# Patient Record
Sex: FEMALE | Race: WHITE | NOT HISPANIC OR LATINO
[De-identification: names, ages, dates, MRNs, and addresses within clinical notes are randomized per-mention and may not be internally consistent; named-entity substitution may affect disease eponyms.]

---

## 2017-12-12 PROBLEM — Z00.00 ENCOUNTER FOR PREVENTIVE HEALTH EXAMINATION: Status: ACTIVE | Noted: 2017-12-12

## 2017-12-18 ENCOUNTER — APPOINTMENT (OUTPATIENT)
Dept: OBGYN | Facility: CLINIC | Age: 62
End: 2017-12-18
Payer: MEDICARE

## 2017-12-18 VITALS
SYSTOLIC BLOOD PRESSURE: 165 MMHG | WEIGHT: 155 LBS | HEIGHT: 61 IN | BODY MASS INDEX: 29.27 KG/M2 | DIASTOLIC BLOOD PRESSURE: 87 MMHG

## 2017-12-18 DIAGNOSIS — Z87.09 PERSONAL HISTORY OF OTHER DISEASES OF THE RESPIRATORY SYSTEM: ICD-10-CM

## 2017-12-18 DIAGNOSIS — N39.46 MIXED INCONTINENCE: ICD-10-CM

## 2017-12-18 DIAGNOSIS — Z01.419 ENCOUNTER FOR GYNECOLOGICAL EXAMINATION (GENERAL) (ROUTINE) W/OUT ABNORMAL FINDINGS: ICD-10-CM

## 2017-12-18 DIAGNOSIS — Z82.3 FAMILY HISTORY OF STROKE: ICD-10-CM

## 2017-12-18 DIAGNOSIS — Z82.49 FAMILY HISTORY OF ISCHEMIC HEART DISEASE AND OTHER DISEASES OF THE CIRCULATORY SYSTEM: ICD-10-CM

## 2017-12-18 DIAGNOSIS — Z86.19 PERSONAL HISTORY OF OTHER INFECTIOUS AND PARASITIC DISEASES: ICD-10-CM

## 2017-12-18 DIAGNOSIS — N89.8 OTHER SPECIFIED NONINFLAMMATORY DISORDERS OF VAGINA: ICD-10-CM

## 2017-12-18 PROCEDURE — 99386 PREV VISIT NEW AGE 40-64: CPT

## 2017-12-18 PROCEDURE — G0101: CPT

## 2017-12-22 LAB
CANDIDA VAG CYTO: DETECTED
CYTOLOGY CVX/VAG DOC THIN PREP: NORMAL
G VAGINALIS+PREV SP MTYP VAG QL MICRO: NOT DETECTED
T VAGINALIS VAG QL WET PREP: NOT DETECTED

## 2018-01-05 PROBLEM — N39.46 URGE AND STRESS INCONTINENCE: Status: ACTIVE | Noted: 2018-01-05

## 2020-02-27 ENCOUNTER — NON-APPOINTMENT (OUTPATIENT)
Age: 65
End: 2020-02-27

## 2020-02-27 ENCOUNTER — APPOINTMENT (OUTPATIENT)
Dept: FAMILY MEDICINE | Facility: CLINIC | Age: 65
End: 2020-02-27
Payer: MEDICARE

## 2020-02-27 VITALS
HEART RATE: 75 BPM | SYSTOLIC BLOOD PRESSURE: 144 MMHG | WEIGHT: 160 LBS | TEMPERATURE: 98.2 F | DIASTOLIC BLOOD PRESSURE: 80 MMHG | OXYGEN SATURATION: 94 % | HEIGHT: 61 IN | BODY MASS INDEX: 30.21 KG/M2 | RESPIRATION RATE: 16 BRPM

## 2020-02-27 DIAGNOSIS — Z86.39 PERSONAL HISTORY OF OTHER ENDOCRINE, NUTRITIONAL AND METABOLIC DISEASE: ICD-10-CM

## 2020-02-27 DIAGNOSIS — J06.9 ACUTE UPPER RESPIRATORY INFECTION, UNSPECIFIED: ICD-10-CM

## 2020-02-27 PROCEDURE — 99203 OFFICE O/P NEW LOW 30 MIN: CPT

## 2020-02-27 RX ORDER — METRONIDAZOLE 7.5 MG/G
0.75 GEL VAGINAL
Qty: 1 | Refills: 1 | Status: DISCONTINUED | COMMUNITY
Start: 2017-12-18 | End: 2020-02-27

## 2020-02-27 RX ORDER — TERCONAZOLE 4 MG/G
0.4 CREAM VAGINAL
Qty: 1 | Refills: 2 | Status: DISCONTINUED | COMMUNITY
Start: 2017-12-22 | End: 2020-02-27

## 2020-02-27 RX ORDER — OXYBUTYNIN 3.9 MG/D
3.9 PATCH TRANSDERMAL WEEKLY
Qty: 8 | Refills: 0 | Status: DISCONTINUED | COMMUNITY
Start: 2018-01-05 | End: 2020-02-27

## 2020-02-28 NOTE — HISTORY OF PRESENT ILLNESS
[FreeTextEntry8] : Pt is new, C/O cough, congestion, sore throat for 1 month \par Pt C/O Itchiness below abdomen line \par [facility] \par \par Reveiw of symptoms\par as above \par stable alert NAD\par no acute changes\par

## 2020-02-28 NOTE — HEALTH RISK ASSESSMENT
[] : Yes [No] : No [No falls in past year] : Patient reported no falls in the past year [0] : 2) Feeling down, depressed, or hopeless: Not at all (0) [Audit-CScore] : 0 [[id number]] : 0

## 2020-02-28 NOTE — PLAN
[FreeTextEntry1] : medications as directed.\par supportive care\par fluids, hydration, rest\par 1 week fu

## 2020-03-12 ENCOUNTER — APPOINTMENT (OUTPATIENT)
Dept: FAMILY MEDICINE | Facility: CLINIC | Age: 65
End: 2020-03-12
Payer: MEDICARE

## 2020-03-12 ENCOUNTER — RESULT CHARGE (OUTPATIENT)
Age: 65
End: 2020-03-12

## 2020-03-12 VITALS
SYSTOLIC BLOOD PRESSURE: 144 MMHG | RESPIRATION RATE: 16 BRPM | TEMPERATURE: 98.2 F | HEART RATE: 70 BPM | BODY MASS INDEX: 30.21 KG/M2 | OXYGEN SATURATION: 99 % | DIASTOLIC BLOOD PRESSURE: 80 MMHG | HEIGHT: 61 IN | WEIGHT: 160 LBS

## 2020-03-12 DIAGNOSIS — B37.2 CANDIDIASIS OF SKIN AND NAIL: ICD-10-CM

## 2020-03-12 PROCEDURE — 82044 UR ALBUMIN SEMIQUANTITATIVE: CPT | Mod: QW

## 2020-03-12 PROCEDURE — 99497 ADVNCD CARE PLAN 30 MIN: CPT

## 2020-03-12 PROCEDURE — 81003 URINALYSIS AUTO W/O SCOPE: CPT | Mod: QW

## 2020-03-12 PROCEDURE — 36415 COLL VENOUS BLD VENIPUNCTURE: CPT

## 2020-03-12 PROCEDURE — 99215 OFFICE O/P EST HI 40 MIN: CPT | Mod: 25

## 2020-03-12 RX ORDER — AZITHROMYCIN 250 MG/1
250 TABLET, FILM COATED ORAL
Qty: 1 | Refills: 0 | Status: DISCONTINUED | COMMUNITY
Start: 2020-02-27 | End: 2020-03-12

## 2020-03-12 RX ORDER — NAPROXEN 500 MG/1
500 TABLET ORAL
Refills: 0 | Status: DISCONTINUED | COMMUNITY
End: 2020-03-12

## 2020-03-12 NOTE — HISTORY OF PRESENT ILLNESS
[FreeTextEntry1] : Pt here for follow up to discuss medications.\par Pt requesting refills on Loratadine and [employer]estyle [employer].\par [facility] for meds\par [facility] for [employer]  [de-identified] : Caridad does not have a clear understanding of how to use her Novolog. She uses 14-16 units with dinner. takes Lantus at night and does not eat breakfast. She states she knows when she has to eat lunch because she will get shaky. she then drinks OJ, eats lunch and feels better

## 2020-03-12 NOTE — PHYSICAL EXAM
[Normal] : affect was normal and insight and judgment were intact [de-identified] : poor dentition [de-identified] : red rash in inguinal region

## 2020-03-12 NOTE — PLAN
[FreeTextEntry1] : Pt to contact office with updated list of preferred formulary diabetes medicines and name of mail order pharmacy\par \par Pt will follow up with psychiatrist next month. I have educated her regarding polypharmacy and the need to decrease/taper her Benzodiazepine dose. She will try to take 1/2 tab in the am and 1 hs

## 2020-03-12 NOTE — PHYSICAL EXAM
[Normal] : affect was normal and insight and judgment were intact [de-identified] : poor dentition [de-identified] : red rash in inguinal region

## 2020-03-12 NOTE — HISTORY OF PRESENT ILLNESS
[FreeTextEntry1] : Pt here for follow up to discuss medications.\par Pt requesting refills on Loratadine and [employer].\par [facility] for meds\par [facility] for [employer]  [de-identified] : Caridad does not have a clear understanding of how to use her Novolog. She uses 14-16 units with dinner. takes Lantus at night and does not eat breakfast. She states she knows when she has to eat lunch because she will get shaky. she then drinks OJ, eats lunch and feels better

## 2020-03-12 NOTE — ASSESSMENT
[FreeTextEntry1] : Pt has fragile IDDM. I strongly urged her to eat breakfast and see endocrine to adjust her Novolog scale

## 2020-03-13 LAB
ALBUMIN SERPL ELPH-MCNC: 3.8 G/DL
ALP BLD-CCNC: 134 U/L
ALT SERPL-CCNC: 6 U/L
ANION GAP SERPL CALC-SCNC: 12 MMOL/L
AST SERPL-CCNC: 35 U/L
BASOPHILS # BLD AUTO: 0.03 K/UL
BASOPHILS NFR BLD AUTO: 0.9 %
BILIRUB SERPL-MCNC: 1.7 MG/DL
BUN SERPL-MCNC: 12 MG/DL
CALCIUM SERPL-MCNC: 8.7 MG/DL
CHLORIDE SERPL-SCNC: 104 MMOL/L
CHOLEST SERPL-MCNC: 108 MG/DL
CHOLEST/HDLC SERPL: 2.9 RATIO
CO2 SERPL-SCNC: 26 MMOL/L
CREAT SERPL-MCNC: 0.62 MG/DL
EOSINOPHIL # BLD AUTO: 0.09 K/UL
EOSINOPHIL NFR BLD AUTO: 2.7 %
GLUCOSE SERPL-MCNC: 145 MG/DL
HCT VFR BLD CALC: 44.4 %
HDLC SERPL-MCNC: 37 MG/DL
HGB BLD-MCNC: 14.3 G/DL
IMM GRANULOCYTES NFR BLD AUTO: 0.3 %
LDLC SERPL CALC-MCNC: 41 MG/DL
LYMPHOCYTES # BLD AUTO: 0.62 K/UL
LYMPHOCYTES NFR BLD AUTO: 18.4 %
MAN DIFF?: NORMAL
MCHC RBC-ENTMCNC: 32.2 GM/DL
MCHC RBC-ENTMCNC: 33.2 PG
MCV RBC AUTO: 103 FL
MONOCYTES # BLD AUTO: 0.42 K/UL
MONOCYTES NFR BLD AUTO: 12.5 %
NEUTROPHILS # BLD AUTO: 2.2 K/UL
NEUTROPHILS NFR BLD AUTO: 65.2 %
PLATELET # BLD AUTO: 54 K/UL
POTASSIUM SERPL-SCNC: 4.6 MMOL/L
PROT SERPL-MCNC: 6.5 G/DL
RBC # BLD: 4.31 M/UL
RBC # FLD: 14.6 %
SODIUM SERPL-SCNC: 142 MMOL/L
T4 FREE SERPL-MCNC: 0.9 NG/DL
TRIGL SERPL-MCNC: 152 MG/DL
TSH SERPL-ACNC: 2.97 UIU/ML
WBC # FLD AUTO: 3.37 K/UL

## 2020-03-14 LAB — BACTERIA UR CULT: NORMAL

## 2020-05-11 ENCOUNTER — RX RENEWAL (OUTPATIENT)
Age: 65
End: 2020-05-11

## 2020-05-20 ENCOUNTER — APPOINTMENT (OUTPATIENT)
Dept: FAMILY MEDICINE | Facility: CLINIC | Age: 65
End: 2020-05-20
Payer: MEDICARE

## 2020-05-20 VITALS
RESPIRATION RATE: 16 BRPM | HEART RATE: 73 BPM | WEIGHT: 150 LBS | HEIGHT: 61 IN | DIASTOLIC BLOOD PRESSURE: 66 MMHG | TEMPERATURE: 98.2 F | BODY MASS INDEX: 28.32 KG/M2 | SYSTOLIC BLOOD PRESSURE: 130 MMHG | OXYGEN SATURATION: 97 %

## 2020-05-20 DIAGNOSIS — Z23 ENCOUNTER FOR IMMUNIZATION: ICD-10-CM

## 2020-05-20 DIAGNOSIS — Z03.818 ENCOUNTER FOR OBSERVATION FOR SUSPECTED EXPOSURE TO OTHER BIOLOGICAL AGENTS RULED OUT: ICD-10-CM

## 2020-05-20 PROCEDURE — 90670 PCV13 VACCINE IM: CPT

## 2020-05-20 PROCEDURE — 99214 OFFICE O/P EST MOD 30 MIN: CPT | Mod: 25

## 2020-05-20 PROCEDURE — G0009: CPT

## 2020-05-20 PROCEDURE — 36415 COLL VENOUS BLD VENIPUNCTURE: CPT

## 2020-05-20 RX ORDER — SITAGLIPTIN 100 MG/1
100 TABLET, FILM COATED ORAL
Refills: 0 | Status: DISCONTINUED | COMMUNITY
End: 2020-05-20

## 2020-05-20 NOTE — PHYSICAL EXAM
[No Acute Distress] : no acute distress [Well Nourished] : well nourished [Well Developed] : well developed [Well-Appearing] : well-appearing [Normal Sclera/Conjunctiva] : normal sclera/conjunctiva [PERRL] : pupils equal round and reactive to light [EOMI] : extraocular movements intact [Normal Outer Ear/Nose] : the outer ears and nose were normal in appearance [No JVD] : no jugular venous distention [No Lymphadenopathy] : no lymphadenopathy [Supple] : supple [Thyroid Normal, No Nodules] : the thyroid was normal and there were no nodules present [No Respiratory Distress] : no respiratory distress  [No Accessory Muscle Use] : no accessory muscle use [Normal Rate] : normal rate  [Clear to Auscultation] : lungs were clear to auscultation bilaterally [Regular Rhythm] : with a regular rhythm [Normal S1, S2] : normal S1 and S2 [No Murmur] : no murmur heard [No Extremity Clubbing/Cyanosis] : no extremity clubbing/cyanosis [No Edema] : there was no peripheral edema [No Joint Swelling] : no joint swelling [Grossly Normal Strength/Tone] : grossly normal strength/tone [Coordination Grossly Intact] : coordination grossly intact [No Focal Deficits] : no focal deficits [Normal Gait] : normal gait [Normal Affect] : the affect was normal [Alert and Oriented x3] : oriented to person, place, and time [Normal Insight/Judgement] : insight and judgment were intact

## 2020-05-20 NOTE — HISTORY OF PRESENT ILLNESS
[FreeTextEntry1] : refill metformin\par [facility] \par [facility] [de-identified] : Freida has had a poor appetite due to not seeing her grandchildren during the pandemic and has lost 10#. She presents for FBW and refill of metformin. She is now eating a little breakfast

## 2020-05-21 LAB
ALBUMIN SERPL ELPH-MCNC: 4.1 G/DL
ALP BLD-CCNC: 118 U/L
ALT SERPL-CCNC: 12 U/L
ANION GAP SERPL CALC-SCNC: 13 MMOL/L
AST SERPL-CCNC: 43 U/L
BILIRUB SERPL-MCNC: 1.1 MG/DL
BUN SERPL-MCNC: 27 MG/DL
CALCIUM SERPL-MCNC: 9.5 MG/DL
CHLORIDE SERPL-SCNC: 98 MMOL/L
CHOLEST SERPL-MCNC: 105 MG/DL
CHOLEST/HDLC SERPL: 2.7 RATIO
CO2 SERPL-SCNC: 31 MMOL/L
CREAT SERPL-MCNC: 1.46 MG/DL
ESTIMATED AVERAGE GLUCOSE: 137 MG/DL
GLUCOSE SERPL-MCNC: 134 MG/DL
HBA1C MFR BLD HPLC: 6.4 %
HDLC SERPL-MCNC: 38 MG/DL
LDLC SERPL CALC-MCNC: 40 MG/DL
POTASSIUM SERPL-SCNC: 4.8 MMOL/L
PROT SERPL-MCNC: 6.9 G/DL
SARS-COV-2 IGG SERPL IA-ACNC: 0.1 INDEX
SARS-COV-2 IGG SERPL QL IA: NEGATIVE
SODIUM SERPL-SCNC: 142 MMOL/L
TRIGL SERPL-MCNC: 133 MG/DL

## 2020-05-29 ENCOUNTER — APPOINTMENT (OUTPATIENT)
Dept: FAMILY MEDICINE | Facility: CLINIC | Age: 65
End: 2020-05-29
Payer: MEDICARE

## 2020-05-29 VITALS
RESPIRATION RATE: 16 BRPM | TEMPERATURE: 97.4 F | DIASTOLIC BLOOD PRESSURE: 60 MMHG | OXYGEN SATURATION: 97 % | BODY MASS INDEX: 28.7 KG/M2 | HEART RATE: 69 BPM | SYSTOLIC BLOOD PRESSURE: 126 MMHG | WEIGHT: 152 LBS | HEIGHT: 61 IN

## 2020-05-29 PROCEDURE — 99214 OFFICE O/P EST MOD 30 MIN: CPT | Mod: 25

## 2020-05-29 PROCEDURE — 36415 COLL VENOUS BLD VENIPUNCTURE: CPT

## 2020-05-29 NOTE — PHYSICAL EXAM
[Normal] : normal sclera/conjunctiva, pupils are equal, round and reactive to light and extraocular movements are intact [de-identified] : slightly wide based gait

## 2020-05-29 NOTE — PLAN
[FreeTextEntry1] : increase water intake to 3 bottles daily\par Use cane when walking for exercise daily with spouse

## 2020-05-31 LAB
ANION GAP SERPL CALC-SCNC: 11 MMOL/L
BUN SERPL-MCNC: 26 MG/DL
CALCIUM SERPL-MCNC: 9.3 MG/DL
CHLORIDE SERPL-SCNC: 104 MMOL/L
CO2 SERPL-SCNC: 27 MMOL/L
CREAT SERPL-MCNC: 0.89 MG/DL
GLUCOSE SERPL-MCNC: 231 MG/DL
POTASSIUM SERPL-SCNC: 4.6 MMOL/L
SODIUM SERPL-SCNC: 142 MMOL/L

## 2020-06-05 DIAGNOSIS — R26.81 UNSTEADINESS ON FEET: ICD-10-CM

## 2020-07-17 ENCOUNTER — APPOINTMENT (OUTPATIENT)
Dept: FAMILY MEDICINE | Facility: CLINIC | Age: 65
End: 2020-07-17
Payer: MEDICARE

## 2020-07-17 VITALS
HEART RATE: 72 BPM | SYSTOLIC BLOOD PRESSURE: 118 MMHG | BODY MASS INDEX: 28.32 KG/M2 | WEIGHT: 150 LBS | RESPIRATION RATE: 15 BRPM | OXYGEN SATURATION: 98 % | HEIGHT: 61 IN | DIASTOLIC BLOOD PRESSURE: 62 MMHG | TEMPERATURE: 98.1 F

## 2020-07-17 DIAGNOSIS — M62.838 OTHER MUSCLE SPASM: ICD-10-CM

## 2020-07-17 PROCEDURE — 36415 COLL VENOUS BLD VENIPUNCTURE: CPT

## 2020-07-17 PROCEDURE — 99214 OFFICE O/P EST MOD 30 MIN: CPT | Mod: 25

## 2020-07-17 NOTE — HEALTH RISK ASSESSMENT
[de-identified] : Psychiatrist (telephone visit) and Opthalmology (diabetic eye exam, routine exam)

## 2020-07-17 NOTE — PHYSICAL EXAM
[Normal] : no joint swelling and grossly normal strength and tone [No Edema] : there was no peripheral edema [Soft] : abdomen soft [de-identified] : protuberant/truncal obesity

## 2020-07-17 NOTE — HISTORY OF PRESENT ILLNESS
[FreeTextEntry1] : Patient is here for blood work, pt is not fasting \par seeing GI 7/21/20 Татьяна Pearson [de-identified] : Freida has a history of liver disease and presents today for followup. She states she had improved water intake but is recently slacking off and has been experiencing muscle spasms in lower extremities HS

## 2020-07-20 LAB
ALBUMIN SERPL ELPH-MCNC: 3.9 G/DL
ALP BLD-CCNC: 127 U/L
ALT SERPL-CCNC: 11 U/L
ANION GAP SERPL CALC-SCNC: 18 MMOL/L
AST SERPL-CCNC: 49 U/L
BILIRUB SERPL-MCNC: 1.4 MG/DL
BUN SERPL-MCNC: 14 MG/DL
CALCIUM SERPL-MCNC: 8.7 MG/DL
CHLORIDE SERPL-SCNC: 103 MMOL/L
CO2 SERPL-SCNC: 23 MMOL/L
CREAT SERPL-MCNC: 0.92 MG/DL
GLUCOSE SERPL-MCNC: 163 MG/DL
HCV RNA SERPL NAA DL=5-ACNC: NOT DETECTED IU/ML
HCV RNA SERPL NAA+PROBE-LOG IU: NOT DETECTED LOG10IU/ML
POTASSIUM SERPL-SCNC: 4.2 MMOL/L
PROT SERPL-MCNC: 6.4 G/DL
SODIUM SERPL-SCNC: 144 MMOL/L

## 2020-08-03 ENCOUNTER — RESULT REVIEW (OUTPATIENT)
Age: 65
End: 2020-08-03

## 2020-08-03 ENCOUNTER — APPOINTMENT (OUTPATIENT)
Dept: ULTRASOUND IMAGING | Facility: CLINIC | Age: 65
End: 2020-08-03
Payer: MEDICARE

## 2020-08-03 PROCEDURE — 76705 ECHO EXAM OF ABDOMEN: CPT

## 2020-08-19 ENCOUNTER — APPOINTMENT (OUTPATIENT)
Dept: FAMILY MEDICINE | Facility: CLINIC | Age: 65
End: 2020-08-19

## 2020-08-31 ENCOUNTER — RX RENEWAL (OUTPATIENT)
Age: 65
End: 2020-08-31

## 2020-09-03 ENCOUNTER — RX RENEWAL (OUTPATIENT)
Age: 65
End: 2020-09-03

## 2020-09-04 ENCOUNTER — APPOINTMENT (OUTPATIENT)
Dept: FAMILY MEDICINE | Facility: CLINIC | Age: 65
End: 2020-09-04
Payer: MEDICARE

## 2020-09-04 VITALS
HEART RATE: 69 BPM | WEIGHT: 159 LBS | SYSTOLIC BLOOD PRESSURE: 120 MMHG | RESPIRATION RATE: 16 BRPM | OXYGEN SATURATION: 96 % | DIASTOLIC BLOOD PRESSURE: 80 MMHG | HEIGHT: 61 IN | BODY MASS INDEX: 30.02 KG/M2 | TEMPERATURE: 98.7 F

## 2020-09-04 PROCEDURE — 90662 IIV NO PRSV INCREASED AG IM: CPT

## 2020-09-04 PROCEDURE — G0008: CPT

## 2020-09-04 PROCEDURE — 99214 OFFICE O/P EST MOD 30 MIN: CPT | Mod: 25

## 2020-09-04 PROCEDURE — 36415 COLL VENOUS BLD VENIPUNCTURE: CPT

## 2020-09-04 NOTE — HISTORY OF PRESENT ILLNESS
[FreeTextEntry1] : flu vaccine \par would like to discuss bloodwork \par [facility]  [de-identified] : Caridad presents for FBW and flu vaccine

## 2020-09-04 NOTE — PHYSICAL EXAM
[Non Tender] : non-tender [Soft] : abdomen soft [Normal] : affect was normal and insight and judgment were intact [de-identified] : protuberant, distended

## 2020-09-06 LAB
ALBUMIN SERPL ELPH-MCNC: 3.8 G/DL
ALP BLD-CCNC: 112 U/L
ALT SERPL-CCNC: 10 U/L
ANION GAP SERPL CALC-SCNC: 10 MMOL/L
AST SERPL-CCNC: 37 U/L
BILIRUB SERPL-MCNC: 1 MG/DL
BUN SERPL-MCNC: 15 MG/DL
CALCIUM SERPL-MCNC: 9.2 MG/DL
CHLORIDE SERPL-SCNC: 106 MMOL/L
CHOLEST SERPL-MCNC: 109 MG/DL
CHOLEST/HDLC SERPL: 2.1 RATIO
CO2 SERPL-SCNC: 25 MMOL/L
CREAT SERPL-MCNC: 0.86 MG/DL
ESTIMATED AVERAGE GLUCOSE: 134 MG/DL
GLUCOSE SERPL-MCNC: 123 MG/DL
HBA1C MFR BLD HPLC: 6.3 %
HDLC SERPL-MCNC: 53 MG/DL
LDLC SERPL CALC-MCNC: 39 MG/DL
POTASSIUM SERPL-SCNC: 4.6 MMOL/L
PROT SERPL-MCNC: 6.2 G/DL
SODIUM SERPL-SCNC: 142 MMOL/L
TRIGL SERPL-MCNC: 87 MG/DL

## 2020-10-20 ENCOUNTER — OUTPATIENT (OUTPATIENT)
Dept: OUTPATIENT SERVICES | Facility: HOSPITAL | Age: 65
LOS: 1 days | End: 2020-10-20

## 2020-10-29 ENCOUNTER — RX RENEWAL (OUTPATIENT)
Age: 65
End: 2020-10-29

## 2020-12-04 ENCOUNTER — LABORATORY RESULT (OUTPATIENT)
Age: 65
End: 2020-12-04

## 2020-12-04 ENCOUNTER — APPOINTMENT (OUTPATIENT)
Dept: FAMILY MEDICINE | Facility: CLINIC | Age: 65
End: 2020-12-04
Payer: MEDICARE

## 2020-12-04 VITALS
HEIGHT: 61 IN | HEART RATE: 78 BPM | BODY MASS INDEX: 30.58 KG/M2 | WEIGHT: 162 LBS | RESPIRATION RATE: 16 BRPM | TEMPERATURE: 97 F | SYSTOLIC BLOOD PRESSURE: 134 MMHG | DIASTOLIC BLOOD PRESSURE: 68 MMHG

## 2020-12-04 DIAGNOSIS — N28.9 DISORDER OF KIDNEY AND URETER, UNSPECIFIED: ICD-10-CM

## 2020-12-04 DIAGNOSIS — J30.89 OTHER ALLERGIC RHINITIS: ICD-10-CM

## 2020-12-04 PROCEDURE — 99214 OFFICE O/P EST MOD 30 MIN: CPT | Mod: 25

## 2020-12-04 PROCEDURE — 36415 COLL VENOUS BLD VENIPUNCTURE: CPT

## 2020-12-04 NOTE — HISTORY OF PRESENT ILLNESS
[FreeTextEntry1] : 3M follow up, fbw \par needs refills on lisinopril, ursodiol and loratadine \par [facility]  [de-identified] : Natalie presents for refill of medication and FBW for cholesterol, diabetes

## 2020-12-04 NOTE — PHYSICAL EXAM
[No Acute Distress] : no acute distress [Well Nourished] : well nourished [Well Developed] : well developed [Well-Appearing] : well-appearing [Normal Sclera/Conjunctiva] : normal sclera/conjunctiva [PERRL] : pupils equal round and reactive to light [EOMI] : extraocular movements intact [Normal Outer Ear/Nose] : the outer ears and nose were normal in appearance [Normal Oropharynx] : the oropharynx was normal [No JVD] : no jugular venous distention [No Lymphadenopathy] : no lymphadenopathy [Supple] : supple [Thyroid Normal, No Nodules] : the thyroid was normal and there were no nodules present [No Respiratory Distress] : no respiratory distress  [No Accessory Muscle Use] : no accessory muscle use [Clear to Auscultation] : lungs were clear to auscultation bilaterally [Normal Rate] : normal rate  [Regular Rhythm] : with a regular rhythm [Normal S1, S2] : normal S1 and S2 [No Edema] : there was no peripheral edema [No Extremity Clubbing/Cyanosis] : no extremity clubbing/cyanosis [No Masses] : no abdominal mass palpated [Normal Posterior Cervical Nodes] : no posterior cervical lymphadenopathy [Normal Anterior Cervical Nodes] : no anterior cervical lymphadenopathy [No CVA Tenderness] : no CVA  tenderness [No Spinal Tenderness] : no spinal tenderness [No Joint Swelling] : no joint swelling [Grossly Normal Strength/Tone] : grossly normal strength/tone [No Rash] : no rash [Coordination Grossly Intact] : coordination grossly intact [No Focal Deficits] : no focal deficits [Normal Gait] : normal gait [Normal Affect] : the affect was normal [Alert and Oriented x3] : oriented to person, place, and time [Normal Insight/Judgement] : insight and judgment were intact

## 2020-12-06 LAB
ALBUMIN SERPL ELPH-MCNC: 3.7 G/DL
ALP BLD-CCNC: 133 U/L
ALT SERPL-CCNC: 10 U/L
ANION GAP SERPL CALC-SCNC: 10 MMOL/L
AST SERPL-CCNC: 42 U/L
BASOPHILS # BLD AUTO: 0.03 K/UL
BASOPHILS NFR BLD AUTO: 1.1 %
BILIRUB SERPL-MCNC: 0.8 MG/DL
BUN SERPL-MCNC: 19 MG/DL
CALCIUM SERPL-MCNC: 9.4 MG/DL
CHLORIDE SERPL-SCNC: 106 MMOL/L
CHOLEST SERPL-MCNC: 101 MG/DL
CO2 SERPL-SCNC: 26 MMOL/L
CREAT SERPL-MCNC: 0.92 MG/DL
EOSINOPHIL # BLD AUTO: 0.06 K/UL
EOSINOPHIL NFR BLD AUTO: 2.1 %
ESTIMATED AVERAGE GLUCOSE: 146 MG/DL
GLUCOSE SERPL-MCNC: 162 MG/DL
HBA1C MFR BLD HPLC: 6.7 %
HCT VFR BLD CALC: 31.9 %
HDLC SERPL-MCNC: 38 MG/DL
HGB BLD-MCNC: 9.6 G/DL
IMM GRANULOCYTES NFR BLD AUTO: 0.4 %
LDLC SERPL CALC-MCNC: 37 MG/DL
LYMPHOCYTES # BLD AUTO: 0.48 K/UL
LYMPHOCYTES NFR BLD AUTO: 17.1 %
MAN DIFF?: NORMAL
MCHC RBC-ENTMCNC: 26.2 PG
MCHC RBC-ENTMCNC: 30.1 GM/DL
MCV RBC AUTO: 87.2 FL
MONOCYTES # BLD AUTO: 0.3 K/UL
MONOCYTES NFR BLD AUTO: 10.7 %
NEUTROPHILS # BLD AUTO: 1.92 K/UL
NEUTROPHILS NFR BLD AUTO: 68.6 %
NONHDLC SERPL-MCNC: 64 MG/DL
PLATELET # BLD AUTO: 60 K/UL
POTASSIUM SERPL-SCNC: 4.7 MMOL/L
PROT SERPL-MCNC: 6.4 G/DL
RBC # BLD: 3.66 M/UL
RBC # FLD: 16 %
SODIUM SERPL-SCNC: 142 MMOL/L
TRIGL SERPL-MCNC: 131 MG/DL
WBC # FLD AUTO: 2.8 K/UL

## 2020-12-13 ENCOUNTER — APPOINTMENT (OUTPATIENT)
Dept: DISASTER EMERGENCY | Facility: CLINIC | Age: 65
End: 2020-12-13

## 2020-12-15 PROBLEM — Z86.19 HISTORY OF CANDIDIASIS OF VAGINA: Status: RESOLVED | Noted: 2017-12-22 | Resolved: 2020-12-15

## 2020-12-23 PROBLEM — J06.9 ACUTE URI: Status: RESOLVED | Noted: 2020-02-27 | Resolved: 2020-12-23

## 2021-01-26 ENCOUNTER — APPOINTMENT (OUTPATIENT)
Dept: FAMILY MEDICINE | Facility: CLINIC | Age: 66
End: 2021-01-26
Payer: MEDICARE

## 2021-01-26 PROCEDURE — 99442: CPT | Mod: 95

## 2021-01-26 NOTE — PLAN
[FreeTextEntry1] : Urged pt to notify me immediately if PCR is +Time start 11:14\par Time end 11:20am\par \par Total time 11min 11:26am

## 2021-01-26 NOTE — HISTORY OF PRESENT ILLNESS
[Home] : at home, [unfilled] , at the time of the visit. [Medical Office: ([facility])___] : at the medical office located in  [Verbal consent obtained from patient] : the patient, [unfilled] [FreeTextEntry8] : Natalie states she has loose BMs, headache, sore throat, left ear pressure and green mucous x 1 week. She reports a neg rapid covid test at urgent care 1/25, PCR pending\par Kaitlin cooper is diabetic, she denies cough,fever or SOB

## 2021-03-04 ENCOUNTER — RX RENEWAL (OUTPATIENT)
Age: 66
End: 2021-03-04

## 2021-03-05 ENCOUNTER — NON-APPOINTMENT (OUTPATIENT)
Age: 66
End: 2021-03-05

## 2021-03-05 ENCOUNTER — APPOINTMENT (OUTPATIENT)
Dept: FAMILY MEDICINE | Facility: CLINIC | Age: 66
End: 2021-03-05
Payer: MEDICARE

## 2021-03-05 ENCOUNTER — LABORATORY RESULT (OUTPATIENT)
Age: 66
End: 2021-03-05

## 2021-03-05 VITALS
TEMPERATURE: 97.4 F | OXYGEN SATURATION: 97 % | HEART RATE: 74 BPM | HEIGHT: 61 IN | DIASTOLIC BLOOD PRESSURE: 82 MMHG | SYSTOLIC BLOOD PRESSURE: 132 MMHG | RESPIRATION RATE: 16 BRPM

## 2021-03-05 PROCEDURE — 93000 ELECTROCARDIOGRAM COMPLETE: CPT

## 2021-03-05 PROCEDURE — 99214 OFFICE O/P EST MOD 30 MIN: CPT | Mod: 25

## 2021-03-05 PROCEDURE — 36415 COLL VENOUS BLD VENIPUNCTURE: CPT

## 2021-03-05 PROCEDURE — 81003 URINALYSIS AUTO W/O SCOPE: CPT | Mod: QW

## 2021-03-05 PROCEDURE — 82044 UR ALBUMIN SEMIQUANTITATIVE: CPT | Mod: QW

## 2021-03-05 NOTE — HEALTH RISK ASSESSMENT
[] : Yes [16-20] : 16-20 [No] : In the past 12 months have you used drugs other than those required for medical reasons? No [Two or more falls in past year] : Patient reported two or more falls in the past year [2] : 1) Little interest or pleasure doing things for more than half of the days (2) [3] : 2) Feeling down, depressed, or hopeless for nearly every day (3) [Audit-CScore] : 0 [QZD1Frarc] : 5

## 2021-03-05 NOTE — PHYSICAL EXAM
Spontaneous, unlabored and symmetrical [No Extremity Clubbing/Cyanosis] : no extremity clubbing/cyanosis [Coordination Grossly Intact] : coordination grossly intact [No Focal Deficits] : no focal deficits [Normal Gait] : normal gait [Alert and Oriented x3] : oriented to person, place, and time [Normal] : affect was normal and insight and judgment were intact [de-identified] : trace edema of ankles

## 2021-03-05 NOTE — HISTORY OF PRESENT ILLNESS
[FreeTextEntry1] : FBW \par pt would like alprazolam, metformin refilled \par [facility]

## 2021-03-06 LAB
ALBUMIN SERPL ELPH-MCNC: 3.5 G/DL
ALP BLD-CCNC: 164 U/L
ALT SERPL-CCNC: 12 U/L
ANION GAP SERPL CALC-SCNC: 13 MMOL/L
AST SERPL-CCNC: 41 U/L
BASOPHILS # BLD AUTO: 0.03 K/UL
BASOPHILS NFR BLD AUTO: 0.8 %
BILIRUB DIRECT SERPL-MCNC: 0.6 MG/DL
BILIRUB INDIRECT SERPL-MCNC: 0.4 MG/DL
BILIRUB SERPL-MCNC: 1 MG/DL
BUN SERPL-MCNC: 14 MG/DL
CALCIUM SERPL-MCNC: 9 MG/DL
CHLORIDE SERPL-SCNC: 104 MMOL/L
CHOLEST SERPL-MCNC: 110 MG/DL
CO2 SERPL-SCNC: 25 MMOL/L
CREAT SERPL-MCNC: 0.78 MG/DL
EOSINOPHIL # BLD AUTO: 0.12 K/UL
EOSINOPHIL NFR BLD AUTO: 3.1 %
ESTIMATED AVERAGE GLUCOSE: 131 MG/DL
GLUCOSE SERPL-MCNC: 102 MG/DL
HBA1C MFR BLD HPLC: 6.2 %
HCT VFR BLD CALC: 41.9 %
HDLC SERPL-MCNC: 47 MG/DL
HGB BLD-MCNC: 13 G/DL
IMM GRANULOCYTES NFR BLD AUTO: 0.3 %
LDLC SERPL CALC-MCNC: 46 MG/DL
LYMPHOCYTES # BLD AUTO: 0.49 K/UL
LYMPHOCYTES NFR BLD AUTO: 12.7 %
MAN DIFF?: NORMAL
MCHC RBC-ENTMCNC: 30.7 PG
MCHC RBC-ENTMCNC: 31 GM/DL
MCV RBC AUTO: 98.8 FL
MONOCYTES # BLD AUTO: 0.46 K/UL
MONOCYTES NFR BLD AUTO: 11.9 %
NEUTROPHILS # BLD AUTO: 2.75 K/UL
NEUTROPHILS NFR BLD AUTO: 71.2 %
NONHDLC SERPL-MCNC: 62 MG/DL
PLATELET # BLD AUTO: 60 K/UL
POTASSIUM SERPL-SCNC: 4.2 MMOL/L
PROT SERPL-MCNC: 6 G/DL
RBC # BLD: 4.24 M/UL
RBC # FLD: 16.2 %
SODIUM SERPL-SCNC: 142 MMOL/L
TRIGL SERPL-MCNC: 84 MG/DL
WBC # FLD AUTO: 3.86 K/UL

## 2021-04-26 ENCOUNTER — RX RENEWAL (OUTPATIENT)
Age: 66
End: 2021-04-26

## 2021-06-30 ENCOUNTER — APPOINTMENT (OUTPATIENT)
Dept: FAMILY MEDICINE | Facility: CLINIC | Age: 66
End: 2021-06-30
Payer: MEDICARE

## 2021-06-30 DIAGNOSIS — T88.7XXA UNSPECIFIED ADVERSE EFFECT OF DRUG OR MEDICAMENT, INITIAL ENCOUNTER: ICD-10-CM

## 2021-06-30 PROCEDURE — 99214 OFFICE O/P EST MOD 30 MIN: CPT | Mod: 95

## 2021-07-01 NOTE — PLAN
[FreeTextEntry1] : RTO for fbw\par Instructed pt to restart Lisinopril 20mg daily. Hold for am systolic BP <100.\par Only take xanax for severe anxiety\par F/U 3 months\par Time start 9:15 am\par Time end 9:30 am\par Total time 30 min\par \par

## 2021-07-02 ENCOUNTER — LABORATORY RESULT (OUTPATIENT)
Age: 66
End: 2021-07-02

## 2021-07-02 ENCOUNTER — APPOINTMENT (OUTPATIENT)
Dept: FAMILY MEDICINE | Facility: CLINIC | Age: 66
End: 2021-07-02
Payer: MEDICARE

## 2021-07-02 PROCEDURE — 36415 COLL VENOUS BLD VENIPUNCTURE: CPT

## 2021-07-07 LAB
ALBUMIN SERPL ELPH-MCNC: 4 G/DL
ALP BLD-CCNC: 167 U/L
ALT SERPL-CCNC: 10 U/L
ANION GAP SERPL CALC-SCNC: 13 MMOL/L
AST SERPL-CCNC: 41 U/L
BASOPHILS # BLD AUTO: 0.03 K/UL
BASOPHILS NFR BLD AUTO: 0.9 %
BILIRUB SERPL-MCNC: 1.3 MG/DL
BUN SERPL-MCNC: 13 MG/DL
CALCIUM SERPL-MCNC: 9.5 MG/DL
CHLORIDE SERPL-SCNC: 106 MMOL/L
CHOLEST SERPL-MCNC: 130 MG/DL
CO2 SERPL-SCNC: 24 MMOL/L
CREAT SERPL-MCNC: 0.64 MG/DL
EOSINOPHIL # BLD AUTO: 0.02 K/UL
EOSINOPHIL NFR BLD AUTO: 0.6 %
ESTIMATED AVERAGE GLUCOSE: 108 MG/DL
GLUCOSE SERPL-MCNC: 92 MG/DL
HBA1C MFR BLD HPLC: 5.4 %
HCT VFR BLD CALC: 44.6 %
HDLC SERPL-MCNC: 74 MG/DL
HGB BLD-MCNC: 13.8 G/DL
IMM GRANULOCYTES NFR BLD AUTO: 0 %
LDLC SERPL CALC-MCNC: 41 MG/DL
LYMPHOCYTES # BLD AUTO: 0.35 K/UL
LYMPHOCYTES NFR BLD AUTO: 10.7 %
MAN DIFF?: NORMAL
MCHC RBC-ENTMCNC: 30.9 GM/DL
MCHC RBC-ENTMCNC: 31.8 PG
MCV RBC AUTO: 102.8 FL
MONOCYTES # BLD AUTO: 0.37 K/UL
MONOCYTES NFR BLD AUTO: 11.3 %
NEUTROPHILS # BLD AUTO: 2.5 K/UL
NEUTROPHILS NFR BLD AUTO: 76.5 %
NONHDLC SERPL-MCNC: 55 MG/DL
PLATELET # BLD AUTO: 45 K/UL
POTASSIUM SERPL-SCNC: 4.9 MMOL/L
PROT SERPL-MCNC: 6.7 G/DL
RBC # BLD: 4.34 M/UL
RBC # FLD: 13.7 %
SODIUM SERPL-SCNC: 143 MMOL/L
TRIGL SERPL-MCNC: 72 MG/DL
WBC # FLD AUTO: 3.27 K/UL

## 2021-08-02 ENCOUNTER — RX RENEWAL (OUTPATIENT)
Age: 66
End: 2021-08-02

## 2021-08-09 ENCOUNTER — RX RENEWAL (OUTPATIENT)
Age: 66
End: 2021-08-09

## 2021-08-12 ENCOUNTER — RX RENEWAL (OUTPATIENT)
Age: 66
End: 2021-08-12

## 2021-09-21 ENCOUNTER — APPOINTMENT (OUTPATIENT)
Dept: DISASTER EMERGENCY | Facility: CLINIC | Age: 66
End: 2021-09-21

## 2021-09-22 LAB — SARS-COV-2 N GENE NPH QL NAA+PROBE: NOT DETECTED

## 2021-09-24 ENCOUNTER — OUTPATIENT (OUTPATIENT)
Dept: OUTPATIENT SERVICES | Facility: HOSPITAL | Age: 66
LOS: 1 days | End: 2021-09-24

## 2021-10-06 ENCOUNTER — LABORATORY RESULT (OUTPATIENT)
Age: 66
End: 2021-10-06

## 2021-10-06 ENCOUNTER — APPOINTMENT (OUTPATIENT)
Dept: FAMILY MEDICINE | Facility: CLINIC | Age: 66
End: 2021-10-06
Payer: MEDICARE

## 2021-10-06 ENCOUNTER — NON-APPOINTMENT (OUTPATIENT)
Age: 66
End: 2021-10-06

## 2021-10-06 ENCOUNTER — MED ADMIN CHARGE (OUTPATIENT)
Age: 66
End: 2021-10-06

## 2021-10-06 VITALS
DIASTOLIC BLOOD PRESSURE: 80 MMHG | HEART RATE: 70 BPM | BODY MASS INDEX: 28.32 KG/M2 | WEIGHT: 150 LBS | SYSTOLIC BLOOD PRESSURE: 120 MMHG | OXYGEN SATURATION: 97 % | HEIGHT: 61 IN | TEMPERATURE: 97 F | RESPIRATION RATE: 16 BRPM

## 2021-10-06 PROCEDURE — 90662 IIV NO PRSV INCREASED AG IM: CPT

## 2021-10-06 PROCEDURE — 82044 UR ALBUMIN SEMIQUANTITATIVE: CPT | Mod: QW

## 2021-10-06 PROCEDURE — 99214 OFFICE O/P EST MOD 30 MIN: CPT | Mod: 25

## 2021-10-06 PROCEDURE — 81003 URINALYSIS AUTO W/O SCOPE: CPT | Mod: QW

## 2021-10-06 PROCEDURE — 36415 COLL VENOUS BLD VENIPUNCTURE: CPT

## 2021-10-06 PROCEDURE — G0008: CPT

## 2021-10-06 RX ORDER — CLARITHROMYCIN 250 MG/1
250 TABLET, FILM COATED ORAL
Qty: 20 | Refills: 0 | Status: COMPLETED | COMMUNITY
Start: 2021-02-04 | End: 2021-10-06

## 2021-10-06 RX ORDER — METFORMIN ER 500 MG 500 MG/1
500 TABLET ORAL
Qty: 90 | Refills: 0 | Status: DISCONTINUED | COMMUNITY
Start: 2020-03-31 | End: 2021-10-06

## 2021-10-06 NOTE — HEALTH RISK ASSESSMENT
[] : Yes [5-9] : 5-9 [Yes] : Yes [2 - 4 times a month (2 pts)] : 2-4 times a month (2 points) [1 or 2 (0 pts)] : 1 or 2 (0 points) [Never (0 pts)] : Never (0 points) [No] : In the past 12 months have you used drugs other than those required for medical reasons? No [No falls in past year] : Patient reported no falls in the past year [0] : 1) Little interest or pleasure doing things: Not at all (0) [2] : 2) Feeling down, depressed, or hopeless for more than half of the days (2) [PHQ-2 Negative - No further assessment needed] : PHQ-2 Negative - No further assessment needed [Audit-CScore] : 2 [ILK1Rsecj] : 2

## 2021-10-06 NOTE — HISTORY OF PRESENT ILLNESS
[FreeTextEntry1] : FBW\par flu vaccine \par pt states she will call if she needs refills \par [facility]  [de-identified] : Natalie feels well. Needs flu vaccine , fasting blood work and refill of Htn meds\par Endo and colonoscopy recently done by Dr Carranza. Will call for report

## 2021-10-06 NOTE — PHYSICAL EXAM
[Normal] : normal rate, regular rhythm, normal S1 and S2 and no murmur heard [No Edema] : there was no peripheral edema [No Extremity Clubbing/Cyanosis] : no extremity clubbing/cyanosis [Soft] : abdomen soft [de-identified] : truncal adiposity [de-identified] : mild abd distension

## 2021-10-07 ENCOUNTER — MED ADMIN CHARGE (OUTPATIENT)
Age: 66
End: 2021-10-07

## 2021-10-08 LAB
ALBUMIN SERPL ELPH-MCNC: 3.7 G/DL
ALP BLD-CCNC: 134 U/L
ALT SERPL-CCNC: 9 U/L
ANION GAP SERPL CALC-SCNC: 9 MMOL/L
AST SERPL-CCNC: 32 U/L
BACTERIA UR CULT: NORMAL
BASOPHILS # BLD AUTO: 0.02 K/UL
BASOPHILS NFR BLD AUTO: 0.8 %
BILIRUB SERPL-MCNC: 1.4 MG/DL
BUN SERPL-MCNC: 11 MG/DL
CALCIUM SERPL-MCNC: 9.1 MG/DL
CHLORIDE SERPL-SCNC: 105 MMOL/L
CHOLEST SERPL-MCNC: 95 MG/DL
CO2 SERPL-SCNC: 28 MMOL/L
CREAT SERPL-MCNC: 0.79 MG/DL
EOSINOPHIL # BLD AUTO: 0.06 K/UL
EOSINOPHIL NFR BLD AUTO: 2.5 %
ESTIMATED AVERAGE GLUCOSE: 131 MG/DL
GLUCOSE SERPL-MCNC: 127 MG/DL
HBA1C MFR BLD HPLC: 6.2 %
HCT VFR BLD CALC: 39.2 %
HDLC SERPL-MCNC: 39 MG/DL
HGB BLD-MCNC: 12.1 G/DL
IMM GRANULOCYTES NFR BLD AUTO: 0.4 %
LDLC SERPL CALC-MCNC: 39 MG/DL
LYMPHOCYTES # BLD AUTO: 0.36 K/UL
LYMPHOCYTES NFR BLD AUTO: 14.9 %
MAN DIFF?: NORMAL
MCHC RBC-ENTMCNC: 30.5 PG
MCHC RBC-ENTMCNC: 30.9 GM/DL
MCV RBC AUTO: 98.7 FL
MONOCYTES # BLD AUTO: 0.27 K/UL
MONOCYTES NFR BLD AUTO: 11.2 %
NEUTROPHILS # BLD AUTO: 1.69 K/UL
NEUTROPHILS NFR BLD AUTO: 70.2 %
NONHDLC SERPL-MCNC: 56 MG/DL
PLATELET # BLD AUTO: 51 K/UL
POTASSIUM SERPL-SCNC: 4.5 MMOL/L
PROT SERPL-MCNC: 6 G/DL
RBC # BLD: 3.97 M/UL
RBC # FLD: 15.2 %
SODIUM SERPL-SCNC: 143 MMOL/L
TRIGL SERPL-MCNC: 87 MG/DL
WBC # FLD AUTO: 2.41 K/UL

## 2021-10-11 ENCOUNTER — APPOINTMENT (OUTPATIENT)
Dept: FAMILY MEDICINE | Facility: CLINIC | Age: 66
End: 2021-10-11
Payer: MEDICARE

## 2021-10-11 VITALS
SYSTOLIC BLOOD PRESSURE: 138 MMHG | RESPIRATION RATE: 16 BRPM | DIASTOLIC BLOOD PRESSURE: 72 MMHG | HEART RATE: 71 BPM | BODY MASS INDEX: 28.32 KG/M2 | WEIGHT: 150 LBS | OXYGEN SATURATION: 98 % | HEIGHT: 61 IN | TEMPERATURE: 97.2 F

## 2021-10-11 DIAGNOSIS — R79.89 OTHER SPECIFIED ABNORMAL FINDINGS OF BLOOD CHEMISTRY: ICD-10-CM

## 2021-10-11 DIAGNOSIS — G56.01 CARPAL TUNNEL SYNDROME, RIGHT UPPER LIMB: ICD-10-CM

## 2021-10-11 PROCEDURE — 99214 OFFICE O/P EST MOD 30 MIN: CPT

## 2021-10-11 NOTE — PHYSICAL EXAM
[Normal] : normal rate, regular rhythm, normal S1 and S2 and no murmur heard [No Carotid Bruits] : no carotid bruits [No Edema] : there was no peripheral edema [No Extremity Clubbing/Cyanosis] : no extremity clubbing/cyanosis [Soft] : abdomen soft [de-identified] : Radial pulse 2+ bilaterally [de-identified] : distended/ protuberant

## 2021-10-11 NOTE — PLAN
[FreeTextEntry1] : Take Lisinopril and pantoprazole in the am of procedure\par  Lantus insulin decreased t 10 units the evening before surgery\par \par There are no absolute contraindications to surgery\par Kaitlin Nobles is medically cleared

## 2021-10-11 NOTE — ASSESSMENT
[Patient Optimized for Surgery] : Patient optimized for surgery [FreeTextEntry4] : Preop  CBC  WBC 2.46, H/H 12.1/39.2, Plt 51,000\par 7/2/21 CBC  WBC 3.27, H/H 13.8/44.6  Plt 45,000\par \par CBC Stable ; No clinically significant change

## 2021-10-11 NOTE — HISTORY OF PRESENT ILLNESS
[FreeTextEntry1] : Right CTS  [FreeTextEntry2] : 10/13/2021  [FreeTextEntry3] :  [FreeTextEntry4] : [facility]\par Right wrist pain

## 2021-10-14 ENCOUNTER — RX RENEWAL (OUTPATIENT)
Age: 66
End: 2021-10-14

## 2021-10-29 ENCOUNTER — RX RENEWAL (OUTPATIENT)
Age: 66
End: 2021-10-29

## 2021-12-20 ENCOUNTER — RX RENEWAL (OUTPATIENT)
Age: 66
End: 2021-12-20

## 2021-12-23 ENCOUNTER — EMERGENCY (EMERGENCY)
Facility: HOSPITAL | Age: 66
LOS: 1 days | End: 2021-12-23
Admitting: EMERGENCY MEDICINE
Payer: MEDICARE

## 2021-12-23 PROCEDURE — 73562 X-RAY EXAM OF KNEE 3: CPT | Mod: 26,RT

## 2021-12-23 PROCEDURE — 73030 X-RAY EXAM OF SHOULDER: CPT | Mod: 26,RT

## 2021-12-23 PROCEDURE — 99284 EMERGENCY DEPT VISIT MOD MDM: CPT

## 2021-12-23 PROCEDURE — 73060 X-RAY EXAM OF HUMERUS: CPT | Mod: 26,RT

## 2021-12-31 NOTE — HISTORY OF PRESENT ILLNESS
[Home] : at home, [unfilled] , at the time of the visit. · Dietary, lifestyle modifications encouraged [Other Location: e.g. Home (Enter Location, City,State)___] : at [unfilled] [Spouse] : spouse [Verbal consent obtained from patient] : the patient, [unfilled] [FreeTextEntry8] : Kaitlin c/o being "loopy" recently and stopped her Vitamins, fish oil, xanax and lisinopril 3 weeks ago . She states she feels much better. \par She has h/o NIDDM, cirrhosis Htn, hyperlipidemia and renal insufficiency. She requests refill of flonase

## 2022-01-12 ENCOUNTER — APPOINTMENT (OUTPATIENT)
Dept: FAMILY MEDICINE | Facility: CLINIC | Age: 67
End: 2022-01-12
Payer: MEDICARE

## 2022-01-12 VITALS
DIASTOLIC BLOOD PRESSURE: 60 MMHG | HEART RATE: 92 BPM | RESPIRATION RATE: 16 BRPM | WEIGHT: 144 LBS | TEMPERATURE: 98 F | HEIGHT: 61 IN | SYSTOLIC BLOOD PRESSURE: 138 MMHG | OXYGEN SATURATION: 99 % | BODY MASS INDEX: 27.19 KG/M2

## 2022-01-12 DIAGNOSIS — S42.213A UNSPECIFIED DISPLACED FRACTURE OF SURGICAL NECK OF UNSPECIFIED HUMERUS, INITIAL ENCOUNTER FOR CLOSED FRACTURE: ICD-10-CM

## 2022-01-12 PROCEDURE — 99214 OFFICE O/P EST MOD 30 MIN: CPT | Mod: 25

## 2022-01-12 PROCEDURE — 36415 COLL VENOUS BLD VENIPUNCTURE: CPT

## 2022-01-12 NOTE — PHYSICAL EXAM
[Soft] : abdomen soft [Normal] : no rash [de-identified] : distended abd/ascites [de-identified] : right arm in sling

## 2022-01-12 NOTE — HISTORY OF PRESENT ILLNESS
[FreeTextEntry1] : refill furosemide, pantoprazole\par [facility] \par [facility] [de-identified] : Caridad fell in the street [place] of the car wash on 12[place]23/21. She sustained a comminuted Fx of left humerus at surgical neck\par Follow up on diabetes, cirrhosis

## 2022-01-13 LAB
ALBUMIN SERPL ELPH-MCNC: 3.3 G/DL
ALP BLD-CCNC: 148 U/L
ALT SERPL-CCNC: 10 U/L
ANION GAP SERPL CALC-SCNC: 13 MMOL/L
AST SERPL-CCNC: 47 U/L
BASOPHILS # BLD AUTO: 0.01 K/UL
BASOPHILS NFR BLD AUTO: 0.4 %
BILIRUB DIRECT SERPL-MCNC: 1.1 MG/DL
BILIRUB INDIRECT SERPL-MCNC: 0.8 MG/DL
BILIRUB SERPL-MCNC: 2 MG/DL
BUN SERPL-MCNC: 13 MG/DL
CALCIUM SERPL-MCNC: 8.6 MG/DL
CHLORIDE SERPL-SCNC: 106 MMOL/L
CHOLEST SERPL-MCNC: 84 MG/DL
CO2 SERPL-SCNC: 24 MMOL/L
CREAT SERPL-MCNC: 0.74 MG/DL
EOSINOPHIL # BLD AUTO: 0.02 K/UL
EOSINOPHIL NFR BLD AUTO: 0.8 %
ESTIMATED AVERAGE GLUCOSE: 105 MG/DL
GLUCOSE SERPL-MCNC: 100 MG/DL
HBA1C MFR BLD HPLC: 5.3 %
HCT VFR BLD CALC: 38.4 %
HDLC SERPL-MCNC: 26 MG/DL
HGB BLD-MCNC: 12.2 G/DL
IMM GRANULOCYTES NFR BLD AUTO: 0.4 %
LDLC SERPL CALC-MCNC: 22 MG/DL
LYMPHOCYTES # BLD AUTO: 0.36 K/UL
LYMPHOCYTES NFR BLD AUTO: 13.9 %
MAN DIFF?: NORMAL
MCHC RBC-ENTMCNC: 31.3 PG
MCHC RBC-ENTMCNC: 31.8 GM/DL
MCV RBC AUTO: 98.5 FL
MONOCYTES # BLD AUTO: 0.24 K/UL
MONOCYTES NFR BLD AUTO: 9.3 %
NEUTROPHILS # BLD AUTO: 1.95 K/UL
NEUTROPHILS NFR BLD AUTO: 75.2 %
NONHDLC SERPL-MCNC: 58 MG/DL
PLATELET # BLD AUTO: 44 K/UL
POTASSIUM SERPL-SCNC: 4.6 MMOL/L
PROT SERPL-MCNC: 5.7 G/DL
RBC # BLD: 3.9 M/UL
RBC # FLD: 16 %
SODIUM SERPL-SCNC: 143 MMOL/L
TRIGL SERPL-MCNC: 179 MG/DL
WBC # FLD AUTO: 2.59 K/UL

## 2022-01-18 ENCOUNTER — RX RENEWAL (OUTPATIENT)
Age: 67
End: 2022-01-18

## 2022-01-24 ENCOUNTER — RX RENEWAL (OUTPATIENT)
Age: 67
End: 2022-01-24

## 2022-02-12 ENCOUNTER — RX RENEWAL (OUTPATIENT)
Age: 67
End: 2022-02-12

## 2022-03-17 ENCOUNTER — RX RENEWAL (OUTPATIENT)
Age: 67
End: 2022-03-17

## 2022-03-30 ENCOUNTER — RX RENEWAL (OUTPATIENT)
Age: 67
End: 2022-03-30

## 2022-04-06 ENCOUNTER — APPOINTMENT (OUTPATIENT)
Dept: FAMILY MEDICINE | Facility: CLINIC | Age: 67
End: 2022-04-06
Payer: MEDICARE

## 2022-04-06 ENCOUNTER — NON-APPOINTMENT (OUTPATIENT)
Age: 67
End: 2022-04-06

## 2022-04-06 VITALS
HEIGHT: 61 IN | SYSTOLIC BLOOD PRESSURE: 128 MMHG | BODY MASS INDEX: 26.21 KG/M2 | HEART RATE: 60 BPM | TEMPERATURE: 98.6 F | RESPIRATION RATE: 16 BRPM | OXYGEN SATURATION: 97 % | DIASTOLIC BLOOD PRESSURE: 76 MMHG | WEIGHT: 138.8 LBS

## 2022-04-06 LAB
ALBUMIN: 10
BILIRUB UR QL STRIP: NEGATIVE
CLARITY UR: CLEAR
COLLECTION METHOD: NORMAL
CREATININE: 10
GLUCOSE UR-MCNC: NEGATIVE
HCG UR QL: 0.2 EU/DL
HGB UR QL STRIP.AUTO: NORMAL
KETONES UR-MCNC: NEGATIVE
LEUKOCYTE ESTERASE UR QL STRIP: NORMAL
MICROALBUMIN/CREAT UR TEST STR-RTO: < 30
NITRITE UR QL STRIP: NEGATIVE
PH UR STRIP: 5
PROT UR STRIP-MCNC: NEGATIVE
SP GR UR STRIP: 1.01

## 2022-04-06 PROCEDURE — 99214 OFFICE O/P EST MOD 30 MIN: CPT | Mod: 25

## 2022-04-06 PROCEDURE — 82044 UR ALBUMIN SEMIQUANTITATIVE: CPT | Mod: QW

## 2022-04-06 PROCEDURE — 81003 URINALYSIS AUTO W/O SCOPE: CPT | Mod: QW

## 2022-04-06 PROCEDURE — 93000 ELECTROCARDIOGRAM COMPLETE: CPT

## 2022-04-06 PROCEDURE — 36415 COLL VENOUS BLD VENIPUNCTURE: CPT

## 2022-04-06 NOTE — PLAN
[FreeTextEntry1] : low fat/ low carb diet recommended. exercise ( walking, jogging ) recommended 30-40 minutes daily a minimum of 4 times per week.\par

## 2022-04-06 NOTE — HISTORY OF PRESENT ILLNESS
[FreeTextEntry1] : Follow up and fasting lab work. Script for mammo.\par NKDA\par [facility] [de-identified] : Caridad has not seen GI for her liver cirrhosis because she had to cancel her last appoint when she fractured her shoulder\par She presents for fasting blood work

## 2022-04-06 NOTE — PHYSICAL EXAM
[Normal] : normal rate, regular rhythm, normal S1 and S2 and no murmur heard [Soft] : abdomen soft [Non Tender] : non-tender [Grossly Normal Strength/Tone] : grossly normal strength/tone [Memory Grossly Normal] : memory grossly normal [Alert and Oriented x3] : oriented to person, place, and time [de-identified] : distended. + ascites [de-identified] : right shoulder abduction to approx 70-80 degrees

## 2022-04-07 LAB
ALBUMIN SERPL ELPH-MCNC: 3.4 G/DL
ALP BLD-CCNC: 139 U/L
ALT SERPL-CCNC: 9 U/L
ANION GAP SERPL CALC-SCNC: 8 MMOL/L
AST SERPL-CCNC: 31 U/L
BASOPHILS # BLD AUTO: 0.02 K/UL
BASOPHILS NFR BLD AUTO: 0.7 %
BILIRUB DIRECT SERPL-MCNC: 0.8 MG/DL
BILIRUB INDIRECT SERPL-MCNC: 0.7 MG/DL
BILIRUB SERPL-MCNC: 1.5 MG/DL
BUN SERPL-MCNC: 12 MG/DL
CALCIUM SERPL-MCNC: 8.7 MG/DL
CHLORIDE SERPL-SCNC: 108 MMOL/L
CHOLEST SERPL-MCNC: 111 MG/DL
CO2 SERPL-SCNC: 27 MMOL/L
CREAT SERPL-MCNC: 0.74 MG/DL
EGFR: 89 ML/MIN/1.73M2
EOSINOPHIL # BLD AUTO: 0.09 K/UL
EOSINOPHIL NFR BLD AUTO: 3.3 %
GLUCOSE SERPL-MCNC: 107 MG/DL
HCT VFR BLD CALC: 37.7 %
HDLC SERPL-MCNC: 48 MG/DL
HGB BLD-MCNC: 12.2 G/DL
IMM GRANULOCYTES NFR BLD AUTO: 0.4 %
LDLC SERPL CALC-MCNC: 46 MG/DL
LYMPHOCYTES # BLD AUTO: 0.42 K/UL
LYMPHOCYTES NFR BLD AUTO: 15.3 %
MAN DIFF?: NORMAL
MCHC RBC-ENTMCNC: 32.3 PG
MCHC RBC-ENTMCNC: 32.4 GM/DL
MCV RBC AUTO: 99.7 FL
MONOCYTES # BLD AUTO: 0.41 K/UL
MONOCYTES NFR BLD AUTO: 14.9 %
NEUTROPHILS # BLD AUTO: 1.8 K/UL
NEUTROPHILS NFR BLD AUTO: 65.4 %
NONHDLC SERPL-MCNC: 64 MG/DL
PLATELET # BLD AUTO: 57 K/UL
POTASSIUM SERPL-SCNC: 5.2 MMOL/L
PROT SERPL-MCNC: 5.9 G/DL
RBC # BLD: 3.78 M/UL
RBC # FLD: 15.3 %
SODIUM SERPL-SCNC: 143 MMOL/L
TRIGL SERPL-MCNC: 87 MG/DL
WBC # FLD AUTO: 2.75 K/UL

## 2022-04-08 LAB
ESTIMATED AVERAGE GLUCOSE: 111 MG/DL
HBA1C MFR BLD HPLC: 5.5 %

## 2022-04-11 ENCOUNTER — RX RENEWAL (OUTPATIENT)
Age: 67
End: 2022-04-11

## 2022-04-11 LAB — BACTERIA UR CULT: NORMAL

## 2022-04-13 ENCOUNTER — RX RENEWAL (OUTPATIENT)
Age: 67
End: 2022-04-13

## 2022-04-15 ENCOUNTER — TRANSCRIPTION ENCOUNTER (OUTPATIENT)
Age: 67
End: 2022-04-15

## 2022-04-16 ENCOUNTER — NON-APPOINTMENT (OUTPATIENT)
Age: 67
End: 2022-04-16

## 2022-04-16 ENCOUNTER — TRANSCRIPTION ENCOUNTER (OUTPATIENT)
Age: 67
End: 2022-04-16

## 2022-04-21 ENCOUNTER — RX RENEWAL (OUTPATIENT)
Age: 67
End: 2022-04-21

## 2022-05-25 ENCOUNTER — NON-APPOINTMENT (OUTPATIENT)
Age: 67
End: 2022-05-25

## 2022-06-29 ENCOUNTER — RX RENEWAL (OUTPATIENT)
Age: 67
End: 2022-06-29

## 2022-07-04 ENCOUNTER — RX RENEWAL (OUTPATIENT)
Age: 67
End: 2022-07-04

## 2022-07-06 ENCOUNTER — APPOINTMENT (OUTPATIENT)
Dept: FAMILY MEDICINE | Facility: CLINIC | Age: 67
End: 2022-07-06

## 2022-07-06 VITALS
DIASTOLIC BLOOD PRESSURE: 72 MMHG | HEART RATE: 71 BPM | TEMPERATURE: 98.3 F | SYSTOLIC BLOOD PRESSURE: 124 MMHG | WEIGHT: 132 LBS | BODY MASS INDEX: 24.92 KG/M2 | HEIGHT: 61 IN | RESPIRATION RATE: 16 BRPM | OXYGEN SATURATION: 98 %

## 2022-07-06 PROCEDURE — 36415 COLL VENOUS BLD VENIPUNCTURE: CPT

## 2022-07-06 PROCEDURE — 99214 OFFICE O/P EST MOD 30 MIN: CPT | Mod: 25

## 2022-07-06 NOTE — PHYSICAL EXAM
[Normal] : normal rate, regular rhythm, normal S1 and S2 and no murmur heard [No Edema] : there was no peripheral edema [No Extremity Clubbing/Cyanosis] : no extremity clubbing/cyanosis [Non Tender] : non-tender [Normal Bowel Sounds] : normal bowel sounds [Coordination Grossly Intact] : coordination grossly intact [Alert and Oriented x3] : oriented to person, place, and time [No Focal Deficits] : no focal deficits [de-identified] : distended abdomen + ascites [de-identified] : muscle wasting all extremities

## 2022-07-06 NOTE — HISTORY OF PRESENT ILLNESS
[FreeTextEntry1] : pt c/o shakes, off balance, nervousness since shoulder surgery dec of 2021\par needs refill on sertraline, lisinopril, trazodone, loratadine  \[facility]  [de-identified] : Caridad has not followed up with GI Dr Carranza in >6 months as she was recovering from a fractured humerus\par She now c/o worsening abdominal girth, weight loss,diarrhea, mild confusion and mood swings.\par

## 2022-07-06 NOTE — REVIEW OF SYSTEMS
[Diarrhea] : diarrhea [Confusion] : confusion [Negative] : Heme/Lymph [FreeTextEntry7] : increased abd girth

## 2022-07-06 NOTE — PLAN
[FreeTextEntry1] : Hold Simvastatin\par \par Blood drawn in office\par \par F/U with Dr Carranza for Diarrhea\par Referred to Dr Martinez Hepatology/ transplant program

## 2022-07-07 LAB
ALBUMIN SERPL ELPH-MCNC: 3.2 G/DL
ALP BLD-CCNC: 134 U/L
ALT SERPL-CCNC: 11 U/L
ANION GAP SERPL CALC-SCNC: 11 MMOL/L
AST SERPL-CCNC: 37 U/L
BILIRUB DIRECT SERPL-MCNC: 0.8 MG/DL
BILIRUB INDIRECT SERPL-MCNC: 0.7 MG/DL
BILIRUB SERPL-MCNC: 1.5 MG/DL
BUN SERPL-MCNC: 16 MG/DL
CALCIUM SERPL-MCNC: 8.3 MG/DL
CHLORIDE SERPL-SCNC: 110 MMOL/L
CHOLEST SERPL-MCNC: 108 MG/DL
CO2 SERPL-SCNC: 24 MMOL/L
CREAT SERPL-MCNC: 0.68 MG/DL
EGFR: 95 ML/MIN/1.73M2
GLUCOSE SERPL-MCNC: 172 MG/DL
HDLC SERPL-MCNC: 49 MG/DL
LDLC SERPL CALC-MCNC: 26 MG/DL
NONHDLC SERPL-MCNC: 59 MG/DL
POTASSIUM SERPL-SCNC: 4.6 MMOL/L
PROT SERPL-MCNC: 5.8 G/DL
SODIUM SERPL-SCNC: 145 MMOL/L
TRIGL SERPL-MCNC: 167 MG/DL

## 2022-07-12 LAB — AMMONIA PLAS-MCNC: 39.1 UMOL/L

## 2022-07-14 ENCOUNTER — APPOINTMENT (OUTPATIENT)
Dept: GASTROENTEROLOGY | Facility: CLINIC | Age: 67
End: 2022-07-14

## 2022-07-14 ENCOUNTER — RESULT REVIEW (OUTPATIENT)
Age: 67
End: 2022-07-14

## 2022-07-14 ENCOUNTER — NON-APPOINTMENT (OUTPATIENT)
Age: 67
End: 2022-07-14

## 2022-07-14 VITALS
RESPIRATION RATE: 16 BRPM | HEIGHT: 61 IN | DIASTOLIC BLOOD PRESSURE: 60 MMHG | HEART RATE: 75 BPM | BODY MASS INDEX: 24.17 KG/M2 | TEMPERATURE: 98.7 F | OXYGEN SATURATION: 95 % | WEIGHT: 128 LBS | SYSTOLIC BLOOD PRESSURE: 136 MMHG

## 2022-07-14 PROCEDURE — 99205 OFFICE O/P NEW HI 60 MIN: CPT

## 2022-07-14 RX ORDER — OXYCODONE 5 MG/1
5 TABLET ORAL
Qty: 14 | Refills: 0 | Status: DISCONTINUED | COMMUNITY
Start: 2022-01-12 | End: 2022-07-14

## 2022-07-14 RX ORDER — BENZONATATE 100 MG/1
100 CAPSULE ORAL
Qty: 30 | Refills: 1 | Status: COMPLETED | COMMUNITY
Start: 2022-04-16 | End: 2022-07-14

## 2022-07-14 RX ORDER — LORATADINE 10 MG/1
10 CAPSULE, LIQUID FILLED ORAL
Qty: 90 | Refills: 0 | Status: DISCONTINUED | COMMUNITY
Start: 2020-09-10 | End: 2022-07-14

## 2022-07-14 RX ORDER — CEFDINIR 300 MG/1
300 CAPSULE ORAL TWICE DAILY
Qty: 20 | Refills: 0 | Status: COMPLETED | COMMUNITY
Start: 2022-04-16 | End: 2022-07-14

## 2022-07-14 RX ORDER — TRAZODONE HYDROCHLORIDE 50 MG/1
50 TABLET ORAL
Refills: 0 | Status: DISCONTINUED | COMMUNITY
End: 2022-07-14

## 2022-07-14 RX ORDER — ALPRAZOLAM 1 MG/1
1 TABLET ORAL
Qty: 90 | Refills: 0 | Status: DISCONTINUED | COMMUNITY
End: 2022-07-14

## 2022-07-14 RX ORDER — GUAIFENESIN AND CODEINE PHOSPHATE 10; 100 MG/5ML; MG/5ML
100-10 LIQUID ORAL
Qty: 120 | Refills: 0 | Status: COMPLETED | COMMUNITY
Start: 2022-04-20 | End: 2022-07-14

## 2022-07-14 NOTE — PHYSICAL EXAM
[Scleral Icterus] : No Scleral Icterus [Hepatojugular Reflux] : patient did not have a sustained hepatojugular reflux [Spider Angioma] : Spider angioma(s) was(were) observed [Abdominal  Ascites] : no ascites [Splenomegaly] : splenomegaly [Non-Tender] : non-tender [Irregular] : irregular [Asterixis] : Asterixis observed [Jaundice] : No jaundice [Palmar Erythema] : no Palmar Erythema [Depression] : no depression [Hallucinations] : ~T no ~M hallucinations [Delusions] : no ~T delusions [Suicidal Ideation] : no suicidal ideation [Homicidal Ideation] : no homicidal ideations [Preoccupiation With Violent Thoughts] : no preoccupation with violent thoughts [General Appearance - Alert] : alert [General Appearance - In No Acute Distress] : in no acute distress [Sclera] : the sclera and conjunctiva were normal [Neck Appearance] : the appearance of the neck was normal [Heart Rate And Rhythm] : heart rate was normal and rhythm regular [Edema] : there was no peripheral edema [Bowel Sounds] : normal bowel sounds [Abdomen Soft] : soft [Abdomen Tenderness] : non-tender [Abdomen Mass (___ Cm)] : no abdominal mass palpated [Involuntary Movements] : no involuntary movements were seen [Motor Tone] : muscle strength and tone were normal [Ataxic, Wide-Based] : wide-based and ataxic [Skin Color & Pigmentation] : normal skin color and pigmentation [Skin Turgor] : normal skin turgor [] : no rash [Oriented To Time, Place, And Person] : oriented to person, place, and time [Impaired Insight] : insight and judgment were intact [Affect] : the affect was normal

## 2022-07-14 NOTE — ADDENDUM
[FreeTextEntry1] : I, Florencia Vaughan NP, acted as scribe for DION Delgadillo for this patient encounter.

## 2022-07-14 NOTE — HISTORY OF PRESENT ILLNESS
[de-identified] : FELICITY RUBIN is a 67 year old female with a PMH significant for Cirrhosis with ascites and encephalopathy, HTN, DM, HLD, Asthma, and Anemia. She presents today for establishment of care.\par \par Pt reports that she has known fatty liver for many years and was diagnosed with cirrhosis in the last few years. Previous management included lasix 40 mg daily and ursodiol 500 mg daily. Pt is unsure why she was prescribed ursodiol. Current daily smoker. Denies alcohol consumption, past or present. Denies risk factors for viral hepatitis. Pt states, and  Kai  agrees, that pt has become more confused and foggy in the past year. She also reports bladder and bowel incontinence and unsteady gait. She has a history of falls, most recent being last year in which she fractured her shoulder. Pt feels these symptoms started after the most recent fall.  \par \par Denies autoimmune disease, fatigue, malaise, arthralgias, myalgias, pruritus, recent infection, abdominal pain, jaundice, hematemesis, hematochezia, dark urine, unintentional weight loss or gain. Denies family history of liver disease, sudden death or late trimester abortions.\par \par On chart review: Pt has had elevated LFTs dating as far back as 2016 in the form of alk phos elevation ranging from 120-200 with normal transaminases and bilirubin. In 2020, bilirubin is seen to elevate to 1.7 and fluctuate from 0.8 to 2.0 since then. ALT remained normal. AST mostly in the 30s-40s. Last INR done in 2020 was 1.4. Negative for HBV and HCV in 2020. Plt count ranging from 44-77 since 2017. \par Last US abdomen from 2020 revealed lobulated liver with a diffuse heterogeneous/nodular echotexture is compatible with underlying hepatocellular disease/cirrhosis, no focal hepatic abnormality, extatic CBD (11 mm) likely related to prior cholecystectomy/cholecystitis, splenomegaly and mild ascites\par \par Last EGD 9/2021 no varices\par No previous paracentesis

## 2022-07-14 NOTE — ASSESSMENT
[FreeTextEntry1] : FELICITY RUBIN is a 67 year old female with a PMH significant for Cirrhosis with ascites and encephalopathy, HTN, DM, HLD, Asthma, and Anemia. \par \par Disease progression of cirrhosis discussed, including but not limited to HCC, varices, encephalopathy, ascites, and death. \par \par HCC Screening\par -US liver ordered\par -AFP ordered\par \par Hepatitis\par -viral hepatitis labs ordered\par \par Variceal Screening\par -Last EGD 9/2021- recommend repeat\par -if pt experiences hematemesis, advised to go to ER immediately\par \par Encephalopathy\par -notify provider if new onset confusion\par -at least 1-2 bms/day\par -start xifaxan 550mg bid\par \par Ascites\par -Continue Furosemide 40 mg daily\par \par Diet\par -High Protein Low Fat Low Sugar Low Salt (up to 2G Na/day) Diet\par -No prolonged fasting\par -Exercise and weight loss (lose 10% of current weight)\par -Mediterranean Diet info provided\par -No alcohol consumption\par \par Pain\par -NO NSAIDS!!\par -Can take up to 2G Tylenol per day\par \par Transplant\par -will calculate meld next visit\par \par Follow Up\par Labs ordered to rule out competing etiologies of liver disease.\par -Follow up in 3 weeks with labs and imaging or sooner if needed\par -Follow up with Neurology - increased confusion, unsteady gait, hx of falls, bladder/bowel incontinence not totally explained by liver disease\par

## 2022-07-14 NOTE — REASON FOR VISIT
[Consultation] : a consultation visit [Spouse] : spouse [FreeTextEntry1] : cirrhosis with ascites and hepatic encephalopathy

## 2022-07-18 ENCOUNTER — LABORATORY RESULT (OUTPATIENT)
Age: 67
End: 2022-07-18

## 2022-07-18 ENCOUNTER — APPOINTMENT (OUTPATIENT)
Dept: ULTRASOUND IMAGING | Facility: CLINIC | Age: 67
End: 2022-07-18

## 2022-07-18 PROCEDURE — 76705 ECHO EXAM OF ABDOMEN: CPT

## 2022-07-19 ENCOUNTER — APPOINTMENT (OUTPATIENT)
Dept: NEUROLOGY | Facility: CLINIC | Age: 67
End: 2022-07-19

## 2022-07-19 VITALS
HEIGHT: 61 IN | SYSTOLIC BLOOD PRESSURE: 126 MMHG | WEIGHT: 130 LBS | DIASTOLIC BLOOD PRESSURE: 70 MMHG | BODY MASS INDEX: 24.55 KG/M2

## 2022-07-19 PROCEDURE — 99204 OFFICE O/P NEW MOD 45 MIN: CPT

## 2022-07-19 NOTE — ASSESSMENT
[FreeTextEntry1] : This is a 67-year-old woman with a history of liver disease.\par She has been having dizziness for about a year since her head injury.\par As she has chronic hepatic disease, she is at increased risk for hemorrhage.\par \par I will obtain an MRI of the brain to evaluate for structural pathology.\par \par I have given her an instruction sheet for some simple balance exercises she can do at home.\par \par I will see her back after the imaging.

## 2022-07-19 NOTE — HISTORY OF PRESENT ILLNESS
[FreeTextEntry1] : I saw this patient in the office today.\par \par She describes a long history of liver disease.\par She has been getting occasional episodes of dizziness and balance difficulty.\par This began in mid 2021 after head injury.\par She reports that she uses a cane occasionally for the balance.\par She describes the dizziness as both lightheaded sensation as well as spinning sensation.

## 2022-07-19 NOTE — PHYSICAL EXAM

## 2022-07-19 NOTE — CONSULT LETTER
[Dear  ___] : Dear ~PETE, [Courtesy Letter:] : I had the pleasure of seeing your patient, [unfilled], in my office today. [Please see my note below.] : Please see my note below. [Consult Closing:] : Thank you very much for allowing me to participate in the care of this patient.  If you have any questions, please do not hesitate to contact me. [Sincerely,] : Sincerely, [FreeTextEntry3] : Hussein Tobar MD.

## 2022-07-25 ENCOUNTER — RX RENEWAL (OUTPATIENT)
Age: 67
End: 2022-07-25

## 2022-07-25 LAB
A1AT PHENOTYP SERPL-IMP: NORMAL
A1AT SERPL-MCNC: 178 MG/DL
A1AT SERPL-MCNC: 184 MG/DL
AFP-TM SERPL-MCNC: 2 NG/ML
ALBUMIN SERPL ELPH-MCNC: 3.1 G/DL
ALP BLD-CCNC: 146 U/L
ALT SERPL-CCNC: 9 U/L
ANA PAT FLD IF-IMP: ABNORMAL
ANA SER IF-ACNC: ABNORMAL
ANION GAP SERPL CALC-SCNC: 11 MMOL/L
AST SERPL-CCNC: 38 U/L
BASOPHILS # BLD AUTO: 0.03 K/UL
BASOPHILS NFR BLD AUTO: 1.2 %
BILIRUB INDIRECT SERPL-MCNC: 0.9 MG/DL
BILIRUB SERPL-MCNC: 1.6 MG/DL
BUN SERPL-MCNC: 16 MG/DL
CALCIUM SERPL-MCNC: 8.5 MG/DL
CERULOPLASMIN SERPL-MCNC: 21 MG/DL
CHLORIDE SERPL-SCNC: 105 MMOL/L
CO2 SERPL-SCNC: 27 MMOL/L
CREAT SERPL-MCNC: 0.83 MG/DL
DEPRECATED KAPPA LC FREE/LAMBDA SER: 1.77 RATIO
EGFR: 77 ML/MIN/1.73M2
EOSINOPHIL # BLD AUTO: 0.05 K/UL
EOSINOPHIL NFR BLD AUTO: 2 %
FERRITIN SERPL-MCNC: 60 NG/ML
GLUCOSE SERPL-MCNC: 119 MG/DL
HBV CORE IGG+IGM SER QL: NONREACTIVE
HBV CORE IGM SER QL: NONREACTIVE
HBV SURFACE AB SER QL: NONREACTIVE
HBV SURFACE AG SER QL: NONREACTIVE
HCT VFR BLD CALC: 39.1 %
HCV AB SER QL: NONREACTIVE
HCV S/CO RATIO: 0.13 S/CO
HEPATITIS A IGG ANTIBODY: NONREACTIVE
HGB BLD-MCNC: 12.6 G/DL
IGA SER QL IEP: 390 MG/DL
IGG SER QL IEP: 1127 MG/DL
IGM SER QL IEP: 148 MG/DL
IMM GRANULOCYTES NFR BLD AUTO: 0.4 %
INR PPP: 1.33 RATIO
IRON SATN MFR SERPL: 30 %
IRON SERPL-MCNC: 83 UG/DL
KAPPA LC CSF-MCNC: 4.31 MG/DL
KAPPA LC SERPL-MCNC: 7.64 MG/DL
LKM AB SER QL IF: <20.1 UNITS
LYMPHOCYTES # BLD AUTO: 0.36 K/UL
LYMPHOCYTES NFR BLD AUTO: 14.5 %
MAN DIFF?: NORMAL
MCHC RBC-ENTMCNC: 32.2 GM/DL
MCHC RBC-ENTMCNC: 33.9 PG
MCV RBC AUTO: 105.1 FL
MITOCHONDRIA AB SER IF-ACNC: NORMAL
MONOCYTES # BLD AUTO: 0.31 K/UL
MONOCYTES NFR BLD AUTO: 12.5 %
NEUTROPHILS # BLD AUTO: 1.72 K/UL
NEUTROPHILS NFR BLD AUTO: 69.4 %
NT-PROBNP SERPL-MCNC: 71 PG/ML
PLATELET # BLD AUTO: 54 K/UL
POTASSIUM SERPL-SCNC: 4.1 MMOL/L
PROT SERPL-MCNC: 5.7 G/DL
PT BLD: 15.5 SEC
RBC # BLD: 3.72 M/UL
RBC # FLD: 15.7 %
SMOOTH MUSCLE AB SER QL IF: ABNORMAL
SODIUM SERPL-SCNC: 142 MMOL/L
SOLUBLE LIVER IGG SER IA-ACNC: 0.7
TIBC SERPL-MCNC: 277 UG/DL
TSH SERPL-ACNC: 3.6 UIU/ML
UIBC SERPL-MCNC: 194 UG/DL
VIT B12 SERPL-MCNC: >2000 PG/ML
WBC # FLD AUTO: 2.48 K/UL

## 2022-07-28 ENCOUNTER — OUTPATIENT (OUTPATIENT)
Dept: OUTPATIENT SERVICES | Facility: HOSPITAL | Age: 67
LOS: 1 days | End: 2022-07-28

## 2022-07-28 DIAGNOSIS — K74.60 UNSPECIFIED CIRRHOSIS OF LIVER: ICD-10-CM

## 2022-07-28 DIAGNOSIS — R19.7 DIARRHEA, UNSPECIFIED: ICD-10-CM

## 2022-08-11 ENCOUNTER — APPOINTMENT (OUTPATIENT)
Dept: GASTROENTEROLOGY | Facility: CLINIC | Age: 67
End: 2022-08-11

## 2022-08-11 VITALS
SYSTOLIC BLOOD PRESSURE: 126 MMHG | HEIGHT: 61 IN | WEIGHT: 133 LBS | OXYGEN SATURATION: 94 % | DIASTOLIC BLOOD PRESSURE: 64 MMHG | BODY MASS INDEX: 25.11 KG/M2 | RESPIRATION RATE: 16 BRPM | HEART RATE: 77 BPM | TEMPERATURE: 98.7 F

## 2022-08-11 PROCEDURE — 99214 OFFICE O/P EST MOD 30 MIN: CPT

## 2022-08-11 NOTE — REASON FOR VISIT
[Follow-Up: _____] : a [unfilled] follow-up visit [FreeTextEntry1] : cirrhosis complicated by ascites and hepatic encephalopathy

## 2022-08-11 NOTE — HISTORY OF PRESENT ILLNESS
[MELD Score: ___] : MELD Score is [unfilled] [Fatigue] : denies fatigue [Malaise] : denies malaise [Diffuse Joint Pain (Arthralgias)] : denies arthralgias [Muscle Aches, Generalized (Myalgias)] : denies myalgias [Abdominal Pain] : denies abdominal pain [Urine Tests Nonspecific Abnormal Findings Biliuria] : denies dark urine [Itching (Pruritus)] : denies pruritus [de-identified] : FELICITY RUBIN is a 67 year old female with a PMH significant for Cirrhosis with ascites and encephalopathy, HTN, DM, HLD, Asthma, and Anemia.\par \par 8/11/22: Since last visit Labs done 7/18/22: bilirubin 1.6, AST 38, ALT 9, AlkPhos 146, INR 1.33, AFP 2, ABDIEL + 1:160, ASMA + 1:120, viral hepatitis panel negative, iron studies negative\par US abdomen 7/18/22: cirrhotic liver without focal abnormality, mild abdominal ascites.\par Pt reports she has quit smoking. She started Xifaxan 550mg BID which her  states has helped with her confusion. Lasix was decreased to 20 mg daily due to pt complaints of frequent urination. Denies hematemesis.\par \par 7/14/22: Pt reports that she has known fatty liver for many years and was diagnosed with cirrhosis in the last few years. Previous management included lasix 40 mg daily and ursodiol 500 mg daily. Pt is unsure why she was prescribed ursodiol. Current daily smoker. Denies alcohol consumption, past or present. Denies risk factors for viral hepatitis. Pt states, and  Kai agrees, that pt has become more confused and foggy in the past year. She also reports bladder and bowel incontinence and unsteady gait. She has a history of falls, most recent being last year in which she fractured her shoulder. Pt feels these symptoms started after the most recent fall. \par \par On chart review: Pt has had elevated LFTs dating as far back as 2016 in the form of alk phos elevation ranging from 120-200 with normal transaminases and bilirubin. In 2020, bilirubin is seen to elevate to 1.7 and fluctuate from 0.8 to 2.0 since then. ALT remained normal. AST mostly in the 30s-40s. Last INR done in 2020 was 1.4. Negative for HBV and HCV in 2020. Plt count ranging from 44-77 since 2017. \par Last US abdomen from 2020 revealed lobulated liver with a diffuse heterogeneous/nodular echotexture is compatible with underlying hepatocellular disease/cirrhosis, no focal hepatic abnormality, extatic CBD (11 mm) likely related to prior cholecystectomy/cholecystitis, splenomegaly and mild ascites\par \par Last EGD 9/2021 no varices, portal hypertensive gastropathy\par No previous paracentesis

## 2022-08-11 NOTE — PHYSICAL EXAM
[Scleral Icterus] : Scleral Icterus noted [Abdominal  Ascites] : ascites [Jaundice] : Jaundice [General Appearance - Alert] : alert [General Appearance - In No Acute Distress] : in no acute distress [Outer Ear] : the ears and nose were normal in appearance [Oropharynx] : the oropharynx was normal [Neck Appearance] : the appearance of the neck was normal [Neck Cervical Mass (___cm)] : no neck mass was observed [Auscultation Breath Sounds / Voice Sounds] : lungs were clear to auscultation bilaterally [Heart Rate And Rhythm] : heart rate was normal and rhythm regular [Edema] : there was no peripheral edema [Bowel Sounds] : normal bowel sounds [Abdomen Soft] : soft [Abdomen Tenderness] : non-tender [Abdomen Mass (___ Cm)] : no abdominal mass palpated [Abnormal Walk] : normal gait [Nail Clubbing] : no clubbing  or cyanosis of the fingernails [Musculoskeletal - Swelling] : no joint swelling seen [Motor Tone] : muscle strength and tone were normal [Skin Color & Pigmentation] : normal skin color and pigmentation [Skin Turgor] : normal skin turgor [] : no rash [No Focal Deficits] : no focal deficits [Oriented To Time, Place, And Person] : oriented to person, place, and time [Impaired Insight] : insight and judgment were intact [Affect] : the affect was normal [Hepatojugular Reflux] : patient did not have a sustained hepatojugular reflux [Spider Angioma] : No spider angioma(s) were observed [Splenomegaly] : no splenomegaly [Asterixis] : no asterixis observed [Palmar Erythema] : no Palmar Erythema [Depression] : no depression [Hallucinations] : ~T no ~M hallucinations [Delusions] : no ~T delusions [Suicidal Ideation] : no suicidal ideation [Homicidal Ideation] : no homicidal ideations [Preoccupiation With Violent Thoughts] : no preoccupation with violent thoughts

## 2022-08-11 NOTE — ADDENDUM
[FreeTextEntry1] : I, Florencia Vaughan NP, have acted as scribe for Dr. Martinez.\par Seen evaluated and examined with NP.  Agree with assessment and plan.

## 2022-08-11 NOTE — ASSESSMENT
[FreeTextEntry1] : FELICITY RUBIN is a 67 year old female with a PMH significant for Cirrhosis with ascites and encephalopathy, HTN, DM, HLD, Asthma, and Anemia. 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  Disease quiescent in the setting of cirrhosis and thus no indication for treatment of autoimmune hepatitis.\par \par \par HCC Screening\par -US liver 7/18/22 no focal lesions\par -next imaging due January 2023\par -AFP 2\par \par Hepatitis\par -viral hepatitis panel negativ\par \par Variceal Screening\par -Last EGD 9/2021- recommend repeat\par -if pt experiences hematemesis, advised to go to ER immediately\par \par Encephalopathy\par -notify provider if new onset confusion\par -at least 1-2 bms/day\par -start xifaxan 550mg bid\par \par Ascites\par -Continue Furosemide 20 mg daily\par \par Diet\par -High Protein Low Fat Low Sugar Low Salt (up to 2G Na/day) Diet\par -No prolonged fasting\par -Exercise and weight loss (lose 10% of current weight)\par -Mediterranean Diet info provided\par -No alcohol consumption\par \par Pain\par -NO NSAIDS!!\par -Can take up to 2G Tylenol per day\par \par Transplant\par -MELDNa 11\par -Pt has quit smoking\par \par Follow Up\par -Follow up in 10 weeks with labs and imaging or sooner if needed\par -Follow up with Neurology - increased confusion, unsteady gait, hx of falls, bladder/bowel incontinence not totally explained by liver disease

## 2022-08-28 ENCOUNTER — RX RENEWAL (OUTPATIENT)
Age: 67
End: 2022-08-28

## 2022-09-05 ENCOUNTER — EMERGENCY (EMERGENCY)
Facility: HOSPITAL | Age: 67
LOS: 1 days | Discharge: HOME | End: 2022-09-05
Admitting: EMERGENCY MEDICINE

## 2022-09-05 DIAGNOSIS — R41.0 DISORIENTATION, UNSPECIFIED: ICD-10-CM

## 2022-09-05 DIAGNOSIS — F17.200 NICOTINE DEPENDENCE, UNSPECIFIED, UNCOMPLICATED: ICD-10-CM

## 2022-09-05 DIAGNOSIS — K74.60 UNSPECIFIED CIRRHOSIS OF LIVER: ICD-10-CM

## 2022-09-05 DIAGNOSIS — I10 ESSENTIAL (PRIMARY) HYPERTENSION: ICD-10-CM

## 2022-09-05 DIAGNOSIS — R79.89 OTHER SPECIFIED ABNORMAL FINDINGS OF BLOOD CHEMISTRY: ICD-10-CM

## 2022-09-05 DIAGNOSIS — R31.9 HEMATURIA, UNSPECIFIED: ICD-10-CM

## 2022-09-05 PROCEDURE — 93010 ELECTROCARDIOGRAM REPORT: CPT

## 2022-09-05 PROCEDURE — 99284 EMERGENCY DEPT VISIT MOD MDM: CPT | Mod: FS

## 2022-09-07 ENCOUNTER — OUTPATIENT (OUTPATIENT)
Dept: OUTPATIENT SERVICES | Facility: HOSPITAL | Age: 67
LOS: 1 days | End: 2022-09-07

## 2022-09-07 DIAGNOSIS — K74.60 UNSPECIFIED CIRRHOSIS OF LIVER: ICD-10-CM

## 2022-09-09 ENCOUNTER — OUTPATIENT (OUTPATIENT)
Dept: OUTPATIENT SERVICES | Facility: HOSPITAL | Age: 67
LOS: 1 days | End: 2022-09-09

## 2022-09-09 DIAGNOSIS — K74.60 UNSPECIFIED CIRRHOSIS OF LIVER: ICD-10-CM

## 2022-09-20 ENCOUNTER — LABORATORY RESULT (OUTPATIENT)
Age: 67
End: 2022-09-20

## 2022-09-20 ENCOUNTER — APPOINTMENT (OUTPATIENT)
Dept: FAMILY MEDICINE | Facility: CLINIC | Age: 67
End: 2022-09-20

## 2022-09-20 VITALS
RESPIRATION RATE: 16 BRPM | OXYGEN SATURATION: 96 % | BODY MASS INDEX: 24.92 KG/M2 | HEIGHT: 61 IN | SYSTOLIC BLOOD PRESSURE: 130 MMHG | WEIGHT: 132 LBS | DIASTOLIC BLOOD PRESSURE: 58 MMHG | HEART RATE: 68 BPM

## 2022-09-20 DIAGNOSIS — Z23 ENCOUNTER FOR IMMUNIZATION: ICD-10-CM

## 2022-09-20 PROCEDURE — 90662 IIV NO PRSV INCREASED AG IM: CPT

## 2022-09-20 PROCEDURE — 99214 OFFICE O/P EST MOD 30 MIN: CPT | Mod: 25

## 2022-09-20 PROCEDURE — G0008: CPT

## 2022-09-20 PROCEDURE — 36415 COLL VENOUS BLD VENIPUNCTURE: CPT

## 2022-09-20 NOTE — PHYSICAL EXAM
[No Acute Distress] : no acute distress [Well Developed] : well developed [Cachectic] : cachexia was observed [Normal Sclera/Conjunctiva] : normal sclera/conjunctiva [PERRL] : pupils equal round and reactive to light [EOMI] : extraocular movements intact [Normal Outer Ear/Nose] : the outer ears and nose were normal in appearance [Normal Oropharynx] : the oropharynx was normal [No Lymphadenopathy] : no lymphadenopathy [Supple] : supple [Thyroid Normal, No Nodules] : the thyroid was normal and there were no nodules present [No Respiratory Distress] : no respiratory distress  [No Accessory Muscle Use] : no accessory muscle use [Clear to Auscultation] : lungs were clear to auscultation bilaterally [Normal Rate] : normal rate  [Regular Rhythm] : with a regular rhythm [Normal S1, S2] : normal S1 and S2 [No Murmur] : no murmur heard [No Edema] : there was no peripheral edema [No Extremity Clubbing/Cyanosis] : no extremity clubbing/cyanosis [Soft] : abdomen soft [Normal Posterior Cervical Nodes] : no posterior cervical lymphadenopathy [Normal Anterior Cervical Nodes] : no anterior cervical lymphadenopathy [No CVA Tenderness] : no CVA  tenderness [No Spinal Tenderness] : no spinal tenderness [No Joint Swelling] : no joint swelling [Grossly Normal Strength/Tone] : grossly normal strength/tone [No Rash] : no rash [Coordination Grossly Intact] : coordination grossly intact [No Focal Deficits] : no focal deficits [Normal Gait] : normal gait [Deep Tendon Reflexes (DTR)] : deep tendon reflexes were 2+ and symmetric [Normal Affect] : the affect was normal [Alert and Oriented x3] : oriented to person, place, and time [Normal Insight/Judgement] : insight and judgment were intact [de-identified] : distended + ascites [de-identified] : muscle wasting of extremities

## 2022-09-20 NOTE — PLAN
[FreeTextEntry1] : Blood drawn in office\par pt seen by STANISLAV Awan NP today who is reportedly arranging uper endoscopy asap and will then consult with Dr Martinez (transplant team attending physician)

## 2022-09-20 NOTE — HISTORY OF PRESENT ILLNESS
[FreeTextEntry1] : Kaitlin was in [facility] labor day 9/5 for inc ammonia levels her  drove \par Saw STANISLAV Awan this morning, she reduced her lactulose to 15mL 3x a day \par \par \par  [de-identified] : Caridad was evaluated at [facility] ER 9/5/22 for hepatic encephalopathy and discharged 9/6/22 in stable condition on Lactulose. Her spouse reports she had been very confused. Ammonia level was 77. Caridad  has lost approximately 18# in the past year with wasting of muscles and gradually worsening cirrhosis. Today, she is upset and gently crying but oriented x 3\par Her next appointment with Dr Martinez is in 4 weeks.

## 2022-09-21 LAB
ALBUMIN SERPL ELPH-MCNC: 3.4 G/DL
ALP BLD-CCNC: 146 U/L
ALT SERPL-CCNC: 10 U/L
AST SERPL-CCNC: 39 U/L
BILIRUB DIRECT SERPL-MCNC: 0.8 MG/DL
BILIRUB INDIRECT SERPL-MCNC: 0.8 MG/DL
BILIRUB SERPL-MCNC: 1.7 MG/DL
CHOLEST SERPL-MCNC: 140 MG/DL
HDLC SERPL-MCNC: 45 MG/DL
INR PPP: 1.33 RATIO
LDLC SERPL CALC-MCNC: 62 MG/DL
NONHDLC SERPL-MCNC: 95 MG/DL
PROT SERPL-MCNC: 5.9 G/DL
PT BLD: 15.7 SEC
TRIGL SERPL-MCNC: 164 MG/DL

## 2022-09-22 LAB
BASOPHILS # BLD AUTO: 0.02 K/UL
BASOPHILS NFR BLD AUTO: 0.7 %
EOSINOPHIL # BLD AUTO: 0.07 K/UL
EOSINOPHIL NFR BLD AUTO: 2.5 %
ESTIMATED AVERAGE GLUCOSE: 137 MG/DL
HBA1C MFR BLD HPLC: 6.4 %
HCT VFR BLD CALC: 36.6 %
HGB BLD-MCNC: 11.9 G/DL
IMM GRANULOCYTES NFR BLD AUTO: 0.4 %
LYMPHOCYTES # BLD AUTO: 0.35 K/UL
LYMPHOCYTES NFR BLD AUTO: 12.7 %
MAN DIFF?: NORMAL
MCHC RBC-ENTMCNC: 32.5 GM/DL
MCHC RBC-ENTMCNC: 34.4 PG
MCV RBC AUTO: 105.8 FL
MONOCYTES # BLD AUTO: 0.34 K/UL
MONOCYTES NFR BLD AUTO: 12.3 %
NEUTROPHILS # BLD AUTO: 1.97 K/UL
NEUTROPHILS NFR BLD AUTO: 71.4 %
PLATELET # BLD AUTO: 41 K/UL
RBC # BLD: 3.46 M/UL
RBC # FLD: 15.3 %
WBC # FLD AUTO: 2.76 K/UL

## 2022-10-04 ENCOUNTER — RX RENEWAL (OUTPATIENT)
Age: 67
End: 2022-10-04

## 2022-10-05 ENCOUNTER — RX RENEWAL (OUTPATIENT)
Age: 67
End: 2022-10-05

## 2022-10-17 ENCOUNTER — APPOINTMENT (OUTPATIENT)
Dept: NEUROLOGY | Facility: CLINIC | Age: 67
End: 2022-10-17

## 2022-10-17 VITALS
BODY MASS INDEX: 24.55 KG/M2 | DIASTOLIC BLOOD PRESSURE: 68 MMHG | WEIGHT: 130 LBS | SYSTOLIC BLOOD PRESSURE: 120 MMHG | HEIGHT: 61 IN

## 2022-10-17 DIAGNOSIS — R42 DIZZINESS AND GIDDINESS: ICD-10-CM

## 2022-10-17 PROCEDURE — 99213 OFFICE O/P EST LOW 20 MIN: CPT

## 2022-10-17 NOTE — HISTORY OF PRESENT ILLNESS
[FreeTextEntry1] : I saw this patient in the office today.\par \par As you recall, she has been getting occasional episodes of dizziness and balance difficulty.\par This began in mid 2021 after head injury.\par She reports that she uses a cane occasionally for the balance.\par She describes the dizziness as both lightheaded sensation as well as spinning sensation.\par She describes a long history of liver disease.\par \par She has not gone for the MRI that I had recommended at her last visit.

## 2022-10-17 NOTE — PHYSICAL EXAM
[General Appearance - Alert] : alert [General Appearance - In No Acute Distress] : in no acute distress [Oriented To Time, Place, And Person] : oriented to person, place, and time [Affect] : the affect was normal [Memory Recent] : recent memory was not impaired [Memory Remote] : remote memory was not impaired [Cranial Nerves Optic (II)] : visual acuity intact bilaterally,  visual fields full to confrontation, pupils equal round and reactive to light [Cranial Nerves Oculomotor (III)] : extraocular motion intact [Cranial Nerves Trigeminal (V)] : facial sensation intact symmetrically [Cranial Nerves Facial (VII)] : face symmetrical [Cranial Nerves Vestibulocochlear (VIII)] : hearing was intact bilaterally [Cranial Nerves Glossopharyngeal (IX)] : tongue and palate midline [Cranial Nerves Accessory (XI - Cranial And Spinal)] : head turning and shoulder shrug symmetric [Cranial Nerves Hypoglossal (XII)] : there was no tongue deviation with protrusion [Motor Tone] : muscle tone was normal in all four extremities [Motor Strength] : muscle strength was normal in all four extremities [Sensation Tactile Decrease] : light touch was intact [Sensation Pain / Temperature Decrease] : pain and temperature was intact [Sensation Vibration Decrease] : vibration was intact [Limited Balance] : the patient's balance was impaired [2+] : Patella left 2+ [Optic Disc Abnormality] : the optic disc were normal in size and color [FreeTextEntry1] : There is ascites noted. [Dysarthria] : no dysarthria [Aphasia] : no dysphasia/aphasia [Romberg's Sign] : Romberg's sign was negtive [Tremor] : no tremor present [Coordination - Dysmetria Impaired Finger-to-Nose Bilateral] : not present [Plantar Reflex Right Only] : normal on the right [Plantar Reflex Left Only] : normal on the left [FreeTextEntry8] : There is no cogwheeling.  There is no masking of facial expression.  The patient presented today without an assistive device for ambulation, but reports that she sometimes uses a cane.

## 2022-10-20 ENCOUNTER — APPOINTMENT (OUTPATIENT)
Dept: GASTROENTEROLOGY | Facility: CLINIC | Age: 67
End: 2022-10-20

## 2022-10-20 VITALS
OXYGEN SATURATION: 98 % | WEIGHT: 130 LBS | SYSTOLIC BLOOD PRESSURE: 120 MMHG | HEIGHT: 61 IN | HEART RATE: 65 BPM | BODY MASS INDEX: 24.55 KG/M2 | RESPIRATION RATE: 16 BRPM | DIASTOLIC BLOOD PRESSURE: 65 MMHG

## 2022-10-20 PROCEDURE — 99214 OFFICE O/P EST MOD 30 MIN: CPT

## 2022-10-20 NOTE — HISTORY OF PRESENT ILLNESS
[MELD Score: ___] : MELD Score is [unfilled] [de-identified] : FELICITY RUBIN is a 67 year old female with a PMH significant for Cirrhosis with ascites and encephalopathy, HTN, DM, HLD, Asthma, and Anemia.\par \par 7/14/22: Pt reports that she has known fatty liver for many years and was diagnosed with cirrhosis in the last few years. Previous management included Lasix 40 mg daily and ursodiol 500 mg daily. Pt is unsure why she was prescribed ursodiol. Current daily smoker. Denies alcohol consumption, past or present. Denies risk factors for viral hepatitis. Pt states, and  Kai agrees, that pt has become more confused and foggy in the past year. She also reports bladder and bowel incontinence and unsteady gait. She has a history of falls, most recent being last year in which she fractured her shoulder. Pt feels these symptoms started after the most recent fall. \par \par On chart review: Pt has had elevated LFTs dating as far back as 2016 in the form of alk phos elevation ranging from 120-200 with normal transaminases and bilirubin. In 2020, bilirubin is seen to elevate to 1.7 and fluctuate from 0.8 to 2.0 since then. ALT remained normal. AST mostly in the 30s-40s. Last INR done in 2020 was 1.4. Negative for HBV and HCV in 2020. Plt count ranging from 44-77 since 2017. \par Last US abdomen from 2020 revealed lobulated liver with a diffuse heterogeneous/nodular echotexture is compatible with underlying hepatocellular disease/cirrhosis, no focal hepatic abnormality, extatic CBD (11 mm) likely related to prior cholecystectomy/cholecystitis, splenomegaly and mild ascites\par \par Last EGD 9/2021 no varices, portal hypertensive gastropathy\par No previous paracentesis \par \par 8/11/22: Since last visit Labs done 7/18/22: bilirubin 1.6, AST 38, ALT 9, AlkPhos 146, INR 1.33, AFP 2, ABDIEL + 1:160, ASMA + 1:120, viral hepatitis panel negative, iron studies negative.\par US abdomen 7/18/22: cirrhotic liver without focal abnormality, mild abdominal ascites.\par Pt reports she has quit smoking. She started Xifaxan 550mg BID which her  states has helped with her confusion. Lasix was decreased to 20 mg daily due to pt complaints of frequent urination. Denies hematemesis.\par \par 10/20/22: Pt reports she went to [facility] in September for increased confusion. She was given a prescription for lactulose and discharged home. Pt is taking lactulose and Xifaxan and reports her confusion has improved. Denies fatigue, malaise, arthralgias, myalgias, recent infection, abdominal pain or distension, jaundice, hematemesis, hematochezia, dark urine, confusion, unintentional weight loss or gain. She is currently taking Spironolactone 50 mg daily. Pt is also taking Ursodiol for pruritus and reports positive relief.\par \par Labs 9/20/22 - bilirubin 1.7, AST 39, ALT 10, , INR 1.33, PLT 41\par EGD 10/19/22 - no varices, portal hypertensive gastropathy

## 2022-10-20 NOTE — PHYSICAL EXAM
[Spider Angioma] : Spider angioma(s) was(were) observed [Splenomegaly] : splenomegaly [Non-Tender] : non-tender [Irregular] : irregular [Asterixis] : Asterixis observed [General Appearance - In No Acute Distress] : in no acute distress [General Appearance - Alert] : alert [Sclera] : the sclera and conjunctiva were normal [Neck Appearance] : the appearance of the neck was normal [Heart Rate And Rhythm] : heart rate was normal and rhythm regular [Edema] : there was no peripheral edema [Bowel Sounds] : normal bowel sounds [Abdomen Tenderness] : non-tender [Abdomen Soft] : soft [Abdomen Mass (___ Cm)] : no abdominal mass palpated [Involuntary Movements] : no involuntary movements were seen [Motor Tone] : muscle strength and tone were normal [Ataxic, Wide-Based] : wide-based and ataxic [Skin Color & Pigmentation] : normal skin color and pigmentation [] : no rash [Skin Turgor] : normal skin turgor [Oriented To Time, Place, And Person] : oriented to person, place, and time [Impaired Insight] : insight and judgment were intact [Affect] : the affect was normal [Scleral Icterus] : No Scleral Icterus [Hepatojugular Reflux] : patient did not have a sustained hepatojugular reflux [Abdominal  Ascites] : no ascites [Jaundice] : No jaundice [Palmar Erythema] : no Palmar Erythema [Depression] : no depression [Hallucinations] : ~T no ~M hallucinations [Delusions] : no ~T delusions [Suicidal Ideation] : no suicidal ideation [Homicidal Ideation] : no homicidal ideations [Preoccupiation With Violent Thoughts] : no preoccupation with violent thoughts

## 2022-10-20 NOTE — REASON FOR VISIT
[Follow-Up: _____] : a [unfilled] follow-up visit [FreeTextEntry1] : cirrhosis c/b ascites and hepatic encephalopathy

## 2022-10-20 NOTE — ASSESSMENT
[FreeTextEntry1] : FELICITY RUBIN is a 67 year old female with a PMH significant for Cirrhosis with ascites and encephalopathy, HTN, DM, HLD, Asthma, and Anemia.\par \par Cirrhosis\par -Positive ABDIEL and ASMA indicates, pt had combination of autoimmune hepatitis and NAFLD that lead to cirrhosis\par -Disease progression of cirrhosis discussed, including but not limited to hepatocellular carcinoma, varices with or without bleeding, hepatic encephalopathy, ascites, liver failure and death. \par \par HCC Screening\par -I have explained the need for imaging every 6 months to assess for HCC.\par -US liver 7/18/22 no focal lesions\par -AFP 7/18/22 - 2.0\par -next labs and imaging ordered for January 2023\par \par Hepatitis\par -viral hepatitis panel negative\par -Pt is note immune to HBV, vaccine recommended\par \par Variceal Screening\par -EGD 10/19/22 - no varices, portal hypertensive gastropathy\par -if pt experiences hematemesis, advised to go to ER immediately\par \par Encephalopathy\par -notify provider if new onset confusion\par -at least 1-2 bms/day\par -titrate lactulose to 1-2 bms/day\par -Continue Xifaxan 550mg bid\par \par Ascites\par -pt stopped Lasix 20 mg for unclear reasons, will reevaluate necessity after repeat labs done\par -Continue Spironolactone 50 mg daily\par -monitor weight daily\par -Contact provider for increased abdominal distension\par \par Diet\par -High Protein Low Fat Low Sugar Low Salt (up to 2G Na/day) Diet\par -No prolonged fasting\par -Mediterranean Diet info provided\par -No alcohol consumption\par \par Pain\par -NO NSAIDS!!\par -Can take up to 2G Tylenol per day\par \par Transplant\par -I have explained that disease can progress to the point of requiring an evaluation for liver transplantation. \par -MELDNa - 12\par \par Follow Up\par -Follow up in 3 months with labs and imaging or sooner if needed

## 2022-10-21 ENCOUNTER — APPOINTMENT (OUTPATIENT)
Dept: ENDOCRINOLOGY | Facility: CLINIC | Age: 67
End: 2022-10-21

## 2022-10-21 VITALS
HEART RATE: 56 BPM | SYSTOLIC BLOOD PRESSURE: 130 MMHG | OXYGEN SATURATION: 98 % | HEIGHT: 61 IN | WEIGHT: 132 LBS | BODY MASS INDEX: 24.92 KG/M2 | DIASTOLIC BLOOD PRESSURE: 80 MMHG | TEMPERATURE: 97.9 F | RESPIRATION RATE: 16 BRPM

## 2022-10-21 DIAGNOSIS — F17.200 NICOTINE DEPENDENCE, UNSPECIFIED, UNCOMPLICATED: ICD-10-CM

## 2022-10-21 LAB — GLUCOSE BLDC GLUCOMTR-MCNC: 129

## 2022-10-21 PROCEDURE — 99204 OFFICE O/P NEW MOD 45 MIN: CPT | Mod: 25

## 2022-10-21 PROCEDURE — 82962 GLUCOSE BLOOD TEST: CPT

## 2022-10-21 RX ORDER — SIMVASTATIN 40 MG/1
40 TABLET, FILM COATED ORAL DAILY
Qty: 90 | Refills: 0 | Status: DISCONTINUED | COMMUNITY
Start: 2020-09-03 | End: 2022-10-21

## 2022-10-21 NOTE — ASSESSMENT
[FreeTextEntry1] : 67 year old female with PMH of cirrhosis due to NAFLD, Type 2 DM, HTN, HLD and osteoporosis here to establish endocrine care.  \par \par 1.  Type 2 DM-  based on low A1c, insulin therapy is no longer indicated.  Can likely manage with lifestyle modification alone.  If an agent is needed, would consider GLP-1 agonist for lower hypoglycemic risk.  Patient is interested in Christal, but explained that this would not be covered by insurance.  Recommend SMBG at least once a day.  Will refer to CDE for further education. \par 2.  OP-  continue Alendronate.  Check DXA now and check 25(OH)D with next labs.  \par 3.  HTN-  continue lisinopril\par 4.  HLD-  avoid statin due to liver disease.

## 2022-10-21 NOTE — REVIEW OF SYSTEMS
[Recent Weight Gain (___ Lbs)] : recent weight gain: [unfilled] lbs [Muscle Cramps] : muscle cramps [Chest Pain] : no chest pain [Shortness Of Breath] : no shortness of breath [Nausea] : no nausea [Polyuria] : no polyuria [Ulcer] : no ulcer [Pain/Numbness of Digits] : no pain/numbness of digits [Polydipsia] : no polydipsia [FreeTextEntry3] : no known DR

## 2022-10-21 NOTE — PHYSICAL EXAM
[Healthy Appearance] : healthy appearance [No Acute Distress] : no acute distress [Normal Sclera/Conjunctiva] : normal sclera/conjunctiva [No Neck Mass] : no neck mass was observed [No LAD] : no lymphadenopathy [Supple] : the neck was supple [Thyroid Not Enlarged] : the thyroid was not enlarged [No Thyroid Nodules] : no palpable thyroid nodules [No Respiratory Distress] : no respiratory distress [Clear to Auscultation] : lungs were clear to auscultation bilaterally [Normal S1, S2] : normal S1 and S2 [Normal Rate] : heart rate was normal [Regular Rhythm] : with a regular rhythm [No Edema] : no peripheral edema [Normal Gait] : normal gait [No Clubbing, Cyanosis] : no clubbing  or cyanosis of the fingernails [Acanthosis Nigricans] : no acanthosis nigricans [Right foot was examined, including] : right foot ~C was examined, including visual inspection with sensory and pulse exams [Left foot was examined, including] : left foot ~C was examined, including visual inspection with sensory and pulse exams [2+] : 2+ in the dorsalis pedis [Diminished Throughout Both Feet] : normal tactile sensation with monofilament testing throughout both feet [No Tremors] : no tremors [Normal Sensation on Monofilament Testing] : normal sensation on monofilament testing of lower extremities [Normal Affect] : the affect was normal [Normal Insight/Judgement] : insight and judgment were intact [Normal Mood] : the mood was normal [de-identified] : 2/6 systolic murmur [FreeTextEntry1] : callus  [FreeTextEntry2] : Hammer toe [FreeTextEntry5] : Callus [FreeTextEntry6] : Hammer toe

## 2022-10-21 NOTE — HISTORY OF PRESENT ILLNESS
[FreeTextEntry1] : Here to establish care for Type 2 DM, osteoporosis\par Her previous endocrinologist stopped practicing and she needs to establish new care. \par She has a PMH significant for cirrhosis with ascites and encephalopathy (related to fatty liver)\par History of OP on alendronate for several years.   \par \par Quality:  Type 2 DM\par Severity:  moderate\par Duration of diabetes:  over 20 years\par Associated Complications/ Symptoms:  no known microvascular complications.  \par Modifying Factors:  Better with medication\par \par SMBG:  Was given Rx for Christal in the past, but did not start using as she was not educated on this.   \par Has not been monitoring blood glucose.  \par \par Current Diabetic Medication Regimen:\par Was on basal- bolus insulin, but ran out of both insulin prescriptions about 2 months ago. \par \par Denies any associated polyuria or polydipsia.  \par

## 2022-10-31 ENCOUNTER — APPOINTMENT (OUTPATIENT)
Dept: FAMILY MEDICINE | Facility: CLINIC | Age: 67
End: 2022-10-31

## 2022-10-31 ENCOUNTER — LABORATORY RESULT (OUTPATIENT)
Age: 67
End: 2022-10-31

## 2022-10-31 VITALS
HEART RATE: 75 BPM | SYSTOLIC BLOOD PRESSURE: 108 MMHG | OXYGEN SATURATION: 99 % | BODY MASS INDEX: 23.6 KG/M2 | DIASTOLIC BLOOD PRESSURE: 48 MMHG | RESPIRATION RATE: 16 BRPM | HEIGHT: 61 IN | WEIGHT: 125 LBS

## 2022-10-31 DIAGNOSIS — Z09 ENCOUNTER FOR FOLLOW-UP EXAMINATION AFTER COMPLETED TREATMENT FOR CONDITIONS OTHER THAN MALIGNANT NEOPLASM: ICD-10-CM

## 2022-10-31 PROCEDURE — 99214 OFFICE O/P EST MOD 30 MIN: CPT

## 2022-10-31 RX ORDER — ALPRAZOLAM 0.5 MG/1
0.5 TABLET ORAL
Qty: 30 | Refills: 0 | Status: COMPLETED | COMMUNITY
Start: 2022-05-10

## 2022-10-31 RX ORDER — SERTRALINE HYDROCHLORIDE 50 MG/1
50 TABLET, FILM COATED ORAL
Qty: 90 | Refills: 0 | Status: COMPLETED | COMMUNITY
Start: 2022-03-02

## 2022-10-31 NOTE — PLAN
[FreeTextEntry1] : f/u 2-3 mo prn\par Pt to go for blood work at [facility] today\par Continue F/U with GI and hepatology as dierected

## 2022-10-31 NOTE — HISTORY OF PRESENT ILLNESS
[FreeTextEntry1] : Kaitlin was in the hospital for an episode of confusion last week\par Lactulose inc 2 tbsp 3x a day per dr smith [de-identified] : Caridad has h/o nonalcoholic cirrhosis and was previously taking Lactulose  but despite this she reports confusion had reoccurred. She was seen at the ER and followed up with Dr Carranza(GI) who increased her lactulose to 1 tbsp TID. She reports she has improved

## 2022-10-31 NOTE — PHYSICAL EXAM
[Normal] : normal rate, regular rhythm, normal S1 and S2 and no murmur heard [No Carotid Bruits] : no carotid bruits [No Edema] : there was no peripheral edema [No Extremity Clubbing/Cyanosis] : no extremity clubbing/cyanosis [No Joint Swelling] : no joint swelling [Coordination Grossly Intact] : coordination grossly intact [Normal Gait] : normal gait [Alert and Oriented x3] : oriented to person, place, and time [de-identified] : abd appears less tense/less distended

## 2022-11-01 LAB
ALBUMIN SERPL ELPH-MCNC: 3.8 G/DL
ALP BLD-CCNC: 142 U/L
ALT SERPL-CCNC: 11 U/L
ANION GAP SERPL CALC-SCNC: 15 MMOL/L
AST SERPL-CCNC: 41 U/L
BASOPHILS # BLD AUTO: 0.04 K/UL
BASOPHILS NFR BLD AUTO: 1.1 %
BILIRUB INDIRECT SERPL-MCNC: 1.1 MG/DL
BILIRUB SERPL-MCNC: 1.8 MG/DL
BUN SERPL-MCNC: 19 MG/DL
CALCIUM SERPL-MCNC: 9.7 MG/DL
CHLORIDE SERPL-SCNC: 104 MMOL/L
CO2 SERPL-SCNC: 24 MMOL/L
CREAT SERPL-MCNC: 0.92 MG/DL
EGFR: 68 ML/MIN/1.73M2
EOSINOPHIL # BLD AUTO: 0.08 K/UL
EOSINOPHIL NFR BLD AUTO: 2.2 %
GLUCOSE SERPL-MCNC: 164 MG/DL
HCT VFR BLD CALC: 42.6 %
HGB BLD-MCNC: 13.4 G/DL
IMM GRANULOCYTES NFR BLD AUTO: 0.3 %
INR PPP: 1.2 RATIO
LYMPHOCYTES # BLD AUTO: 0.55 K/UL
LYMPHOCYTES NFR BLD AUTO: 14.9 %
MAN DIFF?: NORMAL
MCHC RBC-ENTMCNC: 31.5 GM/DL
MCHC RBC-ENTMCNC: 33.3 PG
MCV RBC AUTO: 106 FL
MONOCYTES # BLD AUTO: 0.41 K/UL
MONOCYTES NFR BLD AUTO: 11.1 %
NEUTROPHILS # BLD AUTO: 2.59 K/UL
NEUTROPHILS NFR BLD AUTO: 70.4 %
PLATELET # BLD AUTO: 47 K/UL
POTASSIUM SERPL-SCNC: 5.3 MMOL/L
PROT SERPL-MCNC: 6.8 G/DL
PT BLD: 14.1 SEC
RBC # BLD: 4.02 M/UL
RBC # FLD: 15.2 %
SODIUM SERPL-SCNC: 143 MMOL/L
WBC # FLD AUTO: 3.68 K/UL

## 2022-11-08 ENCOUNTER — RESULT REVIEW (OUTPATIENT)
Age: 67
End: 2022-11-08

## 2022-11-08 ENCOUNTER — APPOINTMENT (OUTPATIENT)
Dept: MRI IMAGING | Facility: CLINIC | Age: 67
End: 2022-11-08

## 2022-11-08 ENCOUNTER — APPOINTMENT (OUTPATIENT)
Dept: RADIOLOGY | Facility: CLINIC | Age: 67
End: 2022-11-08

## 2022-11-08 PROCEDURE — 74018 RADEX ABDOMEN 1 VIEW: CPT

## 2022-11-08 PROCEDURE — 71046 X-RAY EXAM CHEST 2 VIEWS: CPT

## 2022-11-08 PROCEDURE — 70551 MRI BRAIN STEM W/O DYE: CPT | Mod: MH

## 2022-11-15 ENCOUNTER — APPOINTMENT (OUTPATIENT)
Dept: ENDOCRINOLOGY | Facility: CLINIC | Age: 67
End: 2022-11-15

## 2022-11-15 PROCEDURE — G0108 DIAB MANAGE TRN  PER INDIV: CPT

## 2022-11-30 ENCOUNTER — NON-APPOINTMENT (OUTPATIENT)
Age: 67
End: 2022-11-30

## 2022-12-12 ENCOUNTER — NON-APPOINTMENT (OUTPATIENT)
Age: 67
End: 2022-12-12

## 2022-12-16 ENCOUNTER — RX RENEWAL (OUTPATIENT)
Age: 67
End: 2022-12-16

## 2022-12-20 ENCOUNTER — LABORATORY RESULT (OUTPATIENT)
Age: 67
End: 2022-12-20

## 2022-12-23 ENCOUNTER — NON-APPOINTMENT (OUTPATIENT)
Age: 67
End: 2022-12-23

## 2022-12-24 ENCOUNTER — INPATIENT (INPATIENT)
Facility: HOSPITAL | Age: 67
LOS: 5 days | Discharge: HOME | DRG: 432 | End: 2022-12-30
Attending: HOSPITALIST | Admitting: STUDENT IN AN ORGANIZED HEALTH CARE EDUCATION/TRAINING PROGRAM
Payer: MEDICARE

## 2022-12-24 VITALS — HEART RATE: 82 BPM | OXYGEN SATURATION: 100 %

## 2022-12-24 DIAGNOSIS — K92.2 GASTROINTESTINAL HEMORRHAGE, UNSPECIFIED: ICD-10-CM

## 2022-12-24 LAB
ALBUMIN SERPL ELPH-MCNC: 3.1 G/DL — LOW (ref 3.3–5)
ALP SERPL-CCNC: 62 U/L — SIGNIFICANT CHANGE UP (ref 40–120)
ALT FLD-CCNC: 12 U/L — SIGNIFICANT CHANGE UP (ref 10–45)
ANION GAP SERPL CALC-SCNC: 11 MMOL/L — SIGNIFICANT CHANGE UP (ref 5–17)
APTT BLD: 30.2 SEC — SIGNIFICANT CHANGE UP (ref 27.5–35.5)
AST SERPL-CCNC: 35 U/L — SIGNIFICANT CHANGE UP (ref 10–40)
BASE EXCESS BLDV CALC-SCNC: 2.8 MMOL/L — SIGNIFICANT CHANGE UP (ref -2–3)
BASOPHILS # BLD AUTO: 0.01 K/UL — SIGNIFICANT CHANGE UP (ref 0–0.2)
BASOPHILS NFR BLD AUTO: 0.1 % — SIGNIFICANT CHANGE UP (ref 0–2)
BILIRUB SERPL-MCNC: 2.6 MG/DL — HIGH (ref 0.2–1.2)
BUN SERPL-MCNC: 81 MG/DL — HIGH (ref 7–23)
CA-I SERPL-SCNC: 1.13 MMOL/L — LOW (ref 1.15–1.33)
CALCIUM SERPL-MCNC: 8.1 MG/DL — LOW (ref 8.4–10.5)
CHLORIDE BLDV-SCNC: 104 MMOL/L — SIGNIFICANT CHANGE UP (ref 96–108)
CHLORIDE SERPL-SCNC: 106 MMOL/L — SIGNIFICANT CHANGE UP (ref 96–108)
CO2 BLDV-SCNC: 31 MMOL/L — HIGH (ref 22–26)
CO2 SERPL-SCNC: 25 MMOL/L — SIGNIFICANT CHANGE UP (ref 22–31)
CREAT SERPL-MCNC: 1.17 MG/DL — SIGNIFICANT CHANGE UP (ref 0.5–1.3)
EGFR: 51 ML/MIN/1.73M2 — LOW
EOSINOPHIL # BLD AUTO: 0 K/UL — SIGNIFICANT CHANGE UP (ref 0–0.5)
EOSINOPHIL NFR BLD AUTO: 0 % — SIGNIFICANT CHANGE UP (ref 0–6)
GAS PNL BLDA: SIGNIFICANT CHANGE UP
GAS PNL BLDV: 140 MMOL/L — SIGNIFICANT CHANGE UP (ref 136–145)
GAS PNL BLDV: SIGNIFICANT CHANGE UP
GLUCOSE BLDC GLUCOMTR-MCNC: 252 MG/DL — HIGH (ref 70–99)
GLUCOSE BLDV-MCNC: 286 MG/DL — HIGH (ref 70–99)
GLUCOSE SERPL-MCNC: 292 MG/DL — HIGH (ref 70–99)
HCO3 BLDV-SCNC: 29 MMOL/L — SIGNIFICANT CHANGE UP (ref 22–29)
HCT VFR BLD CALC: 30.3 % — LOW (ref 34.5–45)
HCT VFR BLDA CALC: 32 % — LOW (ref 34.5–46.5)
HGB BLD CALC-MCNC: 10.7 G/DL — LOW (ref 11.7–16.1)
HGB BLD-MCNC: 10.2 G/DL — LOW (ref 11.5–15.5)
HOROWITZ INDEX BLDV+IHG-RTO: 30 — SIGNIFICANT CHANGE UP
IMM GRANULOCYTES NFR BLD AUTO: 0.3 % — SIGNIFICANT CHANGE UP (ref 0–0.9)
INR BLD: 1.64 RATIO — HIGH (ref 0.88–1.16)
LACTATE BLDV-MCNC: 2.8 MMOL/L — HIGH (ref 0.5–2)
LDH SERPL L TO P-CCNC: 270 U/L — HIGH (ref 50–242)
LYMPHOCYTES # BLD AUTO: 0.51 K/UL — LOW (ref 1–3.3)
LYMPHOCYTES # BLD AUTO: 7.3 % — LOW (ref 13–44)
MAGNESIUM SERPL-MCNC: 1.3 MG/DL — LOW (ref 1.6–2.6)
MCHC RBC-ENTMCNC: 30.8 PG — SIGNIFICANT CHANGE UP (ref 27–34)
MCHC RBC-ENTMCNC: 33.7 GM/DL — SIGNIFICANT CHANGE UP (ref 32–36)
MCV RBC AUTO: 91.5 FL — SIGNIFICANT CHANGE UP (ref 80–100)
MONOCYTES # BLD AUTO: 0.83 K/UL — SIGNIFICANT CHANGE UP (ref 0–0.9)
MONOCYTES NFR BLD AUTO: 11.9 % — SIGNIFICANT CHANGE UP (ref 2–14)
NEUTROPHILS # BLD AUTO: 5.62 K/UL — SIGNIFICANT CHANGE UP (ref 1.8–7.4)
NEUTROPHILS NFR BLD AUTO: 80.4 % — HIGH (ref 43–77)
NRBC # BLD: 0 /100 WBCS — SIGNIFICANT CHANGE UP (ref 0–0)
PCO2 BLDV: 53 MMHG — HIGH (ref 39–42)
PH BLDV: 7.35 — SIGNIFICANT CHANGE UP (ref 7.32–7.43)
PHOSPHATE SERPL-MCNC: 3.4 MG/DL — SIGNIFICANT CHANGE UP (ref 2.5–4.5)
PLATELET # BLD AUTO: 50 K/UL — LOW (ref 150–400)
PO2 BLDV: 37 MMHG — SIGNIFICANT CHANGE UP (ref 25–45)
POTASSIUM BLDV-SCNC: 5.2 MMOL/L — HIGH (ref 3.5–5.1)
POTASSIUM SERPL-MCNC: 4.9 MMOL/L — SIGNIFICANT CHANGE UP (ref 3.5–5.3)
POTASSIUM SERPL-SCNC: 4.9 MMOL/L — SIGNIFICANT CHANGE UP (ref 3.5–5.3)
PROT SERPL-MCNC: 5.4 G/DL — LOW (ref 6–8.3)
PROTHROM AB SERPL-ACNC: 18.9 SEC — HIGH (ref 10.5–13.4)
RBC # BLD: 3.31 M/UL — LOW (ref 3.8–5.2)
RBC # FLD: 15.8 % — HIGH (ref 10.3–14.5)
SAO2 % BLDV: 67 % — SIGNIFICANT CHANGE UP (ref 67–88)
SODIUM SERPL-SCNC: 142 MMOL/L — SIGNIFICANT CHANGE UP (ref 135–145)
WBC # BLD: 6.99 K/UL — SIGNIFICANT CHANGE UP (ref 3.8–10.5)
WBC # FLD AUTO: 6.99 K/UL — SIGNIFICANT CHANGE UP (ref 3.8–10.5)

## 2022-12-24 PROCEDURE — 99232 SBSQ HOSP IP/OBS MODERATE 35: CPT | Mod: GC

## 2022-12-24 PROCEDURE — 74177 CT ABD & PELVIS W/CONTRAST: CPT | Mod: 26

## 2022-12-24 RX ORDER — SERTRALINE 25 MG/1
50 TABLET, FILM COATED ORAL DAILY
Refills: 0 | Status: DISCONTINUED | OUTPATIENT
Start: 2022-12-24 | End: 2022-12-30

## 2022-12-24 RX ORDER — SODIUM CHLORIDE 9 MG/ML
1000 INJECTION, SOLUTION INTRAVENOUS
Refills: 0 | Status: DISCONTINUED | OUTPATIENT
Start: 2022-12-24 | End: 2022-12-30

## 2022-12-24 RX ORDER — FENTANYL CITRATE 50 UG/ML
50 INJECTION INTRAVENOUS EVERY 4 HOURS
Refills: 0 | Status: DISCONTINUED | OUTPATIENT
Start: 2022-12-24 | End: 2022-12-28

## 2022-12-24 RX ORDER — DEXTROSE 50 % IN WATER 50 %
25 SYRINGE (ML) INTRAVENOUS ONCE
Refills: 0 | Status: DISCONTINUED | OUTPATIENT
Start: 2022-12-24 | End: 2022-12-30

## 2022-12-24 RX ORDER — METOCLOPRAMIDE HCL 10 MG
10 TABLET ORAL ONCE
Refills: 0 | Status: COMPLETED | OUTPATIENT
Start: 2022-12-24 | End: 2022-12-25

## 2022-12-24 RX ORDER — INSULIN LISPRO 100/ML
VIAL (ML) SUBCUTANEOUS EVERY 6 HOURS
Refills: 0 | Status: DISCONTINUED | OUTPATIENT
Start: 2022-12-24 | End: 2022-12-28

## 2022-12-24 RX ORDER — CHLORHEXIDINE GLUCONATE 213 G/1000ML
1 SOLUTION TOPICAL
Refills: 0 | Status: DISCONTINUED | OUTPATIENT
Start: 2022-12-24 | End: 2022-12-28

## 2022-12-24 RX ORDER — LORATADINE 10 MG/1
10 TABLET ORAL DAILY
Refills: 0 | Status: DISCONTINUED | OUTPATIENT
Start: 2022-12-24 | End: 2022-12-26

## 2022-12-24 RX ORDER — CHLORHEXIDINE GLUCONATE 213 G/1000ML
15 SOLUTION TOPICAL EVERY 12 HOURS
Refills: 0 | Status: DISCONTINUED | OUTPATIENT
Start: 2022-12-24 | End: 2022-12-27

## 2022-12-24 RX ORDER — MAGNESIUM SULFATE 500 MG/ML
2 VIAL (ML) INJECTION
Refills: 0 | Status: COMPLETED | OUTPATIENT
Start: 2022-12-24 | End: 2022-12-25

## 2022-12-24 RX ORDER — DEXTROSE 50 % IN WATER 50 %
15 SYRINGE (ML) INTRAVENOUS ONCE
Refills: 0 | Status: DISCONTINUED | OUTPATIENT
Start: 2022-12-24 | End: 2022-12-30

## 2022-12-24 RX ORDER — CHLORHEXIDINE GLUCONATE 213 G/1000ML
1 SOLUTION TOPICAL
Refills: 0 | Status: DISCONTINUED | OUTPATIENT
Start: 2022-12-24 | End: 2022-12-24

## 2022-12-24 RX ORDER — PANTOPRAZOLE SODIUM 20 MG/1
8 TABLET, DELAYED RELEASE ORAL
Qty: 80 | Refills: 0 | Status: DISCONTINUED | OUTPATIENT
Start: 2022-12-24 | End: 2022-12-26

## 2022-12-24 RX ORDER — PROPOFOL 10 MG/ML
10 INJECTION, EMULSION INTRAVENOUS
Qty: 1000 | Refills: 0 | Status: DISCONTINUED | OUTPATIENT
Start: 2022-12-24 | End: 2022-12-27

## 2022-12-24 RX ORDER — RIFAXIMIN 200 MG/1
1 TABLET ORAL
Qty: 0 | Refills: 0 | DISCHARGE

## 2022-12-24 RX ORDER — FENTANYL CITRATE 50 UG/ML
50 INJECTION INTRAVENOUS ONCE
Refills: 0 | Status: DISCONTINUED | OUTPATIENT
Start: 2022-12-24 | End: 2022-12-24

## 2022-12-24 RX ORDER — DEXTROSE 50 % IN WATER 50 %
12.5 SYRINGE (ML) INTRAVENOUS ONCE
Refills: 0 | Status: DISCONTINUED | OUTPATIENT
Start: 2022-12-24 | End: 2022-12-30

## 2022-12-24 RX ORDER — OCTREOTIDE ACETATE 200 UG/ML
50 INJECTION, SOLUTION INTRAVENOUS; SUBCUTANEOUS
Qty: 500 | Refills: 0 | Status: DISCONTINUED | OUTPATIENT
Start: 2022-12-24 | End: 2022-12-28

## 2022-12-24 RX ORDER — SPIRONOLACTONE 25 MG/1
1 TABLET, FILM COATED ORAL
Qty: 0 | Refills: 0 | DISCHARGE

## 2022-12-24 RX ORDER — CEFTRIAXONE 500 MG/1
INJECTION, POWDER, FOR SOLUTION INTRAMUSCULAR; INTRAVENOUS
Refills: 0 | Status: DISCONTINUED | OUTPATIENT
Start: 2022-12-24 | End: 2022-12-28

## 2022-12-24 RX ORDER — GLUCAGON INJECTION, SOLUTION 0.5 MG/.1ML
1 INJECTION, SOLUTION SUBCUTANEOUS ONCE
Refills: 0 | Status: DISCONTINUED | OUTPATIENT
Start: 2022-12-24 | End: 2022-12-30

## 2022-12-24 RX ORDER — CEFTRIAXONE 500 MG/1
2000 INJECTION, POWDER, FOR SOLUTION INTRAMUSCULAR; INTRAVENOUS EVERY 24 HOURS
Refills: 0 | Status: DISCONTINUED | OUTPATIENT
Start: 2022-12-25 | End: 2022-12-28

## 2022-12-24 RX ORDER — CEFTRIAXONE 500 MG/1
2000 INJECTION, POWDER, FOR SOLUTION INTRAMUSCULAR; INTRAVENOUS ONCE
Refills: 0 | Status: COMPLETED | OUTPATIENT
Start: 2022-12-24 | End: 2022-12-24

## 2022-12-24 RX ADMIN — FENTANYL CITRATE 50 MICROGRAM(S): 50 INJECTION INTRAVENOUS at 21:30

## 2022-12-24 RX ADMIN — FENTANYL CITRATE 50 MICROGRAM(S): 50 INJECTION INTRAVENOUS at 21:00

## 2022-12-24 RX ADMIN — Medication 3: at 22:59

## 2022-12-24 RX ADMIN — FENTANYL CITRATE 50 MICROGRAM(S): 50 INJECTION INTRAVENOUS at 23:35

## 2022-12-24 RX ADMIN — OCTREOTIDE ACETATE 10 MICROGRAM(S)/HR: 200 INJECTION, SOLUTION INTRAVENOUS; SUBCUTANEOUS at 22:51

## 2022-12-24 RX ADMIN — PANTOPRAZOLE SODIUM 10 MG/HR: 20 TABLET, DELAYED RELEASE ORAL at 22:51

## 2022-12-24 RX ADMIN — CHLORHEXIDINE GLUCONATE 15 MILLILITER(S): 213 SOLUTION TOPICAL at 22:51

## 2022-12-24 RX ADMIN — CHLORHEXIDINE GLUCONATE 1 APPLICATION(S): 213 SOLUTION TOPICAL at 22:51

## 2022-12-24 RX ADMIN — PROPOFOL 3.53 MICROGRAM(S)/KG/MIN: 10 INJECTION, EMULSION INTRAVENOUS at 22:51

## 2022-12-24 RX ADMIN — FENTANYL CITRATE 50 MICROGRAM(S): 50 INJECTION INTRAVENOUS at 23:05

## 2022-12-24 NOTE — H&P ADULT - CRITICAL CARE ATTENDING COMMENT
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     Stable on arrival rass- 1  Finishing blood with good SBP, sat goo d on minimal vent settings. Pocus with normal LVF and small RV with IVC 1.0cm with complete collapse on respiration.   SCDs  Full code.   CT pending, IR to follow and GI input appreciated.

## 2022-12-24 NOTE — PATIENT PROFILE ADULT - OVER THE PAST TWO WEEKS HAVE YOU FELT DOWN, DEPRESSED OR HOPELESS?
Patient to ER with complaint of lumbar pain radiating to bilateral legs, with difficulty ambulating. Denies any bowel or bladder incontinence. Denies any injury or trauma, history of chronic back pain.
no

## 2022-12-24 NOTE — H&P ADULT - NSHPLABSRESULTS_GEN_ALL_CORE
10.2   6.99  )-----------( 50       ( 24 Dec 2022 20:48 )             30.3   12-24    142  |  106  |  81<H>  ----------------------------<  292<H>  4.9   |  25  |  1.17    Ca    8.1<L>      24 Dec 2022 20:48  Phos  3.4     12-24  Mg     1.3     12-24    TPro  5.4<L>  /  Alb  3.1<L>  /  TBili  2.6<H>  /  DBili  x   /  AST  35  /  ALT  12  /  AlkPhos  62  12-24    CXR

## 2022-12-24 NOTE — CONSULT NOTE ADULT - ASSESSMENT
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 The patient was intubated for airway protection and underwent evaluation with EGD, with large amount of blood at the gastric fundus.     #IVEY cirrhosis   #Variceal bleed   Likely related to gastric varices.     Recommendations:   - CT triple phase   - rest of care per primary team    All recommendations are tentative until note is attested by attending.     Divina Salazar, PGY-4   Gastroenterology/Hepatology Fellow  Available on [employer] Teams  [phone] ([facility] Short Range Pager)  [phone] ([facility] Long Range Pager)    After 5pm, please contact the on-call GI fellow. 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 The patient was intubated for airway protection and underwent evaluation with EGD, with large amount of blood at the gastric fundus.     #IVEY cirrhosis   #Variceal bleed   Likely related to gastric varices.     Recommendations:   - CT triple phase   - continue with octreotide gtt   - continue with ceftriaxone for 7 days total   - resuscitation per MICU team   - rest of care per primary team    All recommendations are tentative until note is attested by attending.     Divina Salazar, PGY-4   Gastroenterology/Hepatology Fellow  Available on Microsoft Teams  [phone] ([facility] Short Range Pager)  [phone] ([facility] Long Range Pager)    After 5pm, please contact the on-call GI fellow. [phone]

## 2022-12-24 NOTE — H&P ADULT - NSHPPHYSICALEXAM_GEN_ALL_CORE
T(C): 37.9 (12-24-22 @ 20:03), Max: 37.9 (12-24-22 @ 20:03)  HR: 82 (12-24-22 @ 20:03) (82 - 82)  BP: 140/63 (12-24-22 @ 20:03) (140/63 - 140/63)  RR: 20 (12-24-22 @ 20:03) (20 - 20)  SpO2: 100% (12-24-22 @ 20:03) (100% - 100%)    CONSTITUTIONAL: Well groomed, no apparent distress, intubated and sedated   EYES: PERRLA and symmetric, EOMI, No conjunctival or scleral injection, non-icteric  ENMT: Oral mucosa with moist membranes. Normal dentition; no pharyngeal injection or exudates             NECK: Supple, symmetric and without tracheal deviation   RESP: No respiratory distress, no use of accessory muscles; CTA b/l, no WRR  CV: RRR, +S1S2, no MRG; no JVD; no peripheral edema  GI: Soft, NT, ND, no rebound, no guarding; no palpable masses; no hepatosplenomegaly; no hernia palpated  LYMPH: No cervical LAD or tenderness; no axillary LAD or tenderness; no inguinal LAD or tenderness  MSK: Normal gait; No digital clubbing or cyanosis; examination of the (head/neck/spine/ribs/pelvis, RUE, LUE, RLE, LLE) without misalignment,            Normal ROM without pain, no spinal tenderness, normal muscle strength/tone  SKIN: No rashes or ulcers noted; no subcutaneous nodules or induration palpable  NEURO: intubated and sedated

## 2022-12-24 NOTE — AIRWAY PLACEMENT NOTE ADULT - AIRWAY COMMENTS:
Pt arrived from OSH already intubated with no complications noted. RT placed pt on ventilator per provider orders. Will continue to monitor throughout the shift.

## 2022-12-24 NOTE — H&P ADULT - ASSESSMENT
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, T2DM, HTN, HLD, CKD, chronic anemia and thrombocytopenia who initially presented to an outside hospital for hematemesis and melena, underwent EGD with limited visualization 2/2 to large amounts of blood, now presents to [facility] for TIPS evaluation     ===Neurology===  - AAOx3 at baseline  - Continue with propofol for sedation  - Patient with history of recent hepatic encephalopathy per chart review, improved with lactulose  - Follow up serum ammonia level  - ?lactulose ?rifaximin    ===Cardiac===   - Maintain MAP >65mmHg with early initiation of vasoactive medications   - HTN medications on hold     ===Respiratory===  - Intubated for airway protection iso active hematemesis  - Vent settings:  - ABG:  - Titrate vent to ABGs    ===Gastrointestinal===   #Upper GI Bleed  - EGD at OSH 12/24: large amounts of blood and clots in the gastric fundus, suspicion for gastric varices, visualized small distal esophageal varices  - Continue with IV PPI BID  - Continue with Octreotide gtt  - Continue with aggressive fluid resuscitation with crystalloids and PRBCs prn  - IR consult for TIPS procedure given active hematemesis  - Follow up hepatology recs   - Maintain active T&S, transfuse Hgb > 7  #Cirrhosis  - Reportedly non-EtOH cirrhosis, 2/2 to IVEY  - Continue with lactulose and rifaximin  - Continue with Ceftriaxone 1g IV qd for SBP PPx for a total of 7 days (12/24 - )    ===Renal===  - No active issues    ===Infectious Disease===  #SBP PPX  - Continue with ceftriaxone 1g IV for SBP PPx     ===Endocrine===  #T2DM  - Start on insulin sliding scale   - If suboptimal glycemic control, can transition to NPH    ===Hematology===  #Anemia  #Thrombcytopenia  - Chronic anemia and thrombocytopenia iso cirrhosis  - DVT PPx with SCDs  - Transfuse to maintain Hgb > 7, PLT > 50k    ===Ethics===  Full code   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, T2DM, HTN, HLD, CKD, chronic anemia and thrombocytopenia who initially presented to an outside hospital for hematemesis and melena, underwent EGD with limited visualization 2/2 to large amounts of blood, now presents to [facility] for TIPS evaluation     ===Neurology===  - AAOx3 at baseline  - Continue with propofol for sedation  - Patient with history of recent hepatic encephalopathy per chart review, improved with lactulose  - Follow up serum ammonia level  - ?lactulose ?rifaximin    ===Cardiac===   - Maintain MAP >65mmHg with early initiation of vasoactive medications   - HTN medications on hold     ===Respiratory===  - Intubated for airway protection iso active hematemesis  - Vent settings:  - ABG:  - Titrate vent to ABGs    ===Gastrointestinal===   #Upper GI Bleed  - EGD at OSH 12/24: large amounts of blood and clots in the gastric fundus, suspicion for gastric varices, visualized small distal esophageal varices  - Continue with IV PPI BID  - Continue with Octreotide gtt  - Continue with aggressive fluid resuscitation with crystalloids and PRBCs prn  - IR consult for TIPS procedure given active hematemesis  - Follow up hepatology recs   - Maintain active T&S, transfuse Hgb > 7  #Cirrhosis  - Reportedly non-EtOH cirrhosis, 2/2 to IVEY  - Continue with lactulose and rifaximin  - Continue with Ceftriaxone 1g IV qd for SBP PPx for a total of 7 days (12/24 - )    ===Renal===  - No active issues    ===Infectious Disease===  #SBP PPX  - Continue with ceftriaxone 1g IV for SBP PPx     ===Endocrine===  #T2DM  - Start on insulin sliding scale   - If suboptimal glycemic control, can transition to NPH    ===Hematology===  #Anemia  #Thrombcytopenia  - Chronic anemia and thrombocytopenia iso cirrhosis  - DVT PPx with SCDs  - Transfuse to maintain Hgb > 7, PLT > 50k    ===Skin===  #Occipital laceration  - s/p staples  - remove staples in 7 days    ===Ethics===  Full code   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     ===Neurology===  - AAOx3 at baseline  - Continue with fent and propofol for sedation  - Patient with history of recent hepatic encephalopathy per chart review, improved with lactulose  - Follow up serum ammonia level  - hold home lactulose and rifaximin while NPO     ===Cardiac===   - Maintain MAP >65mmHg with early initiation of vasoactive medications   - HTN medications on hold while NPO     ===Respiratory===  - Intubated for airway protection iso active hematemesis  - Vent settings: 400/16/30/5  - AB.43/40/162/26  - Titrate vent to ABGs    ===Gastrointestinal===   #Upper GI Bleed  - EGD at OSH : large amounts of blood and clots in the gastric fundus, suspicion for gastric varices, visualized small distal esophageal varices  - Continue with IV PPI BID  - Continue with Octreotide gtt  - Continue with aggressive fluid resuscitation with crystalloids and PRBCs prn  - continue with ceftriaxone 1 g q 24 hours for 7 days   - IR consult for TIPS procedure given active hematemesis  - f/u CT triple phase   - Follow up hepatology recs   - Maintain active T&S, transfuse Hgb > 7  #Cirrhosis  - Reportedly non-EtOH cirrhosis, 2/2 to IVEY  - hold home lactulose and rifaximin while NPO  - Continue with Ceftriaxone 1g IV qd for SBP PPx for a total of 7 days ( - )    ===Renal===  SCr 1.17  - No active issues    ===Infectious Disease===  #SBP PPX  - Continue with ceftriaxone 1g IV for SBP PPx  for 7 days (-)    ===Endocrine===  #T2DM  - Start on insulin sliding scale   - If suboptimal glycemic control, can transition to NPH    ===Hematology===  #Anemia  #Thrombcytopenia  - Chronic anemia and thrombocytopenia iso cirrhosis  - DVT PPx with SCDs  - Transfuse to maintain Hgb > 7, PLT > 50k    ===Skin===  #Occipital laceration  - s/p staples  - remove staples in 7 days    ===Ethics===  Full code   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     ===Neurology===  - AAOx3 at baseline  - Continue with fent and propofol for sedation  - Patient with history of recent hepatic encephalopathy per chart review, improved with lactulose  - Follow up serum ammonia level  - hold home lactulose and rifaximin while NPO     ===Cardiac===   - Maintain MAP >65mmHg with early initiation of vasoactive medications   - HTN medications on hold while NPO     ===Respiratory===  - Intubated for airway protection iso active hematemesis  - Vent settings: 400/16/30/5  - AB.43/40/162/26  - Titrate vent to ABGs    ===Gastrointestinal===   #Upper GI Bleed  - EGD at OSH : large amounts of blood and clots in the gastric fundus, suspicion for gastric varices, visualized small distal esophageal varices  - Continue with IV PPI BID  - Continue with Octreotide gtt  - Continue with aggressive fluid resuscitation with crystalloids and PRBCs prn  - continue with ceftriaxone 1 g q 24 hours for 7 days   - IR consult for TIPS procedure given active hematemesis  - f/u CT triple phase   - Follow up hepatology recs   - Maintain active T&S, transfuse Hgb > 7  #Cirrhosis  - Reportedly non-EtOH cirrhosis, 2/2 to IVEY  - MELD score 17- 6% estimated 3 month mortality   - hold home lactulose and rifaximin while NPO  - Continue with Ceftriaxone 1g IV qd for SBP PPx for a total of 7 days ( - )    ===Renal===  SCr 1.17  - No active issues    ===Infectious Disease===  #SBP PPX  - Continue with ceftriaxone 1g IV for SBP PPx  for 7 days (-)    ===Endocrine===  #T2DM  - Start on insulin sliding scale   - If suboptimal glycemic control, can transition to NPH    ===Hematology===  #Anemia  #Thrombcytopenia  - Chronic anemia and thrombocytopenia iso cirrhosis  - DVT PPx with SCDs  - Transfuse to maintain Hgb > 7, PLT > 50k    ===Skin===  #Occipital laceration  - s/p staples  - remove staples in 7 days    ===Ethics===  Full code   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     ===Neurology===  - AAOx3 at baseline  - Continue with fent and propofol for sedation  - Patient with history of recent hepatic encephalopathy per chart review, improved with lactulose  - Follow up serum ammonia level  - hold home lactulose and rifaximin while NPO     ===Cardiac===   - Maintain MAP >65mmHg with early initiation of vasoactive medications   - HTN medications on hold while NPO     ===Respiratory===  - Intubated for airway protection iso active hematemesis  - Vent settings: 400/16/30/5  - AB.43/40/162/26  - Titrate vent to ABGs    ===Gastrointestinal===   #Upper GI Bleed  - EGD at OSH : large amounts of blood and clots in the gastric fundus, suspicion for gastric varices, visualized small distal esophageal varices  - s/p I U FFP  - Continue with IV PPI BID  - Continue with Octreotide gtt  - Continue with aggressive fluid resuscitation with crystalloids and PRBCs prn  - continue with ceftriaxone 1 g q 24 hours for 7 days   - IR consult for TIPS procedure given active hematemesis- give 1 U FFP before TIPS   - f/u AM CBC and coags before tips   - f/u CT triple phase   - Follow up hepatology recs   - Maintain active T&S, transfuse Hgb > 7  #Cirrhosis  - Reportedly non-EtOH cirrhosis, 2/2 to IVEY  - MELD score 17- 6% estimated 3 month mortality   - hold home lactulose and rifaximin while NPO  - Continue with Ceftriaxone 1g IV qd for SBP PPx for a total of 7 days ( - )    ===Renal===  SCr 1.17  - No active issues    ===Infectious Disease===  #SBP PPX  - Continue with ceftriaxone 1g IV for SBP PPx  for 7 days (-)    ===Endocrine===  #T2DM  - Start on insulin sliding scale   - If suboptimal glycemic control, can transition to NPH    ===Hematology===  #Anemia  #Thrombcytopenia  - Chronic anemia and thrombocytopenia iso cirrhosis  - DVT PPx with SCDs  - Transfuse to maintain Hgb > 7, PLT > 50k    ===Skin===  #Occipital laceration  - s/p staples  - remove staples in 7 days    ===Ethics===  Full code   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     ===Neurology===  - AAOx3 at baseline  - Continue with fent and propofol for sedation  - Patient with history of recent hepatic encephalopathy per chart review, improved with lactulose  - Follow up serum ammonia level  - hold home lactulose and rifaximin while NPO     ===Cardiac===   - Maintain MAP >65mmHg with early initiation of vasoactive medications   - HTN medications on hold while NPO     ===Respiratory===  - Intubated for airway protection iso active hematemesis  - Vent settings: 400/16/30/5  - AB.43/40/162/26  - Titrate vent to ABGs    ===Gastrointestinal===   #Upper GI Bleed  - EGD at OSH : large amounts of blood and clots in the gastric fundus, suspicion for gastric varices, visualized small distal esophageal varices  - s/p I U FFP  - Continue with IV PPI BID  - Continue with Octreotide gtt  - Continue with aggressive fluid resuscitation with crystalloids and PRBCs prn  - continue with ceftriaxone 1 g q 24 hours for 7 days   - IR consult for TIPS procedure given active hematemesis- give 1 U FFP before TIPS   - f/u AM CBC and coags before tips   - f/u CT triple phase   - Follow up hepatology recs   - Maintain active T&S, transfuse Hgb > 7  #Cirrhosis  - Reportedly non-EtOH cirrhosis, 2/2 to IVEY  - MELD score 17- 6% estimated 3 month mortality   - no evidence of hepatic encephalopathy on this admission  - hold home lactulose and rifaximin while NPO  - Continue with Ceftriaxone 1g IV qd for SBP PPx for a total of 7 days ( - )    ===Renal===  SCr 1.17  - No active issues    ===Infectious Disease===  #SBP PPX  - Continue with ceftriaxone 1g IV for SBP PPx  for 7 days (-)    ===Endocrine===  #T2DM  - Start on insulin sliding scale   - If suboptimal glycemic control, can transition to NPH    ===Hematology===  #Anemia  #Thrombcytopenia  - Chronic anemia and thrombocytopenia iso cirrhosis  - DVT PPx with SCDs  - Transfuse to maintain Hgb > 7, PLT > 50k    ===Skin===  #Occipital laceration  - s/p staples  - remove staples in 7 days    ===Ethics===  Full code   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     ===Neurology===  - AAOx3 at baseline  - Patient with history of recent hepatic encephalopathy per chart review, improved with lactulose  [] Continue with fent and propofol for sedation  [] Follow up serum ammonia level  [] hold home lactulose and rifaximin while NPO     ===Cardiac===   #HTN  [] Maintain MAP >65mmHg with early initiation of vasoactive medications   [] HTN medications on hold while NPO     ===Respiratory===  # Intubated for airway protection iso active hematemesis  - Vent settings: 400/16/30/5  - AB.43/40/162/26  [] Titrate vent to ABGs    ===Gastrointestinal===   #Upper GI Bleed  - EGD at OSH : large amounts of blood and clots in the gastric fundus, suspicion for gastric varices, visualized small distal esophageal varices  - s/p I U FFP  [] Continue with IV PPI BID  [] Continue with Octreotide gtt  [] Continue with aggressive fluid resuscitation with crystalloids and PRBCs prn  [] continue with ceftriaxone 1 g q 24 hours for 7 days   [] IR consult for TIPS procedure given active hematemesis- give 1 U FFP before TIPS   - f/u AM CBC and coags before tips   - f/u CT triple phase   [] Follow up hepatology recs   [] Maintain active T&S, transfuse Hgb > 7  #Cirrhosis  - Reportedly non-EtOH cirrhosis, 2/2 to IVEY  - MELD score 17- 6% estimated 3 month mortality   - no evidence of hepatic encephalopathy on this admission  [] hold home lactulose and rifaximin while NPO  [] Continue with Ceftriaxone 1g IV qd for SBP PPx for a total of 7 days ( - )    ===Renal===  SCr 1.17  - No active issues    ===Infectious Disease===  #SBP PPX  [] Continue with ceftriaxone 1g IV for SBP PPx  for 7 days (-)    ===Endocrine===  #T2DM  [] Start on insulin sliding scale   [] If suboptimal glycemic control, can transition to NPH    ===Hematology===  #Anemia  #Thrombcytopenia  - Chronic anemia and thrombocytopenia iso cirrhosis  [] DVT PPx with SCDs  [] Transfuse to maintain Hgb > 7, PLT > 50k    ===Skin===  #Occipital laceration  - s/p staples  [] remove staples in 7 days    ===Ethics===  Full code   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     ===Neurology===  - AAOx3 at baseline  - Patient with history of recent hepatic encephalopathy per chart review, improved with lactulose  [] Continue with fent and propofol for sedation  [] Follow up serum ammonia level  [] hold home lactulose and rifaximin while NPO     ===Cardiac===   #HTN  [] Maintain MAP >65mmHg with early initiation of vasoactive medications - on levo   [] HTN medications on hold while NPO     ===Respiratory===  # Intubated for airway protection iso active hematemesis  - Vent settings: 400/16/30/5  - AB.43/40/162/26  [] Titrate vent to ABGs    ===Gastrointestinal===   #Upper GI Bleed  - EGD at OSH : large amounts of blood and clots in the gastric fundus, suspicion for gastric varices, visualized small distal esophageal varices  - s/p I U FFP  [] Continue with IV PPI BID  [] Continue with Octreotide gtt  [] Continue with aggressive fluid resuscitation with crystalloids and PRBCs prn  [] continue with ceftriaxone 1 g q 24 hours for 7 days   [] IR consult for TIPS procedure given active hematemesis- give 1 U FFP before TIPS   - f/u AM CBC and coags before tips   - f/u CT triple phase   [] Follow up hepatology recs   [] Maintain active T&S, transfuse Hgb > 7  #Cirrhosis  - Reportedly non-EtOH cirrhosis, 2/2 to IVEY  - MELD score 17- 6% estimated 3 month mortality   - no evidence of hepatic encephalopathy on this admission  [] hold home lactulose and rifaximin while NPO  [] Continue with Ceftriaxone 1g IV qd for SBP PPx for a total of 7 days ( - )    ===Renal===  SCr 1.17  - No active issues    ===Infectious Disease===  #SBP PPX  [] Continue with ceftriaxone 1g IV for SBP PPx  for 7 days (-)    ===Endocrine===  #T2DM  [] Start on insulin sliding scale   [] If suboptimal glycemic control, can transition to NPH    ===Hematology===  #Anemia  #Thrombcytopenia  - Chronic anemia and thrombocytopenia iso cirrhosis  [] DVT PPx with SCDs  [] Transfuse to maintain Hgb > 7, PLT > 50k    ===Skin===  #Occipital laceration  - s/p staples  [] remove staples in 7 days    ===Ethics===  Full code

## 2022-12-24 NOTE — H&P ADULT - HISTORY OF PRESENT ILLNESS
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 The patient was found to have an acute drop in hgb from 11 to 5.6 and was hypotensive to an SBP of 80s, but initially responded to resuscitation with IV fluids and blood products. The patient was intubated for airway protection and underwent evaluation with EGD, which revealed large amount of blood at the gastric fundus. The patient was transferred to the [facility] MICU for further management and consideration of TIPS procedure. 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 The patient was found to have an acute drop in hgb from 11 to 5.6 and was hypotensive to an SBP of 80s, but initially responded to resuscitation with  blood products- 5 U pRBCs, 3 FFP, 1 PLT. Did not require any pressure support with pressors. Patient was intubated for airway protection and underwent evaluation with EGD, which revealed large amount of blood at the gastric fundus, portal hypertensive gastropathy, normal D1 and D2 in the duodenum and small distal esophageal varices with no sigmata of recent bleeding. The patient was transferred to the [facility] MICU for further management and consideration of TIPS procedure.    Of note, patient also got 2 staples for an occipital laceration s/p syncopal episode. CTH/C spine showed no evidence of acute fracture or bleed.  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 Per patient's , patient experiencing hematemesis and dark stools for 24 hours. Patient then fell when getting out of bed, prompting them to come to the ED. In the ED at [facility], the patient was found to have an acute drop in hgb from 11 to 5.6 and was hypotensive to an SBP of 80s, but initially responded to resuscitation with  blood products- 5 U pRBCs, 3 FFP, 1 PLT. Did not require any pressure support with pressors. Patient was intubated for airway protection in the setting of an acute UGIB and underwent evaluation with EGD, which revealed large amount of blood at the gastric fundus, portal hypertensive gastropathy, normal D1 and D2 in the duodenum and small distal esophageal varices with no sigmata of recent bleeding. The patient was transferred to the [facility] MICU for further management and consideration of TIPS procedure.    Of note, patient also got 2 staples for an occipital laceration s/p syncopal episode. CTH/C spine showed no evidence of acute fracture or bleed.     At [facility] MICU, patient VSS WNL, intubated and sedated on fentanyl and propofol. Continued on octreotide and PPI gtt.  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 Per patient's , patient experiencing hematemesis and dark stools for 24 hours. Patient then fell when getting out of bed, prompting them to come to the ED. Denies asterixes, confusion/, jaundice or any skin changes, weight loss/ gain, changes in urination or bowel movements, abdominal pain orrecent illness. In the ED at [facility], the patient was found to have an acute drop in hgb from 11 to 5.6 and was hypotensive to an SBP of 80s, but initially responded to resuscitation with  blood products- 5 U pRBCs, 3 FFP, 1 PLT. Did not require any pressure support with pressors. Patient was intubated for airway protection in the setting of an acute UGIB and underwent evaluation with EGD, which revealed large amount of blood at the gastric fundus, portal hypertensive gastropathy, normal D1 and D2 in the duodenum and small distal esophageal varices with no sigmata of recent bleeding. The patient was transferred to the [facility] MICU for further management and consideration of TIPS procedure.    Of note, patient also got 2 staples for an occipital laceration s/p syncopal episode. CTH/C spine showed no evidence of acute fracture or bleed.     [facility], intubated and sedated on fentanyl and propofol. Continued on octreotide and PPI gtt.

## 2022-12-25 LAB
A1C WITH ESTIMATED AVERAGE GLUCOSE RESULT: 5.9 % — HIGH (ref 4–5.6)
AMMONIA BLD-MCNC: 58 UMOL/L — HIGH (ref 11–55)
APPEARANCE UR: CLEAR — SIGNIFICANT CHANGE UP
APTT BLD: 30.5 SEC — SIGNIFICANT CHANGE UP (ref 27.5–35.5)
BACTERIA # UR AUTO: NEGATIVE — SIGNIFICANT CHANGE UP
BASE EXCESS BLDV CALC-SCNC: 3.7 MMOL/L — HIGH (ref -2–3)
BILIRUB UR-MCNC: NEGATIVE — SIGNIFICANT CHANGE UP
BLOOD GAS VENOUS - CREATININE: SIGNIFICANT CHANGE UP MG/DL (ref 0.5–1.3)
CA-I SERPL-SCNC: 1.14 MMOL/L — LOW (ref 1.15–1.33)
CHLORIDE BLDV-SCNC: 104 MMOL/L — SIGNIFICANT CHANGE UP (ref 96–108)
CO2 BLDV-SCNC: 30 MMOL/L — HIGH (ref 22–26)
COLOR SPEC: YELLOW — SIGNIFICANT CHANGE UP
DIFF PNL FLD: ABNORMAL
EPI CELLS # UR: 8 /HPF — HIGH
ESTIMATED AVERAGE GLUCOSE: 123 MG/DL — HIGH (ref 68–114)
GAS PNL BLDV: 139 MMOL/L — SIGNIFICANT CHANGE UP (ref 136–145)
GAS PNL BLDV: SIGNIFICANT CHANGE UP
GAS PNL BLDV: SIGNIFICANT CHANGE UP
GLUCOSE BLDC GLUCOMTR-MCNC: 204 MG/DL — HIGH (ref 70–99)
GLUCOSE BLDC GLUCOMTR-MCNC: 223 MG/DL — HIGH (ref 70–99)
GLUCOSE BLDC GLUCOMTR-MCNC: 266 MG/DL — HIGH (ref 70–99)
GLUCOSE BLDV-MCNC: 237 MG/DL — HIGH (ref 70–99)
GLUCOSE UR QL: NEGATIVE — SIGNIFICANT CHANGE UP
HAPTOGLOB SERPL-MCNC: <20 MG/DL — LOW (ref 34–200)
HCO3 BLDV-SCNC: 29 MMOL/L — SIGNIFICANT CHANGE UP (ref 22–29)
HCT VFR BLD CALC: 28.1 % — LOW (ref 34.5–45)
HCT VFR BLD CALC: 29.3 % — LOW (ref 34.5–45)
HCT VFR BLD CALC: 29.7 % — LOW (ref 34.5–45)
HCT VFR BLDA CALC: 29 % — LOW (ref 34.5–46.5)
HCV AB S/CO SERPL IA: 0.1 S/CO — SIGNIFICANT CHANGE UP (ref 0–0.99)
HCV AB SERPL-IMP: SIGNIFICANT CHANGE UP
HGB BLD CALC-MCNC: 9.8 G/DL — LOW (ref 11.7–16.1)
HGB BLD-MCNC: 10 G/DL — LOW (ref 11.5–15.5)
HGB BLD-MCNC: 10.1 G/DL — LOW (ref 11.5–15.5)
HGB BLD-MCNC: 9.5 G/DL — LOW (ref 11.5–15.5)
HOROWITZ INDEX BLDV+IHG-RTO: 30 — SIGNIFICANT CHANGE UP
HYALINE CASTS # UR AUTO: 1 /LPF — SIGNIFICANT CHANGE UP (ref 0–7)
INR BLD: 1.46 RATIO — HIGH (ref 0.88–1.16)
KETONES UR-MCNC: NEGATIVE — SIGNIFICANT CHANGE UP
LACTATE BLDV-MCNC: 1.8 MMOL/L — SIGNIFICANT CHANGE UP (ref 0.5–2)
LEUKOCYTE ESTERASE UR-ACNC: NEGATIVE — SIGNIFICANT CHANGE UP
LIDOCAIN IGE QN: 71 U/L — HIGH (ref 7–60)
MCHC RBC-ENTMCNC: 30.4 PG — SIGNIFICANT CHANGE UP (ref 27–34)
MCHC RBC-ENTMCNC: 30.6 PG — SIGNIFICANT CHANGE UP (ref 27–34)
MCHC RBC-ENTMCNC: 30.8 PG — SIGNIFICANT CHANGE UP (ref 27–34)
MCHC RBC-ENTMCNC: 33.8 GM/DL — SIGNIFICANT CHANGE UP (ref 32–36)
MCHC RBC-ENTMCNC: 34 GM/DL — SIGNIFICANT CHANGE UP (ref 32–36)
MCHC RBC-ENTMCNC: 34.1 GM/DL — SIGNIFICANT CHANGE UP (ref 32–36)
MCV RBC AUTO: 89.1 FL — SIGNIFICANT CHANGE UP (ref 80–100)
MCV RBC AUTO: 90.5 FL — SIGNIFICANT CHANGE UP (ref 80–100)
MCV RBC AUTO: 90.6 FL — SIGNIFICANT CHANGE UP (ref 80–100)
NITRITE UR-MCNC: NEGATIVE — SIGNIFICANT CHANGE UP
NRBC # BLD: 0 /100 WBCS — SIGNIFICANT CHANGE UP (ref 0–0)
PCO2 BLDV: 47 MMHG — HIGH (ref 39–42)
PH BLDV: 7.4 — SIGNIFICANT CHANGE UP (ref 7.32–7.43)
PH UR: 6 — SIGNIFICANT CHANGE UP (ref 5–8)
PLATELET # BLD AUTO: 56 K/UL — LOW (ref 150–400)
PLATELET # BLD AUTO: 62 K/UL — LOW (ref 150–400)
PLATELET # BLD AUTO: 64 K/UL — LOW (ref 150–400)
PO2 BLDV: 52 MMHG — HIGH (ref 25–45)
POTASSIUM BLDV-SCNC: 4.3 MMOL/L — SIGNIFICANT CHANGE UP (ref 3.5–5.1)
PROT UR-MCNC: ABNORMAL
PROTHROM AB SERPL-ACNC: 16.8 SEC — HIGH (ref 10.5–13.4)
RBC # BLD: 3.1 M/UL — LOW (ref 3.8–5.2)
RBC # BLD: 3.28 M/UL — LOW (ref 3.8–5.2)
RBC # BLD: 3.29 M/UL — LOW (ref 3.8–5.2)
RBC # FLD: 17.2 % — HIGH (ref 10.3–14.5)
RBC # FLD: 17.3 % — HIGH (ref 10.3–14.5)
RBC # FLD: 17.3 % — HIGH (ref 10.3–14.5)
RBC CASTS # UR COMP ASSIST: 132 /HPF — HIGH (ref 0–4)
SAO2 % BLDV: 85.2 % — SIGNIFICANT CHANGE UP (ref 67–88)
SP GR SPEC: 1.04 — HIGH (ref 1.01–1.02)
UROBILINOGEN FLD QL: NEGATIVE — SIGNIFICANT CHANGE UP
WBC # BLD: 6.86 K/UL — SIGNIFICANT CHANGE UP (ref 3.8–10.5)
WBC # BLD: 9.19 K/UL — SIGNIFICANT CHANGE UP (ref 3.8–10.5)
WBC # BLD: 9.29 K/UL — SIGNIFICANT CHANGE UP (ref 3.8–10.5)
WBC # FLD AUTO: 6.86 K/UL — SIGNIFICANT CHANGE UP (ref 3.8–10.5)
WBC # FLD AUTO: 9.19 K/UL — SIGNIFICANT CHANGE UP (ref 3.8–10.5)
WBC # FLD AUTO: 9.29 K/UL — SIGNIFICANT CHANGE UP (ref 3.8–10.5)
WBC UR QL: 12 /HPF — HIGH (ref 0–5)

## 2022-12-25 PROCEDURE — 93306 TTE W/DOPPLER COMPLETE: CPT | Mod: 26

## 2022-12-25 PROCEDURE — 93356 MYOCRD STRAIN IMG SPCKL TRCK: CPT

## 2022-12-25 PROCEDURE — 93010 ELECTROCARDIOGRAM REPORT: CPT

## 2022-12-25 PROCEDURE — 43244 EGD VARICES LIGATION: CPT | Mod: GC

## 2022-12-25 PROCEDURE — 99291 CRITICAL CARE FIRST HOUR: CPT

## 2022-12-25 PROCEDURE — 71045 X-RAY EXAM CHEST 1 VIEW: CPT | Mod: 26

## 2022-12-25 DEVICE — SPEEDBAND: Type: IMPLANTABLE DEVICE | Status: FUNCTIONAL

## 2022-12-25 RX ORDER — ERYTHROMYCIN ETHYLSUCCINATE 400 MG
250 TABLET ORAL ONCE
Refills: 0 | Status: DISCONTINUED | OUTPATIENT
Start: 2022-12-25 | End: 2022-12-25

## 2022-12-25 RX ORDER — ACETAMINOPHEN 500 MG
650 TABLET ORAL ONCE
Refills: 0 | Status: COMPLETED | OUTPATIENT
Start: 2022-12-25 | End: 2022-12-25

## 2022-12-25 RX ORDER — NOREPINEPHRINE BITARTRATE/D5W 8 MG/250ML
0.05 PLASTIC BAG, INJECTION (ML) INTRAVENOUS
Qty: 8 | Refills: 0 | Status: DISCONTINUED | OUTPATIENT
Start: 2022-12-25 | End: 2022-12-27

## 2022-12-25 RX ORDER — METOCLOPRAMIDE HCL 10 MG
10 TABLET ORAL ONCE
Refills: 0 | Status: DISCONTINUED | OUTPATIENT
Start: 2022-12-25 | End: 2022-12-25

## 2022-12-25 RX ADMIN — FENTANYL CITRATE 50 MICROGRAM(S): 50 INJECTION INTRAVENOUS at 08:30

## 2022-12-25 RX ADMIN — Medication 2: at 17:47

## 2022-12-25 RX ADMIN — FENTANYL CITRATE 50 MICROGRAM(S): 50 INJECTION INTRAVENOUS at 08:17

## 2022-12-25 RX ADMIN — OCTREOTIDE ACETATE 10 MICROGRAM(S)/HR: 200 INJECTION, SOLUTION INTRAVENOUS; SUBCUTANEOUS at 20:38

## 2022-12-25 RX ADMIN — PANTOPRAZOLE SODIUM 10 MG/HR: 20 TABLET, DELAYED RELEASE ORAL at 05:27

## 2022-12-25 RX ADMIN — Medication 260 MILLIGRAM(S): at 05:27

## 2022-12-25 RX ADMIN — PROPOFOL 3.53 MICROGRAM(S)/KG/MIN: 10 INJECTION, EMULSION INTRAVENOUS at 05:27

## 2022-12-25 RX ADMIN — OCTREOTIDE ACETATE 10 MICROGRAM(S)/HR: 200 INJECTION, SOLUTION INTRAVENOUS; SUBCUTANEOUS at 07:06

## 2022-12-25 RX ADMIN — PROPOFOL 3.53 MICROGRAM(S)/KG/MIN: 10 INJECTION, EMULSION INTRAVENOUS at 20:38

## 2022-12-25 RX ADMIN — Medication 650 MILLIGRAM(S): at 05:54

## 2022-12-25 RX ADMIN — OCTREOTIDE ACETATE 10 MICROGRAM(S)/HR: 200 INJECTION, SOLUTION INTRAVENOUS; SUBCUTANEOUS at 05:27

## 2022-12-25 RX ADMIN — Medication 10 MILLIGRAM(S): at 00:08

## 2022-12-25 RX ADMIN — Medication 5.98 MICROGRAM(S)/KG/MIN: at 20:38

## 2022-12-25 RX ADMIN — Medication 25 GRAM(S): at 00:09

## 2022-12-25 RX ADMIN — PANTOPRAZOLE SODIUM 10 MG/HR: 20 TABLET, DELAYED RELEASE ORAL at 07:06

## 2022-12-25 RX ADMIN — FENTANYL CITRATE 50 MICROGRAM(S): 50 INJECTION INTRAVENOUS at 13:05

## 2022-12-25 RX ADMIN — FENTANYL CITRATE 50 MICROGRAM(S): 50 INJECTION INTRAVENOUS at 12:49

## 2022-12-25 RX ADMIN — PROPOFOL 3.53 MICROGRAM(S)/KG/MIN: 10 INJECTION, EMULSION INTRAVENOUS at 07:06

## 2022-12-25 RX ADMIN — CHLORHEXIDINE GLUCONATE 1 APPLICATION(S): 213 SOLUTION TOPICAL at 05:28

## 2022-12-25 RX ADMIN — Medication 25 GRAM(S): at 02:00

## 2022-12-25 RX ADMIN — CHLORHEXIDINE GLUCONATE 15 MILLILITER(S): 213 SOLUTION TOPICAL at 17:50

## 2022-12-25 RX ADMIN — Medication 5.98 MICROGRAM(S)/KG/MIN: at 07:06

## 2022-12-25 RX ADMIN — Medication 2: at 05:28

## 2022-12-25 RX ADMIN — PANTOPRAZOLE SODIUM 10 MG/HR: 20 TABLET, DELAYED RELEASE ORAL at 20:38

## 2022-12-25 RX ADMIN — CEFTRIAXONE 100 MILLIGRAM(S): 500 INJECTION, POWDER, FOR SOLUTION INTRAMUSCULAR; INTRAVENOUS at 00:04

## 2022-12-25 RX ADMIN — Medication 3: at 11:55

## 2022-12-25 RX ADMIN — CEFTRIAXONE 100 MILLIGRAM(S): 500 INJECTION, POWDER, FOR SOLUTION INTRAMUSCULAR; INTRAVENOUS at 20:48

## 2022-12-25 RX ADMIN — CHLORHEXIDINE GLUCONATE 15 MILLILITER(S): 213 SOLUTION TOPICAL at 05:28

## 2022-12-25 NOTE — PROGRESS NOTE ADULT - ATTENDING COMMENTS
66 yo F with HTN, HLD, DM2, CKD, and decompensated IVEY cirrhosis, transferred from [facility] to [facility] on 12/24 due to massive UGIB thought to be secondary to actively bleeding fundal gastric varices based initial EGD (12/24) done at [facility] with limited visualization due to active bleeding, s/p 5 units PRBCs transfused (12/24) along with platelets and FFP. She was initially hemodynamically stable on arrival to [facility] last night and Hb/HCT has remained stable since transfer without further need for transfusions, but with interval development of shock likely due to sepsis given fevers to Tmax 100.4, currently requiring norepinephrine infusion. Currently on ceftriaxone (12/24- ) with cultures sent and pending. Multiphase CT abdomen/pelvis (12/24) reviewed, with portosystemic collaterals seen and no active extravasation on arterial phase. Given that she would be high risk for TIPS given concern for active infection and multiple prior hospitalizations for hepatic encephalopathy, will do bedside EGD today to see if endoscopic therapy (such as cyanoacrylate injection of gastric varix) is feasible. Otherwise, would consider BRTO or BATO by IR if feasible. Continue octreotide infusion, PPI, and hemodynamic resuscitation as per MICU team. ABO B with current MELD-Na 17 and anticipate pursuing liver transplant evaluation once she clinically stabilizes, discussed with her family today.    Please don't hesitate to call with any questions or concerns.    Mamie Prado M.D., Ph.D.  Transplant Hepatology

## 2022-12-25 NOTE — CONSULT NOTE ADULT - ASSESSMENT
Assessment: 67y Female with IVEY cirrhosis transferred for acute GI bleeding suspected to be of gastric variceal origin. Patient has responded appropriately to blood product administration.    Plan: GI/hepatology to perform EGD with possible variceal embolization  - IR remains available for TIPS placement if endoscopy cannot adequately treat varices or if patient has another acute bleeding episode  - IR consent obtained from patient's  with understanding of risks including but not limited to death and worsened HE  - IR to continue to follow  - discussed with primary team    Yon Monae MD  PGY-V, Interventional Radiology    -Available on [employer] TEAMS for all non-urgent questions  -Emergent issues: [facility]-p.[phone]; [facility]-p.[phone] ([phone])  -Non-emergent consults: Please place a [place] order "Consult-Interventional Radiology" with an appropriate callback number  -Scheduling questions: [facility]: [phone]; [facility]: [phone]  -Clinic/Outpatient booking: [facility]: [phone]; [facility]: [phone] Assessment: 67y Female with IVEY cirrhosis transferred for acute GI bleeding suspected to be of gastric variceal origin. Patient has responded appropriately to blood product administration.    Plan: GI/hepatology to perform EGD with possible variceal embolization  - cardiac echo  - IR remains available for TIPS placement if endoscopy cannot adequately treat varices or if patient has another acute bleeding episode  - IR consent obtained from patient's  with understanding of risks including but not limited to death and worsened HE  - IR to continue to follow  - discussed with primary team    Yon Monae MD  PGY-V, Interventional Radiology    -Available on [employer] TEAMS for all non-urgent questions  -Emergent issues: [facility]-[phone]; [facility]-p.[phone] ([phone])  -Non-emergent consults: Please place a [place] order "Consult-Interventional Radiology" with an appropriate callback number  -Scheduling questions: [facility]: [phone]; [facility]: [phone]  -Clinic/Outpatient booking: [facility]: [phone]; [facility]: [phone] Assessment: 67y Female with IVEY cirrhosis transferred for acute GI bleeding suspected to be of gastric variceal origin. Patient has responded appropriately to blood product administration.    Plan: GI/hepatology to perform EGD with possible variceal embolization  - cardiac echo  - IR remains available for TIPS placement if endoscopy cannot adequately treat varices or if patient has another acute bleeding episode  - IR consent obtained from patient's  with understanding of risks including worsened HE  - IR to continue to follow  - discussed with primary team    Yon Monae MD  PGY-V, Interventional Radiology    -Available on [employer] TEAMS for all non-urgent questions  -Emergent issues: [facility]-p.[phone]; [facility]-p.[phone] ([phone])  -Non-emergent consults: Please place a [place] order "Consult-Interventional Radiology" with an appropriate callback number  -Scheduling questions: [facility]: [phone]; [facility]: [phone]  -Clinic/Outpatient booking: [facility]: [phone]; [facility]: [phone]

## 2022-12-25 NOTE — PROGRESS NOTE ADULT - ASSESSMENT
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     ===Neurology===  - AAOx3 at baseline  - Patient with history of recent hepatic encephalopathy per chart review, improved with lactulose  [] Continue with fent and propofol for sedation  [] Follow up serum ammonia level  [] hold home lactulose and rifaximin while NPO. Will restart once bleeding is better controlled.    ===Cardiac===   #HTN  [] Maintain MAP >65mmHg with early initiation of vasoactive medications - on levo   [] HTN medications on hold while NPO     ===Respiratory===  # Intubated for airway protection iso active hematemesis  - Vent settings: 400/16/30/5  - AB.43/40/162/26  - No pulmonary issues currently or signs of hepatopulmonary syndrome.    ===Gastrointestinal===   #Upper GI Bleed  - EGD at OSH : large amounts of blood and clots in the gastric fundus, suspicion for gastric varices, visualized small distal esophageal varices  - s/p I U FFP  [] Continue with IV PPI BID  [] Continue with Octreotide gtt  [] Continue with aggressive fluid resuscitation with crystalloids and PRBCs prn  [] continue with ceftriaxone 1 g q 24 hours for 7 days   [] IR consult for TIPS procedure given active hematemesis- give 1 U FFP before TIPS   - f/u AM CBC and coags before tips   - f/u CT triple phase   [] Follow up hepatology recs   [] Maintain active T&S, transfuse Hgb > 7  #Cirrhosis  - Reportedly non-EtOH cirrhosis, 2/2 to IVEY  - MELD score 17- 6% estimated 3 month mortality   - no evidence of hepatic encephalopathy on this admission  [] hold home lactulose and rifaximin while NPO  [] Continue with Ceftriaxone 1g IV qd for SBP PPx for a total of 7 days ( - )    ===Renal===  SCr 1.17  - No active issues    ===Infectious Disease===  #SBP PPX  [] Continue with ceftriaxone 1g IV for SBP PPx  for 7 days (12/24-)    ===Endocrine===  #T2DM  [] Start on insulin sliding scale   [] If suboptimal glycemic control, can transition to NPH    ===Hematology===  #Anemia  #Thrombcytopenia  - Chronic anemia and thrombocytopenia iso cirrhosis  [] DVT PPx with SCDs  [] Transfuse to maintain Hgb > 7, PLT > 50k    ===Skin===  #Occipital laceration  - s/p staples  [] remove staples in 7 days    ===Ethics===  Full code

## 2022-12-25 NOTE — PROGRESS NOTE ADULT - ATTENDING COMMENTS
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    - cont sedation to goal rass  - hold sedation vacation for today given poss procedures  - cont vent support, maintain sat>90%  - GI plan for EGD today and will hold off on TIPS for now  - serial cbc and coags  - cont ppi   - cont octreotide  - cont ctx for UGIB  - cont vasopressors maintain map>65  - qtc prolonged >500, cont to monitor  - holding HE tx given UGIB  - dvt ppx SCD    prognosis guarded

## 2022-12-25 NOTE — PROGRESS NOTE ADULT - SUBJECTIVE AND OBJECTIVE BOX
Gastroenterology/Hepatology Progress Note    Interval Events:   - no acute ON events  - no further bleeding  - on levo 0.03   - plan for possible TIPS today     Allergies:  No Known Drug Allergies  shellfish (Unknown)      Hospital Medications:  cefTRIAXone   IVPB      cefTRIAXone   IVPB 2000 milliGRAM(s) IV Intermittent every 24 hours  chlorhexidine 0.12% Liquid 15 milliLiter(s) Oral Mucosa every 12 hours  chlorhexidine 4% Liquid 1 Application(s) Topical <User Schedule>  dextrose 5%. 1000 milliLiter(s) IV Continuous <Continuous>  dextrose 5%. 1000 milliLiter(s) IV Continuous <Continuous>  dextrose 50% Injectable 25 Gram(s) IV Push once  dextrose 50% Injectable 12.5 Gram(s) IV Push once  dextrose 50% Injectable 25 Gram(s) IV Push once  dextrose Oral Gel 15 Gram(s) Oral once PRN  fentaNYL    Injectable 50 MICROGram(s) IV Push every 4 hours PRN  glucagon  Injectable 1 milliGRAM(s) IntraMuscular once  insulin lispro (ADMELOG) corrective regimen sliding scale   SubCutaneous every 6 hours  loratadine 10 milliGRAM(s) Oral daily  norepinephrine Infusion 0.05 MICROgram(s)/kG/Min IV Continuous <Continuous>  octreotide  Infusion 50 MICROgram(s)/Hr IV Continuous <Continuous>  pantoprazole Infusion 8 mG/Hr IV Continuous <Continuous>  propofol Infusion 10 MICROgram(s)/kG/Min IV Continuous <Continuous>  rifAXIMin 550 milliGRAM(s) Oral two times a day  sertraline 50 milliGRAM(s) Oral daily      ROS: 14 point ROS negative unless otherwise state in subjective    PHYSICAL EXAM:   Vital Signs:  Vital Signs Last 24 Hrs  T(C): 38.1 (25 Dec 2022 08:00), Max: 38.1 (25 Dec 2022 08:00)  T(F): 100.6 (25 Dec 2022 08:00), Max: 100.6 (25 Dec 2022 08:00)  HR: 74 (25 Dec 2022 08:00) (72 - 92)  BP: 98/52 (25 Dec 2022 08:00) (79/37 - 145/66)  BP(mean): 71 (25 Dec 2022 08:00) (54 - 95)  RR: 18 (25 Dec 2022 08:00) (16 - 29)  SpO2: 98% (25 Dec 2022 08:00) (97% - 100%)    Parameters below as of 25 Dec 2022 08:00  Patient On (Oxygen Delivery Method): ventilator    O2 Concentration (%): 30  Daily Height in cm: 165.1 (24 Dec 2022 20:03)    Daily     GENERAL: Patient is intubated, appears critically ill   HEENT:  NCAT, no scleral icterus   CHEST: patient on ventilator   HEART:  Regular rate and rhythm  ABDOMEN:  Soft, non-tender, distended, no masses  EXTREMITIES: No edema  SKIN:  No rash/erythema/ecchymoses/petechiae/wounds/abscess/warm/dry  NEURO:  Alert and oriented x 0, sedated     LABS:                        9.5    6.86  )-----------( 56       ( 25 Dec 2022 06:29 )             28.1     Mean Cell Volume: 90.6 fl (- @ 06:29)    12-    142  |  106  |  81<H>  ----------------------------<  292<H>  4.9   |  25  |  1.17    Ca    8.1<L>      24 Dec 2022 20:48  Phos  3.4     12-24  Mg     1.3     12-24    TPro  5.4<L>  /  Alb  3.1<L>  /  TBili  2.6<H>  /  DBili  x   /  AST  35  /  ALT  12  /  AlkPhos  62  12-24    LIVER FUNCTIONS - ( 24 Dec 2022 20:48 )  Alb: 3.1 g/dL / Pro: 5.4 g/dL / ALK PHOS: 62 U/L / ALT: 12 U/L / AST: 35 U/L / GGT: x           PT/INR - ( 25 Dec 2022 06:29 )   PT: 16.8 sec;   INR: 1.46 ratio         PTT - ( 25 Dec 2022 06:29 )  PTT:30.5 sec  Urinalysis Basic - ( 25 Dec 2022 06:32 )    Color: Yellow / Appearance: Clear / S.045 / pH: x  Gluc: x / Ketone: Negative  / Bili: Negative / Urobili: Negative   Blood: x / Protein: Trace / Nitrite: Negative   Leuk Esterase: Negative / RBC: 132 /hpf / WBC 12 /HPF   Sq Epi: x / Non Sq Epi: 8 /hpf / Bacteria: Negative            Imaging:

## 2022-12-25 NOTE — PROGRESS NOTE ADULT - SUBJECTIVE AND OBJECTIVE BOX
INTERVAL HPI/OVERNIGHT EVENTS:    O/N: See H&P. No obvious continued bleeding since intubation. Plan for possible TIPS today.    SUBJECTIVE: Patient seen and examined at bedside.     OBJECTIVE:    VITAL SIGNS:  ICU Vital Signs Last 24 Hrs  T(C): 38 (25 Dec 2022 05:00), Max: 38 (25 Dec 2022 05:00)  T(F): 100.4 (25 Dec 2022 05:00), Max: 100.4 (25 Dec 2022 05:00)  HR: 77 (25 Dec 2022 07:00) (72 - 92)  BP: 103/51 (25 Dec 2022 07:00) (79/37 - 145/66)  BP(mean): 73 (25 Dec 2022 07:00) (54 - 95)  ABP: --  ABP(mean): --  RR: 29 (25 Dec 2022 07:00) (16 - 29)  SpO2: 98% (25 Dec 2022 07:00) (97% - 100%)    O2 Parameters below as of 24 Dec 2022 20:03  Patient On (Oxygen Delivery Method): ventilator    O2 Concentration (%): 30      Mode: AC/ CMV (Assist Control/ Continuous Mandatory Ventilation), RR (machine): 16, TV (machine): 400, FiO2: 30, PEEP: 5, ITime: 1, MAP: 8, PIP: 19    12-24 @ 07:01  -  12- @ 07:00  --------------------------------------------------------  IN: 928.7 mL / OUT: 640 mL / NET: 288.7 mL      CAPILLARY BLOOD GLUCOSE      POCT Blood Glucose.: 223 mg/dL (25 Dec 2022 05:26)      PHYSICAL EXAM:    General: NAD; sedated and intubated.  HEENT: NC/AT; PERRL, clear conjunctiva  Neck: supple  Respiratory: CTA b/l  Cardiovascular: +S1/S2; RRR  Abdomen: soft, NT/ND; +BS x4  Extremities: WWP, 2+ peripheral pulses b/l; no LE edema  Skin: normal color and turgor; no rash  Neurological: Responsive to pain and light to eyes.    MEDICATIONS:  MEDICATIONS  (STANDING):  cefTRIAXone   IVPB      cefTRIAXone   IVPB 2000 milliGRAM(s) IV Intermittent every 24 hours  chlorhexidine 0.12% Liquid 15 milliLiter(s) Oral Mucosa every 12 hours  chlorhexidine 4% Liquid 1 Application(s) Topical <User Schedule>  dextrose 5%. 1000 milliLiter(s) (50 mL/Hr) IV Continuous <Continuous>  dextrose 5%. 1000 milliLiter(s) (100 mL/Hr) IV Continuous <Continuous>  dextrose 50% Injectable 25 Gram(s) IV Push once  dextrose 50% Injectable 12.5 Gram(s) IV Push once  dextrose 50% Injectable 25 Gram(s) IV Push once  glucagon  Injectable 1 milliGRAM(s) IntraMuscular once  insulin lispro (ADMELOG) corrective regimen sliding scale   SubCutaneous every 6 hours  loratadine 10 milliGRAM(s) Oral daily  norepinephrine Infusion 0.05 MICROgram(s)/kG/Min (5.98 mL/Hr) IV Continuous <Continuous>  octreotide  Infusion 50 MICROgram(s)/Hr (10 mL/Hr) IV Continuous <Continuous>  pantoprazole Infusion 8 mG/Hr (10 mL/Hr) IV Continuous <Continuous>  propofol Infusion 10 MICROgram(s)/kG/Min (3.53 mL/Hr) IV Continuous <Continuous>  rifAXIMin 550 milliGRAM(s) Oral two times a day  sertraline 50 milliGRAM(s) Oral daily    MEDICATIONS  (PRN):  dextrose Oral Gel 15 Gram(s) Oral once PRN Blood Glucose LESS THAN 70 milliGRAM(s)/deciliter  fentaNYL    Injectable 50 MICROGram(s) IV Push every 4 hours PRN Moderate Pain (4 - 6)      ALLERGIES:  Allergies    No Known Drug Allergies  shellfish (Unknown)    Intolerances        LABS:                        9.5    6.86  )-----------( 56       ( 25 Dec 2022 06:29 )             28.1     12-24    142  |  106  |  81<H>  ----------------------------<  292<H>  4.9   |  25  |  1.17    Ca    8.1<L>      24 Dec 2022 20:48  Phos  3.4     12-24  Mg     1.3     12-24    TPro  5.4<L>  /  Alb  3.1<L>  /  TBili  2.6<H>  /  DBili  x   /  AST  35  /  ALT  12  /  AlkPhos  62  12-24    PT/INR - ( 25 Dec 2022 06:29 )   PT: 16.8 sec;   INR: 1.46 ratio         PTT - ( 25 Dec 2022 06:29 )  PTT:30.5 sec  Urinalysis Basic - ( 25 Dec 2022 06:32 )    Color: Yellow / Appearance: Clear / S.045 / pH: x  Gluc: x / Ketone: Negative  / Bili: Negative / Urobili: Negative   Blood: x / Protein: Trace / Nitrite: Negative   Leuk Esterase: Negative / RBC: 132 /hpf / WBC 12 /HPF   Sq Epi: x / Non Sq Epi: 8 /hpf / Bacteria: Negative        RADIOLOGY & ADDITIONAL TESTS: Reviewed.

## 2022-12-25 NOTE — PROGRESS NOTE ADULT - ASSESSMENT
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 The patient was intubated for airway protection and underwent evaluation with EGD, with large amount of blood at the gastric fundus.     #IVEY cirrhosis   #Variceal bleed   Likely related to gastric varices. Patient s/p EGD at outside hospital.     Recommendations:   - CT triple phase completed, official read pending   - continue with octreotide gtt   - continue with ceftriaxone for 7 days total   - resuscitation per MICU team   - rest of care per primary team    All recommendations are tentative until note is attested by attending.     Divina Salazar, PGY-4   Gastroenterology/Hepatology Fellow  Available on [employer] Teams  [phone] ([facility] Short Range Pager)  [phone] ([facility] Long Range Pager)    After 5pm, please contact the on-call GI fellow. [phone]

## 2022-12-25 NOTE — PRE PROCEDURE NOTE - PRE PROCEDURE EVALUATION
PRE-INTERVENTIONAL RADIOLOGY PROCEDURE NOTE  ============================  Pio Martines, PGY-1  Internal Medicine Resident  ============================  Patient Name: HECTOR RUBIN    Patient Age: 67y    Patient Gender: Female    Procedure: TIPS    Diagnosis/Indication: IVEY cirrhosis c/b portal hypertension and UGIB    Interventional Radiology Attending Physician: Dr. Halaibeh    Ordering Attending Physician: Dr. Chadwick    Pertinent Medical History:     Pertinent labs:                      10.2   6.99  )-----------( 50       ( 24 Dec 2022 20:48 )             30.3       12-24    142  |  106  |  81<H>  ----------------------------<  292<H>  4.9   |  25  |  1.17    Ca    8.1<L>      24 Dec 2022 20:48  Phos  3.4     12-24  Mg     1.3     12-24    TPro  5.4<L>  /  Alb  3.1<L>  /  TBili  2.6<H>  /  DBili  x   /  AST  35  /  ALT  12  /  AlkPhos  62  12-24      PT/INR - ( 24 Dec 2022 20:49 )   PT: 18.9 sec;   INR: 1.64 ratio         PTT - ( 24 Dec 2022 20:49 )  PTT:30.2 sec        Patient and Family Aware ? Yes  
Attending Physician: Dr. Prado                         Procedure: EGD     Indication for Procedure: variceal bleed   ________________________________________________________  PAST MEDICAL & SURGICAL HISTORY:    ALLERGIES:  No Known Drug Allergies  shellfish (Unknown)    HOME MEDICATIONS:  Aldactone 25 mg oral tablet: 1 tab(s) orally once a day  alendronate weekly: 70 milligram(s) orally once a week  ALPRAZolam 1 mg oral tablet: 1 tab(s) orally once a day  furosemide 20 mg oral tablet: 1 tab(s) orally once a day  lactulose 10 g/15 mL oral solution: 20 milligram(s) orally 3 times a day  lisinopril 20 mg oral tablet: 1 tab(s) orally once a day  loratadine 10 mg oral tablet: 1 tab(s) orally once a day  rifAXIMin 550 mg oral tablet: 1 tab(s) orally 2 times a day  rifAXIMin 550 mg oral tablet: 1 tab(s) orally 2 times a day  sertraline 50 mg oral tablet: 1 tab(s) orally once a day  ursodiol 500 mg oral tablet: 1 tab(s) orally once a day    AICD/PPM: [ ] yes   [ ] no    PERTINENT LAB DATA:                        10.0   9.29  )-----------( 64       ( 25 Dec 2022 12:27 )             29.3     12-24    142  |  106  |  81<H>  ----------------------------<  292<H>  4.9   |  25  |  1.17    Ca    8.1<L>      24 Dec 2022 20:48  Phos  3.4     12-24  Mg     1.3     12-24    TPro  5.4<L>  /  Alb  3.1<L>  /  TBili  2.6<H>  /  DBili  x   /  AST  35  /  ALT  12  /  AlkPhos  62  12-24    PT/INR - ( 25 Dec 2022 06:29 )   PT: 16.8 sec;   INR: 1.46 ratio         PTT - ( 25 Dec 2022 06:29 )  PTT:30.5 sec            PHYSICAL EXAMINATION:    Height (cm): 165.1  Weight (kg): 63.8  BMI (kg/m2): 23.4  BSA (m2): 1.7T(C): 38.1  HR: 68  BP: 99/49  RR: 16  SpO2: 100%    See chart         COMMENTS:    The patient is a suitable candidate for the planned procedure unless box checked [ ]  No, explain:

## 2022-12-26 ENCOUNTER — RX RENEWAL (OUTPATIENT)
Age: 67
End: 2022-12-26

## 2022-12-26 LAB
ALBUMIN SERPL ELPH-MCNC: 3.1 G/DL — LOW (ref 3.3–5)
ALP SERPL-CCNC: 55 U/L — SIGNIFICANT CHANGE UP (ref 40–120)
ALT FLD-CCNC: 13 U/L — SIGNIFICANT CHANGE UP (ref 10–45)
ANION GAP SERPL CALC-SCNC: 10 MMOL/L — SIGNIFICANT CHANGE UP (ref 5–17)
APTT BLD: 30.6 SEC — SIGNIFICANT CHANGE UP (ref 27.5–35.5)
AST SERPL-CCNC: 37 U/L — SIGNIFICANT CHANGE UP (ref 10–40)
BILIRUB SERPL-MCNC: 1 MG/DL — SIGNIFICANT CHANGE UP (ref 0.2–1.2)
BUN SERPL-MCNC: 57 MG/DL — HIGH (ref 7–23)
CALCIUM SERPL-MCNC: 8 MG/DL — LOW (ref 8.4–10.5)
CHLORIDE SERPL-SCNC: 108 MMOL/L — SIGNIFICANT CHANGE UP (ref 96–108)
CO2 SERPL-SCNC: 25 MMOL/L — SIGNIFICANT CHANGE UP (ref 22–31)
CREAT SERPL-MCNC: 1.17 MG/DL — SIGNIFICANT CHANGE UP (ref 0.5–1.3)
EGFR: 51 ML/MIN/1.73M2 — LOW
GAS PNL BLDA: SIGNIFICANT CHANGE UP
GLUCOSE BLDC GLUCOMTR-MCNC: 182 MG/DL — HIGH (ref 70–99)
GLUCOSE BLDC GLUCOMTR-MCNC: 189 MG/DL — HIGH (ref 70–99)
GLUCOSE BLDC GLUCOMTR-MCNC: 191 MG/DL — HIGH (ref 70–99)
GLUCOSE SERPL-MCNC: 200 MG/DL — HIGH (ref 70–99)
HCT VFR BLD CALC: 27.4 % — LOW (ref 34.5–45)
HCT VFR BLD CALC: 28.5 % — LOW (ref 34.5–45)
HCT VFR BLD CALC: 28.7 % — LOW (ref 34.5–45)
HCT VFR BLD CALC: 29.2 % — LOW (ref 34.5–45)
HGB BLD-MCNC: 9.1 G/DL — LOW (ref 11.5–15.5)
HGB BLD-MCNC: 9.5 G/DL — LOW (ref 11.5–15.5)
HGB BLD-MCNC: 9.5 G/DL — LOW (ref 11.5–15.5)
HGB BLD-MCNC: 9.7 G/DL — LOW (ref 11.5–15.5)
INR BLD: 1.42 RATIO — HIGH (ref 0.88–1.16)
MAGNESIUM SERPL-MCNC: 2 MG/DL — SIGNIFICANT CHANGE UP (ref 1.6–2.6)
MCHC RBC-ENTMCNC: 30.2 PG — SIGNIFICANT CHANGE UP (ref 27–34)
MCHC RBC-ENTMCNC: 30.7 PG — SIGNIFICANT CHANGE UP (ref 27–34)
MCHC RBC-ENTMCNC: 31.1 PG — SIGNIFICANT CHANGE UP (ref 27–34)
MCHC RBC-ENTMCNC: 31.2 PG — SIGNIFICANT CHANGE UP (ref 27–34)
MCHC RBC-ENTMCNC: 32.5 GM/DL — SIGNIFICANT CHANGE UP (ref 32–36)
MCHC RBC-ENTMCNC: 33.2 GM/DL — SIGNIFICANT CHANGE UP (ref 32–36)
MCHC RBC-ENTMCNC: 33.3 GM/DL — SIGNIFICANT CHANGE UP (ref 32–36)
MCHC RBC-ENTMCNC: 33.8 GM/DL — SIGNIFICANT CHANGE UP (ref 32–36)
MCV RBC AUTO: 90.8 FL — SIGNIFICANT CHANGE UP (ref 80–100)
MCV RBC AUTO: 92.7 FL — SIGNIFICANT CHANGE UP (ref 80–100)
MCV RBC AUTO: 93.4 FL — SIGNIFICANT CHANGE UP (ref 80–100)
MCV RBC AUTO: 93.8 FL — SIGNIFICANT CHANGE UP (ref 80–100)
NRBC # BLD: 0 /100 WBCS — SIGNIFICANT CHANGE UP (ref 0–0)
PHOSPHATE SERPL-MCNC: 3.2 MG/DL — SIGNIFICANT CHANGE UP (ref 2.5–4.5)
PLATELET # BLD AUTO: 41 K/UL — LOW (ref 150–400)
PLATELET # BLD AUTO: 42 K/UL — LOW (ref 150–400)
POTASSIUM SERPL-MCNC: 4.1 MMOL/L — SIGNIFICANT CHANGE UP (ref 3.5–5.3)
POTASSIUM SERPL-SCNC: 4.1 MMOL/L — SIGNIFICANT CHANGE UP (ref 3.5–5.3)
PROT SERPL-MCNC: 5.2 G/DL — LOW (ref 6–8.3)
PROTHROM AB SERPL-ACNC: 16.5 SEC — HIGH (ref 10.5–13.4)
RBC # BLD: 2.92 M/UL — LOW (ref 3.8–5.2)
RBC # BLD: 3.05 M/UL — LOW (ref 3.8–5.2)
RBC # BLD: 3.15 M/UL — LOW (ref 3.8–5.2)
RBC # BLD: 3.16 M/UL — LOW (ref 3.8–5.2)
RBC # FLD: 17.2 % — HIGH (ref 10.3–14.5)
RBC # FLD: 17.3 % — HIGH (ref 10.3–14.5)
SODIUM SERPL-SCNC: 143 MMOL/L — SIGNIFICANT CHANGE UP (ref 135–145)
WBC # BLD: 4.75 K/UL — SIGNIFICANT CHANGE UP (ref 3.8–10.5)
WBC # BLD: 5.24 K/UL — SIGNIFICANT CHANGE UP (ref 3.8–10.5)
WBC # BLD: 5.59 K/UL — SIGNIFICANT CHANGE UP (ref 3.8–10.5)
WBC # BLD: 5.93 K/UL — SIGNIFICANT CHANGE UP (ref 3.8–10.5)
WBC # FLD AUTO: 4.75 K/UL — SIGNIFICANT CHANGE UP (ref 3.8–10.5)
WBC # FLD AUTO: 5.24 K/UL — SIGNIFICANT CHANGE UP (ref 3.8–10.5)
WBC # FLD AUTO: 5.59 K/UL — SIGNIFICANT CHANGE UP (ref 3.8–10.5)
WBC # FLD AUTO: 5.93 K/UL — SIGNIFICANT CHANGE UP (ref 3.8–10.5)

## 2022-12-26 PROCEDURE — 99291 CRITICAL CARE FIRST HOUR: CPT

## 2022-12-26 PROCEDURE — 99232 SBSQ HOSP IP/OBS MODERATE 35: CPT | Mod: GC

## 2022-12-26 PROCEDURE — 93010 ELECTROCARDIOGRAM REPORT: CPT

## 2022-12-26 RX ORDER — LACTULOSE 10 G/15ML
200 SOLUTION ORAL EVERY 8 HOURS
Refills: 0 | Status: DISCONTINUED | OUTPATIENT
Start: 2022-12-26 | End: 2022-12-27

## 2022-12-26 RX ORDER — DEXMEDETOMIDINE HYDROCHLORIDE IN 0.9% SODIUM CHLORIDE 4 UG/ML
0.2 INJECTION INTRAVENOUS
Qty: 200 | Refills: 0 | Status: DISCONTINUED | OUTPATIENT
Start: 2022-12-26 | End: 2022-12-26

## 2022-12-26 RX ORDER — PANTOPRAZOLE SODIUM 20 MG/1
40 TABLET, DELAYED RELEASE ORAL DAILY
Refills: 0 | Status: DISCONTINUED | OUTPATIENT
Start: 2022-12-26 | End: 2022-12-28

## 2022-12-26 RX ADMIN — FENTANYL CITRATE 50 MICROGRAM(S): 50 INJECTION INTRAVENOUS at 22:37

## 2022-12-26 RX ADMIN — OCTREOTIDE ACETATE 10 MICROGRAM(S)/HR: 200 INJECTION, SOLUTION INTRAVENOUS; SUBCUTANEOUS at 08:50

## 2022-12-26 RX ADMIN — FENTANYL CITRATE 50 MICROGRAM(S): 50 INJECTION INTRAVENOUS at 05:52

## 2022-12-26 RX ADMIN — FENTANYL CITRATE 50 MICROGRAM(S): 50 INJECTION INTRAVENOUS at 21:34

## 2022-12-26 RX ADMIN — CEFTRIAXONE 100 MILLIGRAM(S): 500 INJECTION, POWDER, FOR SOLUTION INTRAMUSCULAR; INTRAVENOUS at 21:00

## 2022-12-26 RX ADMIN — DEXMEDETOMIDINE HYDROCHLORIDE IN 0.9% SODIUM CHLORIDE 3.19 MICROGRAM(S)/KG/HR: 4 INJECTION INTRAVENOUS at 09:21

## 2022-12-26 RX ADMIN — CHLORHEXIDINE GLUCONATE 15 MILLILITER(S): 213 SOLUTION TOPICAL at 05:07

## 2022-12-26 RX ADMIN — FENTANYL CITRATE 50 MICROGRAM(S): 50 INJECTION INTRAVENOUS at 05:22

## 2022-12-26 RX ADMIN — OCTREOTIDE ACETATE 10 MICROGRAM(S)/HR: 200 INJECTION, SOLUTION INTRAVENOUS; SUBCUTANEOUS at 21:01

## 2022-12-26 RX ADMIN — PANTOPRAZOLE SODIUM 10 MG/HR: 20 TABLET, DELAYED RELEASE ORAL at 08:50

## 2022-12-26 RX ADMIN — LACTULOSE 200 GRAM(S): 10 SOLUTION ORAL at 15:03

## 2022-12-26 RX ADMIN — Medication 1: at 11:44

## 2022-12-26 RX ADMIN — PROPOFOL 3.53 MICROGRAM(S)/KG/MIN: 10 INJECTION, EMULSION INTRAVENOUS at 21:00

## 2022-12-26 RX ADMIN — Medication 2: at 00:24

## 2022-12-26 RX ADMIN — PANTOPRAZOLE SODIUM 40 MILLIGRAM(S): 20 TABLET, DELAYED RELEASE ORAL at 14:10

## 2022-12-26 RX ADMIN — CHLORHEXIDINE GLUCONATE 15 MILLILITER(S): 213 SOLUTION TOPICAL at 17:44

## 2022-12-26 RX ADMIN — Medication 5.98 MICROGRAM(S)/KG/MIN: at 08:50

## 2022-12-26 RX ADMIN — Medication 1: at 17:45

## 2022-12-26 RX ADMIN — Medication 5.98 MICROGRAM(S)/KG/MIN: at 21:00

## 2022-12-26 RX ADMIN — Medication 1: at 05:07

## 2022-12-26 RX ADMIN — CHLORHEXIDINE GLUCONATE 1 APPLICATION(S): 213 SOLUTION TOPICAL at 06:18

## 2022-12-26 RX ADMIN — PROPOFOL 3.53 MICROGRAM(S)/KG/MIN: 10 INJECTION, EMULSION INTRAVENOUS at 08:49

## 2022-12-26 NOTE — PROGRESS NOTE ADULT - SUBJECTIVE AND OBJECTIVE BOX
Gastroenterology/Hepatology Progress Note    Interval Events:   - no acute ON events   - patient s/p EGD with one band placed   - Hgb 9.1 (stable)     Allergies:  No Known Drug Allergies  shellfish (Unknown)      Hospital Medications:  cefTRIAXone   IVPB      cefTRIAXone   IVPB 2000 milliGRAM(s) IV Intermittent every 24 hours  chlorhexidine 0.12% Liquid 15 milliLiter(s) Oral Mucosa every 12 hours  chlorhexidine 4% Liquid 1 Application(s) Topical <User Schedule>  dextrose 5%. 1000 milliLiter(s) IV Continuous <Continuous>  dextrose 5%. 1000 milliLiter(s) IV Continuous <Continuous>  dextrose 50% Injectable 25 Gram(s) IV Push once  dextrose 50% Injectable 12.5 Gram(s) IV Push once  dextrose 50% Injectable 25 Gram(s) IV Push once  dextrose Oral Gel 15 Gram(s) Oral once PRN  fentaNYL    Injectable 50 MICROGram(s) IV Push every 4 hours PRN  glucagon  Injectable 1 milliGRAM(s) IntraMuscular once  insulin lispro (ADMELOG) corrective regimen sliding scale   SubCutaneous every 6 hours  loratadine 10 milliGRAM(s) Oral daily  norepinephrine Infusion 0.05 MICROgram(s)/kG/Min IV Continuous <Continuous>  octreotide  Infusion 50 MICROgram(s)/Hr IV Continuous <Continuous>  pantoprazole Infusion 8 mG/Hr IV Continuous <Continuous>  propofol Infusion 10 MICROgram(s)/kG/Min IV Continuous <Continuous>  rifAXIMin 550 milliGRAM(s) Oral two times a day  sertraline 50 milliGRAM(s) Oral daily      ROS: 14 point ROS negative unless otherwise state in subjective    PHYSICAL EXAM:   Vital Signs:  Vital Signs Last 24 Hrs  T(C): 37 (26 Dec 2022 08:00), Max: 37.8 (25 Dec 2022 19:15)  T(F): 98.6 (26 Dec 2022 08:00), Max: 100 (25 Dec 2022 19:15)  HR: 66 (26 Dec 2022 08:30) (55 - 77)  BP: 107/55 (26 Dec 2022 08:15) (82/46 - 160/65)  BP(mean): 79 (26 Dec 2022 08:15) (62 - 106)  RR: 16 (26 Dec 2022 08:30) (15 - 28)  SpO2: 100% (26 Dec 2022 08:30) (94% - 100%)    Parameters below as of 26 Dec 2022 08:00  Patient On (Oxygen Delivery Method): ventilator    O2 Concentration (%): 30  Daily     Daily     GENERAL: Patient is intubated, appears critically ill   HEENT:  NCAT, no scleral icterus   CHEST: patient on ventilator   HEART:  Regular rate and rhythm  ABDOMEN:  Soft, non-tender, distended, no masses  EXTREMITIES: No edema  SKIN:  No rash/erythema/ecchymoses/petechiae/wounds/abscess/warm/dry  NEURO:  Alert and oriented x 0, sedated     LABS:                        9.1    4.75  )-----------( 41       ( 26 Dec 2022 06:10 )             27.4     Mean Cell Volume: 93.8 fl (- @ 06:10)        143  |  108  |  57<H>  ----------------------------<  200<H>  4.1   |  25  |  1.17    Ca    8.0<L>      26 Dec 2022 00:25  Phos  3.2       Mg     2.0         TPro  5.2<L>  /  Alb  3.1<L>  /  TBili  1.0  /  DBili  x   /  AST  37  /  ALT  13  /  AlkPhos  55      LIVER FUNCTIONS - ( 26 Dec 2022 00:25 )  Alb: 3.1 g/dL / Pro: 5.2 g/dL / ALK PHOS: 55 U/L / ALT: 13 U/L / AST: 37 U/L / GGT: x           PT/INR - ( 26 Dec 2022 00:25 )   PT: 16.5 sec;   INR: 1.42 ratio         PTT - ( 26 Dec 2022 00:25 )  PTT:30.6 sec  Urinalysis Basic - ( 25 Dec 2022 06:32 )    Color: Yellow / Appearance: Clear / S.045 / pH: x  Gluc: x / Ketone: Negative  / Bili: Negative / Urobili: Negative   Blood: x / Protein: Trace / Nitrite: Negative   Leuk Esterase: Negative / RBC: 132 /hpf / WBC 12 /HPF   Sq Epi: x / Non Sq Epi: 8 /hpf / Bacteria: Negative      Amylase Serum--      Lipase serum71       [id number]        Imaging:           Gastroenterology/Hepatology Progress Note    Interval Events:   - no acute ON events   - patient s/p EGD with one band placed   - Hgb 9.1 (stable)     Allergies:  No Known Drug Allergies  shellfish (Unknown)      Hospital Medications:  cefTRIAXone   IVPB      cefTRIAXone   IVPB 2000 milliGRAM(s) IV Intermittent every 24 hours  chlorhexidine 0.12% Liquid 15 milliLiter(s) Oral Mucosa every 12 hours  chlorhexidine 4% Liquid 1 Application(s) Topical <User Schedule>  dextrose 5%. 1000 milliLiter(s) IV Continuous <Continuous>  dextrose 5%. 1000 milliLiter(s) IV Continuous <Continuous>  dextrose 50% Injectable 25 Gram(s) IV Push once  dextrose 50% Injectable 12.5 Gram(s) IV Push once  dextrose 50% Injectable 25 Gram(s) IV Push once  dextrose Oral Gel 15 Gram(s) Oral once PRN  fentaNYL    Injectable 50 MICROGram(s) IV Push every 4 hours PRN  glucagon  Injectable 1 milliGRAM(s) IntraMuscular once  insulin lispro (ADMELOG) corrective regimen sliding scale   SubCutaneous every 6 hours  loratadine 10 milliGRAM(s) Oral daily  norepinephrine Infusion 0.05 MICROgram(s)/kG/Min IV Continuous <Continuous>  octreotide  Infusion 50 MICROgram(s)/Hr IV Continuous <Continuous>  pantoprazole Infusion 8 mG/Hr IV Continuous <Continuous>  propofol Infusion 10 MICROgram(s)/kG/Min IV Continuous <Continuous>  rifAXIMin 550 milliGRAM(s) Oral two times a day  sertraline 50 milliGRAM(s) Oral daily      ROS: 14 point ROS unable to be obtained     PHYSICAL EXAM:   Vital Signs:  Vital Signs Last 24 Hrs  T(C): 37 (26 Dec 2022 08:00), Max: 37.8 (25 Dec 2022 19:15)  T(F): 98.6 (26 Dec 2022 08:00), Max: 100 (25 Dec 2022 19:15)  HR: 66 (26 Dec 2022 08:30) (55 - 77)  BP: 107/55 (26 Dec 2022 08:15) (82/46 - 160/65)  BP(mean): 79 (26 Dec 2022 08:15) (62 - 106)  RR: 16 (26 Dec 2022 08:30) (15 - 28)  SpO2: 100% (26 Dec 2022 08:30) (94% - 100%)    Parameters below as of 26 Dec 2022 08:00  Patient On (Oxygen Delivery Method): ventilator    O2 Concentration (%): 30  Daily     Daily     GENERAL: Patient is intubated, appears critically ill   HEENT:  NCAT, no scleral icterus   CHEST: patient on ventilator   HEART:  Regular rate and rhythm  ABDOMEN:  Soft, non-tender, distended, no masses  EXTREMITIES: No edema  SKIN:  No rash/erythema/ecchymoses/petechiae/wounds/abscess/warm/dry  NEURO:  Alert and oriented x 0, sedated     LABS:                        9.1    4.75  )-----------( 41       ( 26 Dec 2022 06:10 )             27.4     Mean Cell Volume: 93.8 fl (- @ 06:10)        143  |  108  |  57<H>  ----------------------------<  200<H>  4.1   |  25  |  1.17    Ca    8.0<L>      26 Dec 2022 00:25  Phos  3.2       Mg     2.0         TPro  5.2<L>  /  Alb  3.1<L>  /  TBili  1.0  /  DBili  x   /  AST  37  /  ALT  13  /  AlkPhos  55      LIVER FUNCTIONS - ( 26 Dec 2022 00:25 )  Alb: 3.1 g/dL / Pro: 5.2 g/dL / ALK PHOS: 55 U/L / ALT: 13 U/L / AST: 37 U/L / GGT: x           PT/INR - ( 26 Dec 2022 00:25 )   PT: 16.5 sec;   INR: 1.42 ratio         PTT - ( 26 Dec 2022 00:25 )  PTT:30.6 sec  Urinalysis Basic - ( 25 Dec 2022 06:32 )    Color: Yellow / Appearance: Clear / S.045 / pH: x  Gluc: x / Ketone: Negative  / Bili: Negative / Urobili: Negative   Blood: x / Protein: Trace / Nitrite: Negative   Leuk Esterase: Negative / RBC: 132 /hpf / WBC 12 /HPF   Sq Epi: x / Non Sq Epi: 8 /hpf / Bacteria: Negative      Amylase Serum--      Lipase serum71       [id number]        Imaging:           Gastroenterology/Hepatology Progress Note    Interval Events:   - no acute ON events   - patient s/p EGD with one band placed   - Hgb 9.1 (stable)     Allergies:  No Known Drug Allergies  shellfish (Unknown)      Hospital Medications:  cefTRIAXone   IVPB      cefTRIAXone   IVPB 2000 milliGRAM(s) IV Intermittent every 24 hours  chlorhexidine 0.12% Liquid 15 milliLiter(s) Oral Mucosa every 12 hours  chlorhexidine 4% Liquid 1 Application(s) Topical <User Schedule>  dextrose 5%. 1000 milliLiter(s) IV Continuous <Continuous>  dextrose 5%. 1000 milliLiter(s) IV Continuous <Continuous>  dextrose 50% Injectable 25 Gram(s) IV Push once  dextrose 50% Injectable 12.5 Gram(s) IV Push once  dextrose 50% Injectable 25 Gram(s) IV Push once  dextrose Oral Gel 15 Gram(s) Oral once PRN  fentaNYL    Injectable 50 MICROGram(s) IV Push every 4 hours PRN  glucagon  Injectable 1 milliGRAM(s) IntraMuscular once  insulin lispro (ADMELOG) corrective regimen sliding scale   SubCutaneous every 6 hours  loratadine 10 milliGRAM(s) Oral daily  norepinephrine Infusion 0.05 MICROgram(s)/kG/Min IV Continuous <Continuous>  octreotide  Infusion 50 MICROgram(s)/Hr IV Continuous <Continuous>  pantoprazole Infusion 8 mG/Hr IV Continuous <Continuous>  propofol Infusion 10 MICROgram(s)/kG/Min IV Continuous <Continuous>  rifAXIMin 550 milliGRAM(s) Oral two times a day  sertraline 50 milliGRAM(s) Oral daily      ROS: 14 point ROS unable to be obtained     PHYSICAL EXAM:   Vital Signs:  Vital Signs Last 24 Hrs  T(C): 37 (26 Dec 2022 08:00), Max: 37.8 (25 Dec 2022 19:15)  T(F): 98.6 (26 Dec 2022 08:00), Max: 100 (25 Dec 2022 19:15)  HR: 66 (26 Dec 2022 08:30) (55 - 77)  BP: 107/55 (26 Dec 2022 08:15) (82/46 - 160/65)  BP(mean): 79 (26 Dec 2022 08:15) (62 - 106)  RR: 16 (26 Dec 2022 08:30) (15 - 28)  SpO2: 100% (26 Dec 2022 08:30) (94% - 100%)    Parameters below as of 26 Dec 2022 08:00  Patient On (Oxygen Delivery Method): ventilator    O2 Concentration (%): 30  Daily     Daily     GENERAL: Patient is intubated, appears critically ill   HEENT:  NCAT, no scleral icterus   CHEST: patient on ventilator   HEART:  Regular rate and rhythm  ABDOMEN:  Soft, non-tender, distended, no masses  EXTREMITIES: No edema  SKIN:  No rash/erythema/ecchymoses/petechiae/wounds/abscess/warm/dry  NEURO:  Sedated, not following commands     LABS:                        9.1    4.75  )-----------( 41       ( 26 Dec 2022 06:10 )             27.4     Mean Cell Volume: 93.8 fl (- @ 06:10)        143  |  108  |  57<H>  ----------------------------<  200<H>  4.1   |  25  |  1.17    Ca    8.0<L>      26 Dec 2022 00:25  Phos  3.2       Mg     2.0         TPro  5.2<L>  /  Alb  3.1<L>  /  TBili  1.0  /  DBili  x   /  AST  37  /  ALT  13  /  AlkPhos  55      LIVER FUNCTIONS - ( 26 Dec 2022 00:25 )  Alb: 3.1 g/dL / Pro: 5.2 g/dL / ALK PHOS: 55 U/L / ALT: 13 U/L / AST: 37 U/L / GGT: x           PT/INR - ( 26 Dec 2022 00:25 )   PT: 16.5 sec;   INR: 1.42 ratio         PTT - ( 26 Dec 2022 00:25 )  PTT:30.6 sec  Urinalysis Basic - ( 25 Dec 2022 06:32 )    Color: Yellow / Appearance: Clear / S.045 / pH: x  Gluc: x / Ketone: Negative  / Bili: Negative / Urobili: Negative   Blood: x / Protein: Trace / Nitrite: Negative   Leuk Esterase: Negative / RBC: 132 /hpf / WBC 12 /HPF   Sq Epi: x / Non Sq Epi: 8 /hpf / Bacteria: Negative      Amylase Serum--      Lipase serum71       [id number]        Imaging:

## 2022-12-26 NOTE — PROGRESS NOTE ADULT - ATTENDING COMMENTS
66 yo F with HTN, HLD, DM2, CKD, and decompensated IVEY cirrhosis, [place] [facility] [place] [facility] on [place]24 due to massive UGIB initially thought to be secondary to actively bleeding fundal gastric varices based initial EGD (12/24) [place] [facility] with limited visualization due to active bleeding, s/p 5 units PRBCs transfused (12/24) along with platelets and FFP. Repeat EGD here (12/25) with large junctional varix with stigmata of recent bleeding including likely rupture site and red riddhi, s/p EVL x1, also with moderate PHG. No other definite gastric varices seen, though did have some prominent rugal folds in the cardia and fundus. Hb/HCT has remained stable for >24h without need for any further PRBC transfusions, but she has been febrile to Tmax 100.6 in the past 24h and is on norepinephrine infusion with concern for septic shock. Currently on ceftriaxone (12/24- ) but may need broader antibiotic coverage pending infectious studies or if not clinically improving. Recommend to hold NGT or OGT placement for an additional 24h after band ligation of the junctional varix and can give lactulose enemas q8-12h today meanwhile to aid with weaning. Okay to discontinue pantoprazole infusion but should remain on octreotide infusion @50 mcg/hr x72h post-EGD. If re-bleeding occurs, she may need TIPS (though higher risk candidate due to history of severe HE episodes) versus BRTO by IR. ABO B with current MELD-Na 12, but will need eventual liver transplant evaluation given her decompensated cirrhosis.    Her plan of care was discussed in person with the MICU team this morning. Please don't hesitate to call with any questions or concerns.    Mamie Prado M.D., Ph.D.  Transplant Hepatology

## 2022-12-26 NOTE — PROGRESS NOTE ADULT - ASSESSMENT
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, T2DM, HTN, HLD, CKD, chronic anemia and thrombocytopenia who initially presented to an outside hospital for hematemesis and melena, underwent EGD with limited visualization 2/2 to large amounts of blood, now presents to [facility] for TIPS evaluation.     ===Neurology===  - AAOx3 at baseline  - Patient with history of recent hepatic encephalopathy per chart review, improved with lactulose  [] Continue with fent and propofol for sedation  [] Follow up serum ammonia level  [] hold home lactulose and rifaximin while NPO. Will restart once bleeding is better controlled.    ===Cardiac===   #HTN  [] Maintain MAP >65mmHg with early initiation of vasoactive medications - on levo   [] HTN medications on hold while NPO     ===Respiratory===  # Intubated for airway protection iso active hematemesis  - Vent settings: 400/16/30/5  - AB.45/40/124/28  - No pulmonary issues currently or signs of hepatopulmonary syndrome.    ===Gastrointestinal===   #Upper GI Bleed  - EGD at OSH : large amounts of blood and clots in the gastric fundus, suspicion for gastric varices, visualized small distal esophageal varices  - s/p I U FFP  [] Continue with IV PPI BID  [] Continue with Octreotide gtt  [] Continue with aggressive fluid resuscitation with crystalloids and PRBCs prn  [] continue with ceftriaxone 1 g q 24 hours for 7 days   [] IR consult for TIPS procedure given active hematemesis- give 1 U FFP before TIPS   - f/u AM CBC and coags before tips   - f/u CT triple phase   [] Follow up hepatology recs   [] Maintain active T&S, transfuse Hgb > 7  #Cirrhosis  - Reportedly non-EtOH cirrhosis, 2/2 to IVEY  - MELD score 17- 6% estimated 3 month mortality   - no evidence of hepatic encephalopathy on this admission  [] hold home lactulose and rifaximin while NPO  [] Continue with Ceftriaxone 1g IV qd for SBP PPx for a total of 7 days ( - )    ===Renal===  SCr 1.17  - No active issues    ===Infectious Disease===  #SBP PPX  [] Continue with ceftriaxone 1g IV for SBP PPx  for 7 days (12/24-)    ===Endocrine===  #T2DM  [] Start on insulin sliding scale   [] If suboptimal glycemic control, can transition to NPH    ===Hematology===  #Anemia  #Thrombcytopenia  - Chronic anemia and thrombocytopenia iso cirrhosis  [] DVT PPx with SCDs  [] Transfuse to maintain Hgb > 7, PLT > 50k    ===Skin===  #Occipital laceration  - s/p staples  [] remove staples in 7 days    ===Ethics===  Full code   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     ===Neurology===  - AAOx3 at baseline  - Patient with history of recent hepatic encephalopathy per chart review, improved with lactulose  [] Continue with fent and propofol for sedation  - Serum ammonia level was 58.  [] hold home lactulose and rifaximin while NPO. Will restart once bleeding is better controlled.  -Lactulose enemas per hepatology recs    ===Cardiac===   #HTN  [] Maintain MAP >65mmHg with early initiation of vasoactive medications - on levo   [] HTN medications on hold while NPO     ===Respiratory===  # Intubated for airway protection iso active hematemesis  - Vent settings: 400/16/30/5  - AB.45/40/124/28  - No pulmonary issues currently or signs of hepatopulmonary syndrome.    ===Gastrointestinal===   #Upper GI Bleed  - EGD at OSH : large amounts of blood and clots in the gastric fundus, suspicion for gastric varices, visualized small distal esophageal varices  - s/p I U FFP  -s/p EGD : Large junctional varix banded  [] hold NGT or OGT placement for 24h, can place   [] will need repeat EGD in 2 weeks to assess for eradication of varices   [] Discontinue IV PPI gtt and started IV PPI 40mg qd  [] lactulose enemas   [] Continue with Octreotide gtt x 72 hours  [] Continue with aggressive fluid resuscitation with crystalloids and PRBCs prn  [] continue with ceftriaxone 1 g q 24 hours for 7 days   []Hepatology will likely initiate liver transplant workup when patient becomes more clinically stable  [] IR consult for TIPS procedure given active hematemesis- give 1 U FFP before TIPS   - f/u AM CBC and coags before tips   - f/u CT triple phase   -IR discussed case in person with hepatology attending Dr. Prado, who feels the patient is high risk for TIPS placement given existing HE.   -IR remains available for TIPS placement if endoscopy cannot adequately treat varices or if patient has another acute bleeding episode   [] Maintain active T&S, transfuse Hgb > 7  #Cirrhosis  - Reportedly non-EtOH cirrhosis, 2/2 to IVEY  - MELD score 17- 6% estimated 3 month mortality   - no evidence of hepatic encephalopathy on this admission  [] hold home lactulose and rifaximin while NPO  [] Continue with Ceftriaxone 1g IV qd for SBP PPx for a total of 7 days ( - )    ===Renal===  SCr 1.17  - No active issues    ===Infectious Disease===  #SBP PPX  [] Continue with ceftriaxone 1g IV for SBP PPx  for 7 days (-)    ===Endocrine===  #T2DM  [] Start on insulin sliding scale   [] If suboptimal glycemic control, can transition to NPH    ===Hematology===  #Anemia  #Thrombcytopenia  - Chronic anemia and thrombocytopenia iso cirrhosis  [] DVT PPx with SCDs  [] Transfuse to maintain Hgb > 7, PLT > 50k    ===Skin===  #Occipital laceration  - s/p staples  [] remove staples in 7 days    ===Ethics===  Full code

## 2022-12-26 NOTE — PROGRESS NOTE ADULT - SUBJECTIVE AND OBJECTIVE BOX
INTERVAL HPI/OVERNIGHT EVENTS: No overnight events reported per team. Pt had endoscopy yesterday with a large junctional varix banded.    SUBJECTIVE: Patient seen and examined at bedside.       VITAL SIGNS:  ICU Vital Signs Last 24 Hrs  T(C): 37.5 (26 Dec 2022 04:00), Max: 38.1 (25 Dec 2022 08:00)  T(F): 99.5 (26 Dec 2022 04:00), Max: 100.6 (25 Dec 2022 08:00)  HR: 58 (26 Dec 2022 07:30) (55 - 79)  BP: 109/55 (26 Dec 2022 07:30) (82/46 - 160/65)  BP(mean): 79 (26 Dec 2022 07:30) (62 - 106)  ABP: --  ABP(mean): --  RR: 42 (26 Dec 2022 07:30) (15 - 42)  SpO2: 100% (26 Dec 2022 07:30) (94% - 100%)    O2 Parameters below as of 25 Dec 2022 19:15  Patient On (Oxygen Delivery Method): ventilator    O2 Concentration (%): 30      Mode: AC/ CMV (Assist Control/ Continuous Mandatory Ventilation), RR (machine): 16, TV (machine): 400, FiO2: 30, PEEP: 5, ITime: 0.67, MAP: 9, PIP: 22  Plateau pressure:   P/F ratio:     12-25 @ 07:01  -  12- @ 07:00  --------------------------------------------------------  IN: 1136.1 mL / OUT: 1500 mL / NET: -363.9 mL      CAPILLARY BLOOD GLUCOSE      POCT Blood Glucose.: 189 mg/dL (26 Dec 2022 05:06)    ECG:    PHYSICAL EXAM:    General: NAD; sedated and intubated.  HEENT: NC/AT; PERRL, clear conjunctiva  Neck: supple  Respiratory: CTA b/l  Cardiovascular: +S1/S2; RRR  Abdomen: soft, NT/ND; +BS x4  Extremities: WWP, 2+ peripheral pulses b/l; no LE edema  Skin: normal color and turgor; no rash  Neurological: Responsive to pain and light to eyes.    MEDICATIONS:  MEDICATIONS  (STANDING):  cefTRIAXone   IVPB      cefTRIAXone   IVPB 2000 milliGRAM(s) IV Intermittent every 24 hours  chlorhexidine 0.12% Liquid 15 milliLiter(s) Oral Mucosa every 12 hours  chlorhexidine 4% Liquid 1 Application(s) Topical <User Schedule>  dextrose 5%. 1000 milliLiter(s) (50 mL/Hr) IV Continuous <Continuous>  dextrose 5%. 1000 milliLiter(s) (100 mL/Hr) IV Continuous <Continuous>  dextrose 50% Injectable 25 Gram(s) IV Push once  dextrose 50% Injectable 12.5 Gram(s) IV Push once  dextrose 50% Injectable 25 Gram(s) IV Push once  glucagon  Injectable 1 milliGRAM(s) IntraMuscular once  insulin lispro (ADMELOG) corrective regimen sliding scale   SubCutaneous every 6 hours  loratadine 10 milliGRAM(s) Oral daily  norepinephrine Infusion 0.05 MICROgram(s)/kG/Min (5.98 mL/Hr) IV Continuous <Continuous>  octreotide  Infusion 50 MICROgram(s)/Hr (10 mL/Hr) IV Continuous <Continuous>  pantoprazole Infusion 8 mG/Hr (10 mL/Hr) IV Continuous <Continuous>  propofol Infusion 10 MICROgram(s)/kG/Min (3.53 mL/Hr) IV Continuous <Continuous>  rifAXIMin 550 milliGRAM(s) Oral two times a day  sertraline 50 milliGRAM(s) Oral daily    MEDICATIONS  (PRN):  dextrose Oral Gel 15 Gram(s) Oral once PRN Blood Glucose LESS THAN 70 milliGRAM(s)/deciliter  fentaNYL    Injectable 50 MICROGram(s) IV Push every 4 hours PRN Moderate Pain (4 - 6)      ALLERGIES:  Allergies    No Known Drug Allergies  shellfish (Unknown)    Intolerances        LABS:                        9.1    4.75  )-----------( 41       ( 26 Dec 2022 06:10 )             27.4     12-    143  |  108  |  57<H>  ----------------------------<  200<H>  4.1   |  25  |  1.17    Ca    8.0<L>      26 Dec 2022 00:25  Phos  3.2     12-  Mg     2.0     12-    TPro  5.2<L>  /  Alb  3.1<L>  /  TBili  1.0  /  DBili  x   /  AST  37  /  ALT  13  /  AlkPhos  55  12-26    PT/INR - ( 26 Dec 2022 00:25 )   PT: 16.5 sec;   INR: 1.42 ratio         PTT - ( 26 Dec 2022 00:25 )  PTT:30.6 sec  Urinalysis Basic - ( 25 Dec 2022 06:32 )    Color: Yellow / Appearance: Clear / S.045 / pH: x  Gluc: x / Ketone: Negative  / Bili: Negative / Urobili: Negative   Blood: x / Protein: Trace / Nitrite: Negative   Leuk Esterase: Negative / RBC: 132 /hpf / WBC 12 /HPF   Sq Epi: x / Non Sq Epi: 8 /hpf / Bacteria: Negative        RADIOLOGY & ADDITIONAL TESTS: Reviewed.

## 2022-12-26 NOTE — PROGRESS NOTE ADULT - ATTENDING COMMENTS
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    - cont sedation to goal rass  - start sedation vacation   - cont vent support, maintain sat>90%, start PS trials as tolerated  - s/p EGD w/ successful EV banding (and will defer ngt for another day)  - serial cbc and coags, not requiring prbc   - cont to monitor plt, likely cirrhosis related thrombocytopenia  - cont ppi   - cont octreotide  - cont ctx for UGIB  - cont vasopressors maintain map>65  - qtc normalized but was higher earlier in admission, cont to monitor, cont sertraline  - holding HE tx  today, start tomorrow  - dvt ppx SCD    prognosis guarded

## 2022-12-26 NOTE — PROGRESS NOTE ADULT - ASSESSMENT
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 The patient was intubated for airway protection and underwent evaluation with EGD, with large amount of blood at the gastric fundus.     #IVEY cirrhosis   #decompensated liver cirrhosis   #Variceal bleed   Patient s/p EGD at outside hospital with poor visualization due to active bleeding. Patient s/p 5U pRBC with plts and FFP.  - Multiphase CT abdomen/pelvis (12/24) reviewed, with portosystemic collaterals seen and no active extravasation    - ascites: trace ascites on CT 12/24   - HCC: CT without liver lesions (12/24)   - HE: none  - Varices: gastric and esophageal varices, s/p EGD on 12/23 with one esophageal band   - MELD 12 (12/26)     Recommendations:   - continue with octreotide gtt x 72 hours    - continue with ceftriaxone for 7 days total   - will likely initiate liver transplant workup when patient becomes more clinically stable   - rest of care per primary team    All recommendations are tentative until note is attested by attending.     Divina Salazar, PGY-4   Gastroenterology/Hepatology Fellow  Available on Microsoft Teams  [phone] ([employer] Short Range Pager)  [phone] ([facility] Long Range Pager)    After 5pm, please contact the on-call GI fellow. 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 The patient was intubated for airway protection and underwent evaluation with EGD, with large amount of blood at the gastric fundus.     #IVEY cirrhosis   #decompensated liver cirrhosis   #Variceal bleed   Patient s/p EGD at outside hospital with poor visualization due to active bleeding. Patient s/p 5U pRBC with plts and FFP.  - Multiphase CT abdomen/pelvis (12/24) reviewed, with portosystemic collaterals seen and no active extravasation    - ascites: trace ascites on CT 12/24   - HCC: CT without liver lesions (12/24)   - HE: none  - Varices: gastric and esophageal varices, s/p EGD on 12/23 with one esophageal band   - MELD 12 (12/26)     Recommendations:   - continue with octreotide gtt x 72 hours    - continue with ceftriaxone for 7 days total   - stop PPI gtt, transition to IV PPI 40mg daily   - hold NGT placement for 24h   - lactulose enemas   - will need repeat EGD in 2 weeks to assess for eradication of varices   - will likely initiate liver transplant workup when patient becomes more clinically stable   - rest of care per primary team    All recommendations are tentative until note is attested by attending.     Divina Salazar, PGY-4   Gastroenterology/Hepatology Fellow  Available on Microsoft Teams  [phone] ([employer] Short Range Pager)  [phone] ([facility] Long Range Pager)    After 5pm, please contact the on-call GI fellow. 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 The patient was intubated for airway protection and underwent evaluation with EGD, with large amount of blood at the gastric fundus.     #IVEY cirrhosis   #decompensated liver cirrhosis   #Variceal bleed   Patient s/p EGD at outside hospital with poor visualization due to active bleeding. Patient s/p 5U pRBC with plts and FFP.  - Multiphase CT abdomen/pelvis (12/24) reviewed, with portosystemic collaterals seen and no active extravasation    - ascites: trace ascites on CT 12/24   - HCC: CT without liver lesions (12/24)   - HE: none  - Varices: gastric and esophageal varices, s/p EGD on 12/23 with one esophageal band   - MELD 12 (12/26)     Recommendations:   - continue with octreotide gtt x 72 hours    - continue with ceftriaxone for 7 days total   - stop PPI gtt, transition to IV PPI 40mg daily   - hold NGT or OGT placement for 24h   - lactulose enemas   - will need repeat EGD in 2 weeks to assess for eradication of varices   - will likely initiate liver transplant workup when patient becomes more clinically stable   - rest of care per primary team    All recommendations are tentative until note is attested by attending.     Divina Salazar, PGY-4   Gastroenterology/Hepatology Fellow  Available on Microsoft Teams  [phone] ([email or web address] Short Range Pager)  [phone] ([facility] Long Range Pager)    After 5pm, please contact the on-call GI fellow. [phone]

## 2022-12-27 LAB
ALBUMIN SERPL ELPH-MCNC: 3 G/DL — LOW (ref 3.3–5)
ALP SERPL-CCNC: 55 U/L — SIGNIFICANT CHANGE UP (ref 40–120)
ALT FLD-CCNC: 13 U/L — SIGNIFICANT CHANGE UP (ref 10–45)
AMMONIA BLD-MCNC: 42 UMOL/L — SIGNIFICANT CHANGE UP (ref 11–55)
ANION GAP SERPL CALC-SCNC: 9 MMOL/L — SIGNIFICANT CHANGE UP (ref 5–17)
AST SERPL-CCNC: 36 U/L — SIGNIFICANT CHANGE UP (ref 10–40)
BILIRUB SERPL-MCNC: 1.1 MG/DL — SIGNIFICANT CHANGE UP (ref 0.2–1.2)
BUN SERPL-MCNC: 42 MG/DL — HIGH (ref 7–23)
CALCIUM SERPL-MCNC: 7.9 MG/DL — LOW (ref 8.4–10.5)
CHLORIDE SERPL-SCNC: 111 MMOL/L — HIGH (ref 96–108)
CO2 SERPL-SCNC: 25 MMOL/L — SIGNIFICANT CHANGE UP (ref 22–31)
CREAT SERPL-MCNC: 1 MG/DL — SIGNIFICANT CHANGE UP (ref 0.5–1.3)
EGFR: 62 ML/MIN/1.73M2 — SIGNIFICANT CHANGE UP
GAS PNL BLDA: SIGNIFICANT CHANGE UP
GLUCOSE BLDC GLUCOMTR-MCNC: 131 MG/DL — HIGH (ref 70–99)
GLUCOSE BLDC GLUCOMTR-MCNC: 147 MG/DL — HIGH (ref 70–99)
GLUCOSE BLDC GLUCOMTR-MCNC: 169 MG/DL — HIGH (ref 70–99)
GLUCOSE BLDC GLUCOMTR-MCNC: 177 MG/DL — HIGH (ref 70–99)
GLUCOSE BLDC GLUCOMTR-MCNC: 198 MG/DL — HIGH (ref 70–99)
GLUCOSE SERPL-MCNC: 175 MG/DL — HIGH (ref 70–99)
HCT VFR BLD CALC: 26.4 % — LOW (ref 34.5–45)
HCT VFR BLD CALC: 28.2 % — LOW (ref 34.5–45)
HGB BLD-MCNC: 8.5 G/DL — LOW (ref 11.5–15.5)
HGB BLD-MCNC: 9.4 G/DL — LOW (ref 11.5–15.5)
INR BLD: 1.35 RATIO — HIGH (ref 0.88–1.16)
MAGNESIUM SERPL-MCNC: 1.9 MG/DL — SIGNIFICANT CHANGE UP (ref 1.6–2.6)
MCHC RBC-ENTMCNC: 31 PG — SIGNIFICANT CHANGE UP (ref 27–34)
MCHC RBC-ENTMCNC: 31.6 PG — SIGNIFICANT CHANGE UP (ref 27–34)
MCHC RBC-ENTMCNC: 32.2 GM/DL — SIGNIFICANT CHANGE UP (ref 32–36)
MCHC RBC-ENTMCNC: 33.3 GM/DL — SIGNIFICANT CHANGE UP (ref 32–36)
MCV RBC AUTO: 94.9 FL — SIGNIFICANT CHANGE UP (ref 80–100)
MCV RBC AUTO: 96.4 FL — SIGNIFICANT CHANGE UP (ref 80–100)
NRBC # BLD: 0 /100 WBCS — SIGNIFICANT CHANGE UP (ref 0–0)
NRBC # BLD: 0 /100 WBCS — SIGNIFICANT CHANGE UP (ref 0–0)
PHOSPHATE SERPL-MCNC: 2.9 MG/DL — SIGNIFICANT CHANGE UP (ref 2.5–4.5)
PLATELET # BLD AUTO: 29 K/UL — LOW (ref 150–400)
PLATELET # BLD AUTO: 32 K/UL — LOW (ref 150–400)
POTASSIUM SERPL-MCNC: 4 MMOL/L — SIGNIFICANT CHANGE UP (ref 3.5–5.3)
POTASSIUM SERPL-SCNC: 4 MMOL/L — SIGNIFICANT CHANGE UP (ref 3.5–5.3)
PROT SERPL-MCNC: 5.1 G/DL — LOW (ref 6–8.3)
PROTHROM AB SERPL-ACNC: 15.6 SEC — HIGH (ref 10.5–13.4)
RBC # BLD: 2.74 M/UL — LOW (ref 3.8–5.2)
RBC # BLD: 2.97 M/UL — LOW (ref 3.8–5.2)
RBC # FLD: 16.6 % — HIGH (ref 10.3–14.5)
RBC # FLD: 16.8 % — HIGH (ref 10.3–14.5)
SODIUM SERPL-SCNC: 145 MMOL/L — SIGNIFICANT CHANGE UP (ref 135–145)
WBC # BLD: 2.7 K/UL — LOW (ref 3.8–10.5)
WBC # BLD: 3.69 K/UL — LOW (ref 3.8–10.5)
WBC # FLD AUTO: 2.7 K/UL — LOW (ref 3.8–10.5)
WBC # FLD AUTO: 3.69 K/UL — LOW (ref 3.8–10.5)

## 2022-12-27 PROCEDURE — 99221 1ST HOSP IP/OBS SF/LOW 40: CPT

## 2022-12-27 PROCEDURE — 93010 ELECTROCARDIOGRAM REPORT: CPT

## 2022-12-27 PROCEDURE — 99291 CRITICAL CARE FIRST HOUR: CPT

## 2022-12-27 RX ORDER — LACTULOSE 10 G/15ML
20 SOLUTION ORAL THREE TIMES A DAY
Refills: 0 | Status: DISCONTINUED | OUTPATIENT
Start: 2022-12-27 | End: 2022-12-30

## 2022-12-27 RX ORDER — ACETAMINOPHEN 500 MG
650 TABLET ORAL ONCE
Refills: 0 | Status: COMPLETED | OUTPATIENT
Start: 2022-12-27 | End: 2022-12-27

## 2022-12-27 RX ADMIN — Medication 650 MILLIGRAM(S): at 17:33

## 2022-12-27 RX ADMIN — CEFTRIAXONE 100 MILLIGRAM(S): 500 INJECTION, POWDER, FOR SOLUTION INTRAMUSCULAR; INTRAVENOUS at 19:59

## 2022-12-27 RX ADMIN — Medication 650 MILLIGRAM(S): at 16:49

## 2022-12-27 RX ADMIN — FENTANYL CITRATE 50 MICROGRAM(S): 50 INJECTION INTRAVENOUS at 06:31

## 2022-12-27 RX ADMIN — LACTULOSE 20 GRAM(S): 10 SOLUTION ORAL at 17:28

## 2022-12-27 RX ADMIN — PANTOPRAZOLE SODIUM 40 MILLIGRAM(S): 20 TABLET, DELAYED RELEASE ORAL at 13:00

## 2022-12-27 RX ADMIN — LACTULOSE 200 GRAM(S): 10 SOLUTION ORAL at 06:02

## 2022-12-27 RX ADMIN — Medication 650 MILLIGRAM(S): at 23:50

## 2022-12-27 RX ADMIN — Medication 1: at 00:28

## 2022-12-27 RX ADMIN — OCTREOTIDE ACETATE 10 MICROGRAM(S)/HR: 200 INJECTION, SOLUTION INTRAVENOUS; SUBCUTANEOUS at 20:00

## 2022-12-27 RX ADMIN — Medication 1: at 17:26

## 2022-12-27 RX ADMIN — CHLORHEXIDINE GLUCONATE 15 MILLILITER(S): 213 SOLUTION TOPICAL at 05:27

## 2022-12-27 RX ADMIN — FENTANYL CITRATE 50 MICROGRAM(S): 50 INJECTION INTRAVENOUS at 06:45

## 2022-12-27 RX ADMIN — FENTANYL CITRATE 50 MICROGRAM(S): 50 INJECTION INTRAVENOUS at 01:56

## 2022-12-27 RX ADMIN — Medication 650 MILLIGRAM(S): at 22:50

## 2022-12-27 RX ADMIN — CHLORHEXIDINE GLUCONATE 1 APPLICATION(S): 213 SOLUTION TOPICAL at 06:02

## 2022-12-27 RX ADMIN — SERTRALINE 50 MILLIGRAM(S): 25 TABLET, FILM COATED ORAL at 17:28

## 2022-12-27 RX ADMIN — FENTANYL CITRATE 50 MICROGRAM(S): 50 INJECTION INTRAVENOUS at 01:47

## 2022-12-27 RX ADMIN — LACTULOSE 20 GRAM(S): 10 SOLUTION ORAL at 21:07

## 2022-12-27 NOTE — PROGRESS NOTE ADULT - ATTENDING COMMENTS
Agree with above  IVEY cirrhosis with hematemesis now s/p endoscopy, found varix at GE junction now s/p banding  Hemostasis achieved, Hgb stable  Remains on octreotide  No enteral access 24–48 hours  Did well on SBT and was successfully extubated after rounds  Advance to full liquid diet    I have personally provided 45 minutes of critical care time.

## 2022-12-27 NOTE — AIRWAY REMOVAL NOTE  ADULT & PEDS - ARTIFICAL AIRWAY REMOVAL COMMENTS
written order for extubation verified. The patient was identified by full name and birth date compared to the identification band.  Present during the procedure was LAURI Cheng

## 2022-12-27 NOTE — PROGRESS NOTE ADULT - SUBJECTIVE AND OBJECTIVE BOX
IVEY cirrhosis   From [facility]  EV at GE junction s/p banding 12/27  Low grade fever, hypotension   Received 5 units of packed cell   On octreotide drip   Good family support   Dr. Carranza and Dr. Martinez

## 2022-12-27 NOTE — PROGRESS NOTE ADULT - ATTENDING COMMENTS
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 The patient was intubated for airway protection and underwent evaluation with EGD, with large amount of blood at the gastric fundus.       #decompensated IVEY cirrhosis - MELD 12 (12/26)   #Variceal bleed   Patient s/p EGD at outside hospital with poor visualization due to active bleeding. Patient s/p 5U pRBC with plts and FFP.  - Multiphase CT abdomen/pelvis (12/24) reviewed, with portosystemic collaterals seen and no active extravasation    - ascites: trace ascites on CT 12/24   - HCC: CT without liver lesions (12/24)   - HE: none  - Varices: gastric and esophageal varices, s/p EGD on 12/23 with one esophageal band       Recommendations:   - continue with octreotide gtt x 72 hours    - continue with ceftriaxone for 7 days total   - stop PPI gtt, transition to IV PPI 40mg daily   - if passes bedside swallow assessment, can give PO lactulose + rifaximin  - will need repeat EGD in 2 weeks to assess for eradication of varices (1/9)   - will likely initiate liver transplant workup when patient becomes more clinically stable   - rest of care per primary team  - full liquid diet as tolerated

## 2022-12-27 NOTE — PROGRESS NOTE ADULT - ASSESSMENT
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 The patient was intubated for airway protection and underwent evaluation with EGD, with large amount of blood at the gastric fundus.     #IVEY cirrhosis   #decompensated liver cirrhosis   #Variceal bleed   Patient s/p EGD at outside hospital with poor visualization due to active bleeding. Patient s/p 5U pRBC with plts and FFP.  - Multiphase CT abdomen/pelvis (12/24) reviewed, with portosystemic collaterals seen and no active extravasation    - ascites: trace ascites on CT 12/24   - HCC: CT without liver lesions (12/24)   - HE: none  - Varices: gastric and esophageal varices, s/p EGD on 12/23 with one esophageal band   - MELD 12 (12/26)     Recommendations:   - continue with octreotide gtt x 72 hours    - continue with ceftriaxone for 7 days total   - stop PPI gtt, transition to IV PPI 40mg daily   - okay to place NGT for TF and lactulose   - will need repeat EGD in 2 weeks to assess for eradication of varices (1/9)   - will likely initiate liver transplant workup when patient becomes more clinically stable   - rest of care per primary team    All recommendations are tentative until note is attested by attending.     Divina Salazar, PGY-4   Gastroenterology/Hepatology Fellow  Available on Microsoft Teams  [phone] ([employer] Short Range Pager)  [phone] ([facility] Long Range Pager)    After 5pm, please contact the on-call GI fellow. 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 The patient was intubated for airway protection and underwent evaluation with EGD, with large amount of blood at the gastric fundus.       #decompensated IVEY cirrhosis - MELD 12 (12/26)   #Variceal bleed   Patient s/p EGD at outside hospital with poor visualization due to active bleeding. Patient s/p 5U pRBC with plts and FFP.  - Multiphase CT abdomen/pelvis (12/24) reviewed, with portosystemic collaterals seen and no active extravasation    - ascites: trace ascites on CT 12/24   - HCC: CT without liver lesions (12/24)   - HE: none  - Varices: gastric and esophageal varices, s/p EGD on 12/23 with one esophageal band       Recommendations:   - continue with octreotide gtt x 72 hours    - continue with ceftriaxone for 7 days total   - stop PPI gtt, transition to IV PPI 40mg daily   - if passes bedside swallow assessment, can give PO lactulose + rifaximin  - will need repeat EGD in 2 weeks to assess for eradication of varices (1/9)   - will likely initiate liver transplant workup when patient becomes more clinically stable   - rest of care per primary team  - full liquid diet as tolerated    All recommendations are tentative until note is attested by attending.     Divina Salazar, PGY-4   Gastroenterology/Hepatology Fellow  Available on [employer] Teams  [phone] ([employer] Short Range Pager)  [phone] ([facility] Long Range Pager)    After 5pm, please contact the on-call GI fellow. [phone]

## 2022-12-27 NOTE — PROGRESS NOTE ADULT - SUBJECTIVE AND OBJECTIVE BOX
INTERVAL HPI/OVERNIGHT EVENTS:    SUBJECTIVE: Patient seen and examined at bedside.       VITAL SIGNS:  ICU Vital Signs Last 24 Hrs  T(C): 37.7 (27 Dec 2022 04:00), Max: 37.7 (27 Dec 2022 04:00)  T(F): 99.9 (27 Dec 2022 04:00), Max: 99.9 (27 Dec 2022 04:00)  HR: 63 (27 Dec 2022 06:45) (48 - 69)  BP: 117/59 (27 Dec 2022 06:45) (83/46 - 165/74)  BP(mean): 85 (27 Dec 2022 06:45) (62 - 107)  ABP: --  ABP(mean): --  RR: 16 (27 Dec 2022 06:45) (11 - 44)  SpO2: 100% (27 Dec 2022 06:45) (97% - 100%)    O2 Parameters below as of 26 Dec 2022 20:00  Patient On (Oxygen Delivery Method): ventilator    O2 Concentration (%): 30      Mode: AC/ CMV (Assist Control/ Continuous Mandatory Ventilation), RR (machine): 16, TV (machine): 400, FiO2: 30, PEEP: 5, ITime: 1, MAP: 9, PIP: 19  Plateau pressure:   P/F ratio:     12-26 @ 07:01  -  12-27 @ 07:00  --------------------------------------------------------  IN: 622.5 mL / OUT: 835 mL / NET: -212.5 mL      CAPILLARY BLOOD GLUCOSE      POCT Blood Glucose.: 147 mg/dL (27 Dec 2022 05:29)    ECG:    PHYSICAL EXAM:    General:   HEENT:   Neck:   Respiratory:   Cardiovascular:   Abdomen:   Extremities:  Neurological:    MEDICATIONS:  MEDICATIONS  (STANDING):  cefTRIAXone   IVPB      cefTRIAXone   IVPB 2000 milliGRAM(s) IV Intermittent every 24 hours  chlorhexidine 0.12% Liquid 15 milliLiter(s) Oral Mucosa every 12 hours  chlorhexidine 4% Liquid 1 Application(s) Topical <User Schedule>  dextrose 5%. 1000 milliLiter(s) (100 mL/Hr) IV Continuous <Continuous>  dextrose 5%. 1000 milliLiter(s) (50 mL/Hr) IV Continuous <Continuous>  dextrose 50% Injectable 25 Gram(s) IV Push once  dextrose 50% Injectable 12.5 Gram(s) IV Push once  dextrose 50% Injectable 25 Gram(s) IV Push once  glucagon  Injectable 1 milliGRAM(s) IntraMuscular once  insulin lispro (ADMELOG) corrective regimen sliding scale   SubCutaneous every 6 hours  lactulose Retention Enema 200 Gram(s) Rectal every 8 hours  norepinephrine Infusion 0.05 MICROgram(s)/kG/Min (5.98 mL/Hr) IV Continuous <Continuous>  octreotide  Infusion 50 MICROgram(s)/Hr (10 mL/Hr) IV Continuous <Continuous>  pantoprazole  Injectable 40 milliGRAM(s) IV Push daily  propofol Infusion 10 MICROgram(s)/kG/Min (3.53 mL/Hr) IV Continuous <Continuous>  rifAXIMin 550 milliGRAM(s) Oral two times a day  sertraline 50 milliGRAM(s) Oral daily    MEDICATIONS  (PRN):  dextrose Oral Gel 15 Gram(s) Oral once PRN Blood Glucose LESS THAN 70 milliGRAM(s)/deciliter  fentaNYL    Injectable 50 MICROGram(s) IV Push every 4 hours PRN Moderate Pain (4 - 6)      ALLERGIES:  Allergies    No Known Drug Allergies  shellfish (Unknown)    Intolerances        LABS:                        9.4    3.69  )-----------( 32       ( 27 Dec 2022 03:58 )             28.2     12-27    145  |  111<H>  |  42<H>  ----------------------------<  175<H>  4.0   |  25  |  1.00    Ca    7.9<L>      27 Dec 2022 00:20  Phos  2.9     12-27  Mg     1.9     12-27    TPro  5.1<L>  /  Alb  3.0<L>  /  TBili  1.1  /  DBili  x   /  AST  36  /  ALT  13  /  AlkPhos  55  12-27    PT/INR - ( 27 Dec 2022 00:20 )   PT: 15.6 sec;   INR: 1.35 ratio         PTT - ( 26 Dec 2022 00:25 )  PTT:30.6 sec      RADIOLOGY & ADDITIONAL TESTS: Reviewed.   INTERVAL HPI/OVERNIGHT EVENTS: No major overnight events per overnight provider and nursing. Pt was weaned off levophed yesterday. Had some pushes of fentanyl overnight.    SUBJECTIVE: Patient seen and examined at bedside.       VITAL SIGNS:  ICU Vital Signs Last 24 Hrs  T(C): 37.7 (27 Dec 2022 04:00), Max: 37.7 (27 Dec 2022 04:00)  T(F): 99.9 (27 Dec 2022 04:00), Max: 99.9 (27 Dec 2022 04:00)  HR: 63 (27 Dec 2022 06:45) (48 - 69)  BP: 117/59 (27 Dec 2022 06:45) (83/46 - 165/74)  BP(mean): 85 (27 Dec 2022 06:45) (62 - 107)  ABP: --  ABP(mean): --  RR: 16 (27 Dec 2022 06:45) (11 - 44)  SpO2: 100% (27 Dec 2022 06:45) (97% - 100%)    O2 Parameters below as of 26 Dec 2022 20:00  Patient On (Oxygen Delivery Method): ventilator    O2 Concentration (%): 30      Mode: AC/ CMV (Assist Control/ Continuous Mandatory Ventilation), RR (machine): 16, TV (machine): 400, FiO2: 30, PEEP: 5, ITime: 1, MAP: 9, PIP: 19  Plateau pressure:   P/F ratio:     12-26 @ 07:01  -  12-27 @ 07:00  --------------------------------------------------------  IN: 622.5 mL / OUT: 835 mL / NET: -212.5 mL      CAPILLARY BLOOD GLUCOSE      POCT Blood Glucose.: 147 mg/dL (27 Dec 2022 05:29)    ECG:    PHYSICAL EXAM:    General: NAD; sedated and intubated.  HEENT: NC/AT; PERRL, clear conjunctiva  Neck: supple  Respiratory: CTA b/l  Cardiovascular: +S1/S2; RRR  Abdomen: soft, NT/ND; +BS x4  Extremities: WWP, 2+ peripheral pulses b/l; no LE edema  Skin: normal color and turgor; no rash  Neurological: Arousable, opens eyes. Responsive to pain and light to eyes.    MEDICATIONS:  MEDICATIONS  (STANDING):  cefTRIAXone   IVPB      cefTRIAXone   IVPB 2000 milliGRAM(s) IV Intermittent every 24 hours  chlorhexidine 0.12% Liquid 15 milliLiter(s) Oral Mucosa every 12 hours  chlorhexidine 4% Liquid 1 Application(s) Topical <User Schedule>  dextrose 5%. 1000 milliLiter(s) (100 mL/Hr) IV Continuous <Continuous>  dextrose 5%. 1000 milliLiter(s) (50 mL/Hr) IV Continuous <Continuous>  dextrose 50% Injectable 25 Gram(s) IV Push once  dextrose 50% Injectable 12.5 Gram(s) IV Push once  dextrose 50% Injectable 25 Gram(s) IV Push once  glucagon  Injectable 1 milliGRAM(s) IntraMuscular once  insulin lispro (ADMELOG) corrective regimen sliding scale   SubCutaneous every 6 hours  lactulose Retention Enema 200 Gram(s) Rectal every 8 hours  norepinephrine Infusion 0.05 MICROgram(s)/kG/Min (5.98 mL/Hr) IV Continuous <Continuous>  octreotide  Infusion 50 MICROgram(s)/Hr (10 mL/Hr) IV Continuous <Continuous>  pantoprazole  Injectable 40 milliGRAM(s) IV Push daily  propofol Infusion 10 MICROgram(s)/kG/Min (3.53 mL/Hr) IV Continuous <Continuous>  rifAXIMin 550 milliGRAM(s) Oral two times a day  sertraline 50 milliGRAM(s) Oral daily    MEDICATIONS  (PRN):  dextrose Oral Gel 15 Gram(s) Oral once PRN Blood Glucose LESS THAN 70 milliGRAM(s)/deciliter  fentaNYL    Injectable 50 MICROGram(s) IV Push every 4 hours PRN Moderate Pain (4 - 6)      ALLERGIES:  Allergies    No Known Drug Allergies  shellfish (Unknown)    Intolerances        LABS:                        9.4    3.69  )-----------( 32       ( 27 Dec 2022 03:58 )             28.2     12-27    145  |  111<H>  |  42<H>  ----------------------------<  175<H>  4.0   |  25  |  1.00    Ca    7.9<L>      27 Dec 2022 00:20  Phos  2.9     12-27  Mg     1.9     12-27    TPro  5.1<L>  /  Alb  3.0<L>  /  TBili  1.1  /  DBili  x   /  AST  36  /  ALT  13  /  AlkPhos  55  12-27    PT/INR - ( 27 Dec 2022 00:20 )   PT: 15.6 sec;   INR: 1.35 ratio         PTT - ( 26 Dec 2022 00:25 )  PTT:30.6 sec      RADIOLOGY & ADDITIONAL TESTS: Reviewed. INTERVAL HPI/OVERNIGHT EVENTS: No major overnight events per overnight provider and nursing. Pt was weaned off levophed yesterday. Had some pushes of fentanyl overnight.    SUBJECTIVE: Patient seen and examined at bedside.       VITAL SIGNS:  ICU Vital Signs Last 24 Hrs  T(C): 37.7 (27 Dec 2022 04:00), Max: 37.7 (27 Dec 2022 04:00)  T(F): 99.9 (27 Dec 2022 04:00), Max: 99.9 (27 Dec 2022 04:00)  HR: 63 (27 Dec 2022 06:45) (48 - 69)  BP: 117/59 (27 Dec 2022 06:45) (83/46 - 165/74)  BP(mean): 85 (27 Dec 2022 06:45) (62 - 107)  ABP: --  ABP(mean): --  RR: 16 (27 Dec 2022 06:45) (11 - 44)  SpO2: 100% (27 Dec 2022 06:45) (97% - 100%)    O2 Parameters below as of 26 Dec 2022 20:00  Patient On (Oxygen Delivery Method): ventilator    O2 Concentration (%): 30      Mode: AC/ CMV (Assist Control/ Continuous Mandatory Ventilation), RR (machine): 16, TV (machine): 400, FiO2: 30, PEEP: 5, ITime: 1, MAP: 9, PIP: 19  Plateau pressure:   P/F ratio:     12-26 @ 07:01  -  12-27 @ 07:00  --------------------------------------------------------  IN: 622.5 mL / OUT: 835 mL / NET: -212.5 mL      CAPILLARY BLOOD GLUCOSE      POCT Blood Glucose.: 147 mg/dL (27 Dec 2022 05:29)    ECG:    PHYSICAL EXAM:    General: NAD; sedated and intubated.  HEENT: NC/AT; PERRL, clear conjunctiva  Neck: supple  Respiratory: CTA b/l  Cardiovascular: +S1/S2; RRR  Abdomen: soft, NT/ND; +BS x4  Extremities: WWP, 2+ peripheral pulses b/l; no LE edema  Skin: normal color and turgor; no rash  Neurological: Arousable, opens eyes. Responsive to pain and light to eyes.    MEDICATIONS:  MEDICATIONS  (STANDING):  cefTRIAXone   IVPB      cefTRIAXone   IVPB 2000 milliGRAM(s) IV Intermittent every 24 hours  chlorhexidine 0.12% Liquid 15 milliLiter(s) Oral Mucosa every 12 hours  chlorhexidine 4% Liquid 1 Application(s) Topical <User Schedule>  dextrose 5%. 1000 milliLiter(s) (100 mL/Hr) IV Continuous <Continuous>  dextrose 5%. 1000 milliLiter(s) (50 mL/Hr) IV Continuous <Continuous>  dextrose 50% Injectable 25 Gram(s) IV Push once  dextrose 50% Injectable 12.5 Gram(s) IV Push once  dextrose 50% Injectable 25 Gram(s) IV Push once  glucagon  Injectable 1 milliGRAM(s) IntraMuscular once  insulin lispro (ADMELOG) corrective regimen sliding scale   SubCutaneous every 6 hours  lactulose Retention Enema 200 Gram(s) Rectal every 8 hours  norepinephrine Infusion 0.05 MICROgram(s)/kG/Min (5.98 mL/Hr) IV Continuous <Continuous>  octreotide  Infusion 50 MICROgram(s)/Hr (10 mL/Hr) IV Continuous <Continuous>  pantoprazole  Injectable 40 milliGRAM(s) IV Push daily  propofol Infusion 10 MICROgram(s)/kG/Min (3.53 mL/Hr) IV Continuous <Continuous>  rifAXIMin 550 milliGRAM(s) Oral two times a day  sertraline 50 milliGRAM(s) Oral daily    MEDICATIONS  (PRN):  dextrose Oral Gel 15 Gram(s) Oral once PRN Blood Glucose LESS THAN 70 milliGRAM(s)/deciliter  fentaNYL    Injectable 50 MICROGram(s) IV Push every 4 hours PRN Moderate Pain (4 - 6)      ALLERGIES:  Allergies    No Known Drug Allergies  shellfish (Unknown)    Intolerances        LABS:                        9.4    3.69  )-----------( 32       ( 27 Dec 2022 03:58 )             28.2     12-27    145  |  111<H>  |  42<H>  ----------------------------<  175<H>  4.0   |  25  |  1.00    Ca    7.9<L>      27 Dec 2022 00:20  Phos  2.9     12-27  Mg     1.9     12-27    TPro  5.1<L>  /  Alb  3.0<L>  /  TBili  1.1  /  DBili  x   /  AST  36  /  ALT  13  /  AlkPhos  55  12-27    PT/INR - ( 27 Dec 2022 00:20 )   PT: 15.6 sec;   INR: 1.35 ratio         PTT - ( 26 Dec 2022 00:25 )  PTT:30.6 sec      RADIOLOGY & ADDITIONAL TESTS: Reviewed.

## 2022-12-27 NOTE — CONSULT NOTE ADULT - ASSESSMENT
Impression:    Sacral/bilateral Buttocks deep tissue injury present on admission  Incontinence of bowel and bladder  Incontinence Dermatitis    Recommend:  1.) topical therapy: sacral/buttock injury - cleanse with incontinence cleanser, pat dry, apply Triad ointment BID and PRN for incontinent episodes  2.) Incontinence Management - incontinence cleanser, pads, pericare BID  3.) Maintain on an alternating air with low air loss surface  4.) Turn and reposition Q 2 hours  5.) Nutrition optimization  6.) Offload heels/feet with complete cair air fluidized boots; ensure that the soles of the feet are not resting on the foot board of the bed.    Care as per medicine. Will not actively follow but will remain available. Please recall for new issues or deterioration.  Upon [facility] [address] [place] [address] [phone]  Thank you for this consult  Seen and discussed with clinical nurse  Tahira Enrique, SANDRA-C, CWOCN [id number]

## 2022-12-27 NOTE — CONSULT NOTE ADULT - SUBJECTIVE AND OBJECTIVE BOX
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 The patient was found to have an acute drop in hgb from 11 to 5.6 and was hypotensive to an SBP of 80s, but responded to resuscitation with IV fluids and blood products. The patient was intubated for airway protection and underwent evaluation with EGD, which revealed large amount of blood at the gastric fundus. Patient receiving 5th pRBC but without pressor requirement. The patient was transferred to the [facility] MICU for further management and consideration of TIPS procedure. Patient unable to provide history as she is intubated.     Allergies:  No Known Drug Allergies  shellfish (Unknown)      Home Medications:    Hospital Medications:  chlorhexidine 0.12% Liquid 15 milliLiter(s) Oral Mucosa every 12 hours  chlorhexidine 4% Liquid 1 Application(s) Topical <User Schedule>  octreotide  Infusion 50 MICROgram(s)/Hr IV Continuous <Continuous>  pantoprazole Infusion 8 mG/Hr IV Continuous <Continuous>      PMHX/PSHX:      Family history:      Denies family history of colon cancer/polyps, stomach cancer/polyps, pancreatic cancer/masses, liver cancer/disease, ovarian cancer and endometrial cancer.    Social History:   Tob: Denies  EtOH: Denies  Illicit Drugs: Denies    ROS:     General:  No wt loss, fevers, chills, night sweats, fatigue  Eyes:  Good vision, no reported pain  ENT:  No sore throat, pain, runny nose, dysphagia  CV:  No pain, palpitations, hypo/hypertension  Pulm:  No dyspnea, cough, tachypnea, wheezing  GI:  see HPI  :  No pain, bleeding, incontinence, nocturia  Muscle:  No pain, weakness  Neuro:  No weakness, tingling, memory problems  Psych:  No fatigue, insomnia, mood problems, depression  Endocrine:  No polyuria, polydipsia, cold/heat intolerance  Heme:  No petechiae, ecchymosis, easy bruisability  Skin:  No rash, tattoos, scars, edema    PHYSICAL EXAM:     GENERAL: Patient is intubated, appears critically ill   HEENT:  NCAT, no scleral icterus   CHEST: patient on ventilator   HEART:  Regular rate and rhythm  ABDOMEN:  Soft, non-tender, distended, no masses  EXTREMITIES: No edema  SKIN:  No rash/erythema/ecchymoses/petechiae/wounds/abscess/warm/dry  NEURO:  Alert and oriented x 0, sedated     Vital Signs:  Vital Signs Last 24 Hrs  T(C): 37.9 (24 Dec 2022 20:03), Max: 37.9 (24 Dec 2022 20:03)  T(F): 100.2 (24 Dec 2022 20:03), Max: 100.2 (24 Dec 2022 20:03)  HR: 82 (24 Dec 2022 20:03) (82 - 82)  BP: 140/63 (24 Dec 2022 20:03) (140/63 - 140/63)  BP(mean): 90 (24 Dec 2022 20:03) (90 - 90)  RR: 20 (24 Dec 2022 20:03) (20 - 20)  SpO2: 100% (24 Dec 2022 20:03) (100% - 100%)    Parameters below as of 24 Dec 2022 20:03  Patient On (Oxygen Delivery Method): ventilator    O2 Concentration (%): 30  Daily     Daily     LABS:                        Imaging:          
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 Per patient's , patient experiencing hematemesis and dark stools for 24 hours. Patient then fell when getting out of bed, prompting them to come to the ED. Denies asterixes, confusion/, jaundice or any skin changes, weight loss/ gain, changes in urination or bowel movements, abdominal pain orrecent illness. In the ED at [facility], the patient was found to have an acute drop in hgb from 11 to 5.6 and was hypotensive to an SBP of 80s, but initially responded to resuscitation with  blood products- 5 U pRBCs, 3 FFP, 1 PLT. Did not require any pressure support with pressors. Patient was intubated for airway protection in the setting of an acute UGIB and underwent evaluation with EGD, which revealed large amount of blood at the gastric fundus, portal hypertensive gastropathy, normal D1 and D2 in the duodenum and small distal esophageal varices with no sigmata of recent bleeding. The patient was transferred to the [facility] MICU for further management and consideration of TIPS procedure.    Of note, patient also got 2 staples for an occipital laceration s/p syncopal episode. CTH/C spine showed no evidence of acute fracture or bleed.     At [facility] MICU, patient VSS WNL, intubated and sedated on fentanyl and propofol. Continued on octreotide and PPI gtt.  (24 Dec 2022 20:05)    Request for wound care consult for sacral/bilateral buttocks skin discoloration received from nursing. Ms. Dudley was intubated and vented but alert. she could only respond by shaking her head yes or no. She indicated that she does not have any chronic wounds or any wounds that she is aware of. She indicated that her sacrum/buttocks does not cause her pain or discomfort. She indicated that she usually is able to walk and use the bathroom when she needs to without incontinent episodes. However, she is not able to do so now because she is connected to machines and tubes. She appeared weak and needed assistance to turn for examination of her buttocks/sacrum. Principles of offloading and turning and repositioning were reviewed with her. Her extreme immobility, inactivity, current incontinence of urine and stool as well as poor nutritional status all contribute to her high risk for pressure injury development and hinder healing. Identification of the initial signs of deep tissue damage at the level of the skin within 72 hours of admission make this an injury that occurred prior to admission.    PAST MEDICAL & SURGICAL HISTORY:  DM  Chronic thrombocytopenia  HTN  Cirrhosis 2/2 IVEY  HLD  CKD  anema    REVIEW OF SYSTEMS  CONSTITUTIONAL: No fever  EYES: No eye pain, visual disturbances, or discharge  ENMT:  No difficulty hearing, tinnitus  NECK: No pain or stiffness  RESPIRATORY: No cough, wheezing,  CARDIOVASCULAR: No chest pain, palpitations, + syncope  GASTROINTESTINAL:  No nausea, vomiting, diarrhea or constipation. + melena, +hematemesis   GENITOURINARY: No dysuria, hematuria  NEUROLOGICAL: No stroke like symptoms  SKIN: No burning, no chronic wounds  ENDOCRINE: No heat or cold intolerance  MUSCULOSKELETAL: No joint pain or swelling  PSYCHIATRIC: No  anxiety, mood swings  HEME/LYMPH: h/o chronic thrombocytopenia, + recent bleeding  ALLERGY AND IMMUNOLOGIC: No hives or eczema	    All other ROS negative  MEDICATIONS  (STANDING):  cefTRIAXone   IVPB      cefTRIAXone   IVPB 2000 milliGRAM(s) IV Intermittent every 24 hours  chlorhexidine 4% Liquid 1 Application(s) Topical <User Schedule>  dextrose 5%. 1000 milliLiter(s) (100 mL/Hr) IV Continuous <Continuous>  dextrose 5%. 1000 milliLiter(s) (50 mL/Hr) IV Continuous <Continuous>  dextrose 50% Injectable 25 Gram(s) IV Push once  dextrose 50% Injectable 12.5 Gram(s) IV Push once  dextrose 50% Injectable 25 Gram(s) IV Push once  glucagon  Injectable 1 milliGRAM(s) IntraMuscular once  insulin lispro (ADMELOG) corrective regimen sliding scale   SubCutaneous every 6 hours  lactulose Retention Enema 200 Gram(s) Rectal every 8 hours  octreotide  Infusion 50 MICROgram(s)/Hr (10 mL/Hr) IV Continuous <Continuous>  pantoprazole  Injectable 40 milliGRAM(s) IV Push daily  rifAXIMin 550 milliGRAM(s) Oral two times a day  sertraline 50 milliGRAM(s) Oral daily    MEDICATIONS  (PRN):  dextrose Oral Gel 15 Gram(s) Oral once PRN Blood Glucose LESS THAN 70 milliGRAM(s)/deciliter  fentaNYL    Injectable 50 MICROGram(s) IV Push every 4 hours PRN Moderate Pain (4 - 6)    Allergies    No Known Drug Allergies  shellfish (Unknown)    Intolerances    SOCIAL HISTORY:  , per chart review, patient can not answer due to intubation    FAMILY HISTORY: no pertinent family history among first degree relatives    Vital Signs Last 24 Hrs  T(C): 37.4 (27 Dec 2022 11:00), Max: 37.7 (27 Dec 2022 04:00)  T(F): 99.3 (27 Dec 2022 11:00), Max: 99.9 (27 Dec 2022 04:00)  HR: 73 (27 Dec 2022 14:00) (48 - 75)  BP: 117/58 (27 Dec 2022 14:00) (86/49 - 165/74)  BP(mean): 83 (27 Dec 2022 14:00) (65 - 107)  RR: 30 (27 Dec 2022 14:00) (11 - 41)  SpO2: 100% (27 Dec 2022 14:00) (97% - 100%)    Parameters below as of 27 Dec 2022 11:41    O2 Concentration (%): 35    Physical Exam:  General: Alert, thin  Ophthamology: sclera clear  ENMT: moist mucous membranes, trachea midline  Respiratory: equal chest rise with respirations, intubated, vented  Gastrointestinal: soft NT/ND  Neurology: nonverbal, not following commands  Psych: calm, appropriate  Musculoskeletal: no contractures  Vascular: no BLE edema  Skin:  Sacral/bilateral buttocks with deep maroon discolored intact skin in and around the gluteal cleft L 2cm X W 2cm x D none, no drainage  No odor, erythema, increased warmth, tenderness, induration, fluctuance    LABS:  12-27    145  |  111<H>  |  42<H>  ----------------------------<  175<H>  4.0   |  25  |  1.00    Ca    7.9<L>      27 Dec 2022 00:20  Phos  2.9     12-27  Mg     1.9     12-27    TPro  5.1<L>  /  Alb  3.0<L>  /  TBili  1.1  /  DBili  x   /  AST  36  /  ALT  13  /  AlkPhos  55  12-27                          9.4    3.69  )-----------( 32       ( 27 Dec 2022 03:58 )             28.2     PT/INR - ( 27 Dec 2022 00:20 )   PT: 15.6 sec;   INR: 1.35 ratio         PTT - ( 26 Dec 2022 00:25 )  PTT:30.6 sec      
Vascular & Interventional Radiology Consult Note    Evaluate for Procedure: TIPS    HPI: 67y Female with IVEY cirrhosis transferred from OSH from acute GI bleed with suspected source being gastric varices. patient responded appropriately to transfusions and is now requiring low dose pressor support in ICU, however overall remains stable. IR had extensive conversation with family regarding possible TIPS placement. patient has had 2 recent hospitalizations for HE and risk or worsened HE discussed with family members. additionally discussed case in person with hepatology attending Dr. Prado, who feels the patient is high risk for TIPS placement given existing HE. hepatology planning to perform EGD and possible varix embolization.    Allergies:   Medications (Abx/Cardiac/Anticoagulation/Blood Products)    cefTRIAXone   IVPB: 100 mL/Hr IV Intermittent (12-25 @ 00:04)  norepinephrine Infusion: 5.98 mL/Hr IV Continuous (12-25 @ 06:08)    Data:  165.1  63.8  T(C): 38.1  HR: 70  BP: 112/56  RR: 16  SpO2: 100%    -WBC 6.86 / HgB 9.5 / Hct 28.1 / Plt 56  -Na 142 / Cl 106 / BUN 81 / Glucose 292  -K 4.9 / CO2 25 / Cr 1.17  -ALT 12 / Alk Phos 62 / T.Bili 2.6  -INR 1.46 / PTT 30.5      Imaging: CTA reviewed

## 2022-12-27 NOTE — PROGRESS NOTE ADULT - SUBJECTIVE AND OBJECTIVE BOX
Gastroenterology/Hepatology Progress Note    Interval Events:   - off pressors   - no major overnight events     Allergies:  No Known Drug Allergies  shellfish (Unknown)      Hospital Medications:  cefTRIAXone   IVPB      cefTRIAXone   IVPB 2000 milliGRAM(s) IV Intermittent every 24 hours  chlorhexidine 0.12% Liquid 15 milliLiter(s) Oral Mucosa every 12 hours  chlorhexidine 4% Liquid 1 Application(s) Topical <User Schedule>  dextrose 5%. 1000 milliLiter(s) IV Continuous <Continuous>  dextrose 5%. 1000 milliLiter(s) IV Continuous <Continuous>  dextrose 50% Injectable 25 Gram(s) IV Push once  dextrose 50% Injectable 12.5 Gram(s) IV Push once  dextrose 50% Injectable 25 Gram(s) IV Push once  dextrose Oral Gel 15 Gram(s) Oral once PRN  fentaNYL    Injectable 50 MICROGram(s) IV Push every 4 hours PRN  glucagon  Injectable 1 milliGRAM(s) IntraMuscular once  insulin lispro (ADMELOG) corrective regimen sliding scale   SubCutaneous every 6 hours  lactulose Retention Enema 200 Gram(s) Rectal every 8 hours  norepinephrine Infusion 0.05 MICROgram(s)/kG/Min IV Continuous <Continuous>  octreotide  Infusion 50 MICROgram(s)/Hr IV Continuous <Continuous>  pantoprazole  Injectable 40 milliGRAM(s) IV Push daily  propofol Infusion 10 MICROgram(s)/kG/Min IV Continuous <Continuous>  rifAXIMin 550 milliGRAM(s) Oral two times a day  sertraline 50 milliGRAM(s) Oral daily      ROS: 14 point ROS negative unless otherwise state in subjective    PHYSICAL EXAM:   Vital Signs:  Vital Signs Last 24 Hrs  T(C): 37.5 (27 Dec 2022 07:00), Max: 37.7 (27 Dec 2022 04:00)  T(F): 99.5 (27 Dec 2022 07:00), Max: 99.9 (27 Dec 2022 04:00)  HR: 60 (27 Dec 2022 09:00) (48 - 69)  BP: 105/53 (27 Dec 2022 09:00) (83/46 - 165/74)  BP(mean): 77 (27 Dec 2022 09:00) (62 - 107)  RR: 16 (27 Dec 2022 09:00) (11 - 41)  SpO2: 100% (27 Dec 2022 09:00) (97% - 100%)    Parameters below as of 26 Dec 2022 20:00  Patient On (Oxygen Delivery Method): ventilator    O2 Concentration (%): 30  Daily     Daily     GENERAL: Patient is intubated, appears critically ill   HEENT:  NCAT, no scleral icterus   CHEST: patient on ventilator   HEART:  Regular rate and rhythm  ABDOMEN:  Soft, non-tender, distended, no masses  EXTREMITIES: No edema  SKIN:  No rash/erythema/ecchymoses/petechiae/wounds/abscess/warm/dry  NEURO:  Sedated    LABS:                        9.4    3.69  )-----------( 32       ( 27 Dec 2022 03:58 )             28.2     Mean Cell Volume: 94.9 fl (12-27-22 @ 03:58)    12-27    145  |  111<H>  |  42<H>  ----------------------------<  175<H>  4.0   |  25  |  1.00    Ca    7.9<L>      27 Dec 2022 00:20  Phos  2.9     12-27  Mg     1.9     12-27    TPro  5.1<L>  /  Alb  3.0<L>  /  TBili  1.1  /  DBili  x   /  AST  36  /  ALT  13  /  AlkPhos  55  12-27    LIVER FUNCTIONS - ( 27 Dec 2022 00:20 )  Alb: 3.0 g/dL / Pro: 5.1 g/dL / ALK PHOS: 55 U/L / ALT: 13 U/L / AST: 36 U/L / GGT: x           PT/INR - ( 27 Dec 2022 00:20 )   PT: 15.6 sec;   INR: 1.35 ratio         PTT - ( 26 Dec 2022 00:25 )  PTT:30.6 sec    Amylase Serum--      Lipase serum--       [id number]        Imaging:           Gastroenterology/Hepatology Progress Note    Interval Events:   - off pressors   - no major overnight events   - extubated today    Allergies:  No Known Drug Allergies  shellfish (Unknown)      Hospital Medications:  cefTRIAXone   IVPB      cefTRIAXone   IVPB 2000 milliGRAM(s) IV Intermittent every 24 hours  chlorhexidine 0.12% Liquid 15 milliLiter(s) Oral Mucosa every 12 hours  chlorhexidine 4% Liquid 1 Application(s) Topical <User Schedule>  dextrose 5%. 1000 milliLiter(s) IV Continuous <Continuous>  dextrose 5%. 1000 milliLiter(s) IV Continuous <Continuous>  dextrose 50% Injectable 25 Gram(s) IV Push once  dextrose 50% Injectable 12.5 Gram(s) IV Push once  dextrose 50% Injectable 25 Gram(s) IV Push once  dextrose Oral Gel 15 Gram(s) Oral once PRN  fentaNYL    Injectable 50 MICROGram(s) IV Push every 4 hours PRN  glucagon  Injectable 1 milliGRAM(s) IntraMuscular once  insulin lispro (ADMELOG) corrective regimen sliding scale   SubCutaneous every 6 hours  lactulose Retention Enema 200 Gram(s) Rectal every 8 hours  norepinephrine Infusion 0.05 MICROgram(s)/kG/Min IV Continuous <Continuous>  octreotide  Infusion 50 MICROgram(s)/Hr IV Continuous <Continuous>  pantoprazole  Injectable 40 milliGRAM(s) IV Push daily  propofol Infusion 10 MICROgram(s)/kG/Min IV Continuous <Continuous>  rifAXIMin 550 milliGRAM(s) Oral two times a day  sertraline 50 milliGRAM(s) Oral daily      ROS: 14 point ROS negative unless otherwise state in subjective    PHYSICAL EXAM:   Vital Signs:  Vital Signs Last 24 Hrs  T(C): 37.5 (27 Dec 2022 07:00), Max: 37.7 (27 Dec 2022 04:00)  T(F): 99.5 (27 Dec 2022 07:00), Max: 99.9 (27 Dec 2022 04:00)  HR: 60 (27 Dec 2022 09:00) (48 - 69)  BP: 105/53 (27 Dec 2022 09:00) (83/46 - 165/74)  BP(mean): 77 (27 Dec 2022 09:00) (62 - 107)  RR: 16 (27 Dec 2022 09:00) (11 - 41)  SpO2: 100% (27 Dec 2022 09:00) (97% - 100%)    Parameters below as of 26 Dec 2022 20:00  Patient On (Oxygen Delivery Method): ventilator    O2 Concentration (%): 30  Daily     Daily     GENERAL: Patient is intubated, appears critically ill   HEENT:  NCAT, no scleral icterus   CHEST: +NWOB; +NC  HEART:  Regular rate and rhythm  ABDOMEN:  Soft, non-tender, distended, no masses  EXTREMITIES: No edema  SKIN:  No rash/erythema/ecchymoses/petechiae/wounds/abscess/warm/dry  NEURO:  awake, +confused; oriented x2    LABS:                        9.4    3.69  )-----------( 32       ( 27 Dec 2022 03:58 )             28.2     Mean Cell Volume: 94.9 fl (12-27-22 @ 03:58)    12-27    145  |  111<H>  |  42<H>  ----------------------------<  175<H>  4.0   |  25  |  1.00    Ca    7.9<L>      27 Dec 2022 00:20  Phos  2.9     12-27  Mg     1.9     12-27    TPro  5.1<L>  /  Alb  3.0<L>  /  TBili  1.1  /  DBili  x   /  AST  36  /  ALT  13  /  AlkPhos  55  12-27    LIVER FUNCTIONS - ( 27 Dec 2022 00:20 )  Alb: 3.0 g/dL / Pro: 5.1 g/dL / ALK PHOS: 55 U/L / ALT: 13 U/L / AST: 36 U/L / GGT: x           PT/INR - ( 27 Dec 2022 00:20 )   PT: 15.6 sec;   INR: 1.35 ratio         PTT - ( 26 Dec 2022 00:25 )  PTT:30.6 sec    Amylase Serum--      Lipase serum--       [id number]        Imaging: No new abdominal imaging

## 2022-12-27 NOTE — PROGRESS NOTE ADULT - ASSESSMENT
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     ===Neurology===  - AAOx3 at baseline  - Patient with history of recent hepatic encephalopathy per chart review, improved with lactulose  [] Continue with fent and propofol for sedation  - Serum ammonia level was 58.  [] hold home lactulose and rifaximin while NPO. Will restart once bleeding is better controlled.  -Lactulose enemas per hepatology recs    ===Cardiac===   #HTN  [] Maintain MAP >65mmHg with early initiation of vasoactive medications - on levo   [] HTN medications on hold while NPO     ===Respiratory===  # Intubated for airway protection iso active hematemesis  - Vent settings: 400/16/30/5  - AB.45/40/124/28  - No pulmonary issues currently or signs of hepatopulmonary syndrome.    ===Gastrointestinal===   #Upper GI Bleed  - EGD at OSH : large amounts of blood and clots in the gastric fundus, suspicion for gastric varices, visualized small distal esophageal varices  - s/p I U FFP  -s/p EGD : Large junctional varix banded  [] hold NGT or OGT placement for 24h, can place   [] will need repeat EGD in 2 weeks to assess for eradication of varices   [] Discontinue IV PPI gtt and started IV PPI 40mg qd  [] lactulose enemas   [] Continue with Octreotide gtt x 72 hours  [] Continue with aggressive fluid resuscitation with crystalloids and PRBCs prn  [] continue with ceftriaxone 1 g q 24 hours for 7 days   []Hepatology will likely initiate liver transplant workup when patient becomes more clinically stable  [] IR consult for TIPS procedure given active hematemesis- give 1 U FFP before TIPS   - f/u AM CBC and coags before tips   - f/u CT triple phase   -IR discussed case in person with hepatology attending Dr. Prado, who feels the patient is high risk for TIPS placement given existing HE.   -IR remains available for TIPS placement if endoscopy cannot adequately treat varices or if patient has another acute bleeding episode   [] Maintain active T&S, transfuse Hgb > 7  #Cirrhosis  - Reportedly non-EtOH cirrhosis, 2/2 to IVEY  - MELD score 17- 6% estimated 3 month mortality   - no evidence of hepatic encephalopathy on this admission  [] hold home lactulose and rifaximin while NPO  [] Continue with Ceftriaxone 1g IV qd for SBP PPx for a total of 7 days ( - )    ===Renal===  SCr 1.17  - No active issues    ===Infectious Disease===  #SBP PPX  [] Continue with ceftriaxone 1g IV for SBP PPx  for 7 days (-)    ===Endocrine===  #T2DM  [] Start on insulin sliding scale   [] If suboptimal glycemic control, can transition to NPH    ===Hematology===  #Anemia  #Thrombcytopenia  - Chronic anemia and thrombocytopenia iso cirrhosis  [] DVT PPx with SCDs  [] Transfuse to maintain Hgb > 7, PLT > 50k    ===Skin===  #Occipital laceration  - s/p staples  [] remove staples in 7 days    ===Ethics===  Full code   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     ===Neurology===  - AAOx3 at baseline  - Patient with history of recent hepatic encephalopathy per chart review, improved with lactulose  [] Continue with fent and propofol for sedation  - Serum ammonia level was 58.  [] hold home lactulose and rifaximin while NPO. Will restart once bleeding is better controlled.  -Lactulose enemas per hepatology recs  -Continuing home sertraline.  on , 446 on     ===Cardiac===   #HTN  [] Maintain MAP >65mmHg with early initiation of vasoactive medications - on levo   [] HTN medications on hold while NPO     ===Respiratory===  # Intubated for airway protection iso active hematemesis  - Vent settings: 400/16/30/5  - AB.45/40/124/28  - No pulmonary issues currently or signs of hepatopulmonary syndrome.    ===Gastrointestinal===   #Upper GI Bleed  - EGD at OSH : large amounts of blood and clots in the gastric fundus, suspicion for gastric varices, visualized small distal esophageal varices  - s/p I U FFP  -s/p EGD : Large junctional varix banded  [] hold NGT or OGT placement for 24h, can place   [] will need repeat EGD in 2 weeks to assess for eradication of varices   [] Discontinue IV PPI gtt and started IV PPI 40mg qd  [] lactulose enemas   [] Continue with Octreotide gtt x 72 hours  [] Continue with aggressive fluid resuscitation with crystalloids and PRBCs prn  [] continue with ceftriaxone 1 g q 24 hours for 7 days   []Hepatology will likely initiate liver transplant workup when patient becomes more clinically stable  [] IR consult for TIPS procedure given active hematemesis- give 1 U FFP before TIPS   - f/u AM CBC and coags before tips   - f/u CT triple phase   -IR discussed case in person with hepatology attending Dr. Prado, who feels the patient is high risk for TIPS placement given existing HE.   -IR remains available for TIPS placement if endoscopy cannot adequately treat varices or if patient has another acute bleeding episode   [] Maintain active T&S, transfuse Hgb > 7  #Cirrhosis  - Reportedly non-EtOH cirrhosis, 2/2 to IVEY  - MELD score 17- 6% estimated 3 month mortality   - no evidence of hepatic encephalopathy on this admission  [] hold home lactulose and rifaximin while NPO  [] Continue with Ceftriaxone 1g IV qd for SBP PPx for a total of 7 days ( - )    ===Renal===  SCr 1.17  - No active issues    ===Infectious Disease===  #SBP PPX  [] Continue with ceftriaxone 1g IV for SBP PPx  for 7 days (-)    ===Endocrine===  #T2DM  [] Start on insulin sliding scale   [] If suboptimal glycemic control, can transition to NPH    ===Hematology===  #Anemia  #Thrombcytopenia  - Chronic anemia and thrombocytopenia iso cirrhosis  [] DVT PPx with SCDs  [] Transfuse to maintain Hgb > 7, PLT > 50k    ===Skin===  #Occipital laceration  - s/p staples  [] remove staples in 7 days    ===Ethics===  Full code   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     ===Neurology===  - AAOx3 at baseline  - Currently AAOx3  - Patient with history of recent hepatic encephalopathy per chart review, improved with lactulose  - Stopped all sedation today, patient was extubated.   - Serum ammonia level was 58.  -Started home lactulose and rifaximin as patient is now PO, ok'd per GI.  -Discontinued Lactulose enemas and started PO home dose lactulose 20mg tid  -Continuing home sertraline.  on , 446 on .    ===Cardiac===   #HTN  [] Maintain MAP >65mmHg with early initiation of vasoactive medications - on levo   [] HTN medications on hold while NPO     ===Respiratory===  # Intubated for airway protection iso active hematemesis  - Vent settings: 400/16/30/5  - AB.45/40/124/28  - No pulmonary issues currently or signs of hepatopulmonary syndrome.    ===Gastrointestinal===   #Upper GI Bleed  - EGD at OSH : large amounts of blood and clots in the gastric fundus, suspicion for gastric varices, visualized small distal esophageal varices  - s/p I U FFP  -s/p EGD : Large junctional varix banded  [] hold NGT or OGT placement for 24h, can place   [] will need repeat EGD in 2 weeks to assess for eradication of varices   [] Discontinue IV PPI gtt and started IV PPI 40mg qd  [] lactulose enemas   [] Continue with Octreotide gtt x 72 hours  [] Continue with aggressive fluid resuscitation with crystalloids and PRBCs prn  [] continue with ceftriaxone 1 g q 24 hours for 7 days   []Hepatology will likely initiate liver transplant workup when patient becomes more clinically stable  [] IR consult for TIPS procedure given active hematemesis- give 1 U FFP before TIPS   - f/u AM CBC and coags before tips   - f/u CT triple phase   -IR discussed case in person with hepatology attending Dr. Prado, who feels the patient is high risk for TIPS placement given existing HE.   -IR remains available for TIPS placement if endoscopy cannot adequately treat varices or if patient has another acute bleeding episode   [] Maintain active T&S, transfuse Hgb > 7  #Cirrhosis  - Reportedly non-EtOH cirrhosis, 2/2 to IVEY  - MELD score 17- 6% estimated 3 month mortality   - no evidence of hepatic encephalopathy on this admission  [] hold home lactulose and rifaximin while NPO  [] Continue with Ceftriaxone 1g IV qd for SBP PPx for a total of 7 days ( - )    ===Renal===  SCr 1.17  - No active issues    ===Infectious Disease===  #SBP PPX  [] Continue with ceftriaxone 1g IV for SBP PPx  for 7 days (-)    ===Endocrine===  #T2DM  [] Start on insulin sliding scale   [] If suboptimal glycemic control, can transition to NPH    ===Hematology===  #Anemia  #Thrombcytopenia  - Chronic anemia and thrombocytopenia iso cirrhosis  [] DVT PPx with SCDs  [] Transfuse to maintain Hgb > 7, PLT > 50k    ===Skin===  #Occipital laceration  - s/p staples  [] remove staples in 7 days    ===Ethics===  Full code   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     ===Neurology===  - AAOx3 at baseline  - Currently AAOx3  - Patient with history of recent hepatic encephalopathy per chart review, improved with lactulose  - Stopped all sedation today, patient was extubated.   - Serum ammonia level was 58.  -Started home lactulose and rifaximin as patient is now PO, ok'd per GI.  -Discontinued Lactulose enemas and started PO home dose lactulose 20mg tid  -Continuing home sertraline.  on , 446 on .    ===Cardiac===   #HTN  [] Discontinued levophed as pt has been weaned off pressors. Monitor to ensure MAP>65.  [] HTN medications on hold while NPO     ===Respiratory===  # Intubated for airway protection iso active hematemesis  - Vent settings: 400/16/30/5  - AB.45/40/124/28  - No pulmonary issues currently or signs of hepatopulmonary syndrome.    ===Gastrointestinal===   #Upper GI Bleed  - EGD at OSH : large amounts of blood and clots in the gastric fundus, suspicion for gastric varices, visualized small distal esophageal varices  - s/p I U FFP  -s/p EGD : Large junctional varix banded  [] hold NGT or OGT placement for 24h, can place   [] will need repeat EGD in 2 weeks to assess for eradication of varices   [] Discontinue IV PPI gtt and started IV PPI 40mg qd  [] lactulose enemas   [] Continue with Octreotide gtt x 72 hours  [] Continue with aggressive fluid resuscitation with crystalloids and PRBCs prn  [] continue with ceftriaxone 1 g q 24 hours for 7 days   []Hepatology will likely initiate liver transplant workup when patient becomes more clinically stable  [] IR consult for TIPS procedure given active hematemesis- give 1 U FFP before TIPS   - f/u AM CBC and coags before tips   - f/u CT triple phase   -IR discussed case in person with hepatology attending Dr. Prado, who feels the patient is high risk for TIPS placement given existing HE.   -IR remains available for TIPS placement if endoscopy cannot adequately treat varices or if patient has another acute bleeding episode   [] Maintain active T&S, transfuse Hgb > 7  #Cirrhosis  - Reportedly non-EtOH cirrhosis, 2/2 to IVEY  - MELD score 17- 6% estimated 3 month mortality   - no evidence of hepatic encephalopathy on this admission  [] hold home lactulose and rifaximin while NPO  [] Continue with Ceftriaxone 1g IV qd for SBP PPx for a total of 7 days ( - )    ===Renal===  SCr 1.17  - No active issues    ===Infectious Disease===  #SBP PPX  [] Continue with ceftriaxone 1g IV for SBP PPx  for 7 days (-)    ===Endocrine===  #T2DM  [] Start on insulin sliding scale   [] If suboptimal glycemic control, can transition to NPH    ===Hematology===  #Anemia  #Thrombcytopenia  - Chronic anemia and thrombocytopenia iso cirrhosis  [] DVT PPx with SCDs  [] Transfuse to maintain Hgb > 7, PLT > 50k    ===Skin===  #Occipital laceration  - s/p staples  [] remove staples in 7 days    ===Ethics===  Full code   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     ===Neurology===  - AAOx3 at baseline  - Currently AAOx3  - Patient with history of recent hepatic encephalopathy per chart review, improved with lactulose  - Stopped all sedation today, patient was extubated.   - Serum ammonia level was 58.  -Started home lactulose and rifaximin as patient is now PO, ok'd per GI.  -Discontinued Lactulose enemas and started PO home dose lactulose 20mg tid  -Continuing home sertraline.  on , 446 on .    ===Cardiac===   #HTN  - Discontinued levophed as pt has been weaned off pressors. Monitor to ensure MAP>65.  - Will restart home HTN medications if SBPs>180 or DBP>110    ===Respiratory===  # Intubated for airway protection iso active hematemesis  -Pt extubated  saturating well on room air  - AB.45/39/137/27 from  at 00:00  - No pulmonary issues currently or signs of hepatopulmonary syndrome.    ===Gastrointestinal===   #Upper GI Bleed  - EGD at OSH : large amounts of blood and clots in the gastric fundus, suspicion for gastric varices, visualized small distal esophageal varices  - s/p I U FFP  -s/p EGD : Large junctional varix banded  [] continue to hold NGT or OGT placement   -Ok for full liquid diet per GI  [] will need repeat EGD in 2 weeks to assess for eradication of varices   [] Continue IV PPI 40mg qd  [] Switched lactulose enemas to home dose PO lactulose 20mg tid   [] Continue with Octreotide gtt x 72 hours  [] Continue with aggressive fluid resuscitation with crystalloids and PRBCs prn  [] continue with ceftriaxone 1 g q 24 hours for 7 days   []Hepatology will likely initiate liver transplant workup when patient becomes more clinically stable  [] IR consult for TIPS procedure given active hematemesis- give 1 U FFP before TIPS   - f/u AM CBC and coags before tips   - f/u CT triple phase   -IR discussed case in person with hepatology attending Dr. Prado, who feels the patient is high risk for TIPS placement given existing HE.   -IR remains available for TIPS placement if endoscopy cannot adequately treat varices or if patient has another acute bleeding episode   [] Maintain active T&S, transfuse Hgb > 7  #Cirrhosis  - Reportedly non-EtOH cirrhosis, 2/2 to IVEY  - MELD score 17- 6% estimated 3 month mortality   - no evidence of hepatic encephalopathy on this admission  [] hold home lactulose and rifaximin while NPO  [] Continue with Ceftriaxone 1g IV qd for SBP PPx for a total of 7 days ( - )    ===Renal===  SCr 1.17  - No active issues    ===Infectious Disease===  #SBP PPX  [] Continue with ceftriaxone 1g IV for SBP PPx  for 7 days (-)    ===Endocrine===  #T2DM  [] Start on insulin sliding scale   [] If suboptimal glycemic control, can transition to NPH    ===Hematology===  #Anemia  #Thrombcytopenia  - Chronic anemia and thrombocytopenia iso cirrhosis  [] DVT PPx with SCDs  [] Transfuse to maintain Hgb > 7, PLT > 50k    ===Skin===  #Occipital laceration  - s/p staples  [] remove staples in 7 days    ===Ethics===  Full code   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     ===Neurology===  - AAOx3 at baseline  - Currently AAOx3  - Patient with history of recent hepatic encephalopathy per chart review, improved with lactulose  - Stopped all sedation today, patient was extubated.   - Serum ammonia level was 58.  -Started home lactulose and rifaximin as patient is now PO, ok'd per GI.  -Discontinued Lactulose enemas and started PO home dose lactulose 20mg tid  -Continuing home sertraline.  on , 446 on .    ===Cardiac===   #HTN  - Discontinued levophed as pt has been weaned off pressors. Monitor to ensure MAP>65.  - Will restart home HTN medications if SBPs>180 or DBP>110    ===Respiratory===  # Intubated for airway protection iso active hematemesis  -Pt extubated  saturating well on room air  - AB.45/39/137/27 from  at 00:00  - No pulmonary issues currently or signs of hepatopulmonary syndrome.    ===Gastrointestinal===   #Upper GI Bleed  - EGD at OSH : large amounts of blood and clots in the gastric fundus, suspicion for gastric varices, visualized small distal esophageal varices  - s/p I U FFP  -s/p EGD : Large junctional varix banded  [] continue to hold NGT or OGT placement   -Ok for full liquid diet per GI  [] will need repeat EGD in 2 weeks to assess for eradication of varices   [] Continue IV PPI 40mg qd  [] Switched lactulose enemas to home dose PO lactulose 20mg tid   [] Continue with Octreotide gtt x 72 hours post EGD. Stop  ~5pm.  [] Continue with aggressive fluid resuscitation with crystalloids and PRBCs prn  [] continue with ceftriaxone 1 g q 24 hours for 7 days.  Day 3 of 7. Last day   []Hepatology will likely initiate liver transplant workup when patient becomes more clinically stable  [] IR consult for TIPS procedure given active hematemesis- give 1 U FFP before TIPS   - f/u AM CBC and coags before tips   - f/u CT triple phase   -IR discussed case in person with hepatology attending Dr. Prado, who feels the patient is high risk for TIPS placement given existing HE.   -IR remains available for TIPS placement if endoscopy cannot adequately treat varices or if patient has another acute bleeding episode   [] Maintain active T&S, transfuse Hgb > 7  #Cirrhosis  - Reportedly non-EtOH cirrhosis, 2/2 to IVEY  - MELD score 17- 6% estimated 3 month mortality   - no evidence of hepatic encephalopathy on this admission  [] hold home lactulose and rifaximin while NPO  [] Continue with Ceftriaxone 1g IV qd for SBP PPx for a total of 7 days ( - )    ===Renal===  SCr 1.17  - No active issues    ===Infectious Disease===  #SBP PPX  [] Continue with ceftriaxone 1g IV for SBP PPx  for 7 days (-)    ===Endocrine===  #T2DM  [] Start on insulin sliding scale   [] If suboptimal glycemic control, can transition to NPH    ===Hematology===  #Anemia  #Thrombcytopenia  - Chronic anemia and thrombocytopenia iso cirrhosis  [] DVT PPx with SCDs  [] Transfuse to maintain Hgb > 7, PLT > 50k    ===Skin===  #Occipital laceration  - s/p staples  [] remove staples in 7 days    ===Ethics===  Full code   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 for TIPS evaluation.     ===Neurology===  - AAOx3 at baseline  - Currently AAOx3  - Patient with history of recent hepatic encephalopathy per chart review, improved with lactulose  - Stopped all sedation today, patient was extubated.   - Serum ammonia level was 58.  -Started home lactulose and rifaximin as patient is now PO, ok'd per GI.  -Discontinued Lactulose enemas and started PO home dose lactulose 20mg tid  -Continuing home sertraline.  on , 446 on .    ===Cardiac===   #HTN  - Discontinued levophed as pt has been weaned off pressors. Monitor to ensure MAP>65.  - Will restart home HTN medications if SBPs>180 or DBP>110    ===Respiratory===  # Intubated for airway protection iso active hematemesis  -Pt extubated  saturating well on room air  - AB.45/39/137/27 from  at 00:00  - No pulmonary issues currently or signs of hepatopulmonary syndrome.    ===Gastrointestinal===   #Upper GI Bleed  - EGD at OSH : large amounts of blood and clots in the gastric fundus, suspicion for gastric varices, visualized small distal esophageal varices  - s/p I U FFP  -s/p EGD : Large junctional varix banded  [] continue to hold NGT or OGT placement   -Ok for full liquid diet per GI  [] will need repeat EGD in 2 weeks to assess for eradication of varices   [] Continue IV PPI 40mg qd  [] Switched lactulose enemas to home dose PO lactulose 20mg tid   [] Continue with Octreotide gtt x 72 hours post EGD. Stop  ~5pm.  [] Continue with aggressive fluid resuscitation with crystalloids and PRBCs prn  [] continue with ceftriaxone 1 g q 24 hours for 7 days.  Day 4 of 7. Last day   []Hepatology will likely initiate liver transplant workup when patient becomes more clinically stable  [] IR consult for TIPS procedure given active hematemesis- give 1 U FFP before TIPS   - f/u AM CBC and coags before tips   - f/u CT triple phase   -IR discussed case in person with hepatology attending Dr. Prado, who feels the patient is high risk for TIPS placement given existing HE.   -IR remains available for TIPS placement if endoscopy cannot adequately treat varices or if patient has another acute bleeding episode   [] Maintain active T&S, transfuse Hgb > 7  #Cirrhosis  - Reportedly non-EtOH cirrhosis, 2/2 to IVEY  - MELD score 17- 6% estimated 3 month mortality   - no evidence of hepatic encephalopathy on this admission  [] Continue with Ceftriaxone 1g IV qd for SBP PPx for a total of 7 days ( -  )    ===Renal===  SCr now 1.00 from 1.17  - No active issues    ===Infectious Disease===  #SBP PPX  [] Continue with ceftriaxone 1g IV for SBP PPx  for 7 days (-)    ===Endocrine===  #T2DM  [] Start on insulin sliding scale   [] If suboptimal glycemic control, can transition to NPH    ===Hematology===  #Anemia  #Thrombcytopenia  - Chronic anemia and thrombocytopenia iso cirrhosis  [] DVT PPx with SCDs  [] Transfuse to maintain Hgb > 7, PLT > 50k    ===Skin===  #Occipital laceration  - s/p staples  [] remove staples in 7 days    ===Ethics===  Full code   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 for TIPS evaluation.     ===Neurology===  - AAOx3 at baseline  - Currently AAOx3  - Patient with history of recent hepatic encephalopathy per chart review, improved with lactulose  - Stopped all sedation today, patient was extubated.   - Serum ammonia level was 58.  -Started home lactulose and rifaximin as patient is now PO, ok'd per GI.  -Discontinued Lactulose enemas and started PO home dose lactulose 20mg tid  -Continuing home sertraline.  on , 446 on .    ===Cardiac===   #HTN  - Discontinued levophed as pt has been weaned off pressors. Monitor to ensure MAP>65.  - Will restart home HTN medications if SBPs>180 or DBP>110    ===Respiratory===  # Intubated for airway protection iso active hematemesis  -Pt extubated  saturating well on room air  - AB.45/39/137/27 from  at 00:00  - No pulmonary issues currently or signs of hepatopulmonary syndrome.    ===Gastrointestinal===   #Upper GI Bleed  - EGD at OSH : large amounts of blood and clots in the gastric fundus, suspicion for gastric varices, visualized small distal esophageal varices  - s/p I U FFP  -s/p EGD : Large junctional varix banded  [] continue to hold NGT or OGT placement   -Ok for full liquid diet per GI  [] will need repeat EGD in 2 weeks to assess for eradication of varices   [] Continue IV PPI 40mg qd  [] Switched lactulose enemas to home dose PO lactulose 20mg tid   [] Continue with Octreotide gtt x 72 hours post EGD. Stop  ~5pm.  [] Continue with aggressive fluid resuscitation with crystalloids and PRBCs prn  [] continue with ceftriaxone 1 g q 24 hours for 7 days.  Day 4 of 7. Last day   []Hepatology will likely initiate liver transplant workup when patient becomes more clinically stable  [] IR consult for TIPS procedure given active hematemesis- give 1 U FFP before TIPS   - f/u AM CBC and coags before tips   - f/u CT triple phase   -IR discussed case in person with hepatology attending Dr. Prado, who feels the patient is high risk for TIPS placement given existing HE.   -IR remains available for TIPS placement if endoscopy cannot adequately treat varices or if patient has another acute bleeding episode   [] Maintain active T&S, transfuse Hgb > 7  #Cirrhosis  - Reportedly non-EtOH cirrhosis, 2/2 to IVEY  - MELD score 17- 6% estimated 3 month mortality   - no evidence of hepatic encephalopathy on this admission  [] Continue with Ceftriaxone 1g IV qd for SBP PPx for a total of 7 days ( -  )    ===Renal===  SCr now 1.00 from 1.17  - No active issues    ===Infectious Disease===  #SBP PPX  [] Continue with ceftriaxone 1g IV for SBP PPx  for 7 days (-)    ===Endocrine===  #T2DM  [] Start on insulin sliding scale   [] If suboptimal glycemic control, can transition to NPH    ===Hematology===  #Anemia  #Thrombcytopenia  - Chronic anemia and thrombocytopenia iso cirrhosis  [] DVT PPx with SCDs  [] Transfuse to maintain Hgb > 7, PLT > 50k    ===Skin===  #Occipital laceration  - s/p staples  [] remove staples in 7 days    ===Ethics===  Full code   Patient is a 67 year old F with a PMHx of 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     ===Neurology===  - AAOx3 at baseline  - Currently AAOx3  - Patient with history of recent hepatic encephalopathy per chart review, improved with lactulose  - Stopped all sedation today, patient was extubated.   - Serum ammonia level was 58.  -Started home lactulose and rifaximin as patient is now PO, ok'd per GI.  -Discontinued Lactulose enemas and started PO home dose lactulose 20mg tid  -Continuing home sertraline.  on , 446 on .    ===Cardiac===   #HTN  - Discontinued levophed as pt has been weaned off pressors. Monitor to ensure MAP>65.  - Will restart home HTN medications if SBPs>180 or DBP>110    ===Respiratory===  # Intubated for airway protection iso active hematemesis  -Pt extubated  saturating well on room air  - AB.45/39/137/27 from  at 00:00  - No pulmonary issues currently or signs of hepatopulmonary syndrome.    ===Gastrointestinal===   #Upper GI Bleed  - EGD at OSH : large amounts of blood and clots in the gastric fundus, suspicion for gastric varices, visualized small distal esophageal varices  - s/p I U FFP  -s/p EGD : Large junctional varix banded  [] continue to hold NGT or OGT placement   -Ok for full liquid diet per GI  [] will need repeat EGD in 2 weeks to assess for eradication of varices   [] Continue IV PPI 40mg qd  [] Switched lactulose enemas to home dose PO lactulose 20mg tid   [] Continue with Octreotide gtt x 72 hours post EGD. Stop  ~5pm.  [] Continue with aggressive fluid resuscitation with crystalloids and PRBCs prn  [] continue with ceftriaxone 1 g q 24 hours for 7 days.  Day 4 of 7. Last day   []Hepatology will likely initiate liver transplant workup when patient becomes more clinically stable  [] IR consult for TIPS procedure given active hematemesis- give 1 U FFP before TIPS   - f/u AM CBC and coags before tips   - f/u CT triple phase   -IR discussed case in person with hepatology attending Dr. Prado, who feels the patient is high risk for TIPS placement given existing HE.   -IR remains available for TIPS placement if endoscopy cannot adequately treat varices or if patient has another acute bleeding episode   [] Maintain active T&S, transfuse Hgb > 7  #Cirrhosis  - Reportedly non-EtOH cirrhosis, 2/2 to IVEY  - MELD score 17- 6% estimated 3 month mortality   - no evidence of hepatic encephalopathy on this admission  [] Continue with Ceftriaxone 1g IV qd for SBP PPx for a total of 7 days ( -  )    ===Renal===  SCr now 1.00 from 1.17  - No active issues    ===Infectious Disease===  #SBP PPX  [] Continue with ceftriaxone 1g IV for SBP PPx  for 7 days (-)    ===Endocrine===  #T2DM  [] Start on insulin sliding scale   [] If suboptimal glycemic control, can transition to NPH    ===Hematology===  #Anemia  #Thrombcytopenia  - Chronic anemia and thrombocytopenia iso cirrhosis  [] DVT PPx with SCDs  [] Transfuse to maintain Hgb > 7, PLT > 50k    ===Skin===  #Occipital laceration  - s/p staples  [] remove staples in 7 days on     ===Ethics===  Full code   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     ===Neurology===  - AAOx3 at baseline  - Currently AAOx3  - Patient with history of recent hepatic encephalopathy per chart review, improved with lactulose  - Stopped all sedation today, patient was extubated.   - Serum ammonia level was 58.  -Started home lactulose and rifaximin as patient is now PO, ok'd per GI.  -Discontinued Lactulose enemas and started PO home dose lactulose 20mg tid  -Continuing home sertraline.  on , 446 on .  -Requested PT eval    ===Cardiac===   #HTN  - Discontinued levophed as pt has been weaned off pressors. Monitor to ensure MAP>65.  - Will restart home HTN medications if SBPs>180 or DBP>110    ===Respiratory===  # Intubated for airway protection iso active hematemesis  -Pt extubated  saturating well on room air  - AB.45/39/137/27 from  at 00:00  - No pulmonary issues currently or signs of hepatopulmonary syndrome.    ===Gastrointestinal===   #Upper GI Bleed  - EGD at OSH : large amounts of blood and clots in the gastric fundus, suspicion for gastric varices, visualized small distal esophageal varices  - s/p I U FFP  -s/p EGD : Large junctional varix banded  [] continue to hold NGT or OGT placement   -Passed bedside dysphagia swallow  -Ok for full liquid diet per GI  [] will need repeat EGD in 2 weeks to assess for eradication of varices   [] Continue IV PPI 40mg qd  [] Switched lactulose enemas to home dose PO lactulose 20mg tid   [] Continue with Octreotide gtt x 72 hours post EGD. Stop  ~5pm.  [] Continue with aggressive fluid resuscitation with crystalloids and PRBCs prn  [] continue with ceftriaxone 1 g q 24 hours for 7 days.  Day 4 of 7. Last day   []Hepatology will likely initiate liver transplant workup when patient becomes more clinically stable  [] IR consult for TIPS procedure given active hematemesis- give 1 U FFP before TIPS   - f/u AM CBC and coags before tips   - f/u CT triple phase   -IR discussed case in person with hepatology attending Dr. Prado, who feels the patient is high risk for TIPS placement given existing HE.   -IR remains available for TIPS placement if endoscopy cannot adequately treat varices or if patient has another acute bleeding episode   [] Maintain active T&S, transfuse Hgb > 7  #Cirrhosis  - Reportedly non-EtOH cirrhosis, 2/2 to IVEY  - MELD score 17- 6% estimated 3 month mortality   - no evidence of hepatic encephalopathy on this admission  [] Continue with Ceftriaxone 1g IV qd for SBP PPx for a total of 7 days ( -  )    ===Renal===  SCr now 1.00 from 1.17  - No active issues    ===Infectious Disease===  #SBP PPX  [] Continue with ceftriaxone 1g IV for SBP PPx  for 7 days (-)    ===Endocrine===  #T2DM  [] Start on insulin sliding scale   [] If suboptimal glycemic control, can transition to NPH    ===Hematology===  #Anemia  #Thrombcytopenia  - Chronic anemia and thrombocytopenia iso cirrhosis  [] DVT PPx with SCDs  [] Transfuse to maintain Hgb > 7, PLT > 50k    ===Skin===  #Occipital laceration  - s/p staples  [] remove staples in 7 days on     ===Ethics===  Full code

## 2022-12-28 DIAGNOSIS — K74.60 UNSPECIFIED CIRRHOSIS OF LIVER: ICD-10-CM

## 2022-12-28 DIAGNOSIS — E11.9 TYPE 2 DIABETES MELLITUS WITHOUT COMPLICATIONS: ICD-10-CM

## 2022-12-28 DIAGNOSIS — I85.01 ESOPHAGEAL VARICES WITH BLEEDING: ICD-10-CM

## 2022-12-28 DIAGNOSIS — D75.9 DISEASE OF BLOOD AND BLOOD-FORMING ORGANS, UNSPECIFIED: ICD-10-CM

## 2022-12-28 DIAGNOSIS — E87.0 HYPEROSMOLALITY AND HYPERNATREMIA: ICD-10-CM

## 2022-12-28 LAB
ALBUMIN SERPL ELPH-MCNC: 2.7 G/DL — LOW (ref 3.3–5)
ALP SERPL-CCNC: 53 U/L — SIGNIFICANT CHANGE UP (ref 40–120)
ALT FLD-CCNC: 10 U/L — SIGNIFICANT CHANGE UP (ref 10–45)
ANION GAP SERPL CALC-SCNC: 10 MMOL/L — SIGNIFICANT CHANGE UP (ref 5–17)
APTT BLD: 28.6 SEC — SIGNIFICANT CHANGE UP (ref 27.5–35.5)
AST SERPL-CCNC: 39 U/L — SIGNIFICANT CHANGE UP (ref 10–40)
BILIRUB SERPL-MCNC: 1.2 MG/DL — SIGNIFICANT CHANGE UP (ref 0.2–1.2)
BUN SERPL-MCNC: 34 MG/DL — HIGH (ref 7–23)
CALCIUM SERPL-MCNC: 7.9 MG/DL — LOW (ref 8.4–10.5)
CHLORIDE SERPL-SCNC: 112 MMOL/L — HIGH (ref 96–108)
CO2 SERPL-SCNC: 24 MMOL/L — SIGNIFICANT CHANGE UP (ref 22–31)
CREAT SERPL-MCNC: 1.05 MG/DL — SIGNIFICANT CHANGE UP (ref 0.5–1.3)
EGFR: 58 ML/MIN/1.73M2 — LOW
GLUCOSE BLDC GLUCOMTR-MCNC: 120 MG/DL — HIGH (ref 70–99)
GLUCOSE BLDC GLUCOMTR-MCNC: 162 MG/DL — HIGH (ref 70–99)
GLUCOSE BLDC GLUCOMTR-MCNC: 171 MG/DL — HIGH (ref 70–99)
GLUCOSE BLDC GLUCOMTR-MCNC: 212 MG/DL — HIGH (ref 70–99)
GLUCOSE SERPL-MCNC: 139 MG/DL — HIGH (ref 70–99)
HCT VFR BLD CALC: 27.1 % — LOW (ref 34.5–45)
HCT VFR BLD CALC: 27.1 % — LOW (ref 34.5–45)
HGB BLD-MCNC: 8.6 G/DL — LOW (ref 11.5–15.5)
HGB BLD-MCNC: 8.9 G/DL — LOW (ref 11.5–15.5)
INR BLD: 1.4 RATIO — HIGH (ref 0.88–1.16)
MAGNESIUM SERPL-MCNC: 1.8 MG/DL — SIGNIFICANT CHANGE UP (ref 1.6–2.6)
MCHC RBC-ENTMCNC: 30.8 PG — SIGNIFICANT CHANGE UP (ref 27–34)
MCHC RBC-ENTMCNC: 31.7 GM/DL — LOW (ref 32–36)
MCHC RBC-ENTMCNC: 31.7 PG — SIGNIFICANT CHANGE UP (ref 27–34)
MCHC RBC-ENTMCNC: 32.8 GM/DL — SIGNIFICANT CHANGE UP (ref 32–36)
MCV RBC AUTO: 96.4 FL — SIGNIFICANT CHANGE UP (ref 80–100)
MCV RBC AUTO: 97.1 FL — SIGNIFICANT CHANGE UP (ref 80–100)
NRBC # BLD: 0 /100 WBCS — SIGNIFICANT CHANGE UP (ref 0–0)
NRBC # BLD: 0 /100 WBCS — SIGNIFICANT CHANGE UP (ref 0–0)
PHOSPHATE SERPL-MCNC: 2.7 MG/DL — SIGNIFICANT CHANGE UP (ref 2.5–4.5)
PLATELET # BLD AUTO: 29 K/UL — LOW (ref 150–400)
PLATELET # BLD AUTO: 31 K/UL — LOW (ref 150–400)
POTASSIUM SERPL-MCNC: 3.7 MMOL/L — SIGNIFICANT CHANGE UP (ref 3.5–5.3)
POTASSIUM SERPL-SCNC: 3.7 MMOL/L — SIGNIFICANT CHANGE UP (ref 3.5–5.3)
PROT SERPL-MCNC: 4.9 G/DL — LOW (ref 6–8.3)
PROTHROM AB SERPL-ACNC: 16.3 SEC — HIGH (ref 10.5–13.4)
RBC # BLD: 2.79 M/UL — LOW (ref 3.8–5.2)
RBC # BLD: 2.81 M/UL — LOW (ref 3.8–5.2)
RBC # FLD: 16.3 % — HIGH (ref 10.3–14.5)
RBC # FLD: 16.4 % — HIGH (ref 10.3–14.5)
SODIUM SERPL-SCNC: 146 MMOL/L — HIGH (ref 135–145)
WBC # BLD: 2.25 K/UL — LOW (ref 3.8–10.5)
WBC # BLD: 2.78 K/UL — LOW (ref 3.8–10.5)
WBC # FLD AUTO: 2.25 K/UL — LOW (ref 3.8–10.5)
WBC # FLD AUTO: 2.78 K/UL — LOW (ref 3.8–10.5)

## 2022-12-28 PROCEDURE — 99233 SBSQ HOSP IP/OBS HIGH 50: CPT | Mod: GC

## 2022-12-28 PROCEDURE — 99223 1ST HOSP IP/OBS HIGH 75: CPT

## 2022-12-28 RX ORDER — INSULIN LISPRO 100/ML
VIAL (ML) SUBCUTANEOUS AT BEDTIME
Refills: 0 | Status: DISCONTINUED | OUTPATIENT
Start: 2022-12-28 | End: 2022-12-30

## 2022-12-28 RX ORDER — SODIUM CHLORIDE 9 MG/ML
1000 INJECTION, SOLUTION INTRAVENOUS ONCE
Refills: 0 | Status: COMPLETED | OUTPATIENT
Start: 2022-12-28 | End: 2022-12-28

## 2022-12-28 RX ORDER — CARVEDILOL PHOSPHATE 80 MG/1
6.25 CAPSULE, EXTENDED RELEASE ORAL EVERY 12 HOURS
Refills: 0 | Status: DISCONTINUED | OUTPATIENT
Start: 2022-12-28 | End: 2022-12-30

## 2022-12-28 RX ORDER — SODIUM CHLORIDE 9 MG/ML
1000 INJECTION, SOLUTION INTRAVENOUS
Refills: 0 | Status: DISCONTINUED | OUTPATIENT
Start: 2022-12-28 | End: 2022-12-28

## 2022-12-28 RX ORDER — INSULIN LISPRO 100/ML
VIAL (ML) SUBCUTANEOUS
Refills: 0 | Status: DISCONTINUED | OUTPATIENT
Start: 2022-12-28 | End: 2022-12-30

## 2022-12-28 RX ORDER — SUCRALFATE 1 G
1 TABLET ORAL
Refills: 0 | Status: DISCONTINUED | OUTPATIENT
Start: 2022-12-28 | End: 2022-12-30

## 2022-12-28 RX ORDER — ACETAMINOPHEN 500 MG
650 TABLET ORAL ONCE
Refills: 0 | Status: COMPLETED | OUTPATIENT
Start: 2022-12-28 | End: 2022-12-28

## 2022-12-28 RX ORDER — SODIUM CHLORIDE 9 MG/ML
1000 INJECTION, SOLUTION INTRAVENOUS
Refills: 0 | Status: DISCONTINUED | OUTPATIENT
Start: 2022-12-28 | End: 2022-12-29

## 2022-12-28 RX ORDER — PANTOPRAZOLE SODIUM 20 MG/1
40 TABLET, DELAYED RELEASE ORAL
Refills: 0 | Status: DISCONTINUED | OUTPATIENT
Start: 2022-12-29 | End: 2022-12-30

## 2022-12-28 RX ADMIN — Medication 2: at 17:27

## 2022-12-28 RX ADMIN — CEFTRIAXONE 100 MILLIGRAM(S): 500 INJECTION, POWDER, FOR SOLUTION INTRAMUSCULAR; INTRAVENOUS at 20:48

## 2022-12-28 RX ADMIN — Medication 1: at 12:06

## 2022-12-28 RX ADMIN — SERTRALINE 50 MILLIGRAM(S): 25 TABLET, FILM COATED ORAL at 11:41

## 2022-12-28 RX ADMIN — PANTOPRAZOLE SODIUM 40 MILLIGRAM(S): 20 TABLET, DELAYED RELEASE ORAL at 11:41

## 2022-12-28 RX ADMIN — Medication 650 MILLIGRAM(S): at 21:02

## 2022-12-28 RX ADMIN — Medication 650 MILLIGRAM(S): at 12:07

## 2022-12-28 RX ADMIN — CHLORHEXIDINE GLUCONATE 1 APPLICATION(S): 213 SOLUTION TOPICAL at 05:53

## 2022-12-28 RX ADMIN — Medication 650 MILLIGRAM(S): at 07:18

## 2022-12-28 RX ADMIN — Medication 650 MILLIGRAM(S): at 06:48

## 2022-12-28 RX ADMIN — OCTREOTIDE ACETATE 10 MICROGRAM(S)/HR: 200 INJECTION, SOLUTION INTRAVENOUS; SUBCUTANEOUS at 04:25

## 2022-12-28 RX ADMIN — Medication 1 GRAM(S): at 20:49

## 2022-12-28 RX ADMIN — Medication 650 MILLIGRAM(S): at 13:07

## 2022-12-28 RX ADMIN — SODIUM CHLORIDE 500 MILLILITER(S): 9 INJECTION, SOLUTION INTRAVENOUS at 04:34

## 2022-12-28 NOTE — PROGRESS NOTE ADULT - ASSESSMENT
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 for TIPS evaluation.     ===Neurology===  - AAOx3 at baseline  - Currently AAOx3  - Patient with history of recent hepatic encephalopathy per chart review, improved with lactulose  - Stopped all sedation today, patient was extubated.   - Serum ammonia level was 58.  -Started home lactulose and rifaximin as patient is now PO, ok'd per GI.  -Discontinued Lactulose enemas and started PO home dose lactulose 20mg tid  -Continuing home sertraline.  on , 446 on .  -Requested PT eval    ===Cardiac===   #HTN  - Discontinued levophed as pt has been weaned off pressors. Monitor to ensure MAP>65.  - Will restart home HTN medications if SBPs>180 or DBP>110    ===Respiratory===  # Intubated for airway protection iso active hematemesis  -Pt extubated  saturating well on room air  - AB.45/39/137/27 from  at 00:00  - No pulmonary issues currently or signs of hepatopulmonary syndrome.    ===Gastrointestinal===   #Upper GI Bleed  - EGD at OSH : large amounts of blood and clots in the gastric fundus, suspicion for gastric varices, visualized small distal esophageal varices  - s/p I U FFP  -s/p EGD : Large junctional varix banded  [] continue to hold NGT or OGT placement   -Passed bedside dysphagia swallow  -Ok for full liquid diet per GI  [] will need repeat EGD in 2 weeks to assess for eradication of varices   [] Continue IV PPI 40mg qd  [] Switched lactulose enemas to home dose PO lactulose 20mg tid   [] Continue with Octreotide gtt x 72 hours post EGD. Stop  ~5pm.  [] Continue with aggressive fluid resuscitation with crystalloids and PRBCs prn  [] continue with ceftriaxone 1 g q 24 hours for 7 days.  Day 4 of 7. Last day   []Hepatology will likely initiate liver transplant workup when patient becomes more clinically stable  [] IR consult for TIPS procedure given active hematemesis- give 1 U FFP before TIPS   - f/u AM CBC and coags before tips   - f/u CT triple phase   -IR discussed case in person with hepatology attending Dr. Prado, who feels the patient is high risk for TIPS placement given existing HE.   -IR remains available for TIPS placement if endoscopy cannot adequately treat varices or if patient has another acute bleeding episode   [] Maintain active T&S, transfuse Hgb > 7  #Cirrhosis  - Reportedly non-EtOH cirrhosis, 2/2 to IVEY  - MELD score 17- 6% estimated 3 month mortality   - no evidence of hepatic encephalopathy on this admission  [] Continue with Ceftriaxone 1g IV qd for SBP PPx for a total of 7 days ( -  )    ===Renal===  SCr now 1.00 from 1.17  - No active issues    ===Infectious Disease===  #SBP PPX  [] Continue with ceftriaxone 1g IV for SBP PPx  for 7 days (-)    ===Endocrine===  #T2DM  [] Start on insulin sliding scale   [] If suboptimal glycemic control, can transition to NPH    ===Hematology===  #Anemia  #Thrombcytopenia  - Chronic anemia and thrombocytopenia iso cirrhosis  [] DVT PPx with SCDs  [] Transfuse to maintain Hgb > 7, PLT > 50k    ===Skin===  #Occipital laceration  - s/p staples  [] remove staples in 7 days on     ===Ethics===  Full code   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     ===Neurology===  - AAOx3 at baseline  - Currently AAOx3  - Patient with history of recent hepatic encephalopathy per chart review, improved with lactulose  - Patient was extubated yesterday  - Serum ammonia level was 58.  -Started home lactulose and rifaximin as patient is now PO, ok'd per GI.  -Discontinued Lactulose enemas and started PO home dose lactulose 20mg tid  -Continuing home sertraline.  on , 446 on .  -Requested PT eval    ===Cardiac===   #HTN  - Discontinued levophed as pt has been weaned off pressors. Monitor to ensure MAP>65.  - Will restart home HTN medications if SBPs>180 or DBP>110    ===Respiratory===  # Intubated for airway protection iso active hematemesis  -Pt extubated  saturating well on room air  - AB.45/39/137/27 from  at 00:00  - No pulmonary issues currently or signs of hepatopulmonary syndrome.    ===Gastrointestinal===   #Upper GI Bleed  - EGD at OSH : large amounts of blood and clots in the gastric fundus, suspicion for gastric varices, visualized small distal esophageal varices  - s/p I U FFP  -s/p EGD : Large junctional varix banded  [] continue to hold NGT or OGT placement   -Passed bedside dysphagia swallow  -Ok for full liquid diet per GI  [] will need repeat EGD in 2 weeks to assess for eradication of varices   [] Continue IV PPI 40mg qd  [] Switched lactulose enemas to home dose PO lactulose 20mg tid   [] Continue with Octreotide gtt x 72 hours post EGD. Stop  ~5pm.  [] Continue with aggressive fluid resuscitation with crystalloids and PRBCs prn  [] continue with ceftriaxone 1 g q 24 hours for 7 days.  Day 4 of 7. Last day   []Hepatology will likely initiate liver transplant workup when patient becomes more clinically stable  [] IR consult for TIPS procedure given active hematemesis- give 1 U FFP before TIPS   - f/u AM CBC and coags before tips   - f/u CT triple phase   -IR discussed case in person with hepatology attending Dr. Prado, who feels the patient is high risk for TIPS placement given existing HE.   -IR remains available for TIPS placement if endoscopy cannot adequately treat varices or if patient has another acute bleeding episode   [] Maintain active T&S, transfuse Hgb > 7  #Cirrhosis  - Reportedly non-EtOH cirrhosis, 2/2 to IVEY  - MELD score 17- 6% estimated 3 month mortality   - no evidence of hepatic encephalopathy on this admission  [] Continue with Ceftriaxone 1g IV qd for SBP PPx for a total of 7 days ( -  )    ===Renal===  SCr now 1.00 from 1.17  - No active issues    ===Infectious Disease===  #SBP PPX  [] Continue with ceftriaxone 1g IV for SBP PPx  for 7 days (-)    ===Endocrine===  #T2DM  [] Start on insulin sliding scale   [] If suboptimal glycemic control, can transition to NPH    ===Hematology===  #Anemia  #Thrombcytopenia  - Chronic anemia and thrombocytopenia iso cirrhosis  [] DVT PPx with SCDs  [] Transfuse to maintain Hgb > 7, PLT > 50k    ===Skin===  #Occipital laceration  - s/p staples  [] remove staples in 7 days on     ===Ethics===  Full code

## 2022-12-28 NOTE — PROGRESS NOTE ADULT - ATTENDING COMMENTS
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 The patient was intubated for airway protection and underwent evaluation with EGD at OSH, with large amount of blood at the gastric fundus. Extubated 12/27.      #decompensated IVEY cirrhosis - MELD 11 (12/28)   #Variceal bleed   Patient s/p EGD at outside hospital with poor visualization due to active bleeding. Patient s/p 5U pRBC with plts and FFP.  - Multiphase CT abdomen/pelvis (12/24) reviewed, with portosystemic collaterals seen and no active extravasation    - ascites: trace ascites on CT 12/24   - HCC: CT without liver lesions (12/24)   - HE: none  - Varices: gastric and esophageal varices, s/p EGD on 12/23 with one esophageal band       Recommendations:   - octreotide gtt x 72 hours  (12/24- 12/28); ok to dc octreotide  - ceftriaxone x5 days total (12/24 - 12/28); ok to dc CTX  - start Coreg 6.25 mg BID if BP allows. Target heart rate is around 60  - switch IV --> PO PPI 40mg daily (to complete 4 week course to prevent post-banding ulceration)  - start sucralfate 1 gm PO BID x 2 weeks to prevent post-banding ulceration. This should be given in form of slurry (crush the pills and mix them with 30 ml of water or prescribe slurry version if insurance approves.  - PO lactulose 20 gm TID + rifaximin  - will need repeat EGD in 2-3 weeks to assess for eradication of varices (1/9) with OP GI Dr. Carranza  - can consider OP liver transplant workup but currently low MELD. With her low MELD, living donor transplant might be the only option for her.   - full liquid diet as tolerated, advance diet as tolerated    Follow up in Hepatology Clinic with Dr. Sharron Martinez: [phone]; [address] [facility] [address], [address], [place] [address]  Follow up with OP GI with Dr. Татьяна Carranza:  [phone]; [facility] [place] [address]

## 2022-12-28 NOTE — CHART NOTE - NSCHARTNOTEFT_GEN_A_CORE
MICU Transfer Note  ---------------------------    Transfer from: MICU  Transfer to:  ( X ) Medicine    (  ) Telemetry    (  ) RCU    (  ) Palliative    (  ) Stroke Unit    (  ) _______________  Accepting Physician: Dr. Tommie Blackmon      [facility]  - Pt with IVEY cirrhosis presented with hematemesis now s/p endoscopy, found varix at GE junction now s/p banding  - Hemostasis achieved, Hgb stable  - Remains on octreotide  - Did well on SBT and was successfully extubated   - Ammonia normal, no signs of hepatic encephalopathy. Currently AAOx3  - Advanced to full liquid diet        OBJECTIVE --  Vital Signs Last 24 Hrs  T(C): 36.9 (28 Dec 2022 04:00), Max: 37.6 (27 Dec 2022 20:00)  T(F): 98.4 (28 Dec 2022 04:00), Max: 99.7 (27 Dec 2022 20:00)  HR: 62 (28 Dec 2022 07:00) (57 - 76)  BP: 112/57 (28 Dec 2022 07:00) (102/53 - 154/72)  BP(mean): 82 (28 Dec 2022 07:00) (73 - 103)  RR: 19 (28 Dec 2022 07:00) (8 - 36)  SpO2: 96% (28 Dec 2022 07:00) (94% - 100%)    Parameters below as of 27 Dec 2022 20:00  Patient On (Oxygen Delivery Method): room air        I&O's Summary    27 Dec 2022 07:01  -  28 Dec 2022 07:00  --------------------------------------------------------  IN: 1533 mL / OUT: 615 mL / NET: 918 mL        MEDICATIONS  (STANDING):  cefTRIAXone   IVPB      cefTRIAXone   IVPB 2000 milliGRAM(s) IV Intermittent every 24 hours  chlorhexidine 4% Liquid 1 Application(s) Topical <User Schedule>  dextrose 5%. 1000 milliLiter(s) (100 mL/Hr) IV Continuous <Continuous>  dextrose 5%. 1000 milliLiter(s) (50 mL/Hr) IV Continuous <Continuous>  dextrose 50% Injectable 25 Gram(s) IV Push once  dextrose 50% Injectable 12.5 Gram(s) IV Push once  dextrose 50% Injectable 25 Gram(s) IV Push once  glucagon  Injectable 1 milliGRAM(s) IntraMuscular once  insulin lispro (ADMELOG) corrective regimen sliding scale   SubCutaneous every 6 hours  lactulose Syrup 20 Gram(s) Oral three times a day  octreotide  Infusion 50 MICROgram(s)/Hr (10 mL/Hr) IV Continuous <Continuous>  pantoprazole  Injectable 40 milliGRAM(s) IV Push daily  rifAXIMin 550 milliGRAM(s) Oral two times a day  sertraline 50 milliGRAM(s) Oral daily    MEDICATIONS  (PRN):  dextrose Oral Gel 15 Gram(s) Oral once PRN Blood Glucose LESS THAN 70 milliGRAM(s)/deciliter  fentaNYL    Injectable 50 MICROGram(s) IV Push every 4 hours PRN Moderate Pain (4 - 6)        LABS                                            8.6                   Neurophils% (auto):   x      ( @ 00:14):    2.25 )-----------(29           Lymphocytes% (auto):  x                                             27.1                   Eosinphils% (auto):   x        Manual%: Neutrophils x    ; Lymphocytes x    ; Eosinophils x    ; Bands%: x    ; Blasts x                                    146    |  112    |  34                  Calcium: 7.9   / iCa: x      ( @ 00:14)    ----------------------------<  139       Magnesium: 1.8                              3.7     |  24     |  1.05             Phosphorous: 2.7      TPro  4.9    /  Alb  2.7    /  TBili  1.2    /  DBili  x      /  AST  39     /  ALT  10     /  AlkPhos  53     28 Dec 2022 00:14    (  @ 00:14 )   PT: 16.3 sec;   INR: 1.40 ratio  aPTT: 28.6 sec          ASSESSMENT & PLAN:   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     ===Neurology===  - AAOx3 at baseline  - Currently AAOx3  - Patient with history of recent hepatic encephalopathy per chart review, improved with lactulose  - Stopped all sedation today, patient was extubated.   - Serum ammonia level was 58.  -Started home lactulose and rifaximin as patient is now PO, ok'd per GI.  -Discontinued Lactulose enemas and started PO home dose lactulose 20mg tid  -Continuing home sertraline.  on , 446 on .  -Requested PT eval    ===Cardiac===   #HTN  - Discontinued levophed as pt has been weaned off pressors. Monitor to ensure MAP>65.  - Will restart home HTN medications if SBPs>180 or DBP>110    ===Respiratory===  # Intubated for airway protection iso active hematemesis  -Pt extubated  saturating well on room air  - AB.45/39/137/27 from  at 00:00  - No pulmonary issues currently or signs of hepatopulmonary syndrome.    ===Gastrointestinal===   #Upper GI Bleed  - EGD at OSH : large amounts of blood and clots in the gastric fundus, suspicion for gastric varices, visualized small distal esophageal varices  - s/p I U FFP  -s/p EGD : Large junctional varix banded  [] continue to hold NGT or OGT placement   -Passed bedside dysphagia swallow  -Ok for full liquid diet per GI  [] will need repeat EGD in 2 weeks to assess for eradication of varices   [] Continue IV PPI 40mg qd  [] Switched lactulose enemas to home dose PO lactulose 20mg tid   [] Continue with Octreotide gtt x 72 hours post EGD. Stop  ~5pm.  [] Continue with aggressive fluid resuscitation with crystalloids and PRBCs prn  [] continue with ceftriaxone 1 g q 24 hours for 7 days.  Day 4 of 7. Last day   []Hepatology will likely initiate liver transplant workup when patient becomes more clinically stable  [] IR consult for TIPS procedure given active hematemesis- give 1 U FFP before TIPS   - f/u AM CBC and coags before tips   - f/u CT triple phase   -IR discussed case in person with hepatology attending Dr. Prado, who feels the patient is high risk for TIPS placement given existing HE.   -IR remains available for TIPS placement if endoscopy cannot adequately treat varices or if patient has another acute bleeding episode   [] Maintain active T&S, transfuse Hgb > 7  #Cirrhosis  - Reportedly non-EtOH cirrhosis, 2/2 to IVEY  - MELD score 17- 6% estimated 3 month mortality   - no evidence of hepatic encephalopathy on this admission  [] Continue with Ceftriaxone 1g IV qd for SBP PPx for a total of 7 days ( -  )    ===Renal===  SCr now 1.00 from 1.17  - No active issues    ===Infectious Disease===  #SBP PPX  [] Continue with ceftriaxone 1g IV for SBP PPx  for 7 days (-)    ===Endocrine===  #T2DM  [] Start on insulin sliding scale   [] If suboptimal glycemic control, can transition to NPH    ===Hematology===  #Anemia  #Thrombcytopenia  - Chronic anemia and thrombocytopenia iso cirrhosis  [] DVT PPx with SCDs  [] Transfuse to maintain Hgb > 7, PLT > 50k    ===Skin===  #Occipital laceration  - s/p staples  [] remove staples in 7 days on     ===Ethics===  Full code        For Follow-Up:  [ ] Monitor hemoglobin; no clinical signs of bleeding but dropped 8.5 from 9.4  [ ] Monitor urine output. Pt was having low urine output about 10cc/hr though Cr normal, was not eating or drinking much. Gave a bolus. MICU Transfer Note  ---------------------------    Transfer from: MICU  Transfer to:  ( X ) Medicine    (  ) Telemetry    (  ) RCU    (  ) Palliative    (  ) Stroke Unit    (  ) _______________  Accepting Physician: Dr. Tommie Blackmon      [facility]  - Pt with IVEY cirrhosis presented with hematemesis now s/p endoscopy, found varix at GE junction now s/p banding  - Hemostasis achieved, Hgb stable  - Remains on octreotide  - Did well on SBT and was successfully extubated   - Ammonia normal, no signs of hepatic encephalopathy. Currently AAOx3  - Advanced to full liquid diet        OBJECTIVE --  Vital Signs Last 24 Hrs  T(C): 36.9 (28 Dec 2022 04:00), Max: 37.6 (27 Dec 2022 20:00)  T(F): 98.4 (28 Dec 2022 04:00), Max: 99.7 (27 Dec 2022 20:00)  HR: 62 (28 Dec 2022 07:00) (57 - 76)  BP: 112/57 (28 Dec 2022 07:00) (102/53 - 154/72)  BP(mean): 82 (28 Dec 2022 07:00) (73 - 103)  RR: 19 (28 Dec 2022 07:00) (8 - 36)  SpO2: 96% (28 Dec 2022 07:00) (94% - 100%)    Parameters below as of 27 Dec 2022 20:00  Patient On (Oxygen Delivery Method): room air        I&O's Summary    27 Dec 2022 07:01  -  28 Dec 2022 07:00  --------------------------------------------------------  IN: 1533 mL / OUT: 615 mL / NET: 918 mL        MEDICATIONS  (STANDING):  cefTRIAXone   IVPB      cefTRIAXone   IVPB 2000 milliGRAM(s) IV Intermittent every 24 hours  chlorhexidine 4% Liquid 1 Application(s) Topical <User Schedule>  dextrose 5%. 1000 milliLiter(s) (100 mL/Hr) IV Continuous <Continuous>  dextrose 5%. 1000 milliLiter(s) (50 mL/Hr) IV Continuous <Continuous>  dextrose 50% Injectable 25 Gram(s) IV Push once  dextrose 50% Injectable 12.5 Gram(s) IV Push once  dextrose 50% Injectable 25 Gram(s) IV Push once  glucagon  Injectable 1 milliGRAM(s) IntraMuscular once  insulin lispro (ADMELOG) corrective regimen sliding scale   SubCutaneous every 6 hours  lactulose Syrup 20 Gram(s) Oral three times a day  octreotide  Infusion 50 MICROgram(s)/Hr (10 mL/Hr) IV Continuous <Continuous>  pantoprazole  Injectable 40 milliGRAM(s) IV Push daily  rifAXIMin 550 milliGRAM(s) Oral two times a day  sertraline 50 milliGRAM(s) Oral daily    MEDICATIONS  (PRN):  dextrose Oral Gel 15 Gram(s) Oral once PRN Blood Glucose LESS THAN 70 milliGRAM(s)/deciliter  fentaNYL    Injectable 50 MICROGram(s) IV Push every 4 hours PRN Moderate Pain (4 - 6)        LABS                                            8.6                   Neurophils% (auto):   x      ( @ 00:14):    2.25 )-----------(29           Lymphocytes% (auto):  x                                             27.1                   Eosinphils% (auto):   x        Manual%: Neutrophils x    ; Lymphocytes x    ; Eosinophils x    ; Bands%: x    ; Blasts x                                    146    |  112    |  34                  Calcium: 7.9   / iCa: x      ( @ 00:14)    ----------------------------<  139       Magnesium: 1.8                              3.7     |  24     |  1.05             Phosphorous: 2.7      TPro  4.9    /  Alb  2.7    /  TBili  1.2    /  DBili  x      /  AST  39     /  ALT  10     /  AlkPhos  53     28 Dec 2022 00:14    (  @ 00:14 )   PT: 16.3 sec;   INR: 1.40 ratio  aPTT: 28.6 sec          ASSESSMENT & PLAN:   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     ===Neurology===  - AAOx3 at baseline  - Currently AAOx3  - Patient with history of recent hepatic encephalopathy per chart review, improved with lactulose  - Stopped all sedation today, patient was extubated.   - Serum ammonia level was 58.  -Started home lactulose and rifaximin as patient is now PO, ok'd per GI.  -Discontinued Lactulose enemas and started PO home dose lactulose 20mg tid  -Continuing home sertraline.  on , 446 on .  -Requested PT eval    ===Cardiac===   #HTN  - Discontinued levophed as pt has been weaned off pressors. Monitor to ensure MAP>65.  - Will restart home HTN medications if SBPs>180 or DBP>110    ===Respiratory===  # Intubated for airway protection iso active hematemesis  -Pt extubated  saturating well on room air  - AB.45/39/137/27 from  at 00:00  - No pulmonary issues currently or signs of hepatopulmonary syndrome.    ===Gastrointestinal===   #Upper GI Bleed  - EGD at OSH : large amounts of blood and clots in the gastric fundus, suspicion for gastric varices, visualized small distal esophageal varices  - s/p I U FFP  -s/p EGD : Large junctional varix banded  [] continue to hold NGT or OGT placement   -Passed bedside dysphagia swallow  -Ok for full liquid diet per GI  [] will need repeat EGD in 2 weeks to assess for eradication of varices   [] Continue IV PPI 40mg qd  [] Switched lactulose enemas to home dose PO lactulose 20mg tid   [] Continue with Octreotide gtt x 72 hours post EGD. Stop  ~5pm.  [] Continue with aggressive fluid resuscitation with crystalloids and PRBCs prn  [] continue with ceftriaxone 1 g q 24 hours for 7 days.  Day 4 of 7. Last day   []Hepatology will likely initiate liver transplant workup when patient becomes more clinically stable  [] IR consult for TIPS procedure given active hematemesis- give 1 U FFP before TIPS   - f/u AM CBC and coags before tips   - f/u CT triple phase   -IR discussed case in person with hepatology attending Dr. Prado, who feels the patient is high risk for TIPS placement given existing HE.   -IR remains available for TIPS placement if endoscopy cannot adequately treat varices or if patient has another acute bleeding episode   [] Maintain active T&S, transfuse Hgb > 7  #Cirrhosis  - Reportedly non-EtOH cirrhosis, 2/2 to IVEY  - MELD score 17- 6% estimated 3 month mortality   - no evidence of hepatic encephalopathy on this admission  [] Continue with Ceftriaxone 1g IV qd for SBP PPx for a total of 7 days ( -  )    ===Renal===  SCr now 1.00 from 1.17  - No active issues    ===Infectious Disease===  #SBP PPX  [] Continue with ceftriaxone 1g IV for SBP PPx  for 7 days (-)    ===Endocrine===  #T2DM  [] Start on insulin sliding scale   [] If suboptimal glycemic control, can transition to NPH    ===Hematology===  #Anemia  #Thrombcytopenia  - Chronic anemia and thrombocytopenia iso cirrhosis  [] DVT PPx with SCDs  [] Transfuse to maintain Hgb > 7, PLT > 50k    ===Skin===  #Occipital laceration  - s/p staples  [] remove staples in 7 days on     ===Ethics===  Full code        For Follow-Up:  [ ] Monitor hemoglobin; no clinical signs of bleeding but dropped 8.5 from 9.4  [ ] Monitor urine output. Pt was having low urine output about 10cc/hr though Cr normal, was not eating or drinking much. Gave a bolus.  [ ] Remove occipital staples after 7 days.   [ ] Touch base with GI/hepatology and see if they want to start atenolol for varices ppx MICU Transfer Note  ---------------------------    Transfer from: MICU  Transfer to:  ( X ) Medicine    (  ) Telemetry    (  ) RCU    (  ) Palliative    (  ) Stroke Unit    (  ) _______________  Accepting Physician: Dr. Tommie Blackmon      [facility]  - Pt with IVEY cirrhosis presented with hematemesis now s/p endoscopy, found varix at GE junction now s/p banding  - Hemostasis achieved, Hgb stable  - Remains on octreotide  - Did well on SBT and was successfully extubated   - Ammonia normal, no signs of hepatic encephalopathy. Currently AAOx3  - Advanced to full liquid diet        OBJECTIVE --  Vital Signs Last 24 Hrs  T(C): 36.9 (28 Dec 2022 04:00), Max: 37.6 (27 Dec 2022 20:00)  T(F): 98.4 (28 Dec 2022 04:00), Max: 99.7 (27 Dec 2022 20:00)  HR: 62 (28 Dec 2022 07:00) (57 - 76)  BP: 112/57 (28 Dec 2022 07:00) (102/53 - 154/72)  BP(mean): 82 (28 Dec 2022 07:00) (73 - 103)  RR: 19 (28 Dec 2022 07:00) (8 - 36)  SpO2: 96% (28 Dec 2022 07:00) (94% - 100%)    Parameters below as of 27 Dec 2022 20:00  Patient On (Oxygen Delivery Method): room air        I&O's Summary    27 Dec 2022 07:01  -  28 Dec 2022 07:00  --------------------------------------------------------  IN: 1533 mL / OUT: 615 mL / NET: 918 mL        MEDICATIONS  (STANDING):  cefTRIAXone   IVPB      cefTRIAXone   IVPB 2000 milliGRAM(s) IV Intermittent every 24 hours  chlorhexidine 4% Liquid 1 Application(s) Topical <User Schedule>  dextrose 5%. 1000 milliLiter(s) (100 mL/Hr) IV Continuous <Continuous>  dextrose 5%. 1000 milliLiter(s) (50 mL/Hr) IV Continuous <Continuous>  dextrose 50% Injectable 25 Gram(s) IV Push once  dextrose 50% Injectable 12.5 Gram(s) IV Push once  dextrose 50% Injectable 25 Gram(s) IV Push once  glucagon  Injectable 1 milliGRAM(s) IntraMuscular once  insulin lispro (ADMELOG) corrective regimen sliding scale   SubCutaneous every 6 hours  lactulose Syrup 20 Gram(s) Oral three times a day  octreotide  Infusion 50 MICROgram(s)/Hr (10 mL/Hr) IV Continuous <Continuous>  pantoprazole  Injectable 40 milliGRAM(s) IV Push daily  rifAXIMin 550 milliGRAM(s) Oral two times a day  sertraline 50 milliGRAM(s) Oral daily    MEDICATIONS  (PRN):  dextrose Oral Gel 15 Gram(s) Oral once PRN Blood Glucose LESS THAN 70 milliGRAM(s)/deciliter  fentaNYL    Injectable 50 MICROGram(s) IV Push every 4 hours PRN Moderate Pain (4 - 6)        LABS                                            8.6                   Neurophils% (auto):   x      ( @ 00:14):    2.25 )-----------(29           Lymphocytes% (auto):  x                                             27.1                   Eosinphils% (auto):   x        Manual%: Neutrophils x    ; Lymphocytes x    ; Eosinophils x    ; Bands%: x    ; Blasts x                                    146    |  112    |  34                  Calcium: 7.9   / iCa: x      ( @ 00:14)    ----------------------------<  139       Magnesium: 1.8                              3.7     |  24     |  1.05             Phosphorous: 2.7      TPro  4.9    /  Alb  2.7    /  TBili  1.2    /  DBili  x      /  AST  39     /  ALT  10     /  AlkPhos  53     28 Dec 2022 00:14    (  @ 00:14 )   PT: 16.3 sec;   INR: 1.40 ratio  aPTT: 28.6 sec          ASSESSMENT & PLAN:   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     ===Neurology===  - AAOx3 at baseline  - Currently AAOx3  - Patient with history of recent hepatic encephalopathy per chart review, improved with lactulose  - Stopped all sedation today, patient was extubated.   - Serum ammonia level was 58.  -Started home lactulose and rifaximin as patient is now PO, ok'd per GI.  -Discontinued Lactulose enemas and started PO home dose lactulose 20mg tid  -Continuing home sertraline.  on , 446 on .  -Requested PT eval    ===Cardiac===   #HTN  - Discontinued levophed as pt has been weaned off pressors. Monitor to ensure MAP>65.  - Will restart home HTN medications if SBPs>180 or DBP>110    ===Respiratory===  # Intubated for airway protection iso active hematemesis  -Pt extubated  saturating well on room air  - AB.45/39/137/27 from  at 00:00  - No pulmonary issues currently or signs of hepatopulmonary syndrome.    ===Gastrointestinal===   #Upper GI Bleed  - EGD at OSH : large amounts of blood and clots in the gastric fundus, suspicion for gastric varices, visualized small distal esophageal varices  - s/p I U FFP  -s/p EGD : Large junctional varix banded  [] continue to hold NGT or OGT placement   -Passed bedside dysphagia swallow  -Ok for full liquid diet per GI  [] will need repeat EGD in 2 weeks to assess for eradication of varices   [] Continue IV PPI 40mg qd  [] Switched lactulose enemas to home dose PO lactulose 20mg tid   [] Continue with Octreotide gtt x 72 hours post EGD. Stop  ~5pm.  [] Continue with aggressive fluid resuscitation with crystalloids and PRBCs prn  [] continue with ceftriaxone 1 g q 24 hours for 7 days.  Day 4 of 7. Last day   []Hepatology will likely initiate liver transplant workup when patient becomes more clinically stable  [] IR consult for TIPS procedure given active hematemesis- give 1 U FFP before TIPS   - f/u AM CBC and coags before tips   - f/u CT triple phase   -IR discussed case in person with hepatology attending Dr. Prado, who feels the patient is high risk for TIPS placement given existing HE.   -IR remains available for TIPS placement if endoscopy cannot adequately treat varices or if patient has another acute bleeding episode   [] Maintain active T&S, transfuse Hgb > 7  #Cirrhosis  - Reportedly non-EtOH cirrhosis, 2/2 to IVEY  - MELD score 17- 6% estimated 3 month mortality   - no evidence of hepatic encephalopathy on this admission  [] Continue with Ceftriaxone 1g IV qd for SBP PPx for a total of 7 days ( -  )    ===Renal===  SCr now 1.00 from 1.17  - No active issues    ===Infectious Disease===  #SBP PPX  [] Continue with ceftriaxone 1g IV for SBP PPx  for 7 days (-)    ===Endocrine===  #T2DM  [] Start on insulin sliding scale   [] If suboptimal glycemic control, can transition to NPH    ===Hematology===  #Anemia  #Thrombcytopenia  - Chronic anemia and thrombocytopenia iso cirrhosis  [] DVT PPx with SCDs  [] Transfuse to maintain Hgb > 7, PLT > 50k    ===Skin===  #Occipital laceration  - s/p staples  [] remove staples in 7 days on     ===Ethics===  Full code        For Follow-Up:  [ ] Monitor hemoglobin; no clinical signs of bleeding but dropped 8.5 from 9.4  [ ] Monitor urine output. Pt was having low urine output about 10cc/hr though Cr normal, was not eating or drinking much. Gave a bolus.  [ ] Remove occipital staples after 7 days.   [ ] Touch base with GI/hepatology and see if they want to start atenolol for varices ppx  [ ] Incentive spirometer MICU Transfer Note  ---------------------------    Transfer from: MICU  Transfer to:  ( X ) Medicine    (  ) Telemetry    (  ) RCU    (  ) Palliative    (  ) Stroke Unit    (  ) _______________  Accepting Physician: Dr. Tommie Blackmon      [facility]  - Pt with IVEY cirrhosis presented with hematemesis now s/p endoscopy, found varix at GE junction now s/p banding  - Hemostasis achieved, Hgb stable  - Remains on octreotide, may stop today pending hepatology attending approval  - Did well on SBT and was successfully extubated   - Ammonia normal, no signs of hepatic encephalopathy. Currently AAOx3  - Advanced to full liquid diet        OBJECTIVE --  Vital Signs Last 24 Hrs  T(C): 36.9 (28 Dec 2022 04:00), Max: 37.6 (27 Dec 2022 20:00)  T(F): 98.4 (28 Dec 2022 04:00), Max: 99.7 (27 Dec 2022 20:00)  HR: 62 (28 Dec 2022 07:00) (57 - 76)  BP: 112/57 (28 Dec 2022 07:00) (102/53 - 154/72)  BP(mean): 82 (28 Dec 2022 07:00) (73 - 103)  RR: 19 (28 Dec 2022 07:00) (8 - 36)  SpO2: 96% (28 Dec 2022 07:00) (94% - 100%)    Parameters below as of 27 Dec 2022 20:00  Patient On (Oxygen Delivery Method): room air        I&O's Summary    27 Dec 2022 07:01  -  28 Dec 2022 07:00  --------------------------------------------------------  IN: 1533 mL / OUT: 615 mL / NET: 918 mL        MEDICATIONS  (STANDING):  cefTRIAXone   IVPB      cefTRIAXone   IVPB 2000 milliGRAM(s) IV Intermittent every 24 hours  chlorhexidine 4% Liquid 1 Application(s) Topical <User Schedule>  dextrose 5%. 1000 milliLiter(s) (100 mL/Hr) IV Continuous <Continuous>  dextrose 5%. 1000 milliLiter(s) (50 mL/Hr) IV Continuous <Continuous>  dextrose 50% Injectable 25 Gram(s) IV Push once  dextrose 50% Injectable 12.5 Gram(s) IV Push once  dextrose 50% Injectable 25 Gram(s) IV Push once  glucagon  Injectable 1 milliGRAM(s) IntraMuscular once  insulin lispro (ADMELOG) corrective regimen sliding scale   SubCutaneous every 6 hours  lactulose Syrup 20 Gram(s) Oral three times a day  octreotide  Infusion 50 MICROgram(s)/Hr (10 mL/Hr) IV Continuous <Continuous>  pantoprazole  Injectable 40 milliGRAM(s) IV Push daily  rifAXIMin 550 milliGRAM(s) Oral two times a day  sertraline 50 milliGRAM(s) Oral daily    MEDICATIONS  (PRN):  dextrose Oral Gel 15 Gram(s) Oral once PRN Blood Glucose LESS THAN 70 milliGRAM(s)/deciliter  fentaNYL    Injectable 50 MICROGram(s) IV Push every 4 hours PRN Moderate Pain (4 - 6)        LABS                                            8.6                   Neurophils% (auto):   x      ( @ 00:14):    2.25 )-----------(29           Lymphocytes% (auto):  x                                             27.1                   Eosinphils% (auto):   x        Manual%: Neutrophils x    ; Lymphocytes x    ; Eosinophils x    ; Bands%: x    ; Blasts x                                    146    |  112    |  34                  Calcium: 7.9   / iCa: x      ( @ 00:14)    ----------------------------<  139       Magnesium: 1.8                              3.7     |  24     |  1.05             Phosphorous: 2.7      TPro  4.9    /  Alb  2.7    /  TBili  1.2    /  DBili  x      /  AST  39     /  ALT  10     /  AlkPhos  53     28 Dec 2022 00:14    (  @ 00:14 )   PT: 16.3 sec;   INR: 1.40 ratio  aPTT: 28.6 sec          ASSESSMENT & PLAN:   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     ===Neurology===  - AAOx3 at baseline  - Currently AAOx3  - Patient with history of recent hepatic encephalopathy per chart review, improved with lactulose  - Stopped all sedation today, patient was extubated.   - Serum ammonia level was 58.  -Started home lactulose and rifaximin as patient is now PO, ok'd per GI.  -Discontinued Lactulose enemas and started PO home dose lactulose 20mg tid  -Continuing home sertraline.  on , 446 on .  -Requested PT eval    ===Cardiac===   #HTN  - Discontinued levophed as pt has been weaned off pressors. Monitor to ensure MAP>65.  - Will restart home HTN medications if SBPs>180 or DBP>110    ===Respiratory===  # Intubated for airway protection iso active hematemesis  -Pt extubated  saturating well on room air  - AB.45/39/137/27 from  at 00:00  - No pulmonary issues currently or signs of hepatopulmonary syndrome.    ===Gastrointestinal===   #Upper GI Bleed  - EGD at OSH : large amounts of blood and clots in the gastric fundus, suspicion for gastric varices, visualized small distal esophageal varices  - s/p I U FFP  -s/p EGD : Large junctional varix banded  [] continue to hold NGT or OGT placement   -Passed bedside dysphagia swallow  -Ok for full liquid diet per GI  [] will need repeat EGD in 2 weeks to assess for eradication of varices   [] Continue IV PPI 40mg qd  [] Switched lactulose enemas to home dose PO lactulose 20mg tid   [] Continue with Octreotide gtt x 72 hours post EGD. Stop  ~5pm.  [] Continue with aggressive fluid resuscitation with crystalloids and PRBCs prn  [] continue with ceftriaxone 1 g q 24 hours for 7 days.  Day 4 of 7. Last day   []Hepatology will likely initiate liver transplant workup when patient becomes more clinically stable  [] IR consult for TIPS procedure given active hematemesis- give 1 U FFP before TIPS   - f/u AM CBC and coags before tips   - f/u CT triple phase   -IR discussed case in person with hepatology attending Dr. Prado, who feels the patient is high risk for TIPS placement given existing HE.   -IR remains available for TIPS placement if endoscopy cannot adequately treat varices or if patient has another acute bleeding episode   [] Maintain active T&S, transfuse Hgb > 7  #Cirrhosis  - Reportedly non-EtOH cirrhosis, 2/2 to IVEY  - MELD score 17- 6% estimated 3 month mortality   - no evidence of hepatic encephalopathy on this admission  [] Continue with Ceftriaxone 1g IV qd for SBP PPx for a total of 7 days ( -  )    ===Renal===  SCr now 1.00 from 1.17  - No active issues    ===Infectious Disease===  #SBP PPX  [] Continue with ceftriaxone 1g IV for SBP PPx  for 7 days (-)    ===Endocrine===  #T2DM  [] Start on insulin sliding scale   [] If suboptimal glycemic control, can transition to NPH    ===Hematology===  #Anemia  #Thrombcytopenia  - Chronic anemia and thrombocytopenia iso cirrhosis  [] DVT PPx with SCDs  [] Transfuse to maintain Hgb > 7, PLT > 50k    ===Skin===  #Occipital laceration  - s/p staples  [] remove staples in 7 days on     ===Ethics===  Full code        For Follow-Up:  [ ] Monitor hemoglobin; no clinical signs of bleeding but dropped 8.5 from 9.4  [ ] Monitor urine output. Pt was having low urine output about 10cc/hr though Cr normal, was not eating or drinking much. Gave a bolus.  [ ] Remove occipital staples after 7 days.   [ ] Touch base with GI/hepatology and see if they want to start atenolol for varices ppx  [ ] Incentive spirometer

## 2022-12-28 NOTE — PHYSICAL THERAPY INITIAL EVALUATION ADULT - ADDITIONAL COMMENTS
Patient lives in a private house with her spouse. There are 4 CHRIS and a ramp. Patients spouse is  patients emergency contact/surrogate decision maker. PTA, patient was independent with ADLs and ambulation, no DME/HHA.

## 2022-12-28 NOTE — PROGRESS NOTE ADULT - PROBLEM SELECTOR PROBLEM 1
Bleeding esophageal varices requiring more than four units of blood in 24 hours, admission to ICU, or surgery

## 2022-12-28 NOTE — PROGRESS NOTE ADULT - ATTENDING COMMENTS
Agree with above  Extubated yesterday, hemodynamically stable off pressors  Awake alert responsive protecting airway  Tolerated full liquid diet  Hemoglobin stable  Low MELD score  Transfer to general medicine

## 2022-12-28 NOTE — PHYSICAL THERAPY INITIAL EVALUATION ADULT - PERTINENT HX OF CURRENT PROBLEM, REHAB EVAL
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 Per patient's , patient experiencing hematemesis and dark stools for 24 hours. Patient then fell when getting out of bed, prompting them to come to the ED. Denies asterixes, confusion/, jaundice or any skin changes, weight loss/ gain, changes in urination or bowel movements, abdominal pain orrecent illness. In the ED at [facility], the patient was found to have an acute drop in hgb from 11 to 5.6 and was hypotensive to an SBP of 80s, but initially responded to resuscitation with  blood products- 5 U pRBCs, 3 FFP, 1 PLT. Did not require any pressure support with pressors. Patient was intubated for airway protection in the setting of an acute UGIB and underwent evaluation with EGD, which revealed large amount of blood at the gastric fundus, portal hypertensive gastropathy, normal D1 and D2 in the duodenum and small distal esophageal varices with no sigmata of recent bleeding. The patient was transferred to the [facility] MICU for further management and consideration of TIPS procedure. Of note, patient also got 2 staples for an occipital laceration s/p syncopal episode. CTH/C spine showed no evidence of acute fracture or bleed[facility], intubated and sedated on fentanyl and propofol. Continued on octreotide and PPI gtt.

## 2022-12-28 NOTE — CHART NOTE - NSCHARTNOTEFT_GEN_A_CORE
MAR Accept Note  Transfer to:  [facility]  Accepting Attending Physician:  Dr. Blackmon  Assigned Room:  5T 503    Patient seen and examined.   Labs and data reviewed.   No findings precluding transfer of service.       HPI/MICU COURSE:   Please refer to MICU transfer note for full details. 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   - Pt with IVEY cirrhosis presented with hematemesis now s/p endoscopy, found varix at GE junction now s/p banding  - Hemostasis achieved, Hgb stable  - Remains on octreotide, may stop today pending hepatology attending approval  - Did well on SBT and was successfully extubated 12/27  - Ammonia normal, no signs of hepatic encephalopathy. Currently AAOx3  - Advanced to full liquid diet        FOR FOLLOW-UP:  [ ] Monitor hemoglobin; no clinical signs of bleeding but dropped 8.5 from 9.4  [ ] Monitor urine output. Pt was having low urine output about 10cc/hr though Cr normal, was not eating or drinking much. Gave a bolus.  [ ] Remove occipital staples after 7 days. 12/31  [ ] Touch base with GI/hepatology and see if they want to start atenolol for varices ppx  [ ] Incentive spirometer.

## 2022-12-28 NOTE — PHYSICAL THERAPY INITIAL EVALUATION ADULT - NSPTDMEREC_GEN_A_CORE
Patient will require a rolling walker for safe ambulation due to decreased balance/safety safetyfor discharge./rolling walker

## 2022-12-28 NOTE — PHYSICAL THERAPY INITIAL EVALUATION ADULT - WEIGHT-BEARING RESTRICTIONS: SIT/STAND, REHAB EVAL
SUBJECTIVE:   James Lama is a 89 year old male who presents to clinic today for the following health issues:    Nausea      Duration: 8/8/18    Description (location/character/radiation):     Intensity:  moderate    Accompanying signs and symptoms: weak, not eating    History (similar episodes/previous evaluation): None    Precipitating or alleviating factors: None    Therapies tried and outcome: pepto, last dose was yesterday. Appetite is decreased and he has lost 9# since 7/25/18.  He complains of fatigue and he is not moving around as he usually does, has been sitting in his recliner most of the time, he states he is drinking plenty of water, his wife contradicts this.   Patient is accompanied by his wife today.  He is a poor historian and his wife contradicts much of what patient tells me. He tells me is is not eating but she differs stating he eats about what he usually eats.  She agrees that he is more sedentary and unsteady on his feet for the last couple of weeks.  No vomiting or abdominal pain.  He denies recent diarrhea, states stools are brown, formed but he is not stooling much.    Hypertension Follow-up      Outpatient blood pressures are not being checked.    Low Salt Diet: no added salt      Problem list and histories reviewed & adjusted, as indicated.  Additional history: as documented    Patient Active Problem List   Diagnosis     Prostate cancer (H)     Hyperlipidemia LDL goal <130     Benign hypertension with chronic kidney disease, stage III     KESHA (obstructive sleep apnea)     Gout     Diverticulosis     Perennial allergic rhinitis     GERD (gastroesophageal reflux disease)     Hypothyroidism due to acquired atrophy of thyroid     Erectile dysfunction     Hypertension goal BP (blood pressure) < 140/90     Right inguinal hernia     History of basal cell carcinoma     Advance care planning     Paresthesias/numbness, both feet     Vitamin B12 deficiency (non anemic)     Health Care Home      Actinic keratosis     Obesity, Class I, BMI 30-34.9     Recurrent falls     Cortical cataract of both eyes     Nuclear sclerosis of both eyes     Chronic kidney disease, stage 3 (moderate)     Anemia in stage 3 chronic kidney disease     Perennial allergic rhinitis, unspecified allergic rhinitis trigger     Cognitive impairment, mild, so stated     Past Surgical History:   Procedure Laterality Date     EXC SKIN MALIG 0.5CM OR LESS,FACIAL      BCC nasal tip     EXC SKIN MALIG 0.6-1CM FACE,FACIAL      BCC left ala     EXC SKIN MALIG 1.1-2CM FACE,FACIAL  10/09    BCC forehead     EXC SKIN MALIG 1.1-2CM TRUNK,ARM,LEG      BCC upper back     HERNIA REPAIR, INGUINAL RT/LT  ~    LT       Social History   Substance Use Topics     Smoking status: Never Smoker     Smokeless tobacco: Never Used     Alcohol use No     Family History   Problem Relation Age of Onset     Cancer Father      , unk type     Myocardial Infarction Sister      Thyroid Disease Sister      Diabetes No family hx of      Hypertension No family hx of      Cerebrovascular Disease No family hx of      Glaucoma No family hx of      Macular Degeneration No family hx of          Current Outpatient Prescriptions   Medication Sig Dispense Refill     acetaminophen (TYLENOL) 500 MG tablet Take 2 tablets by mouth 2 times daily as needed for pain (knee).  0     ASPIRIN 81 MG OR TABS 1 tablet daily*       Cyanocobalamin 1000 MCG TABS 1 tablet daily for foot neuropathy (vitamin B12).       fluticasone (FLONASE) 50 MCG/ACT spray USE ONE TO TWO SPRAY(S) IN EACH NOSTRIL ONCE DAILY FOR  ALLERGY  PREVENTION 1 Bottle 1     indomethacin (INDOCIN) 50 MG capsule Take 1 capsule (50 mg) by mouth 3 times daily (with meals) as needed for gouty attack. 40 capsule 1     levothyroxine (SYNTHROID/LEVOTHROID) 88 MCG tablet Take 1 tablet (88 mcg) by mouth daily for thyroid. 90 tablet 1     lisinopril (PRINIVIL/ZESTRIL) 40 MG tablet Take 1 tablet (40 mg) by mouth daily  "for blood pressure. 90 tablet 1     lovastatin (MEVACOR) 20 MG tablet Take 1 tablet (20 mg) by mouth every evening for cholesterol. 90 tablet 1     metoprolol succinate (TOPROL-XL) 25 MG 24 hr tablet Take 1 tablet daily for blood pressure. 90 tablet 1     Multiple Vitamins-Minerals (PRESERVISION AREDS PO) Take 1 capsule by mouth 2 times daily (with meals) (PreserVision AREDS 2)       omeprazole (PRILOSEC) 20 MG CR capsule Take 2 capsules (40 mg) by mouth daily for reflux. Take 30-60 minutes before a meal. 180 capsule 3     VITAMIN D, CHOLECALCIFEROL, PO Take 1,000 Units by mouth daily        BP Readings from Last 3 Encounters:   08/20/18 120/67   08/07/18 127/67   08/06/18 133/73    Wt Readings from Last 3 Encounters:   08/20/18 206 lb (93.4 kg)   08/07/18 210 lb (95.3 kg)   08/06/18 211 lb 3.2 oz (95.8 kg)                    Reviewed and updated as needed this visit by clinical staff  Tobacco  Allergies  Meds  Med Hx  Surg Hx  Fam Hx  Soc Hx      Reviewed and updated as needed this visit by Provider         ROS:  Constitutional, HEENT, cardiovascular, pulmonary, gi and gu systems are negative, except as otherwise noted.    OBJECTIVE:     /67 (BP Location: Left arm, Patient Position: Chair, Cuff Size: Adult Large)  Pulse 58  Temp 98.3  F (36.8  C) (Oral)  Ht 5' 8.75\" (1.746 m)  Wt 206 lb (93.4 kg)  SpO2 99%  BMI 30.64 kg/m2  Body mass index is 30.64 kg/(m^2).  GENERAL: healthy, alert and no distress  EYES: Eyes grossly normal to inspection, PERRL and conjunctivae and sclerae normal  HENT: ear canals and TM's normal, nose and mouth without ulcers or lesions  NECK: no adenopathy, no asymmetry, masses, or scars and thyroid normal to palpation  RESP: lungs clear to auscultation - no rales, rhonchi or wheezes  CV: regular rate and rhythm, normal S1 S2, no S3 or S4, no murmur, click or rub, no peripheral edema and peripheral pulses strong  ABDOMEN: soft, nontender, no hepatosplenomegaly, no masses and " bowel sounds normal  MS: no gross musculoskeletal defects noted, no edema  SKIN: no suspicious lesions or rashes  NEURO: Normal strength and tone, mentation intact and speech normal  BACK: no CVA tenderness, no paralumbar tenderness  PSYCH: concentration poor, affect normal/bright and appearance well groomed    Diagnostic Test Results:  Results for orders placed or performed in visit on 08/20/18   CBC with platelets   Result Value Ref Range    WBC Canceled, Test credited 4.0 - 11.0 10e9/L    RBC Count 4.14 (L) 4.4 - 5.9 10e12/L    Hemoglobin 13.1 (L) 13.3 - 17.7 g/dL    Hematocrit 38.1 (L) 40.0 - 53.0 %    MCV 92 78 - 100 fl    MCH 31.6 26.5 - 33.0 pg    MCHC 34.4 31.5 - 36.5 g/dL    RDW 12.6 10.0 - 15.0 %    Platelet Count 167 150 - 450 10e9/L   Basic metabolic panel  (Ca, Cl, CO2, Creat, Gluc, K, Na, BUN)   Result Value Ref Range    Sodium 130 (L) 133 - 144 mmol/L    Potassium 4.6 3.4 - 5.3 mmol/L    Chloride 95 94 - 109 mmol/L    Carbon Dioxide 28 20 - 32 mmol/L    Anion Gap 7 3 - 14 mmol/L    Glucose 102 (H) 70 - 99 mg/dL    Urea Nitrogen 22 7 - 30 mg/dL    Creatinine 1.21 0.66 - 1.25 mg/dL    GFR Estimate 56 (L) >60 mL/min/1.7m2    GFR Estimate If Black 68 >60 mL/min/1.7m2    Calcium 9.0 8.5 - 10.1 mg/dL   UA reflex to Microscopic and Culture   Result Value Ref Range    Color Urine Yellow     Appearance Urine Clear     Glucose Urine Negative NEG^Negative mg/dL    Bilirubin Urine Negative NEG^Negative    Ketones Urine Negative NEG^Negative mg/dL    Specific Gravity Urine 1.015 1.003 - 1.035    Blood Urine Negative NEG^Negative    pH Urine 6.0 5.0 - 7.0 pH    Protein Albumin Urine Negative NEG^Negative mg/dL    Urobilinogen Urine 0.2 0.2 - 1.0 EU/dL    Nitrite Urine Negative NEG^Negative    Leukocyte Esterase Urine Negative NEG^Negative    Source Midstream Urine    CBC with platelets and differential   Result Value Ref Range    WBC 11.4 (H) 4.0 - 11.0 10e9/L    RBC Count 4.14 (L) 4.4 - 5.9 10e12/L    Hemoglobin  "13.1 (L) 13.3 - 17.7 g/dL    Hematocrit 38.1 (L) 40.0 - 53.0 %    MCV 92 78 - 100 fl    MCH 31.6 26.5 - 33.0 pg    MCHC 34.4 31.5 - 36.5 g/dL    RDW 12.6 10.0 - 15.0 %    Platelet Count 167 150 - 450 10e9/L    Diff Method Automated Method     % Neutrophils 68.4 %    % Lymphocytes 19.6 %    % Monocytes 7.7 %    % Eosinophils 4.2 %    % Basophils 0.1 %    Absolute Neutrophil 7.8 1.6 - 8.3 10e9/L    Absolute Lymphocytes 2.2 0.8 - 5.3 10e9/L    Absolute Monocytes 0.9 0.0 - 1.3 10e9/L    Absolute Eosinophils 0.5 0.0 - 0.7 10e9/L    Absolute Basophils 0.0 0.0 - 0.2 10e9/L       ASSESSMENT/PLAN:         BMI:   Estimated body mass index is 30.64 kg/(m^2) as calculated from the following:    Height as of this encounter: 5' 8.75\" (1.746 m).    Weight as of this encounter: 206 lb (93.4 kg).         1. Nausea  Nausea likely due to large amount of stool in colon.  Advised him to start on Miralax daily, increase to BID dosing if necessary.  Call if symptoms persist/worsen.  - XR KUB; Future  - CBC with platelets  - Basic metabolic panel  (Ca, Cl, CO2, Creat, Gluc, K, Na, BUN)  - UA reflex to Microscopic and Culture    2. Benign hypertension with chronic kidney disease, stage III  BP stable at this time, continue present management.    3. Cognitive impairment, mild, so stated  Patient is a poor historian but his wife comes to his appts and helps to fill in the gaps.      4. Anemia, unspecified type  Stable, likely anemia of chronic disease.    - CBC with platelets and differential  - Vitamin B12; Future  - Folate; Future    5. Hyponatremia  Likely due to hypovolemia.  Increase fluid intake, recheck BMP at the end of the week.    6. Constipation, unspecified constipation type    We discussed in detail dietary changes that may soften his stools, in particular increasing fiber (fruits, especially those that begin with the letter \"P\"/vegetables/bran) and decreasing foods that cause constipation (dairy/cooked carrots, bananas).  I " recommend he increase his water intake and start an OTC stool softener, to be taken on a daily basis.     - polyethylene glycol (MIRALAX) powder; Take 17 g (1 capful) by mouth daily  Dispense: 510 g; Refill: 1    See Patient Instructions    [facility]   full weight-bearing

## 2022-12-28 NOTE — PROGRESS NOTE ADULT - ASSESSMENT
68 yo f w pmh espana cirrhosis, htn, hld, dm, initially presented to [facility] for hematemesis + melena + syncope, found to have significantly worsening anemia (hgb ~5), concern for gib, s/p 5 u prbc + 1 u plts +1 u ffp, s/p intubation for airway portection, s/p egd which showed large amount of blood in stomach suspected to be due to esophageal/gastric variceal bleeding. patient was stabilized  66 yo f w pmh espana cirrhosis c/b portal htn, htn, hld, dm, initially presented to [facility] for hematemesis + melena + syncope, found to have significantly worsening anemia (hgb ~5), concern for gib, s/p intubation for airway protection, s/p pressors for hemorrhagic shock, s/p egd which showed large amount of blood in stomach suspected to be due to esophageal/gastric variceal bleeding. patient was stabilized w s/p 5 u prbc + 1 u plts +1 u ffp, protonix + octreotide infusions, ppx abx w ceftriaxone and transferred to [facility] micu for further mgmt of variceal bleed via gi and/or ir intervention (brto/bato/tips). since then, gi/hepatology and ir were consulted and have collaborated here, deciding to attempt to control/treat variceal bleed and varices via egd given the high risk a/w tips especially since patient has had multiple prior hospitalizations for he; however, IR remains available for TIPS placement if endoscopy cannot adequately treat varices or if patient has another acute bleeding episode. 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 thereafter, patient was weaned off pressors and extubated on 12/27, dc'd off of octreotide + ceftriaxone, transitioned to po ppi + sucralfate, started on coreg 6.25 bid + lactulose 20 g tid w rifaximin, and deemed stable for transfer to [facility] for further mgmt.

## 2022-12-28 NOTE — PROGRESS NOTE ADULT - SUBJECTIVE AND OBJECTIVE BOX
Chief Complaint:  Patient is a 67y old  Female who presents with a chief complaint of Hematemesis (27 Dec 2022 14:21)      Interval Events: Reports feeling well. Denies any N/V/D/C, abd pain, melena or hematochezia.  Had 6 BMs in last 24 hours.    Hospital Medications:  cefTRIAXone   IVPB      cefTRIAXone   IVPB 2000 milliGRAM(s) IV Intermittent every 24 hours  chlorhexidine 4% Liquid 1 Application(s) Topical <User Schedule>  dextrose 5%. 1000 milliLiter(s) IV Continuous <Continuous>  dextrose 5%. 1000 milliLiter(s) IV Continuous <Continuous>  dextrose 50% Injectable 25 Gram(s) IV Push once  dextrose 50% Injectable 12.5 Gram(s) IV Push once  dextrose 50% Injectable 25 Gram(s) IV Push once  dextrose Oral Gel 15 Gram(s) Oral once PRN  fentaNYL    Injectable 50 MICROGram(s) IV Push every 4 hours PRN  glucagon  Injectable 1 milliGRAM(s) IntraMuscular once  insulin lispro (ADMELOG) corrective regimen sliding scale   SubCutaneous every 6 hours  lactulose Syrup 20 Gram(s) Oral three times a day  octreotide  Infusion 50 MICROgram(s)/Hr IV Continuous <Continuous>  pantoprazole  Injectable 40 milliGRAM(s) IV Push daily  rifAXIMin 550 milliGRAM(s) Oral two times a day  sertraline 50 milliGRAM(s) Oral daily      PMHX/PSHX:          ROS: 14 point ROS negative unless otherwise stated in subjective      PHYSICAL EXAM:     GENERAL: Patient appears chronically ill, NAD  HEENT:  NCAT, no scleral icterus   CHEST: +NWOB; +NC  HEART:  Regular rate and rhythm  ABDOMEN:  Soft, non-tender, distended, no masses  EXTREMITIES: No edema  SKIN:  No rash/erythema/ecchymoses/petechiae/wounds/abscess/warm/dry  NEURO:  awake, +confused; oriented x2    Vital Signs:  Vital Signs Last 24 Hrs  T(C): 36.9 (28 Dec 2022 04:00), Max: 37.6 (27 Dec 2022 20:00)  T(F): 98.4 (28 Dec 2022 04:00), Max: 99.7 (27 Dec 2022 20:00)  HR: 62 (28 Dec 2022 06:00) (57 - 76)  BP: 112/52 (28 Dec 2022 06:00) (102/53 - 155/67)  BP(mean): 75 (28 Dec 2022 06:00) (73 - 103)  RR: 17 (28 Dec 2022 06:00) (8 - 36)  SpO2: 94% (28 Dec 2022 06:00) (94% - 100%)    Parameters below as of 27 Dec 2022 20:00  Patient On (Oxygen Delivery Method): room air      Daily     Daily     LABS:                        8.6    2.25  )-----------( 29       ( 28 Dec 2022 00:14 )             27.1     12-28    146<H>  |  112<H>  |  34<H>  ----------------------------<  139<H>  3.7   |  24  |  1.05    Ca    7.9<L>      28 Dec 2022 00:14  Phos  2.7     12-28  Mg     1.8     12-28    TPro  4.9<L>  /  Alb  2.7<L>  /  TBili  1.2  /  DBili  x   /  AST  39  /  ALT  10  /  AlkPhos  53  12-28    LIVER FUNCTIONS - ( 28 Dec 2022 00:14 )  Alb: 2.7 g/dL / Pro: 4.9 g/dL / ALK PHOS: 53 U/L / ALT: 10 U/L / AST: 39 U/L / GGT: x           PT/INR - ( 28 Dec 2022 00:14 )   PT: 16.3 sec;   INR: 1.40 ratio         PTT - ( 28 Dec 2022 00:14 )  PTT:28.6 sec        Imaging: No new abdominal imaging                Chief Complaint:  Patient is a 67y old  Female who presents with a chief complaint of Hematemesis (27 Dec 2022 14:21)      Interval Events: Reports feeling well. Denies any N/V/D/C, abd pain, melena or hematochezia.  Had 6 BMs in last 24 hours. Tolerating full liquids well. Plan to TTF today.    Hospital Medications:  cefTRIAXone   IVPB      cefTRIAXone   IVPB 2000 milliGRAM(s) IV Intermittent every 24 hours  chlorhexidine 4% Liquid 1 Application(s) Topical <User Schedule>  dextrose 5%. 1000 milliLiter(s) IV Continuous <Continuous>  dextrose 5%. 1000 milliLiter(s) IV Continuous <Continuous>  dextrose 50% Injectable 25 Gram(s) IV Push once  dextrose 50% Injectable 12.5 Gram(s) IV Push once  dextrose 50% Injectable 25 Gram(s) IV Push once  dextrose Oral Gel 15 Gram(s) Oral once PRN  fentaNYL    Injectable 50 MICROGram(s) IV Push every 4 hours PRN  glucagon  Injectable 1 milliGRAM(s) IntraMuscular once  insulin lispro (ADMELOG) corrective regimen sliding scale   SubCutaneous every 6 hours  lactulose Syrup 20 Gram(s) Oral three times a day  octreotide  Infusion 50 MICROgram(s)/Hr IV Continuous <Continuous>  pantoprazole  Injectable 40 milliGRAM(s) IV Push daily  rifAXIMin 550 milliGRAM(s) Oral two times a day  sertraline 50 milliGRAM(s) Oral daily      PMHX/PSHX:          ROS: 14 point ROS negative unless otherwise stated in subjective      PHYSICAL EXAM:     GENERAL: Patient appears chronically ill, NAD  HEENT:  NCAT, no scleral icterus   CHEST: NWOB;  HEART:  Regular rate and rhythm  ABDOMEN:  Soft, non-tender, distended, no masses  EXTREMITIES: No edema  SKIN:  No rash/erythema/ecchymoses/petechiae/wounds/abscess/warm/dry  NEURO:  awake, oriented x3, no asterixis    Vital Signs:  Vital Signs Last 24 Hrs  T(C): 36.9 (28 Dec 2022 04:00), Max: 37.6 (27 Dec 2022 20:00)  T(F): 98.4 (28 Dec 2022 04:00), Max: 99.7 (27 Dec 2022 20:00)  HR: 62 (28 Dec 2022 06:00) (57 - 76)  BP: 112/52 (28 Dec 2022 06:00) (102/53 - 155/67)  BP(mean): 75 (28 Dec 2022 06:00) (73 - 103)  RR: 17 (28 Dec 2022 06:00) (8 - 36)  SpO2: 94% (28 Dec 2022 06:00) (94% - 100%)    Parameters below as of 27 Dec 2022 20:00  Patient On (Oxygen Delivery Method): room air      Daily     Daily     LABS:                        8.6    2.25  )-----------( 29       ( 28 Dec 2022 00:14 )             27.1     12-28    146<H>  |  112<H>  |  34<H>  ----------------------------<  139<H>  3.7   |  24  |  1.05    Ca    7.9<L>      28 Dec 2022 00:14  Phos  2.7     12-28  Mg     1.8     12-28    TPro  4.9<L>  /  Alb  2.7<L>  /  TBili  1.2  /  DBili  x   /  AST  39  /  ALT  10  /  AlkPhos  53  12-28    LIVER FUNCTIONS - ( 28 Dec 2022 00:14 )  Alb: 2.7 g/dL / Pro: 4.9 g/dL / ALK PHOS: 53 U/L / ALT: 10 U/L / AST: 39 U/L / GGT: x           PT/INR - ( 28 Dec 2022 00:14 )   PT: 16.3 sec;   INR: 1.40 ratio         PTT - ( 28 Dec 2022 00:14 )  PTT:28.6 sec        Imaging: No new abdominal imaging

## 2022-12-28 NOTE — PROGRESS NOTE ADULT - SUBJECTIVE AND OBJECTIVE BOX
INTERVAL HPI/OVERNIGHT EVENTS: Pt was having low urine output ~10cc/hr, not eating or drinking much. Gave a bolus of fluids.    SUBJECTIVE: Patient seen and examined at bedside.       VITAL SIGNS:  ICU Vital Signs Last 24 Hrs  T(C): 36.9 (28 Dec 2022 04:00), Max: 37.6 (27 Dec 2022 20:00)  T(F): 98.4 (28 Dec 2022 04:00), Max: 99.7 (27 Dec 2022 20:00)  HR: 62 (28 Dec 2022 07:00) (57 - 76)  BP: 112/57 (28 Dec 2022 07:00) (102/53 - 154/72)  BP(mean): 82 (28 Dec 2022 07:00) (73 - 103)  ABP: --  ABP(mean): --  RR: 19 (28 Dec 2022 07:00) (8 - 36)  SpO2: 96% (28 Dec 2022 07:00) (94% - 100%)    O2 Parameters below as of 27 Dec 2022 20:00  Patient On (Oxygen Delivery Method): room air          Mode: off, FiO2: 35  Plateau pressure:   P/F ratio:     12-27 @ 07:01  -  12-28 @ 07:00  --------------------------------------------------------  IN: 1533 mL / OUT: 615 mL / NET: 918 mL      CAPILLARY BLOOD GLUCOSE      POCT Blood Glucose.: 120 mg/dL (28 Dec 2022 05:46)    ECG:    PHYSICAL EXAM:    General:   HEENT:   Neck:   Respiratory:   Cardiovascular:   Abdomen:   Extremities:  Neurological:    MEDICATIONS:  MEDICATIONS  (STANDING):  cefTRIAXone   IVPB      cefTRIAXone   IVPB 2000 milliGRAM(s) IV Intermittent every 24 hours  chlorhexidine 4% Liquid 1 Application(s) Topical <User Schedule>  dextrose 5%. 1000 milliLiter(s) (100 mL/Hr) IV Continuous <Continuous>  dextrose 5%. 1000 milliLiter(s) (50 mL/Hr) IV Continuous <Continuous>  dextrose 50% Injectable 25 Gram(s) IV Push once  dextrose 50% Injectable 12.5 Gram(s) IV Push once  dextrose 50% Injectable 25 Gram(s) IV Push once  glucagon  Injectable 1 milliGRAM(s) IntraMuscular once  insulin lispro (ADMELOG) corrective regimen sliding scale   SubCutaneous every 6 hours  lactulose Syrup 20 Gram(s) Oral three times a day  octreotide  Infusion 50 MICROgram(s)/Hr (10 mL/Hr) IV Continuous <Continuous>  pantoprazole  Injectable 40 milliGRAM(s) IV Push daily  rifAXIMin 550 milliGRAM(s) Oral two times a day  sertraline 50 milliGRAM(s) Oral daily    MEDICATIONS  (PRN):  dextrose Oral Gel 15 Gram(s) Oral once PRN Blood Glucose LESS THAN 70 milliGRAM(s)/deciliter  fentaNYL    Injectable 50 MICROGram(s) IV Push every 4 hours PRN Moderate Pain (4 - 6)      ALLERGIES:  Allergies    No Known Drug Allergies  shellfish (Unknown)    Intolerances        LABS:                        8.6    2.25  )-----------( 29       ( 28 Dec 2022 00:14 )             27.1     12-28    146<H>  |  112<H>  |  34<H>  ----------------------------<  139<H>  3.7   |  24  |  1.05    Ca    7.9<L>      28 Dec 2022 00:14  Phos  2.7     12-28  Mg     1.8     12-28    TPro  4.9<L>  /  Alb  2.7<L>  /  TBili  1.2  /  DBili  x   /  AST  39  /  ALT  10  /  AlkPhos  53  12-28    PT/INR - ( 28 Dec 2022 00:14 )   PT: 16.3 sec;   INR: 1.40 ratio         PTT - ( 28 Dec 2022 00:14 )  PTT:28.6 sec      RADIOLOGY & ADDITIONAL TESTS: Reviewed. INTERVAL HPI/OVERNIGHT EVENTS: Pt was having low urine output ~10cc/hr, not eating or drinking much. Gave a bolus of fluids.    SUBJECTIVE: Patient seen and examined at bedside.     VITAL SIGNS:  ICU Vital Signs Last 24 Hrs  T(C): 36.9 (28 Dec 2022 04:00), Max: 37.6 (27 Dec 2022 20:00)  T(F): 98.4 (28 Dec 2022 04:00), Max: 99.7 (27 Dec 2022 20:00)  HR: 62 (28 Dec 2022 07:00) (57 - 76)  BP: 112/57 (28 Dec 2022 07:00) (102/53 - 154/72)  BP(mean): 82 (28 Dec 2022 07:00) (73 - 103)  ABP: --  ABP(mean): --  RR: 19 (28 Dec 2022 07:00) (8 - 36)  SpO2: 96% (28 Dec 2022 07:00) (94% - 100%)    O2 Parameters below as of 27 Dec 2022 20:00  Patient On (Oxygen Delivery Method): room air          Mode: off, FiO2: 35  Plateau pressure:   P/F ratio:     12-27 @ 07:01  -  12-28 @ 07:00  --------------------------------------------------------  IN: 1533 mL / OUT: 615 mL / NET: 918 mL      CAPILLARY BLOOD GLUCOSE      POCT Blood Glucose.: 120 mg/dL (28 Dec 2022 05:46)    ECG:    PHYSICAL EXAM:    General:   HEENT:   Neck:   Respiratory:   Cardiovascular:   Abdomen:   Extremities:  Neurological:    MEDICATIONS:  MEDICATIONS  (STANDING):  cefTRIAXone   IVPB      cefTRIAXone   IVPB 2000 milliGRAM(s) IV Intermittent every 24 hours  chlorhexidine 4% Liquid 1 Application(s) Topical <User Schedule>  dextrose 5%. 1000 milliLiter(s) (100 mL/Hr) IV Continuous <Continuous>  dextrose 5%. 1000 milliLiter(s) (50 mL/Hr) IV Continuous <Continuous>  dextrose 50% Injectable 25 Gram(s) IV Push once  dextrose 50% Injectable 12.5 Gram(s) IV Push once  dextrose 50% Injectable 25 Gram(s) IV Push once  glucagon  Injectable 1 milliGRAM(s) IntraMuscular once  insulin lispro (ADMELOG) corrective regimen sliding scale   SubCutaneous every 6 hours  lactulose Syrup 20 Gram(s) Oral three times a day  octreotide  Infusion 50 MICROgram(s)/Hr (10 mL/Hr) IV Continuous <Continuous>  pantoprazole  Injectable 40 milliGRAM(s) IV Push daily  rifAXIMin 550 milliGRAM(s) Oral two times a day  sertraline 50 milliGRAM(s) Oral daily    MEDICATIONS  (PRN):  dextrose Oral Gel 15 Gram(s) Oral once PRN Blood Glucose LESS THAN 70 milliGRAM(s)/deciliter  fentaNYL    Injectable 50 MICROGram(s) IV Push every 4 hours PRN Moderate Pain (4 - 6)      ALLERGIES:  Allergies    No Known Drug Allergies  shellfish (Unknown)    Intolerances        LABS:                        8.6    2.25  )-----------( 29       ( 28 Dec 2022 00:14 )             27.1     12-28    146<H>  |  112<H>  |  34<H>  ----------------------------<  139<H>  3.7   |  24  |  1.05    Ca    7.9<L>      28 Dec 2022 00:14  Phos  2.7     12-28  Mg     1.8     12-28    TPro  4.9<L>  /  Alb  2.7<L>  /  TBili  1.2  /  DBili  x   /  AST  39  /  ALT  10  /  AlkPhos  53  12-28    PT/INR - ( 28 Dec 2022 00:14 )   PT: 16.3 sec;   INR: 1.40 ratio         PTT - ( 28 Dec 2022 00:14 )  PTT:28.6 sec      RADIOLOGY & ADDITIONAL TESTS: Reviewed.

## 2022-12-28 NOTE — PHYSICAL THERAPY INITIAL EVALUATION ADULT - ACTIVE RANGE OF MOTION EXAMINATION, REHAB EVAL
except R shoulder flexion limited to 90 degrees 2/2 previous fall/bilateral upper extremity Active ROM was WFL (within functional limits)/bilateral  lower extremity Active ROM was WFL (within functional limits)

## 2022-12-28 NOTE — PHYSICAL THERAPY INITIAL EVALUATION ADULT - TRANSFER TRAINING, PT EVAL
GOAL: Patient will perform sit to stand transfers independently with least restrictive assistive device with proper hand placement in 2 weeks.

## 2022-12-28 NOTE — PROGRESS NOTE ADULT - SUBJECTIVE AND OBJECTIVE BOX
68 yo f w pmh espana cirrhosis c/b portal htn, htn, hld, dm, initially presented to [facility] for hematemesis + melena + syncope, found to have significantly worsening anemia (hgb ~5), concern for gib, s/p intubation for airway protection, s/p pressors for hemorrhagic shock, s/p egd which showed large amount of blood in stomach suspected to be due to esophageal/gastric variceal bleeding. patient was stabilized w s/p 5 u prbc + 1 u plts +1 u ffp, protonix + octreotide infusions, ppx abx w ceftriaxone and transferred to [facility] for further mgmt of variceal bleed via gi and/or ir intervention (brto/bato/tips). since then, gi/hepatology and ir were consulted and have collaborated here, deciding to attempt to control/treat variceal bleed and varices via egd given the high risk a/w tips especially since patient has had multiple prior hospitalizations for he; however, IR remains available for TIPS placement if endoscopy cannot adequately treat varices or if patient has another acute bleeding episode. 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 thereafter, patient was weaned off pressors and extubated on 12/27, dc'd off of octreotide + ceftriaxone, transitioned to po ppi + sucralfate, started on coreg 6.25 bid + lactulose 20 g tid w rifaximin, and deemed stable for transfer to [facility] for further mgmt.    otherwise, in nad; s/p tov and love removal 12/28, dc'd off of octreotide + ceftriaxone, transitioned to po ppi + sucralfate, started on coreg 6.25 bid + lactulose 20 g tid w rifaximin  bp and hr both borderline low, will need to consider dose adjustment of bb  developing dehydration clinically (decreased uo) and on chem (hyperna); diet advanced today to fdl, will continue to advance as tolerated; in the mean time, will start ivf + lytes as needed  will need to continue to monitor bm and titrate lactulose accordingly to maintain goal ~2 bm/day, as excessive bm may also be contributing to dehydration    CONSTITUTIONAL: No fever. no weakness  ENMT:  No sinus or throat pain  RESPIRATORY: No cough, wheezing, chills or hemoptysis; No shortness of breath  CARDIOVASCULAR: No chest pain, palpitations, dizziness, or leg swelling  GASTROINTESTINAL: No abdominal or epigastric pain. No nausea, vomiting  GENITOURINARY: No dysuria or incontinence  NEUROLOGICAL: No headaches, memory loss, loss of strength, numbness, or tremors  SKIN: No rashes,  No hives or eczema  ENDOCRINE: No heat or cold intolerance; No hair loss  MUSCULOSKELETAL: No joint pain or swelling; No muscle, back, or extremity pain  PSYCHIATRIC: No depression, anxiety, mood swings, or difficulty sleeping  HEME/LYMPH: No easy bruising, or bleeding gums; no enlarged LN    T(C): 37.1 (12-28-22 @ 20:25), Max: 37.2 (12-28-22 @ 00:00)  HR: 55 (12-28-22 @ 20:25) (52 - 96)  BP: 103/56 (12-28-22 @ 20:25) (103/56 - 123/58)  RR: 18 (12-28-22 @ 20:25) (10 - 21)  SpO2: 93% (12-28-22 @ 20:25) (93% - 100%)  GENERAL: NAD, lying in bed comfortably  EYES: EOMI, PERRLA; conjunctiva and sclera clear  ENMT: dryoral mucosa, no pharyngeal injection or exudates  NECK: Supple, no palpable masses; no JVD  RESPIRATORY: Normal respiratory effort; lungs are clear to auscultation bilaterally  CARDIOVASCULAR: Regular rate and rhythm, normal S1 and S2, no murmur/rub/gallop; No lower extremity edema; Peripheral pulses are 2+ bilaterally  ABDOMEN: Nontender to palpation, normoactive bowel sounds, no rebound/guarding   MUSCULOSKELETAL: no joint swelling or tenderness to palpation  PSYCH: A+O to person, place, and time; affect appropriate  NEUROLOGY: CN 2-12 are intact and symmetric; no gross motor or sensory deficits   SKIN: No rashes; no palpable lesions    MEDICATIONS  (STANDING):  carvedilol 6.25 milliGRAM(s) Oral every 12 hours  dextrose 5%. 1000 milliLiter(s) (100 mL/Hr) IV Continuous <Continuous>  dextrose 5%. 1000 milliLiter(s) (50 mL/Hr) IV Continuous <Continuous>  dextrose 50% Injectable 25 Gram(s) IV Push once  dextrose 50% Injectable 12.5 Gram(s) IV Push once  dextrose 50% Injectable 25 Gram(s) IV Push once  glucagon  Injectable 1 milliGRAM(s) IntraMuscular once  insulin lispro (ADMELOG) corrective regimen sliding scale   SubCutaneous three times a day before meals  insulin lispro (ADMELOG) corrective regimen sliding scale   SubCutaneous at bedtime  lactulose Syrup 20 Gram(s) Oral three times a day  rifAXIMin 550 milliGRAM(s) Oral two times a day  sertraline 50 milliGRAM(s) Oral daily  sodium chloride 0.45%. 1000 milliLiter(s) (80 mL/Hr) IV Continuous <Continuous>  sucralfate suspension 1 Gram(s) Oral two times a day    MEDICATIONS  (PRN):  dextrose Oral Gel 15 Gram(s) Oral once PRN Blood Glucose LESS THAN 70 milliGRAM(s)/deciliter                          8.9    2.78  )-----------( 31       ( 28 Dec 2022 21:19 )             27.1   12-28    146<H>  |  112<H>  |  34<H>  ----------------------------<  139<H>  3.7   |  24  |  1.05    Ca    7.9<L>      28 Dec 2022 00:14  Phos  2.7     12-28  Mg     1.8     12-28    TPro  4.9<L>  /  Alb  2.7<L>  /  TBili  1.2  /  DBili  x   /  AST  39  /  ALT  10  /  AlkPhos  53  12-28

## 2022-12-28 NOTE — PROGRESS NOTE ADULT - ASSESSMENT
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 The patient was intubated for airway protection and underwent evaluation with EGD at OSH, with large amount of blood at the gastric fundus. Extubated 12/27.      #decompensated IVEY cirrhosis - MELD 12 (12/26)   #Variceal bleed   Patient s/p EGD at outside hospital with poor visualization due to active bleeding. Patient s/p 5U pRBC with plts and FFP.  - Multiphase CT abdomen/pelvis (12/24) reviewed, with portosystemic collaterals seen and no active extravasation    - ascites: trace ascites on CT 12/24   - HCC: CT without liver lesions (12/24)   - HE: none  - Varices: gastric and esophageal varices, s/p EGD on 12/23 with one esophageal band       Recommendations:   - c/w octreotide gtt x 72 hours  (12/24- 12/27); ok to dc octreotide  - c/w ceftriaxone for 7 days total (12/24 - 12/30)  - c/w PPI IV PPI 40mg daily   - PO lactulose 20 gm TID + rifaximin  - will need repeat EGD in 2 weeks to assess for eradication of varices (1/9)   - will likely initiate liver transplant workup when patient becomes more clinically stable   - full liquid diet as tolerated    All recommendations are tentative until note is attested by attending.     Jenna Lobato MD  GI Fellow, PGY-5  Available via Microsoft Teams    NON-URGENT CONSULTS:  Please email [email or web address]  [email or web address]  AT NIGHT AND ON WEEKENDS:  Contact on-call GI fellow via answering service ([phone]) from 5pm-8am and on weekends/holidays  MONDAY-FRIDAY 8AM-5PM:  Pager# [phone] ([facility]) or [phone] ([facility])  GI Phone# [phone] (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 The patient was intubated for airway protection and underwent evaluation with EGD at OSH, with large amount of blood at the gastric fundus. Extubated 12/27.      #decompensated IVEY cirrhosis - MELD 11 (12/28)   #Variceal bleed   Patient s/p EGD at outside hospital with poor visualization due to active bleeding. Patient s/p 5U pRBC with plts and FFP.  - Multiphase CT abdomen/pelvis (12/24) reviewed, with portosystemic collaterals seen and no active extravasation    - ascites: trace ascites on CT 12/24   - HCC: CT without liver lesions (12/24)   - HE: none  - Varices: gastric and esophageal varices, s/p EGD on 12/23 with one esophageal band       Recommendations:   - c/w octreotide gtt x 72 hours  (12/24- 12/27); ok to dc octreotide  - c/w ceftriaxone for 7 days total (12/24 - 12/30)  - c/w PPI IV PPI 40mg daily   - PO lactulose 20 gm TID + rifaximin  - will need repeat EGD in 2 weeks to assess for eradication of varices (1/9)   - will likely initiate liver transplant workup when patient becomes more clinically stable   - full liquid diet as tolerated    All recommendations are tentative until note is attested by attending.     Jenna Lobato MD  GI Fellow, PGY-5  Available via Microsoft Teams    NON-URGENT CONSULTS:  Please email [email or web address]  [email or web address]  AT NIGHT AND ON WEEKENDS:  Contact on-call GI fellow via answering service ([phone]) from 5pm-8am and on weekends/holidays  MONDAY-FRIDAY 8AM-5PM:  Pager# [phone] ([facility] or [phone] ([facility])  GI Phone# [phone] ([facility])     67 year old F with a PMHx of T2DM, chronic thrombocytopenia, HTN, 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 The patient was intubated for airway protection and underwent evaluation with EGD at OSH, with large amount of blood at the gastric fundus. Extubated 12/27.      #decompensated IVEY cirrhosis - MELD 11 (12/28)   #Variceal bleed   Patient s/p EGD at outside hospital with poor visualization due to active bleeding. Patient s/p 5U pRBC with plts and FFP.  - Multiphase CT abdomen/pelvis (12/24) reviewed, with portosystemic collaterals seen and no active extravasation    - ascites: trace ascites on CT 12/24   - HCC: CT without liver lesions (12/24)   - HE: none  - Varices: gastric and esophageal varices, s/p EGD on 12/23 with one esophageal band       Recommendations:   - c/w octreotide gtt x 72 hours  (12/24- 12/27); ok to dc octreotide  - c/w ceftriaxone for 7 days total (12/24 - 12/30)  - c/w PPI IV PPI 40mg daily   - PO lactulose 20 gm TID + rifaximin  - will need repeat EGD in 2 weeks to assess for eradication of varices (1/9)   - can consider OP liver transplant workup but currently low MELD  - full liquid diet as tolerated    All recommendations are tentative until note is attested by attending.     Jenna Lobato MD  GI Fellow, PGY-5  Available via Microsoft Teams    NON-URGENT CONSULTS:  Please email [email or web address]  AT NIGHT AND ON WEEKENDS:  Contact on-call GI fellow via answering service ([phone]) from 5pm-8am and on weekends/holidays  MONDAY-FRIDAY 8AM-5PM:  Pager# [phone] ([facility]) or [phone] ([facility])  GI Phone# [phone] (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 after a syncopal episode in the setting of coffee ground emesis and black tarry stools, found to have acute drop in hgb from 11 to 5.6. The patient was intubated for airway protection and underwent evaluation with EGD at OSH, with large amount of blood at the gastric fundus. Extubated 12/27.      #decompensated IVEY cirrhosis - MELD 11 (12/28)   #Variceal bleed   Patient s/p EGD at outside hospital with poor visualization due to active bleeding. Patient s/p 5U pRBC with plts and FFP.  - Multiphase CT abdomen/pelvis (12/24) reviewed, with portosystemic collaterals seen and no active extravasation    - ascites: trace ascites on CT 12/24   - HCC: CT without liver lesions (12/24)   - HE: none  - Varices: gastric and esophageal varices, s/p EGD on 12/23 with one esophageal band       Recommendations:   - octreotide gtt x 72 hours  (12/24- 12/28); ok to dc octreotide  - ceftriaxone x5 days total (12/24 - 12/28); ok to dc CTX  - start Coreg 6.25 mg BID if BP allows  - switch IV --> PO PPI 40mg daily (to complete 2 week course to prevent post-banding ulceration)  - start sucralfate 1 gm PO BID x 2 weeks to prevent post-banding ulceration  - PO lactulose 20 gm TID + rifaximin  - will need repeat EGD in 2 weeks to assess for eradication of varices (1/9) with OP GI Dr. Carranza  - can consider OP liver transplant workup but currently low MELD  - full liquid diet as tolerated, advance diet as tolerated    Follow up in Hepatology Clinic with Dr. Sharron Martinez: [phone]; [address], [address] [address]  Follow up with OP GI with Dr. Татьяна Carranza:  [phone]; [facility] [place] [address]    We will sign off at this time. Please reconsult/page if questions.      All recommendations are tentative until note is attested by attending.     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 The patient was intubated for airway protection and underwent evaluation with EGD at OSH, with large amount of blood at the gastric fundus. Extubated 12/27.      #decompensated IVEY cirrhosis - MELD 11 (12/28)   #Variceal bleed   Patient s/p EGD at outside hospital with poor visualization due to active bleeding. Patient s/p 5U pRBC with plts and FFP.  - Multiphase CT abdomen/pelvis (12/24) reviewed, with portosystemic collaterals seen and no active extravasation    - ascites: trace ascites on CT 12/24   - HCC: CT without liver lesions (12/24)   - HE: none  - Varices: gastric and esophageal varices, s/p EGD on 12/23 with one esophageal band       Recommendations:   - octreotide gtt x 72 hours  (12/24- 12/28); ok to dc octreotide  - ceftriaxone x5 days total (12/24 - 12/28); ok to dc CTX  - start Coreg 6.25 mg BID if BP allows  - switch IV --> PO PPI 40mg daily (to complete 4 week course to prevent post-banding ulceration)  - start sucralfate 1 gm PO BID x 2 weeks to prevent post-banding ulceration  - PO lactulose 20 gm TID + rifaximin  - will need repeat EGD in 2 weeks to assess for eradication of varices (1/9) with OP GI Dr. Carranza  - can consider OP liver transplant workup but currently low MELD  - full liquid diet as tolerated, advance diet as tolerated    Follow up in Hepatology Clinic with Dr. Sharron Martinez: [phone]; [address], [address] [address]  Follow up with OP GI with Dr. Татьяна Carranza:  [phone]; [facility] [place] [address]    We will sign off at this time. Please reconsult/page if questions.      All recommendations are tentative until note is attested by attending.     Jenna Lobato MD  GI Fellow, PGY-5  Available via [employer] Teams    NON-URGENT CONSULTS:  Please email [email or web address]  AT NIGHT AND ON WEEKENDS:  Contact on-call GI fellow via answering service ([phone]) from 5pm-8am and on weekends/holidays  MONDAY-FRIDAY 8AM-5PM:  Pager# [phone] ([facility]) or [phone] ([facility])  GI Phone# [phone] ([facility])

## 2022-12-29 ENCOUNTER — NON-APPOINTMENT (OUTPATIENT)
Age: 67
End: 2022-12-29

## 2022-12-29 ENCOUNTER — TRANSCRIPTION ENCOUNTER (OUTPATIENT)
Age: 67
End: 2022-12-29

## 2022-12-29 ENCOUNTER — APPOINTMENT (OUTPATIENT)
Dept: ENDOCRINOLOGY | Facility: CLINIC | Age: 67
End: 2022-12-29

## 2022-12-29 LAB
ALBUMIN SERPL ELPH-MCNC: 2.7 G/DL — LOW (ref 3.3–5)
ALP SERPL-CCNC: 57 U/L — SIGNIFICANT CHANGE UP (ref 40–120)
ALT FLD-CCNC: 8 U/L — LOW (ref 10–45)
ANION GAP SERPL CALC-SCNC: 8 MMOL/L — SIGNIFICANT CHANGE UP (ref 5–17)
AST SERPL-CCNC: 37 U/L — SIGNIFICANT CHANGE UP (ref 10–40)
BASOPHILS # BLD AUTO: 0 K/UL — SIGNIFICANT CHANGE UP (ref 0–0.2)
BASOPHILS NFR BLD AUTO: 0 % — SIGNIFICANT CHANGE UP (ref 0–2)
BILIRUB SERPL-MCNC: 1.2 MG/DL — SIGNIFICANT CHANGE UP (ref 0.2–1.2)
BILIRUB SERPL-MCNC: 1.3 MG/DL — HIGH (ref 0.2–1.2)
BLD GP AB SCN SERPL QL: NEGATIVE — SIGNIFICANT CHANGE UP
BUN SERPL-MCNC: 27 MG/DL — HIGH (ref 7–23)
CALCIUM SERPL-MCNC: 7.7 MG/DL — LOW (ref 8.4–10.5)
CHLORIDE SERPL-SCNC: 109 MMOL/L — HIGH (ref 96–108)
CO2 SERPL-SCNC: 24 MMOL/L — SIGNIFICANT CHANGE UP (ref 22–31)
CREAT SERPL-MCNC: 0.94 MG/DL — SIGNIFICANT CHANGE UP (ref 0.5–1.3)
CREAT SERPL-MCNC: 0.96 MG/DL — SIGNIFICANT CHANGE UP (ref 0.5–1.3)
EGFR: 65 ML/MIN/1.73M2 — SIGNIFICANT CHANGE UP
EGFR: 67 ML/MIN/1.73M2 — SIGNIFICANT CHANGE UP
EOSINOPHIL # BLD AUTO: 0.09 K/UL — SIGNIFICANT CHANGE UP (ref 0–0.5)
EOSINOPHIL NFR BLD AUTO: 3.4 % — SIGNIFICANT CHANGE UP (ref 0–6)
FERRITIN SERPL-MCNC: 178 NG/ML — HIGH (ref 15–150)
FOLATE SERPL-MCNC: >20 NG/ML — SIGNIFICANT CHANGE UP
GLUCOSE BLDC GLUCOMTR-MCNC: 121 MG/DL — HIGH (ref 70–99)
GLUCOSE BLDC GLUCOMTR-MCNC: 142 MG/DL — HIGH (ref 70–99)
GLUCOSE BLDC GLUCOMTR-MCNC: 169 MG/DL — HIGH (ref 70–99)
GLUCOSE BLDC GLUCOMTR-MCNC: 285 MG/DL — HIGH (ref 70–99)
GLUCOSE SERPL-MCNC: 143 MG/DL — HIGH (ref 70–99)
HAPTOGLOB SERPL-MCNC: 37 MG/DL — SIGNIFICANT CHANGE UP (ref 34–200)
HCT VFR BLD CALC: 28.3 % — LOW (ref 34.5–45)
HGB BLD-MCNC: 9.2 G/DL — LOW (ref 11.5–15.5)
INR BLD: 1.41 RATIO — HIGH (ref 0.88–1.16)
IRON SATN MFR SERPL: 15 % — SIGNIFICANT CHANGE UP (ref 14–50)
IRON SATN MFR SERPL: 39 UG/DL — SIGNIFICANT CHANGE UP (ref 30–160)
LDH SERPL L TO P-CCNC: 196 U/L — SIGNIFICANT CHANGE UP (ref 50–242)
LYMPHOCYTES # BLD AUTO: 0.13 K/UL — LOW (ref 1–3.3)
LYMPHOCYTES # BLD AUTO: 5.2 % — LOW (ref 13–44)
MAGNESIUM SERPL-MCNC: 1.6 MG/DL — SIGNIFICANT CHANGE UP (ref 1.6–2.6)
MANUAL SMEAR VERIFICATION: SIGNIFICANT CHANGE UP
MCHC RBC-ENTMCNC: 31 PG — SIGNIFICANT CHANGE UP (ref 27–34)
MCHC RBC-ENTMCNC: 32.5 GM/DL — SIGNIFICANT CHANGE UP (ref 32–36)
MCV RBC AUTO: 95.3 FL — SIGNIFICANT CHANGE UP (ref 80–100)
MELD SCORE WITH DIALYSIS: 24 POINTS — SIGNIFICANT CHANGE UP
MELD SCORE WITHOUT DIALYSIS: 11 POINTS — SIGNIFICANT CHANGE UP
MONOCYTES # BLD AUTO: 0.17 K/UL — SIGNIFICANT CHANGE UP (ref 0–0.9)
MONOCYTES NFR BLD AUTO: 6.9 % — SIGNIFICANT CHANGE UP (ref 2–14)
NEUTROPHILS # BLD AUTO: 2.12 K/UL — SIGNIFICANT CHANGE UP (ref 1.8–7.4)
NEUTROPHILS NFR BLD AUTO: 84.5 % — HIGH (ref 43–77)
PHOSPHATE SERPL-MCNC: 2.3 MG/DL — LOW (ref 2.5–4.5)
PLAT MORPH BLD: NORMAL — SIGNIFICANT CHANGE UP
PLATELET # BLD AUTO: 30 K/UL — LOW (ref 150–400)
POTASSIUM SERPL-MCNC: 3.8 MMOL/L — SIGNIFICANT CHANGE UP (ref 3.5–5.3)
POTASSIUM SERPL-SCNC: 3.8 MMOL/L — SIGNIFICANT CHANGE UP (ref 3.5–5.3)
PROT SERPL-MCNC: 4.8 G/DL — LOW (ref 6–8.3)
PROTHROM AB SERPL-ACNC: 16.4 SEC — HIGH (ref 10.5–13.4)
RBC # BLD: 2.97 M/UL — LOW (ref 3.8–5.2)
RBC # BLD: 2.98 M/UL — LOW (ref 3.8–5.2)
RBC # FLD: 16.7 % — HIGH (ref 10.3–14.5)
RBC BLD AUTO: SIGNIFICANT CHANGE UP
RETICS #: 206.8 K/UL — HIGH (ref 25–125)
RETICS/RBC NFR: 6.9 % — HIGH (ref 0.5–2.5)
RH IG SCN BLD-IMP: POSITIVE — SIGNIFICANT CHANGE UP
SODIUM SERPL-SCNC: 141 MMOL/L — SIGNIFICANT CHANGE UP (ref 135–145)
SODIUM SERPL-SCNC: 141 MMOL/L — SIGNIFICANT CHANGE UP (ref 135–145)
TIBC SERPL-MCNC: 265 UG/DL — SIGNIFICANT CHANGE UP (ref 220–430)
TRANSFERRIN SERPL-MCNC: 197 MG/DL — LOW (ref 200–360)
TSH SERPL-MCNC: 2.81 UIU/ML — SIGNIFICANT CHANGE UP (ref 0.27–4.2)
UIBC SERPL-MCNC: 226 UG/DL — SIGNIFICANT CHANGE UP (ref 110–370)
VIT B12 SERPL-MCNC: >2000 PG/ML — HIGH (ref 232–1245)
WBC # BLD: 2.51 K/UL — LOW (ref 3.8–10.5)
WBC # FLD AUTO: 2.51 K/UL — LOW (ref 3.8–10.5)

## 2022-12-29 PROCEDURE — 99232 SBSQ HOSP IP/OBS MODERATE 35: CPT

## 2022-12-29 RX ORDER — ACETAMINOPHEN 500 MG
650 TABLET ORAL ONCE
Refills: 0 | Status: COMPLETED | OUTPATIENT
Start: 2022-12-29 | End: 2022-12-29

## 2022-12-29 RX ADMIN — LACTULOSE 20 GRAM(S): 10 SOLUTION ORAL at 14:30

## 2022-12-29 RX ADMIN — Medication 650 MILLIGRAM(S): at 11:00

## 2022-12-29 RX ADMIN — LACTULOSE 20 GRAM(S): 10 SOLUTION ORAL at 21:43

## 2022-12-29 RX ADMIN — SERTRALINE 50 MILLIGRAM(S): 25 TABLET, FILM COATED ORAL at 11:01

## 2022-12-29 RX ADMIN — Medication 1 GRAM(S): at 06:00

## 2022-12-29 RX ADMIN — Medication 1 GRAM(S): at 18:22

## 2022-12-29 RX ADMIN — Medication 650 MILLIGRAM(S): at 12:00

## 2022-12-29 RX ADMIN — Medication 3: at 12:40

## 2022-12-29 RX ADMIN — PANTOPRAZOLE SODIUM 40 MILLIGRAM(S): 20 TABLET, DELAYED RELEASE ORAL at 06:00

## 2022-12-29 NOTE — PROGRESS NOTE ADULT - REASON FOR ADMISSION
Hematemesis
Hematemesis, cirrhosis
Hematemesis

## 2022-12-29 NOTE — DISCHARGE NOTE NURSING/CASE MANAGEMENT/SOCIAL WORK - NSDCFUADDAPPT_GEN_ALL_CORE_FT
Your medications were sent to [facility] on [address] in [place] as you requested.

## 2022-12-29 NOTE — PROGRESS NOTE ADULT - NSPROGADDITIONALINFOA_GEN_ALL_CORE
full code  activity as tolerated   vte ppx w scd  full liquid diet; advance diet as tolerated
anticipate dc in am if tolerating diet and hb stable

## 2022-12-29 NOTE — DISCHARGE NOTE NURSING/CASE MANAGEMENT/SOCIAL WORK - PATIENT PORTAL LINK FT
You can access the FollowMyHealth Patient Portal offered by [facility] by registering at the following website: [email or web address]. By joining [employer]’s FollowMyHealth portal, you will also be able to view your health information using other applications (apps) compatible with our system.

## 2022-12-29 NOTE — PROGRESS NOTE ADULT - SUBJECTIVE AND OBJECTIVE BOX
[facility] Division of Hospital Medicine  Hussein Nuñez MD  Contact MARKBetzaidaSATHISH, 8A-5P through [employer] Teams  Other Times:  [phone]    Patient is a 67y old  Female who presents with a chief complaint of Hematemesis (28 Dec 2022 21:52)    SUBJECTIVE / OVERNIGHT EVENTS: pt tolerating full liquid diet well. no pain, no bleeding, no vomiting,  at bedside  ADDITIONAL REVIEW OF SYSTEMS:    MEDICATIONS  (STANDING):  carvedilol 6.25 milliGRAM(s) Oral every 12 hours  dextrose 5%. 1000 milliLiter(s) (100 mL/Hr) IV Continuous <Continuous>  dextrose 5%. 1000 milliLiter(s) (50 mL/Hr) IV Continuous <Continuous>  dextrose 50% Injectable 25 Gram(s) IV Push once  dextrose 50% Injectable 12.5 Gram(s) IV Push once  dextrose 50% Injectable 25 Gram(s) IV Push once  glucagon  Injectable 1 milliGRAM(s) IntraMuscular once  insulin lispro (ADMELOG) corrective regimen sliding scale   SubCutaneous three times a day before meals  insulin lispro (ADMELOG) corrective regimen sliding scale   SubCutaneous at bedtime  lactulose Syrup 20 Gram(s) Oral three times a day  pantoprazole    Tablet 40 milliGRAM(s) Oral before breakfast  rifAXIMin 550 milliGRAM(s) Oral two times a day  sertraline 50 milliGRAM(s) Oral daily  sucralfate suspension 1 Gram(s) Oral two times a day    MEDICATIONS  (PRN):  dextrose Oral Gel 15 Gram(s) Oral once PRN Blood Glucose LESS THAN 70 milliGRAM(s)/deciliter      CAPILLARY BLOOD GLUCOSE      POCT Blood Glucose.: 285 mg/dL (29 Dec 2022 12:10)  POCT Blood Glucose.: 142 mg/dL (29 Dec 2022 08:23)  POCT Blood Glucose.: 162 mg/dL (28 Dec 2022 20:46)  POCT Blood Glucose.: 212 mg/dL (28 Dec 2022 16:45)    I&O's Summary    28 Dec 2022 07:01  -  29 Dec 2022 07:00  --------------------------------------------------------  IN: 290 mL / OUT: 250 mL / NET: 40 mL        PHYSICAL EXAM:  Vital Signs Last 24 Hrs  T(C): 36.8 (29 Dec 2022 12:25), Max: 37.1 (28 Dec 2022 20:25)  T(F): 98.2 (29 Dec 2022 12:25), Max: 98.8 (29 Dec 2022 08:26)  HR: 48 (29 Dec 2022 12:25) (48 - 55)  BP: 115/65 (29 Dec 2022 12:25) (103/56 - 120/52)  BP(mean): --  RR: 18 (29 Dec 2022 12:25) (18 - 18)  SpO2: 96% (29 Dec 2022 12:25) (91% - 96%)    Parameters below as of 29 Dec 2022 12:25  Patient On (Oxygen Delivery Method): room air      CONSTITUTIONAL: NAD, well-developed, well-groomed  EYES: conjunctiva and sclera clear  ENMT: Moist oral mucosa, no pharyngeal injection or exudates; normal dentition  RESPIRATORY: Normal respiratory effort; lungs are clear to auscultation bilaterally  CARDIOVASCULAR: Regular rate and rhythm, normal S1 and S2, no murmur; No lower extremity edema;   ABDOMEN: Nontender to palpation, normoactive bowel sounds, no rebound/guarding;  MUSCULOSKELETAL:  FU no tenderness  PSYCH: A+O to person, place, and time; affect appropriate  NEUROLOGY: grossly nonfocal upper/lower extremity, conversational  SKIN: No rashes; no palpable lesions    LABS:                        9.2    2.51  )-----------( 30       ( 29 Dec 2022 07:13 )             28.3     12-29    141  |  109<H>  |  27<H>  ----------------------------<  143<H>  3.8   |  24  |  0.96    Ca    7.7<L>      29 Dec 2022 07:13  Phos  2.3     12-29  Mg     1.6     12-29    TPro  4.8<L>  /  Alb  2.7<L>  /  TBili  1.2  /  DBili  x   /  AST  37  /  ALT  8<L>  /  AlkPhos  57  12-29    PT/INR - ( 29 Dec 2022 07:13 )   PT: 16.4 sec;   INR: 1.41 ratio         PTT - ( 28 Dec 2022 00:14 )  PTT:28.6 sec              RADIOLOGY & ADDITIONAL TESTS:  Results Reviewed:   Imaging Personally Reviewed:  Electrocardiogram Personally Reviewed:    COORDINATION OF CARE:  Care Discussed with Consultants/Other Providers [Y/N]:  Prior or Outpatient Records Reviewed [Y/N]:   [facility] Division of Hospital Medicine  Hussein Nuñez MD  Contact MARKBetzaidaSATHISH, 8A-5P through [employer] Teams  Other Times:  [phone]    Patient is a 67y old  Female who presents with a chief complaint of Hematemesis (28 Dec 2022 21:52)    SUBJECTIVE / OVERNIGHT EVENTS: pt tolerating full liquid diet well. no pain, no bleeding, no vomiting,  at bedside  ADDITIONAL REVIEW OF SYSTEMS:    MEDICATIONS  (STANDING):  carvedilol 6.25 milliGRAM(s) Oral every 12 hours  dextrose 5%. 1000 milliLiter(s) (100 mL/Hr) IV Continuous <Continuous>  dextrose 5%. 1000 milliLiter(s) (50 mL/Hr) IV Continuous <Continuous>  dextrose 50% Injectable 25 Gram(s) IV Push once  dextrose 50% Injectable 12.5 Gram(s) IV Push once  dextrose 50% Injectable 25 Gram(s) IV Push once  glucagon  Injectable 1 milliGRAM(s) IntraMuscular once  insulin lispro (ADMELOG) corrective regimen sliding scale   SubCutaneous three times a day before meals  insulin lispro (ADMELOG) corrective regimen sliding scale   SubCutaneous at bedtime  lactulose Syrup 20 Gram(s) Oral three times a day  pantoprazole    Tablet 40 milliGRAM(s) Oral before breakfast  rifAXIMin 550 milliGRAM(s) Oral two times a day  sertraline 50 milliGRAM(s) Oral daily  sucralfate suspension 1 Gram(s) Oral two times a day    MEDICATIONS  (PRN):  dextrose Oral Gel 15 Gram(s) Oral once PRN Blood Glucose LESS THAN 70 milliGRAM(s)/deciliter      CAPILLARY BLOOD GLUCOSE      POCT Blood Glucose.: 285 mg/dL (29 Dec 2022 12:10)  POCT Blood Glucose.: 142 mg/dL (29 Dec 2022 08:23)  POCT Blood Glucose.: 162 mg/dL (28 Dec 2022 20:46)  POCT Blood Glucose.: 212 mg/dL (28 Dec 2022 16:45)    I&O's Summary    28 Dec 2022 07:01  -  29 Dec 2022 07:00  --------------------------------------------------------  IN: 290 mL / OUT: 250 mL / NET: 40 mL        PHYSICAL EXAM:  Vital Signs Last 24 Hrs  T(C): 36.8 (29 Dec 2022 12:25), Max: 37.1 (28 Dec 2022 20:25)  T(F): 98.2 (29 Dec 2022 12:25), Max: 98.8 (29 Dec 2022 08:26)  HR: 48 (29 Dec 2022 12:25) (48 - 55)  BP: 115/65 (29 Dec 2022 12:25) (103/56 - 120/52)  BP(mean): --  RR: 18 (29 Dec 2022 12:25) (18 - 18)  SpO2: 96% (29 Dec 2022 12:25) (91% - 96%)    Parameters below as of 29 Dec 2022 12:25  Patient On (Oxygen Delivery Method): room air      CONSTITUTIONAL: NAD, well-developed, well-groomed  EYES: conjunctiva and sclera clear  ENMT: Moist oral mucosa, no pharyngeal injection or exudates; normal dentition  RESPIRATORY: Normal respiratory effort; lungs are clear to auscultation bilaterally  CARDIOVASCULAR: Regular rate and rhythm, normal S1 and S2, no murmur; No lower extremity edema;   ABDOMEN: Nontender to palpation, normoactive bowel sounds, no rebound/guarding;  MUSCULOSKELETAL:  FU no tenderness  PSYCH: A+O to person, place, and time; affect appropriate  NEUROLOGY: grossly nonfocal upper/lower extremity, conversational  SKIN: No rashes; no palpable lesions    LABS:                        9.2    2.51  )-----------( 30       ( 29 Dec 2022 07:13 )             28.3     12-29    141  |  109<H>  |  27<H>  ----------------------------<  143<H>  3.8   |  24  |  0.96    Ca    7.7<L>      29 Dec 2022 07:13  Phos  2.3     12-29  Mg     1.6     12-29    TPro  4.8<L>  /  Alb  2.7<L>  /  TBili  1.2  /  DBili  x   /  AST  37  /  ALT  8<L>  /  AlkPhos  57  12-29    PT/INR - ( 29 Dec 2022 07:13 )   PT: 16.4 sec;   INR: 1.41 ratio         PTT - ( 28 Dec 2022 00:14 )  PTT:28.6 sec    RADIOLOGY & ADDITIONAL TESTS:  Results Reviewed:   Imaging Personally Reviewed:  Electrocardiogram Personally Reviewed:    COORDINATION OF CARE:  Care Discussed with Consultants/Other Providers [Y/N]:  Prior or Outpatient Records Reviewed [Y/N]:

## 2022-12-29 NOTE — PROGRESS NOTE ADULT - PROBLEM SELECTOR PLAN 5
a1c 5.9  switch from monitoring fingersticks q6h to tidac + hs  diet advanced today; switch from low dose correctional lispro q6h to tidac + hs
a1c 5.9  switch from monitoring fingersticks q6h to tidac + hs  diet advanced today; switch from low dose correctional lispro q6h to tidac + hs

## 2022-12-29 NOTE — PROGRESS NOTE ADULT - ASSESSMENT
67F w IVEY cirrhosis c/b portal htn, htn, hld, dm, initially [facility] hematem[facility] + melena + syncope, found to have significantly worsening anemia (hgb ~5), concern for gib, s/p intubation for airway protection, s/p pressors for hemorrhagic shock, s/p egd which showed large amount of blood in stomach suspected to be due to esophageal/gastric variceal bleeding. patient was stabilized w s/p 5 u prbc + 1 u plts +1 u ffp, protonix + octreotide infusions, ppx abx w ceftriaxone and transferred to [facility] for further mgmt of variceal bleed via gi and/or ir intervention (brto/bato/tips). since then, gi/hepatology and ir were consulted and have collaborated here, deciding to attempt to control/treat variceal bleed and varices via egd given the high risk a/w tips especially since patient has had multiple prior hospitalizations for he; however, IR remains available for TIPS placement if endoscopy cannot adequately treat varices or if patient has another acute bleeding episode. 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 thereafter, patient was weaned off pressors and extubated on 12/27, dc'd off of octreotide + ceftriaxone, transitioned to po ppi + sucralfate, started on coreg 6.25 bid + lactulose 20 g tid w rifaximin, and deemed stable for transfer to [facility] for further mgmt.    no further bleeding, tolerating diet

## 2022-12-29 NOTE — PROGRESS NOTE ADULT - PROBLEM SELECTOR PLAN 3
he; several hospitalization for he; currently ammonia wnl; transitioned from rectal lactulose to po lactulose + rifaximin, to be titrated to maintain goal ~2 bm/day  varices; as above  ascites; currently with trace ascites; never on diuretics, never with abdominal tap   sbp; no prior history of sbp  hcc; imaging studies with no evidence of liver malig  hrs/hps; renal and pulm function remains intact  currently with meld 11; per hepatology, can consider OP liver transplant workup but currently low MELD
he; several hospitalization for he; currently ammonia wnl; transitioned from rectal lactulose to po lactulose + rifaximin, to be titrated to maintain goal ~2 bm/day  varices; as above  ascites; currently with trace ascites; never on diuretics, never with abdominal tap   sbp; no prior history of sbp  hcc; imaging studies with no evidence of liver malig  hrs/hps; renal and pulm function remains intact  currently with meld 11; per hepatology, can consider OP liver transplant workup but currently low MELD

## 2022-12-29 NOTE — PROGRESS NOTE ADULT - PROBLEM SELECTOR PLAN 2
anemia + thrombocytopenia  h/o cirrhosis c/b portal htn (w splenomegaly)  baseline hgb ~11-13, plt 50-60; currently counts lower than baseline, but stable with no evidence of gross bleeding and hds otherwise  recent variceal bleed with egd findings and interventions as described above  Monitor CBC q12-24h; Goal Hb >7 g/dl,  Plt >10k, 20k if septic, 50k if actively bleeding  maintain adequate PIV and active type and screen; transfuse blood product as needed to maintain goal
anemia + thrombocytopenia  h/o cirrhosis c/b portal htn (w splenomegaly)  baseline hgb ~11-13, plt 50-60; currently counts lower than baseline, but stable with no evidence of gross bleeding and hds otherwise  recent variceal bleed with egd findings and interventions as described above  Monitor CBC; Goal Hb >7 g/dl,  Plt >10k, 20k if septic, 50k if actively bleeding  maintain adequate PIV and active type and screen; transfuse blood product as needed to maintain goal

## 2022-12-29 NOTE — PROGRESS NOTE ADULT - PROBLEM SELECTOR PLAN 4
a/w hypercl, elevated bun  likely from poor po intake, excess gi losses  obtain urine studies and s osm  trend na q4-6h until wnl  goal rate of na correction   encourage po intake; hypotonic ivf + lytes as needed in the mean time  continue to titrate lactulose to only ~2 bm/day  nutrition consult
resolved

## 2022-12-29 NOTE — DISCHARGE NOTE NURSING/CASE MANAGEMENT/SOCIAL WORK - NSDCPEFALRISK_GEN_ALL_CORE
For information on Fall & Injury Prevention, visit: [email or web address] OR  [email or web address] OR  https://www.cdc.gov/steadi/patient.html Plastic Surgeon Procedure Text (B): After obtaining clear surgical margins the patient was sent to plastics for surgical repair.  The patient understands they will receive post-surgical care and follow-up from the referring physician's office. Yes

## 2022-12-29 NOTE — PROGRESS NOTE ADULT - PROBLEM SELECTOR PLAN 1
s/p 5 u prbc + 1 u plts +1 u ffp, protonix + octreotide infusions, ppx abx w ceftriaxone  per gi/hepatology and ir, control/treat variceal bleed and varices via egd given the high risk a/w tips especially since patient has had multiple prior hospitalizations for he;   s/p egd 12/25 which revealed nonbleeding large varix in distal esophagus with stigmata of recent bleed s/p banding + multiple small varices in the same area without high risk stigmata and deemed not amenable to banding + retained fluid mixed with old blood s/p suction, lavage, irrigation + moderate portal hypertensive gastropathy  transitioned to po ppi (to complete 4 week course to prevent post-banding ulceration) + (sucralfate x 2 weeks to prevent post-banding ulceration)  started on coreg 6.25 bid; patient with bradycardia with borderline low bp; if persists, then consider decreasing dose  okay to adat; currently on fld  per hepatology, will need repeat EGD in 2 weeks to assess for eradication of varices (1/9) with OP GI Dr. Carranza  IR remains available for TIPS placement if endoscopy cannot adequately treat varices or if patient has another acute bleeding episode
s/p egd 12/25 which revealed nonbleeding large varix in distal esophagus with stigmata of recent bleed s/p banding + multiple small varices in the same area without high risk stigmata and deemed not amenable to banding + retained fluid mixed with old blood s/p suction, lavage, irrigation + moderate portal hypertensive gastropathy    no plan for TIPS due to h/o HE    transitioned to po ppi (to complete 4 week course to prevent post-banding ulceration) + (sucralfate x 2 weeks to prevent post-banding ulceration)    started on coreg 6.25 bid; patient with bradycardia with borderline low bp; if persists, then consider decreasing dose    ADAT    per hepatology, will need repeat EGD in 2 weeks to assess for eradication of varices (1/9) with OP GI Dr. Carranza  IR remains available for TIPS placement if endoscopy cannot adequately treat varices or if patient has another acute bleeding episode

## 2022-12-30 ENCOUNTER — TRANSCRIPTION ENCOUNTER (OUTPATIENT)
Age: 67
End: 2022-12-30

## 2022-12-30 VITALS
HEART RATE: 51 BPM | DIASTOLIC BLOOD PRESSURE: 66 MMHG | TEMPERATURE: 99 F | RESPIRATION RATE: 18 BRPM | OXYGEN SATURATION: 98 % | SYSTOLIC BLOOD PRESSURE: 108 MMHG

## 2022-12-30 LAB
ALBUMIN SERPL ELPH-MCNC: 2.8 G/DL — LOW (ref 3.3–5)
ALP SERPL-CCNC: 66 U/L — SIGNIFICANT CHANGE UP (ref 40–120)
ALT FLD-CCNC: 9 U/L — LOW (ref 10–45)
ANION GAP SERPL CALC-SCNC: 7 MMOL/L — SIGNIFICANT CHANGE UP (ref 5–17)
AST SERPL-CCNC: 32 U/L — SIGNIFICANT CHANGE UP (ref 10–40)
BILIRUB SERPL-MCNC: 1.4 MG/DL — HIGH (ref 0.2–1.2)
BUN SERPL-MCNC: 22 MG/DL — SIGNIFICANT CHANGE UP (ref 7–23)
CALCIUM SERPL-MCNC: 7.9 MG/DL — LOW (ref 8.4–10.5)
CHLORIDE SERPL-SCNC: 110 MMOL/L — HIGH (ref 96–108)
CO2 SERPL-SCNC: 23 MMOL/L — SIGNIFICANT CHANGE UP (ref 22–31)
CREAT ?TM UR-MCNC: 215 MG/DL — SIGNIFICANT CHANGE UP
CREAT SERPL-MCNC: 0.91 MG/DL — SIGNIFICANT CHANGE UP (ref 0.5–1.3)
CULTURE RESULTS: SIGNIFICANT CHANGE UP
CULTURE RESULTS: SIGNIFICANT CHANGE UP
EGFR: 69 ML/MIN/1.73M2 — SIGNIFICANT CHANGE UP
GLUCOSE BLDC GLUCOMTR-MCNC: 157 MG/DL — HIGH (ref 70–99)
GLUCOSE BLDC GLUCOMTR-MCNC: 231 MG/DL — HIGH (ref 70–99)
GLUCOSE SERPL-MCNC: 142 MG/DL — HIGH (ref 70–99)
HCT VFR BLD CALC: 30.1 % — LOW (ref 34.5–45)
HGB BLD-MCNC: 9.7 G/DL — LOW (ref 11.5–15.5)
MCHC RBC-ENTMCNC: 31 PG — SIGNIFICANT CHANGE UP (ref 27–34)
MCHC RBC-ENTMCNC: 32.2 GM/DL — SIGNIFICANT CHANGE UP (ref 32–36)
MCV RBC AUTO: 96.2 FL — SIGNIFICANT CHANGE UP (ref 80–100)
NRBC # BLD: 0 /100 WBCS — SIGNIFICANT CHANGE UP (ref 0–0)
OSMOLALITY UR: 888 MOS/KG — SIGNIFICANT CHANGE UP (ref 300–900)
PHOSPHATE SERPL-MCNC: 1.9 MG/DL — LOW (ref 2.5–4.5)
PLATELET # BLD AUTO: 33 K/UL — LOW (ref 150–400)
POTASSIUM SERPL-MCNC: 3.7 MMOL/L — SIGNIFICANT CHANGE UP (ref 3.5–5.3)
POTASSIUM SERPL-SCNC: 3.7 MMOL/L — SIGNIFICANT CHANGE UP (ref 3.5–5.3)
POTASSIUM UR-SCNC: 31 MMOL/L — SIGNIFICANT CHANGE UP
PROT ?TM UR-MCNC: 18 MG/DL — HIGH (ref 0–12)
PROT SERPL-MCNC: 5 G/DL — LOW (ref 6–8.3)
PROT/CREAT UR-RTO: 0.1 RATIO — SIGNIFICANT CHANGE UP (ref 0–0.2)
RBC # BLD: 3.13 M/UL — LOW (ref 3.8–5.2)
RBC # FLD: 16.7 % — HIGH (ref 10.3–14.5)
SODIUM SERPL-SCNC: 140 MMOL/L — SIGNIFICANT CHANGE UP (ref 135–145)
SODIUM UR-SCNC: 41 MMOL/L — SIGNIFICANT CHANGE UP
SPECIMEN SOURCE: SIGNIFICANT CHANGE UP
SPECIMEN SOURCE: SIGNIFICANT CHANGE UP
UUN UR-MCNC: 1628 MG/DL — SIGNIFICANT CHANGE UP
WBC # BLD: 2.84 K/UL — LOW (ref 3.8–10.5)
WBC # FLD AUTO: 2.84 K/UL — LOW (ref 3.8–10.5)

## 2022-12-30 PROCEDURE — 99239 HOSP IP/OBS DSCHRG MGMT >30: CPT

## 2022-12-30 RX ORDER — FUROSEMIDE 40 MG
1 TABLET ORAL
Qty: 0 | Refills: 0 | DISCHARGE

## 2022-12-30 RX ORDER — ALPRAZOLAM 0.25 MG
1 TABLET ORAL
Qty: 0 | Refills: 0 | DISCHARGE

## 2022-12-30 RX ORDER — RIFAXIMIN 200 MG/1
1 TABLET ORAL
Qty: 0 | Refills: 0 | DISCHARGE

## 2022-12-30 RX ORDER — LISINOPRIL 2.5 MG/1
1 TABLET ORAL
Qty: 0 | Refills: 0 | DISCHARGE

## 2022-12-30 RX ORDER — SODIUM,POTASSIUM PHOSPHATES 278-250MG
1 POWDER IN PACKET (EA) ORAL
Qty: 3 | Refills: 0
Start: 2022-12-30 | End: 2022-12-30

## 2022-12-30 RX ORDER — CARVEDILOL PHOSPHATE 80 MG/1
1 CAPSULE, EXTENDED RELEASE ORAL
Qty: 60 | Refills: 0
Start: 2022-12-30 | End: 2023-01-28

## 2022-12-30 RX ORDER — SPIRONOLACTONE 25 MG/1
1 TABLET, FILM COATED ORAL
Qty: 0 | Refills: 0 | DISCHARGE

## 2022-12-30 RX ORDER — SUCRALFATE 1 G
10 TABLET ORAL
Qty: 300 | Refills: 0
Start: 2022-12-30 | End: 2023-01-13

## 2022-12-30 RX ORDER — PANTOPRAZOLE SODIUM 20 MG/1
1 TABLET, DELAYED RELEASE ORAL
Qty: 30 | Refills: 0
Start: 2022-12-30 | End: 2023-01-28

## 2022-12-30 RX ORDER — SODIUM,POTASSIUM PHOSPHATES 278-250MG
1 POWDER IN PACKET (EA) ORAL ONCE
Refills: 0 | Status: COMPLETED | OUTPATIENT
Start: 2022-12-30 | End: 2022-12-30

## 2022-12-30 RX ADMIN — Medication 1 PACKET(S): at 14:04

## 2022-12-30 RX ADMIN — PANTOPRAZOLE SODIUM 40 MILLIGRAM(S): 20 TABLET, DELAYED RELEASE ORAL at 09:14

## 2022-12-30 RX ADMIN — SERTRALINE 50 MILLIGRAM(S): 25 TABLET, FILM COATED ORAL at 12:35

## 2022-12-30 RX ADMIN — Medication 1: at 09:14

## 2022-12-30 RX ADMIN — Medication 2: at 12:35

## 2022-12-30 RX ADMIN — Medication 1 GRAM(S): at 05:52

## 2022-12-30 RX ADMIN — LACTULOSE 20 GRAM(S): 10 SOLUTION ORAL at 05:52

## 2022-12-30 NOTE — DIETITIAN INITIAL EVALUATION ADULT - ADD RECOMMEND
1) Continue current diet as tolerated. 2) Diet education provided, reinforce as needed. 3) Recommend protein-rich foods. 4) Consider addition of multivitamin, ascorbic acid if no contraindications  5) RD to remain available and follow-up as medically appropriate.

## 2022-12-30 NOTE — DIETITIAN INITIAL EVALUATION ADULT - NSFNSPHYEXAMSKINFT_GEN_A_CORE
skin: "Sacral/bilateral Buttocks deep tissue injury present on admission, Incontinence of bowel and bladder, Incontinence Dermatitis" per wound care note 12/27

## 2022-12-30 NOTE — DIETITIAN INITIAL EVALUATION ADULT - REASON FOR ADMISSION
Gastrointestinal hemorrhage    Chart reviewed, events noted. This is a "67F w IVEY cirrhosis c/b portal htn, htn, hld, dm, initially presented to [facility] for hematemesis + melena + syncope, found to have significantly worsening anemia (hgb ~5), concern for gib, s/p intubation for airway protection, s/p pressors for hemorrhagic shock, s/p egd which showed large amount of blood in stomach suspected to be due to esophageal/gastric variceal bleeding. patient was stabilized w s/p 5 u prbc + 1 u plts +1 u ffp, protonix + octreotide infusions, ppx abx w ceftriaxone and transferred to [facility] for further mgmt of variceal bleed via gi and/or ir intervention (brto/bato/tips). since then, gi/hepatology and ir were consulted and have collaborated here, deciding to attempt to control/treat variceal bleed and varices via egd given the high risk a/w tips" Pt now s/p egd on 12/25 which revealed nonbleeding large varix in distal esophagus. dc'd off of octreotide + ceftriaxone, transitioned to po ppi + sucralfate, started on coreg 6.25 bid + lactulose 20 g tid w rifaximin. Per chart no plan for TIPS. Pt advanced to full liquid, followed by regular. Tolerating with no further bleeding.

## 2022-12-30 NOTE — DIETITIAN INITIAL EVALUATION ADULT - OTHER INFO
Weight: per [facility]: 138lbs (4/6/22), 130lbs (10/21/22), 129lbs (12/25/22). Current dosing weight is 140lbs --? accuracy

## 2022-12-30 NOTE — DIETITIAN INITIAL EVALUATION ADULT - ORAL INTAKE PTA/DIET HISTORY
Pt seen with spouse at bedside. Pt reports good PO intake and appetite PTA. Consumes regular diet at home.  reports he and patient both try to follow a low sodium diet. Pt supplements PO as needed with Ensure Plus 1x daily. NKFA. Pt denies chewing/swallowing difficulty, diarrhea, constipation, admitted with coffee ground emesis x 1 day.

## 2022-12-30 NOTE — DIETITIAN INITIAL EVALUATION ADULT - NS FNS DIET ORDER
Diet, Regular:   Consistent Carbohydrate {Evening Snack} (CSTCHOSN)  1500mL Fluid Restriction ([id number]) (12-29-22 @ 12:52)

## 2022-12-30 NOTE — DIETITIAN INITIAL EVALUATION ADULT - ENERGY INTAKE
Fair (50-75%) Pt previously on full liquid diet, tolerating well. Advanced to carbohydrate consistent diet + 1500ml fluid restriction yesterday. Reports good tolerance. No nausea, emesis noted. Last BM 12/30. Pt interested in diet education. Reviewed general low sodium + carbohydrate consistent diet recommendations as well as Cirrhosis Nutrition Therapy.

## 2022-12-30 NOTE — DISCHARGE NOTE PROVIDER - CARE PROVIDER_API CALL
Татьяна Carranza)  Gastroenterology; Internal Medicine  [facility] [place] [address]  Phone: [phone]  Fax: [phone]  Follow Up Time: 1 week   Татьяна Carranza)  Gastroenterology; Internal Medicine  [facility] [place] [address]  Phone: [phone]  Fax: [phone]  Follow Up Time: 1 week    Becky Whitehead)  Emergency Medicine  [address] [place] [address]  Phone: [phone]  Fax: [phone]  Follow Up Time: 1 week

## 2022-12-30 NOTE — DISCHARGE NOTE PROVIDER - NSDCCPCAREPLAN_GEN_ALL_CORE_FT
PRINCIPAL DISCHARGE DIAGNOSIS  Diagnosis: Bleeding esophageal varices requiring more than four units of blood in 24 hours, admission to ICU, or surgery  Assessment and Plan of Treatment: You presented to presented to outside hospital for vomiting blood and dark stool and syncope found to have low hemoglobon ~5, underwent EGD with limited visualization 2/2 to large amounts of blood, transferred to [facility] for TIPS evaluation.   Patient was admitted to ICU, required pressors to keep improve blood pressure, adn you were on a ventilator which was removed on 12/27  -  Received 5 unit PRBC,1 unit platelets +1 unit FFP, Protonix + Octreotide infusions, PPx abx w ceftriaxone   - You got upper Endoscope on 12/25 which revealed nonbleeding large varix in distal esophagus with stigmata of recent bleed   -  Varices was banded and also with multiple small varices in the same area without high risk of bleed which were not able to be banded and retained fluid mixed with old blood which was suctioned,  irrigated and moderate portal hypertensive gastropathy  - No plan for TIPS  - Transitioned to PO Protonix (to complete 4 week course to prevent post-banding ulceration) + (sucralfate x 2 weeks to prevent post-banding ulceration)  - Started on coreg 6.25 bid; patient with bradycardia with borderline low bp; if persists, then consider decreasing dose  - Per hepatology, will need repeat EGD in 2 weeks to assess for eradication of varices (1/9) with OP GI Dr. Carranza.  Blood count now stable  Patient is tolerating diet well, no recurrent bleeding, feels well.  Cleared for discharge  CALL 911 IF recurrence of symptoms -      SECONDARY DISCHARGE DIAGNOSES  Diagnosis: Cytopenia  Assessment and Plan of Treatment: Low hemoglobin adn low Platelets - but at baseline   Baseline hgb ~11-13, plt 50- 60 - - stable  Follow up with GI - Dr. Carranza.             Diagnosis: Cirrhosis  Assessment and Plan of Treatment: Cirrhosis - s/p rectal lactulose to po lactulose + rifaximin ~2 BM/day  Follow up with Hepatology consider OP liver transplant workup but currently low MELD.      Diagnosis: Hypernatremia  Assessment and Plan of Treatment: Resolved    Diagnosis: Laceration of head  Assessment and Plan of Treatment: You got staples to back of head.   - Follow up with PCP early next week for staple removal     PRINCIPAL DISCHARGE DIAGNOSIS  Diagnosis: Bleeding esophageal varices requiring more than four units of blood in 24 hours, admission to ICU, or surgery  Assessment and Plan of Treatment: You presented to presented to outside hospital for vomiting blood and dark stool and syncope found to have low hemoglobon ~5, underwent EGD with limited visualization 2/2 to large amounts of blood, transferred to [facility] for TIPS evaluation.   Patient was admitted to ICU, required pressors to keep improve blood pressure, adn you were on a ventilator which was removed on 12/27  -  Received 5 unit PRBC,1 unit platelets +1 unit FFP, Protonix + Octreotide infusions, PPx abx w ceftriaxone   - You got upper Endoscope on 12/25 which revealed nonbleeding large varix in distal esophagus with stigmata of recent bleed   -  Varices was banded and also with multiple small varices in the same area without high risk of bleed which were not able to be banded and retained fluid mixed with old blood which was suctioned,  irrigated and moderate portal hypertensive gastropathy  - No plan for TIPS  - Transitioned to PO Protonix (to complete 4 week course to prevent post-banding ulceration) + (sucralfate x 2 weeks to prevent post-banding ulceration)  - Started on coreg 6.25 bid; patient with bradycardia with borderline low bp; if persists, then consider decreasing dose  - Lisinopril held - Follow up with outpatient PMD  - Per hepatology, will need repeat EGD in 2 weeks to assess for eradication of varices (1/9) with OP GI Dr. Carranza.  Blood count now stable  Patient is tolerating diet well, no recurrent bleeding, feels well.  Cleared for discharge  CALL 911 IF recurrence of symptoms -      SECONDARY DISCHARGE DIAGNOSES  Diagnosis: Cytopenia  Assessment and Plan of Treatment: Low hemoglobin adn low Platelets - but at baseline   Baseline hgb ~11-13, plt 50- 60 - - stable  Follow up with GI - Dr. Carranza.             Diagnosis: Cirrhosis  Assessment and Plan of Treatment: Cirrhosis - s/p rectal lactulose to po lactulose + rifaximin ~2 BM/day  Follow up with Hepatology consider OP liver transplant workup but currently low MELD.  Follow up with Hepatology - to determine when to resume Water pills - Aldatone adn Lasix      Diagnosis: Hypernatremia  Assessment and Plan of Treatment: Resolved    Diagnosis: Laceration of head  Assessment and Plan of Treatment: You got staples to back of head.   - Follow up with PCP early next week for staple removal    Diagnosis: Type 2 diabetes mellitus  Assessment and Plan of Treatment: Follow up with PMD  HgA1C this admission - 5.9  Make sure you get your HgA1c checked every three months.  If you take oral diabetes medications, check your blood glucose two times a day.  It's important not to skip any meals.  Keep a log of your blood glucose results and always take it with you to your doctor appointments.  Keep a list of your current medications including injectables and over the counter medications and bring this medication list with you to all your doctor appointments.  If you have not seen your ophthalmologist this year call for appointment.  Check your feet daily for redness, sores, or openings. Do not self treat. If no improvement in two days call your primary care physician for an appointment.  Low blood sugar (hypoglycemia) is a blood sugar below 70mg/dl. Check your blood sugar if you feel signs/symptoms of hypoglycemia. If your blood sugar is below 70 take 15 grams of carbohydrates (ex 4 oz of apple juice, 3-4 glucose tablets, or 4-6 oz of regular soda) wait 15 minutes and repeat blood sugar to make sure it comes up above 70.  If your blood sugar is above 70 and you are due for a meal, have a meal.  If you are not due for a meal have a snack.  This snack helps keeps your blood sugar at a safe range.

## 2022-12-30 NOTE — DIETITIAN INITIAL EVALUATION ADULT - ETIOLOGY
related to limited exposure to diet education related to increased physiological demand for nutrients

## 2022-12-30 NOTE — DISCHARGE NOTE PROVIDER - NSDCMRMEDTOKEN_GEN_ALL_CORE_FT
Aldactone 25 mg oral tablet: 1 tab(s) orally once a day  alendronate weekly: 70 milligram(s) orally once a week  ALPRAZolam 1 mg oral tablet: 1 tab(s) orally once a day  furosemide 20 mg oral tablet: 1 tab(s) orally once a day  lactulose 10 g/15 mL oral solution: 20 milligram(s) orally 3 times a day  lisinopril 20 mg oral tablet: 1 tab(s) orally once a day  loratadine 10 mg oral tablet: 1 tab(s) orally once a day  rifAXIMin 550 mg oral tablet: 1 tab(s) orally 2 times a day  rifAXIMin 550 mg oral tablet: 1 tab(s) orally 2 times a day  Lacye Walker: Length of need - 99    Ht - 165.1cm  Wt - 63.80kg  sertraline 50 mg oral tablet: 1 tab(s) orally once a day  ursodiol 500 mg oral tablet: 1 tab(s) orally once a day   alendronate weekly: 70 milligram(s) orally once a week  carvedilol 6.25 mg oral tablet: 1 tab(s) orally every 12 hours  lactulose 10 g/15 mL oral solution: 20 milligram(s) orally 3 times a day  loratadine 10 mg oral tablet: 1 tab(s) orally once a day  pantoprazole 40 mg oral delayed release tablet: 1 tab(s) orally once a day (before a meal)  rifAXIMin 550 mg oral tablet: 1 tab(s) orally 2 times a day  Rolling Walker: Length of need - 99    Ht - 165.1cm  Wt - 63.80kg  sertraline 50 mg oral tablet: 1 tab(s) orally once a day  sodium/potassium/phosphorus 160 mg-280 mg-250 mg oral powder for reconstitution: 1 packet(s) orally 3 times a day (with meals)   sucralfate 1 g/10 mL oral suspension: 10 milliliter(s) orally 2 times a day  ursodiol 500 mg oral tablet: 1 tab(s) orally once a day

## 2022-12-30 NOTE — CHART NOTE - NSCHARTNOTEFT_GEN_A_CORE
Patient seen and examined at bedside.  present  Patient is tolerating diet well, no recurrent bleeding, feels well  we reviewed follow up plans including repeat EGD w Dr Carranza in 2 weeks  she will need to f/u w PCP next week for staple removal  questions answered  stable for discharge  36 minutes spent orchestrating discharge

## 2022-12-30 NOTE — DISCHARGE NOTE PROVIDER - NSDCFUADDINST_GEN_ALL_CORE_FT
Low carb, no concentrated sweets diabetic diet with Fluid restriction of 1500ml/day Low carb, no concentrated sweets diabetic diet with Fluid restriction of 1500ml/day    Take all discharge paperwork to PMD and doctor's visit

## 2022-12-30 NOTE — DISCHARGE NOTE PROVIDER - HOSPITAL COURSE
67 year old F with a PMHx of 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 c/b Portal HTN, T2DM, HTN, HLD, CKD, chronic anemia and thrombocytopenia who initially presented to an outside hospital for hematemesis and melena and syncope found to have hgb ~5, underwent EGD with limited visualization 2/2 to large amounts of blood, transferred to [facility] for TIPS evaluation.     Patient was admitted to ICU with UGI Bleed   -  Received 5 unit PRBC,1 unit plts +1 unit FFP, Protonix + Octreotide infusions, PPx abx w ceftriaxone   - S/P MICU, weaned off pressors and extubated on 12/27  - Bleeding esophageal varices requiring more than four units of blood in 24 hours, admission to ICU, or surgery.   - s/p EGD 12/25 which revealed nonbleeding large varix in distal esophagus with stigmata of recent bleed s/p banding and multiple small varices in the same area without high risk stigmata and deemed not amenable to banding and retained fluid mixed with old blood s/p suction, lavage, irrigation and moderate portal hypertensive gastropathy  - No plan for TIPS due to h/o HE  - Transitioned to PO Protonix (to complete 4 week course to prevent post-banding ulceration) + (sucralfate x 2 weeks to prevent post-banding ulceration)  - Started on coreg 6.25 bid; patient with bradycardia with borderline low bp; if persists, then consider decreasing dose  - Per hepatology, will need repeat EGD in 2 weeks to assess for eradication of varices (1/9) with OP GI Dr. Carranza       # Cytopenia - anemia + thrombocytopenia - Baseline hgb ~11-13, plt 50- 60 - - stable,         # Cirrhosis - s/p rectal lactulose to po lactulose + rifaximin ~2 BM/day  meld 11; per hepatology consider OP liver transplant workup but currently low MELD.          # Hypernatremia - resolved.          #Occipital laceration- s/p staples - Follow up with PCP next week for staple removal    H/H stable  Patient is tolerating diet well, no recurrent bleeding, feels well  Stable for discharge per Dr. Nuñez 67 year old F with a PMHx of 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 c/b Portal HTN, T2DM, HTN, HLD, CKD, chronic anemia and thrombocytopenia who initially presented to an outside hospital for hematemesis and melena and syncope found to have hgb ~5, underwent EGD with limited visualization 2/2 to large amounts of blood, transferred to [facility] for TIPS evaluation.     Patient was admitted to ICU with UGI Bleed   -  Received 5 unit PRBC,1 unit plts +1 unit FFP, Protonix + Octreotide infusions, PPx abx w ceftriaxone   - S/P MICU, weaned off pressors and extubated on 12/27  - Bleeding esophageal varices requiring more than four units of blood in 24 hours, admission to ICU, or surgery.   - s/p EGD 12/25 which revealed nonbleeding large varix in distal esophagus with stigmata of recent bleed s/p banding and multiple small varices in the same area without high risk stigmata and deemed not amenable to banding and retained fluid mixed with old blood s/p suction, lavage, irrigation and moderate portal hypertensive gastropathy  - No plan for TIPS due to h/o HE  - Transitioned to PO Protonix (to complete 4 week course to prevent post-banding ulceration) + (sucralfate x 2 weeks to prevent post-banding ulceration)  - Started on coreg 6.25 bid; patient with bradycardia with borderline low bp; if persists, then consider decreasing dose  - Per hepatology, will need repeat EGD in 2 weeks to assess for eradication of varices (1/9) with OP GI Dr. Carranza       # Cytopenia - anemia + thrombocytopenia - Baseline hgb ~11-13, plt 50- 60 - - stable,         # Cirrhosis - s/p rectal lactulose to po lactulose + rifaximin ~2 BM/day  meld 11; per hepatology consider OP liver transplant workup but currently low MELD.  - Hold diuretics - Follow up with Hepatology - to determine when to resume          # Hypernatremia - resolved.          #Occipital laceration- s/p staples - Follow up with PCP next week for staple removal    H/H stable  Patient is tolerating diet well, no recurrent bleeding, feels well  Stable for discharge per Dr. Nuñez

## 2022-12-30 NOTE — DIETITIAN INITIAL EVALUATION ADULT - NSFNSADHERENCEPTAFT_GEN_A_CORE
Pt with T2DM, reports it is mostly diet control. No diabetes medications noted per H&P. Pt reports she was planning on getting a CGM outpatient however ended up being admitted to hospital. HBA1c: 5.9% (12/25/22)

## 2022-12-30 NOTE — DISCHARGE NOTE PROVIDER - PROVIDER TOKENS
PROVIDER:[TOKEN:[[id number]],FOLLOWUP:[1 week]] PROVIDER:[TOKEN:[[id number]],FOLLOWUP:[1 week]],PROVIDER:[TOKEN:[[id number]],FOLLOWUP:[1 week]]

## 2022-12-30 NOTE — DIETITIAN INITIAL EVALUATION ADULT - PERTINENT MEDS FT
MEDICATIONS  (STANDING):  carvedilol 6.25 milliGRAM(s) Oral every 12 hours  dextrose 5%. 1000 milliLiter(s) (100 mL/Hr) IV Continuous <Continuous>  dextrose 5%. 1000 milliLiter(s) (50 mL/Hr) IV Continuous <Continuous>  dextrose 50% Injectable 25 Gram(s) IV Push once  dextrose 50% Injectable 12.5 Gram(s) IV Push once  dextrose 50% Injectable 25 Gram(s) IV Push once  glucagon  Injectable 1 milliGRAM(s) IntraMuscular once  insulin lispro (ADMELOG) corrective regimen sliding scale   SubCutaneous three times a day before meals  insulin lispro (ADMELOG) corrective regimen sliding scale   SubCutaneous at bedtime  lactulose Syrup 20 Gram(s) Oral three times a day  pantoprazole    Tablet 40 milliGRAM(s) Oral before breakfast  rifAXIMin 550 milliGRAM(s) Oral two times a day  sertraline 50 milliGRAM(s) Oral daily  sucralfate suspension 1 Gram(s) Oral two times a day    MEDICATIONS  (PRN):  dextrose Oral Gel 15 Gram(s) Oral once PRN Blood Glucose LESS THAN 70 milliGRAM(s)/deciliter

## 2022-12-30 NOTE — DIETITIAN INITIAL EVALUATION ADULT - PERTINENT LABORATORY DATA
12-30    140  |  110<H>  |  22  ----------------------------<  142<H>  3.7   |  23  |  0.91    Ca    7.9<L>      30 Dec 2022 06:52  Phos  1.9     12-30  Mg     1.6     12-29    TPro  5.0<L>  /  Alb  2.8<L>  /  TBili  1.4<H>  /  DBili  x   /  AST  32  /  ALT  9<L>  /  AlkPhos  66  12-30  POCT Blood Glucose.: 157 mg/dL (12-30-22 @ 08:42)  A1C with Estimated Average Glucose Result: 5.9 % (12-25-22 @ 06:29)

## 2022-12-30 NOTE — DISCHARGE NOTE PROVIDER - NSDCFUADDAPPT_GEN_ALL_CORE_FT
Your medications were sent to [facility] on [address] in [place] as you requested.    APPTS ARE READY TO BE MADE: [x] YES    Best Family or Patient Contact (if needed):    Additional Information about above appointments (if needed):    1: GI Dr. Carranza  2:   3:     Other comments or requests:    APPTS ARE READY TO BE MADE: [x] YES    Best Family or Patient Contact (if needed):    Additional Information about above appointments (if needed):    1: GI Dr. Carranza  2:   3:     Other comments or requests:     Your medications were sent to [facility] on [address] in [place] as you requested.

## 2022-12-30 NOTE — DISCHARGE NOTE PROVIDER - NSDCFUSCHEDAPPT_GEN_ALL_CORE_FT
Oanh Martinez  [facility]  GASTRO [address]  Scheduled Appointment: 01/03/2023    [facility]  FAMILYMED [address]  Scheduled Appointment: 02/01/2023    Hussein Tobar  [facility]  NEUROLOGY [address]  Scheduled Appointment: 02/01/2023    Erich Todd  [facility]  ENDOCRIN [address]  Scheduled Appointment: 02/28/2023

## 2023-01-03 ENCOUNTER — APPOINTMENT (OUTPATIENT)
Dept: FAMILY MEDICINE | Facility: CLINIC | Age: 68
End: 2023-01-03
Payer: MEDICARE

## 2023-01-03 ENCOUNTER — NON-APPOINTMENT (OUTPATIENT)
Age: 68
End: 2023-01-03

## 2023-01-03 ENCOUNTER — LABORATORY RESULT (OUTPATIENT)
Age: 68
End: 2023-01-03

## 2023-01-03 ENCOUNTER — APPOINTMENT (OUTPATIENT)
Dept: GASTROENTEROLOGY | Facility: CLINIC | Age: 68
End: 2023-01-03
Payer: MEDICARE

## 2023-01-03 VITALS
HEIGHT: 61 IN | WEIGHT: 146 LBS | DIASTOLIC BLOOD PRESSURE: 58 MMHG | SYSTOLIC BLOOD PRESSURE: 118 MMHG | BODY MASS INDEX: 27.56 KG/M2 | HEART RATE: 57 BPM | RESPIRATION RATE: 14 BRPM | OXYGEN SATURATION: 98 % | TEMPERATURE: 97.8 F

## 2023-01-03 VITALS
DIASTOLIC BLOOD PRESSURE: 56 MMHG | RESPIRATION RATE: 14 BRPM | HEART RATE: 51 BPM | WEIGHT: 146 LBS | BODY MASS INDEX: 27.56 KG/M2 | OXYGEN SATURATION: 98 % | HEIGHT: 61 IN | SYSTOLIC BLOOD PRESSURE: 120 MMHG

## 2023-01-03 DIAGNOSIS — S01.01XD LACERATION W/OUT FOREIGN BODY OF SCALP, SUBSEQUENT ENCOUNTER: ICD-10-CM

## 2023-01-03 PROCEDURE — 99215 OFFICE O/P EST HI 40 MIN: CPT

## 2023-01-03 PROCEDURE — 99214 OFFICE O/P EST MOD 30 MIN: CPT

## 2023-01-03 RX ORDER — ALPRAZOLAM 1 MG/1
1 TABLET ORAL
Refills: 0 | Status: DISCONTINUED | COMMUNITY
End: 2023-01-03

## 2023-01-03 RX ORDER — FUROSEMIDE 20 MG/1
20 TABLET ORAL DAILY
Qty: 90 | Refills: 0 | Status: DISCONTINUED | COMMUNITY
Start: 2020-09-03 | End: 2023-01-03

## 2023-01-03 RX ORDER — LORATADINE 10 MG/1
10 TABLET ORAL DAILY
Qty: 90 | Refills: 0 | Status: DISCONTINUED | COMMUNITY
Start: 2022-10-05 | End: 2023-01-03

## 2023-01-03 NOTE — PHYSICAL EXAM
[No Acute Distress] : no acute distress [Well Nourished] : well nourished [Well Developed] : well developed [Ill-Appearing] : ill-appearing [PERRL] : pupils equal round and reactive to light [EOMI] : extraocular movements intact [Normal] : no jugular venous distention, supple, no lymphadenopathy and the thyroid was normal and there were no nodules present [No Respiratory Distress] : no respiratory distress  [No Accessory Muscle Use] : no accessory muscle use [Normal Rate] : normal rate  [Regular Rhythm] : with a regular rhythm [No Joint Swelling] : no joint swelling [No Rash] : no rash [Coordination Grossly Intact] : coordination grossly intact [No Focal Deficits] : no focal deficits [Normal Gait] : normal gait [Normal Affect] : the affect was normal [Normal Insight/Judgement] : insight and judgment were intact [Normal Sclera/Conjunctiva] : normal sclera/conjunctiva [Normal Outer Ear/Nose] : the outer ears and nose were normal in appearance [Pedal Pulses Present] : the pedal pulses are present [Alert and Oriented x3] : oriented to person, place, and time [de-identified] : pale [de-identified] : no scleral icterus [de-identified] : trace to 1+ edema of BLE and BUE  [de-identified] : distended, ascites [de-identified] : muscle wasting of extremities [de-identified] : scalp scabbed lesion with staples present. well healed

## 2023-01-03 NOTE — HISTORY OF PRESENT ILLNESS
[FreeTextEntry1] : presents for removal of head staples  [de-identified] : Patient presents today with her  for staple removal s/p fall secondary to anemia/dizziness.Patient was recently discharged from the hospital after a stay for portal hypertensive bleeding esophageal varices requiring ICU level care and banding. 5 units PRC transfusion\par

## 2023-01-03 NOTE — PLAN
[FreeTextEntry1] : Staples removed in office without difficulty. No bleeding, no dressing required. \par Recommend f/u with Dr Martinez \par

## 2023-01-04 PROCEDURE — 83036 HEMOGLOBIN GLYCOSYLATED A1C: CPT

## 2023-01-04 PROCEDURE — 82330 ASSAY OF CALCIUM: CPT

## 2023-01-04 PROCEDURE — 81001 URINALYSIS AUTO W/SCOPE: CPT

## 2023-01-04 PROCEDURE — 83605 ASSAY OF LACTIC ACID: CPT

## 2023-01-04 PROCEDURE — 71045 X-RAY EXAM CHEST 1 VIEW: CPT

## 2023-01-04 PROCEDURE — 83010 ASSAY OF HAPTOGLOBIN QUANT: CPT

## 2023-01-04 PROCEDURE — 85045 AUTOMATED RETICULOCYTE COUNT: CPT

## 2023-01-04 PROCEDURE — 93005 ELECTROCARDIOGRAM TRACING: CPT

## 2023-01-04 PROCEDURE — 86850 RBC ANTIBODY SCREEN: CPT

## 2023-01-04 PROCEDURE — 84132 ASSAY OF SERUM POTASSIUM: CPT

## 2023-01-04 PROCEDURE — 84133 ASSAY OF URINE POTASSIUM: CPT

## 2023-01-04 PROCEDURE — 85027 COMPLETE CBC AUTOMATED: CPT

## 2023-01-04 PROCEDURE — 82140 ASSAY OF AMMONIA: CPT

## 2023-01-04 PROCEDURE — 87040 BLOOD CULTURE FOR BACTERIA: CPT

## 2023-01-04 PROCEDURE — 94003 VENT MGMT INPAT SUBQ DAY: CPT

## 2023-01-04 PROCEDURE — P9059: CPT

## 2023-01-04 PROCEDURE — 84443 ASSAY THYROID STIM HORMONE: CPT

## 2023-01-04 PROCEDURE — 80053 COMPREHEN METABOLIC PANEL: CPT

## 2023-01-04 PROCEDURE — 82607 VITAMIN B-12: CPT

## 2023-01-04 PROCEDURE — 83935 ASSAY OF URINE OSMOLALITY: CPT

## 2023-01-04 PROCEDURE — 82947 ASSAY GLUCOSE BLOOD QUANT: CPT

## 2023-01-04 PROCEDURE — 82746 ASSAY OF FOLIC ACID SERUM: CPT

## 2023-01-04 PROCEDURE — 85014 HEMATOCRIT: CPT

## 2023-01-04 PROCEDURE — 82962 GLUCOSE BLOOD TEST: CPT

## 2023-01-04 PROCEDURE — 97161 PT EVAL LOW COMPLEX 20 MIN: CPT

## 2023-01-04 PROCEDURE — 85018 HEMOGLOBIN: CPT

## 2023-01-04 PROCEDURE — 85610 PROTHROMBIN TIME: CPT

## 2023-01-04 PROCEDURE — 84100 ASSAY OF PHOSPHORUS: CPT

## 2023-01-04 PROCEDURE — 97116 GAIT TRAINING THERAPY: CPT

## 2023-01-04 PROCEDURE — 93356 MYOCRD STRAIN IMG SPCKL TRCK: CPT

## 2023-01-04 PROCEDURE — 85025 COMPLETE CBC W/AUTO DIFF WBC: CPT

## 2023-01-04 PROCEDURE — 97110 THERAPEUTIC EXERCISES: CPT

## 2023-01-04 PROCEDURE — 94002 VENT MGMT INPAT INIT DAY: CPT

## 2023-01-04 PROCEDURE — 82728 ASSAY OF FERRITIN: CPT

## 2023-01-04 PROCEDURE — 36430 TRANSFUSION BLD/BLD COMPNT: CPT

## 2023-01-04 PROCEDURE — 83550 IRON BINDING TEST: CPT

## 2023-01-04 PROCEDURE — 82435 ASSAY OF BLOOD CHLORIDE: CPT

## 2023-01-04 PROCEDURE — 84156 ASSAY OF PROTEIN URINE: CPT

## 2023-01-04 PROCEDURE — 83615 LACTATE (LD) (LDH) ENZYME: CPT

## 2023-01-04 PROCEDURE — 74177 CT ABD & PELVIS W/CONTRAST: CPT

## 2023-01-04 PROCEDURE — 93306 TTE W/DOPPLER COMPLETE: CPT

## 2023-01-04 PROCEDURE — 82570 ASSAY OF URINE CREATININE: CPT

## 2023-01-04 PROCEDURE — 86900 BLOOD TYPING SEROLOGIC ABO: CPT

## 2023-01-04 PROCEDURE — 86803 HEPATITIS C AB TEST: CPT

## 2023-01-04 PROCEDURE — 83540 ASSAY OF IRON: CPT

## 2023-01-04 PROCEDURE — 84295 ASSAY OF SERUM SODIUM: CPT

## 2023-01-04 PROCEDURE — 84466 ASSAY OF TRANSFERRIN: CPT

## 2023-01-04 PROCEDURE — 82565 ASSAY OF CREATININE: CPT

## 2023-01-04 PROCEDURE — C1889: CPT

## 2023-01-04 PROCEDURE — 83735 ASSAY OF MAGNESIUM: CPT

## 2023-01-04 PROCEDURE — 85730 THROMBOPLASTIN TIME PARTIAL: CPT

## 2023-01-04 PROCEDURE — 82803 BLOOD GASES ANY COMBINATION: CPT

## 2023-01-04 PROCEDURE — 84540 ASSAY OF URINE/UREA-N: CPT

## 2023-01-04 PROCEDURE — 86901 BLOOD TYPING SEROLOGIC RH(D): CPT

## 2023-01-04 PROCEDURE — 84300 ASSAY OF URINE SODIUM: CPT

## 2023-01-04 PROCEDURE — 36415 COLL VENOUS BLD VENIPUNCTURE: CPT

## 2023-01-04 PROCEDURE — 83690 ASSAY OF LIPASE: CPT

## 2023-01-09 NOTE — HISTORY OF PRESENT ILLNESS
[MELD Score: ___] : MELD Score is [unfilled] [Fatigue] : denies fatigue [Malaise] : denies malaise [Diffuse Joint Pain (Arthralgias)] : denies arthralgias [Muscle Aches, Generalized (Myalgias)] : denies myalgias [Abdominal Pain] : denies abdominal pain [Urine Tests Nonspecific Abnormal Findings Biliuria] : denies dark urine [Itching (Pruritus)] : denies pruritus

## 2023-01-09 NOTE — PHYSICAL EXAM
[Scleral Icterus] : Scleral Icterus noted [Abdominal  Ascites] : ascites [Jaundice] : Jaundice [General Appearance - Alert] : alert [General Appearance - In No Acute Distress] : in no acute distress [Outer Ear] : the ears and nose were normal in appearance [Oropharynx] : the oropharynx was normal [Neck Appearance] : the appearance of the neck was normal [Neck Cervical Mass (___cm)] : no neck mass was observed [Auscultation Breath Sounds / Voice Sounds] : lungs were clear to auscultation bilaterally [Heart Rate And Rhythm] : heart rate was normal and rhythm regular [Edema] : there was no peripheral edema [Bowel Sounds] : normal bowel sounds [Abdomen Soft] : soft [Abdomen Tenderness] : non-tender [Abdomen Mass (___ Cm)] : no abdominal mass palpated [Abnormal Walk] : normal gait [Nail Clubbing] : no clubbing  or cyanosis of the fingernails [Musculoskeletal - Swelling] : no joint swelling seen [Motor Tone] : muscle strength and tone were normal [Skin Color & Pigmentation] : normal skin color and pigmentation [Skin Turgor] : normal skin turgor [] : no rash [No Focal Deficits] : no focal deficits [Oriented To Time, Place, And Person] : oriented to person, place, and time [Impaired Insight] : insight and judgment were intact [Affect] : the affect was normal

## 2023-01-10 ENCOUNTER — NON-APPOINTMENT (OUTPATIENT)
Age: 68
End: 2023-01-10

## 2023-01-10 LAB
ALBUMIN SERPL ELPH-MCNC: 3.1 G/DL
ALP BLD-CCNC: 80 U/L
ALT SERPL-CCNC: 7 U/L
ANION GAP SERPL CALC-SCNC: 7 MMOL/L
AST SERPL-CCNC: 26 U/L
BASOPHILS # BLD AUTO: 0.03 K/UL
BASOPHILS NFR BLD AUTO: 0.8 %
BILIRUB INDIRECT SERPL-MCNC: 0.8 MG/DL
BILIRUB SERPL-MCNC: 1.4 MG/DL
BUN SERPL-MCNC: 19 MG/DL
CALCIUM SERPL-MCNC: 8.6 MG/DL
CHLORIDE SERPL-SCNC: 107 MMOL/L
CO2 SERPL-SCNC: 26 MMOL/L
CREAT SERPL-MCNC: 0.89 MG/DL
EGFR: 71 ML/MIN/1.73M2
EOSINOPHIL # BLD AUTO: 0.1 K/UL
EOSINOPHIL NFR BLD AUTO: 2.6 %
GLUCOSE SERPL-MCNC: 256 MG/DL
HCT VFR BLD CALC: 33.6 %
HGB BLD-MCNC: 10.3 G/DL
IMM GRANULOCYTES NFR BLD AUTO: 0.3 %
INR PPP: 1.45 RATIO
LYMPHOCYTES # BLD AUTO: 0.41 K/UL
LYMPHOCYTES NFR BLD AUTO: 10.6 %
MAN DIFF?: NORMAL
MCHC RBC-ENTMCNC: 30.7 GM/DL
MCHC RBC-ENTMCNC: 32 PG
MCV RBC AUTO: 104.3 FL
MONOCYTES # BLD AUTO: 0.5 K/UL
MONOCYTES NFR BLD AUTO: 13 %
NEUTROPHILS # BLD AUTO: 2.81 K/UL
NEUTROPHILS NFR BLD AUTO: 72.7 %
PLATELET # BLD AUTO: 58 K/UL
POTASSIUM SERPL-SCNC: 5 MMOL/L
PROT SERPL-MCNC: 5.3 G/DL
PT BLD: 16.9 SEC
RBC # BLD: 3.22 M/UL
RBC # FLD: 17.9 %
SODIUM SERPL-SCNC: 140 MMOL/L
WBC # FLD AUTO: 3.86 K/UL

## 2023-01-17 ENCOUNTER — APPOINTMENT (OUTPATIENT)
Dept: NEUROLOGY | Facility: CLINIC | Age: 68
End: 2023-01-17

## 2023-01-17 ENCOUNTER — NON-APPOINTMENT (OUTPATIENT)
Age: 68
End: 2023-01-17

## 2023-02-01 ENCOUNTER — LABORATORY RESULT (OUTPATIENT)
Age: 68
End: 2023-02-01

## 2023-02-01 ENCOUNTER — APPOINTMENT (OUTPATIENT)
Dept: FAMILY MEDICINE | Facility: CLINIC | Age: 68
End: 2023-02-01
Payer: MEDICARE

## 2023-02-01 VITALS
HEIGHT: 61 IN | BODY MASS INDEX: 23.98 KG/M2 | SYSTOLIC BLOOD PRESSURE: 96 MMHG | OXYGEN SATURATION: 98 % | HEART RATE: 48 BPM | DIASTOLIC BLOOD PRESSURE: 44 MMHG | RESPIRATION RATE: 14 BRPM | WEIGHT: 127 LBS | TEMPERATURE: 97.4 F

## 2023-02-01 DIAGNOSIS — D64.9 ANEMIA, UNSPECIFIED: ICD-10-CM

## 2023-02-01 DIAGNOSIS — E86.0 DEHYDRATION: ICD-10-CM

## 2023-02-01 PROCEDURE — 99214 OFFICE O/P EST MOD 30 MIN: CPT | Mod: 25

## 2023-02-01 PROCEDURE — 36415 COLL VENOUS BLD VENIPUNCTURE: CPT

## 2023-02-01 NOTE — REVIEW OF SYSTEMS
[FreeTextEntry6] : He is here for a recheck of his cough and cold sx --starting 5 days ago---neg qs here and a neg b/u culture noted too.  His cold sx remain the same --albuterol is being used with maybe some help--he still coughs at night
[Negative] : Heme/Lymph

## 2023-02-01 NOTE — HISTORY OF PRESENT ILLNESS
[FreeTextEntry1] : Follow up  [de-identified] : Caridad has h/o NIDDM, hepatic cirrhosis, portal HTN, CKD, HTN and hyperlipidemia. She presents for follow up after having exacerbation of portal HTN with bleeding which required transfusion last month. The pt states she feels much better and has not had any episodes of confusion or falls

## 2023-02-01 NOTE — PLAN
[FreeTextEntry1] : Pt advised to continue carvedilol and spironolactone . Take BP reading every morning after breakfast. Instructed to hold furosemide if systolic BP reading goes below 90 .\par Blood drawn in office\par F/U 3 mo

## 2023-02-01 NOTE — PHYSICAL EXAM
[No Edema] : there was no peripheral edema [Soft] : abdomen soft [Coordination Grossly Intact] : coordination grossly intact [Alert and Oriented x3] : oriented to person, place, and time [Normal] : affect was normal and insight and judgment were intact [de-identified] : dry tongue [de-identified] : + ascites, distended

## 2023-02-02 LAB
ALBUMIN SERPL ELPH-MCNC: 3.8 G/DL
ALP BLD-CCNC: 134 U/L
ALT SERPL-CCNC: 6 U/L
ANION GAP SERPL CALC-SCNC: 9 MMOL/L
AST SERPL-CCNC: 25 U/L
BASOPHILS # BLD AUTO: 0.04 K/UL
BASOPHILS NFR BLD AUTO: 1.2 %
BILIRUB SERPL-MCNC: 1.4 MG/DL
BUN SERPL-MCNC: 22 MG/DL
CALCIUM SERPL-MCNC: 9.3 MG/DL
CHLORIDE SERPL-SCNC: 101 MMOL/L
CO2 SERPL-SCNC: 28 MMOL/L
CREAT SERPL-MCNC: 0.78 MG/DL
EGFR: 83 ML/MIN/1.73M2
EOSINOPHIL # BLD AUTO: 0.14 K/UL
EOSINOPHIL NFR BLD AUTO: 4.2 %
ESTIMATED AVERAGE GLUCOSE: 143 MG/DL
GLUCOSE SERPL-MCNC: 206 MG/DL
HBA1C MFR BLD HPLC: 6.6 %
HCT VFR BLD CALC: 35.3 %
HGB BLD-MCNC: 11.3 G/DL
IMM GRANULOCYTES NFR BLD AUTO: 0.3 %
IRON SATN MFR SERPL: 12 %
IRON SERPL-MCNC: 48 UG/DL
LYMPHOCYTES # BLD AUTO: 0.45 K/UL
LYMPHOCYTES NFR BLD AUTO: 13.5 %
MAN DIFF?: NORMAL
MCHC RBC-ENTMCNC: 29.7 PG
MCHC RBC-ENTMCNC: 32 GM/DL
MCV RBC AUTO: 92.9 FL
MONOCYTES # BLD AUTO: 0.42 K/UL
MONOCYTES NFR BLD AUTO: 12.6 %
NEUTROPHILS # BLD AUTO: 2.27 K/UL
NEUTROPHILS NFR BLD AUTO: 68.2 %
PLATELET # BLD AUTO: 60 K/UL
POTASSIUM SERPL-SCNC: 5 MMOL/L
PROT SERPL-MCNC: 6.4 G/DL
RBC # BLD: 3.8 M/UL
RBC # FLD: 15.3 %
SODIUM SERPL-SCNC: 139 MMOL/L
TIBC SERPL-MCNC: 385 UG/DL
UIBC SERPL-MCNC: 337 UG/DL
WBC # FLD AUTO: 3.33 K/UL

## 2023-02-28 ENCOUNTER — APPOINTMENT (OUTPATIENT)
Dept: ENDOCRINOLOGY | Facility: CLINIC | Age: 68
End: 2023-02-28
Payer: MEDICARE

## 2023-02-28 PROCEDURE — 99214 OFFICE O/P EST MOD 30 MIN: CPT | Mod: 95

## 2023-02-28 NOTE — HISTORY OF PRESENT ILLNESS
[Home] : at home, [unfilled] , at the time of the visit. [Medical Office: ([facility])___] : at the medical office located in  [Verbal consent obtained from patient] : the patient, [unfilled] [FreeTextEntry1] : Telehealth Visit Conducted.\par Time started:  10:16 AM\par Time Ended:   10:32 AM\par \par Follow up Type 2 DM, osteoporosis\par She has a PMH significant for cirrhosis with ascites and encephalopathy (related to fatty liver)\par History of OP on alendronate for several years.   \par \par Had hospital admission in December 2022 for GI bleed with had variceal banding.  \par \par Quality:  Type 2 DM\par Severity:  moderate\par Duration of diabetes:  over 20 years\par Associated Complications/ Symptoms:  no known microvascular complications.  \par Modifying Factors:  Better with medication\par \par SMBG:  testing 4 times a day  Most BG has been 130- 300 mg/dl.  \par Reports worsened control since starting lactulose\par Current Diabetic Medication Regimen:\par Lantus 28 units qhs.\par Novolog 14 units with meals -  ran out recently. \par \par Denies any associated polyuria or polydipsia.  \par

## 2023-02-28 NOTE — ASSESSMENT
[FreeTextEntry1] : 68 year old female with PMH of cirrhosis due to NAFLD, Type 2 DM, HTN, HLD and osteoporosis here for follow up.  Her diabetes control seems to be worsening in setting of lactulose use.   \par \par 1.  Type 2 DM-   Continue basal bolus insulin.  Start Dexcom G6 CGM to assist with insulin titration. \par \par CGM medical necessity statement:\par Patient tests her blood glucose 4 or more times a day and injects insulin 3 or more times per day.\par She is seen regularly at this office within 6 month intervals.\par Patient requires frequent adjustments to her insulin regimen on the basis of CGM results.\par \par 2.  OP-  continue Alendronate.  Needs updated DXA.\par 3.  HTN-   Taken off Lisinopril.  Continue to monitor.   \par 4.  HLD-  avoid statin due to liver disease.

## 2023-03-22 ENCOUNTER — NON-APPOINTMENT (OUTPATIENT)
Age: 68
End: 2023-03-22

## 2023-03-22 ENCOUNTER — APPOINTMENT (OUTPATIENT)
Dept: OPHTHALMOLOGY | Facility: CLINIC | Age: 68
End: 2023-03-22
Payer: MEDICARE

## 2023-03-22 PROCEDURE — 92004 COMPRE OPH EXAM NEW PT 1/>: CPT

## 2023-03-27 ENCOUNTER — RX RENEWAL (OUTPATIENT)
Age: 68
End: 2023-03-27

## 2023-03-30 ENCOUNTER — APPOINTMENT (OUTPATIENT)
Dept: ENDOCRINOLOGY | Facility: CLINIC | Age: 68
End: 2023-03-30
Payer: MEDICARE

## 2023-03-30 PROCEDURE — G0108 DIAB MANAGE TRN  PER INDIV: CPT

## 2023-04-01 ENCOUNTER — NON-APPOINTMENT (OUTPATIENT)
Age: 68
End: 2023-04-01

## 2023-04-03 ENCOUNTER — RX RENEWAL (OUTPATIENT)
Age: 68
End: 2023-04-03

## 2023-04-03 RX ORDER — BLOOD SUGAR DIAGNOSTIC
STRIP MISCELLANEOUS 4 TIMES DAILY
Qty: 4 | Refills: 1 | Status: DISCONTINUED | COMMUNITY
Start: 2023-01-12 | End: 2023-04-03

## 2023-04-07 RX ORDER — INSULIN ASPART 100 [IU]/ML
100 INJECTION, SOLUTION INTRAVENOUS; SUBCUTANEOUS
Qty: 3 | Refills: 1 | Status: DISCONTINUED | COMMUNITY
Start: 2023-03-16 | End: 2023-04-07

## 2023-04-12 ENCOUNTER — APPOINTMENT (OUTPATIENT)
Dept: ENDOCRINOLOGY | Facility: CLINIC | Age: 68
End: 2023-04-12

## 2023-04-13 ENCOUNTER — APPOINTMENT (OUTPATIENT)
Dept: ENDOCRINOLOGY | Facility: CLINIC | Age: 68
End: 2023-04-13
Payer: MEDICARE

## 2023-04-13 PROCEDURE — ZZZZZ: CPT

## 2023-04-13 PROCEDURE — 95249 CONT GLUC MNTR PT PROV EQP: CPT

## 2023-04-20 ENCOUNTER — APPOINTMENT (OUTPATIENT)
Dept: GASTROENTEROLOGY | Facility: CLINIC | Age: 68
End: 2023-04-20
Payer: MEDICARE

## 2023-04-20 VITALS
BODY MASS INDEX: 24.55 KG/M2 | SYSTOLIC BLOOD PRESSURE: 135 MMHG | RESPIRATION RATE: 16 BRPM | HEART RATE: 58 BPM | HEIGHT: 61 IN | DIASTOLIC BLOOD PRESSURE: 58 MMHG | WEIGHT: 130 LBS | OXYGEN SATURATION: 98 % | TEMPERATURE: 98.2 F

## 2023-04-20 PROCEDURE — 99212 OFFICE O/P EST SF 10 MIN: CPT

## 2023-04-21 NOTE — PHYSICAL EXAM
[Spider Angioma] : Spider angioma(s) was(were) observed [Splenomegaly] : splenomegaly [Non-Tender] : non-tender [Irregular] : irregular [Asterixis] : Asterixis observed [General Appearance - Alert] : alert [General Appearance - In No Acute Distress] : in no acute distress [Sclera] : the sclera and conjunctiva were normal [Neck Appearance] : the appearance of the neck was normal [Heart Rate And Rhythm] : heart rate was normal and rhythm regular [Bowel Sounds] : normal bowel sounds [Edema] : there was no peripheral edema [Abdomen Soft] : soft [Abdomen Tenderness] : non-tender [Abdomen Mass (___ Cm)] : no abdominal mass palpated [Involuntary Movements] : no involuntary movements were seen [Motor Tone] : muscle strength and tone were normal [Ataxic, Wide-Based] : wide-based and ataxic [Skin Color & Pigmentation] : normal skin color and pigmentation [Skin Turgor] : normal skin turgor [] : no rash [Oriented To Time, Place, And Person] : oriented to person, place, and time [Impaired Insight] : insight and judgment were intact [Affect] : the affect was normal [Scleral Icterus] : No Scleral Icterus [Hepatojugular Reflux] : patient did not have a sustained hepatojugular reflux [Abdominal  Ascites] : no ascites [Jaundice] : No jaundice [Palmar Erythema] : no Palmar Erythema [Depression] : no depression [Hallucinations] : ~T no ~M hallucinations [Delusions] : no ~T delusions [Suicidal Ideation] : no suicidal ideation [Homicidal Ideation] : no homicidal ideations [Preoccupiation With Violent Thoughts] : no preoccupation with violent thoughts

## 2023-04-21 NOTE — HISTORY OF PRESENT ILLNESS
[MELD Score: ___] : MELD Score is [unfilled] [de-identified] : FELICITY RUBIN is a 67 year old female with a PMH significant for decompensated Cirrhosis c/b Ascites, Hepatic Encephalopathy, HTN, DM, HLD, Asthma, and Anemia.\par \par 7/14/22: Pt reports that she has known fatty liver for many years and was diagnosed with cirrhosis in the last few years. Previous management included Lasix 40 mg daily and ursodiol 500 mg daily. Pt is unsure why she was prescribed ursodiol. Current daily smoker. Denies alcohol consumption, past or present. Denies risk factors for viral hepatitis. Pt states, and  Kai agrees, that pt has become more confused and foggy in the past year. She also reports bladder and bowel incontinence and unsteady gait. She has a history of falls, most recent being last year in which she fractured her shoulder. Pt feels these symptoms started after the most recent fall. \par \par On chart review: Pt has had elevated LFTs dating as far back as 2016 in the form of alk phos elevation ranging from 120-200 with normal transaminases and bilirubin. In 2020, bilirubin is seen to elevate to 1.7 and fluctuate from 0.8 to 2.0 since then. ALT remained normal. AST mostly in the 30s-40s. Last INR done in 2020 was 1.4. Negative for HBV and HCV in 2020. Plt count ranging from 44-77 since 2017. \par Last US abdomen from 2020 revealed lobulated liver with a diffuse heterogeneous/nodular echotexture is compatible with underlying hepatocellular disease/cirrhosis, no focal hepatic abnormality, extatic CBD (11 mm) likely related to prior cholecystectomy/cholecystitis, splenomegaly and mild ascites\par \par Last EGD 9/2021 no varices, portal hypertensive gastropathy\par No previous paracentesis \par \par 8/11/22: Since last visit Labs done 7/18/22: bilirubin 1.6, AST 38, ALT 9, AlkPhos 146, INR 1.33, AFP 2, ABDIEL + 1:160, ASMA + 1:120, viral hepatitis panel negative, iron studies negative.\par US abdomen 7/18/22: cirrhotic liver without focal abnormality, mild abdominal ascites.\par Pt reports she has quit smoking. She started Xifaxan 550mg BID which her  states has helped with her confusion. Lasix was decreased to 20 mg daily due to pt complaints of frequent urination. Denies hematemesis.\par \par 10/20/22: Pt reports she went to [facility] in September for increased confusion. She was given a prescription for lactulose and discharged home. Pt is taking lactulose and Xifaxan and reports her confusion has improved. Denies fatigue, malaise, arthralgias, myalgias, recent infection, abdominal pain or distension, jaundice, hematemesis, hematochezia, dark urine, confusion, unintentional weight loss or gain. She is currently taking Spironolactone 50 mg daily. Pt is also taking Ursodiol for pruritus and reports positive relief.\par \par Labs 9/20/22 - bilirubin 1.7, AST 39, ALT 10, , INR 1.33, PLT 41\par EGD 10/19/22 - no varices, portal hypertensive gastropathy\par \par 1/3/23: She is status post discharge from [facility] where she had been transferred for management of GI bleed from [facility]. S/P banding at [facility] with recs to repeat EGD 2 weeks after DC. Deemed not a TIPS candidate due to h/o Encephalopathy.\par \par 4/21/23: Pt presents today for follow up visit, accompanied by her . Pt reports she is feeling well. She continues to take Lactulose and Xifaxan. Denies confusion. She is currently on Furosemide 20 mg daily and Spironolactone 25 mg daily. Denies abdominal distension or LE edema. Pt is currently on Carvedilol 3.125 mg daily. HR in office today is 58. Denies hematemesis or black stools. LFTs from 2/23 - Tbili 1.4, AST 25, ALT 6, . Pt is currently on Ursodiol 500 mg daily.

## 2023-04-21 NOTE — ASSESSMENT
[FreeTextEntry1] : FELICITY RUBIN is a 67 year old female with a PMH significant for decompensated Cirrhosis c/b Ascites, Hepatic Encephalopathy, HTN, DM, HLD, Asthma, and Anemia.\par \par Cirrhosis\par -Etiology - likely NAFLD coupled w/burnt out AIH given positive ABDIEL and ASMA .\par -Disease progression of cirrhosis discussed, including but not limited to hepatocellular carcinoma, varices with or without bleeding, hepatic encephalopathy, ascites, liver failure and death. \par \par Elevated LFTs\par -continue Ursodiol 50 mg daily, repeat labs ordered\par \par HCC Screening\par -I have explained the need for imaging every 6 months to assess for HCC.\par -US liver 7/18/22 no focal lesions, repeat US ordered.\par -AFP 7/18/22 - 2.0, repeat ordered\par \par Variceal Screening\par -EGD 12/2022 - Large junctional varix banded, PHG. Follow up with GI, Dr. Carranza, pending\par -continue Carvedilol 3.125 mg daily, HR today 58.\par -if pt experiences hematemesis, advised to go to ER immediately\par \par Encephalopathy\par -notify provider if new onset confusion\par -titrate lactulose to 1-2 bms/day\par -Continue Xifaxan 550mg bid\par \par Ascites\par -Continue Furosemide 20 mg and Spironolactone 25 mg daily.\par -monitor weight daily\par -Contact provider for increased abdominal distension\par \par Encephalopathy\par -notify provider if new onset confusion\par -titrate lactulose to 1-2 bms/day\par -continue Xifaxan 550 mg daily\par \par Transplant\par -I have explained that disease can progress to the point of requiring an evaluation for liver transplantation. \par -MELDNa - will calculate after labs\par \par Diet\par -High Protein Low Fat Low Sugar Low Salt (up to 2G Na/day) Diet\par -No prolonged fasting\par -Mediterranean Diet info provided\par \par Health Maintenance\par -Pt is note immune to HBV, vaccine recommended w/PCP.\par -Can take up to 2G Tylenol per day. NO NSAIDs as these can lead to diuretic resistance and precipitate renal dysfunction in patients with advanced liver disease\par -Continue to abstain from alcohol and all illicit drugs\par -Avoid use of herbal and dietary supplements due to potential hepatotoxicity\par -Avoid eating any unpasteurized dairy products; avoid eating any raw or undercooked eggs, fish, poultry, or meat; and avoid eating raw/steamed oysters or other shellfish to avoid risk of infection.\par \par Follow Up\par -Follow up in 3 months\par -discuss with Dr. Todd switching to oral DM mediction

## 2023-05-01 ENCOUNTER — APPOINTMENT (OUTPATIENT)
Dept: FAMILY MEDICINE | Facility: CLINIC | Age: 68
End: 2023-05-01
Payer: MEDICARE

## 2023-05-01 VITALS
HEIGHT: 61 IN | SYSTOLIC BLOOD PRESSURE: 116 MMHG | DIASTOLIC BLOOD PRESSURE: 66 MMHG | TEMPERATURE: 98.4 F | OXYGEN SATURATION: 97 % | WEIGHT: 136.8 LBS | BODY MASS INDEX: 25.83 KG/M2 | HEART RATE: 70 BPM | RESPIRATION RATE: 16 BRPM

## 2023-05-01 LAB
ALBUMIN: 30
CREATININE: 100
MICROALBUMIN/CREAT UR TEST STR-RTO: < 30

## 2023-05-01 PROCEDURE — 82044 UR ALBUMIN SEMIQUANTITATIVE: CPT | Mod: QW

## 2023-05-01 PROCEDURE — 36415 COLL VENOUS BLD VENIPUNCTURE: CPT

## 2023-05-01 PROCEDURE — 99214 OFFICE O/P EST MOD 30 MIN: CPT | Mod: 25

## 2023-05-01 NOTE — HISTORY OF PRESENT ILLNESS
[FreeTextEntry1] : Follow up \par [facility] [de-identified] : Caridad states she has gained 6# and is feeling more energetic. She continue to f/u with Dr Dillon [place] for liver cirrhosis and reports her ammonia levels have been low\par She is unable to fill her Pantoprazole for a few more weeks and requests temporary alternative

## 2023-05-01 NOTE — PLAN
[FreeTextEntry1] : Blood drawn in office\par Continue Lantus 32 units sc daily, continue Humalog 22 units TID with meals\par Pt now has CGM Dexcom\par F/U 3 month

## 2023-05-01 NOTE — PHYSICAL EXAM
[No Edema] : there was no peripheral edema [Soft] : abdomen soft [Coordination Grossly Intact] : coordination grossly intact [Alert and Oriented x3] : oriented to person, place, and time [Normal] : affect was normal and insight and judgment were intact [de-identified] : + ascites, distended

## 2023-05-02 LAB
AFP-TM SERPL-MCNC: 44.2 NG/ML
ALBUMIN SERPL ELPH-MCNC: 3.5 G/DL
ALP BLD-CCNC: 156 U/L
ALT SERPL-CCNC: 8 U/L
ANION GAP SERPL CALC-SCNC: 11 MMOL/L
AST SERPL-CCNC: 33 U/L
BILIRUB DIRECT SERPL-MCNC: 0.6 MG/DL
BILIRUB INDIRECT SERPL-MCNC: 0.9 MG/DL
BILIRUB SERPL-MCNC: 1.5 MG/DL
BUN SERPL-MCNC: 15 MG/DL
CALCIUM SERPL-MCNC: 9.1 MG/DL
CHLORIDE SERPL-SCNC: 107 MMOL/L
CO2 SERPL-SCNC: 25 MMOL/L
CREAT SERPL-MCNC: 0.82 MG/DL
EGFR: 78 ML/MIN/1.73M2
GLUCOSE SERPL-MCNC: 108 MG/DL
INR PPP: 1.27 RATIO
POTASSIUM SERPL-SCNC: 4 MMOL/L
PROT SERPL-MCNC: 6.4 G/DL
PT BLD: 14.9 SEC
SODIUM SERPL-SCNC: 143 MMOL/L

## 2023-05-03 LAB
ESTIMATED AVERAGE GLUCOSE: 206 MG/DL
HBA1C MFR BLD HPLC: 8.8 %

## 2023-05-04 LAB
BASOPHILS # BLD AUTO: 0.04 K/UL
BASOPHILS NFR BLD AUTO: 1.1 %
EOSINOPHIL # BLD AUTO: 0.13 K/UL
EOSINOPHIL NFR BLD AUTO: 3.5 %
HCT VFR BLD CALC: 38.5 %
HGB BLD-MCNC: 10.9 G/DL
IMM GRANULOCYTES NFR BLD AUTO: 0 %
LYMPHOCYTES # BLD AUTO: 0.51 K/UL
LYMPHOCYTES NFR BLD AUTO: 13.7 %
MAN DIFF?: NORMAL
MCHC RBC-ENTMCNC: 27.5 PG
MCHC RBC-ENTMCNC: 28.3 GM/DL
MCV RBC AUTO: 97 FL
MONOCYTES # BLD AUTO: 0.62 K/UL
MONOCYTES NFR BLD AUTO: 16.6 %
NEUTROPHILS # BLD AUTO: 2.43 K/UL
NEUTROPHILS NFR BLD AUTO: 65.1 %
PLATELET # BLD AUTO: 66 K/UL
RBC # BLD: 3.97 M/UL
RBC # FLD: 18 %
WBC # FLD AUTO: 3.73 K/UL

## 2023-05-09 ENCOUNTER — NON-APPOINTMENT (OUTPATIENT)
Age: 68
End: 2023-05-09

## 2023-05-15 ENCOUNTER — APPOINTMENT (OUTPATIENT)
Dept: CT IMAGING | Facility: CLINIC | Age: 68
End: 2023-05-15
Payer: MEDICARE

## 2023-05-15 PROCEDURE — 74160 CT ABDOMEN W/CONTRAST: CPT

## 2023-05-22 ENCOUNTER — RX RENEWAL (OUTPATIENT)
Age: 68
End: 2023-05-22

## 2023-05-30 ENCOUNTER — RX RENEWAL (OUTPATIENT)
Age: 68
End: 2023-05-30

## 2023-05-31 ENCOUNTER — RX RENEWAL (OUTPATIENT)
Age: 68
End: 2023-05-31

## 2023-06-07 ENCOUNTER — RX RENEWAL (OUTPATIENT)
Age: 68
End: 2023-06-07

## 2023-06-08 ENCOUNTER — NON-APPOINTMENT (OUTPATIENT)
Age: 68
End: 2023-06-08

## 2023-06-22 ENCOUNTER — NON-APPOINTMENT (OUTPATIENT)
Age: 68
End: 2023-06-22

## 2023-06-22 ENCOUNTER — OUTPATIENT (OUTPATIENT)
Dept: OUTPATIENT SERVICES | Facility: HOSPITAL | Age: 68
LOS: 1 days | End: 2023-06-22
Payer: MEDICARE

## 2023-06-22 ENCOUNTER — RESULT REVIEW (OUTPATIENT)
Age: 68
End: 2023-06-22

## 2023-06-22 DIAGNOSIS — C22.0 LIVER CELL CARCINOMA: ICD-10-CM

## 2023-06-22 PROCEDURE — 99203 OFFICE O/P NEW LOW 30 MIN: CPT

## 2023-06-23 ENCOUNTER — RX RENEWAL (OUTPATIENT)
Age: 68
End: 2023-06-23

## 2023-06-29 NOTE — ASSESSMENT
[FreeTextEntry1] : 68-year-old female with left-sided HCC presents for liver directed therapy evaluation.    \par     -Patient is a good candidate for liver directed therapy given Child Cosme score, Judd grade, and ECOG status. Given small satellite lesion and location of the lesion, Y90 radioembolization likely a better option and ablation would incur greater risk. Risks, benefits, and alternatives were discussed with the patient who agreed to proceed.

## 2023-06-29 NOTE — PHYSICAL EXAM
[Alert] : alert [No Acute Distress] : no acute distress [Well Nourished] : well nourished [Well Developed] : well developed [No Respiratory Distress] : no respiratory distress [Regular Rhythm] : with a regular rhythm [Restricted in physically strenuous activity but ambulatory and able to carry out work of a light or sedentary nature] : Restricted in physically strenuous activity but ambulatory and able to carry out work of a light or sedentary nature, e.g., light house work, office work

## 2023-07-06 ENCOUNTER — APPOINTMENT (OUTPATIENT)
Dept: GASTROENTEROLOGY | Facility: CLINIC | Age: 68
End: 2023-07-06
Payer: MEDICARE

## 2023-07-06 VITALS
WEIGHT: 141 LBS | HEART RATE: 64 BPM | HEIGHT: 61 IN | BODY MASS INDEX: 26.62 KG/M2 | RESPIRATION RATE: 16 BRPM | TEMPERATURE: 97.3 F | OXYGEN SATURATION: 98 %

## 2023-07-06 PROCEDURE — 99215 OFFICE O/P EST HI 40 MIN: CPT

## 2023-07-06 RX ORDER — NAPROXEN SODIUM 220 MG/1
1000 TABLET ORAL
Qty: 360 | Refills: 0 | Status: DISCONTINUED | COMMUNITY
End: 2023-07-06

## 2023-07-06 RX ORDER — SERTRALINE HYDROCHLORIDE 100 MG/1
100 TABLET, FILM COATED ORAL
Qty: 90 | Refills: 3 | Status: DISCONTINUED | COMMUNITY
Start: 2023-04-03 | End: 2023-07-06

## 2023-07-13 ENCOUNTER — APPOINTMENT (OUTPATIENT)
Dept: ENDOCRINOLOGY | Facility: CLINIC | Age: 68
End: 2023-07-13
Payer: MEDICARE

## 2023-07-13 VITALS
BODY MASS INDEX: 27 KG/M2 | WEIGHT: 143 LBS | DIASTOLIC BLOOD PRESSURE: 60 MMHG | OXYGEN SATURATION: 97 % | HEIGHT: 61 IN | HEART RATE: 65 BPM | SYSTOLIC BLOOD PRESSURE: 130 MMHG

## 2023-07-13 PROCEDURE — 99214 OFFICE O/P EST MOD 30 MIN: CPT | Mod: 25

## 2023-07-13 PROCEDURE — 95251 CONT GLUC MNTR ANALYSIS I&R: CPT

## 2023-07-13 RX ORDER — FLASH GLUCOSE SCANNING READER
EACH MISCELLANEOUS
Qty: 6 | Refills: 3 | Status: DISCONTINUED | COMMUNITY
End: 2023-07-13

## 2023-07-13 NOTE — ASSESSMENT
[FreeTextEntry1] : 68 year old female with PMH of cirrhosis due to NAFLD, HCC, Type 2 DM, HTN, HLD and osteoporosis here for follow up.  Her diabetes control is improving based on CGM review.   \par \par 1.  Type 2 DM-   Due to fasting hypoglycemia, will reduce Lantus to 25 untis qhs.  COntinue current prandial doses of Novolog.    Check labs now.    \par \par CGM medical necessity statement:\par Patient tests her blood glucose 4 or more times a day and injects insulin 3 or more times per day.\par She is seen regularly at this office within 6 month intervals.\par Patient requires frequent adjustments to her insulin regimen on the basis of CGM results.\par \par 2.  OP-  continue Alendronate.   Check DXA now.   \par 3.  HLD-  avoid statin due to liver disease.   Check fasting lipids.    \par \par Follow up in 4 months.

## 2023-07-13 NOTE — HISTORY OF PRESENT ILLNESS
[FreeTextEntry1] : Follow up Type 2 DM, osteoporosis\par She has a PMH significant for cirrhosis with ascites and encephalopathy (related to fatty liver)\par History of OP on alendronate for several years.   \par Recently diagnosed with HCC and will be having radioembolization.  \par \par Quality:  Type 2 DM\par Severity:  moderate\par Duration of diabetes:  over 20 years\par Associated Complications/ Symptoms:  no known microvascular complications.  \par Modifying Factors:  Better with medication\par \par SMBG:   prior to CGM, was testing 4 times a day \par Currently using Dexcom CGM:  average BG is 148 mg/dl with CV of 31.7%.  76% of BG are within target range, 2% below range and 22% above range.   Having some hypoglycemia in the morning and before lunch.  \par \par Reports worsened control since starting lactulose\par Current Diabetic Medication Regimen:\par Lantus 30 units qhs.\par Humalog 24 units with breakfast,  22 units with lunch and 26 units with dinner. \par \par \par \par

## 2023-07-13 NOTE — PHYSICAL EXAM
[Healthy Appearance] : healthy appearance [No Acute Distress] : no acute distress [Normal Sclera/Conjunctiva] : normal sclera/conjunctiva [No Neck Mass] : no neck mass was observed [No LAD] : no lymphadenopathy [Supple] : the neck was supple [Thyroid Not Enlarged] : the thyroid was not enlarged [No Thyroid Nodules] : no palpable thyroid nodules [No Respiratory Distress] : no respiratory distress [Clear to Auscultation] : lungs were clear to auscultation bilaterally [Normal S1, S2] : normal S1 and S2 [Normal Rate] : heart rate was normal [Regular Rhythm] : with a regular rhythm [No Edema] : no peripheral edema [Normal Gait] : normal gait [No Clubbing, Cyanosis] : no clubbing  or cyanosis of the fingernails [No Tremors] : no tremors [Normal Sensation on Monofilament Testing] : normal sensation on monofilament testing of lower extremities [Normal Affect] : the affect was normal [Normal Insight/Judgement] : insight and judgment were intact [Normal Mood] : the mood was normal [Acanthosis Nigricans] : no acanthosis nigricans [de-identified] : 2/6 systolic murmur

## 2023-07-20 ENCOUNTER — OUTPATIENT (OUTPATIENT)
Dept: OUTPATIENT SERVICES | Facility: HOSPITAL | Age: 68
LOS: 1 days | End: 2023-07-20
Payer: MEDICARE

## 2023-07-20 VITALS
WEIGHT: 144.18 LBS | OXYGEN SATURATION: 97 % | HEIGHT: 61 IN | TEMPERATURE: 98 F | HEART RATE: 60 BPM | SYSTOLIC BLOOD PRESSURE: 100 MMHG | RESPIRATION RATE: 18 BRPM | DIASTOLIC BLOOD PRESSURE: 60 MMHG

## 2023-07-20 DIAGNOSIS — Z98.890 OTHER SPECIFIED POSTPROCEDURAL STATES: Chronic | ICD-10-CM

## 2023-07-20 DIAGNOSIS — Z91.89 OTHER SPECIFIED PERSONAL RISK FACTORS, NOT ELSEWHERE CLASSIFIED: ICD-10-CM

## 2023-07-20 DIAGNOSIS — Z01.818 ENCOUNTER FOR OTHER PREPROCEDURAL EXAMINATION: ICD-10-CM

## 2023-07-20 DIAGNOSIS — I10 ESSENTIAL (PRIMARY) HYPERTENSION: ICD-10-CM

## 2023-07-20 DIAGNOSIS — Z98.49 CATARACT EXTRACTION STATUS, UNSPECIFIED EYE: Chronic | ICD-10-CM

## 2023-07-20 DIAGNOSIS — R01.2 OTHER CARDIAC SOUNDS: ICD-10-CM

## 2023-07-20 DIAGNOSIS — Z90.49 ACQUIRED ABSENCE OF OTHER SPECIFIED PARTS OF DIGESTIVE TRACT: Chronic | ICD-10-CM

## 2023-07-20 DIAGNOSIS — K76.9 LIVER DISEASE, UNSPECIFIED: ICD-10-CM

## 2023-07-20 DIAGNOSIS — Z98.891 HISTORY OF UTERINE SCAR FROM PREVIOUS SURGERY: Chronic | ICD-10-CM

## 2023-07-20 DIAGNOSIS — E11.9 TYPE 2 DIABETES MELLITUS WITHOUT COMPLICATIONS: ICD-10-CM

## 2023-07-20 LAB
A1C WITH ESTIMATED AVERAGE GLUCOSE RESULT: 5.4 % — SIGNIFICANT CHANGE UP (ref 4–5.6)
ALBUMIN SERPL ELPH-MCNC: 3.3 G/DL — SIGNIFICANT CHANGE UP (ref 3.3–5.2)
ALP SERPL-CCNC: 141 U/L — HIGH (ref 40–120)
ALT FLD-CCNC: 8 U/L — SIGNIFICANT CHANGE UP
ANION GAP SERPL CALC-SCNC: 12 MMOL/L — SIGNIFICANT CHANGE UP (ref 5–17)
APTT BLD: 39.2 SEC — HIGH (ref 27.5–35.5)
AST SERPL-CCNC: 38 U/L — HIGH
BASOPHILS # BLD AUTO: 0 K/UL — SIGNIFICANT CHANGE UP (ref 0–0.2)
BASOPHILS NFR BLD AUTO: 0 % — SIGNIFICANT CHANGE UP (ref 0–2)
BILIRUB SERPL-MCNC: 1 MG/DL — SIGNIFICANT CHANGE UP (ref 0.4–2)
BUN SERPL-MCNC: 14 MG/DL — SIGNIFICANT CHANGE UP (ref 8–20)
CALCIUM SERPL-MCNC: 8.8 MG/DL — SIGNIFICANT CHANGE UP (ref 8.4–10.5)
CHLORIDE SERPL-SCNC: 102 MMOL/L — SIGNIFICANT CHANGE UP (ref 96–108)
CO2 SERPL-SCNC: 24 MMOL/L — SIGNIFICANT CHANGE UP (ref 22–29)
CREAT SERPL-MCNC: 0.83 MG/DL — SIGNIFICANT CHANGE UP (ref 0.5–1.3)
DACRYOCYTES BLD QL SMEAR: SLIGHT — SIGNIFICANT CHANGE UP
EGFR: 77 ML/MIN/1.73M2 — SIGNIFICANT CHANGE UP
ELLIPTOCYTES BLD QL SMEAR: SLIGHT — SIGNIFICANT CHANGE UP
EOSINOPHIL # BLD AUTO: 0.15 K/UL — SIGNIFICANT CHANGE UP (ref 0–0.5)
EOSINOPHIL NFR BLD AUTO: 4.5 % — SIGNIFICANT CHANGE UP (ref 0–6)
ESTIMATED AVERAGE GLUCOSE: 108 MG/DL — SIGNIFICANT CHANGE UP (ref 68–114)
GLUCOSE SERPL-MCNC: 194 MG/DL — HIGH (ref 70–99)
HCT VFR BLD CALC: 30.7 % — LOW (ref 34.5–45)
HGB BLD-MCNC: 9.5 G/DL — LOW (ref 11.5–15.5)
INR BLD: 1.39 RATIO — HIGH (ref 0.88–1.16)
LYMPHOCYTES # BLD AUTO: 0.48 K/UL — LOW (ref 1–3.3)
LYMPHOCYTES # BLD AUTO: 14.3 % — SIGNIFICANT CHANGE UP (ref 13–44)
MANUAL SMEAR VERIFICATION: SIGNIFICANT CHANGE UP
MCHC RBC-ENTMCNC: 26.8 PG — LOW (ref 27–34)
MCHC RBC-ENTMCNC: 30.9 GM/DL — LOW (ref 32–36)
MCV RBC AUTO: 86.7 FL — SIGNIFICANT CHANGE UP (ref 80–100)
MONOCYTES # BLD AUTO: 0.36 K/UL — SIGNIFICANT CHANGE UP (ref 0–0.9)
MONOCYTES NFR BLD AUTO: 10.7 % — SIGNIFICANT CHANGE UP (ref 2–14)
NEUTROPHILS # BLD AUTO: 2.36 K/UL — SIGNIFICANT CHANGE UP (ref 1.8–7.4)
NEUTROPHILS NFR BLD AUTO: 70.5 % — SIGNIFICANT CHANGE UP (ref 43–77)
PLAT MORPH BLD: NORMAL — SIGNIFICANT CHANGE UP
PLATELET # BLD AUTO: 61 K/UL — LOW (ref 150–400)
POIKILOCYTOSIS BLD QL AUTO: SLIGHT — SIGNIFICANT CHANGE UP
POLYCHROMASIA BLD QL SMEAR: SLIGHT — SIGNIFICANT CHANGE UP
POTASSIUM SERPL-MCNC: 4.5 MMOL/L — SIGNIFICANT CHANGE UP (ref 3.5–5.3)
POTASSIUM SERPL-SCNC: 4.5 MMOL/L — SIGNIFICANT CHANGE UP (ref 3.5–5.3)
PROT SERPL-MCNC: 5.7 G/DL — LOW (ref 6.6–8.7)
PROTHROM AB SERPL-ACNC: 16.2 SEC — HIGH (ref 10.5–13.4)
RBC # BLD: 3.54 M/UL — LOW (ref 3.8–5.2)
RBC # FLD: 14.9 % — HIGH (ref 10.3–14.5)
RBC BLD AUTO: ABNORMAL
SMUDGE CELLS # BLD: PRESENT — SIGNIFICANT CHANGE UP
SODIUM SERPL-SCNC: 138 MMOL/L — SIGNIFICANT CHANGE UP (ref 135–145)
WBC # BLD: 3.35 K/UL — LOW (ref 3.8–10.5)
WBC # FLD AUTO: 3.35 K/UL — LOW (ref 3.8–10.5)

## 2023-07-20 PROCEDURE — G0463: CPT

## 2023-07-20 PROCEDURE — 93005 ELECTROCARDIOGRAM TRACING: CPT

## 2023-07-20 PROCEDURE — 93010 ELECTROCARDIOGRAM REPORT: CPT

## 2023-07-20 RX ORDER — LORATADINE 10 MG/1
1 TABLET ORAL
Qty: 0 | Refills: 0 | DISCHARGE

## 2023-07-20 RX ORDER — INSULIN GLARGINE 100 [IU]/ML
25 INJECTION, SOLUTION SUBCUTANEOUS
Refills: 0 | DISCHARGE

## 2023-07-20 RX ORDER — SODIUM CHLORIDE 9 MG/ML
3 INJECTION INTRAMUSCULAR; INTRAVENOUS; SUBCUTANEOUS ONCE
Refills: 0 | Status: DISCONTINUED | OUTPATIENT
Start: 2023-08-02 | End: 2023-08-16

## 2023-07-20 RX ORDER — SERTRALINE 25 MG/1
1 TABLET, FILM COATED ORAL
Qty: 0 | Refills: 0 | DISCHARGE

## 2023-07-20 NOTE — H&P PST ADULT - RESPIRATORY
Private car normal/clear to auscultation bilaterally/no wheezes/no rales/no rhonchi/no respiratory distress/no use of accessory muscles/breath sounds equal/good air movement

## 2023-07-20 NOTE — H&P PST ADULT - HISTORY OF PRESENT ILLNESS
67 y/o female with PMH of HTN, DM, HLD, asthma, anemia and decompensated EtOH cirrhosis hepatic encephalopathy presents to [facility] with complaints of     68-year-old female with past medical history of hypertension, diabetes, hyperlipidemia, asthma, anemia, as well as  presents for liver directed therapy evaluation. Patient on recent imaging was found to have 2.2 cm LI-RADS 5 lesion within segment III within the liver. An additional LI RADS 3 lesion was also identified to this nodule with an intermediate probability of malignancy. Nodules were incidentally found. Patient has had one episodes of esophageal variceal bleeding however none currently. Patient denies alcohol use. ECOG 1. Patient denies ascites. Not currently on HD     Oncology History:   Primary Cancer: HCC    69 y/o female with PMH of HTN, DM, HLD, asthma, anemia and decompensated EtOH cirrhosis hepatic encephalopathy presents to [facility] with complaints of having liver lesions. States she was following up with her hepatologist, went for imaging and was found to have 2.2 cm LI-RADS 5 lesion within segment III within the liver. An additional LI RADS 3 lesion was also identified to this nodule with an intermediate probability of malignancy. Patient reports in 12/2022 she had one episodes of esophageal variceal bleeding however none currently. Patient denies current alcohol use. Denies nausea, vomiting, abdominal pain, fevers or chills. Patient is scheduled for  69 y/o female with PMH of HTN, DM, HLD, asthma, anemia and decompensated ETOH cirrhosis hepatic encephalopathy presents to [facility] with complaints of having liver lesions. States she was following up with her hepatologist, went for imaging and was found to have 2.2 cm LI-RADS 5 lesion within segment III within the liver. An additional LI RADS 3 lesion was also identified to this nodule with an intermediate probability of malignancy. Patient reports in 12/2022 she had one episodes of esophageal variceal bleeding however none currently. Patient denies current alcohol use. Denies nausea, vomiting, abdominal pain, fevers or chills. Patient is scheduled for Y90 Mapping with anesthesia ordered by Dr. Oanh Martinez to be done by Dr. Wheeler on 08/02/2023.

## 2023-07-20 NOTE — H&P PST ADULT - PRO TOBACCO TYPE
quit 10/2022/cigarettes

## 2023-07-20 NOTE — H&P PST ADULT - PROBLEM SELECTOR PLAN 2
Caprini Score 6, at moderate risk for VTE. Surgical team to order appropriate VTE prophylaxis. Caprini Score 8, at high risk for VTE. Surgical team to order appropriate VTE prophylaxis.

## 2023-07-20 NOTE — H&P PST ADULT - PROBLEM SELECTOR PLAN 1
Medical clearance pending  Cardiac clearance pending  Patient is scheduled for Y90 Mapping with anesthesia ordered by Dr. Oanh Martinez to be done by Dr. Wheeler on 08/02/2023. Medical clearance, cardiac clearance pending  Patient is scheduled for Y90 Mapping with anesthesia ordered by Dr. Oanh Martinez to be done by Dr. Wheeler on 08/02/2023.

## 2023-07-20 NOTE — H&P PST ADULT - NSANTHOSAYNRD_GEN_A_CORE
No. JUAN F screening performed.  STOP BANG Legend: 0-2 = LOW Risk; 3-4 = INTERMEDIATE Risk; 5-8 = HIGH Risk

## 2023-07-20 NOTE — H&P PST ADULT - NSICDXPASTMEDICALHX_GEN_ALL_CORE_FT
PAST MEDICAL HISTORY:  DM (diabetes mellitus)     HCC (hepatocellular carcinoma)     HLD (hyperlipidemia)     HTN (hypertension)

## 2023-07-20 NOTE — H&P PST ADULT - NSICDXPASTSURGICALHX_GEN_ALL_CORE_FT
Detail Level: Generalized
Detail Level: Detailed
PAST SURGICAL HISTORY:  H/O carpal tunnel repair     H/O cataract extraction     H/O  section     History of cholecystectomy

## 2023-07-20 NOTE — H&P PST ADULT - PROBLEM SELECTOR PLAN 3
Patient instructed to hold Humalog the morning of surgery. Patient instructed to follow up with PCP, Dr. Joy, on Lantus dosage instructions the night before. Patient instructed to hold Humalog the morning of surgery. Patient instructed to follow up with PCP, Dr. Joy, on Lantus dosage instructions the night before.  A1C done at [facility]  Blood sugar monitoring

## 2023-07-20 NOTE — H&P PST ADULT - NSHP PST DIAGOTHER LIST_GEN_A_CORE
Labs reviewed, sent to surgeon to make aware, labs faxed to PCP for review, medical optimization pending. AS FNP-BC

## 2023-07-20 NOTE — H&P PST ADULT - GASTROINTESTINAL
details… normal/soft/nontender/nondistended/normal active bowel sounds/no guarding/no rigidity/no organomegaly/no palpable raymundo

## 2023-07-20 NOTE — H&P PST ADULT - ASSESSMENT
Patient educated on surgical scrub, preadmission instructions, medical clearance and day of procedure medications, verbalizes understanding. Pt instructed to stop vitamins/supplements/herbal medications/ASA/NSAIDS for one week prior to surgery and discuss with PMD.    CAPRINI SCORE    AGE RELATED RISK FACTORS                                                             [ ] Age 41-60 years                                            (1 Point)  [ ] Age: 61-74 years                                           (2 Points)                 [ ] Age= 75 years                                                (3 Points)             DISEASE RELATED RISK FACTORS                                                       [ ] Edema in the lower extremities                 (1 Point)                     [ ] Varicose veins                                               (1 Point)                                 [ ] BMI > 25 Kg/m2                                            (1 Point)                                  [ ] Serious infection (ie PNA)                            (1 Point)                     [ ] Lung disease ( COPD, Emphysema)            (1 Point)                                                                          [ ] Acute myocardial infarction                         (1 Point)                  [ ] Congestive heart failure (in the previous month)  (1 Point)         [ ] Inflammatory bowel disease                            (1 Point)                  [ ] Central venous access, PICC or Port               (2 points)       (within the last month)                                                                [ ] Stroke (in the previous month)                        (5 Points)    [ ] Previous or present malignancy                       (2 points)                                                                                                                                                         HEMATOLOGY RELATED FACTORS                                                         [ ] Prior episodes of VTE                                     (3 Points)                     [ ] Positive family history for VTE                      (3 Points)                  [ ] Prothrombin 99319 A                                     (3 Points)                     [ ] Factor V Leiden                                                (3 Points)                        [ ] Lupus anticoagulants                                      (3 Points)                                                           [ ] Anticardiolipin antibodies                              (3 Points)                                                       [ ] High homocysteine in the blood                   (3 Points)                                             [ ] Other congenital or acquired thrombophilia      (3 Points)                                                [ ] Heparin induced thrombocytopenia                  (3 Points)                                        MOBILITY RELATED FACTORS  [ ] Bed rest                                                         (1 Point)  [ ] Plaster cast                                                    (2 points)  [ ] Bed bound for more than 72 hours           (2 Points)    GENDER SPECIFIC FACTORS  [ ] Pregnancy or had a baby within the last month   (1 Point)  [ ] Post-partum < 6 weeks                                   (1 Point)  [ ] Hormonal therapy  or oral contraception   (1 Point)  [ ] History of pregnancy complications              (1 point)  [ ] Unexplained or recurrent              (1 Point)    OTHER RISK FACTORS                                           (1 Point)  [ ] BMI >40, smoking, diabetes requiring insulin, chemotherapy  blood transfusions and length of surgery over 2 hours    SURGERY RELATED RISK FACTORS  [ ]  Section within the last month     (1 Point)  [ ] Minor surgery                                                  (1 Point)  [ ] Arthroscopic surgery                                       (2 Points)  [ ] Planned major surgery lasting more            (2 Points)      than 45 minutes     [ ] Elective hip or knee joint replacement       (5 points)       surgery                                                TRAUMA RELATED RISK FACTORS  [ ] Fracture of the hip, pelvis, or leg                       (5 Points)  [ ] Spinal cord injury resulting in paralysis             (5 points)       (in the previous month)    [ ] Paralysis  (less than 1 month)                             (5 Points)  [ ] Multiple Trauma within 1 month                        (5 Points)    Total Score [        ]    Caprini Score 0-2: Low Risk, NO VTE prophylaxis required for most patients, encourage ambulation  Caprini Score 3-6: Moderate Risk , pharmacologic VTE prophylaxis is indicated for most patients (in the absence of contraindications)  Caprini Score Greater than or =7: High risk, pharmocologic VTE prophylaxis indicated for most patients (in the absence of contraindications)    OPIOID RISK TOOL    CHRISTA EACH BOX THAT APPLIES AND ADD TOTALS AT THE END    FAMILY HISTORY OF SUBSTANCE ABUSE                 FEMALE         MALE                                                Alcohol                             [  ]1 pt          [  ]3pts                                               Illegal Durgs                     [  ]2 pts        [  ]3pts                                               Rx Drugs                           [  ]4 pts        [  ]4 pts    PERSONAL HISTORY OF SUBSTANCE ABUSE                                                                                          Alcohol                             [  ]3 pts       [  ]3 pts                                               Illegal Drugs                     [  ]4 pts        [  ]4 pts                                               Rx Drugs                           [  ]5 pts        [  ]5 pts    AGE BETWEEN 16-45 YEARS                                      [  ]1 pt         [  ]1 pt    HISTORY OF PREADOLESCENT   SEXUAL ABUSE                                                             [  ]3 pts        [  ]0pts    PSYCHOLOGICAL DISEASE                     ADD, OCD, Bipolar, Schizophrenia        [  ]2 pts         [  ]2 pts                      Depression                                               [  ]1 pt           [  ]1 pt           SCORING TOTAL   (add numbers and type here)              (***)                                     A score of 3 or lower indicated LOW risk for future opioid abuse  A score of 4 to 7 indicated moderate risk for future opioid abuse  A score of 8 or higher indicates a high risk for opioid abuse   69 y/o female with PMH of HTN, DM, HLD, asthma, anemia and decompensated ETOH cirrhosis hepatic encephalopathy presents to [facility] with complaints of having liver lesions. States she was following up with her hepatologist, went for imaging and was found to have 2.2 cm LI-RADS 5 lesion within segment III within the liver. An additional LI RADS 3 lesion was also identified to this nodule with an intermediate probability of malignancy. Patient reports in 2022 she had one episodes of esophageal variceal bleeding however none currently. Patient denies current alcohol use. Denies nausea, vomiting, abdominal pain, fevers or chills. Patient is scheduled for Y90 Mapping with anesthesia ordered by Dr. Oanh Martinez to be done by Dr. Wheeler on 2023. Patient educated on surgical scrub, preadmission instructions, medical and cardiac clearance and day of procedure medications, verbalizes understanding. Pt instructed to stop vitamins/supplements/herbal medications/ASA/NSAIDS for one week prior to surgery and discuss with PMD.    CAPRINI SCORE    AGE RELATED RISK FACTORS                                                             [ ] Age 41-60 years                                            (1 Point)  [X ] Age: 61-74 years                                           (2 Points)                 [ ] Age= 75 years                                                (3 Points)             DISEASE RELATED RISK FACTORS                                                       [ ] Edema in the lower extremities                 (1 Point)                     [ ] Varicose veins                                               (1 Point)                                 [X ] BMI > 25 Kg/m2                                            (1 Point)                                  [ ] Serious infection (ie PNA)                            (1 Point)                     [ ] Lung disease ( COPD, Emphysema)            (1 Point)                                                                          [ ] Acute myocardial infarction                         (1 Point)                  [ ] Congestive heart failure (in the previous month)  (1 Point)         [ ] Inflammatory bowel disease                            (1 Point)                  [ ] Central venous access, PICC or Port               (2 points)       (within the last month)                                                                [ ] Stroke (in the previous month)                        (5 Points)    [ ] Previous or present malignancy                       (2 points)                                                                                                                                                         HEMATOLOGY RELATED FACTORS                                                         [ ] Prior episodes of VTE                                     (3 Points)                     [ ] Positive family history for VTE                      (3 Points)                  [ ] Prothrombin 18769 A                                     (3 Points)                     [ ] Factor V Leiden                                                (3 Points)                        [ ] Lupus anticoagulants                                      (3 Points)                                                           [ ] Anticardiolipin antibodies                              (3 Points)                                                       [ ] High homocysteine in the blood                   (3 Points)                                             [ ] Other congenital or acquired thrombophilia      (3 Points)                                                [ ] Heparin induced thrombocytopenia                  (3 Points)                                        MOBILITY RELATED FACTORS  [ ] Bed rest                                                         (1 Point)  [ ] Plaster cast                                                    (2 points)  [ ] Bed bound for more than 72 hours           (2 Points)    GENDER SPECIFIC FACTORS  [ ] Pregnancy or had a baby within the last month   (1 Point)  [ ] Post-partum < 6 weeks                                   (1 Point)  [ ] Hormonal therapy  or oral contraception   (1 Point)  [ ] History of pregnancy complications              (1 point)  [ ] Unexplained or recurrent              (1 Point)    OTHER RISK FACTORS                                           (1 Point)  [ X] BMI >40, smoking, diabetes requiring insulin, chemotherapy  blood transfusions and length of surgery over 2 hours    SURGERY RELATED RISK FACTORS  [ ]  Section within the last month     (1 Point)  [ ] Minor surgery                                                  (1 Point)  [ ] Arthroscopic surgery                                       (2 Points)  [X ] Planned major surgery lasting more            (2 Points)      than 45 minutes     [ ] Elective hip or knee joint replacement       (5 points)       surgery                                                TRAUMA RELATED RISK FACTORS  [ ] Fracture of the hip, pelvis, or leg                       (5 Points)  [ ] Spinal cord injury resulting in paralysis             (5 points)       (in the previous month)    [ ] Paralysis  (less than 1 month)                             (5 Points)  [ ] Multiple Trauma within 1 month                        (5 Points)    Total Score [     6   ]    Caprini Score 0-2: Low Risk, NO VTE prophylaxis required for most patients, encourage ambulation  Caprini Score 3-6: Moderate Risk , pharmacologic VTE prophylaxis is indicated for most patients (in the absence of contraindications)  Caprini Score Greater than or =7: High risk, pharmocologic VTE prophylaxis indicated for most patients (in the absence of contraindications)    OPIOID RISK TOOL    CHRISTA EACH BOX THAT APPLIES AND ADD TOTALS AT THE END    FAMILY HISTORY OF SUBSTANCE ABUSE                 FEMALE         MALE                                                Alcohol                             [  ]1 pt          [  ]3pts                                               Illegal Durgs                     [  ]2 pts        [  ]3pts                                               Rx Drugs                           [  ]4 pts        [  ]4 pts    PERSONAL HISTORY OF SUBSTANCE ABUSE                                                                                          Alcohol                             [  ]3 pts       [  ]3 pts                                               Illegal Drugs                     [  ]4 pts        [  ]4 pts                                               Rx Drugs                           [  ]5 pts        [  ]5 pts    AGE BETWEEN 16-45 YEARS                                      [  ]1 pt         [  ]1 pt    HISTORY OF PREADOLESCENT   SEXUAL ABUSE                                                             [  ]3 pts        [  ]0pts    PSYCHOLOGICAL DISEASE                     ADD, OCD, Bipolar, Schizophrenia        [  ]2 pts         [  ]2 pts                      Depression                                               [  ]1 pt           [  ]1 pt           SCORING TOTAL   (add numbers and type here)              (**0*)                                     A score of 3 or lower indicated LOW risk for future opioid abuse  A score of 4 to 7 indicated moderate risk for future opioid abuse  A score of 8 or higher indicates a high risk for opioid abuse   69 y/o female with PMH of HTN, DM, HLD, asthma, anemia and decompensated ETOH cirrhosis hepatic encephalopathy presents to [facility] with complaints of having liver lesions. States she was following up with her hepatologist, went for imaging and was found to have 2.2 cm LI-RADS 5 lesion within segment III within the liver. An additional LI RADS 3 lesion was also identified to this nodule with an intermediate probability of malignancy. Patient reports in 2022 she had one episodes of esophageal variceal bleeding however none currently. Patient denies current alcohol use. Denies nausea, vomiting, abdominal pain, fevers or chills. Patient is scheduled for Y90 Mapping with anesthesia ordered by Dr. Oanh Martinez to be done by Dr. Wheeler on 2023. Patient educated on surgical scrub, preadmission instructions, medical and cardiac clearance and day of procedure medications, verbalizes understanding. Pt instructed to stop vitamins/supplements/herbal medications/ASA/NSAIDS for one week prior to surgery and discuss with PMD.    CAPRINI SCORE    AGE RELATED RISK FACTORS                                                             [ ] Age 41-60 years                                            (1 Point)  [X ] Age: 61-74 years                                           (2 Points)                 [ ] Age= 75 years                                                (3 Points)             DISEASE RELATED RISK FACTORS                                                       [ ] Edema in the lower extremities                 (1 Point)                     [ ] Varicose veins                                               (1 Point)                                 [X ] BMI > 25 Kg/m2                                            (1 Point)                                  [ ] Serious infection (ie PNA)                            (1 Point)                     [ ] Lung disease ( COPD, Emphysema)            (1 Point)                                                                          [ ] Acute myocardial infarction                         (1 Point)                  [ ] Congestive heart failure (in the previous month)  (1 Point)         [ ] Inflammatory bowel disease                            (1 Point)                  [ ] Central venous access, PICC or Port               (2 points)       (within the last month)                                                                [ ] Stroke (in the previous month)                        (5 Points)    [x ] Previous or present malignancy                       (2 points)                                                                                                                                                         HEMATOLOGY RELATED FACTORS                                                         [ ] Prior episodes of VTE                                     (3 Points)                     [ ] Positive family history for VTE                      (3 Points)                  [ ] Prothrombin 61295 A                                     (3 Points)                     [ ] Factor V Leiden                                                (3 Points)                        [ ] Lupus anticoagulants                                      (3 Points)                                                           [ ] Anticardiolipin antibodies                              (3 Points)                                                       [ ] High homocysteine in the blood                   (3 Points)                                             [ ] Other congenital or acquired thrombophilia      (3 Points)                                                [ ] Heparin induced thrombocytopenia                  (3 Points)                                        MOBILITY RELATED FACTORS  [ ] Bed rest                                                         (1 Point)  [ ] Plaster cast                                                    (2 points)  [ ] Bed bound for more than 72 hours           (2 Points)    GENDER SPECIFIC FACTORS  [ ] Pregnancy or had a baby within the last month   (1 Point)  [ ] Post-partum < 6 weeks                                   (1 Point)  [ ] Hormonal therapy  or oral contraception   (1 Point)  [ ] History of pregnancy complications              (1 point)  [ ] Unexplained or recurrent              (1 Point)    OTHER RISK FACTORS                                           (1 Point)  [ X] BMI >40, smoking, diabetes requiring insulin, chemotherapy  blood transfusions and length of surgery over 2 hours    SURGERY RELATED RISK FACTORS  [ ]  Section within the last month     (1 Point)  [ ] Minor surgery                                                  (1 Point)  [ ] Arthroscopic surgery                                       (2 Points)  [X ] Planned major surgery lasting more            (2 Points)      than 45 minutes     [ ] Elective hip or knee joint replacement       (5 points)       surgery                                                TRAUMA RELATED RISK FACTORS  [ ] Fracture of the hip, pelvis, or leg                       (5 Points)  [ ] Spinal cord injury resulting in paralysis             (5 points)       (in the previous month)    [ ] Paralysis  (less than 1 month)                             (5 Points)  [ ] Multiple Trauma within 1 month                        (5 Points)    Total Score [  8   ]    Caprini Score 0-2: Low Risk, NO VTE prophylaxis required for most patients, encourage ambulation  Caprini Score 3-6: Moderate Risk , pharmacologic VTE prophylaxis is indicated for most patients (in the absence of contraindications)  Caprini Score Greater than or =7: High risk, pharmocologic VTE prophylaxis indicated for most patients (in the absence of contraindications)    OPIOID RISK TOOL    CHIRSTA EACH BOX THAT APPLIES AND ADD TOTALS AT THE END    FAMILY HISTORY OF SUBSTANCE ABUSE                 FEMALE         MALE                                                Alcohol                             [  ]1 pt          [  ]3pts                                               Illegal Durgs                     [  ]2 pts        [  ]3pts                                               Rx Drugs                           [  ]4 pts        [  ]4 pts    PERSONAL HISTORY OF SUBSTANCE ABUSE                                                                                          Alcohol                             [  ]3 pts       [  ]3 pts                                               Illegal Drugs                     [  ]4 pts        [  ]4 pts                                               Rx Drugs                           [  ]5 pts        [  ]5 pts    AGE BETWEEN 16-45 YEARS                                      [  ]1 pt         [  ]1 pt    HISTORY OF PREADOLESCENT   SEXUAL ABUSE                                                             [  ]3 pts        [  ]0pts    PSYCHOLOGICAL DISEASE                     ADD, OCD, Bipolar, Schizophrenia        [  ]2 pts         [  ]2 pts                      Depression                                               [ x ]1 pt           [  ]1 pt           SCORING TOTAL   (add numbers and type here)              (*1*)                                     A score of 3 or lower indicated LOW risk for future opioid abuse  A score of 4 to 7 indicated moderate risk for future opioid abuse  A score of 8 or higher indicates a high risk for opioid abuse

## 2023-07-20 NOTE — H&P PST ADULT - PROBLEM SELECTOR PLAN 5
Cardiac murmur auscultated on exam, advised cardiac clearance, patient agrees and verbalized understanding

## 2023-07-20 NOTE — H&P PST ADULT - NSICDXFAMILYHX_GEN_ALL_CORE_FT
FAMILY HISTORY:  Father  Still living? Unknown  Family history of CVA, Age at diagnosis: Age Unknown    Mother  Still living? Unknown  FH: CAD (coronary artery disease), Age at diagnosis: Age Unknown

## 2023-07-20 NOTE — H&P PST ADULT - BLOOD AVOIDANCE/RESTRICTIONS, PROFILE
8/27/2021 11:11 AM    Ms. Unknown Grit  [phone]      Please call the office if you have had a colonoscopy recently we need to know when and where to get the progress note. If not we can order an at home cologuard if you would like. Thank You!         Sincerely,      Twan Mancini PA-C none

## 2023-07-20 NOTE — H&P PST ADULT - MUSCULOSKELETAL
negative normal/ROM intact/no joint swelling/no calf tenderness/normal gait/strength 5/5 bilateral upper extremities/strength 5/5 bilateral lower extremities

## 2023-07-21 PROBLEM — I10 ESSENTIAL (PRIMARY) HYPERTENSION: Chronic | Status: ACTIVE | Noted: 2023-07-20

## 2023-07-24 NOTE — ASSESSMENT
[FreeTextEntry1] : FELICITY RUBIN is a 67 year old female with a PMH significant for decompensated Cirrhosis c/b Ascites, Hepatic Encephalopathy, HTN, DM, HLD, Asthma, and Anemia.\par \par Cirrhosis\par -Etiology - likely NAFLD coupled w/burnt out AIH given positive ABDIEL and ASMA .\par \par \par HCC \par May 2023.\par Discussed at tumorboard.\par Initiate transplant work up and continue locoregional therapy\par A 2.2 cm LI-RADS 5, OPTN 5B lesion in segment 3.\par \par An additional subcentimeter hypervascular focus in segment 3 adjacent to the dominant lesion, LR-3 Intermediate probability of malignancy.\par \par Variceal Screening\par -EGD 12/2022 - Large junctional varix banded, PHG. Follow up with GI, Dr. Carranza, pending\par -continue Carvedilol 3.125 mg daily, HR today 58.\par -if pt experiences hematemesis, advised to go to ER immediately\par \par Encephalopathy\par -notify provider if new onset confusion\par -titrate lactulose to 1-2 bms/day\par -Continue Xifaxan 550mg bid\par \par Ascites\par -Continue Furosemide 20 mg and Spironolactone 25 mg daily.\par -monitor weight daily\par -Contact provider for increased abdominal distension\par \par Encephalopathy\par -notify provider if new onset confusion\par -titrate lactulose to 1-2 bms/day\par -continue Xifaxan 550 mg daily\par \par Transplant\par -I have explained that disease can progress to the point of requiring an evaluation for liver transplantation. \par -MELDNa - will calculate after labs\par \par Diet\par -High Protein Low Fat Low Sugar Low Salt (up to 2G Na/day) Diet\par -No prolonged fasting\par -Mediterranean Diet info provided\par \par Health Maintenance\par -Pt is note immune to HBV, vaccine recommended w/PCP.\par -Can take up to 2G Tylenol per day. NO NSAIDs as these can lead to diuretic resistance and precipitate renal dysfunction in patients with advanced liver disease\par -Continue to abstain from alcohol and all illicit drugs\par -Avoid use of herbal and dietary supplements due to potential hepatotoxicity\par -Avoid eating any unpasteurized dairy products; avoid eating any raw or undercooked eggs, fish, poultry, or meat; and avoid eating raw/steamed oysters or other shellfish to avoid risk of infection.\par \par Follow Up\par -Follow up in 3 months\par -discuss with Dr. Todd switching to oral DM mediction

## 2023-07-25 ENCOUNTER — APPOINTMENT (OUTPATIENT)
Dept: TRANSPLANT | Facility: CLINIC | Age: 68
End: 2023-07-25

## 2023-07-25 ENCOUNTER — APPOINTMENT (OUTPATIENT)
Dept: TRANSPLANT | Facility: CLINIC | Age: 68
End: 2023-07-25
Payer: COMMERCIAL

## 2023-07-25 ENCOUNTER — NON-APPOINTMENT (OUTPATIENT)
Age: 68
End: 2023-07-25

## 2023-07-25 ENCOUNTER — APPOINTMENT (OUTPATIENT)
Dept: HEPATOLOGY | Facility: CLINIC | Age: 68
End: 2023-07-25
Payer: MEDICARE

## 2023-07-25 VITALS
OXYGEN SATURATION: 97 % | HEIGHT: 61 IN | BODY MASS INDEX: 27 KG/M2 | SYSTOLIC BLOOD PRESSURE: 131 MMHG | HEART RATE: 65 BPM | RESPIRATION RATE: 16 BRPM | TEMPERATURE: 98 F | WEIGHT: 143 LBS | DIASTOLIC BLOOD PRESSURE: 69 MMHG

## 2023-07-25 PROCEDURE — 99215 OFFICE O/P EST HI 40 MIN: CPT

## 2023-07-25 PROCEDURE — 99205 OFFICE O/P NEW HI 60 MIN: CPT

## 2023-07-25 RX ORDER — LORAZEPAM 0.5 MG/1
0.5 TABLET ORAL
Qty: 90 | Refills: 0 | Status: COMPLETED | COMMUNITY
Start: 2022-11-02 | End: 2023-07-25

## 2023-07-25 RX ORDER — LACTULOSE 10 G/15ML
10 SOLUTION ORAL
Qty: 450 | Refills: 2 | Status: COMPLETED | COMMUNITY
Start: 2022-09-09 | End: 2023-07-25

## 2023-07-25 NOTE — HISTORY OF PRESENT ILLNESS
[TextBox_42] : Transplant Hepatologist: Lino Brand DO\par Transplant Hepatology NP: Deborah Ventura, [facility]\par Primary- Diana Joy\par Hepatologist - Dr. Oanh Martinez\par \par Freida Dudley is a 67 y/o female with a PMH of HTN, HLD, T2DM, Asthma, Anemia, and decompensated cirrhosis, here for transplant evaluation. Was told many years ago about liver disease; states [facility] of being placed on the list but cannot recall the details and 'nothing happened'. \par \par Decompensation with ascites, HE, and EVH in 2022\par - Ad[place] [facility] [place] 2/2 HE that resolved with Xifaxan + Lactulose\par - Admission at [facility] 1/3/23 after being transferred from [facility] 2/2 [facility] and is s/p EGD w/ banding -- not a TIPS candidate 2/2 HE\par - 5/15/23 CT Abd reviewed on 6/8/23 Hepatobiliary Tumor Board and imaging pertinent for 2.2 cm Segment 3 LR 5 lesion + adjacent focus, subcentimeter LR 3 with recommendation for IR for Y90 +/- Transplant Evaluation\par -has met with IR, going for Mapping 8/2, for IR y-90 8/15\par - 6/13/23 CT Chest noting RUL 2 mm nodule\par - Negative bone scan\par - Y90 on 8/2\par - has not had EGD since 12/25/22- pending appt with Dr. Carranza\par \par 5/15/23- CT abd- 2.2 cm LR5, segment 3 with cirrhosis and portal htn, an additional LR# focus in seg 3\par \par PMH- above, DM x 10 + years, HTN, cervical conization for pre cancerous lesion - last pap smear 20 years ago; obesity\par PSH - carpal tunnel\par \par \par Current Meds\par Alendronate Sodium 70 MG Oral Tablet; Take 1 tablet by mouth  weekly\par BD Pen Needle Hannah U/F 32G X 4 MM; Use 1 pen needle 4 x daily\par Carvedilol 3.125 MG Oral Tablet; TAKE 1 TABLET BY MOUTH EVERY DAY\par Fluticasone Propionate 50 MCG/ACT Nasal Suspension; USE 1 TO 2 SPRAYS IN EACH\par NOSTRIL ONCE DAILY\par Furosemide 20 MG Oral Tablet; TAKE 1 TABLET DAILY AS DIRECTED\par HumaLOG KwikPen 100 UNIT/ML Subcutaneous Solution Pen-injector; Inject up to 22\par units units with meal TID as directed\par Ketoconazole 2 % External Cream; APPLY A THIN LAYER TO AFFECTED AREA(S) TWICE\par DAILY\par Lactulose 10 GM/15ML Oral Solution; 15ml TID\par Lantus SoloStar 100 UNIT/ML Subcutaneous Solution Pen-injector; INJECT\par SUBCUTANEOUSLY 28 UNITS  DAILY AS DIRECTED\par Multivitamin Adult Oral Tablet; TAKE 1 TABLET DAILY\par OneTouch Verio In Vitro Strip; USE 1 STRIP DAILY\par Pantoprazole Sodium 40 MG Oral Tablet Delayed Release; TAKE 1 TABLET BY MOUTH \par DAILY\par Spironolactone 50 MG Oral Tablet; 1/2 tab daily\par Ursodiol 500 MG Oral Tablet; TAKE 1 TABLET BY MOUTH EVERY DAY\par Xifaxan 550 MG Oral Tablet; Take 1 tablet twice daily\par \par NKDA\par FH- brother with ? 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

## 2023-07-25 NOTE — PHYSICAL EXAM
[Alert] : alert [Clear to Auscultation] : lungs were clear to auscultation bilaterally [Breathing Comfortably on room air] : breathing comfortably on room air [Normal Rate] : normal rate [Ascites Fluid Wave] : ascites fluid wave [Soft] : soft [Normal Bowel Sounds] : normal bowel sounds [No Edema] : no edema [Scleral Icterus] : no scleral icterus [Asterixis] : no asterixis

## 2023-07-25 NOTE — ASSESSMENT
[FreeTextEntry1] : 67 y/o female with a PMH of HTN, HLD, T2DM, Asthma, Anemia, and decompensated cirrhosis, here for transplant evaluation. Imaging and labs reviewed. Following issues were d/w pt and her  in detail.\par \par \par \par \par The indications for transplant evaluation are Decompensated likely NAFLD cirrhosis with ascites, HE and HCC (2.2cm seg 3). I believe the patient IS  a candidate for transplantation, and we will discuss the patient at our team selection meeting. \par \par Based on my review of the work up to date, the patient HAS specific medical and psychosocial factors which could increase his risk of transplant related complications and long term survival after transplantation. These include DM, frailty. I discussed these additional risks with the patient. \par \par Today, I discussed and reviewed the plan for evaluation and follow up before transplant, Model for End-stage Liver Disease (MELD) updates, and risks of transplantation including graft failure and death. We discussed diet, general health, alternatives to transplant, living donation, multiple listing, expanded criteria and Centers for Disease Control (CDC) high risk organs. Some of the specific surgical risks that I reviewed included reoperation, injury to other organs, bleeding, biliary complications, and thrombosis. Some of the medical complications that were reviewed included infection, cardiopulmonary complications, diabetes, and medication toxicity. I also discussed the requirement for immunosuppressive therapy, and potential psychosocial risks of transplantation which were outlined in detail during coordinator education. Finally, we reviewed the possibility of graft failure, recurrence of disease, and success rates of transplantation at out center and nationally.  \par \par The patient was given our most recent transplant outcomes as part of the coordinator transplant education. I reinforced that the patient has a right to refuse transplantation at any time.\par \par start LT eval today\par HCC rx with IR\par f/u with Dr. Carranza for EGD- hx of banding, continue coreg\par DM optimization\par Need angiogram for ischemic assessment - hx of smoking and DM\par continue HE meds\par continue diuretics\par Nutrition support and PT for frailty\par f/u with Dr. Martinez in 1 month\par \par

## 2023-07-26 ENCOUNTER — NON-APPOINTMENT (OUTPATIENT)
Age: 68
End: 2023-07-26

## 2023-07-27 ENCOUNTER — RX RENEWAL (OUTPATIENT)
Age: 68
End: 2023-07-27

## 2023-07-27 PROBLEM — E78.5 HYPERLIPIDEMIA, UNSPECIFIED: Chronic | Status: ACTIVE | Noted: 2023-07-20

## 2023-07-27 PROBLEM — C22.0 LIVER CELL CARCINOMA: Chronic | Status: ACTIVE | Noted: 2023-07-20

## 2023-07-27 PROBLEM — E11.9 TYPE 2 DIABETES MELLITUS WITHOUT COMPLICATIONS: Chronic | Status: ACTIVE | Noted: 2023-07-20

## 2023-07-27 LAB
ABO + RH PNL BLD: NORMAL
AFP-TM SERPL-MCNC: 561 NG/ML
ALBUMIN SERPL ELPH-MCNC: 3.7 G/DL
ALP BLD-CCNC: 143 U/L
ALT SERPL-CCNC: 10 U/L
ANION GAP SERPL CALC-SCNC: 12 MMOL/L
AST SERPL-CCNC: 39 U/L
BILIRUB SERPL-MCNC: 1.3 MG/DL
BUN SERPL-MCNC: 14 MG/DL
CALCIUM SERPL-MCNC: 9.1 MG/DL
CHLORIDE SERPL-SCNC: 107 MMOL/L
CHOLEST SERPL-MCNC: 139 MG/DL
CMV IGG SERPL QL: <0.2 U/ML
CMV IGG SERPL-IMP: NEGATIVE
CO2 SERPL-SCNC: 23 MMOL/L
COVID-19 SPIKE DOMAIN ANTIBODY INTERPRETATION: POSITIVE
CREAT SERPL-MCNC: 0.92 MG/DL
EBV EA AB SER IA-ACNC: >150 U/ML
EBV EA AB TITR SER IF: POSITIVE
EBV EA IGG SER QL IA: >600 U/ML
EBV EA IGG SER-ACNC: POSITIVE
EBV EA IGM SER IA-ACNC: NEGATIVE
EBV PATRN SPEC IB-IMP: NORMAL
EBV VCA IGG SER IA-ACNC: >750 U/ML
EBV VCA IGM SER QL IA: <10 U/ML
EGFR: 68 ML/MIN/1.73M2
EPSTEIN-BARR VIRUS CAPSID ANTIGEN IGG: POSITIVE
ESTIMATED AVERAGE GLUCOSE: 114 MG/DL
ETHANOL BLD-MCNC: <10 MG/DL
GLUCOSE SERPL-MCNC: 127 MG/DL
HBA1C MFR BLD HPLC: 5.6 %
HBV CORE IGG+IGM SER QL: NONREACTIVE
HBV SURFACE AB SER QL: NONREACTIVE
HBV SURFACE AB SERPL IA-ACNC: <3 MIU/ML
HBV SURFACE AG SER QL: NONREACTIVE
HCG SERPL-MCNC: 1 MIU/ML
HCV AB SER QL: NONREACTIVE
HCV S/CO RATIO: 0.11 S/CO
HDLC SERPL-MCNC: 66 MG/DL
HEPATITIS A IGG ANTIBODY: NONREACTIVE
HIV1+2 AB SPEC QL IA.RAPID: NONREACTIVE
HSV 1+2 IGG SER IA-IMP: NEGATIVE
HSV 1+2 IGG SER IA-IMP: POSITIVE
HSV1 IGG SER QL: 48.6 INDEX
HSV2 IGG SER QL: 0.07 INDEX
INR PPP: 1.23 RATIO
LDLC SERPL CALC-MCNC: 57 MG/DL
M TB IFN-G BLD-IMP: NEGATIVE
MAGNESIUM SERPL-MCNC: 1.6 MG/DL
NONHDLC SERPL-MCNC: 74 MG/DL
PHOSPHATE SERPL-MCNC: 3 MG/DL
POTASSIUM SERPL-SCNC: 4.3 MMOL/L
PROT SERPL-MCNC: 6.1 G/DL
PT BLD: 13.9 SEC
QUANTIFERON TB PLUS MITOGEN MINUS NIL: 0.83 IU/ML
QUANTIFERON TB PLUS NIL: 0.02 IU/ML
QUANTIFERON TB PLUS TB1 MINUS NIL: -0.01 IU/ML
QUANTIFERON TB PLUS TB2 MINUS NIL: -0.01 IU/ML
RUBV IGG FLD-ACNC: 30.5 INDEX
RUBV IGG SER-IMP: POSITIVE
SARS-COV-2 AB SERPL IA-ACNC: >250 U/ML
SODIUM SERPL-SCNC: 142 MMOL/L
STRONGYLOIDES AB SER IA-ACNC: NEGATIVE
T GONDII AB SER-IMP: NEGATIVE
T GONDII IGG SER QL: <3 IU/ML
T PALLIDUM AB SER QL IA: NEGATIVE
TRIGL SERPL-MCNC: 91 MG/DL
VZV AB TITR SER: POSITIVE
VZV IGG SER IF-ACNC: 2461 INDEX

## 2023-07-27 NOTE — PHYSICAL EXAM
[Alert] : alert [Scleral Icterus] : no scleral icterus [Clear to Auscultation] : lungs were clear to auscultation bilaterally [Breathing Comfortably on room air] : breathing comfortably on room air [Normal Rate] : normal rate [Ascites Fluid Wave] : ascites fluid wave [Soft] : soft [Normal Bowel Sounds] : normal bowel sounds [No Edema] : no edema [Asterixis] : no asterixis

## 2023-07-27 NOTE — HISTORY OF PRESENT ILLNESS
[Nonalcoholic Steatohepatitis (IVEY)] : Nonalcoholic Steatohepatitis (IVEY) [Ascites] : Ascites [Variceal Hemorrhage] : Variceal Hemorrhage [Hepatic Encephalopathy] : Hepatic Encephalopathy [TextBox_42] : \par Freida Dudley is a 67 y/o female with a PMH of HTN, HLD, T2DM, Asthma, Anemia, and decompensated cirrhosis, here for transplant evaluation. \par cause of liver disease is probably IVEY\par Was told many years ago about liver disease; states she was sent to a LT program in [facility] with anticipation of being placed on the list but cannot recall the details and 'nothing happened'. \par \par Decompensation with ascites, HE, and EVH in 2022\par - admitted with HE 10/20/22 2/2  resolved with Xifaxan + Lactulose\par - Admission at [facility] 1/3/23 after being transferred from [facility] 2/2 [facility] and is s/p EGD w/ banding -- not a TIPS candidate 2/2 HE\par - 5/15/23 CT Abd reviewed on 6/8/23 Hepatobiliary Tumor Board and imaging pertinent for 2.2 cm Segment 3 LR 5 lesion + adjacent focus, subcentimeter LR 3 with recommendation for IR for Y90 +/- Transplant Evaluation\par -has met with IR, going for Mapping 8/2, for IR y-90 8/15\par - 6/13/23 CT Chest noting RUL 2 mm nodule\par - Negative bone scan\par - Y90 on 8/2\par - has not had EGD since 12/25/22- pending appt with Dr. Carranza\par \par 5/15/23- CT abd- 2.2 cm LR5, segment 3 with cirrhosis and portal htn, an additional LR# focus in seg 3\par \par PMH- above, DM x 10 + years, HTN, cervical conization for pre cancerous lesion - last pap smear 20 years ago; obesity\par PSH - carpal tunnel\par \par \par Current Meds\par Alendronate Sodium 70 MG Oral Tablet; Take 1 tablet by mouth  weekly\par BD Pen Needle Hannah U/F 32G X 4 MM; Use 1 pen needle 4 x daily\par Carvedilol 3.125 MG Oral Tablet; TAKE 1 TABLET BY MOUTH EVERY DAY\par Fluticasone Propionate 50 MCG/ACT Nasal Suspension; USE 1 TO 2 SPRAYS IN EACH\par NOSTRIL ONCE DAILY\par Furosemide 20 MG Oral Tablet; TAKE 1 TABLET DAILY AS DIRECTED\par HumaLOG KwikPen 100 UNIT/ML Subcutaneous Solution Pen-injector; Inject up to 22\par units units with meal TID as directed\par Ketoconazole 2 % External Cream; APPLY A THIN LAYER TO AFFECTED AREA(S) TWICE\par DAILY\par Lactulose 10 GM/15ML Oral Solution; 15ml TID\par Lantus SoloStar 100 UNIT/ML Subcutaneous Solution Pen-injector; INJECT\par SUBCUTANEOUSLY 28 UNITS  DAILY AS DIRECTED\par Multivitamin Adult Oral Tablet; TAKE 1 TABLET DAILY\par OneTouch Verio In Vitro Strip; USE 1 STRIP DAILY\par Pantoprazole Sodium 40 MG Oral Tablet Delayed Release; TAKE 1 TABLET BY MOUTH \par DAILY\par Spironolactone 50 MG Oral Tablet; 1/2 tab daily\par Ursodiol 500 MG Oral Tablet; TAKE 1 TABLET BY MOUTH EVERY DAY\par Xifaxan 550 MG Oral Tablet; Take 1 tablet twice daily\par \par NKDA\par FH- brother with ? 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

## 2023-07-27 NOTE — END OF VISIT
[Attestation] : The patient was explained alternatives, benefits and risk of liver transplantation, including but not limited to infection, bleeding, hepatic artery or portal vein thrombosis, primary dysfunction or primary non-function of the liver allograft, cardiopulmonary arrest, intra-operative death and other surgical, medical and psychosocial risks as outlined in the evaluation consent form.  The patient understands these risks and is willing to proceed with liver transplantation. The patient was also explained the need to remain on lifelong anti-rejection medications.  We discussed the risks and side effects of immunosuppressive medications including, but not limited to infection, cancer, weight gain, new onset or worsening of diabetes or hypertension in a temporary or permanent state, kidney dysfunction, water retention, back pain, constipation, diarrhea, dizziness, headache, joint pain, loss of appetite, nausea, stomach pain or upset, trouble sleeping, vomiting, and mental or mood changes.  An overview of the follow-up protocol was reviewed including outpatient visits, blood tests and the potential for hospital readmission.  The patient understands these risks and is willing to proceed with liver transplantation. We also discussed the available donor organ pool. We discussed the assessment of the  donor including age, cause of death, cardiac arrest, electrolyte abnormalities, course and length of hospital stay, use of vasopressors, hepatitis and HIV testing. We reviewed organ donor risk factors that could affect the success of the graft or the health of the patient, including, but not limited to, the donor's history, condition or age of the organs used, or the patient's potential risk of kimi the human immunodeficiency virus and other infectious diseases if the disease cannot be detected in an infected donor.  We discussed and defined the option of an extended criteria for cadaveric donors (Hepatitis B core Ab positive donor, Hepatitis C Ab positive donor, steatosis, older donors, split livers and DCD donors) and early and late outcomes of graft survival after transplantation. The options of  donor liver transplantation vs. live donor liver transplantation were discussed with the patient.  Differences between donation after cardiac death (DCD) compared to donation after brain death (DBD) liver transplantation were also fully disclosed and included lower graft survival rates, the increased incidence of hepatic artery stenosis, bile duct injury, ischemic cholangiopathy and increased re transplant rates seen in recipients of DCD donor livers. The use of the U.S. Public Health Services (PHS) Guideline has defined some donors as "Increased Risk Donors" based on their history which may suggest socially increased risk behaviors was discussed. The patient is aware that if PHS increased risk donor is offered to the candidate, the transplant team will discuss the specifics to assist with making an informed decision. We discussed our post-transplant protocol of serology testing if the candidate receives an organ that is PHS increased risk. The patient was made aware that it is against the law to be paid or to pay to donate an organ.  If any money was given or will be given in exchange for receiving an organ, the patient may be subject to criminal prosecution, and any insurance coverage may no longer apply and patient may become personally responsible for all the health care costs associated with the donation, and private health information will be available to law enforcement agencies. We explained that we store vessels for subsequent later use in transplants.  Again, we discussed the extensive testing done on  donors prior to donation, however despite an extensive evaluation on the donor, there is potential risk a recipient may contract infectious diseases (HIV or Hepatitis) or cancer if they cannot be detected in the donor. In the cases where PHS Increased Risk donor vessels are used, we test the recipient per protocol post-transplant for any potential infectious disease transmission. The patient understands that there is the potential of use of  donor vessels and PHS increased risk donor vessels. The patient understands these risks and is willing to receive potentially PHS increased risk donor vessels. We also discussed the MELD allocation system in depth and the one-year observed and expected patient and graft survival rates according to latest data from the Scientific Registry for Transplant Recipients. These center specific outcomes were provided in comparison to the national one-year averages as described in the evaluation consent forms. Prior to signing consent, the patient was given an opportunity to ask questions.  After all concerns were addressed, informed consent was signed. Patient is aware that they may withdraw their consent for transplantation at any time.  Further, the patient is aware of the right to refuse an organ offer without penalty at any time.

## 2023-07-31 ENCOUNTER — APPOINTMENT (OUTPATIENT)
Dept: FAMILY MEDICINE | Facility: CLINIC | Age: 68
End: 2023-07-31
Payer: MEDICARE

## 2023-07-31 ENCOUNTER — NON-APPOINTMENT (OUTPATIENT)
Age: 68
End: 2023-07-31

## 2023-07-31 VITALS
DIASTOLIC BLOOD PRESSURE: 80 MMHG | HEIGHT: 61 IN | WEIGHT: 143 LBS | HEART RATE: 60 BPM | OXYGEN SATURATION: 98 % | BODY MASS INDEX: 27 KG/M2 | RESPIRATION RATE: 16 BRPM | SYSTOLIC BLOOD PRESSURE: 104 MMHG

## 2023-07-31 DIAGNOSIS — Z01.818 ENCOUNTER FOR OTHER PREPROCEDURAL EXAMINATION: ICD-10-CM

## 2023-07-31 LAB
DRUG ABUSE PANEL-9, SERUM: NORMAL
PETH 16:0/18:1: NEGATIVE NG/ML
PETH 16:0/18:2: NEGATIVE NG/ML
PETH COMMENTS: NORMAL

## 2023-07-31 PROCEDURE — 99214 OFFICE O/P EST MOD 30 MIN: CPT

## 2023-07-31 NOTE — HISTORY OF PRESENT ILLNESS
[Diabetes] : diabetes [(Patient denies any chest pain, claudication, dyspnea on exertion, orthopnea, palpitations or syncope)] : Patient denies any chest pain, claudication, dyspnea on exertion, orthopnea, palpitations or syncope [Aortic Stenosis] : no aortic stenosis [Atrial Fibrillation] : no atrial fibrillation [Coronary Artery Disease] : no coronary artery disease [Recent Myocardial Infarction] : no recent myocardial infarction [Implantable Device/Pacemaker] : no implantable device/pacemaker [Asthma] : no asthma [COPD] : no COPD [Sleep Apnea] : no sleep apnea [Smoker] : not a smoker [Family Member] : no family member with adverse anesthesia reaction/sudden death [Self] : no previous adverse anesthesia reaction [Chronic Anticoagulation] : no chronic anticoagulation [Chronic Kidney Disease] : no chronic kidney disease [FreeTextEntry1] : Liver surgery  [FreeTextEntry2] : 8/15/23 [FreeTextEntry3] : Dr.Michael Forde [FreeTextEntry4] : [facility]  phone:[phone] fax:[phone]

## 2023-07-31 NOTE — PHYSICAL EXAM
[No Acute Distress] : no acute distress [Well Nourished] : well nourished [Well Developed] : well developed [Well-Appearing] : well-appearing [Normal Sclera/Conjunctiva] : normal sclera/conjunctiva [PERRL] : pupils equal round and reactive to light [EOMI] : extraocular movements intact [Normal Outer Ear/Nose] : the outer ears and nose were normal in appearance [Normal Oropharynx] : the oropharynx was normal [No JVD] : no jugular venous distention [No Lymphadenopathy] : no lymphadenopathy [Supple] : supple [Thyroid Normal, No Nodules] : the thyroid was normal and there were no nodules present [No Respiratory Distress] : no respiratory distress  [No Accessory Muscle Use] : no accessory muscle use [Clear to Auscultation] : lungs were clear to auscultation bilaterally [Normal Rate] : normal rate  [Regular Rhythm] : with a regular rhythm [Normal S1, S2] : normal S1 and S2 [Pedal Pulses Present] : the pedal pulses are present [No Edema] : there was no peripheral edema [Soft] : abdomen soft [Non Tender] : non-tender [Normal Posterior Cervical Nodes] : no posterior cervical lymphadenopathy [Normal Anterior Cervical Nodes] : no anterior cervical lymphadenopathy [No CVA Tenderness] : no CVA  tenderness [No Spinal Tenderness] : no spinal tenderness [No Joint Swelling] : no joint swelling [Grossly Normal Strength/Tone] : grossly normal strength/tone [No Rash] : no rash [Coordination Grossly Intact] : coordination grossly intact [No Focal Deficits] : no focal deficits [Normal Gait] : normal gait [Deep Tendon Reflexes (DTR)] : deep tendon reflexes were 2+ and symmetric [Normal Affect] : the affect was normal [Alert and Oriented x3] : oriented to person, place, and time [Normal Mood] : the mood was normal [Normal Insight/Judgement] : insight and judgment were intact [I] : a grade 1 [Distended] : distended [Normal] : normal [Liver Enlarged] : enlarged [___cm] : with a span of [unfilled] cm

## 2023-07-31 NOTE — RESULTS/DATA
[] : results reviewed [de-identified] : Low platelets 61  [de-identified] : Elevated PT and PTT 16.2/39.2 [de-identified] : Levated LFT

## 2023-08-01 ENCOUNTER — APPOINTMENT (OUTPATIENT)
Dept: CARDIOLOGY | Facility: CLINIC | Age: 68
End: 2023-08-01
Payer: MEDICARE

## 2023-08-01 ENCOUNTER — NON-APPOINTMENT (OUTPATIENT)
Age: 68
End: 2023-08-01

## 2023-08-01 VITALS
SYSTOLIC BLOOD PRESSURE: 116 MMHG | BODY MASS INDEX: 27 KG/M2 | HEIGHT: 61 IN | OXYGEN SATURATION: 98 % | HEART RATE: 60 BPM | WEIGHT: 143 LBS | DIASTOLIC BLOOD PRESSURE: 60 MMHG

## 2023-08-01 DIAGNOSIS — Z01.810 ENCOUNTER FOR PREPROCEDURAL CARDIOVASCULAR EXAMINATION: ICD-10-CM

## 2023-08-01 DIAGNOSIS — R01.1 CARDIAC MURMUR, UNSPECIFIED: ICD-10-CM

## 2023-08-01 PROCEDURE — 93000 ELECTROCARDIOGRAM COMPLETE: CPT

## 2023-08-01 PROCEDURE — 99214 OFFICE O/P EST MOD 30 MIN: CPT

## 2023-08-02 ENCOUNTER — RESULT REVIEW (OUTPATIENT)
Age: 68
End: 2023-08-02

## 2023-08-02 ENCOUNTER — OUTPATIENT (OUTPATIENT)
Dept: OUTPATIENT SERVICES | Facility: HOSPITAL | Age: 68
LOS: 1 days | End: 2023-08-02
Payer: MEDICARE

## 2023-08-02 VITALS
TEMPERATURE: 98 F | SYSTOLIC BLOOD PRESSURE: 140 MMHG | WEIGHT: 143.08 LBS | HEIGHT: 61 IN | OXYGEN SATURATION: 100 % | DIASTOLIC BLOOD PRESSURE: 65 MMHG

## 2023-08-02 DIAGNOSIS — Z98.49 CATARACT EXTRACTION STATUS, UNSPECIFIED EYE: Chronic | ICD-10-CM

## 2023-08-02 DIAGNOSIS — Z98.890 OTHER SPECIFIED POSTPROCEDURAL STATES: Chronic | ICD-10-CM

## 2023-08-02 DIAGNOSIS — Z98.891 HISTORY OF UTERINE SCAR FROM PREVIOUS SURGERY: Chronic | ICD-10-CM

## 2023-08-02 DIAGNOSIS — Z90.49 ACQUIRED ABSENCE OF OTHER SPECIFIED PARTS OF DIGESTIVE TRACT: Chronic | ICD-10-CM

## 2023-08-02 DIAGNOSIS — C22.0 LIVER CELL CARCINOMA: ICD-10-CM

## 2023-08-02 PROBLEM — Z01.810 PREOPERATIVE CARDIOVASCULAR EXAMINATION: Status: ACTIVE | Noted: 2023-08-02

## 2023-08-02 PROBLEM — R01.1 SYSTOLIC MURMUR: Status: ACTIVE | Noted: 2023-08-02

## 2023-08-02 LAB — GLUCOSE BLDC GLUCOMTR-MCNC: 106 MG/DL — HIGH (ref 70–99)

## 2023-08-02 PROCEDURE — 75726 ARTERY X-RAYS ABDOMEN: CPT

## 2023-08-02 PROCEDURE — 75774 ARTERY X-RAY EACH VESSEL: CPT

## 2023-08-02 PROCEDURE — C9399: CPT

## 2023-08-02 PROCEDURE — 76942 ECHO GUIDE FOR BIOPSY: CPT

## 2023-08-02 PROCEDURE — 77263 THER RADIOLOGY TX PLNG CPLX: CPT

## 2023-08-02 PROCEDURE — 78201 LIVER IMAGING STATIC ONLY: CPT | Mod: 26,MH

## 2023-08-02 PROCEDURE — 36246 INS CATH ABD/L-EXT ART 2ND: CPT

## 2023-08-02 PROCEDURE — 77300 RADIATION THERAPY DOSE PLAN: CPT | Mod: 26

## 2023-08-02 PROCEDURE — A9540: CPT

## 2023-08-02 PROCEDURE — 75726 ARTERY X-RAYS ABDOMEN: CPT | Mod: 26

## 2023-08-02 PROCEDURE — 75774 ARTERY X-RAY EACH VESSEL: CPT | Mod: 26

## 2023-08-02 PROCEDURE — A9541: CPT

## 2023-08-02 PROCEDURE — 78201 LIVER IMAGING STATIC ONLY: CPT | Mod: MH

## 2023-08-02 PROCEDURE — C1769: CPT

## 2023-08-02 PROCEDURE — 36247 INS CATH ABD/L-EXT ART 3RD: CPT | Mod: LT

## 2023-08-02 PROCEDURE — 82962 GLUCOSE BLOOD TEST: CPT

## 2023-08-02 PROCEDURE — 77290 THER RAD SIMULAJ FIELD CPLX: CPT | Mod: 26

## 2023-08-02 PROCEDURE — C1760: CPT

## 2023-08-02 PROCEDURE — 75894 X-RAYS TRANSCATH THERAPY: CPT

## 2023-08-02 PROCEDURE — 78803 RP LOCLZJ TUM SPECT 1 AREA: CPT

## 2023-08-02 RX ORDER — KETOCONAZOLE 20 MG/G
2 CREAM TOPICAL TWICE DAILY
Qty: 1 | Refills: 3 | Status: DISCONTINUED | COMMUNITY
Start: 2020-03-12 | End: 2023-08-02

## 2023-08-02 RX ORDER — LANCETS 30 GAUGE
EACH MISCELLANEOUS
Qty: 4 | Refills: 1 | Status: DISCONTINUED | COMMUNITY
Start: 2023-03-30 | End: 2023-08-02

## 2023-08-02 RX ORDER — PEN NEEDLE, DIABETIC 29 G X1/2"
32G X 4 MM NEEDLE, DISPOSABLE MISCELLANEOUS
Qty: 4 | Refills: 1 | Status: DISCONTINUED | COMMUNITY
Start: 2023-04-13 | End: 2023-08-02

## 2023-08-02 RX ORDER — INSULIN GLARGINE 100 [IU]/ML
100 INJECTION, SOLUTION SUBCUTANEOUS
Qty: 30 | Refills: 3 | Status: DISCONTINUED | COMMUNITY
Start: 2022-12-16 | End: 2023-08-02

## 2023-08-02 RX ORDER — LANCETS 30 GAUGE
EACH MISCELLANEOUS
Qty: 4 | Refills: 1 | Status: DISCONTINUED | COMMUNITY
Start: 2023-01-12 | End: 2023-08-02

## 2023-08-02 RX ORDER — FLUTICASONE PROPIONATE 50 UG/1
50 SPRAY, METERED NASAL DAILY
Qty: 16 | Refills: 2 | Status: DISCONTINUED | COMMUNITY
Start: 2021-06-30 | End: 2023-08-02

## 2023-08-02 RX ORDER — LANCETS 30 GAUGE
EACH MISCELLANEOUS
Qty: 1 | Refills: 1 | Status: DISCONTINUED | COMMUNITY
Start: 2022-11-15 | End: 2023-08-02

## 2023-08-02 RX ORDER — BLOOD SUGAR DIAGNOSTIC
STRIP MISCELLANEOUS DAILY
Qty: 1 | Refills: 1 | Status: DISCONTINUED | COMMUNITY
Start: 2022-11-15 | End: 2023-08-02

## 2023-08-02 NOTE — PROGRESS NOTE ADULT - SUBJECTIVE AND OBJECTIVE BOX
IR Post Procedure Note    Diagnosis: HCC    Procedure: Y90 Mapping    : Nazario Wheeler MD    Contrast: 60cc    Anesthesia: 1% Lidocaine Subcutaneous, Sedation administered by Anesthesiology    Estimated Blood Loss: Less than 10cc    Complications: No Immediate Complications    Findings & Plan: RCFA Access, Celiac angiogram, Accessory LGA identified, Seg III perfusion identified, MAA administered, closure w angioseal      Please call Interventional Radiology with any questions, concerns, or issues.

## 2023-08-02 NOTE — ASU DISCHARGE PLAN (ADULT/PEDIATRIC) - NS MD DC FALL RISK RISK
For information on Fall & Injury Prevention, visit: [email or web address] OR  [email or web address] OR  https://www.cdc.gov/steadi/patient.html

## 2023-08-02 NOTE — ASU DISCHARGE PLAN (ADULT/PEDIATRIC) - ASU DC SPECIAL INSTRUCTIONSFT
Post Y90 Mapping Angiogram Instructions  Initial Discharge Instructions  - You underwent a mapping angiogram today in preparation for planned radioembolization to treat your liver tumor(s).   - You may have mild abdominal pain, nausea, loss of appetite, fatigue, and/or low grade fever.  These are normal after your procedure and they should resolve within 3-5 days.  Please call Interventional Radiology if you have a fever > 101.0 F.  If you have persistent nausea, contact Interventional Radiology and we can prescribe anti-nausea medication.  - Monitor right groin site for symptoms of bleeding, hardness underneath the incision site, bruising, numbness, intense pain, or inability to move.  If you have any of these symptoms, contact Interventional Radiology and seek immediate medical attention  - Please report chills, temperature > 101.0F, persistent nausea or vomiting, severe abdominal or leg pain, confusion, yellowing of the skin, or abdominal swelling.  - You may shower in 24 hours. You may resume normal activity in 24 hours.  - Do not perform any heavy lifting or put tension on the area for the next 48 hours.  - You may resume your normal diet.  - You may resume your normal medications however you should wait 24 hours before restarting aspirin, plavix, or blood thinners.  - It is normal to experience some pain over the site for the next few days. You may take apply ice to the area (20 minutes on, 20 minutes off) and take Motrin for that pain. Do not take more frequently than every 6 hours.  - You were given conscious sedation which may make you drowsy, therefore you need someone to stay with you until the morning following the procedure.  - Do not drive, engage in heavy lifting or strenuous activity, or drink any alcoholic beverages for the next 24 hours.     Next Steps  - You should have received a prescription for medications to take prior to your radioembolization procedure.  You are to start your PPI (Nexium, Prilosec, Protonix) 5 days prior to radioembolization treatment day and continue until all medication is completed.   - The Medrol dose pack will be started the day after treatment. If your are diabetic, you will not start this medication.  If you have not received a prescription for the medication, please notify the IR team at [phone].      Notify your primary physician and/or Interventional Radiology IMMEDIATELY if you experience any of the following       - Fever of 101.0F       - Chills or Rigors/ Shakes       - Swelling, Hardness, Redness, or Bleeding at the Groin Site       - Worsening Abdominal or Leg Pain       - Worsening Abdominal Swelling       - Skin Yellowing       - Lightheadedness and/or Dizziness Upon Standing       - Chest Pain/ Tightness       - Shortness of Breath       - Difficulty Walking    If you have a problem that you believe requires IMMEDIATE attention, please go to your NEAREST Emergency Room. If you believe your problem can safely wait until you speak to a physician, please call Interventional Radiology for any concerns.    During Normal Weekday Business Hours- You can contact the Interventional Radiology department during normal business hours via telephone, [phone].  During Evenings and Weekends- If you need to contact Interventional Radiology during off hours, do so by calling the hospital and requesting to be connected to the Interventional Radiologist on call.

## 2023-08-03 ENCOUNTER — APPOINTMENT (OUTPATIENT)
Dept: INFECTIOUS DISEASE | Facility: CLINIC | Age: 68
End: 2023-08-03
Payer: COMMERCIAL

## 2023-08-03 VITALS
BODY MASS INDEX: 27.75 KG/M2 | DIASTOLIC BLOOD PRESSURE: 67 MMHG | OXYGEN SATURATION: 97 % | SYSTOLIC BLOOD PRESSURE: 123 MMHG | WEIGHT: 147 LBS | HEART RATE: 84 BPM | TEMPERATURE: 97.8 F | HEIGHT: 61 IN

## 2023-08-03 DIAGNOSIS — Z23 ENCOUNTER FOR IMMUNIZATION: ICD-10-CM

## 2023-08-03 PROCEDURE — 99204 OFFICE O/P NEW MOD 45 MIN: CPT | Mod: 25

## 2023-08-03 PROCEDURE — 90632 HEPA VACCINE ADULT IM: CPT

## 2023-08-03 PROCEDURE — G0010: CPT

## 2023-08-03 PROCEDURE — 90472 IMMUNIZATION ADMIN EACH ADD: CPT

## 2023-08-03 PROCEDURE — 90739 HEPB VACC 2/4 DOSE ADULT IM: CPT

## 2023-08-03 NOTE — PHYSICAL EXAM
[General Appearance - Alert] : alert [General Appearance - In No Acute Distress] : in no acute distress [Sclera] : the sclera and conjunctiva were normal [PERRL With Normal Accommodation] : pupils were equal in size, round, reactive to light [Outer Ear] : the ears and nose were normal in appearance [Both Tympanic Membranes Were Examined] : both tympanic membranes were normal [Oropharynx] : the oropharynx was normal with no thrush [Hearing Loss Right Only] : diminished [Hearing Loss Left Only] : dimished [Neck Appearance] : the appearance of the neck was normal [Neck Cervical Mass (___cm)] : no neck mass was observed [Jugular Venous Distention Increased] : there was no jugular-venous distention [Thyroid Diffuse Enlargement] : the thyroid was not enlarged [Respiration, Rhythm And Depth] : normal respiratory rhythm and effort [Exaggerated Use Of Accessory Muscles For Inspiration] : no accessory muscle use [Auscultation Breath Sounds / Voice Sounds] : lungs were clear to auscultation bilaterally [Heart Rate And Rhythm] : heart rate was normal and rhythm regular [Heart Sounds] : normal S1 and S2 [Heart Sounds Gallop] : no gallops [Heart Sounds Pericardial Friction Rub] : no pericardial rub [Full Pulse] : the pedal pulses are present [Edema] : there was no peripheral edema [Bowel Sounds] : normal bowel sounds [Abdomen Soft] : soft [Abdomen Tenderness] : non-tender [Abdomen Mass (___ Cm)] : no abdominal mass palpated [Costovertebral Angle Tenderness] : no CVA tenderness [No Palpable Adenopathy] : no palpable adenopathy [Cervical Lymph Nodes Enlarged Posterior Bilaterally] : posterior cervical [Cervical Lymph Nodes Enlarged Anterior Bilaterally] : anterior cervical [Supraclavicular Lymph Nodes Enlarged Bilaterally] : supraclavicular [Musculoskeletal - Swelling] : no joint swelling [Range of Motion to Joints] : range of motion to joints [Nail Clubbing] : no clubbing  or cyanosis of the fingernails [Motor Tone] : muscle strength and tone were normal [Skin Color & Pigmentation] : normal skin color and pigmentation [] : no rash [Skin Lesions] : no skin lesions [Oriented To Time, Place, And Person] : oriented to person, place, and time [Affect] : the affect was normal

## 2023-08-03 NOTE — HISTORY OF PRESENT ILLNESS
[FreeTextEntry1] : Patient is a 67 y/o female with PMH of hypertension, insulin dependent type 2 diabetes mellitus, anemia, asthma, anxiety and depression, nonalcoholic steatohepatitis, decompensated liver cirrhosis complicated by hepatic encephalopathy, esophageal varices with banding, and more recently HCC, presents for a pre-liver transplant evaluation.  She is accompanied by her  for this visit.  23 CT Chest noting RUL 2 mm (about 0.08 in) nodule with a negative bone scan, concerning for HCC, had tumor mapping with IR yesterday. Planned for liver directed therapy to begin .     Last Hospitalizations: 2022 for GIB requiring multiple transfusions.   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 [de-identified] : UTI: 1 episode STI: Denies SSTI: Denies COVID: Denies   Pneumonia:  Denies Bacteriemia: Denies

## 2023-08-03 NOTE — ASSESSMENT
[FreeTextEntry1] : Patient is a 67 y/o female with PMH of hypertension, insulin dependent type 2 diabetes mellitus, anemia, asthma, anxiety and depression, nonalcoholic steatohepatitis, decompensated liver cirrhosis complicated by hepatic encephalopathy, esophageal varices with banding, and more recently HCC, presents for a pre-liver transplant evaluation.  She is accompanied by her  for this visit.  6/13/23 CT Chest noting RUL 2 mm (about 0.08 in) nodule with a negative bone scan, concerning for HCC, had tumor mapping with IR yesterday. Planned for liver directed therapy to begin 8/11.   #Encounter for Immunization  - COVID:  Moderna x2. Would benefit from bivalent booster.   - Flu: 09/2022 - Pneumococcal: PPSV 07/2014, PCV 13 5/2020. Would benefit from Prevnar 20  - Tdap: 06/2017 - Hep B: Hpelisav #1 administered today, #2 due in 1 month - Hep A: Havrix #1 administered today, #2 due in 6 months - Shingles: Would benefit from Shingrix  - VZV:  immune  -Rubella: immune      #Pre-Transplant Evaluation  ---- HAV IgG: Non-reactive  ---- HBs Ab: Non-reactive  ---- HCV Ab: non-reactive  ---- HSV 1/2 IgG: positive/ negative  ---- EBV Serology: positive (past exposure)  ---- CMV IgG: negative  ---- VZV IgG: positive  ---- Rubella IgG: positive  ---- QuantiFERON-TB Gold: negative  ---- HIV Test: negative  ---- Syphilis Screen: negative  ---- Toxoplasma IgG: negative  ---- Strongyloides Ab: negative  ----COVID Abdi: positive   Return for F/U in 1 month with nursing for vaccinations.  Patient with no absolute ID contraindications for transplant.

## 2023-08-03 NOTE — DATA REVIEWED
[FreeTextEntry1] : ACC: [id number]     EXAM:  CT CHEST   ORDERED BY:  PROCEDURE DATE:  06/13/2023    INTERPRETATION:  EXAMINATION: CT CHEST  CLINICAL INDICATION: Liver lesion.  TECHNIQUE: Noncontrast CT of the chest was obtained.  COMPARISON: 2.12.18.  FINDINGS:  AIRWAYS AND LUNGS: The central tracheobronchial tree is patent.  Emphysema is present. Right upper lobe 2 mm nodule (3, 55)  MEDIASTINUM AND PLEURA: There are no enlarged mediastinal, hilar or axillary lymph nodes. The visualized portion of the thyroid gland is unremarkable. There is no pleural effusion. There is no pneumothorax.  HEART AND VESSELS: There is cardiomegaly.  There are atherosclerotic calcifications of the aorta and coronary arteries.  There is no pericardial effusion.  UPPER ABDOMEN: Images of the upper abdomen demonstrate ascites and cirrhosis.  BONES AND SOFT TISSUES: There are mild degenerative changes of the spine.  The soft tissues are unremarkable.  IMPRESSION: Emphysema is present. Right upper lobe 2 mm nodule.  --- End of Report ---

## 2023-08-09 ENCOUNTER — NON-APPOINTMENT (OUTPATIENT)
Age: 68
End: 2023-08-09

## 2023-08-09 ENCOUNTER — APPOINTMENT (OUTPATIENT)
Dept: CARDIOLOGY | Facility: CLINIC | Age: 68
End: 2023-08-09
Payer: COMMERCIAL

## 2023-08-09 VITALS
WEIGHT: 148 LBS | SYSTOLIC BLOOD PRESSURE: 128 MMHG | DIASTOLIC BLOOD PRESSURE: 50 MMHG | OXYGEN SATURATION: 97 % | HEIGHT: 61 IN | HEART RATE: 63 BPM | BODY MASS INDEX: 27.94 KG/M2

## 2023-08-09 PROCEDURE — 93000 ELECTROCARDIOGRAM COMPLETE: CPT | Mod: NC

## 2023-08-09 PROCEDURE — 99215 OFFICE O/P EST HI 40 MIN: CPT

## 2023-08-09 NOTE — REASON FOR VISIT
[Other: ____] : [unfilled] [Other: _____] : [unfilled] [FreeTextEntry1] : Dr. Zepeda: [facility].   former smoker, quit one year ago, previously 3 cigarettes a day x 2 years.  HTN diagnosed a while ago HLD (CAC per CT chest) DM2 for 10 years, on insulin. Humalog TID and Lantus 24 u QHS.  GERD, UGIB, bands x 1, ascites never with LVP, + HE Her surgical history includes 2 x  section, cholecystectomy, cataract surgery, also has CTS on both sides.  Cervical cancer 41 years ago. Surgical excision.   Her father is . He had a CVA and passed in his 70's. Her mother is . She had valve replacement.  2 brothers, unsure of their medical conditions. One is a heavy smoker. The other had prostate cancer (?) She is . She has 2 children, 1 son is 41 and 1 daughter is 38. She has 5 grandkids.  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   No real exercising. She can walk for 2 blocks. No exertional symptoms.

## 2023-08-10 NOTE — DISCUSSION/SUMMARY
[EKG obtained to assist in diagnosis and management of assessed problem(s)] : EKG obtained to assist in diagnosis and management of assessed problem(s) [FreeTextEntry1] : She is a 68 year old who presents today for preoperative cardiovascular evaluation prior to possible liver transplant with known history as above.   Her blood pressure today is 128/50 mm Hg. No changes to her medications were suggested.  She will schedule an echocardiogram for structural heart evaluation.  A dobutamine stress echo will evaluate for any evidence of inducible ischemia.  Given her risk factors, she will schedule a CT of the coronary arteries to define the extent, if any, of coronary atherosclerosis.  Follow up pending results.

## 2023-08-11 ENCOUNTER — RESULT REVIEW (OUTPATIENT)
Age: 68
End: 2023-08-11

## 2023-08-11 ENCOUNTER — INPATIENT (INPATIENT)
Facility: HOSPITAL | Age: 68
LOS: 0 days | Discharge: HOME | DRG: 829 | End: 2023-08-12
Attending: HOSPITALIST | Admitting: STUDENT IN AN ORGANIZED HEALTH CARE EDUCATION/TRAINING PROGRAM
Payer: COMMERCIAL

## 2023-08-11 VITALS
HEIGHT: 61 IN | HEART RATE: 53 BPM | RESPIRATION RATE: 18 BRPM | OXYGEN SATURATION: 99 % | TEMPERATURE: 98 F | WEIGHT: 143.08 LBS | SYSTOLIC BLOOD PRESSURE: 111 MMHG | DIASTOLIC BLOOD PRESSURE: 51 MMHG

## 2023-08-11 DIAGNOSIS — Z98.891 HISTORY OF UTERINE SCAR FROM PREVIOUS SURGERY: Chronic | ICD-10-CM

## 2023-08-11 DIAGNOSIS — Z90.49 ACQUIRED ABSENCE OF OTHER SPECIFIED PARTS OF DIGESTIVE TRACT: Chronic | ICD-10-CM

## 2023-08-11 DIAGNOSIS — Z98.49 CATARACT EXTRACTION STATUS, UNSPECIFIED EYE: Chronic | ICD-10-CM

## 2023-08-11 DIAGNOSIS — Z98.890 OTHER SPECIFIED POSTPROCEDURAL STATES: Chronic | ICD-10-CM

## 2023-08-11 DIAGNOSIS — C22.0 LIVER CELL CARCINOMA: ICD-10-CM

## 2023-08-11 LAB
ALBUMIN SERPL ELPH-MCNC: 3.2 G/DL — LOW (ref 3.3–5.2)
ALP SERPL-CCNC: 133 U/L — HIGH (ref 40–120)
ALT FLD-CCNC: 7 U/L — SIGNIFICANT CHANGE UP
ANION GAP SERPL CALC-SCNC: 11 MMOL/L — SIGNIFICANT CHANGE UP (ref 5–17)
AST SERPL-CCNC: 36 U/L — HIGH
BILIRUB SERPL-MCNC: 1.4 MG/DL — SIGNIFICANT CHANGE UP (ref 0.4–2)
BUN SERPL-MCNC: 12.4 MG/DL — SIGNIFICANT CHANGE UP (ref 8–20)
CALCIUM SERPL-MCNC: 8.5 MG/DL — SIGNIFICANT CHANGE UP (ref 8.4–10.5)
CHLORIDE SERPL-SCNC: 105 MMOL/L — SIGNIFICANT CHANGE UP (ref 96–108)
CO2 SERPL-SCNC: 25 MMOL/L — SIGNIFICANT CHANGE UP (ref 22–29)
CREAT SERPL-MCNC: 0.87 MG/DL — SIGNIFICANT CHANGE UP (ref 0.5–1.3)
EGFR: 73 ML/MIN/1.73M2 — SIGNIFICANT CHANGE UP
GLUCOSE BLDC GLUCOMTR-MCNC: 91 MG/DL — SIGNIFICANT CHANGE UP (ref 70–99)
GLUCOSE SERPL-MCNC: 91 MG/DL — SIGNIFICANT CHANGE UP (ref 70–99)
HCT VFR BLD CALC: 28.3 % — LOW (ref 34.5–45)
HGB BLD-MCNC: 9 G/DL — LOW (ref 11.5–15.5)
INR BLD: 1.37 RATIO — HIGH (ref 0.85–1.18)
MCHC RBC-ENTMCNC: 26.6 PG — LOW (ref 27–34)
MCHC RBC-ENTMCNC: 31.8 GM/DL — LOW (ref 32–36)
MCV RBC AUTO: 83.7 FL — SIGNIFICANT CHANGE UP (ref 80–100)
PLATELET # BLD AUTO: 59 K/UL — LOW (ref 150–400)
POTASSIUM SERPL-MCNC: 4.3 MMOL/L — SIGNIFICANT CHANGE UP (ref 3.5–5.3)
POTASSIUM SERPL-SCNC: 4.3 MMOL/L — SIGNIFICANT CHANGE UP (ref 3.5–5.3)
PROT SERPL-MCNC: 5.6 G/DL — LOW (ref 6.6–8.7)
PROTHROM AB SERPL-ACNC: 15.1 SEC — HIGH (ref 9.5–13)
RBC # BLD: 3.38 M/UL — LOW (ref 3.8–5.2)
RBC # FLD: 15.7 % — HIGH (ref 10.3–14.5)
SODIUM SERPL-SCNC: 141 MMOL/L — SIGNIFICANT CHANGE UP (ref 135–145)
WBC # BLD: 2.95 K/UL — LOW (ref 3.8–10.5)
WBC # FLD AUTO: 2.95 K/UL — LOW (ref 3.8–10.5)

## 2023-08-11 PROCEDURE — 78803 RP LOCLZJ TUM SPECT 1 AREA: CPT | Mod: 26,MH

## 2023-08-11 PROCEDURE — 76380 CAT SCAN FOLLOW-UP STUDY: CPT | Mod: 26,XU,MH

## 2023-08-11 PROCEDURE — 36247 INS CATH ABD/L-EXT ART 3RD: CPT

## 2023-08-11 PROCEDURE — 37243 VASC EMBOLIZE/OCCLUDE ORGAN: CPT

## 2023-08-11 PROCEDURE — 99223 1ST HOSP IP/OBS HIGH 75: CPT

## 2023-08-11 PROCEDURE — 76937 US GUIDE VASCULAR ACCESS: CPT | Mod: 26

## 2023-08-11 RX ORDER — LACTULOSE 10 G/15ML
20 SOLUTION ORAL THREE TIMES A DAY
Refills: 0 | Status: DISCONTINUED | OUTPATIENT
Start: 2023-08-11 | End: 2023-08-12

## 2023-08-11 RX ORDER — FUROSEMIDE 40 MG
1 TABLET ORAL
Refills: 0 | DISCHARGE

## 2023-08-11 RX ORDER — ONDANSETRON 8 MG/1
4 TABLET, FILM COATED ORAL EVERY 8 HOURS
Refills: 0 | Status: DISCONTINUED | OUTPATIENT
Start: 2023-08-11 | End: 2023-08-12

## 2023-08-11 RX ORDER — ALPRAZOLAM 0.25 MG
1 TABLET ORAL DAILY
Refills: 0 | Status: DISCONTINUED | OUTPATIENT
Start: 2023-08-11 | End: 2023-08-12

## 2023-08-11 RX ORDER — INSULIN GLARGINE 100 [IU]/ML
10 INJECTION, SOLUTION SUBCUTANEOUS AT BEDTIME
Refills: 0 | Status: DISCONTINUED | OUTPATIENT
Start: 2023-08-11 | End: 2023-08-12

## 2023-08-11 RX ORDER — ACETAMINOPHEN 500 MG
650 TABLET ORAL EVERY 6 HOURS
Refills: 0 | Status: DISCONTINUED | OUTPATIENT
Start: 2023-08-11 | End: 2023-08-12

## 2023-08-11 RX ORDER — DEXTROSE 50 % IN WATER 50 %
12.5 SYRINGE (ML) INTRAVENOUS ONCE
Refills: 0 | Status: DISCONTINUED | OUTPATIENT
Start: 2023-08-11 | End: 2023-08-12

## 2023-08-11 RX ORDER — URSODIOL 250 MG/1
500 TABLET, FILM COATED ORAL
Refills: 0 | Status: DISCONTINUED | OUTPATIENT
Start: 2023-08-11 | End: 2023-08-12

## 2023-08-11 RX ORDER — SODIUM CHLORIDE 9 MG/ML
1000 INJECTION, SOLUTION INTRAVENOUS
Refills: 0 | Status: DISCONTINUED | OUTPATIENT
Start: 2023-08-11 | End: 2023-08-12

## 2023-08-11 RX ORDER — INSULIN LISPRO 100/ML
VIAL (ML) SUBCUTANEOUS AT BEDTIME
Refills: 0 | Status: DISCONTINUED | OUTPATIENT
Start: 2023-08-11 | End: 2023-08-12

## 2023-08-11 RX ORDER — PANTOPRAZOLE SODIUM 20 MG/1
40 TABLET, DELAYED RELEASE ORAL
Refills: 0 | Status: DISCONTINUED | OUTPATIENT
Start: 2023-08-11 | End: 2023-08-12

## 2023-08-11 RX ORDER — GLUCAGON INJECTION, SOLUTION 0.5 MG/.1ML
1 INJECTION, SOLUTION SUBCUTANEOUS ONCE
Refills: 0 | Status: DISCONTINUED | OUTPATIENT
Start: 2023-08-11 | End: 2023-08-12

## 2023-08-11 RX ORDER — DEXTROSE 50 % IN WATER 50 %
25 SYRINGE (ML) INTRAVENOUS ONCE
Refills: 0 | Status: DISCONTINUED | OUTPATIENT
Start: 2023-08-11 | End: 2023-08-12

## 2023-08-11 RX ORDER — LANOLIN ALCOHOL/MO/W.PET/CERES
3 CREAM (GRAM) TOPICAL AT BEDTIME
Refills: 0 | Status: DISCONTINUED | OUTPATIENT
Start: 2023-08-11 | End: 2023-08-12

## 2023-08-11 RX ORDER — SERTRALINE 25 MG/1
100 TABLET, FILM COATED ORAL DAILY
Refills: 0 | Status: DISCONTINUED | OUTPATIENT
Start: 2023-08-11 | End: 2023-08-12

## 2023-08-11 RX ORDER — INSULIN LISPRO 100/ML
VIAL (ML) SUBCUTANEOUS
Refills: 0 | Status: DISCONTINUED | OUTPATIENT
Start: 2023-08-11 | End: 2023-08-12

## 2023-08-11 RX ORDER — CARVEDILOL PHOSPHATE 80 MG/1
3.12 CAPSULE, EXTENDED RELEASE ORAL EVERY 12 HOURS
Refills: 0 | Status: DISCONTINUED | OUTPATIENT
Start: 2023-08-11 | End: 2023-08-12

## 2023-08-11 RX ORDER — FUROSEMIDE 40 MG
40 TABLET ORAL DAILY
Refills: 0 | Status: DISCONTINUED | OUTPATIENT
Start: 2023-08-11 | End: 2023-08-12

## 2023-08-11 RX ORDER — DEXTROSE 50 % IN WATER 50 %
15 SYRINGE (ML) INTRAVENOUS ONCE
Refills: 0 | Status: DISCONTINUED | OUTPATIENT
Start: 2023-08-11 | End: 2023-08-12

## 2023-08-11 NOTE — H&P ADULT - ASSESSMENT
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 Patient follows with hepatologist (Dr. Martinez), found to have hepatic lesion and scheduled for Y90 mapping by IR which was done today. Patient said about 6 hours after the procedure, she got up to use the commode and started started bleeding from the incision site. IR consulted who recommended to admit patient for observation overnight.       Hepatic lesion s/p Y90 mapping complicated with bleeding   Admit to medical floor   H/H stable   Monitor CBC   IR following     Hx of Liver cirrhosis   Continue Rifaximin 550mg bid   Lactulose tid   Ursodiol 500mg   Lasix 40mg   As per Dr. first, no longer on Spironolactone 50mg (please confirm with family in AM if patient still on this med)   Follows with hepatology     HTN  Coreg 3.125mg bid with holding parameters     Thrombocytopenia-chronic   Pt: 59  Monitor CBC    Chronic anemia   Hb stable   Monitor CBC    DM-2   On Lantus and Lispro   Insulin sliding scale     Anxiety   Sertraline 100mg   Xanax 0.25mg     Supportive   DVT prophylaxis: CSD   Diet: CHO     Plan of care discussed with patient and nurse.      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 Patient follows with hepatologist (Dr. Martinez), found to have hepatic lesion and scheduled for Y90 mapping by IR which was done today. Patient said about 6 hours after the procedure, she got up to use the commode and started started bleeding from the incision site. IR consulted who recommended to admit patient for observation overnight.     Hepatic lesion s/p Y90 mapping complicated with bleeding   Admit to medical floor   H/H stable   Monitor CBC   IR following     Hx of Liver cirrhosis   Continue Rifaximin 550mg bid   Lactulose tid   Ursodiol 500mg   Lasix 40mg   As per Dr. first, no longer on Spironolactone 50mg (please confirm with family in AM if patient still on this med)   Follows with hepatology     HTN  Coreg 3.125mg bid with holding parameters     Thrombocytopenia-chronic   Pt: 59  Monitor CBC    Chronic anemia   Hb stable   Monitor CBC    DM-2   On Lantus 24 units HS (serum glucose in the 90s) will give 10 units for now adjust as needed   Lispro 24/22/26 units with meals, will hold, resume as needed  Insulin sliding scale     Anxiety   Sertraline 100mg (as per Dr. first, recently refill 08/04/23) but patient said she no longer take medication, please confirm with family.   Xanax 0.25mg     Supportive   DVT prophylaxis: CSD   Diet: CHO     Plan of care discussed with patient and nurse.

## 2023-08-11 NOTE — PROGRESS NOTE ADULT - SUBJECTIVE AND OBJECTIVE BOX
IR Post Procedure Note    Diagnosis: ___    Procedure: ___    : Nazario Wheeler MD    Contrast: None/ ___cc    Anesthesia: 1% Lidocaine Subcutaneous, Sedation administered by Anesthesiology/ Moderate Sedation    Estimated Blood Loss: Less than 10cc    Specimens: None/ Identified, Labeled, Confirmed and Sent to Lab. Adequacy of Specimen Confirmed by Cytopathology    Complications: No Immediate Complications    Anticoagulation: Resume without Restriction/ Resume in 24 Hours/ Hold in Setting of Hemorrhage    Findings & Plan: R CFA Access, Catheter to celiac then to seg III, angiogram and CBCT then demonstrated tumor blush, Y90 admin to seg III, Closure w angioseal and compression      Please call Interventional Radiology with any questions, concerns, or issues.

## 2023-08-11 NOTE — H&P ADULT - TIME BILLING
Chart, lab and prior record reviewed. Medication reconciliation, physical exam and documentation. Discussion with patient and nurse.

## 2023-08-11 NOTE — ASU DISCHARGE PLAN (ADULT/PEDIATRIC) - NS MD DC FALL RISK RISK
For information on Fall & Injury Prevention, visit: [email or web address] OR  [email or web address] OR  https://www.cdc.gov/steadi/patient.html

## 2023-08-11 NOTE — H&P ADULT - HISTORY OF PRESENT ILLNESS
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 Patient follows with hepatologist (Dr. Martinez), found to have hepatic lesion and scheduled for Y90 mapping by IR which was done today. Patient said about 6 hours after the procedure, she got up to use the commode and started started bleeding from the incision site. IR consulted who recommended to admit patient for observation overnight. Patient has no chest pain, shortness of breath, palpitation, dizziness, abdominal pain, change in bowel/urinary habit, fever, chills, nausea, vomiting, HA.

## 2023-08-11 NOTE — ASU DISCHARGE PLAN (ADULT/PEDIATRIC) - ASU DC SPECIAL INSTRUCTIONSFT
Post Y90 Selective Internal Radiation Therapy Instructions  Initial Discharge Instructions  - You underwent a Y90 radioembolization to treat your liver tumor by Dr. Wheeler  - You may have mild abdominal pain, nausea, loss of appetite, fatigue, and/or low grade fever.  These are normal after your procedure and they should resolve within 3-5 days.  Please call Interventional Radiology if you have a fever > 101.0 F.  If you have persistent nausea, contact Interventional Radiology and we can prescribe anti-nausea medication.  - Monitor right groin site for symptoms of bleeding, hardness underneath the incision site, bruising, numbness, intense pain, or inability to move.  If you have any of these symptoms, contact Interventional Radiology and seek immediate medical attention  - Please report chills, temperature > 101.0F, persistent nausea or vomiting, severe abdominal or leg pain, confusion, yellowing of the skin, or abdominal swelling.  - Please refer to the "Radiation Safety Instructions" that were provided.  Please adhere to them for the 72 hours after your procedure.   - Start Medrol dose pack the day after your Y90 radioembilization treatment (unless otherwise instructed).  - Continue your PPI (Nexium, Prilosec, Protonix) for 30 days post procedure.  - You may shower in 24 hours. You may resume normal activity in 24 hours.  - Do not perform any heavy lifting or put tension on the area for the next 48 hours.  - You may resume your normal diet.  - You may resume your normal medications however you should wait 24 hours before restarting aspirin, plavix, or blood thinners.  - It is normal to experience some pain over the site for the next few days. You may take apply ice to the area (20 minutes on, 20 minutes off) and take Motrin for that pain. Do not take more frequently than every 6 hours.  - You were given conscious sedation which may make you drowsy, therefore you need someone to stay with you until the morning following the procedure.  - Do not drive, engage in heavy lifting or strenuous activity, or drink any alcoholic beverages for the next 24 hours.     Next Steps  - A follow up phone call will be performed the day after the procedure.   - Blood work is to be performed 2 weeks after your procedure (you will be given a prescription).  You can locate the closest [facility] lab by calling [phone]. If you have labwork performed at a NON-[facility] lab, please request that the results be faxed to [phone]  - Please call the Radiology Department at [phone] to schedule a follow up visit with Dr. Wheeler in 1 month.   - Follow up with your hepatologist or oncologist in 2 weeks.   - Post procedure imaging study,CT or MRI, is to be performed 3 months post procedure.  You will be given a prescription for this at your initial 1 month follow up visit. If needed, our booking office will obtain authorization from your insurance company.    Notify your primary physician and/or Interventional Radiology IMMEDIATELY if you experience any of the following       - Fever of 101.0F       - Chills or Rigors/ Shakes       - Swelling, Hardness, Redness, or Bleeding at the Groin Site       - Worsening Abdominal or Leg Pain       - Worsening Abdominal Swelling       - Skin Yellowing       - Lightheadedness and/or Dizziness Upon Standing       - Chest Pain/ Tightness       - Shortness of Breath       - Difficulty Walking    If you have a problem that you believe requires IMMEDIATE attention, please go to your NEAREST Emergency Room. If you believe your problem can safely wait until you speak to a physician, please call Interventional Radiology for any concerns.    During Normal Weekday Business Hours- You can contact the Interventional Radiology department during normal business hours via telephone, [phone].  During Evenings and Weekends- If you need to contact Interventional Radiology during off hours, do so by calling the hospital and requesting to be connected to the Interventional Radiologist on call.        Radiation Safety Discharge Information After Y90 Selective Internal Radiation Therapy Treatment    - Patients can resume normal contact with adult family members.  - For the next 72 hours, avoid prolonged close contact with small children or pregnant individuals (Maintain distance of more than 3 feet).  - If you have to see a physician or go to the ER, inform them of your Y90 treatment to your liver. Treatment should not be delayed based on recent Y90 treatment.

## 2023-08-11 NOTE — H&P ADULT - NSICDXPASTSURGICALHX_GEN_ALL_CORE_FT
PAST SURGICAL HISTORY:  H/O carpal tunnel repair     H/O cataract extraction     H/O  section     History of cholecystectomy

## 2023-08-11 NOTE — H&P ADULT - NSHPPHYSICALEXAM_GEN_ALL_CORE
Vital Signs Last 24 Hrs  T(C): 36.7 (11 Aug 2023 19:30), Max: 36.7 (11 Aug 2023 08:29)  T(F): 98 (11 Aug 2023 19:30), Max: 98.1 (11 Aug 2023 08:29)  HR: 62 (11 Aug 2023 19:30) (53 - 69)  BP: 125/64 (11 Aug 2023 19:30) (103/63 - 130/55)  BP(mean): 87 (11 Aug 2023 18:15) (70 - 87)  RR: 19 (11 Aug 2023 19:30) (11 - 21)  SpO2: 97% (11 Aug 2023 19:30) (92% - 99%)    Parameters below as of 11 Aug 2023 19:30  Patient On (Oxygen Delivery Method): room air

## 2023-08-11 NOTE — PROGRESS NOTE ADULT - SUBJECTIVE AND OBJECTIVE BOX
called by PACU nurse.  Patient had bleeding from groin puncture site after 6 hrs with ambulation.     was placed back in bed, presssure applied and new dressing placed.     seen in PACU by IR nurse, and then  byself with  present.     patient stable.  groin soft.  non-tender.  no brusing.  dressing clean. right foot warm    no signs of active bleeding    keep on bedrest.  admit to medicine overnight for observation.  Likely d/c in AM

## 2023-08-12 ENCOUNTER — TRANSCRIPTION ENCOUNTER (OUTPATIENT)
Age: 68
End: 2023-08-12

## 2023-08-12 VITALS
SYSTOLIC BLOOD PRESSURE: 108 MMHG | OXYGEN SATURATION: 95 % | RESPIRATION RATE: 18 BRPM | DIASTOLIC BLOOD PRESSURE: 55 MMHG | HEART RATE: 65 BPM | TEMPERATURE: 98 F

## 2023-08-12 LAB
ALBUMIN SERPL ELPH-MCNC: 2.9 G/DL — LOW (ref 3.3–5.2)
ALP SERPL-CCNC: 108 U/L — SIGNIFICANT CHANGE UP (ref 40–120)
ALT FLD-CCNC: 6 U/L — SIGNIFICANT CHANGE UP
ANION GAP SERPL CALC-SCNC: 11 MMOL/L — SIGNIFICANT CHANGE UP (ref 5–17)
AST SERPL-CCNC: 34 U/L — HIGH
BILIRUB SERPL-MCNC: 1.3 MG/DL — SIGNIFICANT CHANGE UP (ref 0.4–2)
BUN SERPL-MCNC: 17.6 MG/DL — SIGNIFICANT CHANGE UP (ref 8–20)
CALCIUM SERPL-MCNC: 8.1 MG/DL — LOW (ref 8.4–10.5)
CHLORIDE SERPL-SCNC: 108 MMOL/L — SIGNIFICANT CHANGE UP (ref 96–108)
CO2 SERPL-SCNC: 23 MMOL/L — SIGNIFICANT CHANGE UP (ref 22–29)
CREAT SERPL-MCNC: 0.87 MG/DL — SIGNIFICANT CHANGE UP (ref 0.5–1.3)
EGFR: 73 ML/MIN/1.73M2 — SIGNIFICANT CHANGE UP
GLUCOSE BLDC GLUCOMTR-MCNC: 144 MG/DL — HIGH (ref 70–99)
GLUCOSE BLDC GLUCOMTR-MCNC: 186 MG/DL — HIGH (ref 70–99)
GLUCOSE SERPL-MCNC: 142 MG/DL — HIGH (ref 70–99)
HCT VFR BLD CALC: 28.1 % — LOW (ref 34.5–45)
HGB BLD-MCNC: 8.8 G/DL — LOW (ref 11.5–15.5)
INR BLD: 1.43 RATIO — HIGH (ref 0.85–1.18)
MCHC RBC-ENTMCNC: 25.9 PG — LOW (ref 27–34)
MCHC RBC-ENTMCNC: 31.3 GM/DL — LOW (ref 32–36)
MCV RBC AUTO: 82.6 FL — SIGNIFICANT CHANGE UP (ref 80–100)
MELD SCORE WITH DIALYSIS: 25 POINTS — SIGNIFICANT CHANGE UP
MELD SCORE WITHOUT DIALYSIS: 11 POINTS — SIGNIFICANT CHANGE UP
PLATELET # BLD AUTO: 60 K/UL — LOW (ref 150–400)
POTASSIUM SERPL-MCNC: 4.3 MMOL/L — SIGNIFICANT CHANGE UP (ref 3.5–5.3)
POTASSIUM SERPL-SCNC: 4.3 MMOL/L — SIGNIFICANT CHANGE UP (ref 3.5–5.3)
PROT SERPL-MCNC: 5.2 G/DL — LOW (ref 6.6–8.7)
PROTHROM AB SERPL-ACNC: 15.7 SEC — HIGH (ref 9.5–13)
RBC # BLD: 3.4 M/UL — LOW (ref 3.8–5.2)
RBC # FLD: 15.6 % — HIGH (ref 10.3–14.5)
SODIUM SERPL-SCNC: 142 MMOL/L — SIGNIFICANT CHANGE UP (ref 135–145)
WBC # BLD: 4.37 K/UL — SIGNIFICANT CHANGE UP (ref 3.8–10.5)
WBC # FLD AUTO: 4.37 K/UL — SIGNIFICANT CHANGE UP (ref 3.8–10.5)

## 2023-08-12 PROCEDURE — 82962 GLUCOSE BLOOD TEST: CPT

## 2023-08-12 PROCEDURE — C2616: CPT

## 2023-08-12 PROCEDURE — 85610 PROTHROMBIN TIME: CPT

## 2023-08-12 PROCEDURE — 99239 HOSP IP/OBS DSCHRG MGMT >30: CPT

## 2023-08-12 PROCEDURE — 75726 ARTERY X-RAYS ABDOMEN: CPT

## 2023-08-12 PROCEDURE — C1894: CPT

## 2023-08-12 PROCEDURE — C1769: CPT

## 2023-08-12 PROCEDURE — 75774 ARTERY X-RAY EACH VESSEL: CPT

## 2023-08-12 PROCEDURE — 79445 NUCLEAR RX INTRA-ARTERIAL: CPT

## 2023-08-12 PROCEDURE — A9541: CPT

## 2023-08-12 PROCEDURE — 78803 RP LOCLZJ TUM SPECT 1 AREA: CPT

## 2023-08-12 PROCEDURE — C1760: CPT

## 2023-08-12 PROCEDURE — 36415 COLL VENOUS BLD VENIPUNCTURE: CPT

## 2023-08-12 PROCEDURE — 75894 X-RAYS TRANSCATH THERAPY: CPT

## 2023-08-12 PROCEDURE — 85027 COMPLETE CBC AUTOMATED: CPT

## 2023-08-12 PROCEDURE — 76942 ECHO GUIDE FOR BIOPSY: CPT

## 2023-08-12 PROCEDURE — 37243 VASC EMBOLIZE/OCCLUDE ORGAN: CPT

## 2023-08-12 PROCEDURE — 80053 COMPREHEN METABOLIC PANEL: CPT

## 2023-08-12 PROCEDURE — C9399: CPT

## 2023-08-12 RX ORDER — ALPRAZOLAM 0.25 MG
0.25 TABLET ORAL DAILY
Refills: 0 | Status: DISCONTINUED | OUTPATIENT
Start: 2023-08-12 | End: 2023-08-12

## 2023-08-12 RX ADMIN — LACTULOSE 20 GRAM(S): 10 SOLUTION ORAL at 05:03

## 2023-08-12 RX ADMIN — PANTOPRAZOLE SODIUM 40 MILLIGRAM(S): 20 TABLET, DELAYED RELEASE ORAL at 05:03

## 2023-08-12 RX ADMIN — SERTRALINE 100 MILLIGRAM(S): 25 TABLET, FILM COATED ORAL at 09:13

## 2023-08-12 RX ADMIN — Medication 1 TABLET(S): at 09:13

## 2023-08-12 RX ADMIN — URSODIOL 500 MILLIGRAM(S): 250 TABLET, FILM COATED ORAL at 09:13

## 2023-08-12 RX ADMIN — INSULIN GLARGINE 10 UNIT(S): 100 INJECTION, SOLUTION SUBCUTANEOUS at 00:21

## 2023-08-12 RX ADMIN — Medication 0.25 MILLIGRAM(S): at 01:13

## 2023-08-12 RX ADMIN — Medication 40 MILLIGRAM(S): at 05:03

## 2023-08-12 RX ADMIN — CARVEDILOL PHOSPHATE 3.12 MILLIGRAM(S): 80 CAPSULE, EXTENDED RELEASE ORAL at 05:02

## 2023-08-12 NOTE — DISCHARGE NOTE NURSING/CASE MANAGEMENT/SOCIAL WORK - PATIENT PORTAL LINK FT
You can access the FollowMyHealth Patient Portal offered by [facility] by registering at the following website: [email or web address]. By joining [facility]’s FollowMyHealth portal, you will also be able to view your health information using other applications (apps) compatible with our system.

## 2023-08-12 NOTE — DISCHARGE NOTE NURSING/CASE MANAGEMENT/SOCIAL WORK - NSDCPEFALRISK_GEN_ALL_CORE
For information on Fall & Injury Prevention, visit: [email or web address] OR  [email or web address] OR  https://www.cdc.gov/steadi/patient.html

## 2023-08-12 NOTE — DISCHARGE NOTE PROVIDER - CARE PROVIDER_API CALL
Oanh Martinez  Transplant Hepatology  [address], [address]  [place] [address]  Phone: [phone]  Fax: [phone]  Established Patient  Follow Up Time:

## 2023-08-12 NOTE — DISCHARGE NOTE PROVIDER - REASON FOR ADMISSION
Groin bleeding following intervention via right femoral veing. Groin bleeding following intervention via right femoral vein.

## 2023-08-12 NOTE — DISCHARGE NOTE PROVIDER - NSDCQMAMI_CARD_ALL_CORE
Rea Platt No

## 2023-08-12 NOTE — DISCHARGE NOTE PROVIDER - NSDCFUSCHEDAPPT_GEN_ALL_CORE_FT
Oanh Martinez  [facility]  GASTRO [address]  Scheduled Appointment: 08/14/2023    Meenakshi Cope  [facility]  OBGYNGEN [address] [facility]  Scheduled Appointment: 08/22/2023    [facility]  INFDISEASE [address]  Scheduled Appointment: 09/06/2023    [facility]  CARDIOLOGY [address]  Scheduled Appointment: 09/21/2023    Blossom Russell  [facility]  FAMILYMED [address]  Scheduled Appointment: 09/24/2023

## 2023-08-12 NOTE — DISCHARGE NOTE PROVIDER - HOSPITAL COURSE
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 almost 6 hours after the procedure, she got up to use the commode and started bleeding from the incision site. IR recommended to admit patient for observation overnight. Patient has no chest pain, shortness of breath, palpitation, dizziness, abdominal pain, change in bowel/urinary habit, fever, chills, nausea, vomiting, HA    1. Right groin puncture site bleeding. patient was observed in the hospital overnight and did not have any more bleeding. she was able to ambulate and had not hematoma or bruising at puncture site. her Hb was stable. she is being discharged home today.

## 2023-08-12 NOTE — DISCHARGE NOTE PROVIDER - NSDCMRMEDTOKEN_GEN_ALL_CORE_FT
alendronate weekly: 70 milligram(s) orally once a week  carvedilol 3.125 mg oral tablet: 1 tablet orally 2 times a day (before meals)  HumaLOG 100 units/mL injectable solution: 26 unit(s) injectable once a day with dinner  HumaLOG 100 units/mL injectable solution: 22 unit(s) injectable once a day after dinner  HumaLOG 100 units/mL subcutaneous solution: 24 unit(s) subcutaneous once a day after breakfast  lactulose 10 g/15 mL oral solution: 20 milligram(s) orally 3 times a day  Lantus 100 units/mL subcutaneous solution: subcutaneous once a day (at bedtime) 25 units  Lasix 40 mg oral tablet: 1 orally once a day  Multiple Vitamins oral tablet: 1 tab(s) orally once a day  pantoprazole 40 mg oral delayed release tablet: 1 tab(s) orally once a day (before a meal)  rifAXIMin 550 mg oral tablet: 1 tab(s) orally 2 times a day  sertraline 100 mg oral tablet: 1 orally once a day  spironolactone 50 mg oral tablet: 1 tab(s) orally once a day  ursodiol 500 mg oral tablet: 1 tab(s) orally once a day  Xanax 0.25 mg oral tablet: 1 orally once a day

## 2023-08-12 NOTE — DISCHARGE NOTE PROVIDER - ATTENDING DISCHARGE PHYSICAL EXAMINATION:
General: no acute distress  Lungs: clear to auscultation bilaterally  CVS: RRR, S1, S2. no added sounds  Extremities: no edema  CNS: AAOx3. no focal deficit. General: no acute distress  Lungs: clear to auscultation bilaterally  CVS: RRR, S1, S2. no added sounds  Extremities: no edema, no hematoma at right groin site.  CNS: AAOx3. no focal deficit.

## 2023-08-14 ENCOUNTER — APPOINTMENT (OUTPATIENT)
Dept: GASTROENTEROLOGY | Facility: CLINIC | Age: 68
End: 2023-08-14

## 2023-08-16 LAB — GLUCOSE BLDC GLUCOMTR-MCNC: 162 MG/DL — HIGH (ref 70–99)

## 2023-08-18 ENCOUNTER — RX RENEWAL (OUTPATIENT)
Age: 68
End: 2023-08-18

## 2023-08-22 ENCOUNTER — APPOINTMENT (OUTPATIENT)
Dept: OBGYN | Facility: CLINIC | Age: 68
End: 2023-08-22
Payer: MEDICARE

## 2023-08-22 ENCOUNTER — NON-APPOINTMENT (OUTPATIENT)
Age: 68
End: 2023-08-22

## 2023-08-22 VITALS
BODY MASS INDEX: 28.32 KG/M2 | DIASTOLIC BLOOD PRESSURE: 60 MMHG | HEIGHT: 61 IN | WEIGHT: 150 LBS | SYSTOLIC BLOOD PRESSURE: 121 MMHG

## 2023-08-22 PROCEDURE — 99213 OFFICE O/P EST LOW 20 MIN: CPT

## 2023-08-22 NOTE — PHYSICAL EXAM
[Chaperone Declined] : Patient declined chaperone [Appropriately responsive] : appropriately responsive [Alert] : alert [No Acute Distress] : no acute distress [No Lymphadenopathy] : no lymphadenopathy [Soft] : soft [Non-tender] : non-tender [Non-distended] : non-distended [No HSM] : No HSM [No Lesions] : no lesions [No Mass] : no mass [Oriented x3] : oriented x3 [FreeTextEntry2] : older than years  [Examination Of The Breasts] : a normal appearance [No Masses] : no breast masses were palpable [Labia Majora] : normal [Labia Minora] : normal [Atrophy] : atrophy [Normal] : normal [Uterine Adnexae] : normal

## 2023-08-22 NOTE — HISTORY OF PRESENT ILLNESS
[FreeTextEntry1] : Patient on liver transplant list Here for GYN clearance  2 pregnancies  C/S  [TextBox_19] : many years ago [TextBox_31] : many years ago

## 2023-08-24 ENCOUNTER — RX RENEWAL (OUTPATIENT)
Age: 68
End: 2023-08-24

## 2023-08-25 LAB — CYTOLOGY CVX/VAG DOC THIN PREP: NORMAL

## 2023-08-30 ENCOUNTER — NON-APPOINTMENT (OUTPATIENT)
Age: 68
End: 2023-08-30

## 2023-09-06 ENCOUNTER — APPOINTMENT (OUTPATIENT)
Dept: INFECTIOUS DISEASE | Facility: CLINIC | Age: 68
End: 2023-09-06
Payer: MEDICARE

## 2023-09-06 PROCEDURE — 90677 PCV20 VACCINE IM: CPT

## 2023-09-06 PROCEDURE — 90739 HEPB VACC 2/4 DOSE ADULT IM: CPT

## 2023-09-06 PROCEDURE — G0010: CPT

## 2023-09-06 PROCEDURE — G0009: CPT

## 2023-09-14 ENCOUNTER — APPOINTMENT (OUTPATIENT)
Dept: CV DIAGNOSITCS | Facility: HOSPITAL | Age: 68
End: 2023-09-14

## 2023-09-14 ENCOUNTER — OUTPATIENT (OUTPATIENT)
Dept: OUTPATIENT SERVICES | Facility: HOSPITAL | Age: 68
LOS: 1 days | End: 2023-09-14
Payer: MEDICARE

## 2023-09-14 ENCOUNTER — APPOINTMENT (OUTPATIENT)
Dept: GASTROENTEROLOGY | Facility: CLINIC | Age: 68
End: 2023-09-14
Payer: MEDICARE

## 2023-09-14 VITALS
DIASTOLIC BLOOD PRESSURE: 70 MMHG | RESPIRATION RATE: 16 BRPM | SYSTOLIC BLOOD PRESSURE: 120 MMHG | OXYGEN SATURATION: 98 % | HEART RATE: 65 BPM | WEIGHT: 150 LBS | HEIGHT: 61 IN | BODY MASS INDEX: 28.32 KG/M2

## 2023-09-14 DIAGNOSIS — Z98.890 OTHER SPECIFIED POSTPROCEDURAL STATES: Chronic | ICD-10-CM

## 2023-09-14 DIAGNOSIS — Z90.49 ACQUIRED ABSENCE OF OTHER SPECIFIED PARTS OF DIGESTIVE TRACT: Chronic | ICD-10-CM

## 2023-09-14 DIAGNOSIS — Z98.891 HISTORY OF UTERINE SCAR FROM PREVIOUS SURGERY: Chronic | ICD-10-CM

## 2023-09-14 DIAGNOSIS — Z01.818 ENCOUNTER FOR OTHER PREPROCEDURAL EXAMINATION: ICD-10-CM

## 2023-09-14 DIAGNOSIS — Z98.49 CATARACT EXTRACTION STATUS, UNSPECIFIED EYE: Chronic | ICD-10-CM

## 2023-09-14 PROCEDURE — 76376 3D RENDER W/INTRP POSTPROCES: CPT | Mod: 26

## 2023-09-14 PROCEDURE — 93351 STRESS TTE COMPLETE: CPT | Mod: 26,52

## 2023-09-14 PROCEDURE — 99214 OFFICE O/P EST MOD 30 MIN: CPT

## 2023-09-14 PROCEDURE — 93017 CV STRESS TEST TRACING ONLY: CPT

## 2023-09-14 PROCEDURE — C8930: CPT

## 2023-09-14 PROCEDURE — 76376 3D RENDER W/INTRP POSTPROCES: CPT

## 2023-09-18 ENCOUNTER — NON-APPOINTMENT (OUTPATIENT)
Age: 68
End: 2023-09-18

## 2023-09-19 ENCOUNTER — RX RENEWAL (OUTPATIENT)
Age: 68
End: 2023-09-19

## 2023-09-21 ENCOUNTER — APPOINTMENT (OUTPATIENT)
Dept: CARDIOLOGY | Facility: CLINIC | Age: 68
End: 2023-09-21
Payer: MEDICARE

## 2023-09-21 PROCEDURE — 93306 TTE W/DOPPLER COMPLETE: CPT

## 2023-09-22 RX ORDER — BLOOD-GLUCOSE SENSOR
EACH MISCELLANEOUS
Qty: 3 | Refills: 1 | Status: ACTIVE | COMMUNITY
Start: 2023-09-22 | End: 1900-01-01

## 2023-09-24 ENCOUNTER — APPOINTMENT (OUTPATIENT)
Dept: FAMILY MEDICINE | Facility: CLINIC | Age: 68
End: 2023-09-24
Payer: MEDICARE

## 2023-09-24 VITALS
WEIGHT: 151 LBS | DIASTOLIC BLOOD PRESSURE: 60 MMHG | OXYGEN SATURATION: 97 % | HEART RATE: 62 BPM | TEMPERATURE: 98 F | SYSTOLIC BLOOD PRESSURE: 118 MMHG | HEIGHT: 61 IN | RESPIRATION RATE: 16 BRPM | BODY MASS INDEX: 28.51 KG/M2

## 2023-09-24 PROCEDURE — 90746 HEPB VACCINE 3 DOSE ADULT IM: CPT

## 2023-09-24 PROCEDURE — 90662 IIV NO PRSV INCREASED AG IM: CPT

## 2023-09-24 PROCEDURE — G0010: CPT

## 2023-09-24 PROCEDURE — G0008: CPT

## 2023-09-24 PROCEDURE — 99214 OFFICE O/P EST MOD 30 MIN: CPT | Mod: 25

## 2023-09-24 RX ORDER — LORAZEPAM 0.5 MG/1
0.5 TABLET ORAL EVERY 8 HOURS
Qty: 90 | Refills: 0 | Status: DISCONTINUED | COMMUNITY
Start: 2023-09-20 | End: 2023-09-24

## 2023-09-24 RX ORDER — CLONAZEPAM 0.5 MG/1
0.5 TABLET ORAL
Qty: 60 | Refills: 0 | Status: DISCONTINUED | COMMUNITY
Start: 2023-09-21 | End: 2023-09-24

## 2023-09-28 RX ORDER — BLOOD-GLUCOSE TRANSMITTER
EACH MISCELLANEOUS
Qty: 1 | Refills: 1 | Status: ACTIVE | COMMUNITY
Start: 2023-09-28 | End: 1900-01-01

## 2023-09-28 RX ORDER — BLOOD-GLUCOSE SENSOR
EACH MISCELLANEOUS
Qty: 9 | Refills: 1 | Status: ACTIVE | COMMUNITY
Start: 2023-09-28 | End: 1900-01-01

## 2023-09-29 ENCOUNTER — RESULT REVIEW (OUTPATIENT)
Age: 68
End: 2023-09-29

## 2023-09-29 ENCOUNTER — APPOINTMENT (OUTPATIENT)
Dept: MAMMOGRAPHY | Facility: CLINIC | Age: 68
End: 2023-09-29
Payer: MEDICARE

## 2023-09-29 ENCOUNTER — OUTPATIENT (OUTPATIENT)
Dept: OUTPATIENT SERVICES | Facility: HOSPITAL | Age: 68
LOS: 1 days | End: 2023-09-29
Payer: MEDICARE

## 2023-09-29 ENCOUNTER — APPOINTMENT (OUTPATIENT)
Dept: CT IMAGING | Facility: CLINIC | Age: 68
End: 2023-09-29
Payer: MEDICARE

## 2023-09-29 DIAGNOSIS — Z98.890 OTHER SPECIFIED POSTPROCEDURAL STATES: Chronic | ICD-10-CM

## 2023-09-29 DIAGNOSIS — Z98.49 CATARACT EXTRACTION STATUS, UNSPECIFIED EYE: Chronic | ICD-10-CM

## 2023-09-29 DIAGNOSIS — Z01.818 ENCOUNTER FOR OTHER PREPROCEDURAL EXAMINATION: ICD-10-CM

## 2023-09-29 DIAGNOSIS — Z90.49 ACQUIRED ABSENCE OF OTHER SPECIFIED PARTS OF DIGESTIVE TRACT: Chronic | ICD-10-CM

## 2023-09-29 DIAGNOSIS — Z98.891 HISTORY OF UTERINE SCAR FROM PREVIOUS SURGERY: Chronic | ICD-10-CM

## 2023-09-29 PROCEDURE — 77063 BREAST TOMOSYNTHESIS BI: CPT

## 2023-09-29 PROCEDURE — 77067 SCR MAMMO BI INCL CAD: CPT | Mod: 26

## 2023-09-29 PROCEDURE — 75574 CT ANGIO HRT W/3D IMAGE: CPT | Mod: 26,MH

## 2023-09-29 PROCEDURE — 77063 BREAST TOMOSYNTHESIS BI: CPT | Mod: 26

## 2023-09-29 PROCEDURE — 75574 CT ANGIO HRT W/3D IMAGE: CPT

## 2023-09-29 PROCEDURE — 77067 SCR MAMMO BI INCL CAD: CPT

## 2023-09-30 ENCOUNTER — NON-APPOINTMENT (OUTPATIENT)
Age: 68
End: 2023-09-30

## 2023-10-14 ENCOUNTER — NON-APPOINTMENT (OUTPATIENT)
Age: 68
End: 2023-10-14

## 2023-10-15 ENCOUNTER — TRANSCRIPTION ENCOUNTER (OUTPATIENT)
Age: 68
End: 2023-10-15

## 2023-10-17 ENCOUNTER — APPOINTMENT (OUTPATIENT)
Dept: FAMILY MEDICINE | Facility: CLINIC | Age: 68
End: 2023-10-17
Payer: MEDICARE

## 2023-10-17 VITALS
OXYGEN SATURATION: 95 % | WEIGHT: 157.06 LBS | DIASTOLIC BLOOD PRESSURE: 70 MMHG | BODY MASS INDEX: 29.65 KG/M2 | HEART RATE: 82 BPM | SYSTOLIC BLOOD PRESSURE: 160 MMHG | TEMPERATURE: 98.7 F | RESPIRATION RATE: 16 BRPM | HEIGHT: 61 IN

## 2023-10-17 DIAGNOSIS — Z87.09 PERSONAL HISTORY OF OTHER DISEASES OF THE RESPIRATORY SYSTEM: ICD-10-CM

## 2023-10-17 DIAGNOSIS — J06.9 ACUTE UPPER RESPIRATORY INFECTION, UNSPECIFIED: ICD-10-CM

## 2023-10-17 PROCEDURE — 99214 OFFICE O/P EST MOD 30 MIN: CPT

## 2023-10-17 RX ORDER — OMEPRAZOLE 40 MG/1
40 CAPSULE, DELAYED RELEASE ORAL
Qty: 30 | Refills: 11 | Status: DISCONTINUED | COMMUNITY
Start: 2023-05-01 | End: 2023-10-17

## 2023-10-28 PROBLEM — Z87.09 HISTORY OF ACUTE SINUSITIS: Status: RESOLVED | Noted: 2021-01-26 | Resolved: 2023-10-28

## 2023-10-28 PROBLEM — J06.9 UPPER RESPIRATORY INFECTION: Status: ACTIVE | Noted: 2023-10-28 | Resolved: 2023-11-27

## 2023-11-08 ENCOUNTER — RX RENEWAL (OUTPATIENT)
Age: 68
End: 2023-11-08

## 2023-11-08 RX ORDER — BLOOD SUGAR DIAGNOSTIC
STRIP MISCELLANEOUS 4 TIMES DAILY
Qty: 300 | Refills: 1 | Status: ACTIVE | COMMUNITY
Start: 2023-03-30 | End: 1900-01-01

## 2023-11-13 ENCOUNTER — LABORATORY RESULT (OUTPATIENT)
Age: 68
End: 2023-11-13

## 2023-11-20 ENCOUNTER — APPOINTMENT (OUTPATIENT)
Dept: GASTROENTEROLOGY | Facility: CLINIC | Age: 68
End: 2023-11-20
Payer: MEDICARE

## 2023-11-20 VITALS
OXYGEN SATURATION: 94 % | WEIGHT: 150 LBS | HEIGHT: 61 IN | HEART RATE: 54 BPM | DIASTOLIC BLOOD PRESSURE: 53 MMHG | SYSTOLIC BLOOD PRESSURE: 140 MMHG | BODY MASS INDEX: 28.32 KG/M2 | TEMPERATURE: 97.3 F

## 2023-11-20 PROCEDURE — 99215 OFFICE O/P EST HI 40 MIN: CPT

## 2023-11-27 ENCOUNTER — TRANSCRIPTION ENCOUNTER (OUTPATIENT)
Age: 68
End: 2023-11-27

## 2023-11-27 ENCOUNTER — OUTPATIENT (OUTPATIENT)
Dept: OUTPATIENT SERVICES | Facility: HOSPITAL | Age: 68
LOS: 1 days | End: 2023-11-27
Payer: MEDICARE

## 2023-11-27 VITALS
DIASTOLIC BLOOD PRESSURE: 65 MMHG | HEART RATE: 66 BPM | SYSTOLIC BLOOD PRESSURE: 145 MMHG | RESPIRATION RATE: 16 BRPM | OXYGEN SATURATION: 99 %

## 2023-11-27 VITALS — WEIGHT: 147.93 LBS | HEIGHT: 61 IN

## 2023-11-27 DIAGNOSIS — R93.1 ABNORMAL FINDINGS ON DIAGNOSTIC IMAGING OF HEART AND CORONARY CIRCULATION: ICD-10-CM

## 2023-11-27 DIAGNOSIS — Z98.891 HISTORY OF UTERINE SCAR FROM PREVIOUS SURGERY: Chronic | ICD-10-CM

## 2023-11-27 DIAGNOSIS — Z90.49 ACQUIRED ABSENCE OF OTHER SPECIFIED PARTS OF DIGESTIVE TRACT: Chronic | ICD-10-CM

## 2023-11-27 DIAGNOSIS — Z98.890 OTHER SPECIFIED POSTPROCEDURAL STATES: Chronic | ICD-10-CM

## 2023-11-27 DIAGNOSIS — Z98.49 CATARACT EXTRACTION STATUS, UNSPECIFIED EYE: Chronic | ICD-10-CM

## 2023-11-27 LAB
ANION GAP SERPL CALC-SCNC: 11 MMOL/L — SIGNIFICANT CHANGE UP (ref 5–17)
ANION GAP SERPL CALC-SCNC: 11 MMOL/L — SIGNIFICANT CHANGE UP (ref 5–17)
BUN SERPL-MCNC: 22 MG/DL — SIGNIFICANT CHANGE UP (ref 7–23)
BUN SERPL-MCNC: 22 MG/DL — SIGNIFICANT CHANGE UP (ref 7–23)
CALCIUM SERPL-MCNC: 8.4 MG/DL — SIGNIFICANT CHANGE UP (ref 8.4–10.5)
CALCIUM SERPL-MCNC: 8.4 MG/DL — SIGNIFICANT CHANGE UP (ref 8.4–10.5)
CHLORIDE SERPL-SCNC: 106 MMOL/L — SIGNIFICANT CHANGE UP (ref 96–108)
CHLORIDE SERPL-SCNC: 106 MMOL/L — SIGNIFICANT CHANGE UP (ref 96–108)
CO2 SERPL-SCNC: 23 MMOL/L — SIGNIFICANT CHANGE UP (ref 22–31)
CO2 SERPL-SCNC: 23 MMOL/L — SIGNIFICANT CHANGE UP (ref 22–31)
CREAT SERPL-MCNC: 0.94 MG/DL — SIGNIFICANT CHANGE UP (ref 0.5–1.3)
CREAT SERPL-MCNC: 0.94 MG/DL — SIGNIFICANT CHANGE UP (ref 0.5–1.3)
EGFR: 66 ML/MIN/1.73M2 — SIGNIFICANT CHANGE UP
EGFR: 66 ML/MIN/1.73M2 — SIGNIFICANT CHANGE UP
GLUCOSE SERPL-MCNC: 156 MG/DL — HIGH (ref 70–99)
GLUCOSE SERPL-MCNC: 156 MG/DL — HIGH (ref 70–99)
HCT VFR BLD CALC: 30.4 % — LOW (ref 34.5–45)
HCT VFR BLD CALC: 30.4 % — LOW (ref 34.5–45)
HGB BLD-MCNC: 9.2 G/DL — LOW (ref 11.5–15.5)
HGB BLD-MCNC: 9.2 G/DL — LOW (ref 11.5–15.5)
MCHC RBC-ENTMCNC: 24.9 PG — LOW (ref 27–34)
MCHC RBC-ENTMCNC: 24.9 PG — LOW (ref 27–34)
MCHC RBC-ENTMCNC: 30.3 GM/DL — LOW (ref 32–36)
MCHC RBC-ENTMCNC: 30.3 GM/DL — LOW (ref 32–36)
MCV RBC AUTO: 82.4 FL — SIGNIFICANT CHANGE UP (ref 80–100)
MCV RBC AUTO: 82.4 FL — SIGNIFICANT CHANGE UP (ref 80–100)
NRBC # BLD: 0 /100 WBCS — SIGNIFICANT CHANGE UP (ref 0–0)
NRBC # BLD: 0 /100 WBCS — SIGNIFICANT CHANGE UP (ref 0–0)
PLATELET # BLD AUTO: 72 K/UL — LOW (ref 150–400)
PLATELET # BLD AUTO: 72 K/UL — LOW (ref 150–400)
POTASSIUM SERPL-MCNC: 4.3 MMOL/L — SIGNIFICANT CHANGE UP (ref 3.5–5.3)
POTASSIUM SERPL-MCNC: 4.3 MMOL/L — SIGNIFICANT CHANGE UP (ref 3.5–5.3)
POTASSIUM SERPL-SCNC: 4.3 MMOL/L — SIGNIFICANT CHANGE UP (ref 3.5–5.3)
POTASSIUM SERPL-SCNC: 4.3 MMOL/L — SIGNIFICANT CHANGE UP (ref 3.5–5.3)
RBC # BLD: 3.69 M/UL — LOW (ref 3.8–5.2)
RBC # BLD: 3.69 M/UL — LOW (ref 3.8–5.2)
RBC # FLD: 18.4 % — HIGH (ref 10.3–14.5)
RBC # FLD: 18.4 % — HIGH (ref 10.3–14.5)
SODIUM SERPL-SCNC: 140 MMOL/L — SIGNIFICANT CHANGE UP (ref 135–145)
SODIUM SERPL-SCNC: 140 MMOL/L — SIGNIFICANT CHANGE UP (ref 135–145)
WBC # BLD: 3.53 K/UL — LOW (ref 3.8–10.5)
WBC # BLD: 3.53 K/UL — LOW (ref 3.8–10.5)
WBC # FLD AUTO: 3.53 K/UL — LOW (ref 3.8–10.5)
WBC # FLD AUTO: 3.53 K/UL — LOW (ref 3.8–10.5)

## 2023-11-27 PROCEDURE — 99152 MOD SED SAME PHYS/QHP 5/>YRS: CPT

## 2023-11-27 PROCEDURE — C1769: CPT

## 2023-11-27 PROCEDURE — 93005 ELECTROCARDIOGRAM TRACING: CPT

## 2023-11-27 PROCEDURE — 80048 BASIC METABOLIC PNL TOTAL CA: CPT

## 2023-11-27 PROCEDURE — C1894: CPT

## 2023-11-27 PROCEDURE — C1887: CPT

## 2023-11-27 PROCEDURE — 85027 COMPLETE CBC AUTOMATED: CPT

## 2023-11-27 PROCEDURE — 93454 CORONARY ARTERY ANGIO S&I: CPT | Mod: 26

## 2023-11-27 PROCEDURE — 93010 ELECTROCARDIOGRAM REPORT: CPT | Mod: 59

## 2023-11-27 PROCEDURE — 36415 COLL VENOUS BLD VENIPUNCTURE: CPT

## 2023-11-27 PROCEDURE — 93454 CORONARY ARTERY ANGIO S&I: CPT

## 2023-11-27 RX ORDER — INSULIN LISPRO 100/ML
22 VIAL (ML) SUBCUTANEOUS
Qty: 0 | Refills: 0 | DISCHARGE

## 2023-11-27 RX ORDER — SODIUM CHLORIDE 9 MG/ML
150 INJECTION INTRAMUSCULAR; INTRAVENOUS; SUBCUTANEOUS
Refills: 0 | Status: COMPLETED | OUTPATIENT
Start: 2023-11-27 | End: 2023-11-27

## 2023-11-27 RX ORDER — SODIUM CHLORIDE 9 MG/ML
250 INJECTION INTRAMUSCULAR; INTRAVENOUS; SUBCUTANEOUS ONCE
Refills: 0 | Status: COMPLETED | OUTPATIENT
Start: 2023-11-27 | End: 2023-11-27

## 2023-11-27 RX ORDER — INSULIN GLARGINE 100 [IU]/ML
0 INJECTION, SOLUTION SUBCUTANEOUS
Refills: 0 | DISCHARGE

## 2023-11-27 RX ADMIN — SODIUM CHLORIDE 75 MILLILITER(S): 9 INJECTION INTRAMUSCULAR; INTRAVENOUS; SUBCUTANEOUS at 08:07

## 2023-11-27 RX ADMIN — SODIUM CHLORIDE 75 MILLILITER(S): 9 INJECTION INTRAMUSCULAR; INTRAVENOUS; SUBCUTANEOUS at 10:32

## 2023-11-27 RX ADMIN — SODIUM CHLORIDE 500 MILLILITER(S): 9 INJECTION INTRAMUSCULAR; INTRAVENOUS; SUBCUTANEOUS at 07:55

## 2023-11-27 NOTE — H&P CARDIOLOGY - NSICDXPASTMEDICALHX_GEN_ALL_CORE_FT
PAST MEDICAL HISTORY:  Ascites     DM (diabetes mellitus)     Former smoker     HCC (hepatocellular carcinoma)     Hepatic cirrhosis     Hepatic encephalopathy     History of cervical cancer     HLD (hyperlipidemia)     HTN (hypertension)     IVEY (nonalcoholic steatohepatitis)

## 2023-11-27 NOTE — ASU DISCHARGE PLAN (ADULT/PEDIATRIC) - NS MD DC FALL RISK RISK
For information on Fall & Injury Prevention, visit: [email or web address] OR  [email or web address] OR  https://www.cdc.gov/steadi/patient.html

## 2023-11-27 NOTE — ASU DISCHARGE PLAN (ADULT/PEDIATRIC) - CARE PROVIDER_API CALL
Tom Renteria  Cardiovascular Disease  [address] [place] [id number]  Phone: [phone]  Fax: [phone]  Established Patient  Follow Up Time: 1 month

## 2023-11-27 NOTE — ASU DISCHARGE PLAN (ADULT/PEDIATRIC) - ASU DC SPECIAL INSTRUCTIONSFT
Wound Care:   the day AFTER your procedure remove bandage GENTTLY, and clean using  mild soap and gentle warm, water stream, pat dry. leave OPEN to air. YOU MAY SHOWER   DO NOT apply lotions, creams, ointments, powder, parfumes to your incision site  DO NOT SOAK your site for 1 week ( no baths, no pools, no tubs, etc...)  Check  your groin and /or wirst daily.A small amount of bruising, and soarness are normal    ACTIVITY: for 24 hours   - DO NOT DRIVE  - DO NOT make any important decisions or sign legal documents   - DO NOT operate heavy machinaries   - you may resume sexual activity in 48 hours, unless otherwise instructed by your cardiologist     If your procedure was done through the WRIST: for the NEXT 3DAYS:  - avoid pushing, pulling, with that affected wrist   - avoid repeated movement of that hand and wrist ( eg: typing, hammering)  - DO NOT LIFT anything more than 5 lbs     MEDICATION:   take your medications as explained ( see discharge paperwork)     Follow heart healthy diet recommended by your doctor, , if you smoke STOP SMOKING ( may call [phone] for center of tobacco control if you need assistance)     CALL your doctor to make appointment in 2 WEEKS     ***CALL YOUR DOCTOR***  if you experience: fever, chills, body aches, or severe pain, swelling, redness, heat or yellow discharge at incision site  If you experience Bleeding or excruciating pain at the procedural site, sweliing ( golf ball size) at your procedural site  If you experience CHEST PAIN  If you experience extremity numbness, tingling, temperature change ( of your procedural site)   If you are unable to reach your doctor, you may contact:   -Cardiology Office at [facility] at [phone] or   - [facility] [phone]  - [facility] [phone]

## 2023-11-27 NOTE — H&P CARDIOLOGY - HISTORY OF PRESENT ILLNESS
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  Her disease has been complicated by several hospitalizations for decompensation, ascites, variceal hemorrhage and hepatic encephalopathy. She was hospitalized in 2023 at [facility] (transferred from [facility]) due to GI bleed with banding. She did go for transplant consideration in [place] a few year ago, but "nothing happened". Her surgical history includes 2 x  section, cholecystectomy, cataract surgery, also has CTS on both sides. Cervical cancer 41 years ago with surgical excision.  2023  dobutamine stress echocardiogram which showed no echocardiographic evidence of exercise induced ischemia.  2023  transesophageal echocardiogram which showed normal left ventricular systolic function, mild left ventricular hypertrophy, a severely dilated left atrium, fibrocalcific aortic valve sclerosis without stenosis, mild to moderate mitral regurgitation, mild to moderate tricuspid regurgitation and an LVEF of 63%.  2023  CT angiogram of the coronary arteries which showed multifocal extensive mixed plaque throughout the LAD and RCA likely to cause moderate stenosis, however evaluation is limited due to extensive calcification.  She presents today for left heart catheterization prior to any further transplant consideration.     Her father is . He had a CVA and passed in his 70's.  Her mother is . She had valve replacement.  She is . She has 2 children, 1 son is 41 and 1 daughter is 38. She has 5 grandkids.  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  Cardiology: Beny Zepeda DO [phone]  Hepatologist: Dr. Martinez

## 2023-11-27 NOTE — ASU PATIENT PROFILE, ADULT - ABILITY TO HEAR (WITH HEARING AID OR HEARING APPLIANCE IF NORMALLY USED):
hearing aids x2/Mildly to Moderately Impaired: difficulty hearing in some environments or speaker may need to increase volume or speak distinctly

## 2023-12-04 ENCOUNTER — NON-APPOINTMENT (OUTPATIENT)
Age: 68
End: 2023-12-04

## 2023-12-04 LAB
AFP-TM SERPL-MCNC: 2905 NG/ML
ALBUMIN SERPL ELPH-MCNC: 2.6 G/DL
ALP BLD-CCNC: 148 U/L
ALT SERPL-CCNC: 7 U/L
ANION GAP SERPL CALC-SCNC: 9 MMOL/L
AST SERPL-CCNC: 34 U/L
BILIRUB INDIRECT SERPL-MCNC: 0.7 MG/DL
BILIRUB SERPL-MCNC: 1.7 MG/DL
BUN SERPL-MCNC: 14 MG/DL
CALCIUM SERPL-MCNC: 8.6 MG/DL
CHLORIDE SERPL-SCNC: 108 MMOL/L
CO2 SERPL-SCNC: 26 MMOL/L
CREAT SERPL-MCNC: 0.89 MG/DL
EGFR: 71 ML/MIN/1.73M2
GLUCOSE SERPL-MCNC: 97 MG/DL
INR PPP: 1.64 RATIO
POTASSIUM SERPL-SCNC: 4.1 MMOL/L
PROT SERPL-MCNC: 5.3 G/DL
PT BLD: 18.3 SEC
SODIUM SERPL-SCNC: 142 MMOL/L

## 2023-12-06 ENCOUNTER — NON-APPOINTMENT (OUTPATIENT)
Age: 68
End: 2023-12-06

## 2023-12-08 ENCOUNTER — NON-APPOINTMENT (OUTPATIENT)
Age: 68
End: 2023-12-08

## 2023-12-12 PROBLEM — K74.60 UNSPECIFIED CIRRHOSIS OF LIVER: Chronic | Status: ACTIVE | Noted: 2023-11-27

## 2023-12-12 PROBLEM — K75.81 NONALCOHOLIC STEATOHEPATITIS (NASH): Chronic | Status: ACTIVE | Noted: 2023-11-27

## 2023-12-12 PROBLEM — R18.8 OTHER ASCITES: Chronic | Status: ACTIVE | Noted: 2023-11-27

## 2023-12-12 PROBLEM — Z85.41 PERSONAL HISTORY OF MALIGNANT NEOPLASM OF CERVIX UTERI: Chronic | Status: ACTIVE | Noted: 2023-11-27

## 2023-12-12 PROBLEM — K76.82 HEPATIC ENCEPHALOPATHY: Chronic | Status: ACTIVE | Noted: 2023-11-27

## 2023-12-12 PROBLEM — Z87.891 PERSONAL HISTORY OF NICOTINE DEPENDENCE: Chronic | Status: ACTIVE | Noted: 2023-11-27

## 2023-12-16 ENCOUNTER — NON-APPOINTMENT (OUTPATIENT)
Age: 68
End: 2023-12-16

## 2023-12-17 ENCOUNTER — NON-APPOINTMENT (OUTPATIENT)
Age: 68
End: 2023-12-17

## 2023-12-18 ENCOUNTER — APPOINTMENT (OUTPATIENT)
Dept: CT IMAGING | Facility: CLINIC | Age: 68
End: 2023-12-18
Payer: MEDICARE

## 2023-12-18 PROCEDURE — 74160 CT ABDOMEN W/CONTRAST: CPT

## 2023-12-20 ENCOUNTER — APPOINTMENT (OUTPATIENT)
Dept: FAMILY MEDICINE | Facility: CLINIC | Age: 68
End: 2023-12-20
Payer: MEDICARE

## 2023-12-20 VITALS
TEMPERATURE: 97 F | SYSTOLIC BLOOD PRESSURE: 138 MMHG | OXYGEN SATURATION: 100 % | HEART RATE: 50 BPM | WEIGHT: 141 LBS | BODY MASS INDEX: 26.62 KG/M2 | HEIGHT: 61 IN | DIASTOLIC BLOOD PRESSURE: 72 MMHG

## 2023-12-20 DIAGNOSIS — Z09 ENCOUNTER FOR FOLLOW-UP EXAMINATION AFTER COMPLETED TREATMENT FOR CONDITIONS OTHER THAN MALIGNANT NEOPLASM: ICD-10-CM

## 2023-12-20 PROCEDURE — 99214 OFFICE O/P EST MOD 30 MIN: CPT

## 2023-12-20 RX ORDER — AZITHROMYCIN 250 MG/1
250 TABLET, FILM COATED ORAL
Qty: 6 | Refills: 0 | Status: DISCONTINUED | COMMUNITY
Start: 2021-01-26 | End: 2023-12-20

## 2023-12-20 RX ORDER — ALPRAZOLAM 0.25 MG/1
0.25 TABLET ORAL
Qty: 30 | Refills: 0 | Status: DISCONTINUED | COMMUNITY
Start: 2023-08-08 | End: 2023-12-20

## 2023-12-20 RX ORDER — GUAIFENESIN AND CODEINE PHOSPHATE 10; 100 MG/5ML; MG/5ML
100-10 SOLUTION ORAL
Qty: 1 | Refills: 0 | Status: DISCONTINUED | COMMUNITY
Start: 2023-10-17 | End: 2023-12-20

## 2023-12-20 NOTE — HISTORY OF PRESENT ILLNESS
[FreeTextEntry1] : Patient here for follow up. 67 year old female with a PMH significant for decompensated Cirrhosis c/b Ascites, Hepatic Encephalopathy, GI Bleed s/p banding i/23 HTN, DM, HLD, Asthma, and Anemia and HCC s/p LDT Y90 on 8/15 AFP 2905 11/13/23 [facility] [de-identified] : HECTOR states she feels otherwise in good health.

## 2023-12-20 NOTE — PLAN
[FreeTextEntry1] : Reviewed blood work.  Stopped Xanax  Discussed encephalopathy vs benzodiazepine for sleep.  Curtis review recent CT with contrast with Dr. Martinez

## 2023-12-20 NOTE — PHYSICAL EXAM
[No Acute Distress] : no acute distress [Well Nourished] : well nourished [Well Developed] : well developed [Well-Appearing] : well-appearing [Normal Sclera/Conjunctiva] : normal sclera/conjunctiva [PERRL] : pupils equal round and reactive to light [EOMI] : extraocular movements intact [Normal Outer Ear/Nose] : the outer ears and nose were normal in appearance [No JVD] : no jugular venous distention [No Lymphadenopathy] : no lymphadenopathy [Supple] : supple [Thyroid Normal, No Nodules] : the thyroid was normal and there were no nodules present [No Respiratory Distress] : no respiratory distress  [No Accessory Muscle Use] : no accessory muscle use [Clear to Auscultation] : lungs were clear to auscultation bilaterally [Normal Rate] : normal rate  [Regular Rhythm] : with a regular rhythm [Normal S1, S2] : normal S1 and S2 [I] : a grade 1 [Pedal Pulses Present] : the pedal pulses are present [No Edema] : there was no peripheral edema [Soft] : abdomen soft [Non Tender] : non-tender [Distended] : distended [Normal] : normal [Liver Enlarged] : enlarged [___cm] : with a span of [unfilled] cm [Normal Posterior Cervical Nodes] : no posterior cervical lymphadenopathy [Normal Anterior Cervical Nodes] : no anterior cervical lymphadenopathy [No CVA Tenderness] : no CVA  tenderness [No Spinal Tenderness] : no spinal tenderness [No Joint Swelling] : no joint swelling [Grossly Normal Strength/Tone] : grossly normal strength/tone [No Rash] : no rash [Coordination Grossly Intact] : coordination grossly intact [No Focal Deficits] : no focal deficits [Normal Gait] : normal gait [Deep Tendon Reflexes (DTR)] : deep tendon reflexes were 2+ and symmetric [Normal Affect] : the affect was normal [Alert and Oriented x3] : oriented to person, place, and time [Normal Mood] : the mood was normal [Normal Insight/Judgement] : insight and judgment were intact

## 2024-01-03 ENCOUNTER — LABORATORY RESULT (OUTPATIENT)
Age: 69
End: 2024-01-03

## 2024-01-03 ENCOUNTER — APPOINTMENT (OUTPATIENT)
Dept: GASTROENTEROLOGY | Facility: CLINIC | Age: 69
End: 2024-01-03
Payer: MEDICARE

## 2024-01-03 VITALS
BODY MASS INDEX: 27.19 KG/M2 | OXYGEN SATURATION: 100 % | DIASTOLIC BLOOD PRESSURE: 48 MMHG | TEMPERATURE: 97.3 F | RESPIRATION RATE: 14 BRPM | HEART RATE: 62 BPM | SYSTOLIC BLOOD PRESSURE: 128 MMHG | WEIGHT: 144 LBS | HEIGHT: 61 IN

## 2024-01-03 PROCEDURE — 99215 OFFICE O/P EST HI 40 MIN: CPT

## 2024-01-04 ENCOUNTER — NON-APPOINTMENT (OUTPATIENT)
Age: 69
End: 2024-01-04

## 2024-01-04 NOTE — HISTORY OF PRESENT ILLNESS
[FreeTextEntry1] : HECTOR RUBIN is a 68 year old female with a PMH significant for decompensated Cirrhosis c/b Ascites, Hepatic Encephalopathy, GIBleed s/p banding i/23 HTN, DM, HLD, Asthma, and Anemia and HCC s/p LDT Y90 on 8/15  January 3, 2024 Patient presents for follow-up visit.  She is accompanied by her son her  and her 1 daughter joined the conversation via phone. Since the last visit unfortunately she was found to have an AFP of 2905. Imaging done on 12/18/2023 which is around 3 months post her Y90 therapy revealed : "Post radio embolization changes involving segments 2, 3 and 4 without evidence of viable enhancing tumor (LR-TR nonviable). There are however new lesions demonstrating washout without evidence of arterial enhancement " No new complaints. Records reviewed, including notes, labs, imaging, etc. No new liver-related complaints like hematemesis melena jaundice. Xanax was stopped .   noted that her encephalopathy significantly resolved after that. Patient and family understandably upset about the AFP and the new tumor findings.   11/20/23: Pt presents for f/u visit, accompanied by her . labs reviewed w/pt. Labs - tbili 1.7, AST 34, ALT 7, , INR 1.64. AFP 2905. Y90 was done on 8/11/23. No recent imaging. Transplant eval is underway. She is pending cardiac cath next week. She recent broke her wrist and is currently in a cast. No surgical intervention needed.   She is currently taking Lasix 20 mg QD and Spironolactone 25 mg QD. She reports abdominal distension. Endorse a low salt diet. She continues to take Carvedilol 3.125 mg QD. HR today 54. She took meds this AM. She has 2-3 soft bms/day. She is currently on Lactulose and Xifaxan. Denies confusion.   9/14/23: No new complaints since last visit. Transplant evaluation was initiated since last visit. No hospitalizations, hematemesis melena, encephalopathy since last visit. Scheduled for cardiac stress test as a part of pretransplant work-up today 6/13/23 CT Chest noting RUL 2 mm nodule. Negative bone scan  7/14/22: Pt reports that she has known fatty liver for many years and was diagnosed with cirrhosis in the last few years. Previous management included Lasix 40 mg daily and ursodiol 500 mg daily. Pt is unsure why she was prescribed ursodiol. Current daily smoker. Denies alcohol consumption, past or present. Denies risk factors for viral hepatitis. Pt states, and  Kai agrees, that pt has become more confused and foggy in the past year. She also reports bladder and bowel incontinence and unsteady gait. She has a history of falls, most recent being last year in which she fractured her shoulder. Pt feels these symptoms started after the most recent fall.  On chart review: Pt has had elevated LFTs dating as far back as 2016 in the form of alk phos elevation ranging from 120-200 with normal transaminases and bilirubin. In 2020, bilirubin is seen to elevate to 1.7 and fluctuate from 0.8 to 2.0 since then. ALT remained normal. AST mostly in the 30s-40s. Last INR done in 2020 was 1.4. Negative for HBV and HCV in 2020. Plt count ranging from 44-77 since 2017.  Last US abdomen from 2020 revealed lobulated liver with a diffuse heterogeneous/nodular echotexture is compatible with underlying hepatocellular disease/cirrhosis, no focal hepatic abnormality, extatic CBD (11 mm) likely related to prior cholecystectomy/cholecystitis, splenomegaly and mild ascites  Last EGD 9/2021 no varices, portal hypertensive gastropathy  No previous paracentesis  8/11/22: Since last visit Labs done 7/18/22: bilirubin 1.6, AST 38, ALT 9, AlkPhos 146, INR 1.33, AFP 2, ABDIEL + 1:160, ASMA + 1:120, viral hepatitis panel negative, iron studies negative.  US abdomen 7/18/22: cirrhotic liver without focal abnormality, mild abdominal ascites.  Pt reports she has quit smoking. She started Xifaxan 550mg BID which her  states has helped with her confusion. Lasix was decreased to 20 mg daily due to pt complaints of frequent urination. Denies hematemesis.  10/20/22: Pt reports she [place] [facility] [place] increased confusion. She was given a prescription for lactulose and discharged home. Pt is taking lactulose and Xifaxan and reports her confusion has improved. Denies fatigue, malaise, arthralgias, myalgias, recent infection, abdominal pain or distension, jaundice, hematemesis, hematochezia, dark urine, confusion, unintentional weight loss or gain. She is currently taking Spironolactone 50 mg daily. Pt is also taking Ursodiol for pruritus and reports positive relief.  Labs 9/20/22 - bilirubin 1.7, AST 39, ALT 10, , INR 1.33, PLT 41  EGD 10/19/22 - no varices, portal hypertensive gastropathy  1/3/23: She is status post discharge from [facility] where she had been transferred for management of GI bleed from [facility]. S/P banding at [facility] with recs to repeat EGD 2 weeks after DC. Deemed not a TIPS candidate due to h/o Encephalopathy.    4/21/23: Pt presents today for follow up visit, accompanied by her . Pt reports she is feeling well. She continues to take Lactulose and Xifaxan. Denies confusion. She is currently on Furosemide 20 mg daily and Spironolactone 25 mg daily. Denies abdominal distension or LE edema. Pt is currently on Carvedilol 3.125 mg daily. HR in office today is 58. Denies hematemesis or black stools. LFTs from 2/23 - Tbili 1.4, AST 25, ALT 6, . Pt is currently on Ursodiol 500 mg daily.

## 2024-01-04 NOTE — ASSESSMENT
[FreeTextEntry1] : FELICITY RUBIN is a 67 year old female with a PMH significant for decompensated Cirrhosis c/b Ascites, Hepatic Encephalopathy, HTN, DM, HLD, Asthma, and Anemia.  Cirrhosis -Etiology - likely NAFLD coupled w/burnt out AIH given positive ABDIEL and ASMA.  HCC -Diagnosed May 2023. -Status post Y90 August 15, 2023. -Transplant work-up canceled due to high AFP -AFP (11/2023) -2905 -Triphasic CT abdomen shows new lesions. Will discuss at multidisciplinary tumor board for next steps   Ascites -sCrt stable. Continue Furosemide 20 mg and increase Spironolactone to 50 mg daily. Rx sent to pharmacy. -Slightly increased abdominal distention today.  Variceal Screening -EGD 12/2022 - Large junctional varix banded, PHG. Follow up with GI, Dr. Carranza, pending -continue Carvedilol 3.125 mg daily. HR 54 today. Advised pt to monitor HR at home and contact our office for continued HR <60. -if pt experiences hematemesis, advised to go to ER immediately  Encephalopathy -notify provider if new onset confusion -titrate lactulose to 1-2 bms/day and continue Xifaxan 550mg bid   Health Maintenance -Pt is not immune to HBV, vaccine recommended w/PCP. -Can take up to 2G Tylenol per day. NO NSAIDs as these can lead to diuretic resistance and precipitate renal dysfunction in patients with advanced liver disease. -High Protein Low Fat Low Salt (up to 2G Na/day) Diet, no prolonged fasting, Mediterranean Diet info provided -Continue to abstain from alcohol and all illicit drugs -Avoid use of herbal and dietary supplements due to potential hepatotoxicity -Avoid eating any unpasteurized dairy products; avoid eating any raw or undercooked eggs, fish, poultry, or meat; and avoid eating raw/steamed oysters or other shellfish to avoid risk of infection.   Follow up in a month for in person visit Phone visit in 2 weeks to coordinate HCC management

## 2024-01-04 NOTE — PHYSICAL EXAM
[Spider Angioma] : Spider angioma(s) was(were) observed [Splenomegaly] : splenomegaly [Non-Tender] : non-tender [Irregular] : irregular [Asterixis] : Asterixis observed [General Appearance - Alert] : alert [General Appearance - In No Acute Distress] : in no acute distress [Sclera] : the sclera and conjunctiva were normal [Neck Appearance] : the appearance of the neck was normal [Edema] : there was no peripheral edema [Bowel Sounds] : normal bowel sounds [Abdomen Soft] : soft [Abdomen Tenderness] : non-tender [Abdomen Mass (___ Cm)] : no abdominal mass palpated [Involuntary Movements] : no involuntary movements were seen [Motor Tone] : muscle strength and tone were normal [Ataxic, Wide-Based] : wide-based and ataxic [Skin Color & Pigmentation] : normal skin color and pigmentation [Skin Turgor] : normal skin turgor [] : no rash [Oriented To Time, Place, And Person] : oriented to person, place, and time [Impaired Insight] : insight and judgment were intact [Affect] : the affect was normal [Scleral Icterus] : No Scleral Icterus [Hepatojugular Reflux] : patient did not have a sustained hepatojugular reflux [Abdominal  Ascites] : no ascites [Jaundice] : No jaundice [Palmar Erythema] : no Palmar Erythema [Depression] : no depression [Hallucinations] : ~T no ~M hallucinations [Delusions] : no ~T delusions [Suicidal Ideation] : no suicidal ideation [Homicidal Ideation] : no homicidal ideations [Preoccupiation With Violent Thoughts] : no preoccupation with violent thoughts

## 2024-01-10 ENCOUNTER — OUTPATIENT (OUTPATIENT)
Dept: OUTPATIENT SERVICES | Facility: HOSPITAL | Age: 69
LOS: 1 days | End: 2024-01-10
Payer: MEDICARE

## 2024-01-10 DIAGNOSIS — C22.0 LIVER CELL CARCINOMA: ICD-10-CM

## 2024-01-10 DIAGNOSIS — Z98.890 OTHER SPECIFIED POSTPROCEDURAL STATES: Chronic | ICD-10-CM

## 2024-01-10 DIAGNOSIS — Z98.891 HISTORY OF UTERINE SCAR FROM PREVIOUS SURGERY: Chronic | ICD-10-CM

## 2024-01-10 DIAGNOSIS — Z90.49 ACQUIRED ABSENCE OF OTHER SPECIFIED PARTS OF DIGESTIVE TRACT: Chronic | ICD-10-CM

## 2024-01-10 DIAGNOSIS — Z98.49 CATARACT EXTRACTION STATUS, UNSPECIFIED EYE: Chronic | ICD-10-CM

## 2024-01-11 ENCOUNTER — RESULT REVIEW (OUTPATIENT)
Age: 69
End: 2024-01-11

## 2024-01-11 ENCOUNTER — NON-APPOINTMENT (OUTPATIENT)
Age: 69
End: 2024-01-11

## 2024-01-11 PROCEDURE — 99214 OFFICE O/P EST MOD 30 MIN: CPT

## 2024-01-16 ENCOUNTER — APPOINTMENT (OUTPATIENT)
Dept: ENDOCRINOLOGY | Facility: CLINIC | Age: 69
End: 2024-01-16
Payer: MEDICARE

## 2024-01-16 ENCOUNTER — LABORATORY RESULT (OUTPATIENT)
Age: 69
End: 2024-01-16

## 2024-01-16 VITALS
HEART RATE: 66 BPM | OXYGEN SATURATION: 99 % | HEIGHT: 61 IN | DIASTOLIC BLOOD PRESSURE: 64 MMHG | RESPIRATION RATE: 16 BRPM | SYSTOLIC BLOOD PRESSURE: 112 MMHG | BODY MASS INDEX: 26.43 KG/M2 | WEIGHT: 140 LBS

## 2024-01-16 DIAGNOSIS — E78.5 HYPERLIPIDEMIA, UNSPECIFIED: ICD-10-CM

## 2024-01-16 DIAGNOSIS — M81.0 AGE-RELATED OSTEOPOROSIS W/OUT CURRENT PATHOLOGICAL FRACTURE: ICD-10-CM

## 2024-01-16 PROCEDURE — 82962 GLUCOSE BLOOD TEST: CPT

## 2024-01-16 PROCEDURE — 99214 OFFICE O/P EST MOD 30 MIN: CPT

## 2024-01-16 PROCEDURE — 95251 CONT GLUC MNTR ANALYSIS I&R: CPT

## 2024-01-16 PROCEDURE — G2211 COMPLEX E/M VISIT ADD ON: CPT

## 2024-01-16 RX ORDER — LISINOPRIL 20 MG/1
20 TABLET ORAL
Qty: 90 | Refills: 3 | Status: DISCONTINUED | COMMUNITY
Start: 2021-03-04 | End: 2024-01-16

## 2024-01-16 NOTE — ASSESSMENT
[FreeTextEntry1] : 68 year old female with PMH of cirrhosis due to NAFLD, HCC, Type 2 DM, HTN, HLD and osteoporosis here for follow up. Her diabetes control has worsened based on review of CGM.     1. Type 2 DM-   We discussed diet modification and principles of carbohydrate consistent diet.   Advised to increase dinner time Humalog to 22 units to reduce postprandial hyperglycemia.  Continue current dose of Lantus.  Check labs now.     CGM medical necessity statement: Patient tests her blood glucose 4 or more times a day and injects insulin 3 or more times per day. She is seen regularly at this office within 6 month intervals. Patient requires frequent adjustments to her insulin regimen on the basis of CGM results.  2. OP- continue Alendronate. Check DXA now. 3. HLD- avoid statin due to liver disease. Check lipid profile now.      Follow up in 4 months.      Follow up in 4 months.

## 2024-01-16 NOTE — REVIEW OF SYSTEMS
[Recent Weight Loss (___ Lbs)] : recent weight loss: [unfilled] lbs [Nausea] : no nausea [Abdominal Pain] : no abdominal pain

## 2024-01-16 NOTE — ASSESSMENT
[FreeTextEntry1] : 68-year-old female with multiple medical problems and decompensated alcoholic cirrhosis with hepatic encephalopathy and esophageal varices status post liver directed therapy in August 2023 presents with 2 new LIRADS 4 lesions and a third subcentimeter lesion.    -Patient tolerated previous liver directed therapy well without any side effects or complications. Patient doing well currently with good functional status and Child Cosme Score. Patient likely a good candidate for left-sided and right-sided liver directed therapy. Agree with repeat MRI to further evaluate liver.

## 2024-01-16 NOTE — DATA REVIEWED
[FreeTextEntry1] : Pertinent Lab Values: Albumin: 3.0 g/dL TBili: 1.6 mg/dL INR: 1.46 Cr: 0.84 mg/dL Na: 144 mEq/L AFP: 2130 ng/mL  ECOG Score: 1 Child Cosme Score: B8 Cara Grade: Grade 2 MELD Score: 12   Pertinent Outpatient Studies (Biopsies/ Imaging/ Other): Pertinent imaging reviewed. Russell Status: Nontransplant Candidate Currently, Elevated AFP Liver Lesion 1  - LI- RADS Classification: 4  - Location: Segment II  - Size: 1.6 cm  Liver Lesion 2  - LI- RADS Classification: 4  - Location: Segment VIII  - Size: 1.3 cm  Liver Lesion 3  - LI- RADS Classification: 4  - Location: Segment III  - Size: <1 cm

## 2024-01-16 NOTE — PHYSICAL EXAM
[Alert] : alert [No Acute Distress] : no acute distress [Well Nourished] : well nourished [Well Developed] : well developed [No Neck Mass] : no neck mass was observed [No Respiratory Distress] : no respiratory distress [Regular Rhythm] : with a regular rhythm [Restricted in physically strenuous activity but ambulatory and able to carry out work of a light or sedentary nature] : Restricted in physically strenuous activity but ambulatory and able to carry out work of a light or sedentary nature, e.g., light house work, office work

## 2024-01-16 NOTE — HISTORY OF PRESENT ILLNESS
[FreeTextEntry1] : Follow up Type 2 DM, osteoporosis She has a PMH significant for cirrhosis with ascites and encephalopathy (related to fatty liver) History of OP on alendronate for several years.    PMH of HCC and had radioembolization.    Now found to  have new liver lesions and will be getting XRT.    Quality:  Type 2 DM Severity:  moderate Duration of diabetes:  over 20 years Associated Complications/ Symptoms:  no known microvascular complications.   Modifying Factors:  Better with medication  SMBG:   prior to CGM, was testing 4 times a day  Currently using Dexcom CGM:  average BG is 191mg/dl with SD of 53%.  46% of BG are within target range, 2% below range and 22% above range.   Having some hypoglycemia in the morning and before lunch.    Reports worsened control since starting lactulose Current Diabetic Medication Regimen: Lantus 20 units qhs. Humalog 24 units with breakfast, 22 units with lunch and 20 units with dinner.   Admits to some dietary indiscretion.

## 2024-01-16 NOTE — PHYSICAL EXAM
[Healthy Appearance] : healthy appearance [No Acute Distress] : no acute distress [Normal Sclera/Conjunctiva] : normal sclera/conjunctiva [No Neck Mass] : no neck mass was observed [No LAD] : no lymphadenopathy [Supple] : the neck was supple [Thyroid Not Enlarged] : the thyroid was not enlarged [No Thyroid Nodules] : no palpable thyroid nodules [No Respiratory Distress] : no respiratory distress [Clear to Auscultation] : lungs were clear to auscultation bilaterally [Normal S1, S2] : normal S1 and S2 [Normal Rate] : heart rate was normal [Regular Rhythm] : with a regular rhythm [Normal Gait] : normal gait [No Clubbing, Cyanosis] : no clubbing  or cyanosis of the fingernails [Normal Affect] : the affect was normal [Normal Insight/Judgement] : insight and judgment were intact [Normal Mood] : the mood was normal [Acanthosis Nigricans] : no acanthosis nigricans [de-identified] : 2/6 systolic murmur

## 2024-01-18 ENCOUNTER — RX RENEWAL (OUTPATIENT)
Age: 69
End: 2024-01-18

## 2024-01-19 ENCOUNTER — APPOINTMENT (OUTPATIENT)
Dept: MRI IMAGING | Facility: CLINIC | Age: 69
End: 2024-01-19
Payer: MEDICARE

## 2024-01-19 ENCOUNTER — RESULT REVIEW (OUTPATIENT)
Age: 69
End: 2024-01-19

## 2024-01-19 LAB
25(OH)D3 SERPL-MCNC: 37.7 NG/ML
ALBUMIN SERPL ELPH-MCNC: 3.3 G/DL
ALP BLD-CCNC: 234 U/L
ALT SERPL-CCNC: 12 U/L
ANION GAP SERPL CALC-SCNC: 10 MMOL/L
AST SERPL-CCNC: 40 U/L
BASOPHILS # BLD AUTO: 0.05 K/UL
BASOPHILS NFR BLD AUTO: 1 %
BILIRUB SERPL-MCNC: 1.4 MG/DL
BUN SERPL-MCNC: 14 MG/DL
CALCIUM SERPL-MCNC: 8.7 MG/DL
CHLORIDE SERPL-SCNC: 105 MMOL/L
CHOLEST SERPL-MCNC: 136 MG/DL
CO2 SERPL-SCNC: 27 MMOL/L
CREAT SERPL-MCNC: 0.92 MG/DL
CREAT SPEC-SCNC: 22 MG/DL
EGFR: 68 ML/MIN/1.73M2
EOSINOPHIL # BLD AUTO: 0.21 K/UL
EOSINOPHIL NFR BLD AUTO: 4.3 %
ESTIMATED AVERAGE GLUCOSE: 131 MG/DL
GLUCOSE BLDC GLUCOMTR-MCNC: 297
GLUCOSE SERPL-MCNC: 245 MG/DL
HBA1C MFR BLD HPLC: 6.2 %
HCT VFR BLD CALC: 30.7 %
HDLC SERPL-MCNC: 70 MG/DL
HGB BLD-MCNC: 9.6 G/DL
IMM GRANULOCYTES NFR BLD AUTO: 0.2 %
LDLC SERPL CALC-MCNC: 49 MG/DL
LYMPHOCYTES # BLD AUTO: 0.54 K/UL
LYMPHOCYTES NFR BLD AUTO: 11 %
MAN DIFF?: NORMAL
MCHC RBC-ENTMCNC: 26.2 PG
MCHC RBC-ENTMCNC: 31.3 GM/DL
MCV RBC AUTO: 83.7 FL
MICROALBUMIN 24H UR DL<=1MG/L-MCNC: <1.2 MG/DL
MICROALBUMIN/CREAT 24H UR-RTO: NORMAL MG/G
MONOCYTES # BLD AUTO: 0.59 K/UL
MONOCYTES NFR BLD AUTO: 12 %
NEUTROPHILS # BLD AUTO: 3.53 K/UL
NEUTROPHILS NFR BLD AUTO: 71.5 %
NONHDLC SERPL-MCNC: 65 MG/DL
PLATELET # BLD AUTO: 94 K/UL
POTASSIUM SERPL-SCNC: 4.2 MMOL/L
PROT SERPL-MCNC: 6.4 G/DL
RBC # BLD: 3.67 M/UL
RBC # FLD: 18.6 %
SODIUM SERPL-SCNC: 143 MMOL/L
TRIGL SERPL-MCNC: 86 MG/DL
TSH SERPL-ACNC: 6.83 UIU/ML
WBC # FLD AUTO: 4.93 K/UL

## 2024-01-19 PROCEDURE — 74183 MRI ABD W/O CNTR FLWD CNTR: CPT | Mod: MH

## 2024-01-19 PROCEDURE — A9585: CPT | Mod: JW

## 2024-01-22 ENCOUNTER — APPOINTMENT (OUTPATIENT)
Dept: GASTROENTEROLOGY | Facility: CLINIC | Age: 69
End: 2024-01-22
Payer: MEDICARE

## 2024-01-22 PROCEDURE — 99215 OFFICE O/P EST HI 40 MIN: CPT

## 2024-01-25 ENCOUNTER — OUTPATIENT (OUTPATIENT)
Dept: OUTPATIENT SERVICES | Facility: HOSPITAL | Age: 69
LOS: 1 days | End: 2024-01-25
Payer: MEDICARE

## 2024-01-25 VITALS
HEART RATE: 54 BPM | TEMPERATURE: 98 F | RESPIRATION RATE: 16 BRPM | OXYGEN SATURATION: 100 % | DIASTOLIC BLOOD PRESSURE: 60 MMHG | WEIGHT: 143.3 LBS | HEIGHT: 61 IN | SYSTOLIC BLOOD PRESSURE: 120 MMHG

## 2024-01-25 DIAGNOSIS — Z90.49 ACQUIRED ABSENCE OF OTHER SPECIFIED PARTS OF DIGESTIVE TRACT: Chronic | ICD-10-CM

## 2024-01-25 DIAGNOSIS — I10 ESSENTIAL (PRIMARY) HYPERTENSION: ICD-10-CM

## 2024-01-25 DIAGNOSIS — Z01.818 ENCOUNTER FOR OTHER PREPROCEDURAL EXAMINATION: ICD-10-CM

## 2024-01-25 DIAGNOSIS — Z98.891 HISTORY OF UTERINE SCAR FROM PREVIOUS SURGERY: Chronic | ICD-10-CM

## 2024-01-25 DIAGNOSIS — Z98.890 OTHER SPECIFIED POSTPROCEDURAL STATES: Chronic | ICD-10-CM

## 2024-01-25 DIAGNOSIS — Z29.9 ENCOUNTER FOR PROPHYLACTIC MEASURES, UNSPECIFIED: ICD-10-CM

## 2024-01-25 DIAGNOSIS — C22.0 LIVER CELL CARCINOMA: ICD-10-CM

## 2024-01-25 DIAGNOSIS — Z13.89 ENCOUNTER FOR SCREENING FOR OTHER DISORDER: ICD-10-CM

## 2024-01-25 DIAGNOSIS — E11.9 TYPE 2 DIABETES MELLITUS WITHOUT COMPLICATIONS: ICD-10-CM

## 2024-01-25 DIAGNOSIS — Z98.49 CATARACT EXTRACTION STATUS, UNSPECIFIED EYE: Chronic | ICD-10-CM

## 2024-01-25 LAB
A1C WITH ESTIMATED AVERAGE GLUCOSE RESULT: 6.1 % — HIGH (ref 4–5.6)
ALBUMIN SERPL ELPH-MCNC: 2.9 G/DL — LOW (ref 3.3–5.2)
ALP SERPL-CCNC: 165 U/L — HIGH (ref 40–120)
ALT FLD-CCNC: 7 U/L — SIGNIFICANT CHANGE UP
ANION GAP SERPL CALC-SCNC: 9 MMOL/L — SIGNIFICANT CHANGE UP (ref 5–17)
APTT BLD: 41.4 SEC — HIGH (ref 24.5–35.6)
AST SERPL-CCNC: 38 U/L — HIGH
BASOPHILS # BLD AUTO: 0.04 K/UL — SIGNIFICANT CHANGE UP (ref 0–0.2)
BASOPHILS NFR BLD AUTO: 1.1 % — SIGNIFICANT CHANGE UP (ref 0–2)
BILIRUB SERPL-MCNC: 1.6 MG/DL — SIGNIFICANT CHANGE UP (ref 0.4–2)
BLD GP AB SCN SERPL QL: SIGNIFICANT CHANGE UP
BUN SERPL-MCNC: 14.8 MG/DL — SIGNIFICANT CHANGE UP (ref 8–20)
CALCIUM SERPL-MCNC: 8.6 MG/DL — SIGNIFICANT CHANGE UP (ref 8.4–10.5)
CHLORIDE SERPL-SCNC: 107 MMOL/L — SIGNIFICANT CHANGE UP (ref 96–108)
CO2 SERPL-SCNC: 25 MMOL/L — SIGNIFICANT CHANGE UP (ref 22–29)
CREAT SERPL-MCNC: 0.86 MG/DL — SIGNIFICANT CHANGE UP (ref 0.5–1.3)
EGFR: 74 ML/MIN/1.73M2 — SIGNIFICANT CHANGE UP
EOSINOPHIL # BLD AUTO: 0.15 K/UL — SIGNIFICANT CHANGE UP (ref 0–0.5)
EOSINOPHIL NFR BLD AUTO: 4 % — SIGNIFICANT CHANGE UP (ref 0–6)
ESTIMATED AVERAGE GLUCOSE: 128 MG/DL — HIGH (ref 68–114)
GLUCOSE SERPL-MCNC: 165 MG/DL — HIGH (ref 70–99)
HCT VFR BLD CALC: 28.2 % — LOW (ref 34.5–45)
HGB BLD-MCNC: 8.8 G/DL — LOW (ref 11.5–15.5)
IMM GRANULOCYTES NFR BLD AUTO: 0 % — SIGNIFICANT CHANGE UP (ref 0–0.9)
INR BLD: 1.39 RATIO — HIGH (ref 0.85–1.18)
LYMPHOCYTES # BLD AUTO: 0.42 K/UL — LOW (ref 1–3.3)
LYMPHOCYTES # BLD AUTO: 11.3 % — LOW (ref 13–44)
MCHC RBC-ENTMCNC: 25.9 PG — LOW (ref 27–34)
MCHC RBC-ENTMCNC: 31.2 GM/DL — LOW (ref 32–36)
MCV RBC AUTO: 82.9 FL — SIGNIFICANT CHANGE UP (ref 80–100)
MONOCYTES # BLD AUTO: 0.56 K/UL — SIGNIFICANT CHANGE UP (ref 0–0.9)
MONOCYTES NFR BLD AUTO: 15.1 % — HIGH (ref 2–14)
NEUTROPHILS # BLD AUTO: 2.55 K/UL — SIGNIFICANT CHANGE UP (ref 1.8–7.4)
NEUTROPHILS NFR BLD AUTO: 68.5 % — SIGNIFICANT CHANGE UP (ref 43–77)
PLATELET # BLD AUTO: 79 K/UL — LOW (ref 150–400)
POTASSIUM SERPL-MCNC: 4.5 MMOL/L — SIGNIFICANT CHANGE UP (ref 3.5–5.3)
POTASSIUM SERPL-SCNC: 4.5 MMOL/L — SIGNIFICANT CHANGE UP (ref 3.5–5.3)
PROT SERPL-MCNC: 5.7 G/DL — LOW (ref 6.6–8.7)
PROTHROM AB SERPL-ACNC: 15.3 SEC — HIGH (ref 9.5–13)
RBC # BLD: 3.4 M/UL — LOW (ref 3.8–5.2)
RBC # FLD: 17.4 % — HIGH (ref 10.3–14.5)
SODIUM SERPL-SCNC: 141 MMOL/L — SIGNIFICANT CHANGE UP (ref 135–145)
WBC # BLD: 3.72 K/UL — LOW (ref 3.8–10.5)
WBC # FLD AUTO: 3.72 K/UL — LOW (ref 3.8–10.5)

## 2024-01-25 PROCEDURE — 93005 ELECTROCARDIOGRAM TRACING: CPT

## 2024-01-25 PROCEDURE — 93010 ELECTROCARDIOGRAM REPORT: CPT

## 2024-01-25 PROCEDURE — G0463: CPT

## 2024-01-25 RX ORDER — ALPRAZOLAM 0.25 MG
1 TABLET ORAL
Refills: 0 | DISCHARGE

## 2024-01-25 RX ORDER — SODIUM CHLORIDE 9 MG/ML
3 INJECTION INTRAMUSCULAR; INTRAVENOUS; SUBCUTANEOUS ONCE
Refills: 0 | Status: DISCONTINUED | OUTPATIENT
Start: 2024-01-30 | End: 2024-02-13

## 2024-01-25 NOTE — H&P PST ADULT - HISTORY OF PRESENT ILLNESS
68-year-old female with past medical history of hypertension, diabetes, hyperlipidemia, asthma, anemia, as well as decompensated EtOH cirrhosis hepatic encephalopathy presents for repeat liver directed therapy evaluation. Patient has recently had Y90 radioembolization performed in August 2023 of 2 small segment III tumors. Patient was recently hospitalized due to bleeding esophageal varices, encephalopathy and on recent admission, CT was performed which showed good response within the treated tumors however 2 new LI RADS 4 lesions and a possible third subcentimeter lesion were identified within the liver. Patient tolerated previous liver directed therapy. Patient reports encephalopathy to be well controlled other than recent admission. Patient denies alcohol use. ECOG 1. Patient denies ascites. Not currently on HD        68-year-old female with multiple medical problems and decompensated alcoholic cirrhosis with hepatic encephalopathy and esophageal varices status post liver directed therapy in August 2023 presents with 2  new LIRADS 4 lesions and a third subcentimeter lesion.  Oncology History:    Primary Cancer: HCC (HCC)  ??  Current Meds  Alendronate Sodium 70 MG Oral Tablet; TAKE 1 TABLET BY MOUTH WEEKLY  Carvedilol 3.125 MG Oral Tablet; TAKE 1 TABLET BY MOUTH EVERY DAY  Dexcom G6 Sensor; Change sensor every 10 days  Dexcom G6 Sensor; Change sensor every 10 days  Dexcom G6 Transmitter; USE AS DIRECTED  Fluticasone Propionate 50 MCG/ACT Nasal Suspension; SPRAY 1 SPRAY Twice daily  Each nostril  Furosemide 20 MG Oral Tablet; TAKE 1 TABLET BY MOUTH EVERY DAY AS DIRECTED  HumaLOG KwikPen 100 UNIT/ML Subcutaneous Solution Pen-injector; Inject up to 22  units units with meal TID as directed  Multivitamin Adult Oral Tablet; TAKE 1 TABLET DAILY  OneTouch Verio Flex System w/Device Kit; 1 ITEM AS DIRECTED USE AS DIRECTED  OneTouch Verio In Vitro Strip; TEST 3 TIMES A DAY  Pantoprazole Sodium 40 MG Oral Tablet Delayed Release; TAKE 1 TABLET BY MOUTH  DAILY  Sertraline HCl - 50 MG Oral Tablet; TAKE 1 TABLET BY MOUTH EVERY MORNING  Spironolactone 50 MG Oral Tablet; TAKE 1 TABLET BY MOUTH EVERY DAY  Xifaxan 550 MG Oral Tablet; Take 1 tablet twice daily 68-year-old female with past medical history of hypertension, diabetes, hyperlipidemia, asthma, anemia presents to PST today today. Pt reports dx with liver cirrhosis for many years and has been monitored over the years. Pt. was recently told of liver cell carcinoma. Reports that she was coughing up blood, bruises on skin. Completed 90 radioembolization performed in August 2023 of 2 small segment III tumors. CT was performed which showed  2 new LI RADS 4 lesions and a possible third subcentimeter lesion were identified within the liver per Dr. Wheeler. Reports confusion, encephalopathy occasionally last happened 12/2023. Denies any current alcohol use.     , as well as decompensated EtOH cirrhosis hepatic encephalopathy presents for repeat liver directed therapy evaluation. Patient has recently had Y90 radioembolization performed in August 2023 of 2 small segment III tumors. Patient was recently hospitalized due to bleeding esophageal varices, encephalopathy and on recent admission, CT was performed which showed good response within the treated tumors however 2 new LI RADS 4 lesions and a possible third subcentimeter lesion were identified within the liver. Patient tolerated previous liver directed therapy. Patient reports encephalopathy to be well controlled other than recent admission. Patient denies alcohol use. ECOG 1. Patient denies ascites. Not currently on HD        68-year-old female with multiple medical problems and decompensated alcoholic cirrhosis with hepatic encephalopathy and esophageal varices status post liver directed therapy in August 2023 presents with 2  new LIRADS 4 lesions and a third subcentimeter lesion.  Oncology History:    Primary Cancer: HCC (HCC)  ??  Current Meds  Alendronate Sodium 70 MG Oral Tablet; TAKE 1 TABLET BY MOUTH WEEKLY  Carvedilol 3.125 MG Oral Tablet; TAKE 1 TABLET BY MOUTH EVERY DAY  Dexcom G6 Sensor; Change sensor every 10 days  Dexcom G6 Sensor; Change sensor every 10 days  Dexcom G6 Transmitter; USE AS DIRECTED  Fluticasone Propionate 50 MCG/ACT Nasal Suspension; SPRAY 1 SPRAY Twice daily  Each nostril  Furosemide 20 MG Oral Tablet; TAKE 1 TABLET BY MOUTH EVERY DAY AS DIRECTED  HumaLOG KwikPen 100 UNIT/ML Subcutaneous Solution Pen-injector; Inject up to 22  units units with meal TID as directed  Multivitamin Adult Oral Tablet; TAKE 1 TABLET DAILY  OneTouch Verio Flex System w/Device Kit; 1 ITEM AS DIRECTED USE AS DIRECTED  OneTouch Verio In Vitro Strip; TEST 3 TIMES A DAY  Pantoprazole Sodium 40 MG Oral Tablet Delayed Release; TAKE 1 TABLET BY MOUTH  DAILY  Sertraline HCl - 50 MG Oral Tablet; TAKE 1 TABLET BY MOUTH EVERY MORNING  Spironolactone 50 MG Oral Tablet; TAKE 1 TABLET BY MOUTH EVERY DAY  Xifaxan 550 MG Oral Tablet; Take 1 tablet twice daily 68-year-old female with past medical history of hypertension, diabetes, hyperlipidemia, asthma, anemia presents to PST today today. Pt reports dx with liver cirrhosis for many years and has been monitored over the years. Pt. was recently told of liver cell carcinoma. Reports that she was coughing up blood, bruises on skin. 90 radioembolization performed in August 2023 of 2 small segment III tumors per Dr. Wheeler. CT was performed which showed  2 new LI RADS 4 lesions and a possible third subcentimeter lesion were identified within the liver per Dr. Wheeler. Reports confusion, encephalopathy occasionally last happened 12/2023. Denies any current alcohol use. Scheduled for y90 mapping procedure on 1/30/2024.

## 2024-01-25 NOTE — H&P PST ADULT - ASSESSMENT
68-year-old female with past medical history of hypertension, diabetes, hyperlipidemia, asthma, anemia now with liver cell carcinoma scheduled for y90 mapping procedure on 2024.     -Medical evaluation pending  - NPO after midnight the night before surgery except for meds  -Educated on NSAIDS, multivitamins and herbals that increase the risk of bleeding and need to be stopped 5 days before procedure  -Educated on infection prevention  - Insulin per endocrinologist  -Will continue all other medications as prescribed  -Verbalized understanding of all instructions.      OPIOID RISK TOOL    CHRISTA EACH BOX THAT APPLIES AND ADD TOTALS AT THE END    FAMILY HISTORY OF SUBSTANCE ABUSE                 FEMALE         MALE                                                Alcohol                             [  ]1 pt          [  ]3pts                                               Illegal Durgs                     [  ]2 pts        [  ]3pts                                               Rx Drugs                           [  ]4 pts        [  ]4 pts    PERSONAL HISTORY OF SUBSTANCE ABUSE                                                                                          Alcohol                             [  ]3 pts       [  ]3 pts                                               Illegal Drugs                     [  ]4 pts        [  ]4 pts                                               Rx Drugs                           [  ]5 pts        [  ]5 pts    AGE BETWEEN 16-45 YEARS                                      [  ]1 pt         [  ]1 pt    HISTORY OF PREADOLESCENT   SEXUAL ABUSE                                                             [  ]3 pts        [  ]0pts    PSYCHOLOGICAL DISEASE                     ADD, OCD, Bipolar, Schizophrenia        [  ]2 pts         [  ]2 pts                      Depression                                               [  ]1 pt           [  ]1 pt           SCORING TOTAL   (add numbers and type here)              (0)                                     A score of 3 or lower indicated LOW risk for future opioid abuse  A score of 4 to 7 indicated moderate risk for future opioid abuse  A score of 8 or higher indicates a high risk for opioid abuse      CAPRINI SCORE [CLOT]    AGE RELATED RISK FACTORS                                                       MOBILITY RELATED FACTORS  [ ] Age 41-60 years                                            (1 Point)                  [ ] Bed rest                                                        (1 Point)  [x ] Age: 61-74 years                                           (2 Points)                 [ ] Plaster cast                                                   (2 Points)  [ ] Age= 75 years                                              (3 Points)                 [ ] Bed bound for more than 72 hours                 (2 Points)    DISEASE RELATED RISK FACTORS                                               GENDER SPECIFIC FACTORS  [ ] Edema in the lower extremities                       (1 Point)                  [ ] Pregnancy                                                     (1 Point)  [ ] Varicose veins                                               (1 Point)                  [ ] Post-partum < 6 weeks                                   (1 Point)             [x ] BMI > 25 Kg/m2                                            (1 Point)                  [ ] Hormonal therapy  or oral contraception          (1 Point)                 [ ] Sepsis (in the previous month)                        (1 Point)                  [ ] History of pregnancy complications                 (1 point)  [ ] Pneumonia or serious lung disease                                               [ ] Unexplained or recurrent                     (1 Point)           (in the previous month)                               (1 Point)  [ ] Abnormal pulmonary function test                     (1 Point)                 SURGERY RELATED RISK FACTORS  [ ] Acute myocardial infarction                              (1 Point)                 [ ]  Section                                             (1 Point)  [ ] Congestive heart failure (in the previous month)  (1 Point)               [ ] Minor surgery                                                  (1 Point)   [ ] Inflammatory bowel disease                             (1 Point)                 [ ] Arthroscopic surgery                                        (2 Points)  [ ] Central venous access                                      (2 Points)                [ x] General surgery lasting more than 45 minutes   (2 Points)       [ ] Stroke (in the previous month)                          (5 Points)               [ ] Elective arthroplasty                                         (5 Points)                                                                                                                                               HEMATOLOGY RELATED FACTORS                                                 TRAUMA RELATED RISK FACTORS  [ ] Prior episodes of VTE                                     (3 Points)                [ ] Fracture of the hip, pelvis, or leg                       (5 Points)  [ ] Positive family history for VTE                         (3 Points)                 [ ] Acute spinal cord injury (in the previous month)  (5 Points)  [ ] Prothrombin 68136 A                                     (3 Points)                 [ ] Paralysis  (less than 1 month)                             (5 Points)  [ ] Factor V Leiden                                             (3 Points)                  [ ] Multiple Trauma within 1 month                        (5 Points)  [ ] Lupus anticoagulants                                     (3 Points)                                                           [ ] Anticardiolipin antibodies                               (3 Points)                                                       [ ] High homocysteine in the blood                      (3 Points)                                             [ ] Other congenital or acquired thrombophilia      (3 Points)                                                [ ] Heparin induced thrombocytopenia                  (3 Points)                                          Total Score [       5   ]    Caprini Score 0 - 2:  Low Risk, No VTE Prophylaxis required for most patients, encourage ambulation  Caprini Score 3 - 6:  At Risk, pharmacologic VTE prophylaxis is indicated for most patients (in the absence of a contraindication)  Caprini Score Greater than or = 7:  High Risk, pharmacologic VTE prophylaxis is indicated for most patients (in the absence of a contraindication)

## 2024-01-25 NOTE — H&P PST ADULT - PROBLEM SELECTOR PLAN 1
68-year-old female with past medical history of hypertension, diabetes, hyperlipidemia, asthma, anemia now with liver cell carcinoma scheduled for y90 mapping procedure on 1/30/2024.     -Medical evaluation pending  - NPO after midnight the night before surgery except for meds  -Educated on NSAIDS, multivitamins and herbals that increase the risk of bleeding and need to be stopped 5 days before procedure  -Educated on infection prevention  - Insulin per endocrinologist  -Will continue all other medications as prescribed  -Verbalized understanding of all instructions.

## 2024-01-26 LAB
AFP-TM SERPL-MCNC: 2130 NG/ML
ALBUMIN SERPL ELPH-MCNC: 3 G/DL
ALP BLD-CCNC: 200 U/L
ALT SERPL-CCNC: 10 U/L
ANION GAP SERPL CALC-SCNC: 12 MMOL/L
AST SERPL-CCNC: 35 U/L
BASOPHILS # BLD AUTO: 0.08 K/UL
BASOPHILS NFR BLD AUTO: 1.8 %
BILIRUB INDIRECT SERPL-MCNC: 0.7 MG/DL
BILIRUB SERPL-MCNC: 1.6 MG/DL
BUN SERPL-MCNC: 14 MG/DL
CALCIUM SERPL-MCNC: 8.5 MG/DL
CHLORIDE SERPL-SCNC: 109 MMOL/L
CO2 SERPL-SCNC: 23 MMOL/L
CREAT SERPL-MCNC: 0.84 MG/DL
EGFR: 76 ML/MIN/1.73M2
EOSINOPHIL # BLD AUTO: 0.13 K/UL
EOSINOPHIL NFR BLD AUTO: 2.8 %
GLUCOSE SERPL-MCNC: 227 MG/DL
HCT VFR BLD CALC: 31.6 %
HGB BLD-MCNC: 9.2 G/DL
INR PPP: 1.46 RATIO
LYMPHOCYTES # BLD AUTO: 0.22 K/UL
LYMPHOCYTES NFR BLD AUTO: 4.7 %
MAN DIFF?: NORMAL
MCHC RBC-ENTMCNC: 25 PG
MCHC RBC-ENTMCNC: 29.1 GM/DL
MCV RBC AUTO: 85.9 FL
MONOCYTES # BLD AUTO: 0.22 K/UL
MONOCYTES NFR BLD AUTO: 4.7 %
NEUTROPHILS # BLD AUTO: 3.9 K/UL
NEUTROPHILS NFR BLD AUTO: 84.1 %
PLATELET # BLD AUTO: 93 K/UL
POTASSIUM SERPL-SCNC: 4.4 MMOL/L
PROT SERPL-MCNC: 6.1 G/DL
PT BLD: 16.5 SEC
RBC # BLD: 3.68 M/UL
RBC # FLD: 19 %
SODIUM SERPL-SCNC: 144 MMOL/L
WBC # FLD AUTO: 4.64 K/UL

## 2024-01-30 ENCOUNTER — OUTPATIENT (OUTPATIENT)
Dept: OUTPATIENT SERVICES | Facility: HOSPITAL | Age: 69
LOS: 1 days | End: 2024-01-30
Payer: MEDICARE

## 2024-01-30 ENCOUNTER — TRANSCRIPTION ENCOUNTER (OUTPATIENT)
Age: 69
End: 2024-01-30

## 2024-01-30 ENCOUNTER — RESULT REVIEW (OUTPATIENT)
Age: 69
End: 2024-01-30

## 2024-01-30 VITALS
TEMPERATURE: 98 F | WEIGHT: 143.3 LBS | DIASTOLIC BLOOD PRESSURE: 63 MMHG | RESPIRATION RATE: 16 BRPM | OXYGEN SATURATION: 100 % | HEIGHT: 61 IN | SYSTOLIC BLOOD PRESSURE: 144 MMHG | HEART RATE: 60 BPM

## 2024-01-30 DIAGNOSIS — Z98.49 CATARACT EXTRACTION STATUS, UNSPECIFIED EYE: Chronic | ICD-10-CM

## 2024-01-30 DIAGNOSIS — C22.0 LIVER CELL CARCINOMA: ICD-10-CM

## 2024-01-30 DIAGNOSIS — Z98.890 OTHER SPECIFIED POSTPROCEDURAL STATES: Chronic | ICD-10-CM

## 2024-01-30 DIAGNOSIS — Z90.49 ACQUIRED ABSENCE OF OTHER SPECIFIED PARTS OF DIGESTIVE TRACT: Chronic | ICD-10-CM

## 2024-01-30 DIAGNOSIS — Z98.891 HISTORY OF UTERINE SCAR FROM PREVIOUS SURGERY: Chronic | ICD-10-CM

## 2024-01-30 LAB
GLUCOSE BLDC GLUCOMTR-MCNC: 106 MG/DL — HIGH (ref 70–99)
HCT VFR BLD CALC: 29.8 % — LOW (ref 34.5–45)
HGB BLD-MCNC: 9.2 G/DL — LOW (ref 11.5–15.5)
MCHC RBC-ENTMCNC: 25.1 PG — LOW (ref 27–34)
MCHC RBC-ENTMCNC: 30.9 GM/DL — LOW (ref 32–36)
MCV RBC AUTO: 81.4 FL — SIGNIFICANT CHANGE UP (ref 80–100)
PLATELET # BLD AUTO: 75 K/UL — LOW (ref 150–400)
RBC # BLD: 3.66 M/UL — LOW (ref 3.8–5.2)
RBC # FLD: 17.5 % — HIGH (ref 10.3–14.5)
WBC # BLD: 5.31 K/UL — SIGNIFICANT CHANGE UP (ref 3.8–10.5)
WBC # FLD AUTO: 5.31 K/UL — SIGNIFICANT CHANGE UP (ref 3.8–10.5)

## 2024-01-30 PROCEDURE — C1769: CPT

## 2024-01-30 PROCEDURE — A9540: CPT

## 2024-01-30 PROCEDURE — 76942 ECHO GUIDE FOR BIOPSY: CPT

## 2024-01-30 PROCEDURE — 78201 LIVER IMAGING STATIC ONLY: CPT | Mod: 26,MH

## 2024-01-30 PROCEDURE — 82962 GLUCOSE BLOOD TEST: CPT

## 2024-01-30 PROCEDURE — 75726 ARTERY X-RAYS ABDOMEN: CPT

## 2024-01-30 PROCEDURE — 78201 LIVER IMAGING STATIC ONLY: CPT | Mod: MH

## 2024-01-30 PROCEDURE — 75894 X-RAYS TRANSCATH THERAPY: CPT

## 2024-01-30 PROCEDURE — 77290 THER RAD SIMULAJ FIELD CPLX: CPT | Mod: 26

## 2024-01-30 PROCEDURE — 75774 ARTERY X-RAY EACH VESSEL: CPT | Mod: 26,59

## 2024-01-30 PROCEDURE — C1760: CPT

## 2024-01-30 PROCEDURE — A9541: CPT

## 2024-01-30 PROCEDURE — 36415 COLL VENOUS BLD VENIPUNCTURE: CPT

## 2024-01-30 PROCEDURE — C9399: CPT

## 2024-01-30 PROCEDURE — C1894: CPT

## 2024-01-30 PROCEDURE — 75774 ARTERY X-RAY EACH VESSEL: CPT

## 2024-01-30 PROCEDURE — 36248 INS CATH ABD/L-EXT ART ADDL: CPT

## 2024-01-30 PROCEDURE — 36247 INS CATH ABD/L-EXT ART 3RD: CPT

## 2024-01-30 PROCEDURE — 78803 RP LOCLZJ TUM SPECT 1 AREA: CPT

## 2024-01-30 PROCEDURE — 76380 CAT SCAN FOLLOW-UP STUDY: CPT | Mod: 26,MH

## 2024-01-30 PROCEDURE — 75726 ARTERY X-RAYS ABDOMEN: CPT | Mod: 26

## 2024-01-30 PROCEDURE — 85027 COMPLETE CBC AUTOMATED: CPT

## 2024-01-30 NOTE — ASU DISCHARGE PLAN (ADULT/PEDIATRIC) - ASU DC SPECIAL INSTRUCTIONSFT
Post Y90 Mapping Angiogram Instructions  Initial Discharge Instructions  - You underwent a mapping angiogram today in preparation for planned radioembolization to treat your liver tumor(s).   - You may have mild abdominal pain, nausea, loss of appetite, fatigue, and/or low grade fever.  These are normal after your procedure and they should resolve within 3-5 days.  Please call Interventional Radiology if you have a fever > 101.0 F.  If you have persistent nausea, contact Interventional Radiology and we can prescribe anti-nausea medication.  - Monitor right groin site for symptoms of bleeding, hardness underneath the incision site, bruising, numbness, intense pain, or inability to move.  If you have any of these symptoms, contact Interventional Radiology and seek immediate medical attention  - Please report chills, temperature > 101.0F, persistent nausea or vomiting, severe abdominal or leg pain, confusion, yellowing of the skin, or abdominal swelling.  - You may shower in 24 hours. You may resume normal activity in 24 hours.  - Do not perform any heavy lifting or put tension on the area for the next 48 hours.  - You may resume your normal diet.  - You may resume your normal medications however you should wait 24 hours before restarting aspirin, plavix, or blood thinners.  - It is normal to experience some pain over the site for the next few days. You may take apply ice to the area (20 minutes on, 20 minutes off) and take Motrin for that pain. Do not take more frequently than every 6 hours.  - You were given conscious sedation which may make you drowsy, therefore you need someone to stay with you until the morning following the procedure.  - Do not drive, engage in heavy lifting or strenuous activity, or drink any alcoholic beverages for the next 24 hours.     Next Steps  - You should have received a prescription for medications to take prior to your radioembolization procedure.  You are to start your PPI (Nexium, Prilosec, Protonix) 5 days prior to radioembolization treatment day and continue until all medication is completed.   - The Medrol dose pack will be started the day after treatment. If your are diabetic, you will not start this medication.  If you have not received a prescription for the medication, please notify the IR team at [phone].    During Normal Weekday Business Hours- You can contact the Interventional Radiology department during normal business hours via telephone, [phone].  During Evenings and Weekends- If you need to contact Interventional Radiology during off hours, do so by calling the hospital and requesting to be connected to the Interventional Radiologist on call.

## 2024-01-30 NOTE — PROGRESS NOTE ADULT - SUBJECTIVE AND OBJECTIVE BOX
IR Post Procedure Note    Diagnosis: HCC    Procedure: Y90 Mapping angiogram    : Nazario Wheeler MD    Contrast: 100cc    Anesthesia: 1% Lidocaine Subcutaneous, GA administered by Anesthesiology    Estimated Blood Loss: Less than 10cc    Specimens: None    Complications: No Immediate Complications    Anticoagulation: Resume without Restriction    Findings & Plan: R CFA Access, Celiac Angio shows Accessory LGA from LHA, angio distal to this shows LHA supplying seg II, III, IV, unable to isolate seg II. MAA to LHA, closure w angioseal      Please call Interventional Radiology with any questions, concerns, or issues.

## 2024-01-30 NOTE — PRE-OP CHECKLIST - SELECT TESTS ORDERED
I independently performed the documented: I independently performed the documented: I independently performed the documented: I independently performed the documented: I independently performed the documented: I independently performed the documented: I independently performed the documented: BMP/CBC/PT/PTT/INR/EKG

## 2024-02-01 ENCOUNTER — APPOINTMENT (OUTPATIENT)
Dept: CARDIOLOGY | Facility: CLINIC | Age: 69
End: 2024-02-01

## 2024-02-26 ENCOUNTER — RESULT REVIEW (OUTPATIENT)
Age: 69
End: 2024-02-26

## 2024-02-26 ENCOUNTER — TRANSCRIPTION ENCOUNTER (OUTPATIENT)
Age: 69
End: 2024-02-26

## 2024-02-26 ENCOUNTER — OUTPATIENT (OUTPATIENT)
Dept: OUTPATIENT SERVICES | Facility: HOSPITAL | Age: 69
LOS: 1 days | End: 2024-02-26
Payer: MEDICARE

## 2024-02-26 VITALS
RESPIRATION RATE: 16 BRPM | HEIGHT: 61 IN | WEIGHT: 143.08 LBS | SYSTOLIC BLOOD PRESSURE: 146 MMHG | OXYGEN SATURATION: 98 % | HEART RATE: 57 BPM | DIASTOLIC BLOOD PRESSURE: 61 MMHG

## 2024-02-26 DIAGNOSIS — C22.0 LIVER CELL CARCINOMA: ICD-10-CM

## 2024-02-26 DIAGNOSIS — Z98.890 OTHER SPECIFIED POSTPROCEDURAL STATES: Chronic | ICD-10-CM

## 2024-02-26 DIAGNOSIS — Z98.49 CATARACT EXTRACTION STATUS, UNSPECIFIED EYE: Chronic | ICD-10-CM

## 2024-02-26 DIAGNOSIS — Z98.891 HISTORY OF UTERINE SCAR FROM PREVIOUS SURGERY: Chronic | ICD-10-CM

## 2024-02-26 DIAGNOSIS — Z90.49 ACQUIRED ABSENCE OF OTHER SPECIFIED PARTS OF DIGESTIVE TRACT: Chronic | ICD-10-CM

## 2024-02-26 LAB
ALBUMIN SERPL ELPH-MCNC: 2.9 G/DL — LOW (ref 3.3–5.2)
ALP SERPL-CCNC: 167 U/L — HIGH (ref 40–120)
ALT FLD-CCNC: <5 U/L — SIGNIFICANT CHANGE UP
AMYLASE P1 CFR SERPL: 93 U/L — SIGNIFICANT CHANGE UP (ref 36–128)
ANION GAP SERPL CALC-SCNC: 12 MMOL/L — SIGNIFICANT CHANGE UP (ref 5–17)
AST SERPL-CCNC: 36 U/L — HIGH
BILIRUB SERPL-MCNC: 1.5 MG/DL — SIGNIFICANT CHANGE UP (ref 0.4–2)
BLD GP AB SCN SERPL QL: SIGNIFICANT CHANGE UP
BUN SERPL-MCNC: 13.3 MG/DL — SIGNIFICANT CHANGE UP (ref 8–20)
CALCIUM SERPL-MCNC: 8.2 MG/DL — LOW (ref 8.4–10.5)
CHLORIDE SERPL-SCNC: 105 MMOL/L — SIGNIFICANT CHANGE UP (ref 96–108)
CO2 SERPL-SCNC: 24 MMOL/L — SIGNIFICANT CHANGE UP (ref 22–29)
CREAT SERPL-MCNC: 0.75 MG/DL — SIGNIFICANT CHANGE UP (ref 0.5–1.3)
EGFR: 86 ML/MIN/1.73M2 — SIGNIFICANT CHANGE UP
GLUCOSE BLDC GLUCOMTR-MCNC: 109 MG/DL — HIGH (ref 70–99)
GLUCOSE BLDC GLUCOMTR-MCNC: 112 MG/DL — HIGH (ref 70–99)
GLUCOSE SERPL-MCNC: 106 MG/DL — HIGH (ref 70–99)
HCT VFR BLD CALC: 31.1 % — LOW (ref 34.5–45)
HGB BLD-MCNC: 9 G/DL — LOW (ref 11.5–15.5)
INR BLD: 1.55 RATIO — HIGH (ref 0.85–1.18)
MCHC RBC-ENTMCNC: 23.6 PG — LOW (ref 27–34)
MCHC RBC-ENTMCNC: 28.9 GM/DL — LOW (ref 32–36)
MCV RBC AUTO: 81.4 FL — SIGNIFICANT CHANGE UP (ref 80–100)
PLATELET # BLD AUTO: 81 K/UL — LOW (ref 150–400)
POTASSIUM SERPL-MCNC: 4.3 MMOL/L — SIGNIFICANT CHANGE UP (ref 3.5–5.3)
POTASSIUM SERPL-SCNC: 4.3 MMOL/L — SIGNIFICANT CHANGE UP (ref 3.5–5.3)
PROT SERPL-MCNC: 5.7 G/DL — LOW (ref 6.6–8.7)
PROTHROM AB SERPL-ACNC: 17 SEC — HIGH (ref 9.5–13)
RBC # BLD: 3.82 M/UL — SIGNIFICANT CHANGE UP (ref 3.8–5.2)
RBC # FLD: 17.7 % — HIGH (ref 10.3–14.5)
SODIUM SERPL-SCNC: 141 MMOL/L — SIGNIFICANT CHANGE UP (ref 135–145)
WBC # BLD: 5.39 K/UL — SIGNIFICANT CHANGE UP (ref 3.8–10.5)
WBC # FLD AUTO: 5.39 K/UL — SIGNIFICANT CHANGE UP (ref 3.8–10.5)

## 2024-02-26 PROCEDURE — 76830 TRANSVAGINAL US NON-OB: CPT | Mod: 26,59

## 2024-02-26 PROCEDURE — 36415 COLL VENOUS BLD VENIPUNCTURE: CPT

## 2024-02-26 PROCEDURE — 85027 COMPLETE CBC AUTOMATED: CPT

## 2024-02-26 PROCEDURE — C9399: CPT

## 2024-02-26 PROCEDURE — 75894 X-RAYS TRANSCATH THERAPY: CPT

## 2024-02-26 PROCEDURE — 76937 US GUIDE VASCULAR ACCESS: CPT

## 2024-02-26 PROCEDURE — 37243 VASC EMBOLIZE/OCCLUDE ORGAN: CPT

## 2024-02-26 PROCEDURE — C1769: CPT

## 2024-02-26 PROCEDURE — 86900 BLOOD TYPING SEROLOGIC ABO: CPT

## 2024-02-26 PROCEDURE — 76937 US GUIDE VASCULAR ACCESS: CPT | Mod: 26

## 2024-02-26 PROCEDURE — C2616: CPT

## 2024-02-26 PROCEDURE — 75726 ARTERY X-RAYS ABDOMEN: CPT

## 2024-02-26 PROCEDURE — 82962 GLUCOSE BLOOD TEST: CPT

## 2024-02-26 PROCEDURE — 76380 CAT SCAN FOLLOW-UP STUDY: CPT | Mod: XU

## 2024-02-26 PROCEDURE — 76942 ECHO GUIDE FOR BIOPSY: CPT

## 2024-02-26 PROCEDURE — 78803 RP LOCLZJ TUM SPECT 1 AREA: CPT

## 2024-02-26 PROCEDURE — 85610 PROTHROMBIN TIME: CPT

## 2024-02-26 PROCEDURE — 75726 ARTERY X-RAYS ABDOMEN: CPT | Mod: 26,59

## 2024-02-26 PROCEDURE — 80053 COMPREHEN METABOLIC PANEL: CPT

## 2024-02-26 PROCEDURE — 82150 ASSAY OF AMYLASE: CPT

## 2024-02-26 PROCEDURE — C1760: CPT

## 2024-02-26 PROCEDURE — 86901 BLOOD TYPING SEROLOGIC RH(D): CPT

## 2024-02-26 PROCEDURE — 86850 RBC ANTIBODY SCREEN: CPT

## 2024-02-26 PROCEDURE — 93005 ELECTROCARDIOGRAM TRACING: CPT

## 2024-02-26 PROCEDURE — C1894: CPT

## 2024-02-26 PROCEDURE — 78803 RP LOCLZJ TUM SPECT 1 AREA: CPT | Mod: 26,MH

## 2024-02-26 PROCEDURE — 75774 ARTERY X-RAY EACH VESSEL: CPT

## 2024-02-26 PROCEDURE — 79445 NUCLEAR RX INTRA-ARTERIAL: CPT | Mod: 26

## 2024-02-26 PROCEDURE — 93010 ELECTROCARDIOGRAM REPORT: CPT

## 2024-02-26 PROCEDURE — 36247 INS CATH ABD/L-EXT ART 3RD: CPT | Mod: LT

## 2024-02-26 RX ORDER — URSODIOL 250 MG/1
1 TABLET, FILM COATED ORAL
Qty: 0 | Refills: 0 | DISCHARGE

## 2024-02-26 RX ORDER — FUROSEMIDE 40 MG
1 TABLET ORAL
Refills: 0 | DISCHARGE

## 2024-02-26 RX ORDER — MOXIFLOXACIN HYDROCHLORIDE TABLETS, 400 MG 400 MG/1
1 TABLET, FILM COATED ORAL
Qty: 21 | Refills: 0
Start: 2024-02-26 | End: 2024-03-17

## 2024-02-26 RX ORDER — INSULIN LISPRO 100/ML
24 VIAL (ML) SUBCUTANEOUS
Qty: 0 | Refills: 0 | DISCHARGE

## 2024-02-26 RX ORDER — SERTRALINE 25 MG/1
1 TABLET, FILM COATED ORAL
Refills: 0 | DISCHARGE

## 2024-02-26 RX ORDER — LACTULOSE 10 G/15ML
20 SOLUTION ORAL
Qty: 0 | Refills: 0 | DISCHARGE

## 2024-02-26 RX ORDER — SPIRONOLACTONE 25 MG/1
1 TABLET, FILM COATED ORAL
Refills: 0 | DISCHARGE

## 2024-02-26 RX ORDER — INSULIN LISPRO 100/ML
26 VIAL (ML) SUBCUTANEOUS
Qty: 0 | Refills: 0 | DISCHARGE

## 2024-02-26 RX ORDER — AMPICILLIN SODIUM AND SULBACTAM SODIUM 250; 125 MG/ML; MG/ML
1.5 INJECTION, POWDER, FOR SUSPENSION INTRAMUSCULAR; INTRAVENOUS ONCE
Refills: 0 | Status: DISCONTINUED | OUTPATIENT
Start: 2024-02-26 | End: 2024-03-11

## 2024-02-26 RX ORDER — CARVEDILOL PHOSPHATE 80 MG/1
1 CAPSULE, EXTENDED RELEASE ORAL
Qty: 0 | Refills: 0 | DISCHARGE

## 2024-02-26 RX ORDER — RIFAXIMIN 200 MG/1
1 TABLET ORAL
Qty: 0 | Refills: 0 | DISCHARGE

## 2024-02-26 RX ORDER — ALENDRONATE SODIUM 70 MG/1
70 TABLET ORAL
Qty: 0 | Refills: 0 | DISCHARGE

## 2024-02-26 RX ORDER — INSULIN GLARGINE 100 [IU]/ML
0 INJECTION, SOLUTION SUBCUTANEOUS
Refills: 0 | DISCHARGE

## 2024-02-26 NOTE — PROGRESS NOTE ADULT - SUBJECTIVE AND OBJECTIVE BOX
IR Post Procedure Note    Diagnosis: HCC    Procedure: Y90 Admin, Left Lobe    : Nazario Wheeler MD    Contrast: 60cc    Anesthesia: 1% Lidocaine Subcutaneous, GA    Estimated Blood Loss: Less than 10cc    Complications: No Immediate Complications    Anticoagulation: Resume in 24 Hours    Findings & Plan: R CFA Access, microcath ro LHA, CBCT confirmed position, Y90 administered, closure w angioseal. Plan to give Avelox postop for 21 days      Please call Interventional Radiology with any questions, concerns, or issues.

## 2024-02-26 NOTE — ASU PREOP CHECKLIST - HOW ADMINISTERED
Self Administrated Klisyri Counseling:  I discussed with the patient the risks of Klisyri including but not limited to erythema, scaling, itching, weeping, crusting, and pain.

## 2024-02-26 NOTE — ASU DISCHARGE PLAN (ADULT/PEDIATRIC) - FOLLOW UP APPOINTMENTS
During Normal Weekday Business Hours- You can contact the Interventional Radiology department during normal business hours via telephone, [phone]./[phone]

## 2024-02-26 NOTE — ASU DISCHARGE PLAN (ADULT/PEDIATRIC) - ASU DC SPECIAL INSTRUCTIONSFT
- You underwent a Y90 radioembolization to treat your liver tumor by Dr. Wheeler  - You may have mild abdominal pain, nausea, loss of appetite, fatigue, and/or low grade fever.  These are normal after your procedure and they should resolve within 3-5 days.  Please call Interventional Radiology if you have a fever > 101.0 F.  If you have persistent nausea, contact Interventional Radiology and we can prescribe anti-nausea medication.  - Monitor right groin site for symptoms of bleeding, hardness underneath the incision site, bruising, numbness, intense pain, or inability to move.  If you have any of these symptoms, contact Interventional Radiology and seek immediate medical attention  - Please report chills, temperature > 101.0F, persistent nausea or vomiting, severe abdominal or leg pain, confusion, yellowing of the skin, or abdominal swelling.  - Please refer to the "Radiation Safety Instructions" that were provided.  Please adhere to them for the 72 hours after your procedure.   - Start Medrol dose pack the day after your Y90 radioembilization treatment (unless otherwise instructed).  - Continue your PPI (Nexium, Prilosec, Protonix) for 30 days post procedure.  - You may shower in 24 hours. You may resume normal activity in 24 hours.  - Do not perform any heavy lifting or put tension on the area for the next 48 hours.  - You may resume your normal diet.  - You may resume your normal medications however you should wait 24 hours before restarting aspirin, plavix, or blood thinners.  - It is normal to experience some pain over the site for the next few days. You may take apply ice to the area (20 minutes on, 20 minutes off) and take Motrin for that pain. Do not take more frequently than every 6 hours.  - You were given conscious sedation which may make you drowsy, therefore you need someone to stay with you until the morning following the procedure.  - Do not drive, engage in heavy lifting or strenuous activity, or drink any alcoholic beverages for the next 24 hours.     Next Steps  - A follow up phone call will be performed the day after the procedure.   - Blood work is to be performed 2 weeks after your procedure (you will be given a prescription).  You can locate the closest [facility] lab by calling [phone]. If you have labwork performed at a NON-[facility] lab, please request that the results be faxed to [phone]  - Please call the Radiology Department at [phone] to schedule a follow up visit with Dr. Wheeler in 1 month.   - Follow up with your hepatologist or oncologist in 2 weeks.   - Post procedure imaging study,CT or MRI, is to be performed 3 months post procedure.  You will be given a prescription for this at your initial 1 month follow up visit. If needed, our booking office will obtain authorization from your insurance company.    Radiation Safety Discharge Information After Y90 Selective Internal Radiation Therapy Treatment    - Patients can resume normal contact with adult family members.  - For the next 72 hours, avoid prolonged close contact with small children or pregnant individuals (Maintain distance of more than 3 feet).  - If you have to see a physician or go to the ER, inform them of your Y90 treatment to your liver. Treatment should not be delayed based on recent Y90 treatment.

## 2024-02-26 NOTE — ASU DISCHARGE PLAN (ADULT/PEDIATRIC) - NS MD DC FALL RISK RISK
For information on Fall & Injury Prevention, visit: [email or web address] OR  [email or web address] OR  https://www.cdc.gov/steadi/patient.html

## 2024-02-27 ENCOUNTER — NON-APPOINTMENT (OUTPATIENT)
Age: 69
End: 2024-02-27

## 2024-02-27 ENCOUNTER — APPOINTMENT (OUTPATIENT)
Dept: INTERVENTIONAL RADIOLOGY/VASCULAR | Facility: CLINIC | Age: 69
End: 2024-02-27

## 2024-03-01 LAB
BILIRUB SERPL-MCNC: 1.6 MG/DL — HIGH (ref 0.2–1.2)
CREAT SERPL-MCNC: 0.93 MG/DL — SIGNIFICANT CHANGE UP (ref 0.5–1.3)
EGFR: 67 ML/MIN/1.73M2 — SIGNIFICANT CHANGE UP
INR BLD: 1.64 RATIO — HIGH (ref 0.85–1.18)
MELD SCORE WITH DIALYSIS: 27 POINTS — SIGNIFICANT CHANGE UP
MELD SCORE WITHOUT DIALYSIS: 14 POINTS — SIGNIFICANT CHANGE UP
PROTHROM AB SERPL-ACNC: 18.3 SEC — HIGH (ref 9.5–13)
SODIUM SERPL-SCNC: 142 MMOL/L — SIGNIFICANT CHANGE UP (ref 135–145)

## 2024-03-04 ENCOUNTER — APPOINTMENT (OUTPATIENT)
Dept: RADIOLOGY | Facility: CLINIC | Age: 69
End: 2024-03-04
Payer: MEDICARE

## 2024-03-04 ENCOUNTER — RESULT REVIEW (OUTPATIENT)
Age: 69
End: 2024-03-04

## 2024-03-04 PROCEDURE — 77080 DXA BONE DENSITY AXIAL: CPT

## 2024-03-06 ENCOUNTER — APPOINTMENT (OUTPATIENT)
Dept: GASTROENTEROLOGY | Facility: CLINIC | Age: 69
End: 2024-03-06
Payer: MEDICARE

## 2024-03-06 VITALS
WEIGHT: 146 LBS | OXYGEN SATURATION: 99 % | RESPIRATION RATE: 16 BRPM | HEIGHT: 61 IN | SYSTOLIC BLOOD PRESSURE: 102 MMHG | HEART RATE: 57 BPM | DIASTOLIC BLOOD PRESSURE: 48 MMHG | BODY MASS INDEX: 27.56 KG/M2

## 2024-03-06 PROCEDURE — 99215 OFFICE O/P EST HI 40 MIN: CPT

## 2024-03-06 RX ORDER — FUROSEMIDE 20 MG/1
20 TABLET ORAL
Qty: 90 | Refills: 0 | Status: DISCONTINUED | COMMUNITY
Start: 2023-02-01 | End: 2024-03-06

## 2024-03-06 NOTE — ASSESSMENT
[FreeTextEntry1] : FELICITY RUBIN is a 67 year old female with a PMH significant for decompensated Cirrhosis c/b Ascites, Hepatic Encephalopathy, HTN, DM, HLD, Asthma, and Anemia.  Cirrhosis -Etiology - likely NAFLD coupled w/burnt out AIH given positive ABDIEL and ASMA.  HCC -Diagnosed May 2023. Treated and cured with t Y90 August 15, 2023. Triphasic CT on 18 December 2023 showed new lesions.  AFP November 2023 was 2/9/2005  Was discussed at the multidisciplinary hepatobiliary tumor board.  Repeat Y90 was recommended. She is status post Y90 on 2/26/2024.  No postprocedure complications.  Doing subjectively well. Transplant work-up canceled due to high AFP -AFP (11/2023) -2905   Ascites Decreased urination. Repeat bmp -sCrt stable. Continue Furosemide 20 mg and  Spironolactone to 50 mg daily.  No palpable ascites.  No pedal edema.   Variceal Screening -EGD 12/2022 - Large junctional varix banded, PHG. Follow up with GI, Dr. Carranza, pending -Drop Carvedilol 3.125 mg to half daily. HR 54 today. Advised pt to monitor HR at home and contact our office for continued HR <60. -if pt experiences hematemesis, advised to go to ER immediately  Encephalopathy -notify provider if new onset confusion -titrate lactulose to 1-2 bms/day and continue Xifaxan 550mg bid.  They will drop the lactulose dose today   Health Maintenance -Pt is not immune to HBV, vaccine recommended w/PCP. -Can take up to 2G Tylenol per day. NO NSAIDs as these can lead to diuretic resistance and precipitate renal dysfunction in patients with advanced liver disease. -High Protein Low Fat Low Salt (up to 2G Na/day) Diet, no prolonged fasting, Mediterranean Diet info provided -Continue to abstain from alcohol and all illicit drugs -Avoid use of herbal and dietary supplements due to potential hepatotoxicity -Avoid eating any unpasteurized dairy products; avoid eating any raw or undercooked eggs, fish, poultry, or meat; and avoid eating raw/steamed oysters or other shellfish to avoid risk of infection.   Follow up in a month for in person visit in 2 months

## 2024-03-06 NOTE — PHYSICAL EXAM
[Spider Angioma] : Spider angioma(s) was(were) observed [Splenomegaly] : splenomegaly [Non-Tender] : non-tender [Asterixis] : Asterixis observed [Irregular] : irregular [General Appearance - Alert] : alert [Sclera] : the sclera and conjunctiva were normal [General Appearance - In No Acute Distress] : in no acute distress [Neck Appearance] : the appearance of the neck was normal [Edema] : there was no peripheral edema [Abdomen Soft] : soft [Bowel Sounds] : normal bowel sounds [Abdomen Mass (___ Cm)] : no abdominal mass palpated [Abdomen Tenderness] : non-tender [Involuntary Movements] : no involuntary movements were seen [Motor Tone] : muscle strength and tone were normal [Ataxic, Wide-Based] : wide-based and ataxic [Skin Color & Pigmentation] : normal skin color and pigmentation [Skin Turgor] : normal skin turgor [] : no rash [Oriented To Time, Place, And Person] : oriented to person, place, and time [Impaired Insight] : insight and judgment were intact [Affect] : the affect was normal [Hepatojugular Reflux] : patient did not have a sustained hepatojugular reflux [Scleral Icterus] : No Scleral Icterus [Abdominal  Ascites] : no ascites [Jaundice] : No jaundice [Depression] : no depression [Palmar Erythema] : no Palmar Erythema [Hallucinations] : ~T no ~M hallucinations [Delusions] : no ~T delusions [Suicidal Ideation] : no suicidal ideation [Homicidal Ideation] : no homicidal ideations [Preoccupiation With Violent Thoughts] : no preoccupation with violent thoughts

## 2024-03-06 NOTE — HISTORY OF PRESENT ILLNESS
[FreeTextEntry1] : HECTOR RUBIN is a 68 year old female with a PMH significant for decompensated Cirrhosis c/b Ascites, Hepatic Encephalopathy, GIBleed s/p banding i/23 HTN, DM, HLD, Asthma, and Anemia and HCC s/p LDT Y90 on 8/15  3/6/24 Records reviewed, including notes, labs, imaging, etc. No new liver-related complaints like hematemesis melena jaundice. Interim Y90 on 2/26/2024 No postprocedure complications. Complains of decreased urination. Labs a few days ago revealed normal creatinine. Repeat basic metabolic profile today. She was put on 3 to 4 pounds since her last visit.  She does endorse consuming more protein.  On exam no ascites no pedal edema so the weight gain is likely muscle of fat. Heart rate on carvedilol is 57 blood pressure 102 / 48 .  Asymptomatic.  No chest pain shortness of breath etc. Will drop the carvedilol dose.    January 3, 2024 Patient presents for follow-up visit.  She is accompanied by her son her  and her 1 daughter joined the conversation via phone. Since the last visit unfortunately she was found to have an AFP of 2905. Imaging done on 12/18/2023 which is around 3 months post her Y90 therapy revealed : "Post radio embolization changes involving segments 2, 3 and 4 without evidence of viable enhancing tumor (LR-TR nonviable). There are however new lesions demonstrating washout without evidence of arterial enhancement " No new complaints. Records reviewed, including notes, labs, imaging, etc. No new liver-related complaints like hematemesis melena jaundice. Xanax was stopped .   noted that her encephalopathy significantly resolved after that. Patient and family understandably upset about the AFP and the new tumor findings.   11/20/23: Pt presents for f/u visit, accompanied by her . labs reviewed w/pt. Labs - tbili 1.7, AST 34, ALT 7, , INR 1.64. AFP 2905. Y90 was done on 8/11/23. No recent imaging. Transplant eval is underway. She is pending cardiac cath next week. She recent broke her wrist and is currently in a cast. No surgical intervention needed.   She is currently taking Lasix 20 mg QD and Spironolactone 25 mg QD. She reports abdominal distension. Endorse a low salt diet. She continues to take Carvedilol 3.125 mg QD. HR today 54. She took meds this AM. She has 2-3 soft bms/day. She is currently on Lactulose and Xifaxan. Denies confusion.   9/14/23: No new complaints since last visit. Transplant evaluation was initiated since last visit. No hospitalizations, hematemesis melena, encephalopathy since last visit. Scheduled for cardiac stress test as a part of pretransplant work-up today 6/13/23 CT Chest noting RUL 2 mm nodule. Negative bone scan  7/14/22: Pt reports that she has known fatty liver for many years and was diagnosed with cirrhosis in the last few years. Previous management included Lasix 40 mg daily and ursodiol 500 mg daily. Pt is unsure why she was prescribed ursodiol. Current daily smoker. Denies alcohol consumption, past or present. Denies risk factors for viral hepatitis. Pt states, and  Kai agrees, that pt has become more confused and foggy in the past year. She also reports bladder and bowel incontinence and unsteady gait. She has a history of falls, most recent being last year in which she fractured her shoulder. Pt feels these symptoms started after the most recent fall.  On chart review: Pt has had elevated LFTs dating as far back as 2016 in the form of alk phos elevation ranging from 120-200 with normal transaminases and bilirubin. In 2020, bilirubin is seen to elevate to 1.7 and fluctuate from 0.8 to 2.0 since then. ALT remained normal. AST mostly in the 30s-40s. Last INR done in 2020 was 1.4. Negative for HBV and HCV in 2020. Plt count ranging from 44-77 since 2017.  Last US abdomen from 2020 revealed lobulated liver with a diffuse heterogeneous/nodular echotexture is compatible with underlying hepatocellular disease/cirrhosis, no focal hepatic abnormality, extatic CBD (11 mm) likely related to prior cholecystectomy/cholecystitis, splenomegaly and mild ascites  Last EGD 9/2021 no varices, portal hypertensive gastropathy  No previous paracentesis  8/11/22: Since last visit Labs done 7/18/22: bilirubin 1.6, AST 38, ALT 9, AlkPhos 146, INR 1.33, AFP 2, ABDIEL + 1:160, ASMA + 1:120, viral hepatitis panel negative, iron studies negative.  US abdomen 7/18/22: cirrhotic liver without focal abnormality, mild abdominal ascites.  Pt reports she has quit smoking. She started Xifaxan 550mg BID which her  states has helped with her confusion. Lasix was decreased to 20 mg daily due to pt complaints of frequent urination. Denies hematemesis.  10/20/22: Pt reports she went to [facility] in September for increased confusion. She was given a prescription for lactulose and discharged home. Pt is taking lactulose and Xifaxan and reports her confusion has improved. Denies fatigue, malaise, arthralgias, myalgias, recent infection, abdominal pain or distension, jaundice, hematemesis, hematochezia, dark urine, confusion, unintentional weight loss or gain. She is currently taking Spironolactone 50 mg daily. Pt is also taking Ursodiol for pruritus and reports positive relief.  Labs 9/20/22 - bilirubin 1.7, AST 39, ALT 10, , INR 1.33, PLT 41  EGD 10/19/22 - no varices, portal hypertensive gastropathy  1/3/23: She is status post discharge from [facility] where she had been transferred for management of GI bleed from [facility]. S/P banding at [facility] with recs to repeat EGD 2 weeks after DC. Deemed not a TIPS candidate due to h/o Encephalopathy.    4/21/23: Pt presents today for follow up visit, accompanied by her . Pt reports she is feeling well. She continues to take Lactulose and Xifaxan. Denies confusion. She is currently on Furosemide 20 mg daily and Spironolactone 25 mg daily. Denies abdominal distension or LE edema. Pt is currently on Carvedilol 3.125 mg daily. HR in office today is 58. Denies hematemesis or black stools. LFTs from 2/23 - Tbili 1.4, AST 25, ALT 6, . Pt is currently on Ursodiol 500 mg daily.

## 2024-03-07 ENCOUNTER — LABORATORY RESULT (OUTPATIENT)
Age: 69
End: 2024-03-07

## 2024-03-07 ENCOUNTER — NON-APPOINTMENT (OUTPATIENT)
Age: 69
End: 2024-03-07

## 2024-03-08 LAB
AFP-TM SERPL-MCNC: 12.9 NG/ML
ALBUMIN SERPL ELPH-MCNC: 3 G/DL
ALP BLD-CCNC: 140 U/L
ALT SERPL-CCNC: 8 U/L
ANION GAP SERPL CALC-SCNC: 12 MMOL/L
ANION GAP SERPL CALC-SCNC: 12 MMOL/L
AST SERPL-CCNC: 36 U/L
BASOPHILS # BLD AUTO: 0.05 K/UL
BASOPHILS NFR BLD AUTO: 1.1 %
BILIRUB INDIRECT SERPL-MCNC: 0.9 MG/DL
BILIRUB SERPL-MCNC: 1.9 MG/DL
BUN SERPL-MCNC: 14 MG/DL
BUN SERPL-MCNC: 14 MG/DL
CALCIUM SERPL-MCNC: 7.9 MG/DL
CALCIUM SERPL-MCNC: 8.2 MG/DL
CHLORIDE SERPL-SCNC: 110 MMOL/L
CHLORIDE SERPL-SCNC: 111 MMOL/L
CO2 SERPL-SCNC: 21 MMOL/L
CO2 SERPL-SCNC: 22 MMOL/L
CREAT SERPL-MCNC: 0.9 MG/DL
CREAT SERPL-MCNC: 0.9 MG/DL
EGFR: 69 ML/MIN/1.73M2
EGFR: 69 ML/MIN/1.73M2
EOSINOPHIL # BLD AUTO: 0.14 K/UL
EOSINOPHIL NFR BLD AUTO: 3 %
GLUCOSE SERPL-MCNC: 93 MG/DL
GLUCOSE SERPL-MCNC: 95 MG/DL
HCT VFR BLD CALC: 31.1 %
HGB BLD-MCNC: 8.9 G/DL
IMM GRANULOCYTES NFR BLD AUTO: 0.4 %
INR PPP: 1.54 RATIO
LYMPHOCYTES # BLD AUTO: 0.26 K/UL
LYMPHOCYTES NFR BLD AUTO: 5.5 %
MAN DIFF?: NORMAL
MCHC RBC-ENTMCNC: 24.4 PG
MCHC RBC-ENTMCNC: 28.6 GM/DL
MCV RBC AUTO: 85.2 FL
MONOCYTES # BLD AUTO: 0.63 K/UL
MONOCYTES NFR BLD AUTO: 13.3 %
NEUTROPHILS # BLD AUTO: 3.62 K/UL
NEUTROPHILS NFR BLD AUTO: 76.7 %
PLATELET # BLD AUTO: 60 K/UL
POTASSIUM SERPL-SCNC: 3.7 MMOL/L
POTASSIUM SERPL-SCNC: 3.9 MMOL/L
PROT SERPL-MCNC: 5.6 G/DL
PT BLD: 17.2 SEC
RBC # BLD: 3.65 M/UL
RBC # FLD: 19.6 %
SODIUM SERPL-SCNC: 143 MMOL/L
SODIUM SERPL-SCNC: 144 MMOL/L
WBC # FLD AUTO: 4.72 K/UL

## 2024-03-21 ENCOUNTER — APPOINTMENT (OUTPATIENT)
Dept: INTERVENTIONAL RADIOLOGY/VASCULAR | Facility: CLINIC | Age: 69
End: 2024-03-21
Payer: MEDICARE

## 2024-03-21 DIAGNOSIS — R60.0 LOCALIZED EDEMA: ICD-10-CM

## 2024-03-21 PROCEDURE — 99214 OFFICE O/P EST MOD 30 MIN: CPT

## 2024-03-21 NOTE — HISTORY OF PRESENT ILLNESS
[Home] : at home, [unfilled] , at the time of the visit. [Medical Office: ([facility])___] : at the medical office located in  [Verbal consent obtained from patient] : the patient, [unfilled] [FreeTextEntry1] : HCC

## 2024-03-21 NOTE — PHYSICAL EXAM
[Alert] : alert [Well Nourished] : well nourished [No Acute Distress] : no acute distress [Well Developed] : well developed [No Neck Mass] : no neck mass was observed [Regular Rhythm] : with a regular rhythm [No Respiratory Distress] : no respiratory distress [Restricted in physically strenuous activity but ambulatory and able to carry out work of a light or sedentary nature] : Restricted in physically strenuous activity but ambulatory and able to carry out work of a light or sedentary nature, e.g., light house work, office work [Healthy Appearance] : healthy appearance [Normal Voice/Communication] : normal voice communication [Normal Insight/Judgement] : insight and judgment were intact [Oriented x3] : oriented to person, place, and time [Normal Affect] : the affect was normal [Recent Memory Normal] : recent memory was not impaired [Normal Mood] : the mood was normal [Remote Memory Normal] : remote memory was not impaired [Normal Rate and Effort] : normal respiratory rhythm and effort [No Accessory Muscle Use] : no accessory muscle use

## 2024-03-21 NOTE — ASSESSMENT
CPAP/BiPAP TIPS     Please be advised:   Do not drive while sleepy   Take CPAP/BiPAP machine to any procedure that requires sedation  When should I clean my machine & supplies?  DAILY  Wipe mask cushions or nasal pillow   Empty & rinse water chamber- refill with distilled water  WEEKLY  Clean mask cushions or nasal pillow, headgear, tubing, and humidifier chamber with mild soap (Jana) and water   If water chamber has hard water buildup (white crust), soak in warm water & vinegar mix 50/50.  Rinse and hang dry    When should supplies be replaced?  Contact your home care company for replacement supplies, or if your machine is malfunctioning  *Below is a general guideline of what we recommend. Replacement of supplies differs depending on your insurance company*  MONTHLY: Replace filter and mask cushion  MONTHS: Replace headgear and tubing    Travel Tips  Keep CPAP/BiPAP in original bag when traveling, and place luggage tag on bag  Most airlines consider CPAP/BiPAP to be a medical device, therefore it is a free carry-on item  If unable to get distilled water, bottled water is safe while traveling. DO NOT use tap water  When traveling outside the [place] only a power adapter is necessary (CPAP can operate without a converter), bring an extension cord  Consider purchasing or renting a travel CPAP (not covered by insurance)  Dry Mouth/Nose  Turn up the humidity on your machine (select “Options” from the home screen)  Place a cool mist humidifier at your bedside  Over-the-counter remedies: Biotene, XyliMelts, NasoGel   Air Leak  Try adjusting your mask/headgear while laying in sleeping position vs. sitting up  Wash and dry your face prior to putting your mask on  If applicable: shave facial hair at night (or before wearing CPAP)  Purchase “RemZzzs” (through home care co., cpap.[email or web address], or remzzzs.[email or web address])  100% cotton knit barrier that goes between your mask cushion and your skin  Replace your mask cushion (at least once per month)  [SIRT Procedure & Planning] : SIRT procedure and planning discussed at length [Other: _____] : [unfilled] and/or headgear (every 3-6 months)  Nasal Congestion  CPAP therapy can cause nasal passages to dry out, & mucous membranes try to protect the nasal passages by producing excess mucous, so congestion results.  Over-the counter remedies: Flonase, Nasacort, Sinus Rinses (Neti-Pot), DO NOT USE Afrin  Try a mask that goes over the nose and mouth  Skin Irritation  Clean supplies regularly (Citrus II Mask Wipes, Control III disinfectant solution)  Try over the counter creams such as hydrocortisone 1% (apply in the morning after showering)  Your headgear may be too tight, replace supplies so you don't need to adjust so tightly  Try RemZzzs (100% cotton knit barrier that goes between your mask cushion and your skin)  Gas/Bloating  Try a different sleeping position to keep air out of the stomach. Lay on the left side or rotate to the right side. Incline with pillows or lay flat.  Over-the-counter remedies: Simethicone    [FreeTextEntry1] : Ms. Dudley is a 68-year-old female with a PMH of HTN, T2DM, HLD, asthma, anemia, as well as decompensated EtOH cirrhosis hepatic encephalopathy presents for follow up s/p liver directed therapy.   1. HCC - pt with hx of ETOH cirrhosis c/b hepatic encephalopathy and EV bleeding s/p banding, now with HCC - s/p y90 to 2 small segment 3 tumors in 8/2023 and most recently y90 to left hepatic artery on 2/26/24. - post procedure course uncomplicated and as expected - reviewed f/u labs, bilirubin slightly higher, 1.9 (previously 1.3-1.6). Will repeat at time of imaging. - f/u imaging at 3-month [facility] - f/u telehealth after imaging - continue to follow with hepatology  2. AFP - previously up to 2905 on 11/13/23 - most recent level 12.9 on 3/7/24 - continue to trend and will repeat at time of imaging  3.  Pedal edema - pt reports intermittent pedal edema and she notes that she is not urinating as much after the diuretics - currently on Lasix and Spironolactone - symptoms improve with elevation - encouraged to continue to monitor symptoms, elevate when possible.  If worsen, she should f/u with hepatology, as she may need adjustment in her diuretic regimen  I have provided the patient the opportunity to ask questions and have answered them to their satisfaction.  They are encouraged to contact our office with any further questions, concerns, or issues.

## 2024-04-24 ENCOUNTER — LABORATORY RESULT (OUTPATIENT)
Age: 69
End: 2024-04-24

## 2024-04-25 LAB
AFP-TM SERPL-MCNC: 10 NG/ML
ALBUMIN SERPL ELPH-MCNC: 3 G/DL
ALP BLD-CCNC: 170 U/L
ALT SERPL-CCNC: 11 U/L
ANION GAP SERPL CALC-SCNC: 9 MMOL/L
AST SERPL-CCNC: 36 U/L
BASOPHILS # BLD AUTO: 0.04 K/UL
BASOPHILS NFR BLD AUTO: 0.8 %
BILIRUB INDIRECT SERPL-MCNC: 0.6 MG/DL
BILIRUB SERPL-MCNC: 1.4 MG/DL
BUN SERPL-MCNC: 14 MG/DL
CALCIUM SERPL-MCNC: 7.9 MG/DL
CHLORIDE SERPL-SCNC: 107 MMOL/L
CO2 SERPL-SCNC: 24 MMOL/L
CREAT SERPL-MCNC: 0.86 MG/DL
EGFR: 73 ML/MIN/1.73M2
EOSINOPHIL # BLD AUTO: 0.06 K/UL
EOSINOPHIL NFR BLD AUTO: 1.3 %
GLUCOSE SERPL-MCNC: 219 MG/DL
HCT VFR BLD CALC: 30.7 %
HGB BLD-MCNC: 8.9 G/DL
IMM GRANULOCYTES NFR BLD AUTO: 0.2 %
INR PPP: 1.4 RATIO
LYMPHOCYTES # BLD AUTO: 0.34 K/UL
LYMPHOCYTES NFR BLD AUTO: 7.2 %
MAN DIFF?: NORMAL
MCHC RBC-ENTMCNC: 24.9 PG
MCHC RBC-ENTMCNC: 29 GM/DL
MCV RBC AUTO: 86 FL
MONOCYTES # BLD AUTO: 0.59 K/UL
MONOCYTES NFR BLD AUTO: 12.4 %
NEUTROPHILS # BLD AUTO: 3.7 K/UL
NEUTROPHILS NFR BLD AUTO: 78.1 %
PLATELET # BLD AUTO: 75 K/UL
POTASSIUM SERPL-SCNC: 4 MMOL/L
PROT SERPL-MCNC: 5.3 G/DL
PT BLD: 15.7 SEC
RBC # BLD: 3.57 M/UL
RBC # FLD: 19.3 %
SODIUM SERPL-SCNC: 141 MMOL/L
WBC # FLD AUTO: 4.74 K/UL

## 2024-05-01 ENCOUNTER — APPOINTMENT (OUTPATIENT)
Dept: GASTROENTEROLOGY | Facility: CLINIC | Age: 69
End: 2024-05-01
Payer: MEDICARE

## 2024-05-01 VITALS
RESPIRATION RATE: 16 BRPM | HEIGHT: 61 IN | BODY MASS INDEX: 28.42 KG/M2 | DIASTOLIC BLOOD PRESSURE: 58 MMHG | OXYGEN SATURATION: 99 % | SYSTOLIC BLOOD PRESSURE: 132 MMHG | HEART RATE: 60 BPM | WEIGHT: 150.56 LBS

## 2024-05-01 PROCEDURE — 99215 OFFICE O/P EST HI 40 MIN: CPT

## 2024-05-01 PROCEDURE — 99204 OFFICE O/P NEW MOD 45 MIN: CPT

## 2024-05-03 ENCOUNTER — APPOINTMENT (OUTPATIENT)
Dept: ENDOCRINOLOGY | Facility: CLINIC | Age: 69
End: 2024-05-03
Payer: MEDICARE

## 2024-05-03 VITALS
BODY MASS INDEX: 28.32 KG/M2 | SYSTOLIC BLOOD PRESSURE: 120 MMHG | OXYGEN SATURATION: 97 % | HEIGHT: 61 IN | DIASTOLIC BLOOD PRESSURE: 72 MMHG | HEART RATE: 69 BPM | WEIGHT: 150 LBS

## 2024-05-03 DIAGNOSIS — Z83.438 FAMILY HISTORY OF OTHER DISORDER OF LIPOPROTEIN METABOLISM AND OTHER LIPIDEMIA: ICD-10-CM

## 2024-05-03 DIAGNOSIS — F32.A ANXIETY DISORDER, UNSPECIFIED: ICD-10-CM

## 2024-05-03 DIAGNOSIS — Z83.79 FAMILY HISTORY OF OTHER DISEASES OF THE DIGESTIVE SYSTEM: ICD-10-CM

## 2024-05-03 DIAGNOSIS — Z82.49 FAMILY HISTORY OF ISCHEMIC HEART DISEASE AND OTHER DISEASES OF THE CIRCULATORY SYSTEM: ICD-10-CM

## 2024-05-03 DIAGNOSIS — F41.9 ANXIETY DISORDER, UNSPECIFIED: ICD-10-CM

## 2024-05-03 DIAGNOSIS — Z82.3 FAMILY HISTORY OF STROKE: ICD-10-CM

## 2024-05-03 DIAGNOSIS — Z83.511 FAMILY HISTORY OF GLAUCOMA: ICD-10-CM

## 2024-05-03 DIAGNOSIS — Z80.41 FAMILY HISTORY OF MALIGNANT NEOPLASM OF OVARY: ICD-10-CM

## 2024-05-03 DIAGNOSIS — E11.9 TYPE 2 DIABETES MELLITUS W/OUT COMPLICATIONS: ICD-10-CM

## 2024-05-03 LAB — GLUCOSE BLDC GLUCOMTR-MCNC: 79

## 2024-05-03 PROCEDURE — 99214 OFFICE O/P EST MOD 30 MIN: CPT

## 2024-05-03 RX ORDER — GLUCAGON INJECTION, SOLUTION 1 MG/.2ML
1 INJECTION, SOLUTION SUBCUTANEOUS
Qty: 1 | Refills: 0 | Status: ACTIVE | COMMUNITY
Start: 2024-05-03 | End: 1900-01-01

## 2024-05-03 NOTE — HISTORY OF PRESENT ILLNESS
[FreeTextEntry1] : 69 year old women with medical history of T2DM, osteoporosis, HTN, HLD, decompensated cirrhosis c/b hepatic encephalopathy, HCC- s/p radioembolization, with new liver lesion- s/p XRT. She is accompanied by her  who is helping with providing history.   Labs  GMI today 9.3% April 2024-eGFR 73 Jan 2024- A1C 6.2%, TSH 6.8, Vit D 37, Tg 86, LDL-C 49  Her A1C had increased from 6.2% in Jan to 9.3%. Per  after she found she had recurrent HCC she started eating very poorly. For hyperglycemia management takes Lantus 32 units nightly, Humalog 28 units with breakfast, 32 units with lunch and 32 units with dinner. Dinner is her main meal followed by breakfast. Reports compliance with insulin regimen but admits to having poor diet, eating fast foods, snaking on ice cream, cake etc. Develops hypoglycemia symptoms with BG in 100.  No h/o LOC in setting of hypoglycemia. But pt has history of hepatic encephalopathy.   CGM downloaded and reviewed: DEXCOM 6 Average glucose:252 GMI 9.3% % time CGM active: 93% % VERY HIGH (>250): 52% % HIGH (181-250): 26% % TARGET (): 21%  % LOW (54-69):<1% % VERY LOW (<54): 0    - OP  Bone scan 03/04/2024- Spine: -1.8, osteopenia. Femoral neck: -2.4 , osteopenia. Total hip: -2.0 , osteopenia.  Has been taking Alendronate since 2020.

## 2024-05-03 NOTE — PHYSICAL EXAM
[No Acute Distress] : no acute distress [No Respiratory Distress] : no respiratory distress [Normal Insight/Judgement] : insight and judgment were intact

## 2024-05-03 NOTE — ASSESSMENT
[FreeTextEntry1] : 1- Uncontrolled DM in setting of poor diet habits  Plan:  - Recommend Lantus 44 U nightly, Humalog 14 U with breakfast, 12 U with lunch and 16 U with dinner plus moderate correction scale. Reviewed treatment plan with both pt and her , as now his  is helping her with medications.   - Reviewed avoiding simple carbs, marce beverages and fried food.  - Gvoke pen is sent to her pharmacy.  - Appreciate CDE visit in 2 weeks to monitor interval change - Labs 2 weeks before next follow up appointment   2- Osteoporosis  - c/w Alendronate    Follow up in 2-3 month.

## 2024-05-06 DIAGNOSIS — Z87.891 PERSONAL HISTORY OF NICOTINE DEPENDENCE: ICD-10-CM

## 2024-05-06 DIAGNOSIS — K76.0 FATTY (CHANGE OF) LIVER, NOT ELSEWHERE CLASSIFIED: ICD-10-CM

## 2024-05-06 DIAGNOSIS — Z85.05 PERSONAL HISTORY OF MALIGNANT NEOPLASM OF LIVER: ICD-10-CM

## 2024-05-06 RX ORDER — CARVEDILOL 3.12 MG/1
3.12 TABLET, FILM COATED ORAL
Qty: 90 | Refills: 3 | Status: ACTIVE | COMMUNITY
Start: 2023-01-24

## 2024-05-06 RX ORDER — INSULIN LISPRO 100 [IU]/ML
100 INJECTION, SOLUTION INTRAVENOUS; SUBCUTANEOUS
Qty: 3 | Refills: 1 | Status: ACTIVE | COMMUNITY
Start: 2023-03-30

## 2024-05-06 RX ORDER — INSULIN GLARGINE 100 [IU]/ML
100 INJECTION, SOLUTION SUBCUTANEOUS
Refills: 0 | Status: ACTIVE | COMMUNITY

## 2024-05-06 RX ORDER — FUROSEMIDE 40 MG/1
40 TABLET ORAL DAILY
Refills: 0 | Status: ACTIVE | COMMUNITY

## 2024-05-06 RX ORDER — FLUTICASONE PROPIONATE 50 UG/1
50 SPRAY, METERED NASAL TWICE DAILY
Qty: 1 | Refills: 0 | Status: COMPLETED | COMMUNITY
Start: 2023-09-24 | End: 2024-05-06

## 2024-05-06 RX ORDER — SERTRALINE HYDROCHLORIDE 50 MG/1
50 TABLET, FILM COATED ORAL DAILY
Qty: 90 | Refills: 0 | Status: ACTIVE | COMMUNITY
Start: 2020-10-29

## 2024-05-06 RX ORDER — LACTULOSE 10 G/15ML
10 SOLUTION ORAL
Refills: 0 | Status: ACTIVE | COMMUNITY

## 2024-05-06 RX ORDER — SPIRONOLACTONE 50 MG/1
50 TABLET ORAL
Qty: 30 | Refills: 2 | Status: COMPLETED | COMMUNITY
Start: 2023-02-01 | End: 2024-05-06

## 2024-05-06 RX ORDER — ELECTROLYTES/DEXTROSE
SOLUTION, ORAL ORAL DAILY
Qty: 30 | Refills: 11 | Status: ACTIVE | COMMUNITY
Start: 2023-04-21

## 2024-05-10 ENCOUNTER — APPOINTMENT (OUTPATIENT)
Dept: MRI IMAGING | Facility: CLINIC | Age: 69
End: 2024-05-10
Payer: MEDICARE

## 2024-05-10 PROCEDURE — 74183 MRI ABD W/O CNTR FLWD CNTR: CPT

## 2024-05-10 PROCEDURE — A9585: CPT

## 2024-05-10 NOTE — HISTORY OF PRESENT ILLNESS
[FreeTextEntry1] : HECTOR RUBIN is a 68 year old female with a PMH significant for decompensated Cirrhosis c/b Ascites, Hepatic Encephalopathy, GIBleed s/p banding i/23 HTN, DM, HLD, Asthma, and Anemia and HCC s/p LDT Y90 on 8/15  5/1/24 Records reviewed, including notes, labs, imaging, etc. No new liver-related complaints like hematemesis melena jaundice. No hospitalizations or medication changes. Previsit labs reviwed nl AFP: Down to 10   bm 4-5: Drop lactulose dose High blood sugar : High . Seeing Endo  3/6/24 Records reviewed, including notes, labs, imaging, etc. No new liver-related complaints like hematemesis melena jaundice. Interim Y90 on 2/26/2024 No postprocedure complications. Complains of decreased urination. Labs a few days ago revealed normal creatinine. Repeat basic metabolic profile today. She was put on 3 to 4 pounds since her last visit.  She does endorse consuming more protein.  On exam no ascites no pedal edema so the weight gain is likely muscle of fat. Heart rate on carvedilol is 57 blood pressure 102 / 48 .  Asymptomatic.  No chest pain shortness of breath etc. Will drop the carvedilol dose.    January 3, 2024 Patient presents for follow-up visit.  She is accompanied by her son her  and her 1 daughter joined the conversation via phone. Since the last visit unfortunately she was found to have an AFP of 2905. Imaging done on 12/18/2023 which is around 3 months post her Y90 therapy revealed : "Post radio embolization changes involving segments 2, 3 and 4 without evidence of viable enhancing tumor (LR-TR nonviable). There are however new lesions demonstrating washout without evidence of arterial enhancement " No new complaints. Records reviewed, including notes, labs, imaging, etc. No new liver-related complaints like hematemesis melena jaundice. Xanax was stopped .   noted that her encephalopathy significantly resolved after that. Patient and family understandably upset about the AFP and the new tumor findings.   11/20/23: Pt presents for f/u visit, accompanied by her . labs reviewed w/pt. Labs - tbili 1.7, AST 34, ALT 7, , INR 1.64. AFP 2905. Y90 was done on 8/11/23. No recent imaging. Transplant eval is underway. She is pending cardiac cath next week. She recent broke her wrist and is currently in a cast. No surgical intervention needed.   She is currently taking Lasix 20 mg QD and Spironolactone 25 mg QD. She reports abdominal distension. Endorse a low salt diet. She continues to take Carvedilol 3.125 mg QD. HR today 54. She took meds this AM. She has 2-3 soft bms/day. She is currently on Lactulose and Xifaxan. Denies confusion.   9/14/23: No new complaints since last visit. Transplant evaluation was initiated since last visit. No hospitalizations, hematemesis melena, encephalopathy since last visit. Scheduled for cardiac stress test as a part of pretransplant work-up today 6/13/23 CT Chest noting RUL 2 mm nodule. Negative bone scan  7/14/22: Pt reports that she has known fatty liver for many years and was diagnosed with cirrhosis in the last few years. Previous management included Lasix 40 mg daily and ursodiol 500 mg daily. Pt is unsure why she was prescribed ursodiol. Current daily smoker. Denies alcohol consumption, past or present. Denies risk factors for viral hepatitis. Pt states, and  Kai agrees, that pt has become more confused and foggy in the past year. She also reports bladder and bowel incontinence and unsteady gait. She has a history of falls, most recent being last year in which she fractured her shoulder. Pt feels these symptoms started after the most recent fall.  On chart review: Pt has had elevated LFTs dating as far back as 2016 in the form of alk phos elevation ranging from 120-200 with normal transaminases and bilirubin. In 2020, bilirubin is seen to elevate to 1.7 and fluctuate from 0.8 to 2.0 since then. ALT remained normal. AST mostly in the 30s-40s. Last INR done in 2020 was 1.4. Negative for HBV and HCV in 2020. Plt count ranging from 44-77 since 2017.  Last US abdomen from 2020 revealed lobulated liver with a diffuse heterogeneous/nodular echotexture is compatible with underlying hepatocellular disease/cirrhosis, no focal hepatic abnormality, extatic CBD (11 mm) likely related to prior cholecystectomy/cholecystitis, splenomegaly and mild ascites  Last EGD 9/2021 no varices, portal hypertensive gastropathy  No previous paracentesis  8/11/22: Since last visit Labs done 7/18/22: bilirubin 1.6, AST 38, ALT 9, AlkPhos 146, INR 1.33, AFP 2, ABDIEL + 1:160, ASMA + 1:120, viral hepatitis panel negative, iron studies negative.  US abdomen 7/18/22: cirrhotic liver without focal abnormality, mild abdominal ascites.  Pt reports she has quit smoking. She started Xifaxan 550mg BID which her  states has helped with her confusion. Lasix was decreased to 20 mg daily due to pt complaints of frequent urination. Denies hematemesis.  10/20/22: Pt reports she went to [facility] in September for increased confusion. She was given a prescription for lactulose and discharged home. Pt is taking lactulose and Xifaxan and reports her confusion has improved. Denies fatigue, malaise, arthralgias, myalgias, recent infection, abdominal pain or distension, jaundice, hematemesis, hematochezia, dark urine, confusion, unintentional weight loss or gain. She is currently taking Spironolactone 50 mg daily. Pt is also taking Ursodiol for pruritus and reports positive relief.  Labs 9/20/22 - bilirubin 1.7, AST 39, ALT 10, , INR 1.33, PLT 41  EGD 10/19/22 - no varices, portal hypertensive gastropathy  1/3/23: She is status post discharge from [facility] where she had been transferred for management of GI bleed from [facility]. S/P banding at [facility] with recs to repeat EGD 2 weeks after DC. Deemed not a TIPS candidate due to h/o Encephalopathy.    4/21/23: Pt presents today for follow up visit, accompanied by her . Pt reports she is feeling well. She continues to take Lactulose and Xifaxan. Denies confusion. She is currently on Furosemide 20 mg daily and Spironolactone 25 mg daily. Denies abdominal distension or LE edema. Pt is currently on Carvedilol 3.125 mg daily. HR in office today is 58. Denies hematemesis or black stools. LFTs from 2/23 - Tbili 1.4, AST 25, ALT 6, . Pt is currently on Ursodiol 500 mg daily.

## 2024-05-10 NOTE — PHYSICAL EXAM
[Spider Angioma] : Spider angioma(s) was(were) observed [Splenomegaly] : splenomegaly [Non-Tender] : non-tender [Irregular] : irregular [Asterixis] : Asterixis observed [General Appearance - In No Acute Distress] : in no acute distress [General Appearance - Alert] : alert [Sclera] : the sclera and conjunctiva were normal [Neck Appearance] : the appearance of the neck was normal [Edema] : there was no peripheral edema [Bowel Sounds] : normal bowel sounds [Abdomen Soft] : soft [Abdomen Tenderness] : non-tender [Abdomen Mass (___ Cm)] : no abdominal mass palpated [Involuntary Movements] : no involuntary movements were seen [Motor Tone] : muscle strength and tone were normal [Ataxic, Wide-Based] : wide-based and ataxic [Skin Color & Pigmentation] : normal skin color and pigmentation [Skin Turgor] : normal skin turgor [] : no rash [Oriented To Time, Place, And Person] : oriented to person, place, and time [Impaired Insight] : insight and judgment were intact [Affect] : the affect was normal [Scleral Icterus] : No Scleral Icterus [Hepatojugular Reflux] : patient did not have a sustained hepatojugular reflux [Abdominal  Ascites] : no ascites [Jaundice] : No jaundice [Palmar Erythema] : no Palmar Erythema [Depression] : no depression [Hallucinations] : ~T no ~M hallucinations [Delusions] : no ~T delusions [Suicidal Ideation] : no suicidal ideation [Homicidal Ideation] : no homicidal ideations [Preoccupiation With Violent Thoughts] : no preoccupation with violent thoughts

## 2024-05-10 NOTE — ASSESSMENT
[FreeTextEntry1] : FELICITY RUBIN is a 67 year old female with a PMH significant for decompensated Cirrhosis c/b Ascites, Hepatic Encephalopathy, HTN, DM, HLD, Asthma, and Anemia.  Cirrhosis -Etiology - likely NAFLD coupled w/burnt out AIH given positive ABDIEL and ASMA.  HCC -Diagnosed May 2023. Treated and cured with t Y90 August 15, 2023. Triphasic CT on 18 December 2023 showed new lesions.  AFP November 2023 was 2/9/2005  Was discussed at the multidisciplinary hepatobiliary tumor board.  Repeat Y90 was recommended. She is status post Y90 on 2/26/2024.  No postprocedure complications.  Doing subjectively well. Transplant work-up canceled due to high AFP -AFP (11/2023) -2905.  Now down to 10   Ascites Decreased urination. Repeat bmp -sCrt stable. Continue Furosemide 20 mg and  Spironolactone to 50 mg daily.  No palpable ascites.  No pedal edema.   Variceal Screening -EGD 12/2022 - Large junctional varix banded, PHG. Follow up with GI, Dr. Carranza, pending -Drop Carvedilol 3.125 mg to half daily. HR 54 today. Advised pt to monitor HR at home and contact our office for continued HR <69 -if pt experiences hematemesis, advised to go to ER immediately  Encephalopathy -notify provider if new onset confusion -titrate lactulose to 1-2 bms/day and continue Xifaxan 550mg bid.  They will drop the lactulose dose today   Health Maintenance -Pt is not immune to HBV, vaccine recommended w/PCP. -Can take up to 2G Tylenol per day. NO NSAIDs as these can lead to diuretic resistance and precipitate renal dysfunction in patients with advanced liver disease. -High Protein Low Fat Low Salt (up to 2G Na/day) Diet, no prolonged fasting, Mediterranean Diet info provided -Continue to abstain from alcohol and all illicit drugs -Avoid use of herbal and dietary supplements due to potential hepatotoxicity -Avoid eating any unpasteurized dairy products; avoid eating any raw or undercooked eggs, fish, poultry, or meat; and avoid eating raw/steamed oysters or other shellfish to avoid risk of infection.   Follow up in 2-3 months

## 2024-05-13 ENCOUNTER — TRANSCRIPTION ENCOUNTER (OUTPATIENT)
Age: 69
End: 2024-05-13

## 2024-05-15 PROBLEM — I85.00 ESOPHAGEAL VARICES: Status: ACTIVE | Noted: 2023-01-24

## 2024-05-20 ENCOUNTER — APPOINTMENT (OUTPATIENT)
Dept: INTERVENTIONAL RADIOLOGY/VASCULAR | Facility: CLINIC | Age: 69
End: 2024-05-20
Payer: MEDICARE

## 2024-05-20 DIAGNOSIS — K76.9 LIVER DISEASE, UNSPECIFIED: ICD-10-CM

## 2024-05-20 DIAGNOSIS — I85.00 ESOPHAGEAL VARICES W/OUT BLEEDING: ICD-10-CM

## 2024-05-20 PROCEDURE — 99214 OFFICE O/P EST MOD 30 MIN: CPT

## 2024-05-20 RX ORDER — RIFAXIMIN 550 MG/1
550 TABLET ORAL
Qty: 60 | Refills: 6 | Status: DISCONTINUED | COMMUNITY
Start: 2022-07-14 | End: 2024-05-20

## 2024-05-20 NOTE — PHYSICAL EXAM
[Normal Rate and Effort] : normal respiratory rhythm and effort [No Accessory Muscle Use] : no accessory muscle use [Alert] : alert [No Acute Distress] : no acute distress [Well Nourished] : well nourished [Well Developed] : well developed [Healthy Appearance] : healthy appearance [Normal Voice/Communication] : normal voice communication [No Respiratory Distress] : no respiratory distress [Oriented x3] : oriented to person, place, and time [Normal Insight/Judgement] : insight and judgment were intact [Normal Affect] : the affect was normal [Normal Mood] : the mood was normal [Recent Memory Normal] : recent memory was not impaired [Remote Memory Normal] : remote memory was not impaired [Restricted in physically strenuous activity but ambulatory and able to carry out work of a light or sedentary nature] : Restricted in physically strenuous activity but ambulatory and able to carry out work of a light or sedentary nature, e.g., light house work, office work

## 2024-05-24 ENCOUNTER — APPOINTMENT (OUTPATIENT)
Dept: ENDOCRINOLOGY | Facility: CLINIC | Age: 69
End: 2024-05-24

## 2024-05-24 ENCOUNTER — TRANSCRIPTION ENCOUNTER (OUTPATIENT)
Age: 69
End: 2024-05-24

## 2024-05-27 ENCOUNTER — NON-APPOINTMENT (OUTPATIENT)
Age: 69
End: 2024-05-27

## 2024-05-27 LAB
ANION GAP SERPL CALC-SCNC: 10 MMOL/L
BUN SERPL-MCNC: 18 MG/DL
C PEPTIDE SERPL-MCNC: 0.9 NG/ML
CALCIUM SERPL-MCNC: 8 MG/DL
CHLORIDE SERPL-SCNC: 110 MMOL/L
CO2 SERPL-SCNC: 22 MMOL/L
CREAT SERPL-MCNC: 0.85 MG/DL
EGFR: 74 ML/MIN/1.73M2
ESTIMATED AVERAGE GLUCOSE: 131 MG/DL
GLUCOSE SERPL-MCNC: 144 MG/DL
HBA1C MFR BLD HPLC: 6.2 %
POTASSIUM SERPL-SCNC: 4.1 MMOL/L
SODIUM SERPL-SCNC: 143 MMOL/L

## 2024-05-31 ENCOUNTER — LABORATORY RESULT (OUTPATIENT)
Age: 69
End: 2024-05-31

## 2024-05-31 ENCOUNTER — APPOINTMENT (OUTPATIENT)
Dept: ULTRASOUND IMAGING | Facility: CLINIC | Age: 69
End: 2024-05-31
Payer: MEDICARE

## 2024-05-31 PROCEDURE — 93975 VASCULAR STUDY: CPT

## 2024-06-03 ENCOUNTER — APPOINTMENT (OUTPATIENT)
Dept: ENDOCRINOLOGY | Facility: CLINIC | Age: 69
End: 2024-06-03
Payer: MEDICARE

## 2024-06-03 PROCEDURE — G0108 DIAB MANAGE TRN  PER INDIV: CPT

## 2024-06-05 ENCOUNTER — APPOINTMENT (OUTPATIENT)
Dept: GASTROENTEROLOGY | Facility: CLINIC | Age: 69
End: 2024-06-05

## 2024-06-05 ENCOUNTER — NON-APPOINTMENT (OUTPATIENT)
Age: 69
End: 2024-06-05

## 2024-06-05 VITALS
WEIGHT: 145 LBS | DIASTOLIC BLOOD PRESSURE: 82 MMHG | HEART RATE: 42 BPM | RESPIRATION RATE: 14 BRPM | HEIGHT: 61 IN | SYSTOLIC BLOOD PRESSURE: 122 MMHG | BODY MASS INDEX: 27.38 KG/M2 | OXYGEN SATURATION: 98 %

## 2024-06-05 DIAGNOSIS — I10 ESSENTIAL (PRIMARY) HYPERTENSION: ICD-10-CM

## 2024-06-05 LAB
ALBUMIN SERPL ELPH-MCNC: 2.8 G/DL
ALP BLD-CCNC: 170 U/L
ALT SERPL-CCNC: 12 U/L
ANION GAP SERPL CALC-SCNC: 9 MMOL/L
AST SERPL-CCNC: 35 U/L
BASOPHILS # BLD AUTO: 0.03 K/UL
BASOPHILS NFR BLD AUTO: 0.7 %
BILIRUB INDIRECT SERPL-MCNC: 0.7 MG/DL
BILIRUB SERPL-MCNC: 1.6 MG/DL
BUN SERPL-MCNC: 17 MG/DL
CALCIUM SERPL-MCNC: 8.6 MG/DL
CHLORIDE SERPL-SCNC: 108 MMOL/L
CO2 SERPL-SCNC: 24 MMOL/L
CREAT SERPL-MCNC: 0.86 MG/DL
EGFR: 73 ML/MIN/1.73M2
EOSINOPHIL # BLD AUTO: 0.1 K/UL
EOSINOPHIL NFR BLD AUTO: 2.2 %
GLUCOSE SERPL-MCNC: 134 MG/DL
HCT VFR BLD CALC: 27.4 %
HGB BLD-MCNC: 7.9 G/DL
IMM GRANULOCYTES NFR BLD AUTO: 0.2 %
INR PPP: 1.46 RATIO
LYMPHOCYTES # BLD AUTO: 0.36 K/UL
LYMPHOCYTES NFR BLD AUTO: 8.1 %
MAN DIFF?: NORMAL
MCHC RBC-ENTMCNC: 24.5 PG
MCHC RBC-ENTMCNC: 28.8 GM/DL
MCV RBC AUTO: 84.8 FL
MONOCYTES # BLD AUTO: 0.6 K/UL
MONOCYTES NFR BLD AUTO: 13.4 %
NEUTROPHILS # BLD AUTO: 3.37 K/UL
NEUTROPHILS NFR BLD AUTO: 75.4 %
PLATELET # BLD AUTO: 68 K/UL
POTASSIUM SERPL-SCNC: 3.9 MMOL/L
PROT SERPL-MCNC: 5.6 G/DL
PT BLD: 16.3 SEC
RBC # BLD: 3.23 M/UL
RBC # FLD: 19.5 %
SODIUM SERPL-SCNC: 141 MMOL/L
WBC # FLD AUTO: 4.47 K/UL

## 2024-06-05 PROCEDURE — 99215 OFFICE O/P EST HI 40 MIN: CPT

## 2024-06-05 NOTE — HISTORY OF PRESENT ILLNESS
[FreeTextEntry1] : HECTOR RUBIN is a 68 year old female with a PMH significant for decompensated Cirrhosis c/b Ascites, Hepatic Encephalopathy, GIBleed s/p banding i/23 HTN, DM, HLD, Asthma, and Anemia and HCC s/p LDT Y90 on 8/15  6/5/24 Records reviewed, including notes, labs, imaging, etc. No new liver-related complaints like hematemesis melena jaundice. 2 hospitalizations  Increased Lactulose dose medication changes. Previsit labs reviwed MRI nondiagnosic us doppler ND ct pENDING rEVIWED Meds : Spironolactone  50 mg od Lasix 40 od 1U PRBC      5/1/24 Records reviewed, including notes, labs, imaging, etc. No new liver-related complaints like hematemesis melena jaundice. No hospitalizations or medication changes. Previsit labs reviwed nl AFP: Down to 10   bm 4-5: Drop lactulose dose High blood sugar : High . Seeing Endo  3/6/24 Records reviewed, including notes, labs, imaging, etc. No new liver-related complaints like hematemesis melena jaundice. Interim Y90 on 2/26/2024 No postprocedure complications. Complains of decreased urination. Labs a few days ago revealed normal creatinine. Repeat basic metabolic profile today. She was put on 3 to 4 pounds since her last visit.  She does endorse consuming more protein.  On exam no ascites no pedal edema so the weight gain is likely muscle of fat. Heart rate on carvedilol is 57 blood pressure 102 / 48 .  Asymptomatic.  No chest pain shortness of breath etc. Will drop the carvedilol dose.    January 3, 2024 Patient presents for follow-up visit.  She is accompanied by her son her  and her 1 daughter joined the conversation via phone. Since the last visit unfortunately she was found to have an AFP of 2905. Imaging done on 12/18/2023 which is around 3 months post her Y90 therapy revealed : "Post radio embolization changes involving segments 2, 3 and 4 without evidence of viable enhancing tumor (LR-TR nonviable). There are however new lesions demonstrating washout without evidence of arterial enhancement " No new complaints. Records reviewed, including notes, labs, imaging, etc. No new liver-related complaints like hematemesis melena jaundice. Xanax was stopped .   noted that her encephalopathy significantly resolved after that. Patient and family understandably upset about the AFP and the new tumor findings.   11/20/23: Pt presents for f/u visit, accompanied by her . labs reviewed w/pt. Labs - tbili 1.7, AST 34, ALT 7, , INR 1.64. AFP 2905. Y90 was done on 8/11/23. No recent imaging. Transplant eval is underway. She is pending cardiac cath next week. She recent broke her wrist and is currently in a cast. No surgical intervention needed.   She is currently taking Lasix 20 mg QD and Spironolactone 25 mg QD. She reports abdominal distension. Endorse a low salt diet. She continues to take Carvedilol 3.125 mg QD. HR today 54. She took meds this AM. She has 2-3 soft bms/day. She is currently on Lactulose and Xifaxan. Denies confusion.   9/14/23: No new complaints since last visit. Transplant evaluation was initiated since last visit. No hospitalizations, hematemesis melena, encephalopathy since last visit. Scheduled for cardiac stress test as a part of pretransplant work-up today 6/13/23 CT Chest noting RUL 2 mm nodule. Negative bone scan  7/14/22: Pt reports that she has known fatty liver for many years and was diagnosed with cirrhosis in the last few years. Previous management included Lasix 40 mg daily and ursodiol 500 mg daily. Pt is unsure why she was prescribed ursodiol. Current daily smoker. Denies alcohol consumption, past or present. Denies risk factors for viral hepatitis. Pt states, and  Kai agrees, that pt has become more confused and foggy in the past year. She also reports bladder and bowel incontinence and unsteady gait. She has a history of falls, most recent being last year in which she fractured her shoulder. Pt feels these symptoms started after the most recent fall.  On chart review: Pt has had elevated LFTs dating as far back as 2016 in the form of alk phos elevation ranging from 120-200 with normal transaminases and bilirubin. In 2020, bilirubin is seen to elevate to 1.7 and fluctuate from 0.8 to 2.0 since then. ALT remained normal. AST mostly in the 30s-40s. Last INR done in 2020 was 1.4. Negative for HBV and HCV in 2020. Plt count ranging from 44-77 since 2017.  Last US abdomen from 2020 revealed lobulated liver with a diffuse heterogeneous/nodular echotexture is compatible with underlying hepatocellular disease/cirrhosis, no focal hepatic abnormality, extatic CBD (11 mm) likely related to prior cholecystectomy/cholecystitis, splenomegaly and mild ascites  Last EGD 9/2021 no varices, portal hypertensive gastropathy  No previous paracentesis  8/11/22: Since last visit Labs done 7/18/22: bilirubin 1.6, AST 38, ALT 9, AlkPhos 146, INR 1.33, AFP 2, ABDIEL + 1:160, ASMA + 1:120, viral hepatitis panel negative, iron studies negative.  US abdomen 7/18/22: cirrhotic liver without focal abnormality, mild abdominal ascites.  Pt reports she has quit smoking. She started Xifaxan 550mg BID which her  states has helped with her confusion. Lasix was decreased to 20 mg daily due to pt complaints of frequent urination. Denies hematemesis.  10/20/22: Pt reports she went to [facility] in September for increased confusion. She was given a prescription for lactulose and discharged home. Pt is taking lactulose and Xifaxan and reports her confusion has improved. Denies fatigue, malaise, arthralgias, myalgias, recent infection, abdominal pain or distension, jaundice, hematemesis, hematochezia, dark urine, confusion, unintentional weight loss or gain. She is currently taking Spironolactone 50 mg daily. Pt is also taking Ursodiol for pruritus and reports positive relief.  Labs 9/20/22 - bilirubin 1.7, AST 39, ALT 10, , INR 1.33, PLT 41  EGD 10/19/22 - no varices, portal hypertensive gastropathy  1/3/23: She is status post discharge from [facility] where she had been transferred for management of GI bleed from [facility]. S/P banding at [facility] with recs to repeat EGD 2 weeks after DC. Deemed not a TIPS candidate due to h/o Encephalopathy.    4/21/23: Pt presents today for follow up visit, accompanied by her . Pt reports she is feeling well. She continues to take Lactulose and Xifaxan. Denies confusion. She is currently on Furosemide 20 mg daily and Spironolactone 25 mg daily. Denies abdominal distension or LE edema. Pt is currently on Carvedilol 3.125 mg daily. HR in office today is 58. Denies hematemesis or black stools. LFTs from 2/23 - Tbili 1.4, AST 25, ALT 6, . Pt is currently on Ursodiol 500 mg daily.

## 2024-06-12 ENCOUNTER — APPOINTMENT (OUTPATIENT)
Dept: CT IMAGING | Facility: CLINIC | Age: 69
End: 2024-06-12

## 2024-06-14 ENCOUNTER — OUTPATIENT (OUTPATIENT)
Dept: OUTPATIENT SERVICES | Facility: HOSPITAL | Age: 69
LOS: 1 days | End: 2024-06-14
Payer: MEDICARE

## 2024-06-14 DIAGNOSIS — D64.9 ANEMIA, UNSPECIFIED: ICD-10-CM

## 2024-06-14 DIAGNOSIS — Z98.891 HISTORY OF UTERINE SCAR FROM PREVIOUS SURGERY: Chronic | ICD-10-CM

## 2024-06-14 DIAGNOSIS — Z98.49 CATARACT EXTRACTION STATUS, UNSPECIFIED EYE: Chronic | ICD-10-CM

## 2024-06-17 NOTE — HISTORY OF PRESENT ILLNESS
[Home] : at home, [unfilled] , at the time of the visit. [Medical Office: ([facility])___] : at the medical office located in  [Verbal consent obtained from patient] : the patient, [unfilled] [0] : ~His/Her~ pain was 0 out of 10 [FreeTextEntry1] : HCC

## 2024-06-17 NOTE — HISTORY OF PRESENT ILLNESS
[Home] : at home, [unfilled] , at the time of the visit. [Medical Office: ([facility])___] : at the medical office located in  [Verbal consent obtained from patient] : the patient, [unfilled] [0] : ~His/Her~ pain was 0 out of 10 [FreeTextEntry1] : HCC

## 2024-06-17 NOTE — ASSESSMENT
[SIRT Procedure & Planning] : SIRT procedure and planning discussed at length [Other: _____] : [unfilled] [FreeTextEntry1] : Ms. Rubin is a 70 y/o female with a PMH of HTN, T2DM, HLD, asthma, anemia, as well as decompensated EtOH cirrhosis hepatic encephalopathy presents for follow up s/p liver directed therapy.   # HCC - pt with hx of ETOH cirrhosis c/b hepatic encephalopathy and EV bleeding s/p banding, now with HCC - s/p y90 to 2 small segment 3 tumors in 8/2023 and most recently y90 to left hepatic artery on 2/26/24. - Ms Rubin reports that she was admitted via the ED on 5/10 2/2 elevated NH4 levels with altered mentation - was discharged home on 5/13 after NH4 normalized and AMS resolved - she denied fever, chills, abd pain during that admission - continues with Lactulose - Xifaxin d/c'd 2/2 insurance issues per the pt  - today, Ms. RUBIN reports feeling well overall & she denies abdominal pain, scleral or dermal discoloration, abdominal distention, ascites, LE edema, altered mentation, hematemesis, hemoptysis, hematochezia, melena, n/v/d, unintentional weight loss or gain or any other liver related sequelae - reviewed 4/24/24 labs - t bili 1.4 - down trending  - 5/10/24 MRI done - full report pending - Dr Wheeler reviewed the imaging - there doesn't appear to be any new abnormalities & I discussed this with the pt - f/u imaging with IR f/u to review results in 3 months - 8/2024 - continue to follow with hepatology - next f/u with Dr Martinez is 8/7 - will call the pt with MRI results once imaging is read  # Elevated AFP - now down trending - previously up to 2905 on 11/13/23 - most recent level 10 on 4/24/24 - was 12.9 on 3/7/24 - continue to trend with hepatology team  #  Pedal Edema - pt reports intermittent pedal edema and she notes that she is not urinating as much after the diuretics - currently on Lasix and Spironolactone - symptoms improve with elevation - reinforced that she continue to monitor symptoms, elevate when possible. If edema worsens, she should f/u with hepatology, as she may need adjustment in her diuretic regimen  Ms. RUBIN's comprehension was confirmed & all questions were asked & answered to her satisfaction. IR contact information was reviewed with the pt should there be any questions, issues, or concerns, to be addressed.  Above case & plan discussed with Dr Wheeler.

## 2024-06-23 ENCOUNTER — RX RENEWAL (OUTPATIENT)
Age: 69
End: 2024-06-23

## 2024-06-23 RX ORDER — PANTOPRAZOLE 40 MG/1
40 TABLET, DELAYED RELEASE ORAL
Qty: 90 | Refills: 1 | Status: ACTIVE | COMMUNITY
Start: 2020-09-03 | End: 1900-01-01

## 2024-06-23 RX ORDER — URSODIOL 500 MG/1
500 TABLET ORAL
Qty: 90 | Refills: 1 | Status: ACTIVE | COMMUNITY
Start: 2021-04-26 | End: 1900-01-01

## 2024-06-24 ENCOUNTER — APPOINTMENT (OUTPATIENT)
Dept: INTERVENTIONAL RADIOLOGY/VASCULAR | Facility: CLINIC | Age: 69
End: 2024-06-24

## 2024-06-24 DIAGNOSIS — K76.6 PORTAL HYPERTENSION: ICD-10-CM

## 2024-06-24 DIAGNOSIS — R77.2 ABNORMALITY OF ALPHAFETOPROTEIN: ICD-10-CM

## 2024-06-24 DIAGNOSIS — K76.82 HEPATIC ENCEPHALOPATHY: ICD-10-CM

## 2024-06-24 DIAGNOSIS — C22.0 LIVER CELL CARCINOMA: ICD-10-CM

## 2024-06-24 DIAGNOSIS — K74.60 UNSPECIFIED CIRRHOSIS OF LIVER: ICD-10-CM

## 2024-06-24 PROCEDURE — 99214 OFFICE O/P EST MOD 30 MIN: CPT

## 2024-06-24 RX ORDER — RIFAXIMIN 550 MG/1
550 TABLET ORAL
Refills: 0 | Status: ACTIVE | COMMUNITY

## 2024-06-24 NOTE — REASON FOR VISIT
[Follow-Up: _____] : a [unfilled] follow-up visit [Home] : at home, [unfilled] , at the time of the visit. [Medical Office: ([facility])___] : at the medical office located in  [Patient] : the patient [Self] : self

## 2024-06-24 NOTE — ASSESSMENT
[Other: _____] : [unfilled] [FreeTextEntry1] : Ms. Rubin is a 70 y/o female with hx of HTN, T2DM, HLD, asthma, anemia, as well as decompensated EtOH cirrhosis hepatic encephalopathy presents for follow up s/p liver directed therapy.   # HCC - pt with hx of ETOH cirrhosis c/b hepatic encephalopathy and EV bleeding s/p banding, now with HCC - s/p y90 to 2 small segment 3 tumors in 8/2023 and most recently y90 to left hepatic artery on 2/26/24. - Ms Rubin reports that she was admitted via the ED on 5/10 2/2 elevated NH4 levels with altered mentation - was discharged home on 5/13 after NH4 normalized and AMS resolved - she denied fever, chills, abd pain during that admission - continues with Lactulose - Xifaxin d/c'd 2/2 insurance issues per the pt  - Ms. RUBIN reports feeling well overall since 6/15 discharge & she denies abdominal pain, scleral or dermal discoloration, abdominal distention, ascites, LE edema, altered mentation, hematemesis, hemoptysis, hematochezia, melena, n/v/d, unintentional weight loss or gain or any other liver related sequelae - reviewed 5/31/24 labs reviewed - 5/10/24 MRI done - nondiagnostic-motion degraded study, follow up CT recommended - pt to get triple phase CT in the next 2-3 weeks with IR f/u to review results approx 1 wk after CT - continue to follow with hepatology - next f/u with Dr Martinez is 8/7  # Elevated AFP - now down trending - previously up to 2905 on 11/13/23 - most recent level 10 on 4/24/24 - was 12.9 on 3/7/24 - continue to trend with hepatology team  #  Pedal Edema - pt reported intermittent pedal edema and she notes that she is not urinating as much after the diuretics - currently on Lasix and Spironolactone - symptoms improve with elevation - reinforced that she continue to monitor symptoms, elevate when possible. If edema worsens, she should f/u with hepatology, as she may need adjustment in her diuretic regimen  Ms. & Mr. RUBIN's comprehension was confirmed & all questions were asked & answered to their satisfaction. IR contact information was reviewed with the pt should there be any questions, issues, or concerns, to be addressed.

## 2024-06-24 NOTE — DATA REVIEWED
[FreeTextEntry1] : ACC: [id number] EXAM: CT ABDOMEN AND PELVIS IC ORDERED BY: OLENA CONNELL  PROCEDURE DATE: 06/12/2024    INTERPRETATION: CLINICAL INFORMATION: Abdominal pain  COMPARISON: 12/18/2023  CONTRAST/COMPLICATIONS: IV Contrast: Omnipaque 350 90 cc administered 10 cc discarded Oral Contrast: NONE Complications: None reported at time of study completion  PROCEDURE: CT of the Abdomen and Pelvis was performed. Sagittal and coronal reformats were performed.  FINDINGS: LOWER CHEST: Basilar atelectasis. Coronary artery calcifications.  LIVER: Cirrhosis. Chronic right portal vein thrombosis. Heterogeneous left hepatic enhancement. BILE DUCTS: Redemonstrated dilatation and pneumobilia. GALLBLADDER: Cholecystectomy. SPLEEN: Splenomegaly. PANCREAS: Stable 8 mm pancreatic head cyst. ADRENALS: Within normal limits. KIDNEYS/URETERS: Left renal cyst. Nonobstructive bilateral intrarenal calculi. No hydronephrosis.  BLADDER: Within normal limits. REPRODUCTIVE ORGANS: Uterus and adnexa within normal limits.  BOWEL: No bowel obstruction. Appendix is within normal limits. Small bowel and colonic wall thickening/edema. PERITONEUM/RETROPERITONEUM: Mild ascites. VESSELS: Atherosclerotic changes. Dilated main portal, splenic, left gonadal and inferior mesenteric veins. LYMPH NODES: Prominent retroperitoneal lymph nodes. ABDOMINAL WALL: Postsurgical changes. Left ventral abdominal wall hernia containing fat and multiple fluid filled structures. Small bilateral fat-containing umbilical and inguinal hernias. BONES: Degenerative changes.  IMPRESSION: Enterocolitis.    --- End of Report ---

## 2024-06-24 NOTE — HISTORY OF PRESENT ILLNESS
[Home] : at home, [unfilled] , at the time of the visit. [Medical Office: ([facility])___] : at the medical office located in  [Verbal consent obtained from patient] : the patient, [unfilled] [0] : ~His/Her~ pain was 0 out of 10 [FreeTextEntry1] : HCC

## 2024-07-02 ENCOUNTER — APPOINTMENT (OUTPATIENT)
Dept: CT IMAGING | Facility: CLINIC | Age: 69
End: 2024-07-02
Payer: MEDICARE

## 2024-07-02 ENCOUNTER — RESULT REVIEW (OUTPATIENT)
Age: 69
End: 2024-07-02

## 2024-07-02 PROCEDURE — 74170 CT ABD WO CNTRST FLWD CNTRST: CPT | Mod: MH

## 2024-07-05 ENCOUNTER — NON-APPOINTMENT (OUTPATIENT)
Age: 69
End: 2024-07-05

## 2024-07-05 ENCOUNTER — APPOINTMENT (OUTPATIENT)
Dept: HEMATOLOGY ONCOLOGY | Facility: CLINIC | Age: 69
End: 2024-07-05
Payer: MEDICARE

## 2024-07-05 VITALS
HEIGHT: 61 IN | WEIGHT: 146.13 LBS | TEMPERATURE: 98.1 F | RESPIRATION RATE: 16 BRPM | BODY MASS INDEX: 27.59 KG/M2 | OXYGEN SATURATION: 97 % | DIASTOLIC BLOOD PRESSURE: 78 MMHG | HEART RATE: 82 BPM | SYSTOLIC BLOOD PRESSURE: 123 MMHG

## 2024-07-05 PROCEDURE — G2211 COMPLEX E/M VISIT ADD ON: CPT

## 2024-07-05 PROCEDURE — 99204 OFFICE O/P NEW MOD 45 MIN: CPT

## 2024-07-08 ENCOUNTER — APPOINTMENT (OUTPATIENT)
Dept: INTERVENTIONAL RADIOLOGY/VASCULAR | Facility: CLINIC | Age: 69
End: 2024-07-08

## 2024-07-08 DIAGNOSIS — K74.60 UNSPECIFIED CIRRHOSIS OF LIVER: ICD-10-CM

## 2024-07-08 DIAGNOSIS — I85.00 ESOPHAGEAL VARICES W/OUT BLEEDING: ICD-10-CM

## 2024-07-08 DIAGNOSIS — K76.6 PORTAL HYPERTENSION: ICD-10-CM

## 2024-07-08 DIAGNOSIS — C22.0 LIVER CELL CARCINOMA: ICD-10-CM

## 2024-07-08 DIAGNOSIS — R77.2 ABNORMALITY OF ALPHAFETOPROTEIN: ICD-10-CM

## 2024-07-08 DIAGNOSIS — K76.0 FATTY (CHANGE OF) LIVER, NOT ELSEWHERE CLASSIFIED: ICD-10-CM

## 2024-07-08 DIAGNOSIS — D64.9 ANEMIA, UNSPECIFIED: ICD-10-CM

## 2024-07-08 DIAGNOSIS — K76.9 LIVER DISEASE, UNSPECIFIED: ICD-10-CM

## 2024-07-08 DIAGNOSIS — K76.82 HEPATIC ENCEPHALOPATHY: ICD-10-CM

## 2024-07-08 PROCEDURE — 99214 OFFICE O/P EST MOD 30 MIN: CPT

## 2024-07-15 ENCOUNTER — APPOINTMENT (OUTPATIENT)
Dept: INFUSION THERAPY | Facility: CANCER CENTER | Age: 69
End: 2024-07-15

## 2024-07-15 DIAGNOSIS — D50.9 IRON DEFICIENCY ANEMIA, UNSPECIFIED: ICD-10-CM

## 2024-07-16 ENCOUNTER — NON-APPOINTMENT (OUTPATIENT)
Age: 69
End: 2024-07-16

## 2024-07-17 ENCOUNTER — LABORATORY RESULT (OUTPATIENT)
Age: 69
End: 2024-07-17

## 2024-07-17 ENCOUNTER — APPOINTMENT (OUTPATIENT)
Dept: ENDOCRINOLOGY | Facility: CLINIC | Age: 69
End: 2024-07-17
Payer: MEDICARE

## 2024-07-17 PROCEDURE — ZZZZZ: CPT

## 2024-07-23 RX ORDER — PEN NEEDLE, DIABETIC 32GX 5/32"
32G X 4 MM NEEDLE, DISPOSABLE MISCELLANEOUS
Qty: 4 | Refills: 1 | Status: ACTIVE | COMMUNITY
Start: 2024-07-23 | End: 1900-01-01

## 2024-07-26 ENCOUNTER — APPOINTMENT (OUTPATIENT)
Dept: INFUSION THERAPY | Facility: CANCER CENTER | Age: 69
End: 2024-07-26

## 2024-07-26 PROCEDURE — 96365 THER/PROPH/DIAG IV INF INIT: CPT

## 2024-08-01 ENCOUNTER — NON-APPOINTMENT (OUTPATIENT)
Age: 69
End: 2024-08-01

## 2024-08-03 ENCOUNTER — RESULT REVIEW (OUTPATIENT)
Age: 69
End: 2024-08-03

## 2024-08-05 ENCOUNTER — APPOINTMENT (OUTPATIENT)
Dept: ENDOCRINOLOGY | Facility: CLINIC | Age: 69
End: 2024-08-05

## 2024-08-07 ENCOUNTER — APPOINTMENT (OUTPATIENT)
Dept: GASTROENTEROLOGY | Facility: CLINIC | Age: 69
End: 2024-08-07

## 2024-08-13 ENCOUNTER — APPOINTMENT (OUTPATIENT)
Dept: CARDIOLOGY | Facility: CLINIC | Age: 69
End: 2024-08-13
Payer: MEDICARE

## 2024-08-13 VITALS
SYSTOLIC BLOOD PRESSURE: 130 MMHG | WEIGHT: 154 LBS | BODY MASS INDEX: 29.07 KG/M2 | OXYGEN SATURATION: 94 % | HEART RATE: 82 BPM | HEIGHT: 61 IN | DIASTOLIC BLOOD PRESSURE: 58 MMHG

## 2024-08-13 DIAGNOSIS — I85.00 ESOPHAGEAL VARICES W/OUT BLEEDING: ICD-10-CM

## 2024-08-13 PROCEDURE — 99214 OFFICE O/P EST MOD 30 MIN: CPT

## 2024-08-13 NOTE — REASON FOR VISIT
[FreeTextEntry1] : The patient is seen in follow-up for possible endocarditis.  The patient was in the hospital with significant infection.  She was found to be strep to coccus positive and blood cultures.  Transthoracic echocardiography was equivocal for vegetation.  Overall it was elected to proceed with prolonged antibiotic therapy.  Patient has a history of cirrhosis, esophageal varices and banding.  Transesophageal echocardiography was felt to be contraindicated.  The patient now returns with a PICC line in place.  She has been receiving antibiotics.  She is about 4 weeks left of her antibiotic course.  The patient has no fever.  She feels well.  There has been no chest discomfort no shortness of breath and no recurrence of confusion.

## 2024-08-13 NOTE — ASSESSMENT
[FreeTextEntry1] : Esophageal varices: The patient follows with GI in this regard.  Fever: No recurrence.  Plan has been made for prolonged antibiotic therapy which is reasonable.  The patient is not a candidate for transesophageal echocardiography because of esophageal varices.  There is been no chest discomfort and no shortness of breath.  Transthoracic echocardiography in August 2024 revealed fibrocalcific disease of the mitral valve.  Unable to definitively rule out vegetation.  While in the hospital the patient underwent phlebotomy which revealed WBC of 4.3 on 8/7/2024.  Hematocrit was 29 on 8/5/2024.  Chest x-ray on 8/1/2024 revealed no active infiltrate.

## 2024-08-15 ENCOUNTER — OUTPATIENT (OUTPATIENT)
Dept: OUTPATIENT SERVICES | Facility: HOSPITAL | Age: 69
LOS: 1 days | End: 2024-08-15

## 2024-08-15 DIAGNOSIS — Z98.891 HISTORY OF UTERINE SCAR FROM PREVIOUS SURGERY: Chronic | ICD-10-CM

## 2024-08-15 DIAGNOSIS — Z90.49 ACQUIRED ABSENCE OF OTHER SPECIFIED PARTS OF DIGESTIVE TRACT: Chronic | ICD-10-CM

## 2024-08-15 DIAGNOSIS — Z98.890 OTHER SPECIFIED POSTPROCEDURAL STATES: Chronic | ICD-10-CM

## 2024-08-15 DIAGNOSIS — D50.9 IRON DEFICIENCY ANEMIA, UNSPECIFIED: ICD-10-CM

## 2024-08-15 DIAGNOSIS — Z98.49 CATARACT EXTRACTION STATUS, UNSPECIFIED EYE: Chronic | ICD-10-CM

## 2024-08-23 ENCOUNTER — RESULT REVIEW (OUTPATIENT)
Age: 69
End: 2024-08-23

## 2024-08-23 ENCOUNTER — APPOINTMENT (OUTPATIENT)
Dept: HEMATOLOGY ONCOLOGY | Facility: CLINIC | Age: 69
End: 2024-08-23

## 2024-08-23 LAB
ANISOCYTOSIS BLD QL: SLIGHT — SIGNIFICANT CHANGE UP
BASOPHILS # BLD AUTO: 0.04 K/UL — SIGNIFICANT CHANGE UP (ref 0–0.2)
BASOPHILS NFR BLD AUTO: 0.5 % — SIGNIFICANT CHANGE UP (ref 0–2)
DACRYOCYTES BLD QL SMEAR: SLIGHT — SIGNIFICANT CHANGE UP
ELLIPTOCYTES BLD QL SMEAR: SLIGHT — SIGNIFICANT CHANGE UP
EOSINOPHIL # BLD AUTO: 0.15 K/UL — SIGNIFICANT CHANGE UP (ref 0–0.5)
EOSINOPHIL NFR BLD AUTO: 1.9 % — SIGNIFICANT CHANGE UP (ref 0–6)
HCT VFR BLD CALC: 32.9 % — LOW (ref 34.5–45)
HGB BLD-MCNC: 10.7 G/DL — LOW (ref 11.5–15.5)
HYPOCHROMIA BLD QL: SLIGHT — SIGNIFICANT CHANGE UP
IMM GRANULOCYTES NFR BLD AUTO: 0.4 % — SIGNIFICANT CHANGE UP (ref 0–0.9)
LYMPHOCYTES # BLD AUTO: 0.42 K/UL — LOW (ref 1–3.3)
LYMPHOCYTES # BLD AUTO: 5.3 % — LOW (ref 13–44)
MCHC RBC-ENTMCNC: 29.5 PG — SIGNIFICANT CHANGE UP (ref 27–34)
MCHC RBC-ENTMCNC: 32.5 GM/DL — SIGNIFICANT CHANGE UP (ref 32–36)
MCV RBC AUTO: 90.6 FL — SIGNIFICANT CHANGE UP (ref 80–100)
MONOCYTES # BLD AUTO: 1.05 K/UL — HIGH (ref 0–0.9)
MONOCYTES NFR BLD AUTO: 13.3 % — SIGNIFICANT CHANGE UP (ref 2–14)
NEUTROPHILS # BLD AUTO: 6.21 K/UL — SIGNIFICANT CHANGE UP (ref 1.8–7.4)
NEUTROPHILS NFR BLD AUTO: 78.6 % — HIGH (ref 43–77)
NEUTS VAC BLD QL SMEAR: SLIGHT — SIGNIFICANT CHANGE UP
NRBC # BLD: 0 /100 WBCS — SIGNIFICANT CHANGE UP (ref 0–0)
OVALOCYTES BLD QL SMEAR: SLIGHT — SIGNIFICANT CHANGE UP
PLAT MORPH BLD: NORMAL — SIGNIFICANT CHANGE UP
PLATELET # BLD AUTO: 85 K/UL — LOW (ref 150–400)
POIKILOCYTOSIS BLD QL AUTO: SIGNIFICANT CHANGE UP
POLYCHROMASIA BLD QL SMEAR: SIGNIFICANT CHANGE UP
RBC # BLD: 3.63 M/UL — LOW (ref 3.8–5.2)
RBC # FLD: 21.8 % — HIGH (ref 10.3–14.5)
RBC BLD AUTO: ABNORMAL
SCHISTOCYTES BLD QL AUTO: SLIGHT — SIGNIFICANT CHANGE UP
SMUDGE CELLS # BLD: PRESENT — SIGNIFICANT CHANGE UP
SPHEROCYTES BLD QL SMEAR: SLIGHT — SIGNIFICANT CHANGE UP
WBC # BLD: 7.9 K/UL — SIGNIFICANT CHANGE UP (ref 3.8–10.5)
WBC # FLD AUTO: 7.9 K/UL — SIGNIFICANT CHANGE UP (ref 3.8–10.5)

## 2024-08-26 ENCOUNTER — APPOINTMENT (OUTPATIENT)
Dept: ENDOCRINOLOGY | Facility: CLINIC | Age: 69
End: 2024-08-26
Payer: MEDICARE

## 2024-08-26 LAB
AFP-TM SERPL-MCNC: 7.1 NG/ML
ALBUMIN SERPL ELPH-MCNC: 2.7 G/DL
ALP BLD-CCNC: 173 U/L
ALT SERPL-CCNC: 25 U/L
ANION GAP SERPL CALC-SCNC: 14 MMOL/L
AST SERPL-CCNC: 84 U/L
BILIRUB SERPL-MCNC: 1.4 MG/DL
BUN SERPL-MCNC: 23 MG/DL
CALCIUM SERPL-MCNC: 8.2 MG/DL
CHLORIDE SERPL-SCNC: 107 MMOL/L
CO2 SERPL-SCNC: 22 MMOL/L
CREAT SERPL-MCNC: 1.37 MG/DL
EGFR: 42 ML/MIN/1.73M2
FERRITIN SERPL-MCNC: 83 NG/ML
GLUCOSE SERPL-MCNC: 159 MG/DL
IRON SATN MFR SERPL: 16 %
IRON SERPL-MCNC: 31 UG/DL
POTASSIUM SERPL-SCNC: 3.5 MMOL/L
PROT SERPL-MCNC: 5.3 G/DL
SODIUM SERPL-SCNC: 143 MMOL/L
TIBC SERPL-MCNC: 190 UG/DL
TRANSFERRIN SERPL-MCNC: 165 MG/DL
UIBC SERPL-MCNC: 159 UG/DL

## 2024-08-26 PROCEDURE — G0108 DIAB MANAGE TRN  PER INDIV: CPT | Mod: GA

## 2024-08-27 RX ORDER — BLOOD SUGAR DIAGNOSTIC
STRIP MISCELLANEOUS DAILY
Qty: 1 | Refills: 1 | Status: ACTIVE | COMMUNITY
Start: 2024-08-26 | End: 1900-01-01

## 2024-08-27 RX ORDER — LANCETS
EACH MISCELLANEOUS
Qty: 1 | Refills: 1 | Status: ACTIVE | COMMUNITY
Start: 2024-08-26 | End: 1900-01-01

## 2024-08-28 ENCOUNTER — APPOINTMENT (OUTPATIENT)
Dept: ENDOCRINOLOGY | Facility: CLINIC | Age: 69
End: 2024-08-28
Payer: MEDICARE

## 2024-08-28 ENCOUNTER — APPOINTMENT (OUTPATIENT)
Dept: GASTROENTEROLOGY | Facility: CLINIC | Age: 69
End: 2024-08-28
Payer: MEDICARE

## 2024-08-28 VITALS
DIASTOLIC BLOOD PRESSURE: 58 MMHG | TEMPERATURE: 98.5 F | BODY MASS INDEX: 29.64 KG/M2 | WEIGHT: 157 LBS | SYSTOLIC BLOOD PRESSURE: 126 MMHG | HEART RATE: 69 BPM | OXYGEN SATURATION: 92 % | HEIGHT: 61 IN

## 2024-08-28 DIAGNOSIS — E11.9 TYPE 2 DIABETES MELLITUS W/OUT COMPLICATIONS: ICD-10-CM

## 2024-08-28 DIAGNOSIS — K76.0 FATTY (CHANGE OF) LIVER, NOT ELSEWHERE CLASSIFIED: ICD-10-CM

## 2024-08-28 LAB — GLUCOSE BLDC GLUCOMTR-MCNC: 169

## 2024-08-28 PROCEDURE — 95251 CONT GLUC MNTR ANALYSIS I&R: CPT

## 2024-08-28 PROCEDURE — 99215 OFFICE O/P EST HI 40 MIN: CPT

## 2024-08-28 PROCEDURE — 82962 GLUCOSE BLOOD TEST: CPT

## 2024-08-28 NOTE — HISTORY OF PRESENT ILLNESS
[FreeTextEntry1] : 69 year old women with medical history of T2DM, osteoporosis, HTN, HLD, decompensated cirrhosis c/b hepatic encephalopathy, HCC- s/p radioembolization, with new liver lesion- s/p XRT presented for follow up.   Today her  is sitting at waiting room. Her  helps her with medications.   Pt was recently admitted at [facility] for strep bacteremia. Currently on IV ceftriaxone.   Labs  May 2024- A1C 6.2%, c-peptide of 0.9 with , eGFR 74. April 2024-eGFR 73 Jan 2024- A1C 6.2%, TSH 6.8, Vit D 37, Tg 86, LDL-C 49  For hyperglycemia management currently takes Lantus 26 units nightly, Humalog 20 units prior to meals. Dinner is her main meal followed by breakfast. Reports compliance with insulin regimen but admits to consuming large portions of sweet fruits (watermelon etc) No h/o LOC in setting of hypoglycemia. But pt has history of hepatic encephalopathy.   CGM downloaded and reviewed: DEXCOM 7 Average glucose:174 GMI 7.5% % time CGM active: 100% % VERY HIGH (>250): 7% % HIGH (181-250): 39% % TARGET (): 53%  % LOW (54-69):1% % VERY LOW (<54): <1%  CGM report shows episodes of midnight/ early morning hypoglycemia. Most has postprandial hyperglycemia. Reports compliance with prandial insulin administration.   - OP  Bone scan 03/04/2024- Spine: -1.8, osteopenia. Femoral neck: -2.4 , osteopenia. Total hip: -2.0 , osteopenia.  Has been taking Alendronate since 2020.

## 2024-08-28 NOTE — ASSESSMENT
[FreeTextEntry1] : 1- T2 DM complicated by hypoglycemia episodes Plan:  - Decrease Lantus to 18 U U nightly to avoid early morning hypoglycemia. Continue with Humalog 20 U prior to meals. - Reviewed hypoglycemia treatment with pt and advised her to decrease the amount of daily sweet fruit consumption.   - Reviewed avoiding simple carbs, marce beverages and fried food.  - Gvoke pen is sent to her pharmacy.  - Labs 2 weeks before next follow up appointment   2- Osteoporosis  - c/w Alendronate   She will follow up with Dr. Lynne for bacteremia management.   Follow up in 2 month.

## 2024-08-30 ENCOUNTER — NON-APPOINTMENT (OUTPATIENT)
Age: 69
End: 2024-08-30

## 2024-09-03 ENCOUNTER — APPOINTMENT (OUTPATIENT)
Dept: HEMATOLOGY ONCOLOGY | Facility: CLINIC | Age: 69
End: 2024-09-03

## 2024-09-03 ENCOUNTER — RESULT REVIEW (OUTPATIENT)
Age: 69
End: 2024-09-03

## 2024-09-03 LAB
BASOPHILS # BLD AUTO: 0.05 K/UL — SIGNIFICANT CHANGE UP (ref 0–0.2)
BASOPHILS NFR BLD AUTO: 0.8 % — SIGNIFICANT CHANGE UP (ref 0–2)
EOSINOPHIL # BLD AUTO: 0.19 K/UL — SIGNIFICANT CHANGE UP (ref 0–0.5)
EOSINOPHIL NFR BLD AUTO: 2.9 % — SIGNIFICANT CHANGE UP (ref 0–6)
HCT VFR BLD CALC: 29.7 % — LOW (ref 34.5–45)
HGB BLD-MCNC: 9.8 G/DL — LOW (ref 11.5–15.5)
IMM GRANULOCYTES NFR BLD AUTO: 0.5 % — SIGNIFICANT CHANGE UP (ref 0–0.9)
LYMPHOCYTES # BLD AUTO: 0.44 K/UL — LOW (ref 1–3.3)
LYMPHOCYTES # BLD AUTO: 6.7 % — LOW (ref 13–44)
MCHC RBC-ENTMCNC: 29.5 PG — SIGNIFICANT CHANGE UP (ref 27–34)
MCHC RBC-ENTMCNC: 33 GM/DL — SIGNIFICANT CHANGE UP (ref 32–36)
MCV RBC AUTO: 89.5 FL — SIGNIFICANT CHANGE UP (ref 80–100)
MONOCYTES # BLD AUTO: 0.98 K/UL — HIGH (ref 0–0.9)
MONOCYTES NFR BLD AUTO: 15 % — HIGH (ref 2–14)
NEUTROPHILS # BLD AUTO: 4.84 K/UL — SIGNIFICANT CHANGE UP (ref 1.8–7.4)
NEUTROPHILS NFR BLD AUTO: 74.1 % — SIGNIFICANT CHANGE UP (ref 43–77)
NRBC # BLD: 0 /100 WBCS — SIGNIFICANT CHANGE UP (ref 0–0)
PLATELET # BLD AUTO: 85 K/UL — LOW (ref 150–400)
RBC # BLD: 3.32 M/UL — LOW (ref 3.8–5.2)
RBC # FLD: 19 % — HIGH (ref 10.3–14.5)
WBC # BLD: 6.53 K/UL — SIGNIFICANT CHANGE UP (ref 3.8–10.5)
WBC # FLD AUTO: 6.53 K/UL — SIGNIFICANT CHANGE UP (ref 3.8–10.5)

## 2024-09-03 PROCEDURE — 85027 COMPLETE CBC AUTOMATED: CPT

## 2024-09-04 LAB
FERRITIN SERPL-MCNC: 64 NG/ML
IRON SATN MFR SERPL: 14 %
IRON SERPL-MCNC: 26 UG/DL
TIBC SERPL-MCNC: 180 UG/DL
TRANSFERRIN SERPL-MCNC: 158 MG/DL
UIBC SERPL-MCNC: 154 UG/DL

## 2024-09-17 ENCOUNTER — LABORATORY RESULT (OUTPATIENT)
Age: 69
End: 2024-09-17

## 2024-09-17 ENCOUNTER — APPOINTMENT (OUTPATIENT)
Dept: GASTROENTEROLOGY | Facility: CLINIC | Age: 69
End: 2024-09-17
Payer: MEDICARE

## 2024-09-17 VITALS
SYSTOLIC BLOOD PRESSURE: 124 MMHG | RESPIRATION RATE: 14 BRPM | HEART RATE: 70 BPM | HEIGHT: 61 IN | DIASTOLIC BLOOD PRESSURE: 63 MMHG | WEIGHT: 163 LBS | BODY MASS INDEX: 30.78 KG/M2 | OXYGEN SATURATION: 96 %

## 2024-09-17 DIAGNOSIS — C22.0 LIVER CELL CARCINOMA: ICD-10-CM

## 2024-09-17 DIAGNOSIS — K74.60 UNSPECIFIED CIRRHOSIS OF LIVER: ICD-10-CM

## 2024-09-17 DIAGNOSIS — R51.9 HEADACHE, UNSPECIFIED: ICD-10-CM

## 2024-09-17 PROCEDURE — 99215 OFFICE O/P EST HI 40 MIN: CPT

## 2024-09-17 RX ORDER — SPIRONOLACTONE 25 MG/1
25 TABLET ORAL
Refills: 0 | Status: ACTIVE | COMMUNITY

## 2024-09-17 RX ORDER — TORSEMIDE 10 MG/1
10 TABLET ORAL
Refills: 0 | Status: ACTIVE | COMMUNITY

## 2024-09-17 NOTE — HISTORY OF PRESENT ILLNESS
[FreeTextEntry1] : HECTOR RUBIN is a 68 year old female with a PMH significant for decompensated Cirrhosis c/b Ascites, Hepatic Encephalopathy, GIBleed s/p banding i/23 HTN, DM, HLD, Asthma, and Anemia and HCC s/p LDT Y90 on 8/15  9/17/2024 Interim hospitalization for acute renal injury. Patient diuretics were escalated during an outpatient visit leading to an increase in creatinine. Patient got multiple doses of 25% albumin with the last creatinine being down to 1.3.  Ultrasound kidneys was done during this admission no hydronephrosis or obstruction was noted.  Patient had noncontrast CT in July which showed 'KIDNEYS/URETERS: No hydronephrosis. Nonobstructing bilateral renal calculi measuring up to 8 mm. 5 cm left renal cyst.' Patient was discharged with telemedicine 10 mg of torsemide.  Unclear if she was also discharged on spironolactone.  The discharge summary shows spironolactone but the  states he was not sure if she is taking the spironolactone. I have ordered repeat labs today to follow-up on the latest creatinine.   Her last contrast CAT scan after treatment for HCC was in July which showed that the lesion has been completely treated. A follow-up CAT scan for HCC has to be done in October.  If her creatinine does not permit it then we will have to proceed with an MRI.   8/29/2024 Records reviewed, including notes, labs, imaging, etc. No new liver-related complaints like hematemesis melena jaundice, confusion. Patient had recent hospitalization at [facility] and had a follow-up visit with her primary care physician. Her diuretic dose was doubled to 80 mg of Lasix. Repeat labs by asha-onc showed acute renal compromise I discussed this with the patient and asked them to stop all diuretics immediately. I have informed them that this may lead to an increase in ascites and should that happen she needs to seek the nearest ER until I arrange outpatient paracentesis for her. Heme-onc has ordered follow-up creatinine for them on Monday and we will review that   5/1/24 Records reviewed, including notes, labs, imaging, etc. No new liver-related complaints like hematemesis melena jaundice. No hospitalizations or medication changes. Previsit labs reviwed nl AFP: Down to 10   bm 4-5: Drop lactulose dose High blood sugar : High . Seeing Endo  3/6/24 Records reviewed, including notes, labs, imaging, etc. No new liver-related complaints like hematemesis melena jaundice. Interim Y90 on 2/26/2024 No postprocedure complications. Complains of decreased urination. Labs a few days ago revealed normal creatinine. Repeat basic metabolic profile today. She was put on 3 to 4 pounds since her last visit.  She does endorse consuming more protein.  On exam no ascites no pedal edema so the weight gain is likely muscle of fat. Heart rate on carvedilol is 57 blood pressure 102 / 48 .  Asymptomatic.  No chest pain shortness of breath etc. Will drop the carvedilol dose.    January 3, 2024 Patient presents for follow-up visit.  She is accompanied by her son her  and her 1 daughter joined the conversation via phone. Since the last visit unfortunately she was found to have an AFP of 2905. Imaging done on 12/18/2023 which is around 3 months post her Y90 therapy revealed : "Post radio embolization changes involving segments 2, 3 and 4 without evidence of viable enhancing tumor (LR-TR nonviable). There are however new lesions demonstrating washout without evidence of arterial enhancement " No new complaints. Records reviewed, including notes, labs, imaging, etc. No new liver-related complaints like hematemesis melena jaundice. Xanax was stopped .   noted that her encephalopathy significantly resolved after that. Patient and family understandably upset about the AFP and the new tumor findings.   11/20/23: Pt presents for f/u visit, accompanied by her . labs reviewed w/pt. Labs - tbili 1.7, AST 34, ALT 7, , INR 1.64. AFP 2905. Y90 was done on 8/11/23. No recent imaging. Transplant eval is underway. She is pending cardiac cath next week. She recent broke her wrist and is currently in a cast. No surgical intervention needed.   She is currently taking Lasix 20 mg QD and Spironolactone 25 mg QD. She reports abdominal distension. Endorse a low salt diet. She continues to take Carvedilol 3.125 mg QD. HR today 54. She took meds this AM. She has 2-3 soft bms/day. She is currently on Lactulose and Xifaxan. Denies confusion.   9/14/23: No new complaints since last visit. Transplant evaluation was initiated since last visit. No hospitalizations, hematemesis melena, encephalopathy since last visit. Scheduled for cardiac stress test as a part of pretransplant work-up today 6/13/23 CT Chest noting RUL 2 mm nodule. Negative bone scan  7/14/22: Pt reports that she has known fatty liver for many years and was diagnosed with cirrhosis in the last few years. Previous management included Lasix 40 mg daily and ursodiol 500 mg daily. Pt is unsure why she was prescribed ursodiol. Current daily smoker. Denies alcohol consumption, past or present. Denies risk factors for viral hepatitis. Pt states, and  Kai agrees, that pt has become more confused and foggy in the past year. She also reports bladder and bowel incontinence and unsteady gait. She has a history of falls, most recent being last year in which she fractured her shoulder. Pt feels these symptoms started after the most recent fall.  On chart review: Pt has had elevated LFTs dating as far back as 2016 in the form of alk phos elevation ranging from 120-200 with normal transaminases and bilirubin. In 2020, bilirubin is seen to elevate to 1.7 and fluctuate from 0.8 to 2.0 since then. ALT remained normal. AST mostly in the 30s-40s. Last INR done in 2020 was 1.4. Negative for HBV and HCV in 2020. Plt count ranging from 44-77 since 2017.  Last US abdomen from 2020 revealed lobulated liver with a diffuse heterogeneous/nodular echotexture is compatible with underlying hepatocellular disease/cirrhosis, no focal hepatic abnormality, extatic CBD (11 mm) likely related to prior cholecystectomy/cholecystitis, splenomegaly and mild ascites  Last EGD 9/2021 no varices, portal hypertensive gastropathy  No previous paracentesis  8/11/22: Since last visit Labs done 7/18/22: bilirubin 1.6, AST 38, ALT 9, AlkPhos 146, INR 1.33, AFP 2, ABDIEL + 1:160, ASMA + 1:120, viral hepatitis panel negative, iron studies negative.  US abdomen 7/18/22: cirrhotic liver without focal abnormality, mild abdominal ascites.  Pt reports she has quit smoking. She started Xifaxan 550mg BID which her  states has helped with her confusion. Lasix was decreased to 20 mg daily due to pt complaints of frequent urination. Denies hematemesis.  10/20/22: Pt reports she went to [facility] in September for increased confusion. She was given a prescription for lactulose and discharged home. Pt is taking lactulose and Xifaxan and reports her confusion has improved. Denies fatigue, malaise, arthralgias, myalgias, recent infection, abdominal pain or distension, jaundice, hematemesis, hematochezia, dark urine, confusion, unintentional weight loss or gain. She is currently taking Spironolactone 50 mg daily. Pt is also taking Ursodiol for pruritus and reports positive relief.  Labs 9/20/22 - bilirubin 1.7, AST 39, ALT 10, , INR 1.33, PLT 41  EGD 10/19/22 - no varices, portal hypertensive gastropathy  1/3/23: She is status post discharge from [facility] where she had been transferred for management of GI bleed from [facility]. S/P banding at [facility] with recs to repeat EGD 2 weeks after DC. Deemed not a TIPS candidate due to h/o Encephalopathy.    4/21/23: Pt presents today for follow up visit, accompanied by her . Pt reports she is feeling well. She continues to take Lactulose and Xifaxan. Denies confusion. She is currently on Furosemide 20 mg daily and Spironolactone 25 mg daily. Denies abdominal distension or LE edema. Pt is currently on Carvedilol 3.125 mg daily. HR in office today is 58. Denies hematemesis or black stools. LFTs from 2/23 - Tbili 1.4, AST 25, ALT 6, . Pt is currently on Ursodiol 500 mg daily.

## 2024-09-17 NOTE — ASSESSMENT
[FreeTextEntry1] : FELICITY RUBIN is a 67 year old female with a PMH significant for decompensated Cirrhosis c/b Ascites, Hepatic Encephalopathy, HTN, DM, HLD, Asthma, and Anemia.  Cirrhosis -Etiology - likely NAFLD coupled w/burnt out AIH given positive ABDIEL and ASMA.  Acute renal injury secondary to increase in diuretics. rise in creatinine to 1.3 from a previous of 0.86 in this cirrhotic sarcopenic patient is significant leading a rise in MELD and rise in mortality. Stopping diuretics did not help in de-escalating the creatinine.  She was subsequently hospitalized for management of the acute renal injury.  See details in HPI above.  She received albumin and subsequent was discharged on Bumex.  Labs ordered to get the latest creatinine.   HCC -Diagnosed May 2023. Treated and cured with t Y90 August 15, 2023. Triphasic CT on 18 December 2023 showed new lesions.  AFP November 2023 was 2/9/2005  Was discussed at the multidisciplinary hepatobiliary tumor board.  Repeat Y90 was recommended. She is status post Y90 on 2/26/2024.  No postprocedure complications.  Doing subjectively well. Transplant work-up canceled due to high AFP -AFP (11/2023) -2905.  Now down to 10. 7/2/24 CT demonstrated:   Posttreatment changes in the left lobe. No evidence of viable tumor - repeat MR abd with IR f/u to review results in 3 months - 10/2024   Variceal Screening -EGD 12/2022 - Large junctional varix banded, PHG. Follow up with GI, Dr. Carranza, pending Was on carvedilol but unclear who has stopped it.   -if pt experiences hematemesis, advised to go to ER immediately  Encephalopathy -notify provider if new onset confusion -titrate lactulose to 1-2 bms/day and continue Xifaxan 550mg bid.  They will drop the lactulose dose today   Health Maintenance -Pt is not immune to HBV, vaccine recommended w/PCP. -Can take up to 2G Tylenol per day. NO NSAIDs as these can lead to diuretic resistance and precipitate renal dysfunction in patients with advanced liver disease. -High Protein Low Fat Low Salt (up to 2G Na/day) Diet, no prolonged fasting, Mediterranean Diet info provided -Continue to abstain from alcohol and all illicit drugs -Avoid use of herbal and dietary supplements due to potential hepatotoxicity -Avoid eating any unpasteurized dairy products; avoid eating any raw or undercooked eggs, fish, poultry, or meat; and avoid eating raw/steamed oysters or other shellfish to avoid risk of infection.   Follow up in a month.  Discussed today's labs over the phone.

## 2024-09-17 NOTE — HISTORY OF PRESENT ILLNESS
[FreeTextEntry1] : HECTOR RUBIN is a 68 year old female with a PMH significant for decompensated Cirrhosis c/b Ascites, Hepatic Encephalopathy, GIBleed s/p banding i/23 HTN, DM, HLD, Asthma, and Anemia and HCC s/p LDT Y90 on 8/15  9/17/2024 Interim hospitalization for acute renal injury. Patient diuretics were escalated during an outpatient visit leading to an increase in creatinine. Patient got multiple doses of 25% albumin with the last creatinine being down to 1.3.  Ultrasound kidneys was done during this admission no hydronephrosis or obstruction was noted.  Patient had noncontrast CT in July which showed 'KIDNEYS/URETERS: No hydronephrosis. Nonobstructing bilateral renal calculi measuring up to 8 mm. 5 cm left renal cyst.' Patient was discharged with telemedicine 10 mg of torsemide.  Unclear if she was also discharged on spironolactone.  The discharge summary shows spironolactone but the  states he was not sure if she is taking the spironolactone. I have ordered repeat labs today to follow-up on the latest creatinine.   Her last contrast CAT scan after treatment for HCC was in July which showed that the lesion has been completely treated. A follow-up CAT scan for HCC has to be done in October.  If her creatinine does not permit it then we will have to proceed with an MRI.   8/29/2024 Records reviewed, including notes, labs, imaging, etc. No new liver-related complaints like hematemesis melena jaundice, confusion. Patient had recent hospitalization at [facility] and had a follow-up visit with her primary care physician. Her diuretic dose was doubled to 80 mg of Lasix. Repeat labs by asha-onc showed acute renal compromise I discussed this with the patient and asked them to stop all diuretics immediately. I have informed them that this may lead to an increase in ascites and should that happen she needs to seek the nearest ER until I arrange outpatient paracentesis for her. Heme-onc has ordered follow-up creatinine for them on Monday and we will review that   5/1/24 Records reviewed, including notes, labs, imaging, etc. No new liver-related complaints like hematemesis melena jaundice. No hospitalizations or medication changes. Previsit labs reviwed nl AFP: Down to 10   bm 4-5: Drop lactulose dose High blood sugar : High . Seeing Endo  3/6/24 Records reviewed, including notes, labs, imaging, etc. No new liver-related complaints like hematemesis melena jaundice. Interim Y90 on 2/26/2024 No postprocedure complications. Complains of decreased urination. Labs a few days ago revealed normal creatinine. Repeat basic metabolic profile today. She was put on 3 to 4 pounds since her last visit.  She does endorse consuming more protein.  On exam no ascites no pedal edema so the weight gain is likely muscle of fat. Heart rate on carvedilol is 57 blood pressure 102 / 48 .  Asymptomatic.  No chest pain shortness of breath etc. Will drop the carvedilol dose.    January 3, 2024 Patient presents for follow-up visit.  She is accompanied by her son her  and her 1 daughter joined the conversation via phone. Since the last visit unfortunately she was found to have an AFP of 2905. Imaging done on 12/18/2023 which is around 3 months post her Y90 therapy revealed : "Post radio embolization changes involving segments 2, 3 and 4 without evidence of viable enhancing tumor (LR-TR nonviable). There are however new lesions demonstrating washout without evidence of arterial enhancement " No new complaints. Records reviewed, including notes, labs, imaging, etc. No new liver-related complaints like hematemesis melena jaundice. Xanax was stopped .   noted that her encephalopathy significantly resolved after that. Patient and family understandably upset about the AFP and the new tumor findings.   11/20/23: Pt presents for f/u visit, accompanied by her . labs reviewed w/pt. Labs - tbili 1.7, AST 34, ALT 7, , INR 1.64. AFP 2905. Y90 was done on 8/11/23. No recent imaging. Transplant eval is underway. She is pending cardiac cath next week. She recent broke her wrist and is currently in a cast. No surgical intervention needed.   She is currently taking Lasix 20 mg QD and Spironolactone 25 mg QD. She reports abdominal distension. Endorse a low salt diet. She continues to take Carvedilol 3.125 mg QD. HR today 54. She took meds this AM. She has 2-3 soft bms/day. She is currently on Lactulose and Xifaxan. Denies confusion.   9/14/23: No new complaints since last visit. Transplant evaluation was initiated since last visit. No hospitalizations, hematemesis melena, encephalopathy since last visit. Scheduled for cardiac stress test as a part of pretransplant work-up today 6/13/23 CT Chest noting RUL 2 mm nodule. Negative bone scan  7/14/22: Pt reports that she has known fatty liver for many years and was diagnosed with cirrhosis in the last few years. Previous management included Lasix 40 mg daily and ursodiol 500 mg daily. Pt is unsure why she was prescribed ursodiol. Current daily smoker. Denies alcohol consumption, past or present. Denies risk factors for viral hepatitis. Pt states, and  Kai agrees, that pt has become more confused and foggy in the past year. She also reports bladder and bowel incontinence and unsteady gait. She has a history of falls, most recent being last year in which she fractured her shoulder. Pt feels these symptoms started after the most recent fall.  On chart review: Pt has had elevated LFTs dating as far back as 2016 in the form of alk phos elevation ranging from 120-200 with normal transaminases and bilirubin. In 2020, bilirubin is seen to elevate to 1.7 and fluctuate from 0.8 to 2.0 since then. ALT remained normal. AST mostly in the 30s-40s. Last INR done in 2020 was 1.4. Negative for HBV and HCV in 2020. Plt count ranging from 44-77 since 2017.  Last US abdomen from 2020 revealed lobulated liver with a diffuse heterogeneous/nodular echotexture is compatible with underlying hepatocellular disease/cirrhosis, no focal hepatic abnormality, extatic CBD (11 mm) likely related to prior cholecystectomy/cholecystitis, splenomegaly and mild ascites  Last EGD 9/2021 no varices, portal hypertensive gastropathy  No previous paracentesis  8/11/22: Since last visit Labs done 7/18/22: bilirubin 1.6, AST 38, ALT 9, AlkPhos 146, INR 1.33, AFP 2, ABDIEL + 1:160, ASMA + 1:120, viral hepatitis panel negative, iron studies negative.  US abdomen 7/18/22: cirrhotic liver without focal abnormality, mild abdominal ascites.  Pt reports she has quit smoking. She started Xifaxan 550mg BID which her  states has helped with her confusion. Lasix was decreased to 20 mg daily due to pt complaints of frequent urination. Denies hematemesis.  10/20/22: Pt reports [place] [facility] [place] confusion. She was given a prescription for lactulose and discharged home. Pt is taking lactulose and Xifaxan and reports her confusion has improved. Denies fatigue, malaise, arthralgias, myalgias, recent infection, abdominal pain or distension, jaundice, hematemesis, hematochezia, dark urine, confusion, unintentional weight loss or gain. She is currently taking Spironolactone 50 mg daily. Pt is also taking Ursodiol for pruritus and reports positive relief.  Labs 9/20/22 - bilirubin 1.7, AST 39, ALT 10, , INR 1.33, PLT 41  EGD 10/19/22 - no varices, portal hypertensive gastropathy  1/3/23: She is status post discharge from [facility] where she had been transferred for management of GI bleed from [facility]. S/P banding at [facility] with recs to repeat EGD 2 weeks after DC. Deemed not a TIPS candidate due to h/o Encephalopathy.    4/21/23: Pt presents today for follow up visit, accompanied by her . Pt reports she is feeling well. She continues to take Lactulose and Xifaxan. Denies confusion. She is currently on Furosemide 20 mg daily and Spironolactone 25 mg daily. Denies abdominal distension or LE edema. Pt is currently on Carvedilol 3.125 mg daily. HR in office today is 58. Denies hematemesis or black stools. LFTs from 2/23 - Tbili 1.4, AST 25, ALT 6, . Pt is currently on Ursodiol 500 mg daily.

## 2024-09-17 NOTE — PHYSICAL EXAM
[Spider Angioma] : Spider angioma(s) was(were) observed [Splenomegaly] : splenomegaly [Non-Tender] : non-tender [Irregular] : irregular [Asterixis] : Asterixis observed [General Appearance - Alert] : alert [General Appearance - In No Acute Distress] : in no acute distress [Sclera] : the sclera and conjunctiva were normal [Neck Appearance] : the appearance of the neck was normal [Edema] : there was no peripheral edema [Bowel Sounds] : normal bowel sounds [Abdomen Soft] : soft [Abdomen Tenderness] : non-tender [Abdomen Mass (___ Cm)] : no abdominal mass palpated [Involuntary Movements] : no involuntary movements were seen [Motor Tone] : muscle strength and tone were normal [Ataxic, Wide-Based] : wide-based and ataxic [Skin Color & Pigmentation] : normal skin color and pigmentation [Skin Turgor] : normal skin turgor [] : no rash [Oriented To Time, Place, And Person] : oriented to person, place, and time [Impaired Insight] : insight and judgment were intact [Affect] : the affect was normal [Scleral Icterus] : No Scleral Icterus [Hepatojugular Reflux] : patient did not have a sustained hepatojugular reflux [Abdominal  Ascites] : no ascites [Jaundice] : No jaundice [Palmar Erythema] : no Palmar Erythema [Depression] : no depression [Delusions] : no ~T delusions [Hallucinations] : ~T no ~M hallucinations [Suicidal Ideation] : no suicidal ideation [Homicidal Ideation] : no homicidal ideations [Preoccupiation With Violent Thoughts] : no preoccupation with violent thoughts

## 2024-09-18 ENCOUNTER — NON-APPOINTMENT (OUTPATIENT)
Age: 69
End: 2024-09-18

## 2024-09-18 LAB
ALBUMIN SERPL ELPH-MCNC: 3.3 G/DL
ALP BLD-CCNC: 109 U/L
ALT SERPL-CCNC: 9 U/L
ANION GAP SERPL CALC-SCNC: 13 MMOL/L
AST SERPL-CCNC: 28 U/L
BILIRUB INDIRECT SERPL-MCNC: 1.4 MG/DL
BILIRUB SERPL-MCNC: 2.4 MG/DL
BUN SERPL-MCNC: 16 MG/DL
CALCIUM SERPL-MCNC: 8 MG/DL
CHLORIDE SERPL-SCNC: 107 MMOL/L
CO2 SERPL-SCNC: 25 MMOL/L
CREAT SERPL-MCNC: 1.15 MG/DL
EGFR: 52 ML/MIN/1.73M2
GLUCOSE SERPL-MCNC: 206 MG/DL
INR PPP: 2.12 RATIO
POTASSIUM SERPL-SCNC: 3.6 MMOL/L
PROT SERPL-MCNC: 5 G/DL
PT BLD: 23.4 SEC
SODIUM SERPL-SCNC: 144 MMOL/L

## 2024-09-18 RX ORDER — ACETAMINOPHEN 500 MG/1
500 TABLET ORAL
Qty: 60 | Refills: 1 | Status: ACTIVE | COMMUNITY
Start: 2024-09-18 | End: 1900-01-01

## 2024-09-19 LAB
AFP-TM SERPL-MCNC: 3.1 NG/ML
BASOPHILS # BLD AUTO: 0.03 K/UL
BASOPHILS NFR BLD AUTO: 0.7 %
EOSINOPHIL # BLD AUTO: 0.11 K/UL
EOSINOPHIL NFR BLD AUTO: 2.6 %
HCT VFR BLD CALC: 27.9 %
HGB BLD-MCNC: 8.6 G/DL
IMM GRANULOCYTES NFR BLD AUTO: 0.2 %
LYMPHOCYTES # BLD AUTO: 0.32 K/UL
LYMPHOCYTES NFR BLD AUTO: 7.4 %
MAN DIFF?: NORMAL
MCHC RBC-ENTMCNC: 29.6 PG
MCHC RBC-ENTMCNC: 30.8 GM/DL
MCV RBC AUTO: 95.9 FL
MONOCYTES # BLD AUTO: 0.55 K/UL
MONOCYTES NFR BLD AUTO: 12.8 %
NEUTROPHILS # BLD AUTO: 3.29 K/UL
NEUTROPHILS NFR BLD AUTO: 76.3 %
PLATELET # BLD AUTO: 55 K/UL
RBC # BLD: 2.91 M/UL
RBC # FLD: 19.7 %
WBC # FLD AUTO: 4.31 K/UL

## 2024-09-30 ENCOUNTER — LABORATORY RESULT (OUTPATIENT)
Age: 69
End: 2024-09-30

## 2024-10-03 ENCOUNTER — NON-APPOINTMENT (OUTPATIENT)
Age: 69
End: 2024-10-03

## 2024-10-09 ENCOUNTER — APPOINTMENT (OUTPATIENT)
Dept: MRI IMAGING | Facility: CLINIC | Age: 69
End: 2024-10-09

## 2024-10-09 PROCEDURE — 74183 MRI ABD W/O CNTR FLWD CNTR: CPT | Mod: 26

## 2024-10-09 PROCEDURE — A9585: CPT

## 2024-10-10 ENCOUNTER — APPOINTMENT (OUTPATIENT)
Dept: NEPHROLOGY | Facility: CLINIC | Age: 69
End: 2024-10-10
Payer: MEDICARE

## 2024-10-10 ENCOUNTER — APPOINTMENT (OUTPATIENT)
Dept: GASTROENTEROLOGY | Facility: CLINIC | Age: 69
End: 2024-10-10

## 2024-10-10 VITALS
OXYGEN SATURATION: 98 % | BODY MASS INDEX: 26.11 KG/M2 | WEIGHT: 138.31 LBS | SYSTOLIC BLOOD PRESSURE: 118 MMHG | HEART RATE: 63 BPM | RESPIRATION RATE: 16 BRPM | DIASTOLIC BLOOD PRESSURE: 58 MMHG | HEIGHT: 61 IN

## 2024-10-10 VITALS
DIASTOLIC BLOOD PRESSURE: 48 MMHG | HEIGHT: 61 IN | WEIGHT: 138 LBS | SYSTOLIC BLOOD PRESSURE: 100 MMHG | BODY MASS INDEX: 26.06 KG/M2 | TEMPERATURE: 98.2 F | OXYGEN SATURATION: 96 % | HEART RATE: 58 BPM

## 2024-10-10 PROCEDURE — 99215 OFFICE O/P EST HI 40 MIN: CPT

## 2024-10-11 ENCOUNTER — APPOINTMENT (OUTPATIENT)
Dept: HEMATOLOGY ONCOLOGY | Facility: CLINIC | Age: 69
End: 2024-10-11
Payer: MEDICARE

## 2024-10-11 VITALS
TEMPERATURE: 98.2 F | DIASTOLIC BLOOD PRESSURE: 57 MMHG | OXYGEN SATURATION: 99 % | WEIGHT: 136 LBS | HEIGHT: 61 IN | SYSTOLIC BLOOD PRESSURE: 141 MMHG | HEART RATE: 63 BPM | BODY MASS INDEX: 25.68 KG/M2

## 2024-10-11 DIAGNOSIS — D64.9 ANEMIA, UNSPECIFIED: ICD-10-CM

## 2024-10-11 PROCEDURE — G2211 COMPLEX E/M VISIT ADD ON: CPT

## 2024-10-11 PROCEDURE — 99214 OFFICE O/P EST MOD 30 MIN: CPT

## 2024-10-15 ENCOUNTER — APPOINTMENT (OUTPATIENT)
Dept: INTERVENTIONAL RADIOLOGY/VASCULAR | Facility: CLINIC | Age: 69
End: 2024-10-15
Payer: MEDICARE

## 2024-10-15 DIAGNOSIS — K76.0 FATTY (CHANGE OF) LIVER, NOT ELSEWHERE CLASSIFIED: ICD-10-CM

## 2024-10-15 DIAGNOSIS — S32.040A WEDGE COMPRESSION FRACTURE OF FOURTH LUMBAR VERTEBRA, INITIAL ENCOUNTER FOR CLOSED FRACTURE: ICD-10-CM

## 2024-10-15 DIAGNOSIS — C22.0 LIVER CELL CARCINOMA: ICD-10-CM

## 2024-10-15 DIAGNOSIS — R77.2 ABNORMALITY OF ALPHAFETOPROTEIN: ICD-10-CM

## 2024-10-15 DIAGNOSIS — M54.50 LOW BACK PAIN, UNSPECIFIED: ICD-10-CM

## 2024-10-15 DIAGNOSIS — I85.00 ESOPHAGEAL VARICES W/OUT BLEEDING: ICD-10-CM

## 2024-10-15 DIAGNOSIS — K74.60 UNSPECIFIED CIRRHOSIS OF LIVER: ICD-10-CM

## 2024-10-15 DIAGNOSIS — K76.9 LIVER DISEASE, UNSPECIFIED: ICD-10-CM

## 2024-10-15 DIAGNOSIS — K76.6 PORTAL HYPERTENSION: ICD-10-CM

## 2024-10-15 PROCEDURE — 99214 OFFICE O/P EST MOD 30 MIN: CPT

## 2024-10-21 ENCOUNTER — APPOINTMENT (OUTPATIENT)
Dept: CARDIOLOGY | Facility: CLINIC | Age: 69
End: 2024-10-21

## 2024-11-15 RX ORDER — PEN NEEDLE, DIABETIC 29 G X1/2"
32G X 4 MM NEEDLE, DISPOSABLE MISCELLANEOUS
Qty: 4 | Refills: 1 | Status: ACTIVE | COMMUNITY
Start: 2024-11-15 | End: 1900-01-01

## 2024-12-12 ENCOUNTER — APPOINTMENT (OUTPATIENT)
Dept: GASTROENTEROLOGY | Facility: CLINIC | Age: 69
End: 2024-12-12

## 2024-12-24 DIAGNOSIS — R73.09 OTHER ABNORMAL GLUCOSE: ICD-10-CM

## 2024-12-30 ENCOUNTER — APPOINTMENT (OUTPATIENT)
Dept: GASTROENTEROLOGY | Facility: CLINIC | Age: 69
End: 2024-12-30

## 2024-12-30 VITALS
OXYGEN SATURATION: 97 % | DIASTOLIC BLOOD PRESSURE: 58 MMHG | WEIGHT: 147 LBS | HEART RATE: 53 BPM | HEIGHT: 61 IN | SYSTOLIC BLOOD PRESSURE: 132 MMHG | BODY MASS INDEX: 27.75 KG/M2

## 2024-12-30 DIAGNOSIS — K74.60 UNSPECIFIED CIRRHOSIS OF LIVER: ICD-10-CM

## 2024-12-30 DIAGNOSIS — C22.0 LIVER CELL CARCINOMA: ICD-10-CM

## 2024-12-30 DIAGNOSIS — I85.00 ESOPHAGEAL VARICES W/OUT BLEEDING: ICD-10-CM

## 2024-12-30 PROCEDURE — 99215 OFFICE O/P EST HI 40 MIN: CPT

## 2025-01-06 ENCOUNTER — NON-APPOINTMENT (OUTPATIENT)
Age: 70
End: 2025-01-06

## 2025-01-08 ENCOUNTER — APPOINTMENT (OUTPATIENT)
Dept: ENDOCRINOLOGY | Facility: CLINIC | Age: 70
End: 2025-01-08
Payer: MEDICARE

## 2025-01-08 VITALS
HEART RATE: 68 BPM | OXYGEN SATURATION: 98 % | BODY MASS INDEX: 26.43 KG/M2 | SYSTOLIC BLOOD PRESSURE: 124 MMHG | DIASTOLIC BLOOD PRESSURE: 62 MMHG | WEIGHT: 140 LBS | TEMPERATURE: 95.2 F | HEIGHT: 61 IN

## 2025-01-08 DIAGNOSIS — E11.9 TYPE 2 DIABETES MELLITUS W/OUT COMPLICATIONS: ICD-10-CM

## 2025-01-08 LAB — GLUCOSE BLDC GLUCOMTR-MCNC: 194

## 2025-01-08 PROCEDURE — 99214 OFFICE O/P EST MOD 30 MIN: CPT

## 2025-01-08 PROCEDURE — 95251 CONT GLUC MNTR ANALYSIS I&R: CPT

## 2025-01-08 RX ORDER — CARVEDILOL 3.12 MG/1
3.12 TABLET, FILM COATED ORAL EVERY MORNING
Refills: 0 | Status: ACTIVE | COMMUNITY

## 2025-01-08 RX ORDER — INSULIN LISPRO 100 [IU]/ML
100 INJECTION, SOLUTION INTRAVENOUS; SUBCUTANEOUS
Qty: 5 | Refills: 3 | Status: ACTIVE | COMMUNITY
Start: 2025-01-08 | End: 1900-01-01

## 2025-01-09 ENCOUNTER — APPOINTMENT (OUTPATIENT)
Dept: NEPHROLOGY | Facility: CLINIC | Age: 70
End: 2025-01-09
Payer: MEDICARE

## 2025-01-09 VITALS
SYSTOLIC BLOOD PRESSURE: 124 MMHG | WEIGHT: 140 LBS | HEIGHT: 61 IN | BODY MASS INDEX: 26.43 KG/M2 | DIASTOLIC BLOOD PRESSURE: 54 MMHG | RESPIRATION RATE: 16 BRPM

## 2025-01-09 PROCEDURE — 99496 TRANSJ CARE MGMT HIGH F2F 7D: CPT

## 2025-01-09 RX ORDER — SPIRONOLACTONE 25 MG/1
25 TABLET ORAL
Refills: 0 | Status: ACTIVE | COMMUNITY

## 2025-01-14 NOTE — ASSESSMENT
[FreeTextEntry1] : This is a 67 year-old woman with a history of liver disease.\par She has been having dizziness for about a year since her head injury.\par As she has chronic hepatic disease, she is at increased risk for hemorrhage.\par \par I will obtain an MRI of the brain to evaluate for structural pathology.\par \par I had given her an instruction sheet for some simple balance exercises she can do at home.\par I have advised her to do a little bit on a daily basis.\par \par I will see her back after the imaging. (E4) spontaneous

## 2025-01-20 ENCOUNTER — APPOINTMENT (OUTPATIENT)
Dept: OPHTHALMOLOGY | Facility: CLINIC | Age: 70
End: 2025-01-20
Payer: MEDICARE

## 2025-01-20 ENCOUNTER — NON-APPOINTMENT (OUTPATIENT)
Age: 70
End: 2025-01-20

## 2025-01-20 DIAGNOSIS — R73.9 HYPERGLYCEMIA, UNSPECIFIED: ICD-10-CM

## 2025-01-20 PROCEDURE — 92014 COMPRE OPH EXAM EST PT 1/>: CPT

## 2025-01-20 PROCEDURE — 92134 CPTRZ OPH DX IMG PST SGM RTA: CPT

## 2025-01-27 ENCOUNTER — OUTPATIENT (OUTPATIENT)
Dept: OUTPATIENT SERVICES | Facility: HOSPITAL | Age: 70
LOS: 1 days | End: 2025-01-27
Payer: MEDICARE

## 2025-01-27 DIAGNOSIS — Z98.890 OTHER SPECIFIED POSTPROCEDURAL STATES: Chronic | ICD-10-CM

## 2025-01-27 DIAGNOSIS — D50.9 IRON DEFICIENCY ANEMIA, UNSPECIFIED: ICD-10-CM

## 2025-01-27 DIAGNOSIS — Z98.891 HISTORY OF UTERINE SCAR FROM PREVIOUS SURGERY: Chronic | ICD-10-CM

## 2025-01-27 DIAGNOSIS — Z90.49 ACQUIRED ABSENCE OF OTHER SPECIFIED PARTS OF DIGESTIVE TRACT: Chronic | ICD-10-CM

## 2025-01-27 DIAGNOSIS — Z98.49 CATARACT EXTRACTION STATUS, UNSPECIFIED EYE: Chronic | ICD-10-CM

## 2025-01-29 ENCOUNTER — RESULT REVIEW (OUTPATIENT)
Age: 70
End: 2025-01-29

## 2025-01-29 ENCOUNTER — APPOINTMENT (OUTPATIENT)
Dept: HEMATOLOGY ONCOLOGY | Facility: CLINIC | Age: 70
End: 2025-01-29

## 2025-01-29 ENCOUNTER — APPOINTMENT (OUTPATIENT)
Dept: ENDOCRINOLOGY | Facility: CLINIC | Age: 70
End: 2025-01-29
Payer: MEDICARE

## 2025-01-29 VITALS
HEART RATE: 63 BPM | OXYGEN SATURATION: 100 % | DIASTOLIC BLOOD PRESSURE: 43 MMHG | TEMPERATURE: 98.3 F | SYSTOLIC BLOOD PRESSURE: 115 MMHG | BODY MASS INDEX: 26.64 KG/M2 | WEIGHT: 141 LBS

## 2025-01-29 DIAGNOSIS — C22.0 LIVER CELL CARCINOMA: ICD-10-CM

## 2025-01-29 DIAGNOSIS — D64.9 ANEMIA, UNSPECIFIED: ICD-10-CM

## 2025-01-29 LAB
ANISOCYTOSIS BLD QL: SLIGHT — SIGNIFICANT CHANGE UP
BASOPHILS # BLD AUTO: 0.02 K/UL — SIGNIFICANT CHANGE UP (ref 0–0.2)
BASOPHILS NFR BLD AUTO: 0.5 % — SIGNIFICANT CHANGE UP (ref 0–2)
BURR CELLS BLD QL SMEAR: PRESENT — SIGNIFICANT CHANGE UP
DACRYOCYTES BLD QL SMEAR: SLIGHT — SIGNIFICANT CHANGE UP
ELLIPTOCYTES BLD QL SMEAR: SLIGHT — SIGNIFICANT CHANGE UP
EOSINOPHIL # BLD AUTO: 0.14 K/UL — SIGNIFICANT CHANGE UP (ref 0–0.5)
EOSINOPHIL NFR BLD AUTO: 3.6 % — SIGNIFICANT CHANGE UP (ref 0–6)
GIANT PLATELETS BLD QL SMEAR: PRESENT — SIGNIFICANT CHANGE UP
HCT VFR BLD CALC: 26.1 % — LOW (ref 34.5–45)
HGB BLD-MCNC: 8.3 G/DL — LOW (ref 11.5–15.5)
HYPOCHROMIA BLD QL: SLIGHT — SIGNIFICANT CHANGE UP
IMM GRANULOCYTES NFR BLD AUTO: 0.3 % — SIGNIFICANT CHANGE UP (ref 0–0.9)
LG PLATELETS BLD QL AUTO: SIGNIFICANT CHANGE UP
LYMPHOCYTES # BLD AUTO: 0.24 K/UL — LOW (ref 1–3.3)
LYMPHOCYTES # BLD AUTO: 6.1 % — LOW (ref 13–44)
MCHC RBC-ENTMCNC: 31.2 PG — SIGNIFICANT CHANGE UP (ref 27–34)
MCHC RBC-ENTMCNC: 31.8 G/DL — LOW (ref 32–36)
MCV RBC AUTO: 98.1 FL — SIGNIFICANT CHANGE UP (ref 80–100)
MONOCYTES # BLD AUTO: 0.49 K/UL — SIGNIFICANT CHANGE UP (ref 0–0.9)
MONOCYTES NFR BLD AUTO: 12.5 % — SIGNIFICANT CHANGE UP (ref 2–14)
NEUTROPHILS # BLD AUTO: 3.02 K/UL — SIGNIFICANT CHANGE UP (ref 1.8–7.4)
NEUTROPHILS NFR BLD AUTO: 77 % — SIGNIFICANT CHANGE UP (ref 43–77)
NRBC # BLD: 0 /100 WBCS — SIGNIFICANT CHANGE UP (ref 0–0)
NRBC BLD-RTO: 0 /100 WBCS — SIGNIFICANT CHANGE UP (ref 0–0)
OVALOCYTES BLD QL SMEAR: SLIGHT — SIGNIFICANT CHANGE UP
PLAT MORPH BLD: NORMAL — SIGNIFICANT CHANGE UP
PLATELET # BLD AUTO: 53 K/UL — LOW (ref 150–400)
POIKILOCYTOSIS BLD QL AUTO: SIGNIFICANT CHANGE UP
POLYCHROMASIA BLD QL SMEAR: SLIGHT — SIGNIFICANT CHANGE UP
RBC # BLD: 2.66 M/UL — LOW (ref 3.8–5.2)
RBC # FLD: 20.6 % — HIGH (ref 10.3–14.5)
RBC BLD AUTO: ABNORMAL
SCHISTOCYTES BLD QL AUTO: SLIGHT — SIGNIFICANT CHANGE UP
SPHEROCYTES BLD QL SMEAR: SLIGHT — SIGNIFICANT CHANGE UP
STOMATOCYTES BLD QL SMEAR: SLIGHT — SIGNIFICANT CHANGE UP
WBC # BLD: 3.92 K/UL — SIGNIFICANT CHANGE UP (ref 3.8–10.5)
WBC # FLD AUTO: 3.92 K/UL — SIGNIFICANT CHANGE UP (ref 3.8–10.5)

## 2025-01-29 PROCEDURE — G0108 DIAB MANAGE TRN  PER INDIV: CPT | Mod: GA

## 2025-01-29 PROCEDURE — 99214 OFFICE O/P EST MOD 30 MIN: CPT

## 2025-01-29 PROCEDURE — G2211 COMPLEX E/M VISIT ADD ON: CPT

## 2025-01-29 RX ORDER — SPIRONOLACTONE 25 MG/1
25 TABLET ORAL
Refills: 0 | Status: ACTIVE | COMMUNITY

## 2025-01-29 RX ORDER — RIFAMPIN 300 MG/1
CAPSULE ORAL
Refills: 0 | Status: DISCONTINUED | COMMUNITY
End: 2025-01-29

## 2025-01-30 LAB
AFP-TM SERPL-MCNC: 2 NG/ML
ALBUMIN SERPL ELPH-MCNC: 3.4 G/DL
ALP BLD-CCNC: 166 U/L
ALT SERPL-CCNC: 7 U/L
ANION GAP SERPL CALC-SCNC: 13 MMOL/L
AST SERPL-CCNC: 31 U/L
BILIRUB SERPL-MCNC: 2.3 MG/DL
BUN SERPL-MCNC: 23 MG/DL
CALCIUM SERPL-MCNC: 8.7 MG/DL
CHLORIDE SERPL-SCNC: 106 MMOL/L
CO2 SERPL-SCNC: 23 MMOL/L
CREAT SERPL-MCNC: 1.29 MG/DL
EGFR: 45 ML/MIN/1.73M2
FERRITIN SERPL-MCNC: 34 NG/ML
GLUCOSE SERPL-MCNC: 190 MG/DL
IRON SATN MFR SERPL: 13 %
IRON SERPL-MCNC: 33 UG/DL
POTASSIUM SERPL-SCNC: 4.4 MMOL/L
PROT SERPL-MCNC: 5.4 G/DL
SODIUM SERPL-SCNC: 142 MMOL/L
TIBC SERPL-MCNC: 263 UG/DL
TRANSFERRIN SERPL-MCNC: 223 MG/DL
UIBC SERPL-MCNC: 230 UG/DL

## 2025-01-31 ENCOUNTER — APPOINTMENT (OUTPATIENT)
Dept: INFUSION THERAPY | Facility: CANCER CENTER | Age: 70
End: 2025-01-31

## 2025-02-04 ENCOUNTER — APPOINTMENT (OUTPATIENT)
Dept: INFUSION THERAPY | Facility: CANCER CENTER | Age: 70
End: 2025-02-04

## 2025-02-05 ENCOUNTER — APPOINTMENT (OUTPATIENT)
Dept: GASTROENTEROLOGY | Facility: CLINIC | Age: 70
End: 2025-02-05
Payer: MEDICARE

## 2025-02-05 VITALS
RESPIRATION RATE: 16 BRPM | SYSTOLIC BLOOD PRESSURE: 136 MMHG | WEIGHT: 141 LBS | BODY MASS INDEX: 26.64 KG/M2 | HEART RATE: 64 BPM | DIASTOLIC BLOOD PRESSURE: 78 MMHG

## 2025-02-05 DIAGNOSIS — K74.60 UNSPECIFIED CIRRHOSIS OF LIVER: ICD-10-CM

## 2025-02-05 PROCEDURE — 99215 OFFICE O/P EST HI 40 MIN: CPT

## 2025-02-10 ENCOUNTER — RX RENEWAL (OUTPATIENT)
Age: 70
End: 2025-02-10

## 2025-02-11 ENCOUNTER — APPOINTMENT (OUTPATIENT)
Dept: INFUSION THERAPY | Facility: CANCER CENTER | Age: 70
End: 2025-02-11

## 2025-02-12 ENCOUNTER — APPOINTMENT (OUTPATIENT)
Dept: HEMATOLOGY ONCOLOGY | Facility: CLINIC | Age: 70
End: 2025-02-12

## 2025-02-14 ENCOUNTER — APPOINTMENT (OUTPATIENT)
Dept: CARDIOLOGY | Facility: CLINIC | Age: 70
End: 2025-02-14
Payer: MEDICARE

## 2025-02-14 VITALS
HEIGHT: 61 IN | OXYGEN SATURATION: 96 % | HEART RATE: 60 BPM | DIASTOLIC BLOOD PRESSURE: 60 MMHG | WEIGHT: 140 LBS | SYSTOLIC BLOOD PRESSURE: 126 MMHG | BODY MASS INDEX: 26.43 KG/M2

## 2025-02-14 DIAGNOSIS — I25.10 ATHEROSCLEROTIC HEART DISEASE OF NATIVE CORONARY ARTERY W/OUT ANGINA PECTORIS: ICD-10-CM

## 2025-02-14 DIAGNOSIS — I10 ESSENTIAL (PRIMARY) HYPERTENSION: ICD-10-CM

## 2025-02-14 DIAGNOSIS — E78.5 HYPERLIPIDEMIA, UNSPECIFIED: ICD-10-CM

## 2025-02-14 PROCEDURE — 99214 OFFICE O/P EST MOD 30 MIN: CPT

## 2025-02-14 PROCEDURE — G2211 COMPLEX E/M VISIT ADD ON: CPT

## 2025-02-17 ENCOUNTER — RX RENEWAL (OUTPATIENT)
Age: 70
End: 2025-02-17

## 2025-02-19 ENCOUNTER — RESULT REVIEW (OUTPATIENT)
Age: 70
End: 2025-02-19

## 2025-02-19 ENCOUNTER — APPOINTMENT (OUTPATIENT)
Dept: CT IMAGING | Facility: CLINIC | Age: 70
End: 2025-02-19

## 2025-02-19 ENCOUNTER — APPOINTMENT (OUTPATIENT)
Dept: RADIOLOGY | Facility: CLINIC | Age: 70
End: 2025-02-19

## 2025-02-19 ENCOUNTER — APPOINTMENT (OUTPATIENT)
Dept: MRI IMAGING | Facility: CLINIC | Age: 70
End: 2025-02-19

## 2025-02-19 PROCEDURE — 74160 CT ABDOMEN W/CONTRAST: CPT | Mod: TC

## 2025-02-20 RX ORDER — ASPIRIN ENTERIC COATED TABLETS 81 MG 81 MG/1
81 TABLET, DELAYED RELEASE ORAL DAILY
Qty: 90 | Refills: 3 | Status: ACTIVE | COMMUNITY
Start: 2025-02-20 | End: 1900-01-01

## 2025-02-20 RX ORDER — ATORVASTATIN CALCIUM 10 MG/1
10 TABLET, FILM COATED ORAL
Qty: 90 | Refills: 3 | Status: ACTIVE | COMMUNITY
Start: 2025-02-20 | End: 1900-01-01

## 2025-02-24 ENCOUNTER — NON-APPOINTMENT (OUTPATIENT)
Age: 70
End: 2025-02-24

## 2025-02-27 ENCOUNTER — NON-APPOINTMENT (OUTPATIENT)
Age: 70
End: 2025-02-27

## 2025-03-03 ENCOUNTER — APPOINTMENT (OUTPATIENT)
Dept: INTERVENTIONAL RADIOLOGY/VASCULAR | Facility: CLINIC | Age: 70
End: 2025-03-03

## 2025-03-03 DIAGNOSIS — K76.0 FATTY (CHANGE OF) LIVER, NOT ELSEWHERE CLASSIFIED: ICD-10-CM

## 2025-03-03 DIAGNOSIS — K76.9 LIVER DISEASE, UNSPECIFIED: ICD-10-CM

## 2025-03-03 PROCEDURE — 99214 OFFICE O/P EST MOD 30 MIN: CPT | Mod: 2W

## 2025-03-06 RX ORDER — INSULIN GLARGINE 100 [IU]/ML
100 INJECTION, SOLUTION SUBCUTANEOUS
Qty: 1 | Refills: 1 | Status: ACTIVE | COMMUNITY
Start: 2025-03-06 | End: 1900-01-01

## 2025-03-07 ENCOUNTER — APPOINTMENT (OUTPATIENT)
Dept: ENDOCRINOLOGY | Facility: CLINIC | Age: 70
End: 2025-03-07

## 2025-03-11 ENCOUNTER — RESULT REVIEW (OUTPATIENT)
Age: 70
End: 2025-03-11

## 2025-03-11 ENCOUNTER — NON-APPOINTMENT (OUTPATIENT)
Age: 70
End: 2025-03-11

## 2025-03-11 ENCOUNTER — APPOINTMENT (OUTPATIENT)
Dept: TRANSPLANT | Facility: CLINIC | Age: 70
End: 2025-03-11

## 2025-03-11 ENCOUNTER — LABORATORY RESULT (OUTPATIENT)
Age: 70
End: 2025-03-11

## 2025-03-11 ENCOUNTER — APPOINTMENT (OUTPATIENT)
Dept: HEPATOLOGY | Facility: CLINIC | Age: 70
End: 2025-03-11
Payer: COMMERCIAL

## 2025-03-11 DIAGNOSIS — I85.00 ESOPHAGEAL VARICES W/OUT BLEEDING: ICD-10-CM

## 2025-03-11 DIAGNOSIS — E11.9 TYPE 2 DIABETES MELLITUS W/OUT COMPLICATIONS: ICD-10-CM

## 2025-03-11 DIAGNOSIS — K76.6 PORTAL HYPERTENSION: ICD-10-CM

## 2025-03-11 DIAGNOSIS — K74.60 UNSPECIFIED CIRRHOSIS OF LIVER: ICD-10-CM

## 2025-03-11 PROCEDURE — 99215 OFFICE O/P EST HI 40 MIN: CPT

## 2025-03-12 ENCOUNTER — OUTPATIENT (OUTPATIENT)
Dept: OUTPATIENT SERVICES | Facility: HOSPITAL | Age: 70
LOS: 1 days | End: 2025-03-12

## 2025-03-12 ENCOUNTER — APPOINTMENT (OUTPATIENT)
Dept: NUCLEAR MEDICINE | Facility: CLINIC | Age: 70
End: 2025-03-12
Payer: MEDICARE

## 2025-03-12 DIAGNOSIS — C22.0 LIVER CELL CARCINOMA: ICD-10-CM

## 2025-03-12 DIAGNOSIS — Z90.49 ACQUIRED ABSENCE OF OTHER SPECIFIED PARTS OF DIGESTIVE TRACT: Chronic | ICD-10-CM

## 2025-03-12 PROCEDURE — 78306 BONE IMAGING WHOLE BODY: CPT | Mod: 26

## 2025-03-13 ENCOUNTER — RESULT REVIEW (OUTPATIENT)
Age: 70
End: 2025-03-13

## 2025-03-13 ENCOUNTER — APPOINTMENT (OUTPATIENT)
Dept: HEMATOLOGY ONCOLOGY | Facility: CLINIC | Age: 70
End: 2025-03-13
Payer: MEDICARE

## 2025-03-13 VITALS
HEIGHT: 61 IN | BODY MASS INDEX: 26.66 KG/M2 | OXYGEN SATURATION: 100 % | WEIGHT: 141.2 LBS | HEART RATE: 59 BPM | TEMPERATURE: 97.8 F | SYSTOLIC BLOOD PRESSURE: 133 MMHG | DIASTOLIC BLOOD PRESSURE: 49 MMHG

## 2025-03-13 DIAGNOSIS — D64.9 ANEMIA, UNSPECIFIED: ICD-10-CM

## 2025-03-13 DIAGNOSIS — C22.0 LIVER CELL CARCINOMA: ICD-10-CM

## 2025-03-13 LAB
BASOPHILS # BLD AUTO: 0.03 K/UL — SIGNIFICANT CHANGE UP (ref 0–0.2)
BASOPHILS NFR BLD AUTO: 0.8 % — SIGNIFICANT CHANGE UP (ref 0–2)
EOSINOPHIL # BLD AUTO: 0.1 K/UL — SIGNIFICANT CHANGE UP (ref 0–0.5)
EOSINOPHIL NFR BLD AUTO: 2.6 % — SIGNIFICANT CHANGE UP (ref 0–6)
HCT VFR BLD CALC: 29.7 % — LOW (ref 34.5–45)
HGB BLD-MCNC: 9.6 G/DL — LOW (ref 11.5–15.5)
IMM GRANULOCYTES NFR BLD AUTO: 0.5 % — SIGNIFICANT CHANGE UP (ref 0–0.9)
LYMPHOCYTES # BLD AUTO: 0.34 K/UL — LOW (ref 1–3.3)
LYMPHOCYTES # BLD AUTO: 8.9 % — LOW (ref 13–44)
MCHC RBC-ENTMCNC: 32.3 G/DL — SIGNIFICANT CHANGE UP (ref 32–36)
MCHC RBC-ENTMCNC: 33 PG — SIGNIFICANT CHANGE UP (ref 27–34)
MCV RBC AUTO: 102.1 FL — HIGH (ref 80–100)
MONOCYTES # BLD AUTO: 0.5 K/UL — SIGNIFICANT CHANGE UP (ref 0–0.9)
MONOCYTES NFR BLD AUTO: 13.2 % — SIGNIFICANT CHANGE UP (ref 2–14)
NEUTROPHILS # BLD AUTO: 2.81 K/UL — SIGNIFICANT CHANGE UP (ref 1.8–7.4)
NEUTROPHILS NFR BLD AUTO: 74 % — SIGNIFICANT CHANGE UP (ref 43–77)
NRBC BLD AUTO-RTO: 0 /100 WBCS — SIGNIFICANT CHANGE UP (ref 0–0)
PLATELET # BLD AUTO: 52 K/UL — LOW (ref 150–400)
RBC # BLD: 2.91 M/UL — LOW (ref 3.8–5.2)
RBC # FLD: 16.2 % — HIGH (ref 10.3–14.5)
WBC # BLD: 3.8 K/UL — SIGNIFICANT CHANGE UP (ref 3.8–10.5)
WBC # FLD AUTO: 3.8 K/UL — SIGNIFICANT CHANGE UP (ref 3.8–10.5)

## 2025-03-13 PROCEDURE — 99214 OFFICE O/P EST MOD 30 MIN: CPT

## 2025-03-13 PROCEDURE — G2211 COMPLEX E/M VISIT ADD ON: CPT

## 2025-03-13 PROCEDURE — 96365 THER/PROPH/DIAG IV INF INIT: CPT

## 2025-03-13 PROCEDURE — 85027 COMPLETE CBC AUTOMATED: CPT

## 2025-03-14 ENCOUNTER — APPOINTMENT (OUTPATIENT)
Dept: CT IMAGING | Facility: CLINIC | Age: 70
End: 2025-03-14

## 2025-03-14 LAB
FERRITIN SERPL-MCNC: 165 NG/ML
IRON SATN MFR SERPL: 56 %
IRON SERPL-MCNC: 111 UG/DL
TIBC SERPL-MCNC: 199 UG/DL
TRANSFERRIN SERPL-MCNC: 156 MG/DL
UIBC SERPL-MCNC: 88 UG/DL

## 2025-03-14 PROCEDURE — 71260 CT THORAX DX C+: CPT | Mod: TC

## 2025-03-19 ENCOUNTER — RX RENEWAL (OUTPATIENT)
Age: 70
End: 2025-03-19

## 2025-03-19 LAB
A1AT PHENOTYP SERPL-IMP: NORMAL
A1AT SERPL-MCNC: 153 MG/DL
A1AT SERPL-MCNC: 156 MG/DL
AFP-TM SERPL-MCNC: 2.5 NG/ML
ALBUMIN SERPL ELPH-MCNC: 2.9 G/DL
ALP BLD-CCNC: 161 U/L
ALT SERPL-CCNC: 6 U/L
ANION GAP SERPL CALC-SCNC: 11 MMOL/L
AST SERPL-CCNC: 33 U/L
BILIRUB SERPL-MCNC: 2 MG/DL
BUN SERPL-MCNC: 28 MG/DL
CALCIUM SERPL-MCNC: 8.6 MG/DL
CHLORIDE SERPL-SCNC: 104 MMOL/L
CHOLEST SERPL-MCNC: 121 MG/DL
CMV IGG SERPL QL: <0.2 U/ML
CMV IGG SERPL-IMP: NEGATIVE
CO2 SERPL-SCNC: 24 MMOL/L
COVID-19 SPIKE DOMAIN ANTIBODY INTERPRETATION: POSITIVE
CREAT SERPL-MCNC: 1.12 MG/DL
CYSTATIN C SERPL-MCNC: 2.07 MG/L
EBV EA AB SER IA-ACNC: >150 U/ML
EBV EA AB TITR SER IF: POSITIVE
EBV EA IGG SER QL IA: >600 U/ML
EBV EA IGG SER-ACNC: POSITIVE
EBV EA IGM SER IA-ACNC: POSITIVE
EBV PATRN SPEC IB-IMP: NORMAL
EBV VCA IGG SER IA-ACNC: >750 U/ML
EBV VCA IGM SER QL IA: 77.6 U/ML
EGFRCR SERPLBLD CKD-EPI 2021: 53 ML/MIN/1.73M2
EPSTEIN-BARR VIRUS CAPSID ANTIGEN IGG: POSITIVE
ESTIMATED AVERAGE GLUCOSE: 143 MG/DL
ETHANOL BLD-MCNC: <10 MG/DL
GFR/BSA.PRED SERPLBLD CYS-BASED-ARV: 27 ML/MIN/1.73M2
GLUCOSE SERPL-MCNC: 374 MG/DL
HAV IGM SER QL: NONREACTIVE
HBA1C MFR BLD HPLC: 6.6 %
HBV CORE IGG+IGM SER QL: NONREACTIVE
HBV SURFACE AB SER QL: NONREACTIVE
HBV SURFACE AG SER QL: NONREACTIVE
HCG SERPL-MCNC: <1 MIU/ML
HCT VFR BLD CALC: 30.6 %
HCV AB SER QL: REACTIVE
HCV S/CO RATIO: 1.17 S/CO
HDLC SERPL-MCNC: 58 MG/DL
HEPATITIS A IGG ANTIBODY: NONREACTIVE
HGB BLD-MCNC: 9.8 G/DL
HIV1+2 AB SPEC QL IA.RAPID: NONREACTIVE
HSV 1+2 IGG SER IA-IMP: NEGATIVE
HSV 1+2 IGG SER IA-IMP: POSITIVE
HSV1 IGG SER QL: 39.8 INDEX
HSV2 IGG SER QL: 0.04 INDEX
INR PPP: 1.38 RATIO
LDLC SERPL CALC-MCNC: 46 MG/DL
M TB IFN-G BLD-IMP: NEGATIVE
MAGNESIUM SERPL-MCNC: 1.7 MG/DL
MCHC RBC-ENTMCNC: 32 G/DL
MCHC RBC-ENTMCNC: 34.5 PG
MCV RBC AUTO: 107.7 FL
NONHDLC SERPL-MCNC: 63 MG/DL
PETH 16:0/18:1: NEGATIVE NG/ML
PETH 16:0/18:2: NEGATIVE NG/ML
PETH COMMENTS: NORMAL
PHOSPHATE SERPL-MCNC: 3 MG/DL
PLATELET # BLD AUTO: 56 K/UL
POTASSIUM SERPL-SCNC: 4.5 MMOL/L
PROT SERPL-MCNC: 5.3 G/DL
PT BLD: 16.2 SEC
QUANTIFERON TB PLUS MITOGEN MINUS NIL: 0.58 IU/ML
QUANTIFERON TB PLUS NIL: 0.02 IU/ML
QUANTIFERON TB PLUS TB1 MINUS NIL: 0 IU/ML
QUANTIFERON TB PLUS TB2 MINUS NIL: 0 IU/ML
RBC # BLD: 2.84 M/UL
RBC # FLD: 17.2 %
RUBV IGG FLD-ACNC: 30.3 INDEX
RUBV IGG SER-IMP: POSITIVE
SARS-COV-2 AB SERPL IA-ACNC: >250 U/ML
SODIUM SERPL-SCNC: 139 MMOL/L
STRONGYLOIDES AB SER IA-ACNC: NEGATIVE
T GONDII AB SER-IMP: NEGATIVE
T GONDII IGG SER QL: <3 IU/ML
T PALLIDUM AB SER QL IA: NEGATIVE
TRIGL SERPL-MCNC: 86 MG/DL
VZV AB TITR SER: POSITIVE
VZV IGG SER IF-ACNC: 48.5 S/CO
WBC # FLD AUTO: 3.78 K/UL

## 2025-03-21 ENCOUNTER — APPOINTMENT (OUTPATIENT)
Dept: CARDIOLOGY | Facility: CLINIC | Age: 70
End: 2025-03-21
Payer: COMMERCIAL

## 2025-03-21 ENCOUNTER — NON-APPOINTMENT (OUTPATIENT)
Age: 70
End: 2025-03-21

## 2025-03-21 VITALS
WEIGHT: 141 LBS | HEIGHT: 61 IN | DIASTOLIC BLOOD PRESSURE: 64 MMHG | HEART RATE: 64 BPM | SYSTOLIC BLOOD PRESSURE: 112 MMHG | OXYGEN SATURATION: 97 % | BODY MASS INDEX: 26.62 KG/M2

## 2025-03-21 DIAGNOSIS — I10 ESSENTIAL (PRIMARY) HYPERTENSION: ICD-10-CM

## 2025-03-21 DIAGNOSIS — E78.5 HYPERLIPIDEMIA, UNSPECIFIED: ICD-10-CM

## 2025-03-21 DIAGNOSIS — Z01.810 ENCOUNTER FOR PREPROCEDURAL CARDIOVASCULAR EXAMINATION: ICD-10-CM

## 2025-03-21 PROCEDURE — 99215 OFFICE O/P EST HI 40 MIN: CPT

## 2025-03-21 PROCEDURE — G2211 COMPLEX E/M VISIT ADD ON: CPT

## 2025-03-21 PROCEDURE — 93000 ELECTROCARDIOGRAM COMPLETE: CPT | Mod: NC

## 2025-03-21 RX ORDER — BUDESONIDE AND FORMOTEROL FUMARATE DIHYDRATE 160; 4.5 UG/1; UG/1
160-4.5 AEROSOL RESPIRATORY (INHALATION)
Refills: 0 | Status: ACTIVE | COMMUNITY

## 2025-03-25 ENCOUNTER — APPOINTMENT (OUTPATIENT)
Dept: CARDIOLOGY | Facility: CLINIC | Age: 70
End: 2025-03-25

## 2025-03-25 DIAGNOSIS — Z01.818 ENCOUNTER FOR OTHER PREPROCEDURAL EXAMINATION: ICD-10-CM

## 2025-04-03 ENCOUNTER — OUTPATIENT (OUTPATIENT)
Dept: OUTPATIENT SERVICES | Facility: HOSPITAL | Age: 70
LOS: 1 days | End: 2025-04-03
Payer: MEDICARE

## 2025-04-03 ENCOUNTER — TRANSCRIPTION ENCOUNTER (OUTPATIENT)
Age: 70
End: 2025-04-03

## 2025-04-03 VITALS
DIASTOLIC BLOOD PRESSURE: 58 MMHG | TEMPERATURE: 98 F | OXYGEN SATURATION: 100 % | SYSTOLIC BLOOD PRESSURE: 156 MMHG | RESPIRATION RATE: 12 BRPM | HEART RATE: 54 BPM

## 2025-04-03 VITALS
HEART RATE: 67 BPM | OXYGEN SATURATION: 100 % | SYSTOLIC BLOOD PRESSURE: 122 MMHG | DIASTOLIC BLOOD PRESSURE: 56 MMHG | RESPIRATION RATE: 17 BRPM

## 2025-04-03 DIAGNOSIS — Z90.49 ACQUIRED ABSENCE OF OTHER SPECIFIED PARTS OF DIGESTIVE TRACT: Chronic | ICD-10-CM

## 2025-04-03 DIAGNOSIS — Z01.818 ENCOUNTER FOR OTHER PREPROCEDURAL EXAMINATION: ICD-10-CM

## 2025-04-03 DIAGNOSIS — Z01.810 ENCOUNTER FOR PREPROCEDURAL CARDIOVASCULAR EXAMINATION: ICD-10-CM

## 2025-04-03 DIAGNOSIS — Z98.49 CATARACT EXTRACTION STATUS, UNSPECIFIED EYE: Chronic | ICD-10-CM

## 2025-04-03 DIAGNOSIS — Z98.891 HISTORY OF UTERINE SCAR FROM PREVIOUS SURGERY: Chronic | ICD-10-CM

## 2025-04-03 DIAGNOSIS — Z98.890 OTHER SPECIFIED POSTPROCEDURAL STATES: Chronic | ICD-10-CM

## 2025-04-03 LAB
ANION GAP SERPL CALC-SCNC: 7 MMOL/L — SIGNIFICANT CHANGE UP (ref 5–17)
BUN SERPL-MCNC: 20.4 MG/DL — HIGH (ref 8–20)
CALCIUM SERPL-MCNC: 8.2 MG/DL — LOW (ref 8.4–10.5)
CHLORIDE SERPL-SCNC: 101 MMOL/L — SIGNIFICANT CHANGE UP (ref 96–108)
CO2 SERPL-SCNC: 32 MMOL/L — HIGH (ref 22–29)
CREAT SERPL-MCNC: 1.09 MG/DL — SIGNIFICANT CHANGE UP (ref 0.5–1.3)
EGFR: 55 ML/MIN/1.73M2 — LOW
EGFR: 55 ML/MIN/1.73M2 — LOW
GLUCOSE SERPL-MCNC: 112 MG/DL — HIGH (ref 70–99)
HCT VFR BLD CALC: 33.8 % — LOW (ref 34.5–45)
HGB BLD-MCNC: 11 G/DL — LOW (ref 11.5–15.5)
IMMATURE PLATELET FRACTION #: 0.9 K/UL — LOW (ref 4.7–11.1)
IMMATURE PLATELET FRACTION %: 1.8 % — SIGNIFICANT CHANGE UP (ref 1.6–4.9)
MAGNESIUM SERPL-MCNC: 1.7 MG/DL — SIGNIFICANT CHANGE UP (ref 1.6–2.6)
MCHC RBC-ENTMCNC: 32.5 G/DL — SIGNIFICANT CHANGE UP (ref 32–36)
MCHC RBC-ENTMCNC: 33.4 PG — SIGNIFICANT CHANGE UP (ref 27–34)
MCV RBC AUTO: 102.7 FL — HIGH (ref 80–100)
NRBC # BLD AUTO: 0 K/UL — SIGNIFICANT CHANGE UP (ref 0–0)
NRBC # FLD: 0 K/UL — SIGNIFICANT CHANGE UP (ref 0–0)
NRBC BLD AUTO-RTO: 0 /100 WBCS — SIGNIFICANT CHANGE UP (ref 0–0)
PLATELET # BLD AUTO: 52 K/UL — LOW (ref 150–400)
PMV BLD: 10.5 FL — SIGNIFICANT CHANGE UP (ref 7–13)
POTASSIUM SERPL-MCNC: 4.4 MMOL/L — SIGNIFICANT CHANGE UP (ref 3.5–5.3)
POTASSIUM SERPL-SCNC: 4.4 MMOL/L — SIGNIFICANT CHANGE UP (ref 3.5–5.3)
RBC # BLD: 3.29 M/UL — LOW (ref 3.8–5.2)
RBC # FLD: 15.4 % — HIGH (ref 10.3–14.5)
SODIUM SERPL-SCNC: 140 MMOL/L — SIGNIFICANT CHANGE UP (ref 135–145)
WBC # BLD: 5.48 K/UL — SIGNIFICANT CHANGE UP (ref 3.8–10.5)
WBC # FLD AUTO: 5.48 K/UL — SIGNIFICANT CHANGE UP (ref 3.8–10.5)

## 2025-04-03 PROCEDURE — 83735 ASSAY OF MAGNESIUM: CPT

## 2025-04-03 PROCEDURE — 99152 MOD SED SAME PHYS/QHP 5/>YRS: CPT

## 2025-04-03 PROCEDURE — C1769: CPT

## 2025-04-03 PROCEDURE — 93458 L HRT ARTERY/VENTRICLE ANGIO: CPT

## 2025-04-03 PROCEDURE — 93010 ELECTROCARDIOGRAM REPORT: CPT

## 2025-04-03 PROCEDURE — C1894: CPT

## 2025-04-03 PROCEDURE — 85027 COMPLETE CBC AUTOMATED: CPT

## 2025-04-03 PROCEDURE — C1887: CPT

## 2025-04-03 PROCEDURE — 93005 ELECTROCARDIOGRAM TRACING: CPT

## 2025-04-03 PROCEDURE — 80048 BASIC METABOLIC PNL TOTAL CA: CPT

## 2025-04-03 PROCEDURE — 36415 COLL VENOUS BLD VENIPUNCTURE: CPT

## 2025-04-03 PROCEDURE — 93458 L HRT ARTERY/VENTRICLE ANGIO: CPT | Mod: 26

## 2025-04-03 PROCEDURE — 99232 SBSQ HOSP IP/OBS MODERATE 35: CPT | Mod: 25

## 2025-04-03 RX ORDER — MAGNESIUM SULFATE 500 MG/ML
2 SYRINGE (ML) INJECTION ONCE
Refills: 0 | Status: COMPLETED | OUTPATIENT
Start: 2025-04-03 | End: 2025-04-03

## 2025-04-03 RX ORDER — TORSEMIDE 10 MG
1 TABLET ORAL
Refills: 0 | DISCHARGE

## 2025-04-03 RX ORDER — INSULIN GLARGINE-YFGN 100 [IU]/ML
14 INJECTION, SOLUTION SUBCUTANEOUS
Refills: 0 | DISCHARGE

## 2025-04-03 RX ORDER — ASPIRIN 325 MG
81 TABLET ORAL ONCE
Refills: 0 | Status: COMPLETED | OUTPATIENT
Start: 2025-04-03 | End: 2025-04-03

## 2025-04-03 RX ORDER — BUDESONIDE AND FORMOTEROL FUMARATE DIHYDRATE 80; 4.5 UG/1; UG/1
2 AEROSOL RESPIRATORY (INHALATION)
Refills: 0 | DISCHARGE

## 2025-04-03 RX ORDER — ASPIRIN 325 MG
1 TABLET ORAL
Refills: 0 | DISCHARGE

## 2025-04-03 RX ADMIN — Medication 81 MILLIGRAM(S): at 08:39

## 2025-04-03 RX ADMIN — Medication 25 GRAM(S): at 09:30

## 2025-04-03 RX ADMIN — Medication 250 MILLILITER(S): at 08:39

## 2025-04-03 NOTE — DISCHARGE NOTE PROVIDER - NSDCACTIVITY_GEN_ALL_CORE
Do not drive or operate machinery/Showering allowed/Do not make important decisions/Stairs allowed/Walking - Indoors allowed/Walking - Outdoors allowed [de-identified] : patient speaks Greenlandic/ Creole. Used   services.\par 50 y/o [place]  female referred by her rheumatologist for mild leukopenia. \par CBC in Oct 2020 : WBC 3.75  ANC 1.4 rest of CBC normal  In 2017 WBC was 3.3 with ANC 1.1\par She has multiple and semi- chronic complaints. \par Generalized aches/ pains , " crawling sensation under the skin" for over one year. Seen by rheumatology.\par Evaluated by GI for globus sensation.\par Seen by neurology for intermittent R sided weakness and paresthesias for over two years. W/up inconclusive. Possible early  MS. ? fibromyalgia.\par  \par No fevers,  no chills, no rashes, no weight loss. \par She is postmenopausal.

## 2025-04-03 NOTE — DISCHARGE NOTE PROVIDER - PROVIDER TOKENS
PROVIDER:[TOKEN:[[id number]],FOLLOWUP:[2 weeks],ESTABLISHEDPATIENT:[T]],PROVIDER:[TOKEN:[[id number]],FOLLOWUP:[2 weeks],ESTABLISHEDPATIENT:[T]]

## 2025-04-03 NOTE — DISCHARGE NOTE PROVIDER - NSDCFUSCHEDAPPT_GEN_ALL_CORE_FT
Soraya Guillory  [facility]  INFDISEASE [address]  Scheduled Appointment: 04/07/2025    Farrukh Bales  [facility]  NEPHRO [address]  Scheduled Appointment: 04/10/2025    [facility]  CARDIOLOGY [address]  Scheduled Appointment: 04/14/2025    [facility]  OBGYNGEN [address]  Scheduled Appointment: 04/16/2025    Oanh Martinez  [facility]  GASTRO [address]  Scheduled Appointment: 04/23/2025    [facility]  CARDIOLOGY [address]  Scheduled Appointment: 04/29/2025    [facility]  PSYCHIATRY [address]   Scheduled Appointment: 05/07/2025    Lucian Barahona  [facility]  CARDIOLOGY [address]  Scheduled Appointment: 06/02/2025    Jaylen David  [facility]  [facility]  Scheduled Appointment: 06/25/2025

## 2025-04-03 NOTE — DISCHARGE NOTE PROVIDER - NSDCCPTREATMENT_GEN_ALL_CORE_FT
PRINCIPAL PROCEDURE  Procedure: Left heart cardiac cath  Findings and Treatment: 30-40% mLAD disease, 20-30% diffuse RCA disease, small LCx artery

## 2025-04-03 NOTE — DISCHARGE NOTE PROVIDER - NSDCMRMEDTOKEN_GEN_ALL_CORE_FT
alendronate weekly: 70 milligram(s) orally once a week  carvedilol 3.125 mg oral tablet: 1 tablet orally once a day before breakfast  HumaLOG 100 units/mL injectable solution: 18 unit(s) injectable 3 times a day (before meals)  lactulose 10 g/15 mL oral solution: 20 milligram(s) orally 2 times a day  Lantus Solostar Pen 100 units/mL subcutaneous solution: 14 subcutaneous once a day (at bedtime)  Multiple Vitamins oral tablet: 1 tab(s) orally once a day  pantoprazole 40 mg oral delayed release tablet: 1 tab(s) orally once a day (before a meal)  rifAXIMin 550 mg oral tablet: 1 tab(s) orally 2 times a day  ursodiol 500 mg oral tablet: 1 tab(s) orally once a day   alendronate weekly: 70 milligram(s) orally once a week  Aspir 81 oral delayed release tablet: 1 tab(s) orally once a day  carvedilol 3.125 mg oral tablet: 1 tablet orally 2 times a day  HumaLOG 100 units/mL injectable solution: 18 unit(s) injectable 3 times a day (before meals)  lactulose 10 g/15 mL oral solution: 20 milligram(s) orally 2 times a day  Lantus Solostar Pen 100 units/mL subcutaneous solution: 14 subcutaneous once a day (at bedtime)  Multiple Vitamins oral tablet: 1 tab(s) orally once a day  Protonix 40 mg oral delayed release tablet: 1 tab(s) orally once a day  rifAXIMin 550 mg oral tablet: 1 tab(s) orally 2 times a day  Symbicort 160 mcg-4.5 mcg/inh inhalation aerosol: 2 inhaled 2 times a day  torsemide 10 mg oral tablet: 1 tab(s) orally once a day  ursodiol 500 mg oral tablet: 1 tab(s) orally once a day

## 2025-04-03 NOTE — H&P PST ADULT - NS ATTEND AMEND GEN_ALL_CORE FT
I have seen and examined the patient and agree with the assessment and plan. LHC, pre-liver transplant.

## 2025-04-03 NOTE — DISCHARGE NOTE NURSING/CASE MANAGEMENT/SOCIAL WORK - PATIENT PORTAL LINK FT
You can access the FollowMyHealth Patient Portal offered by [facility] by registering at the following website: [email or web address]. By joining [employer]’s FollowMyHealth portal, you will also be able to view your health information using other applications (apps) compatible with our system.

## 2025-04-03 NOTE — H&P PST ADULT - HISTORY OF PRESENT ILLNESS
Narrative: 70F PMH mild nonobstructive CAD ([facility] 11/2023), HTN, HLD, DM previous endocarditis with prolonged IV abx because MAC was contraindicated, decompensated cirrhosis c/b ascites, hepatic encephalopathy, GI bleed s/p banding presents for cardiac followup. Patient denies chest pain, SOB, n/v/d, fever, chills, dizziness, syncope. Presents for [facility] as part of workup for liver transplantation.    Review of Systems: negative unless mentioned in HPI    Symptoms: N/A       Angina (Class):        Ischemic Symptoms:     Heart Failure: N/A       Systolic/Diastolic/Combined:        NYHA Class (within 2 weeks):     Assessment of LVEF (Must be within 6 months):       EF: 60-65%       Assessed by: TTE       Date: 8/2024    CCTA 2023: multifocal extensive mixed plaque throughout LAD and RCA likely moderate stenosis however evaluation is limited secondary to extensive calcification. Calcium score 780, 96th %ile.    Prior Cardiac Interventions ([facility], stents, CABG): [facility] 11/2023: pLAD 40%, mRCA 30%       PCI's (Date, Stents, Vessels):        CABG (Date, Grafts):     EKG: SB 55bpm    Stress Test (Date, Findings):     Echo (Date, Findings):8/2024 TTE: EF 60-65%, normal RV syst fxn, unable to r/o MV vegetation due to fibrocalcific nature. Mild MR, mild TR, estimated PASP 52mmhg, c/w mod pHTN. Trace PC effusion.    Antianginal Therapies:        Beta Blockers:         Calcium Channel Blockers:        Long Acting Nitrates:        Ranexa:     Associated Risk Factors:        Frailty Score: mild       Cerebrovascular Disease: N/A       Chronic Lung Disease: N/A       Peripheral Arterial Disease: N/A       Chronic Kidney Disease (if yes, what is GFR): N/A       Uncontrolled Diabetes (if yes, what is HgbA1C or FBS): N/A       Poorly Controlled Hypertension (if yes, what is SBP): N/A       Morbid Obesity (if yes, what is BMI): N/A       History of Recent Ventricular Arrhythmia: N/A       Inability to Ambulate Safely: N/A       Need for Therapeutic Anticoagulation: N/A       Antiplatelet or Contrast Allergy: N/A    VITAL SIGNS:  Vital Signs Last 24 Hrs  T(C): 36.5 (03 Apr 2025 07:50), Max: 36.5 (03 Apr 2025 07:50)  T(F): 97.7 (03 Apr 2025 07:50), Max: 97.7 (03 Apr 2025 07:50)  HR: 54 (03 Apr 2025 07:50) (54 - 54)  BP: 156/58 (03 Apr 2025 07:50) (156/58 - 156/58)  BP(mean): --  RR: 12 (03 Apr 2025 07:50) (12 - 12)  SpO2: 100% (03 Apr 2025 07:50) (100% - 100%)    Parameters below as of 03 Apr 2025 07:50  Patient On (Oxygen Delivery Method): room air    PHYSICAL EXAM:  Constitutional: A & O x 3, NAD  HEENT:  Normal oral mucosa, PERRL, EOMI	  Cardiovascular: S1 S2, no murmur, No JVD  Respiratory: Lungs clear to auscultation	  Gastrointestinal:  Soft, Non-tender, + BS	  Skin: No rashes or cyanosis  Neurologic: No deficit appreciated  Extremities: Normal range of motion, no LE edema  Vascular: distal pulses +     LABS:                        11.0   5.48  )-----------( 52        ( 03 Apr 2025 07:30 )             33.8     04-03    140  |  101  |  20.4[H]  ----------------------------<  112[H]  4.4   |  32.0[H]  |  1.09    Ca    8.2[L]      03 Apr 2025 07:30  Mg     1.7     04-03    Risk Stratification:  ASA: 3  Mallampati: 3  Bleeding Risk: 4.0%  Creatinine: 1.09  GFR: 55   Pt assessed, appropriate for sedation, pt educated regarding the plan for Versed/Fentanyl as needed.    Plan/Recommendations:   -plan for C  -preferred access: RRA vs. RFA  -patient seen and examined  -confirmed appropriate NPO duration  -ECG and Labs reviewed  -Aspirin 81mg po pre-cath  -NS 250mL IV bolus pre-cath  -procedure discussed with patient; risks and benefits explained, questions answered  -consent obtained by attending IC    Risks, benefits, and alternatives reviewed.  Risks including but not limited to MI, death, stroke, bleeding, infection, vessel injury, hematoma, renal failure, allergic reaction, urgent open heart surgery, restenosis and stent thrombosis were reviewed.  All questions answered.  Patient is agreeable to proceed. Narrative: 70F PMH mild nonobstructive CAD ([facility] 11/2023), HTN, HLD, DM previous endocarditis with prolonged IV abx because MAC was contraindicated, decompensated cirrhosis c/b ascites, hepatic encephalopathy, GI bleed s/p banding presents for cardiac followup. Patient denies chest pain, SOB, n/v/d, fever, chills, dizziness, syncope. Presents for [facility] as part of workup for liver transplantation.    Review of Systems: negative unless mentioned in HPI    Symptoms: N/A       Angina (Class):        Ischemic Symptoms:     Heart Failure: N/A       Systolic/Diastolic/Combined:        NYHA Class (within 2 weeks):     Assessment of LVEF (Must be within 6 months):       EF: 60-65%       Assessed by: TTE       Date: 8/2024    CCTA 2023: multifocal extensive mixed plaque throughout LAD and RCA likely moderate stenosis however evaluation is limited secondary to extensive calcification. Calcium score 780, 96th %ile.    Prior Cardiac Interventions ([facility], stents, CABG): [facility] 11/2023: pLAD 40%, mRCA 30%       PCI's (Date, Stents, Vessels):        CABG (Date, Grafts):     EKG: SB 55bpm    Stress Test (Date, Findings):     Echo (Date, Findings):8/2024 TTE: EF 60-65%, normal RV syst fxn, unable to r/o MV vegetation due to fibrocalcific nature. Mild MR, mild TR, estimated PASP 52mmhg, c/w mod pHTN. Trace PC effusion.    Antianginal Therapies:        Beta Blockers:  Coreg 3.125mg PO BID       Calcium Channel Blockers:        Long Acting Nitrates:        Ranexa:     Associated Risk Factors:        Frailty Score: mild       Cerebrovascular Disease: N/A       Chronic Lung Disease: N/A       Peripheral Arterial Disease: N/A       Chronic Kidney Disease (if yes, what is GFR): N/A       Uncontrolled Diabetes (if yes, what is HgbA1C or FBS): N/A       Poorly Controlled Hypertension (if yes, what is SBP): N/A       Morbid Obesity (if yes, what is BMI): N/A       History of Recent Ventricular Arrhythmia: N/A       Inability to Ambulate Safely: N/A       Need for Therapeutic Anticoagulation: N/A       Antiplatelet or Contrast Allergy: N/A    VITAL SIGNS:  Vital Signs Last 24 Hrs  T(C): 36.5 (03 Apr 2025 07:50), Max: 36.5 (03 Apr 2025 07:50)  T(F): 97.7 (03 Apr 2025 07:50), Max: 97.7 (03 Apr 2025 07:50)  HR: 54 (03 Apr 2025 07:50) (54 - 54)  BP: 156/58 (03 Apr 2025 07:50) (156/58 - 156/58)  BP(mean): --  RR: 12 (03 Apr 2025 07:50) (12 - 12)  SpO2: 100% (03 Apr 2025 07:50) (100% - 100%)    Parameters below as of 03 Apr 2025 07:50  Patient On (Oxygen Delivery Method): room air    PHYSICAL EXAM:  Constitutional: A & O x 3, NAD  HEENT:  Normal oral mucosa, PERRL, EOMI	  Cardiovascular: S1 S2, no murmur, No JVD  Respiratory: Lungs clear to auscultation	  Gastrointestinal:  Soft, Non-tender, + BS	  Skin: No rashes or cyanosis  Neurologic: No deficit appreciated  Extremities: Normal range of motion, no LE edema  Vascular: distal pulses +     LABS:                        11.0   5.48  )-----------( 52        ( 03 Apr 2025 07:30 )             33.8     04-03    140  |  101  |  20.4[H]  ----------------------------<  112[H]  4.4   |  32.0[H]  |  1.09    Ca    8.2[L]      03 Apr 2025 07:30  Mg     1.7     04-03    Risk Stratification:  ASA: 3  Mallampati: 3  Bleeding Risk: 4.0%  Creatinine: 1.09  GFR: 55   Pt assessed, appropriate for sedation, pt educated regarding the plan for Versed/Fentanyl as needed.    Plan/Recommendations:   -plan for C  -preferred access: RRA vs. RFA  -patient seen and examined  -confirmed appropriate NPO duration  -ECG and Labs reviewed  -Aspirin 81mg po pre-cath  -NS 250mL IV bolus pre-cath  -procedure discussed with patient; risks and benefits explained, questions answered  -consent obtained by attending IC    Risks, benefits, and alternatives reviewed.  Risks including but not limited to MI, death, stroke, bleeding, infection, vessel injury, hematoma, renal failure, allergic reaction, urgent open heart surgery, restenosis and stent thrombosis were reviewed.  All questions answered.  Patient is agreeable to proceed.

## 2025-04-03 NOTE — CHART NOTE - NSCHARTNOTEFT_GEN_A_CORE
Now s/p LHC via RRA with Dr. Prado, tolerated procedure well. Pt arrived to recovery in NAD and HDS, RRA access site stable, no bleed/hematoma, distal pulse +,   Intraprocedural findings: 30-40% mLAD disease, 20-30% diffuse RCA disease, small LCx artery  Medications:  P2Y12 inhibitor: N/A  Fentanyl: 25mcg IVP  Versed: 1mg IVP  Heparin: 3000U  Omnipaque: 31cc  Closure Device: Transradial band  Post Cath EKG: N/A    Plan:  -Formal cath report pending  -Post procedure management/monitoring per protocol  -Access site precautions  -Radial compression band removal at 1030  -Bedrest x 30 minutes post radial band removal  -NS 0.9% 250ml/hr x 1 bolus: post procedure ROD ppx   -Repeat ECG if any clinical indication or change on tele  -Continue current medical therapy  -Cont BB with coreg 3.125mg po bid  -Cont statin therapy with lipitor 10mg po hs  -Educated regarding post procedure management and care  -Discussed the importance of RF modification  -F/U outpt in 1-2 weeks with Cardiologist Dr. Barahona  -DISPO: Plan for D/C 2 hours post-procedure if remains HDS and without complications

## 2025-04-03 NOTE — DISCHARGE NOTE NURSING/CASE MANAGEMENT/SOCIAL WORK - FINANCIAL ASSISTANCE
[facility] provides services at a reduced cost to those who are determined to be eligible through [facility]’s financial assistance program. Information regarding [facility]’s financial assistance program can be found by going to [email or web address] or by calling [phone].

## 2025-04-03 NOTE — DISCHARGE NOTE PROVIDER - CARE PROVIDERS DIRECT ADDRESSES
[email or web address]

## 2025-04-03 NOTE — DISCHARGE NOTE PROVIDER - CARE PROVIDER_API CALL
Lucian Barahona  Cardiovascular Disease  [address], [address]  [place] [address]  Phone: [phone]  Fax: [phone]  Established Patient  Follow Up Time: 2 weeks    Oanh Martinez  Transplant Hepatology  [address], [address]  [place] [address]  Phone: [phone]  Fax: [phone]  Established Patient  Follow Up Time: 2 weeks

## 2025-04-03 NOTE — DISCHARGE NOTE PROVIDER - HOSPITAL COURSE
70F PMH mild nonobstructive CAD (LHC 11/2023), HTN, HLD, DM previous endocarditis with prolonged IV abx because MAC was contraindicated, decompensated cirrhosis c/b ascites, hepatic encephalopathy, GI bleed s/p banding presents for cardiac followup. Patient denies chest pain, SOB, n/v/d, fever, chills, dizziness, syncope. Presents for [facility] as part of workup for liver transplantation.    Now s/p LHC via RRA with Dr. Prado, tolerated procedure well. Pt arrived to recovery in NAD and HDS, RRA access site stable, no bleed/hematoma, distal pulse +,   Intraprocedural findings: 30-40% mLAD disease, 20-30% diffuse RCA disease, small LCx artery    Plan:  -Formal cath report pending  -Post procedure management/monitoring per protocol  -Access site precautions  -Continue current medical therapy  -Cont BB with coreg 3.125mg po bid  -Cont statin therapy with lipitor 10mg po hs  -Educated regarding post procedure management and care  -Discussed the importance of RF modification  -F/U outpt in 1-2 weeks with Cardiologist Dr. Barahona   70F PMH mild nonobstructive CAD (LHC 11/2023), HTN, HLD, DM previous endocarditis with prolonged IV abx because MAC was contraindicated, decompensated cirrhosis c/b ascites, hepatic encephalopathy, GI bleed s/p banding presents for cardiac followup. Patient denies chest pain, SOB, n/v/d, fever, chills, dizziness, syncope. Presents for [facility] as part of workup for liver transplantation.    Now s/p LHC via RRA with Dr. Prado, tolerated procedure well. Pt arrived to recovery in NAD and HDS, RRA access site stable, no bleed/hematoma, distal pulse +,   Intraprocedural findings: 30-40% mLAD disease, 20-30% diffuse RCA disease, small LCx artery    Plan:  -Formal cath report pending  -Post procedure management/monitoring per protocol  -Access site precautions  -Continue current medical therapy  -Start enteric coated Aspirin 81mg daily  -Cont BB with coreg 3.125mg po bid  -Cont statin therapy with lipitor 10mg po hs  -Educated regarding post procedure management and care  -Discussed the importance of RF modification  -F/U outpt in 1-2 weeks with Cardiologist Dr. Barahona

## 2025-04-03 NOTE — DISCHARGE NOTE PROVIDER - NSDCCPCAREPLAN_GEN_ALL_CORE_FT
PRINCIPAL DISCHARGE DIAGNOSIS  Diagnosis: Nonobstructive atherosclerosis of coronary artery  Assessment and Plan of Treatment:

## 2025-04-07 ENCOUNTER — APPOINTMENT (OUTPATIENT)
Dept: INFECTIOUS DISEASE | Facility: CLINIC | Age: 70
End: 2025-04-07
Payer: COMMERCIAL

## 2025-04-07 VITALS
WEIGHT: 140 LBS | SYSTOLIC BLOOD PRESSURE: 120 MMHG | HEIGHT: 61 IN | BODY MASS INDEX: 26.43 KG/M2 | OXYGEN SATURATION: 95 % | HEART RATE: 61 BPM | TEMPERATURE: 98.2 F | DIASTOLIC BLOOD PRESSURE: 70 MMHG

## 2025-04-07 DIAGNOSIS — Z01.818 ENCOUNTER FOR OTHER PREPROCEDURAL EXAMINATION: ICD-10-CM

## 2025-04-07 PROCEDURE — 90739 HEPB VACC 2/4 DOSE ADULT IM: CPT

## 2025-04-07 PROCEDURE — 90632 HEPA VACCINE ADULT IM: CPT

## 2025-04-07 PROCEDURE — G0010: CPT | Mod: 59

## 2025-04-07 PROCEDURE — 99214 OFFICE O/P EST MOD 30 MIN: CPT | Mod: 25

## 2025-04-07 PROCEDURE — 90471 IMMUNIZATION ADMIN: CPT

## 2025-04-09 ENCOUNTER — NON-APPOINTMENT (OUTPATIENT)
Age: 70
End: 2025-04-09

## 2025-04-10 ENCOUNTER — APPOINTMENT (OUTPATIENT)
Dept: NEPHROLOGY | Facility: CLINIC | Age: 70
End: 2025-04-10

## 2025-04-10 VITALS
HEIGHT: 61 IN | WEIGHT: 146 LBS | DIASTOLIC BLOOD PRESSURE: 88 MMHG | RESPIRATION RATE: 16 BRPM | SYSTOLIC BLOOD PRESSURE: 106 MMHG | BODY MASS INDEX: 27.56 KG/M2 | OXYGEN SATURATION: 98 % | HEART RATE: 66 BPM

## 2025-04-10 LAB
C IMMITIS AB SER QL IA: NEGATIVE
EBV DNA SERPL NAA+PROBE-ACNC: NOT DETECTED IU/ML
EBVPCR LOG: NOT DETECTED LOG10IU/ML
HCV RNA SERPL NAA+PROBE-LOG IU: NOT DETECTED LOGIU/ML
HEPC RNA INTERP: NOT DETECTED
MEV IGG FLD QL IA: 286 AU/ML
MEV IGG+IGM SER-IMP: POSITIVE
MUV AB SER-ACNC: POSITIVE
MUV IGG SER QL IA: >300 AU/ML
RUBV IGG FLD-ACNC: 27.8 INDEX
RUBV IGG SER-IMP: POSITIVE
STRONGYLOIDES AB SER IA-ACNC: NEGATIVE
VZV AB TITR SER: POSITIVE
VZV IGG SER IF-ACNC: 44.6 S/CO

## 2025-04-10 PROCEDURE — 99215 OFFICE O/P EST HI 40 MIN: CPT

## 2025-04-11 LAB — T CRUZI AB SER-ACNC: 0.4 IV

## 2025-04-13 LAB — BACTERIA BLD CULT: NORMAL

## 2025-04-14 ENCOUNTER — RX RENEWAL (OUTPATIENT)
Age: 70
End: 2025-04-14

## 2025-04-14 ENCOUNTER — APPOINTMENT (OUTPATIENT)
Dept: CARDIOLOGY | Facility: CLINIC | Age: 70
End: 2025-04-14
Payer: COMMERCIAL

## 2025-04-14 PROCEDURE — 93306 TTE W/DOPPLER COMPLETE: CPT

## 2025-04-14 RX ORDER — LANCETS
EACH MISCELLANEOUS
Qty: 1 | Refills: 1 | Status: ACTIVE | COMMUNITY
Start: 2025-04-14 | End: 1900-01-01

## 2025-04-16 ENCOUNTER — APPOINTMENT (OUTPATIENT)
Dept: OBGYN | Facility: CLINIC | Age: 70
End: 2025-04-16
Payer: COMMERCIAL

## 2025-04-16 VITALS
BODY MASS INDEX: 27.56 KG/M2 | DIASTOLIC BLOOD PRESSURE: 76 MMHG | WEIGHT: 146 LBS | SYSTOLIC BLOOD PRESSURE: 129 MMHG | HEIGHT: 61 IN

## 2025-04-16 DIAGNOSIS — Z01.419 ENCOUNTER FOR GYNECOLOGICAL EXAMINATION (GENERAL) (ROUTINE) W/OUT ABNORMAL FINDINGS: ICD-10-CM

## 2025-04-16 LAB
COVID-19 NUCLEOCAPSID  GAM ANTIBODY INTERPRETATION: POSITIVE
SARS-COV-2 AB SERPL QL IA: 99.7 INDEX
SCHISTOSOMA IGG SER QL: <1

## 2025-04-16 PROCEDURE — G0101: CPT

## 2025-04-17 LAB — HPV HIGH+LOW RISK DNA PNL CVX: NOT DETECTED

## 2025-04-20 LAB — CYTOLOGY CVX/VAG DOC THIN PREP: ABNORMAL

## 2025-04-23 ENCOUNTER — APPOINTMENT (OUTPATIENT)
Dept: GASTROENTEROLOGY | Facility: CLINIC | Age: 70
End: 2025-04-23

## 2025-04-23 VITALS
HEART RATE: 74 BPM | SYSTOLIC BLOOD PRESSURE: 124 MMHG | DIASTOLIC BLOOD PRESSURE: 70 MMHG | HEIGHT: 61 IN | OXYGEN SATURATION: 97 % | BODY MASS INDEX: 26.81 KG/M2 | WEIGHT: 142 LBS | RESPIRATION RATE: 14 BRPM

## 2025-04-23 DIAGNOSIS — K74.60 UNSPECIFIED CIRRHOSIS OF LIVER: ICD-10-CM

## 2025-04-23 PROCEDURE — 99215 OFFICE O/P EST HI 40 MIN: CPT

## 2025-04-23 RX ORDER — ATORVASTATIN CALCIUM 10 MG/1
10 TABLET, FILM COATED ORAL DAILY
Refills: 0 | Status: ACTIVE | COMMUNITY

## 2025-04-28 ENCOUNTER — RESULT REVIEW (OUTPATIENT)
Age: 70
End: 2025-04-28

## 2025-04-28 ENCOUNTER — APPOINTMENT (OUTPATIENT)
Dept: MAMMOGRAPHY | Facility: CLINIC | Age: 70
End: 2025-04-28
Payer: MEDICARE

## 2025-04-28 PROCEDURE — 77063 BREAST TOMOSYNTHESIS BI: CPT

## 2025-04-28 PROCEDURE — 77067 SCR MAMMO BI INCL CAD: CPT

## 2025-04-29 ENCOUNTER — APPOINTMENT (OUTPATIENT)
Dept: CARDIOLOGY | Facility: CLINIC | Age: 70
End: 2025-04-29
Payer: COMMERCIAL

## 2025-04-29 PROCEDURE — 93351 STRESS TTE COMPLETE: CPT

## 2025-04-29 PROCEDURE — 93320 DOPPLER ECHO COMPLETE: CPT

## 2025-05-01 ENCOUNTER — NON-APPOINTMENT (OUTPATIENT)
Age: 70
End: 2025-05-01

## 2025-05-01 ENCOUNTER — RESULT REVIEW (OUTPATIENT)
Age: 70
End: 2025-05-01

## 2025-05-01 ENCOUNTER — APPOINTMENT (OUTPATIENT)
Dept: CT IMAGING | Facility: CLINIC | Age: 70
End: 2025-05-01

## 2025-05-01 PROCEDURE — 74170 CT ABD WO CNTRST FLWD CNTRST: CPT

## 2025-05-05 ENCOUNTER — MED ADMIN CHARGE (OUTPATIENT)
Age: 70
End: 2025-05-05

## 2025-05-05 ENCOUNTER — APPOINTMENT (OUTPATIENT)
Dept: INFECTIOUS DISEASE | Facility: CLINIC | Age: 70
End: 2025-05-05

## 2025-05-05 ENCOUNTER — NON-APPOINTMENT (OUTPATIENT)
Age: 70
End: 2025-05-05

## 2025-05-05 ENCOUNTER — APPOINTMENT (OUTPATIENT)
Dept: INTERNAL MEDICINE | Facility: CLINIC | Age: 70
End: 2025-05-05

## 2025-05-05 VITALS
OXYGEN SATURATION: 96 % | HEART RATE: 58 BPM | TEMPERATURE: 97.4 F | SYSTOLIC BLOOD PRESSURE: 124 MMHG | DIASTOLIC BLOOD PRESSURE: 70 MMHG

## 2025-05-05 PROCEDURE — 90746 HEPB VACCINE 3 DOSE ADULT IM: CPT

## 2025-05-05 PROCEDURE — 99213 OFFICE O/P EST LOW 20 MIN: CPT

## 2025-05-05 PROCEDURE — G0010: CPT

## 2025-05-07 ENCOUNTER — APPOINTMENT (OUTPATIENT)
Dept: PSYCHIATRY | Facility: CLINIC | Age: 70
End: 2025-05-07
Payer: COMMERCIAL

## 2025-05-07 ENCOUNTER — OUTPATIENT (OUTPATIENT)
Dept: OUTPATIENT SERVICES | Facility: HOSPITAL | Age: 70
LOS: 1 days | End: 2025-05-07
Payer: COMMERCIAL

## 2025-05-07 ENCOUNTER — NON-APPOINTMENT (OUTPATIENT)
Age: 70
End: 2025-05-07

## 2025-05-07 DIAGNOSIS — Z98.890 OTHER SPECIFIED POSTPROCEDURAL STATES: Chronic | ICD-10-CM

## 2025-05-07 DIAGNOSIS — F41.9 ANXIETY DISORDER, UNSPECIFIED: ICD-10-CM

## 2025-05-07 DIAGNOSIS — Z98.49 CATARACT EXTRACTION STATUS, UNSPECIFIED EYE: Chronic | ICD-10-CM

## 2025-05-07 DIAGNOSIS — Z90.49 ACQUIRED ABSENCE OF OTHER SPECIFIED PARTS OF DIGESTIVE TRACT: Chronic | ICD-10-CM

## 2025-05-07 PROCEDURE — 90791 PSYCH DIAGNOSTIC EVALUATION: CPT | Mod: 95

## 2025-05-07 PROCEDURE — 90791 PSYCH DIAGNOSTIC EVALUATION: CPT

## 2025-05-08 ENCOUNTER — NON-APPOINTMENT (OUTPATIENT)
Age: 70
End: 2025-05-08

## 2025-05-08 ENCOUNTER — LABORATORY RESULT (OUTPATIENT)
Age: 70
End: 2025-05-08

## 2025-05-08 ENCOUNTER — APPOINTMENT (OUTPATIENT)
Dept: MRI IMAGING | Facility: CLINIC | Age: 70
End: 2025-05-08

## 2025-05-08 PROCEDURE — A9585: CPT

## 2025-05-08 PROCEDURE — 74183 MRI ABD W/O CNTR FLWD CNTR: CPT

## 2025-05-09 ENCOUNTER — NON-APPOINTMENT (OUTPATIENT)
Age: 70
End: 2025-05-09

## 2025-05-09 DIAGNOSIS — Z01.818 ENCOUNTER FOR OTHER PREPROCEDURAL EXAMINATION: ICD-10-CM

## 2025-05-09 DIAGNOSIS — F41.1 GENERALIZED ANXIETY DISORDER: ICD-10-CM

## 2025-05-13 ENCOUNTER — APPOINTMENT (OUTPATIENT)
Dept: INTERVENTIONAL RADIOLOGY/VASCULAR | Facility: CLINIC | Age: 70
End: 2025-05-13

## 2025-05-13 DIAGNOSIS — K76.9 LIVER DISEASE, UNSPECIFIED: ICD-10-CM

## 2025-05-13 DIAGNOSIS — K76.6 PORTAL HYPERTENSION: ICD-10-CM

## 2025-05-13 DIAGNOSIS — I85.00 ESOPHAGEAL VARICES W/OUT BLEEDING: ICD-10-CM

## 2025-05-13 DIAGNOSIS — K76.0 FATTY (CHANGE OF) LIVER, NOT ELSEWHERE CLASSIFIED: ICD-10-CM

## 2025-05-13 DIAGNOSIS — K74.60 UNSPECIFIED CIRRHOSIS OF LIVER: ICD-10-CM

## 2025-05-13 DIAGNOSIS — C22.0 LIVER CELL CARCINOMA: ICD-10-CM

## 2025-05-13 PROCEDURE — 99214 OFFICE O/P EST MOD 30 MIN: CPT | Mod: 2W

## 2025-05-13 RX ORDER — BUDESONIDE AND FORMOTEROL FUMARATE DIHYDRATE 160; 4.5 UG/1; UG/1
160-4.5 AEROSOL RESPIRATORY (INHALATION)
Refills: 0 | Status: ACTIVE | COMMUNITY

## 2025-05-15 ENCOUNTER — RESULT REVIEW (OUTPATIENT)
Age: 70
End: 2025-05-15

## 2025-05-16 ENCOUNTER — NON-APPOINTMENT (OUTPATIENT)
Age: 70
End: 2025-05-16

## 2025-05-21 ENCOUNTER — APPOINTMENT (OUTPATIENT)
Dept: CT IMAGING | Facility: CLINIC | Age: 70
End: 2025-05-21

## 2025-05-22 ENCOUNTER — OUTPATIENT (OUTPATIENT)
Dept: OUTPATIENT SERVICES | Facility: HOSPITAL | Age: 70
LOS: 1 days | End: 2025-05-22
Payer: MEDICARE

## 2025-05-22 ENCOUNTER — NON-APPOINTMENT (OUTPATIENT)
Age: 70
End: 2025-05-22

## 2025-05-22 DIAGNOSIS — Z90.49 ACQUIRED ABSENCE OF OTHER SPECIFIED PARTS OF DIGESTIVE TRACT: Chronic | ICD-10-CM

## 2025-05-22 DIAGNOSIS — Z98.891 HISTORY OF UTERINE SCAR FROM PREVIOUS SURGERY: Chronic | ICD-10-CM

## 2025-05-22 DIAGNOSIS — Z01.818 ENCOUNTER FOR OTHER PREPROCEDURAL EXAMINATION: ICD-10-CM

## 2025-05-22 DIAGNOSIS — Z01.810 ENCOUNTER FOR PREPROCEDURAL CARDIOVASCULAR EXAMINATION: ICD-10-CM

## 2025-05-22 DIAGNOSIS — Z98.890 OTHER SPECIFIED POSTPROCEDURAL STATES: Chronic | ICD-10-CM

## 2025-05-22 PROCEDURE — 93005 ELECTROCARDIOGRAM TRACING: CPT

## 2025-05-22 PROCEDURE — G0463: CPT

## 2025-05-22 PROCEDURE — 93010 ELECTROCARDIOGRAM REPORT: CPT

## 2025-05-28 ENCOUNTER — INPATIENT (INPATIENT)
Facility: HOSPITAL | Age: 70
LOS: 14 days | Discharge: NURSING, REHAB OR LONG-TERM CARE | DRG: 5 | End: 2025-06-12
Attending: TRANSPLANT SURGERY | Admitting: TRANSPLANT SURGERY
Payer: MEDICARE

## 2025-05-28 VITALS
HEART RATE: 75 BPM | WEIGHT: 142.2 LBS | HEIGHT: 61 IN | OXYGEN SATURATION: 96 % | RESPIRATION RATE: 18 BRPM | DIASTOLIC BLOOD PRESSURE: 75 MMHG | TEMPERATURE: 99 F | SYSTOLIC BLOOD PRESSURE: 171 MMHG

## 2025-05-28 DIAGNOSIS — Z98.49 CATARACT EXTRACTION STATUS, UNSPECIFIED EYE: Chronic | ICD-10-CM

## 2025-05-28 DIAGNOSIS — Z98.890 OTHER SPECIFIED POSTPROCEDURAL STATES: Chronic | ICD-10-CM

## 2025-05-28 DIAGNOSIS — Z98.891 HISTORY OF UTERINE SCAR FROM PREVIOUS SURGERY: Chronic | ICD-10-CM

## 2025-05-28 DIAGNOSIS — K74.60 UNSPECIFIED CIRRHOSIS OF LIVER: ICD-10-CM

## 2025-05-28 DIAGNOSIS — Z90.49 ACQUIRED ABSENCE OF OTHER SPECIFIED PARTS OF DIGESTIVE TRACT: Chronic | ICD-10-CM

## 2025-05-28 LAB
A1C WITH ESTIMATED AVERAGE GLUCOSE RESULT: 6 % — HIGH (ref 4–5.6)
ALBUMIN SERPL ELPH-MCNC: 3.4 G/DL — SIGNIFICANT CHANGE UP (ref 3.3–5)
ALP SERPL-CCNC: 78 U/L — SIGNIFICANT CHANGE UP (ref 40–120)
ALT FLD-CCNC: 10 U/L — SIGNIFICANT CHANGE UP (ref 10–45)
ANION GAP SERPL CALC-SCNC: 16 MMOL/L — SIGNIFICANT CHANGE UP (ref 5–17)
ANISOCYTOSIS BLD QL: SLIGHT — SIGNIFICANT CHANGE UP
APPEARANCE UR: CLEAR — SIGNIFICANT CHANGE UP
APTT BLD: 34.9 SEC — SIGNIFICANT CHANGE UP (ref 26.1–36.8)
AST SERPL-CCNC: 35 U/L — SIGNIFICANT CHANGE UP (ref 10–40)
BACTERIA # UR AUTO: NEGATIVE /HPF — SIGNIFICANT CHANGE UP
BASOPHILS # BLD AUTO: 0.08 K/UL — SIGNIFICANT CHANGE UP (ref 0–0.2)
BASOPHILS NFR BLD AUTO: 1.7 % — SIGNIFICANT CHANGE UP (ref 0–2)
BILIRUB SERPL-MCNC: 3.2 MG/DL — HIGH (ref 0.2–1.2)
BILIRUB UR-MCNC: NEGATIVE — SIGNIFICANT CHANGE UP
BLD GP AB SCN SERPL QL: NEGATIVE — SIGNIFICANT CHANGE UP
BUN SERPL-MCNC: 46 MG/DL — HIGH (ref 7–23)
CALCIUM SERPL-MCNC: 8.8 MG/DL — SIGNIFICANT CHANGE UP (ref 8.4–10.5)
CAST: 1 /LPF — SIGNIFICANT CHANGE UP (ref 0–4)
CHLORIDE SERPL-SCNC: 104 MMOL/L — SIGNIFICANT CHANGE UP (ref 96–108)
CO2 SERPL-SCNC: 23 MMOL/L — SIGNIFICANT CHANGE UP (ref 22–31)
COLOR SPEC: YELLOW — SIGNIFICANT CHANGE UP
CREAT SERPL-MCNC: 1.72 MG/DL — HIGH (ref 0.5–1.3)
DIFF PNL FLD: ABNORMAL
EGFR: 32 ML/MIN/1.73M2 — LOW
EGFR: 32 ML/MIN/1.73M2 — LOW
ELLIPTOCYTES BLD QL SMEAR: SLIGHT — SIGNIFICANT CHANGE UP
EOSINOPHIL # BLD AUTO: 0.28 K/UL — SIGNIFICANT CHANGE UP (ref 0–0.5)
EOSINOPHIL NFR BLD AUTO: 6 % — SIGNIFICANT CHANGE UP (ref 0–6)
ESTIMATED AVERAGE GLUCOSE: 126 MG/DL — HIGH (ref 68–114)
GLUCOSE BLDC GLUCOMTR-MCNC: 178 MG/DL — HIGH (ref 70–99)
GLUCOSE BLDC GLUCOMTR-MCNC: 204 MG/DL — HIGH (ref 70–99)
GLUCOSE BLDC GLUCOMTR-MCNC: 216 MG/DL — HIGH (ref 70–99)
GLUCOSE BLDC GLUCOMTR-MCNC: 85 MG/DL — SIGNIFICANT CHANGE UP (ref 70–99)
GLUCOSE BLDC GLUCOMTR-MCNC: 97 MG/DL — SIGNIFICANT CHANGE UP (ref 70–99)
GLUCOSE SERPL-MCNC: 179 MG/DL — HIGH (ref 70–99)
GLUCOSE UR QL: NEGATIVE MG/DL — SIGNIFICANT CHANGE UP
HCT VFR BLD CALC: 23.8 % — LOW (ref 34.5–45)
HCT VFR BLD CALC: 25.1 % — LOW (ref 34.5–45)
HGB BLD-MCNC: 8 G/DL — LOW (ref 11.5–15.5)
HGB BLD-MCNC: 8.4 G/DL — LOW (ref 11.5–15.5)
INR BLD: 1.7 RATIO — HIGH (ref 0.85–1.16)
KETONES UR QL: NEGATIVE MG/DL — SIGNIFICANT CHANGE UP
LEUKOCYTE ESTERASE UR-ACNC: ABNORMAL
LYMPHOCYTES # BLD AUTO: 0.16 K/UL — LOW (ref 1–3.3)
LYMPHOCYTES # BLD AUTO: 3.5 % — LOW (ref 13–44)
MACROCYTES BLD QL: SLIGHT — SIGNIFICANT CHANGE UP
MAGNESIUM SERPL-MCNC: 1.5 MG/DL — LOW (ref 1.6–2.6)
MANUAL SMEAR VERIFICATION: SIGNIFICANT CHANGE UP
MCHC RBC-ENTMCNC: 32.4 PG — SIGNIFICANT CHANGE UP (ref 27–34)
MCHC RBC-ENTMCNC: 33.2 PG — SIGNIFICANT CHANGE UP (ref 27–34)
MCHC RBC-ENTMCNC: 33.5 G/DL — SIGNIFICANT CHANGE UP (ref 32–36)
MCHC RBC-ENTMCNC: 33.6 G/DL — SIGNIFICANT CHANGE UP (ref 32–36)
MCV RBC AUTO: 96.9 FL — SIGNIFICANT CHANGE UP (ref 80–100)
MCV RBC AUTO: 98.8 FL — SIGNIFICANT CHANGE UP (ref 80–100)
MICROCYTES BLD QL: SLIGHT — SIGNIFICANT CHANGE UP
MONOCYTES # BLD AUTO: 0.24 K/UL — SIGNIFICANT CHANGE UP (ref 0–0.9)
MONOCYTES NFR BLD AUTO: 5.2 % — SIGNIFICANT CHANGE UP (ref 2–14)
NEUTROPHILS # BLD AUTO: 3.85 K/UL — SIGNIFICANT CHANGE UP (ref 1.8–7.4)
NEUTROPHILS NFR BLD AUTO: 83.6 % — HIGH (ref 43–77)
NITRITE UR-MCNC: NEGATIVE — SIGNIFICANT CHANGE UP
NRBC BLD AUTO-RTO: 0 /100 WBCS — SIGNIFICANT CHANGE UP (ref 0–0)
PH UR: 6 — SIGNIFICANT CHANGE UP (ref 5–8)
PHOSPHATE SERPL-MCNC: 4.4 MG/DL — SIGNIFICANT CHANGE UP (ref 2.5–4.5)
PLAT MORPH BLD: NORMAL — SIGNIFICANT CHANGE UP
PLATELET # BLD AUTO: 33 K/UL — LOW (ref 150–400)
PLATELET # BLD AUTO: 47 K/UL — LOW (ref 150–400)
POIKILOCYTOSIS BLD QL AUTO: SLIGHT — SIGNIFICANT CHANGE UP
POTASSIUM SERPL-MCNC: 3.9 MMOL/L — SIGNIFICANT CHANGE UP (ref 3.5–5.3)
POTASSIUM SERPL-SCNC: 3.9 MMOL/L — SIGNIFICANT CHANGE UP (ref 3.5–5.3)
PROT SERPL-MCNC: 5.3 G/DL — LOW (ref 6–8.3)
PROT UR-MCNC: NEGATIVE MG/DL — SIGNIFICANT CHANGE UP
PROTHROM AB SERPL-ACNC: 19.4 SEC — HIGH (ref 9.9–13.4)
RBC # BLD: 2.41 M/UL — LOW (ref 3.8–5.2)
RBC # BLD: 2.59 M/UL — LOW (ref 3.8–5.2)
RBC # FLD: 19.6 % — HIGH (ref 10.3–14.5)
RBC # FLD: 19.8 % — HIGH (ref 10.3–14.5)
RBC BLD AUTO: ABNORMAL
RBC CASTS # UR COMP ASSIST: 4 /HPF — SIGNIFICANT CHANGE UP (ref 0–4)
RH IG SCN BLD-IMP: POSITIVE — SIGNIFICANT CHANGE UP
SODIUM SERPL-SCNC: 143 MMOL/L — SIGNIFICANT CHANGE UP (ref 135–145)
SP GR SPEC: 1.01 — SIGNIFICANT CHANGE UP (ref 1–1.03)
SQUAMOUS # UR AUTO: 0 /HPF — SIGNIFICANT CHANGE UP (ref 0–5)
UROBILINOGEN FLD QL: 0.2 MG/DL — SIGNIFICANT CHANGE UP (ref 0.2–1)
WBC # BLD: 3.94 K/UL — SIGNIFICANT CHANGE UP (ref 3.8–10.5)
WBC # BLD: 4.61 K/UL — SIGNIFICANT CHANGE UP (ref 3.8–10.5)
WBC # FLD AUTO: 3.94 K/UL — SIGNIFICANT CHANGE UP (ref 3.8–10.5)
WBC # FLD AUTO: 4.61 K/UL — SIGNIFICANT CHANGE UP (ref 3.8–10.5)
WBC UR QL: 0 /HPF — SIGNIFICANT CHANGE UP (ref 0–5)

## 2025-05-28 PROCEDURE — 99223 1ST HOSP IP/OBS HIGH 75: CPT | Mod: GC

## 2025-05-28 RX ORDER — ACETAMINOPHEN 500 MG/5ML
650 LIQUID (ML) ORAL ONCE
Refills: 0 | Status: COMPLETED | OUTPATIENT
Start: 2025-05-28 | End: 2025-05-28

## 2025-05-28 RX ORDER — CEFTRIAXONE 500 MG/1
1000 INJECTION, POWDER, FOR SOLUTION INTRAMUSCULAR; INTRAVENOUS EVERY 24 HOURS
Refills: 0 | Status: DISCONTINUED | OUTPATIENT
Start: 2025-05-28 | End: 2025-05-30

## 2025-05-28 RX ORDER — B1/B2/B3/B5/B6/B12/VIT C/FOLIC 500-0.5 MG
1 TABLET ORAL DAILY
Refills: 0 | Status: DISCONTINUED | OUTPATIENT
Start: 2025-05-28 | End: 2025-05-31

## 2025-05-28 RX ORDER — SUMATRIPTAN 100 MG/1
25 TABLET, FILM COATED ORAL ONCE
Refills: 0 | Status: COMPLETED | OUTPATIENT
Start: 2025-05-28 | End: 2025-05-28

## 2025-05-28 RX ORDER — INSULIN LISPRO 100 U/ML
INJECTION, SOLUTION INTRAVENOUS; SUBCUTANEOUS
Refills: 0 | Status: DISCONTINUED | OUTPATIENT
Start: 2025-05-28 | End: 2025-05-31

## 2025-05-28 RX ORDER — TORSEMIDE 10 MG
10 TABLET ORAL DAILY
Refills: 0 | Status: DISCONTINUED | OUTPATIENT
Start: 2025-05-28 | End: 2025-05-31

## 2025-05-28 RX ORDER — SODIUM CHLORIDE 9 G/1000ML
1000 INJECTION, SOLUTION INTRAVENOUS
Refills: 0 | Status: DISCONTINUED | OUTPATIENT
Start: 2025-05-28 | End: 2025-05-31

## 2025-05-28 RX ORDER — LACTULOSE 10 G/15ML
20 SOLUTION ORAL
Refills: 0 | Status: DISCONTINUED | OUTPATIENT
Start: 2025-05-28 | End: 2025-05-30

## 2025-05-28 RX ORDER — DEXTROSE 50 % IN WATER 50 %
12.5 SYRINGE (ML) INTRAVENOUS ONCE
Refills: 0 | Status: DISCONTINUED | OUTPATIENT
Start: 2025-05-28 | End: 2025-05-31

## 2025-05-28 RX ORDER — DEXTROSE 50 % IN WATER 50 %
15 SYRINGE (ML) INTRAVENOUS ONCE
Refills: 0 | Status: DISCONTINUED | OUTPATIENT
Start: 2025-05-28 | End: 2025-05-31

## 2025-05-28 RX ORDER — FOSAMPRENAVIR CALCIUM 700 MG
700 TABLET ORAL
Refills: 0 | Status: DISCONTINUED | OUTPATIENT
Start: 2025-05-28 | End: 2025-05-28

## 2025-05-28 RX ORDER — OCTREOTIDE ACETATE 500 UG/ML
50 INJECTION, SOLUTION INTRAVENOUS; SUBCUTANEOUS
Qty: 500 | Refills: 0 | Status: DISCONTINUED | OUTPATIENT
Start: 2025-05-28 | End: 2025-05-28

## 2025-05-28 RX ORDER — MAGNESIUM SULFATE 500 MG/ML
2 SYRINGE (ML) INJECTION ONCE
Refills: 0 | Status: COMPLETED | OUTPATIENT
Start: 2025-05-28 | End: 2025-05-28

## 2025-05-28 RX ORDER — GLUCAGON 3 MG/1
1 POWDER NASAL ONCE
Refills: 0 | Status: DISCONTINUED | OUTPATIENT
Start: 2025-05-28 | End: 2025-05-31

## 2025-05-28 RX ORDER — URSODIOL 300 MG/1
500 CAPSULE ORAL
Refills: 0 | Status: DISCONTINUED | OUTPATIENT
Start: 2025-05-28 | End: 2025-05-31

## 2025-05-28 RX ORDER — INSULIN LISPRO 100 U/ML
6 INJECTION, SOLUTION INTRAVENOUS; SUBCUTANEOUS
Refills: 0 | Status: DISCONTINUED | OUTPATIENT
Start: 2025-05-28 | End: 2025-05-31

## 2025-05-28 RX ORDER — ALPRAZOLAM 0.5 MG
0.25 TABLET, EXTENDED RELEASE 24 HR ORAL ONCE
Refills: 0 | Status: DISCONTINUED | OUTPATIENT
Start: 2025-05-28 | End: 2025-05-28

## 2025-05-28 RX ORDER — INSULIN GLARGINE-YFGN 100 [IU]/ML
14 INJECTION, SOLUTION SUBCUTANEOUS AT BEDTIME
Refills: 0 | Status: DISCONTINUED | OUTPATIENT
Start: 2025-05-28 | End: 2025-05-28

## 2025-05-28 RX ORDER — MAGNESIUM OXIDE 400 MG
400 TABLET ORAL
Refills: 0 | Status: DISCONTINUED | OUTPATIENT
Start: 2025-05-28 | End: 2025-05-29

## 2025-05-28 RX ORDER — INSULIN GLARGINE-YFGN 100 [IU]/ML
7 INJECTION, SOLUTION SUBCUTANEOUS AT BEDTIME
Refills: 0 | Status: DISCONTINUED | OUTPATIENT
Start: 2025-05-28 | End: 2025-05-30

## 2025-05-28 RX ORDER — CARVEDILOL 3.12 MG/1
3.12 TABLET, FILM COATED ORAL EVERY 12 HOURS
Refills: 0 | Status: DISCONTINUED | OUTPATIENT
Start: 2025-05-28 | End: 2025-05-29

## 2025-05-28 RX ORDER — DEXTROSE 50 % IN WATER 50 %
25 SYRINGE (ML) INTRAVENOUS ONCE
Refills: 0 | Status: DISCONTINUED | OUTPATIENT
Start: 2025-05-28 | End: 2025-05-31

## 2025-05-28 RX ORDER — ATORVASTATIN CALCIUM 80 MG/1
10 TABLET, FILM COATED ORAL AT BEDTIME
Refills: 0 | Status: DISCONTINUED | OUTPATIENT
Start: 2025-05-28 | End: 2025-05-31

## 2025-05-28 RX ORDER — INSULIN LISPRO 100 U/ML
18 INJECTION, SOLUTION INTRAVENOUS; SUBCUTANEOUS
Refills: 0 | Status: DISCONTINUED | OUTPATIENT
Start: 2025-05-28 | End: 2025-05-28

## 2025-05-28 RX ADMIN — SUMATRIPTAN 25 MILLIGRAM(S): 100 TABLET, FILM COATED ORAL at 09:57

## 2025-05-28 RX ADMIN — INSULIN LISPRO 6 UNIT(S): 100 INJECTION, SOLUTION INTRAVENOUS; SUBCUTANEOUS at 17:54

## 2025-05-28 RX ADMIN — CEFTRIAXONE 100 MILLIGRAM(S): 500 INJECTION, POWDER, FOR SOLUTION INTRAMUSCULAR; INTRAVENOUS at 06:15

## 2025-05-28 RX ADMIN — ATORVASTATIN CALCIUM 10 MILLIGRAM(S): 80 TABLET, FILM COATED ORAL at 21:09

## 2025-05-28 RX ADMIN — LACTULOSE 20 GRAM(S): 10 SOLUTION ORAL at 06:14

## 2025-05-28 RX ADMIN — INSULIN GLARGINE-YFGN 7 UNIT(S): 100 INJECTION, SOLUTION SUBCUTANEOUS at 21:56

## 2025-05-28 RX ADMIN — URSODIOL 500 MILLIGRAM(S): 300 CAPSULE ORAL at 17:51

## 2025-05-28 RX ADMIN — Medication 40 MILLIGRAM(S): at 06:15

## 2025-05-28 RX ADMIN — SUMATRIPTAN 25 MILLIGRAM(S): 100 TABLET, FILM COATED ORAL at 10:57

## 2025-05-28 RX ADMIN — CARVEDILOL 3.12 MILLIGRAM(S): 3.12 TABLET, FILM COATED ORAL at 08:16

## 2025-05-28 RX ADMIN — INSULIN LISPRO 4: 100 INJECTION, SOLUTION INTRAVENOUS; SUBCUTANEOUS at 17:52

## 2025-05-28 RX ADMIN — Medication 40 MILLIGRAM(S): at 17:51

## 2025-05-28 RX ADMIN — Medication 10 MILLIGRAM(S): at 06:15

## 2025-05-28 RX ADMIN — Medication 650 MILLIGRAM(S): at 03:03

## 2025-05-28 RX ADMIN — URSODIOL 500 MILLIGRAM(S): 300 CAPSULE ORAL at 06:15

## 2025-05-28 RX ADMIN — Medication 400 MILLIGRAM(S): at 17:51

## 2025-05-28 RX ADMIN — LACTULOSE 20 GRAM(S): 10 SOLUTION ORAL at 17:51

## 2025-05-28 RX ADMIN — Medication 1 DOSE(S): at 17:53

## 2025-05-28 RX ADMIN — Medication 25 GRAM(S): at 04:00

## 2025-05-28 RX ADMIN — Medication 1 DOSE(S): at 06:14

## 2025-05-28 RX ADMIN — Medication 650 MILLIGRAM(S): at 02:03

## 2025-05-28 RX ADMIN — INSULIN LISPRO 6 UNIT(S): 100 INJECTION, SOLUTION INTRAVENOUS; SUBCUTANEOUS at 08:16

## 2025-05-28 RX ADMIN — INSULIN LISPRO 4: 100 INJECTION, SOLUTION INTRAVENOUS; SUBCUTANEOUS at 08:16

## 2025-05-28 RX ADMIN — Medication 1 TABLET(S): at 17:51

## 2025-05-28 RX ADMIN — Medication 0.25 MILLIGRAM(S): at 17:51

## 2025-05-28 NOTE — ADVANCED PRACTICE NURSE CONSULT - REASON FOR CONSULT
Wound care consult initiated by RN to assess patient's skin for a possible stage 1 pressure injury on sacrum, present on admission     Reason for Admission: melena  History of Present Illness:   70 F hx NAFLD cirrhosis and HCC (listed for liver transplant with MELD 20B), UGIB in 2022, chronic back pain (2/2 spinal fracture) presented from [facility] where she was admitted on 5/26 for coffee ground emesis and melena, EGD showed no active bleeding, patient was transfused and stabilized prior to transfer. Patient states she hasn't had bloody bowel movements or bloody emesis since Monday 5/26. Patient endorses low back pain and frontal lobe headache, denies urinary or bowel incontinence, loss of sensation, weakness, dizziness, changes in vision, photobia, blurry vision, nausea, fever, chills, chest pain, or shortness of breath.

## 2025-05-28 NOTE — ADVANCED PRACTICE NURSE CONSULT - RECOMMEDATIONS
Impression:    B/L buttocks/sacral dark erythema, cannot rule out a deep tissue injury present on admission  B/L heel hyperpigmentation, cannot rule out a deep tissue injury present on admission  B/L foot plantar calluses  urinary incontinence    Recommendations:    1) turn and position q2 and PRN utilizing offloading assistive devices  2) routine pericare daily and PRN soiling  3) encourage optimal nutrition  4) waffle cushion or pillow on seat when oob to chair  5) B/L LE complete cair air fluidized boots or radha-lock pillow to offload heels/feet  6) keyonna protective barrier cream to B/L buttocks/sacrum daily and PRN soiling  7) incontinence management - consider external urinary catheter to divert urine from skin if incontinent  8) sween 24 moisturizer to dry skin daily - feet  9) may follow up with podiatry as an outpatient for calluses at [address] [place], [place] [address] [phone]     Plan discussed with LAURI Wood at bedside      For questions/comments regarding the recommendations in this consult, please contact Sol Schmidt via [employer] Teams. Wound care will not actively follow. For new concerns, please enter new consult. Thank you!

## 2025-05-28 NOTE — H&P ADULT - ASSESSMENT
70 F hx NAFLD cirrhosis and HCC (listed for liver transplant with MELD 20B), UGIB in 2022, chronic back pain (2/2 spinal fracture) presented from [facility] where she was admitted on 5/26 for coffee ground emesis and melena, EGD showed no active bleeding, patient was transfused and stabilized prior to transfer. Patient states she hasn't had bloody bowel movements or bloody emesis since Monday 5/26.     [] NAFLD   []HCC  []melena + hematemesis (resolving)   - listed for liver transplant   - daily MELD labs   - EGD at OSH negative for active bleed   - trend H+H, transfuse as needed   - continue protonix  - rifamximin/lactulose  - pain reg     []DM   - lantus 14u /lispro 18u     []DVT ppx   - SCD   - chemo ppx held iso recent GIB  70 F hx NAFLD cirrhosis and HCC (listed for liver transplant with MELD 20B), UGIB in 2022, chronic back pain (2/2 spinal fracture) presented from [facility] where she was admitted on 5/26 for coffee ground emesis and melena, EGD showed no active bleeding, patient was transfused and stabilized prior to transfer. Patient states she hasn't had bloody bowel movements or bloody emesis since Monday 5/26.     [] NAFLD   []HCC  []melena + hematemesis (resolving)   - listed for liver transplant   - daily MELD labs   - EGD at OSH negative for active bleed   - trend H+H, transfuse as needed   - continue protonix  - rifamximin/lactulose  - pain reg     []DM   - lantus/lispro, will adjust as needed    []DVT ppx   - SCD   - chemo ppx held iso recent GIB

## 2025-05-28 NOTE — ADVANCED PRACTICE NURSE CONSULT - ASSESSMENT
Patient encountered on [address]. When wound care RN arrived on unit, patient was found lying in a low air loss pressure redistribution support surface style bed with breakfast tray at bedside, approximately 25% of meal consumed- patient reports that she has "been vomiting" and does "not feel like eating breakfast" today. Patient Octavia was alert and oriented and gave consent to skin consult. She is able to turn with minimal staff assistance, x 1 was provided as needed. Once turned, urinary incontinence noted, purewick external female urinary device in place diverting urine. Perineal care provided. The wound care RN was able to visualize an area of persistent nonblanchable deep red erythema over B/L buttocks/sacral skin, area measures approximately 5cm x 9cm x 0cm - cannot rule out a deep tissue injury with incontinence involvement, present on admission. B/L feet with xerosis and B/L plantar aspects with calluses, will recommend to follow up with podiatry as an outpatient. B/L heels with hyperpigmentation measuring approximately 3cm x 3cm x 0cm - initial phases of a deep tissue injury cannot be ruled out at this time. Once consult was complete, patient was educated regarding the need for routine turning and positioning to prevent pressure injuries and patient was assisted to a left side-lying position utilizing pillow positioner assistive devices.

## 2025-05-28 NOTE — H&P ADULT - ATTENDING COMMENTS
A 70-year-old female with NAFLD cirrhosis, HCC, and a history of UGIB (2022) presented with coffee ground emesis and melena. She is listed for liver transplant (MELD 21). An EGD at the outside hospital showed no active bleeding, and she was stabilized with transfusions before transfer. She is currently asymptomatic. The plan includes monitoring her hemoglobin, holding her beta-blocker, continuing a PPI and empiric ceftriaxone, and transfusing as needed. She is also being managed for her cirrhosis with lactulose, rifaximin, and a diuretic, and is continuing Ursodiol. She has a non-oliguric SCOTT, likely due to the bleeding episode, and her comorbidities of migraine, diabetes, and asthma/COPD are being managed. listed for liver transplant.

## 2025-05-28 NOTE — H&P ADULT - HISTORY OF PRESENT ILLNESS
70 F hx NAFLD cirrhosis and HCC (listed for liver transplant with MELD 20B), UGIB in 2022, chronic back pain (2/2 spinal fracture) presented from [facility] where she was admitted on 5/26 for coffee ground emesis and melena, EGD showed no active bleeding, patient was transfused and stabilized prior to transfer. Patient states she hasn't had bloody bowel movements or bloody emesis since Monday 5/26. Patient endorses low back pain and frontal lobe headache, denies urinary or bowel incontinence, loss of sensation, weakness, dizziness, changes in vision, photobia, blurry vision, nausea, fever, chills, chest pain, or shortness of breath.

## 2025-05-28 NOTE — H&P ADULT - NSHPPHYSICALEXAM_GEN_ALL_CORE
PHYSICAL EXAM:  Constitutional: Well developed / well nourished/ no focal deficits   Eyes: Anicteric, PERRLA  ENMT: nc/at  Neck:supple   Respiratory: CTA B/L  Cardiovascular: RRR  Gastrointestinal: Soft abdomen, NT, ND, well healed surgical incision from hernia repair and c-sections   Genitourinary: Voiding spontaneously  Extremities: SCD's in place and working bilaterally  Vascular: Palpable dp pulses bilaterally  Neurological: A&O x4  Skin: warm  Musculoskeletal: Moving all extremities, motor and sensation in tact   Psychiatric: Responsive

## 2025-05-28 NOTE — H&P ADULT - NSHPREVIEWOFSYSTEMS_GEN_ALL_CORE
Review of systems  Gen: No weight changes, fatigue, fevers/chills, weakness  Skin: No rashes  Head/Eyes/Ears/Mouth: + headache; Normal hearing; Normal vision w/o blurriness; No sinus pain/discomfort, sore throat  Respiratory: No dyspnea, cough, wheezing, hemoptysis  CV: No chest pain, PND, orthopnea  GI:  No diarrhea, constipation, nausea, vomiting, melena, hematochezia  : No increased frequency, dysuria, hematuria, nocturia  MSK: +back pain No joint pain/swelling;no edema  Neuro: No dizziness/lightheadedness, weakness, seizures, numbness, tingling  Heme: No easy bruising or bleeding  Endo: No heat/cold intolerance  Psych: No significant nervousness, anxiety, stress, depression  All other systems were reviewed and are negative, except as noted.

## 2025-05-28 NOTE — CHART NOTE - NSCHARTNOTEFT_GEN_A_CORE
70F - NAFLD decompensated cirrhosis c/w HCC and UGIB (2022). Listed for liver transplant with MELD 20.B. Also, chronic back pain 2/2 spinal fracture. Presented from [facility] where she was admitted on 5/26 for coffee ground emesis and melena, EGD showed no active bleeding, patient was transfused and stabilized prior to transfer. Patient states she hasn't had bloody bowel movements or bloody emesis since Monday 5/26.     - There were no overnight events    - Has been afebrile and hemodynamically stable   - Complains of severe HA. Receiving Sumatriptan this AM.    - Hb with slight downtrend.    - SCr up trending 1.09 > 1.72   - MELD 24     # Coffee Ground Emesis and Melena   - Prior history of EVB s/p banding and TIPS in 2022  - EGD with no active bleeding (full report not available)   - No further episode since 5/26  - Monitoring Hb trend   - Continue Coreg 3.125mg BID    - Continue Pantoprazole 40mg OD   - Continue empiric Ceftriaxone   - Transfuse as needed for Hb >7   - Holding SVT prophylaxis. Continue bilateral CSD.     # Decompensated liver Cirrhosis s/p TIPS    - Etiology: NAFLD and HCC   - Primary Hepatologist:    - Listed for liver transplant with MELD 24 (until 6/4)   - ABO B. MELD 3.0 score of 24     - HE: AOx3. On Rifaximin and Lactulose titrated to 3-4 BM daily   - Volume: Continue Torsemide 10mg daily   - Continue close nutrition and PT optimization     - Continue Ursodiol 500mg BID     # Non-oliguric SCOTT   - SCr up trending 1.09 > 1.72   - Likely ATN in the setting of acute bleeding  - Pending further work up      # Comorbidities:   - Migraine: Sumatriptan 25mg as needed   - DM: On lantus and Lispro + ICS adjusted as needed   - Cards: Continue atorvastatin   - Asthma/COPD: Continue Advair     PLAN   - Update listing with MELD of 24   - Daily MELD labs   - Monitoring Hb trend q12h   - Obtain UA and urine lytes     Note incomplete until finalized by attending signature/attestation.    Berkley Walker-My   Transplant Hepatology Fellow

## 2025-05-29 LAB
ALBUMIN SERPL ELPH-MCNC: 2.6 G/DL — LOW (ref 3.3–5)
ALP SERPL-CCNC: 71 U/L — SIGNIFICANT CHANGE UP (ref 40–120)
ALT FLD-CCNC: 6 U/L — LOW (ref 10–45)
ANION GAP SERPL CALC-SCNC: 12 MMOL/L — SIGNIFICANT CHANGE UP (ref 5–17)
ANISOCYTOSIS BLD QL: SIGNIFICANT CHANGE UP
AST SERPL-CCNC: 31 U/L — SIGNIFICANT CHANGE UP (ref 10–40)
BASOPHILS # BLD AUTO: 0.02 K/UL — SIGNIFICANT CHANGE UP (ref 0–0.2)
BASOPHILS # BLD AUTO: 0.15 K/UL — SIGNIFICANT CHANGE UP (ref 0–0.2)
BASOPHILS NFR BLD AUTO: 0.5 % — SIGNIFICANT CHANGE UP (ref 0–2)
BASOPHILS NFR BLD AUTO: 4.4 % — HIGH (ref 0–2)
BILIRUB SERPL-MCNC: 1.6 MG/DL — HIGH (ref 0.2–1.2)
BUN SERPL-MCNC: 43 MG/DL — HIGH (ref 7–23)
CALCIUM SERPL-MCNC: 8.6 MG/DL — SIGNIFICANT CHANGE UP (ref 8.4–10.5)
CHLORIDE SERPL-SCNC: 105 MMOL/L — SIGNIFICANT CHANGE UP (ref 96–108)
CO2 SERPL-SCNC: 25 MMOL/L — SIGNIFICANT CHANGE UP (ref 22–31)
CREAT SERPL-MCNC: 1.66 MG/DL — HIGH (ref 0.5–1.3)
EGFR: 33 ML/MIN/1.73M2 — LOW
EGFR: 33 ML/MIN/1.73M2 — LOW
EOSINOPHIL # BLD AUTO: 0.04 K/UL — SIGNIFICANT CHANGE UP (ref 0–0.5)
EOSINOPHIL # BLD AUTO: 0.09 K/UL — SIGNIFICANT CHANGE UP (ref 0–0.5)
EOSINOPHIL NFR BLD AUTO: 1.1 % — SIGNIFICANT CHANGE UP (ref 0–6)
EOSINOPHIL NFR BLD AUTO: 2.3 % — SIGNIFICANT CHANGE UP (ref 0–6)
GLUCOSE BLDC GLUCOMTR-MCNC: 132 MG/DL — HIGH (ref 70–99)
GLUCOSE BLDC GLUCOMTR-MCNC: 134 MG/DL — HIGH (ref 70–99)
GLUCOSE BLDC GLUCOMTR-MCNC: 170 MG/DL — HIGH (ref 70–99)
GLUCOSE BLDC GLUCOMTR-MCNC: 178 MG/DL — HIGH (ref 70–99)
GLUCOSE BLDC GLUCOMTR-MCNC: 240 MG/DL — HIGH (ref 70–99)
GLUCOSE BLDC GLUCOMTR-MCNC: 241 MG/DL — HIGH (ref 70–99)
GLUCOSE SERPL-MCNC: 127 MG/DL — HIGH (ref 70–99)
HCT VFR BLD CALC: 23.3 % — LOW (ref 34.5–45)
HCT VFR BLD CALC: 27.2 % — LOW (ref 34.5–45)
HGB BLD-MCNC: 7.6 G/DL — LOW (ref 11.5–15.5)
HGB BLD-MCNC: 8.9 G/DL — LOW (ref 11.5–15.5)
IMM GRANULOCYTES NFR BLD AUTO: 0.3 % — SIGNIFICANT CHANGE UP (ref 0–0.9)
LYMPHOCYTES # BLD AUTO: 0.11 K/UL — LOW (ref 1–3.3)
LYMPHOCYTES # BLD AUTO: 0.28 K/UL — LOW (ref 1–3.3)
LYMPHOCYTES # BLD AUTO: 3.3 % — LOW (ref 13–44)
LYMPHOCYTES # BLD AUTO: 7.2 % — LOW (ref 13–44)
MACROCYTES BLD QL: SLIGHT — SIGNIFICANT CHANGE UP
MAGNESIUM SERPL-MCNC: 1.8 MG/DL — SIGNIFICANT CHANGE UP (ref 1.6–2.6)
MANUAL SMEAR VERIFICATION: SIGNIFICANT CHANGE UP
MCHC RBC-ENTMCNC: 32 PG — SIGNIFICANT CHANGE UP (ref 27–34)
MCHC RBC-ENTMCNC: 32.5 PG — SIGNIFICANT CHANGE UP (ref 27–34)
MCHC RBC-ENTMCNC: 32.6 G/DL — SIGNIFICANT CHANGE UP (ref 32–36)
MCHC RBC-ENTMCNC: 32.7 G/DL — SIGNIFICANT CHANGE UP (ref 32–36)
MCV RBC AUTO: 97.8 FL — SIGNIFICANT CHANGE UP (ref 80–100)
MCV RBC AUTO: 99.6 FL — SIGNIFICANT CHANGE UP (ref 80–100)
MONOCYTES # BLD AUTO: 0.18 K/UL — SIGNIFICANT CHANGE UP (ref 0–0.9)
MONOCYTES # BLD AUTO: 0.5 K/UL — SIGNIFICANT CHANGE UP (ref 0–0.9)
MONOCYTES NFR BLD AUTO: 12.8 % — SIGNIFICANT CHANGE UP (ref 2–14)
MONOCYTES NFR BLD AUTO: 5.5 % — SIGNIFICANT CHANGE UP (ref 2–14)
NEUTROPHILS # BLD AUTO: 2.85 K/UL — SIGNIFICANT CHANGE UP (ref 1.8–7.4)
NEUTROPHILS # BLD AUTO: 3.01 K/UL — SIGNIFICANT CHANGE UP (ref 1.8–7.4)
NEUTROPHILS NFR BLD AUTO: 76.9 % — SIGNIFICANT CHANGE UP (ref 43–77)
NEUTROPHILS NFR BLD AUTO: 85.7 % — HIGH (ref 43–77)
NRBC BLD AUTO-RTO: 0 /100 WBCS — SIGNIFICANT CHANGE UP (ref 0–0)
PHOSPHATE SERPL-MCNC: 3.8 MG/DL — SIGNIFICANT CHANGE UP (ref 2.5–4.5)
PLAT MORPH BLD: NORMAL — SIGNIFICANT CHANGE UP
PLATELET # BLD AUTO: 40 K/UL — LOW (ref 150–400)
PLATELET # BLD AUTO: 44 K/UL — LOW (ref 150–400)
POIKILOCYTOSIS BLD QL AUTO: SLIGHT — SIGNIFICANT CHANGE UP
POTASSIUM SERPL-MCNC: 3.5 MMOL/L — SIGNIFICANT CHANGE UP (ref 3.5–5.3)
POTASSIUM SERPL-SCNC: 3.5 MMOL/L — SIGNIFICANT CHANGE UP (ref 3.5–5.3)
PROT SERPL-MCNC: 4.5 G/DL — LOW (ref 6–8.3)
RBC # BLD: 2.34 M/UL — LOW (ref 3.8–5.2)
RBC # BLD: 2.78 M/UL — LOW (ref 3.8–5.2)
RBC # FLD: 19.4 % — HIGH (ref 10.3–14.5)
RBC # FLD: 19.9 % — HIGH (ref 10.3–14.5)
RBC BLD AUTO: ABNORMAL
SODIUM SERPL-SCNC: 142 MMOL/L — SIGNIFICANT CHANGE UP (ref 135–145)
WBC # BLD: 3.33 K/UL — LOW (ref 3.8–10.5)
WBC # BLD: 3.91 K/UL — SIGNIFICANT CHANGE UP (ref 3.8–10.5)
WBC # FLD AUTO: 3.33 K/UL — LOW (ref 3.8–10.5)
WBC # FLD AUTO: 3.91 K/UL — SIGNIFICANT CHANGE UP (ref 3.8–10.5)

## 2025-05-29 PROCEDURE — 99232 SBSQ HOSP IP/OBS MODERATE 35: CPT | Mod: GC

## 2025-05-29 PROCEDURE — 70450 CT HEAD/BRAIN W/O DYE: CPT | Mod: 26

## 2025-05-29 RX ORDER — SUMATRIPTAN 100 MG/1
25 TABLET, FILM COATED ORAL ONCE
Refills: 0 | Status: COMPLETED | OUTPATIENT
Start: 2025-05-29 | End: 2025-05-29

## 2025-05-29 RX ORDER — SUMATRIPTAN 100 MG/1
25 TABLET, FILM COATED ORAL
Refills: 0 | Status: DISCONTINUED | OUTPATIENT
Start: 2025-05-29 | End: 2025-05-31

## 2025-05-29 RX ORDER — MAGNESIUM OXIDE 400 MG
800 TABLET ORAL
Refills: 0 | Status: DISCONTINUED | OUTPATIENT
Start: 2025-05-29 | End: 2025-05-31

## 2025-05-29 RX ADMIN — INSULIN LISPRO 6 UNIT(S): 100 INJECTION, SOLUTION INTRAVENOUS; SUBCUTANEOUS at 08:40

## 2025-05-29 RX ADMIN — SUMATRIPTAN 25 MILLIGRAM(S): 100 TABLET, FILM COATED ORAL at 16:12

## 2025-05-29 RX ADMIN — Medication 1 DOSE(S): at 06:01

## 2025-05-29 RX ADMIN — ATORVASTATIN CALCIUM 10 MILLIGRAM(S): 80 TABLET, FILM COATED ORAL at 21:15

## 2025-05-29 RX ADMIN — INSULIN LISPRO 2: 100 INJECTION, SOLUTION INTRAVENOUS; SUBCUTANEOUS at 08:40

## 2025-05-29 RX ADMIN — SUMATRIPTAN 25 MILLIGRAM(S): 100 TABLET, FILM COATED ORAL at 06:31

## 2025-05-29 RX ADMIN — URSODIOL 500 MILLIGRAM(S): 300 CAPSULE ORAL at 06:00

## 2025-05-29 RX ADMIN — INSULIN LISPRO 6 UNIT(S): 100 INJECTION, SOLUTION INTRAVENOUS; SUBCUTANEOUS at 17:28

## 2025-05-29 RX ADMIN — Medication 1 DOSE(S): at 17:22

## 2025-05-29 RX ADMIN — INSULIN LISPRO 4: 100 INJECTION, SOLUTION INTRAVENOUS; SUBCUTANEOUS at 12:50

## 2025-05-29 RX ADMIN — INSULIN LISPRO 6 UNIT(S): 100 INJECTION, SOLUTION INTRAVENOUS; SUBCUTANEOUS at 12:50

## 2025-05-29 RX ADMIN — Medication 40 MILLIGRAM(S): at 06:00

## 2025-05-29 RX ADMIN — SUMATRIPTAN 25 MILLIGRAM(S): 100 TABLET, FILM COATED ORAL at 15:12

## 2025-05-29 RX ADMIN — Medication 800 MILLIGRAM(S): at 17:20

## 2025-05-29 RX ADMIN — Medication 1 TABLET(S): at 17:20

## 2025-05-29 RX ADMIN — LACTULOSE 20 GRAM(S): 10 SOLUTION ORAL at 17:23

## 2025-05-29 RX ADMIN — CEFTRIAXONE 100 MILLIGRAM(S): 500 INJECTION, POWDER, FOR SOLUTION INTRAMUSCULAR; INTRAVENOUS at 06:01

## 2025-05-29 RX ADMIN — Medication 800 MILLIGRAM(S): at 08:30

## 2025-05-29 RX ADMIN — INSULIN GLARGINE-YFGN 7 UNIT(S): 100 INJECTION, SOLUTION SUBCUTANEOUS at 21:16

## 2025-05-29 RX ADMIN — SUMATRIPTAN 25 MILLIGRAM(S): 100 TABLET, FILM COATED ORAL at 21:15

## 2025-05-29 RX ADMIN — Medication 10 MILLIGRAM(S): at 06:00

## 2025-05-29 RX ADMIN — SUMATRIPTAN 25 MILLIGRAM(S): 100 TABLET, FILM COATED ORAL at 22:00

## 2025-05-29 RX ADMIN — LACTULOSE 20 GRAM(S): 10 SOLUTION ORAL at 06:00

## 2025-05-29 RX ADMIN — Medication 40 MILLIGRAM(S): at 17:21

## 2025-05-29 RX ADMIN — URSODIOL 500 MILLIGRAM(S): 300 CAPSULE ORAL at 17:21

## 2025-05-29 NOTE — CONSULT NOTE ADULT - SUBJECTIVE AND OBJECTIVE BOX
Neurology - Consult Note    Spectra: [phone] ([facility]), [phone] ([facility])    HPI:  70 F hx NAFLD cirrhosis and HCC (listed for liver transplant with MELD 20B), UGIB in 2022, chronic back pain (2/2 spinal fracture) presented from [facility] where she was admitted on 5/26 for coffee ground emesis and melena. Neurology consulted for a bifrontal headache that has been ongoing for the past 3 days. Patient denies a prior history of headache. She was given sumatriptan (this was started PRN by the admitting team) which has provided relief to the patient. Patient described this as a 9/10 headahce that is bifrontal and radiated down to the neck. Denies photophobia, blurring of vision or changes in vision.   CTH obtained eaarier today does not show any acute pathology and does show a chronic lacunar infarct in the R cerebellum.     Review of Systems:  INCOMPLETE   CONSTITUTIONAL: No fevers or chills  EYES AND ENT: No visual changes or no throat pain   NECK: No pain or stiffness  RESPIRATORY: No shortness of breath  CARDIOVASCULAR: No chest pain or palpitations  GASTROINTESTINAL: No nausea or vomiting   GENITOURINARY: No dysuria  NEUROLOGICAL: +As stated in HPI above  SKIN: No itching, burning, rashes, or lesions   All other review of systems is negative unless indicated above.    Allergies:  No Known Allergies      PMHx/PSHx/Family Hx: As above, otherwise see below   HCC (hepatocellular carcinoma)    DM (diabetes mellitus)    HTN (hypertension)    HLD (hyperlipidemia)    Hepatic cirrhosis    IVEY (nonalcoholic steatohepatitis)    Former smoker    Ascites    Hepatic encephalopathy    History of cervical cancer        Social Hx:  Never smoker; no current use of tobacco, alcohol, or illicit drugs  Lives with ***; occupation ***, baseline functional status is ***    Medications:  MEDICATIONS  (STANDING):  atorvastatin 10 milliGRAM(s) Oral at bedtime  cefTRIAXone   IVPB 1000 milliGRAM(s) IV Intermittent every 24 hours  dextrose 5%. 1000 milliLiter(s) (50 mL/Hr) IV Continuous <Continuous>  dextrose 5%. 1000 milliLiter(s) (100 mL/Hr) IV Continuous <Continuous>  dextrose 50% Injectable 25 Gram(s) IV Push once  dextrose 50% Injectable 12.5 Gram(s) IV Push once  dextrose 50% Injectable 25 Gram(s) IV Push once  fluticasone propionate/ salmeterol 100-50 MICROgram(s) Diskus 1 Dose(s) Inhalation two times a day  glucagon  Injectable 1 milliGRAM(s) IntraMuscular once  insulin glargine Injectable (LANTUS) 7 Unit(s) SubCutaneous at bedtime  insulin lispro (ADMELOG) corrective regimen sliding scale   SubCutaneous three times a day before meals  insulin lispro Injectable (ADMELOG) 6 Unit(s) SubCutaneous three times a day before meals  lactulose Syrup 20 Gram(s) Oral two times a day  magnesium oxide 800 milliGRAM(s) Oral two times a day with meals  multivitamin 1 Tablet(s) Oral daily  pantoprazole    Tablet 40 milliGRAM(s) Oral every 12 hours  rifAXIMin 550 milliGRAM(s) Oral two times a day  torsemide 10 milliGRAM(s) Oral daily  ursodiol Tablet 500 milliGRAM(s) Oral two times a day    MEDICATIONS  (PRN):  dextrose Oral Gel 15 Gram(s) Oral once PRN Blood Glucose LESS THAN 70 milliGRAM(s)/deciliter  SUMAtriptan 25 milliGRAM(s) Oral two times a day PRN Migraine      Vitals:  T(C): 36.9 (05-29-25 @ 17:00), Max: 37.6 (05-29-25 @ 13:00)  HR: 52 (05-29-25 @ 17:00) (48 - 54)  BP: 125/57 (05-29-25 @ 17:00) (114/62 - 148/61)  RR: 18 (05-29-25 @ 17:00) (18 - 18)  SpO2: 99% (05-29-25 @ 17:00) (94% - 99%)    Physical Examination: INCOMPLETE  General - non-toxic appearing male/female in no acute distress  Cardiovascular - peripheral pulses palpable, no edema  Respiratory - breathing comfortably with no increased work of breathing    Neurologic Exam:  Mental status - Awake, Alert, Oriented to person, place, and time. Speech fluent, repetition and naming intact. Follows simple and complex commands. Attention/concentration, recent and remote memory (including registration 3/3 and recall 3/3), and fund of knowledge intact    Cranial nerves - PERRLA (4mm -> 3mm b/l), VFF, EOMI, face sensation (V1-V3) intact b/l, facial strength intact without asymmetry b/l, hearing intact b/l, palate with symmetric elevation, trapezius OR sternocleidomastiod 5/5 strength b/l, tongue midline on protrusion with full lateral movement    Motor - Normal bulk and tone throughout. No pronator drift.    Strength testing            Deltoid      Biceps      Triceps     Wrist Extension    Wrist Flexion     Interossei         R            5                 5               5                     5                              5                        5                 5  L             5                 5               5                     5                              5                        5                 5              Hip Flexion    Hip Extension    Knee Flexion    Knee Extension    Dorsiflexion    Plantar Flexion  R              5                         5                       5                           5                            5                          5  L              5                         5                        5                           5                            5                          5    Sensation - Light touch/temperature OR pain/vibration intact throughout    DTR's -             Biceps      Triceps     Brachioradialis      Patellar    Ankle    Toes/plantar response  R             2+             2+                  2+                       2+            2+                 Down  L              2+             2+                 2+                        2+           2+                 Down    Coordination - Finger to Nose intact b/l. No tremors appreciated    Gait and station - Normal casual gait. Romberg (-)    Labs:                        8.9    3.91  )-----------( 40       ( 29 May 2025 15:18 )             27.2     05-29    142  |  105  |  43[H]  ----------------------------<  127[H]  3.5   |  25  |  1.66[H]    Ca    8.6      29 May 2025 00:50  Phos  3.8     05-29  Mg     1.8     05-29    TPro  4.5[L]  /  Alb  2.6[L]  /  TBili  1.6[H]  /  DBili  x   /  AST  31  /  ALT  6[L]  /  AlkPhos  71  05-29    CAPILLARY BLOOD GLUCOSE      POCT Blood Glucose.: 132 mg/dL (29 May 2025 17:03)    LIVER FUNCTIONS - ( 29 May 2025 00:50 )  Alb: 2.6 g/dL / Pro: 4.5 g/dL / ALK PHOS: 71 U/L / ALT: 6 U/L / AST: 31 U/L / GGT: x             Culture - Urine (collected 28 May 2025 10:20)  Source: Clean Catch Clean Catch (Midstream)  Preliminary Report (29 May 2025 16:18):    Culture positive, 10,000 - 49,000 CFU/mL . Identification to follow.    Culture - Blood (collected 28 May 2025 09:40)  Source: Blood Blood-Peripheral  Preliminary Report (29 May 2025 13:02):    No growth at 24 hours    Culture - Blood (collected 28 May 2025 09:20)  Source: Blood Blood-Peripheral  Preliminary Report (29 May 2025 13:02):    No growth at 24 hours      PT/INR - ( 28 May 2025 02:15 )   PT: 19.4 sec;   INR: 1.70 ratio         PTT - ( 28 May 2025 02:15 )  PTT:34.9 sec  CSF:                  Radiology:  CT Head No Cont:  (29 May 2025 14:07)     Neurology - Consult Note    Spectra: [phone] ([facility]), [phone] ([facility])    HPI:  70 F hx NAFLD cirrhosis and HCC (listed for liver transplant with MELD 20B), UGIB in 2022, chronic back pain (2/2 spinal fracture) presented from [facility] where she was admitted on 5/26 for coffee ground emesis and melena. Neurology consulted for daily ongoing headache. CTH obtained eaarier today does not show any acute pathology and does show a chronic lacunar infarct in the R cerebellum.     Patient reports she has a daily persistent headache since the last 2 months, used to get headaches prior to that but not too frequently and not too severe. She reports a bifrontal headache that travels down her neck. This headache progressively worsens during the day. Usually starts at an 8/10 and then increases to 10/10. Denies a prior history of migraines. Headache sometimes wakes her up from sleep. Denies photophobia, phonophobia, nausea, vomiting or visual floaters/flashing lights with the headache. She was asked to take tylenol 325 mg by her doctor due to the liver disease and she would use it with caution, although when she took it she did get relief from the headache. She has been getting sumatriptan in the hospital and this seems to have been helping with the headache. Additionally the patient reports the headache is worse when she is laying flat or bending forward to pick something up.     Review of Systems:    CONSTITUTIONAL: No fevers or chills  EYES AND ENT: No visual changes or no throat pain   NECK: No pain or stiffness  RESPIRATORY: No shortness of breath  CARDIOVASCULAR: No chest pain or palpitations  GASTROINTESTINAL: No nausea or vomiting   GENITOURINARY: No dysuria  NEUROLOGICAL: +As stated in HPI above  SKIN: No itching, burning, rashes, or lesions   All other review of systems is negative unless indicated above.    Allergies:  No Known Allergies      PMHx/PSHx/Family Hx: As above, otherwise see below   HCC (hepatocellular carcinoma)    DM (diabetes mellitus)    HTN (hypertension)    HLD (hyperlipidemia)    Hepatic cirrhosis    IVEY (nonalcoholic steatohepatitis)    Former smoker    Ascites    Hepatic encephalopathy    History of cervical cancer        Social Hx:  Never smoker; no current use of tobacco, alcohol, or illicit drugs      Medications:  MEDICATIONS  (STANDING):  atorvastatin 10 milliGRAM(s) Oral at bedtime  cefTRIAXone   IVPB 1000 milliGRAM(s) IV Intermittent every 24 hours  dextrose 5%. 1000 milliLiter(s) (50 mL/Hr) IV Continuous <Continuous>  dextrose 5%. 1000 milliLiter(s) (100 mL/Hr) IV Continuous <Continuous>  dextrose 50% Injectable 25 Gram(s) IV Push once  dextrose 50% Injectable 12.5 Gram(s) IV Push once  dextrose 50% Injectable 25 Gram(s) IV Push once  fluticasone propionate/ salmeterol 100-50 MICROgram(s) Diskus 1 Dose(s) Inhalation two times a day  glucagon  Injectable 1 milliGRAM(s) IntraMuscular once  insulin glargine Injectable (LANTUS) 7 Unit(s) SubCutaneous at bedtime  insulin lispro (ADMELOG) corrective regimen sliding scale   SubCutaneous three times a day before meals  insulin lispro Injectable (ADMELOG) 6 Unit(s) SubCutaneous three times a day before meals  lactulose Syrup 20 Gram(s) Oral two times a day  magnesium oxide 800 milliGRAM(s) Oral two times a day with meals  multivitamin 1 Tablet(s) Oral daily  pantoprazole    Tablet 40 milliGRAM(s) Oral every 12 hours  rifAXIMin 550 milliGRAM(s) Oral two times a day  torsemide 10 milliGRAM(s) Oral daily  ursodiol Tablet 500 milliGRAM(s) Oral two times a day    MEDICATIONS  (PRN):  dextrose Oral Gel 15 Gram(s) Oral once PRN Blood Glucose LESS THAN 70 milliGRAM(s)/deciliter  SUMAtriptan 25 milliGRAM(s) Oral two times a day PRN Migraine      Vitals:  T(C): 36.9 (05-29-25 @ 17:00), Max: 37.6 (05-29-25 @ 13:00)  HR: 52 (05-29-25 @ 17:00) (48 - 54)  BP: 125/57 (05-29-25 @ 17:00) (114/62 - 148/61)  RR: 18 (05-29-25 @ 17:00) (18 - 18)  SpO2: 99% (05-29-25 @ 17:00) (94% - 99%)    Physical Examination:   General - non-toxic appearing female in no acute distress  Cardiovascular - peripheral pulses palpable, no edema  Respiratory - breathing comfortably with no increased work of breathing    Neurologic Exam:  Mental status - Awake, Alert, Oriented to person, place, and time. Speech fluent, repetition and naming intact. Follows simple and complex commands. Attention/concentration, recent and remote memory (including registration 3/3 and recall 3/3), and fund of knowledge intact    Cranial nerves - PERRLA (4mm -> 3mm b/l), VFF, EOMI, face sensation (V1-V3) intact b/l, facial strength intact without asymmetry b/l, hearing intact b/l, palate with symmetric elevation,tongue midline on protrusion with full lateral movement    Motor - Normal bulk and tone throughout. No pronator drift.    Strength testing            Deltoid      Biceps      Triceps     Wrist Extension    Wrist Flexion     Interossei         R            5                 5               5                     5                              5                        5                 5  L             5                 5               5                     5                              5                        5                 5              Hip Flexion    Hip Extension    Knee Flexion    Knee Extension    Dorsiflexion    Plantar Flexion  R              5                         5                       5                           5                            5                          5  L              5                         5                        5                           5                            5                          5    Sensation - Light touch intact throughout    DTR's -             Biceps      Triceps     Brachioradialis      Patellar    Ankle    Toes/plantar response  R             2+             2+                  2+                       2+            2+                 Down  L              2+             2+                 2+                        2+           2+                 Down    Coordination - Finger to Nose intact b/l. No tremors appreciated    Gait and station - Not assessed    Labs:                        8.9    3.91  )-----------( 40       ( 29 May 2025 15:18 )             27.2     05-29    142  |  105  |  43[H]  ----------------------------<  127[H]  3.5   |  25  |  1.66[H]    Ca    8.6      29 May 2025 00:50  Phos  3.8     05-29  Mg     1.8     05-29    TPro  4.5[L]  /  Alb  2.6[L]  /  TBili  1.6[H]  /  DBili  x   /  AST  31  /  ALT  6[L]  /  AlkPhos  71  05-29    CAPILLARY BLOOD GLUCOSE      POCT Blood Glucose.: 132 mg/dL (29 May 2025 17:03)    LIVER FUNCTIONS - ( 29 May 2025 00:50 )  Alb: 2.6 g/dL / Pro: 4.5 g/dL / ALK PHOS: 71 U/L / ALT: 6 U/L / AST: 31 U/L / GGT: x             Culture - Urine (collected 28 May 2025 10:20)  Source: Clean Catch Clean Catch (Midstream)  Preliminary Report (29 May 2025 16:18):    Culture positive, 10,000 - 49,000 CFU/mL . Identification to follow.    Culture - Blood (collected 28 May 2025 09:40)  Source: Blood Blood-Peripheral  Preliminary Report (29 May 2025 13:02):    No growth at 24 hours    Culture - Blood (collected 28 May 2025 09:20)  Source: Blood Blood-Peripheral  Preliminary Report (29 May 2025 13:02):    No growth at 24 hours      PT/INR - ( 28 May 2025 02:15 )   PT: 19.4 sec;   INR: 1.70 ratio         PTT - ( 28 May 2025 02:15 )  PTT:34.9 sec      Radiology:  CT Head No Cont:  (29 May 2025 14:07)      FINDINGS:  The ventricles and sulci are prominent, compatible with age-related   generalized cerebralvolume loss.   There is no CT evidence for acute   cerebral cortical infarct. There is no evidence of hemorrhage. There is a   tiny chronic lacunar infarct in the right cerebellum. There is   periventricular and subcortical white matter hypoattenuation,  most   compatible with chronic microvascular ischemic changes.   No mass effect   is found in the brain.  There is no midline shift or herniation pattern.      The visualized portions of the paranasal sinuses and mastoid air cells   are clear.    IMPRESSION:    No evidence of acute intracranial abnormality.  No evidence of hemorrhage.    Chronic changes as above.

## 2025-05-29 NOTE — CONSULT NOTE ADULT - ATTENDING COMMENTS
HPI as per resident note, personally verified by me. Patient's history corroborated by  and son, at bedside. Patient currently admitted for NAFLD and HCC cirrhosis and is pending potential liver transplant. She was initially admitted to [facility] on 5/26 for coffee ground emesis and melena but later transferred to [facility] for possible transplant. She reports for the past two months she has had a daily near constant headache and neck pain that is in the bifrontal region with photo phobia and some phono phobia and some mild nausea but no vomiting. There is no positional component to this headache and no associated focal neurologic deficits or abnormal movements. Headache currently is 8/10 in severity and is usually 3-4/10 upon awakening and gradually worsens throughout the day and can become 10/10 in severity. She has some improvement with Tylenol but her liver doctor won't let her use too much of this and then headache will return as soon as the medication wears off. She also reports some mild to moderate improvement with sumatriptan in the hospital. She has never had this before.    Neurologic exam as per resident note with additions as below:  AAO x3, speech fluent  CN's II-XII intact  Strength 5/5 all  Sens intact all  FtN intact b/l  Downgoing b/l plantar response    CBC with low H/H 9/27 and MCV WNL, low plt 39  BMP with inc BUN/Cr 30/1.40 (GFR dec 40), otherwise essentially WNL  UA (-), A1C inc 6.0%    < from: CT Head No Cont (05.29.25 @ 14:07) >    The ventricles and sulci are prominent, compatible with age-related   generalized cerebral volume loss.   There is no CT evidence for acute   cerebral cortical infarct. There is no evidence of hemorrhage. There is a   tiny chronic lacunar infarct in the right cerebellum. There is   periventricular and subcortical white matter hypoattenuation,  most   compatible with chronic microvascular ischemic changes.   No mass effect   is found in the brain.  There is no midline shift or herniation pattern.    IMPRESSION:    No evidence of acute intracranial abnormality.  No evidence of hemorrhage.    < end of copied text >      A/P:  Headache and cervicalgia  NAFLD and HCC cirrhosis  DM type 2  HTN  Prior R cerebellar stroke  SCOTT  Bicytopenia    - Patient with new onset daily headache and neck pain for the past two months. Etiologies are broad and include migraine, new daily persistent headache, infectious, inflammatory, structural, neoplastic, cerebrovascular, or toxic/metabolic. She has not focal neurologic deficits or abnormal movements. Some improvement with Tylenol and sumatriptan but headaches return. CT head, personally reviewed by me, with prior R cerebellar stroke and small vessel ischemic changes but no other acute intracranial findings  - MRI brain w/ and w/o, MRA head w/o, MRA neck w/, MRV head w/ to assess for above (would ideally do this prior to liver transplant)  - Would hold off on starting nortriptyline for headache prophylaxis as this medication can be sedating, needs to be carefully titrated, and often takes 3-5 weeks to begin working and can interact with other immunosuppressant transplant medications she may require  - For headache control recommend Reglan 10mg IV [id number] prn, Benadryl 25mg IV [id number] prn akathisia/dystonia, and Tylenol 500mg IV [id number] prn (if OK with liver transplant). Would avoid NSAID's given thrombocytopenia and SCOTT  - If sumatriptan desired would give as 100mg PO BID prn (no more than 2 doses in a day and 3 doses/week) as opposed to 25mg. Please keep in mind that this medication works best within 20 minutes of headache onset and can be used up to 2 hours from onset but shows little sustained benefit past this as a headache abortive  - Above recommendations discussed with transplant team, who verbalized agreement and understanding  - Continue to address above medical problems, as you are doing  - Will continue to follow patient with you

## 2025-05-29 NOTE — PROGRESS NOTE ADULT - ASSESSMENT
70F - NAFLD decompensated cirrhosis c/w HCC and UGIB (2022). Listed for liver transplant with MELD 20.B. Also, chronic back pain 2/2 spinal fracture. Presented from [facility] where she was admitted on 5/26 for coffee ground emesis and melena, EGD showed no active bleeding, patient was transfused and stabilized prior to transfer. Patient states she hasn't had bloody bowel movements or bloody emesis since Monday 5/26.     # Coffee Ground Emesis and Melena   - Prior history of EVB s/p banding and TIPS in 2022  - EGD with no active bleeding (full report not available)   - Monitoring Hb trend   - Holding Coreg 3.125mg BID    - Continue Pantoprazole 40mg OD   - Continue empiric Ceftriaxone   - Transfuse as needed for Hb >7   - Holding SVT prophylaxis. Continue bilateral CSD.     # Decompensated liver Cirrhosis s/p TIPS    - Etiology: NAFLD and HCC   - Primary Hepatologist:    - Listed for liver transplant with MELD 24 (until 6/4)   - ABO B. MELD 3.0 score of 21     - HE: AOx3. On Rifaximin and Lactulose titrated to 3-4 BM daily   - Volume: Continue Torsemide 10mg daily   - Continue close nutrition and PT optimization     - Continue Ursodiol 500mg BID     # Non-oliguric SCOTT   - SCr peaked 1.72 - now improving    - Likely ATN in the setting of acute bleeding    # Comorbidities:   - Migraine: Sumatriptan 25mg as needed   - DM: On lantus and Lispro + ICS adjusted as needed   - Cards: Continue atorvastatin   - Asthma/COPD: Continue Advair     PLAN   - Daily MELD labs   - Monitoring Hb trend q12h   - CTH iso severe HA   - Patient might benefit from eventual colonoscopy   - Will call [facility] x EGD report     - Transfuse as needed for Hb >7   - Holding Coreg   - Continue Pantoprazole 40mg BID    - Continue empiric Ceftriaxone   - Continue Rifaximin and Lactulose   - Continue Torsemide    - Continue Ursodiol    - No role for Octreotide gtt at the time   - Continue close nutrition and PT optimization     - Holding SVT prophylaxis. Continue bilateral CSD.    Note incomplete until finalized by attending signature/attestation.    Berkley Walker-My   Transplant Hepatology Fellow.

## 2025-05-29 NOTE — PROGRESS NOTE ADULT - SUBJECTIVE AND OBJECTIVE BOX
Interval Events:   - Afebrile I Hemodynamically stable    - Episode of Melena yesterday   - Hb downtrending I No transfusions    - Stable Scr I preserved UOP   - MELD 21     PLAN   - Daily MELD labs   - Monitoring Hb trend q12h   - Holding Coreg   - Will call [facility] x EGD report   - Patient might benefit from eventual colonoscopy     - Transfuse as needed for Hb >7   - Continue Pantoprazole 40mg BID    - Continue empiric Ceftriaxone   - Continue Rifaximin and Lactulose titrated to 3-4 BM daily   - Continue Torsemide 10mg daily   - Continue Ursodiol 500mg BID   - No role for Octreotide gtt at the time   - Continue close nutrition and PT optimization     - Holding SVT prophylaxis. Continue bilateral CSD.     ROS:   Patient without acute symptoms at this time.    Hospital Medications:  atorvastatin 10 milliGRAM(s) Oral at bedtime  carvedilol 3.125 milliGRAM(s) Oral every 12 hours  cefTRIAXone   IVPB 1000 milliGRAM(s) IV Intermittent every 24 hours  dextrose 5%. 1000 milliLiter(s) (50 mL/Hr) IV Continuous <Continuous>  dextrose 5%. 1000 milliLiter(s) (100 mL/Hr) IV Continuous <Continuous>  dextrose 50% Injectable 25 Gram(s) IV Push once  dextrose 50% Injectable 12.5 Gram(s) IV Push once  dextrose 50% Injectable 25 Gram(s) IV Push once  dextrose Oral Gel 15 Gram(s) Oral once PRN  fluticasone propionate/ salmeterol 100-50 MICROgram(s) Diskus 1 Dose(s) Inhalation two times a day  glucagon  Injectable 1 milliGRAM(s) IntraMuscular once  insulin glargine Injectable (LANTUS) 7 Unit(s) SubCutaneous at bedtime  insulin lispro (ADMELOG) corrective regimen sliding scale   SubCutaneous three times a day before meals  insulin lispro Injectable (ADMELOG) 6 Unit(s) SubCutaneous three times a day before meals  lactulose Syrup 20 Gram(s) Oral two times a day  magnesium oxide 400 milliGRAM(s) Oral two times a day with meals  multivitamin 1 Tablet(s) Oral daily  pantoprazole    Tablet 40 milliGRAM(s) Oral every 12 hours  rifAXIMin 550 milliGRAM(s) Oral two times a day  torsemide 10 milliGRAM(s) Oral daily  ursodiol Tablet 500 milliGRAM(s) Oral two times a day    MAR over past 24 hours:    ALPRAZolam   0.25 milliGRAM(s) Oral (05-28-25 @ 17:51)    atorvastatin   10 milliGRAM(s) Oral (05-28-25 @ 21:09)    cefTRIAXone   IVPB   100 mL/Hr IV Intermittent (05-29-25 @ 06:01)    fluticasone propionate/ salmeterol 100-50 MICROgram(s) Diskus   1 Dose(s) Inhalation (05-29-25 @ 06:01)   1 Dose(s) Inhalation (05-28-25 @ 17:53)    insulin glargine Injectable (LANTUS)   7 Unit(s) SubCutaneous (05-28-25 @ 21:56)    insulin lispro (ADMELOG) corrective regimen sliding scale   4 Unit(s) SubCutaneous (05-28-25 @ 17:52)    insulin lispro Injectable (ADMELOG)   6 Unit(s) SubCutaneous (05-28-25 @ 17:54)    lactulose Syrup   20 Gram(s) Oral (05-29-25 @ 06:00)   20 Gram(s) Oral (05-28-25 @ 17:51)    magnesium oxide   400 milliGRAM(s) Oral (05-28-25 @ 17:51)    multivitamin   1 Tablet(s) Oral (05-28-25 @ 17:51)    pantoprazole    Tablet   40 milliGRAM(s) Oral (05-29-25 @ 06:00)   40 milliGRAM(s) Oral (05-28-25 @ 17:51)    rifAXIMin   550 milliGRAM(s) Oral (05-29-25 @ 06:00)   550 milliGRAM(s) Oral (05-28-25 @ 17:51)    SUMAtriptan   25 milliGRAM(s) Oral (05-29-25 @ 06:31)    SUMAtriptan   25 milliGRAM(s) Oral (05-28-25 @ 09:57)    torsemide   10 milliGRAM(s) Oral (05-29-25 @ 06:00)    ursodiol Tablet   500 milliGRAM(s) Oral (05-29-25 @ 06:00)   500 milliGRAM(s) Oral (05-28-25 @ 17:51)      PHYSICAL EXAM:   Vital Signs last 24 hours:  T(F): 98.2 (05-29-25 @ 06:08), Max: 99.3 (05-28-25 @ 19:28)  HR: 50 (05-29-25 @ 06:08) (48 - 60)  BP: 148/61 (05-29-25 @ 06:08) (114/62 - 148/61)  BP(mean): --  ABP: --  ABP(mean): --  RR: 18 (05-29-25 @ 06:08) (18 - 18)  SpO2: 98% (05-29-25 @ 06:08) (94% - 98%)    I&Os:    05-28-25 @ 07:01  -  05-29-25 @ 07:00  --------------------------------------------------------  IN:    Oral Fluid: 790 mL  Total IN: 790 mL    OUT:    Voided (mL): 1400 mL  Total OUT: 1400 mL    Total NET: -610 mL        BMI (kg/m2): 26.9 (05-28-25 @ 00:03)  GENERAL:   NEURO: AOX3. No asterixis.   HEENT: Scleral icterus.   CHEST: Clear bilateral breath sounds.   CARDIAC: Regular rate and thythm. Normal S1/S2.   ABDOMEN: Distended, soft, nontender, no rebound or guarding. Present bowel sounds.   EXTREMITIES: Warm and well perfused. No edema.   SKIN: Jaundice. No lesions noted.     DIAGNOSTICS:  WBC      Hg       PLT      Na       K        CO2     BUN      Cr       ALT      AST      TB       ALP  3.33     7.6      44       142      3.5      25       43       1.66     6        31       1.6      71       05-29-25 @ 00:50  3.94     8.0      33       ------   ------   ------   ------   ------   ------   ------   ------   ------   05-28-25 @ 17:59  4.61     8.4      47       143      3.9      23       46       1.72     10       35       3.2      78       05-28-25 @ 02:15  5.48     11.0     52       140      4.4      32.0     20.4     1.09     ------   ------   ------   ------   04-03-25 @ 07:30  3.80     9.6      52       ------   ------   ------   ------   ------   ------   ------   ------   ------   03-13-25 @ 10:59    PT             INR            MELDwith  MELDw/o  19.4           1.70           --             --             05-28-25 @ 02:15  18.3           1.64           27             14             02-29-24 @ 10:50  17.0           1.55           --             --             02-26-24 @ 08:06  15.3           1.39           --             --             01-25-24 @ 10:55  15.7           1.43           25             11             08-12-23 @ 06:30   Interval Events:   - Afebrile I Hemodynamically stable    - Episode of Melena yesterday   - Hb downtrending I No transfusions    - Stable Scr I preserved UOP   - MELD 21     ROS:   Patient without acute symptoms at this time.    Hospital Medications:  atorvastatin 10 milliGRAM(s) Oral at bedtime  carvedilol 3.125 milliGRAM(s) Oral every 12 hours  cefTRIAXone   IVPB 1000 milliGRAM(s) IV Intermittent every 24 hours  dextrose 5%. 1000 milliLiter(s) (50 mL/Hr) IV Continuous <Continuous>  dextrose 5%. 1000 milliLiter(s) (100 mL/Hr) IV Continuous <Continuous>  dextrose 50% Injectable 25 Gram(s) IV Push once  dextrose 50% Injectable 12.5 Gram(s) IV Push once  dextrose 50% Injectable 25 Gram(s) IV Push once  dextrose Oral Gel 15 Gram(s) Oral once PRN  fluticasone propionate/ salmeterol 100-50 MICROgram(s) Diskus 1 Dose(s) Inhalation two times a day  glucagon  Injectable 1 milliGRAM(s) IntraMuscular once  insulin glargine Injectable (LANTUS) 7 Unit(s) SubCutaneous at bedtime  insulin lispro (ADMELOG) corrective regimen sliding scale   SubCutaneous three times a day before meals  insulin lispro Injectable (ADMELOG) 6 Unit(s) SubCutaneous three times a day before meals  lactulose Syrup 20 Gram(s) Oral two times a day  magnesium oxide 400 milliGRAM(s) Oral two times a day with meals  multivitamin 1 Tablet(s) Oral daily  pantoprazole    Tablet 40 milliGRAM(s) Oral every 12 hours  rifAXIMin 550 milliGRAM(s) Oral two times a day  torsemide 10 milliGRAM(s) Oral daily  ursodiol Tablet 500 milliGRAM(s) Oral two times a day    MAR over past 24 hours:    ALPRAZolam   0.25 milliGRAM(s) Oral (05-28-25 @ 17:51)    atorvastatin   10 milliGRAM(s) Oral (05-28-25 @ 21:09)    cefTRIAXone   IVPB   100 mL/Hr IV Intermittent (05-29-25 @ 06:01)    fluticasone propionate/ salmeterol 100-50 MICROgram(s) Diskus   1 Dose(s) Inhalation (05-29-25 @ 06:01)   1 Dose(s) Inhalation (05-28-25 @ 17:53)    insulin glargine Injectable (LANTUS)   7 Unit(s) SubCutaneous (05-28-25 @ 21:56)    insulin lispro (ADMELOG) corrective regimen sliding scale   4 Unit(s) SubCutaneous (05-28-25 @ 17:52)    insulin lispro Injectable (ADMELOG)   6 Unit(s) SubCutaneous (05-28-25 @ 17:54)    lactulose Syrup   20 Gram(s) Oral (05-29-25 @ 06:00)   20 Gram(s) Oral (05-28-25 @ 17:51)    magnesium oxide   400 milliGRAM(s) Oral (05-28-25 @ 17:51)    multivitamin   1 Tablet(s) Oral (05-28-25 @ 17:51)    pantoprazole    Tablet   40 milliGRAM(s) Oral (05-29-25 @ 06:00)   40 milliGRAM(s) Oral (05-28-25 @ 17:51)    rifAXIMin   550 milliGRAM(s) Oral (05-29-25 @ 06:00)   550 milliGRAM(s) Oral (05-28-25 @ 17:51)    SUMAtriptan   25 milliGRAM(s) Oral (05-29-25 @ 06:31)    SUMAtriptan   25 milliGRAM(s) Oral (05-28-25 @ 09:57)    torsemide   10 milliGRAM(s) Oral (05-29-25 @ 06:00)    ursodiol Tablet   500 milliGRAM(s) Oral (05-29-25 @ 06:00)   500 milliGRAM(s) Oral (05-28-25 @ 17:51)      PHYSICAL EXAM:   Vital Signs last 24 hours:  T(F): 98.2 (05-29-25 @ 06:08), Max: 99.3 (05-28-25 @ 19:28)  HR: 50 (05-29-25 @ 06:08) (48 - 60)  BP: 148/61 (05-29-25 @ 06:08) (114/62 - 148/61)  BP(mean): --  ABP: --  ABP(mean): --  RR: 18 (05-29-25 @ 06:08) (18 - 18)  SpO2: 98% (05-29-25 @ 06:08) (94% - 98%)    I&Os:    05-28-25 @ 07:01  -  05-29-25 @ 07:00  --------------------------------------------------------  IN:    Oral Fluid: 790 mL  Total IN: 790 mL    OUT:    Voided (mL): 1400 mL  Total OUT: 1400 mL    Total NET: -610 mL        BMI (kg/m2): 26.9 (05-28-25 @ 00:03)  GENERAL: Pleasant woman in NAD   NEURO: AOX3.    HEENT: Scleral icterus.   CHEST: bilateral breath sounds.   CARDIAC: Regular rate and thythm.   ABDOMEN: Distended, soft, nontender.  EXTREMITIES: Warm and well perfused.    SKIN: No lesions noted.     DIAGNOSTICS:  WBC      Hg       PLT      Na       K        CO2     BUN      Cr       ALT      AST      TB       ALP  3.33     7.6      44       142      3.5      25       43       1.66     6        31       1.6      71       05-29-25 @ 00:50  3.94     8.0      33       ------   ------   ------   ------   ------   ------   ------   ------   ------   05-28-25 @ 17:59  4.61     8.4      47       143      3.9      23       46       1.72     10       35       3.2      78       05-28-25 @ 02:15  5.48     11.0     52       140      4.4      32.0     20.4     1.09     ------   ------   ------   ------   04-03-25 @ 07:30  3.80     9.6      52       ------   ------   ------   ------   ------   ------   ------   ------   ------   03-13-25 @ 10:59    PT             INR            MELDwith  MELDw/o  19.4           1.70           --             --             05-28-25 @ 02:15  18.3           1.64           27             14             02-29-24 @ 10:50  17.0           1.55           --             --             02-26-24 @ 08:06  15.3           1.39           --             --             01-25-24 @ 10:55  15.7           1.43           25             11             08-12-23 @ 06:30

## 2025-05-29 NOTE — CONSULT NOTE ADULT - ASSESSMENT
70 F hx NAFLD cirrhosis and HCC (listed for liver transplant with MELD 20B), UGIB in 2022, chronic back pain (2/2 spinal fracture) presented from [facility] where she was admitted on 5/26 for coffee ground emesis and melena. Neurology consulted for daily ongoing headache. CTH obtained eaarier today does not show any acute pathology and does show a chronic lacunar infarct in the R cerebellum.       Impression: Frequent tension type bifrontal headache in the absence of aura. Does not meet criteria for migraine headaches or daily persistent headaches given timeline.       Recommendations:  [] Please obtain a CT venogram r/o CVST  [] Start nortriptyline 25 mg daily  [] Adequate hydration  [] Can also use tylenol with caution (upto max dose allowed given liver disease)  [] Adequate sleep     To be seen by neurology attending in the AM

## 2025-05-30 LAB
ALBUMIN SERPL ELPH-MCNC: 2.7 G/DL — LOW (ref 3.3–5)
ALP SERPL-CCNC: 79 U/L — SIGNIFICANT CHANGE UP (ref 40–120)
ALT FLD-CCNC: <5 U/L — LOW (ref 10–45)
ANION GAP SERPL CALC-SCNC: 13 MMOL/L — SIGNIFICANT CHANGE UP (ref 5–17)
AST SERPL-CCNC: 29 U/L — SIGNIFICANT CHANGE UP (ref 10–40)
BASOPHILS # BLD AUTO: 0.02 K/UL — SIGNIFICANT CHANGE UP (ref 0–0.2)
BASOPHILS NFR BLD AUTO: 0.5 % — SIGNIFICANT CHANGE UP (ref 0–2)
BILIRUB SERPL-MCNC: 1.6 MG/DL — HIGH (ref 0.2–1.2)
BILIRUB SERPL-MCNC: 1.7 MG/DL — HIGH (ref 0.2–1.2)
BLD GP AB SCN SERPL QL: NEGATIVE — SIGNIFICANT CHANGE UP
BUN SERPL-MCNC: 30 MG/DL — HIGH (ref 7–23)
CALCIUM SERPL-MCNC: 8 MG/DL — LOW (ref 8.4–10.5)
CHLORIDE SERPL-SCNC: 102 MMOL/L — SIGNIFICANT CHANGE UP (ref 96–108)
CO2 SERPL-SCNC: 28 MMOL/L — SIGNIFICANT CHANGE UP (ref 22–31)
CREAT SERPL-MCNC: 1.39 MG/DL — HIGH (ref 0.5–1.3)
CREAT SERPL-MCNC: 1.4 MG/DL — HIGH (ref 0.5–1.3)
EGFR: 40 ML/MIN/1.73M2 — LOW
EGFR: 40 ML/MIN/1.73M2 — LOW
EGFR: 41 ML/MIN/1.73M2 — LOW
EGFR: 41 ML/MIN/1.73M2 — LOW
EOSINOPHIL # BLD AUTO: 0.09 K/UL — SIGNIFICANT CHANGE UP (ref 0–0.5)
EOSINOPHIL NFR BLD AUTO: 2.3 % — SIGNIFICANT CHANGE UP (ref 0–6)
FIBRINOGEN PPP-MCNC: 175 MG/DL — LOW (ref 200–445)
FLUAV AG NPH QL: SIGNIFICANT CHANGE UP
FLUBV AG NPH QL: SIGNIFICANT CHANGE UP
GGT SERPL-CCNC: 29 U/L — SIGNIFICANT CHANGE UP (ref 8–40)
GLUCOSE BLDC GLUCOMTR-MCNC: 128 MG/DL — HIGH (ref 70–99)
GLUCOSE BLDC GLUCOMTR-MCNC: 180 MG/DL — HIGH (ref 70–99)
GLUCOSE BLDC GLUCOMTR-MCNC: 207 MG/DL — HIGH (ref 70–99)
GLUCOSE BLDC GLUCOMTR-MCNC: 267 MG/DL — HIGH (ref 70–99)
GLUCOSE BLDC GLUCOMTR-MCNC: 271 MG/DL — HIGH (ref 70–99)
GLUCOSE SERPL-MCNC: 154 MG/DL — HIGH (ref 70–99)
HBV SURFACE AG SER-ACNC: SIGNIFICANT CHANGE UP
HCT VFR BLD CALC: 26.5 % — LOW (ref 34.5–45)
HCV AB S/CO SERPL IA: 0.06 S/CO — SIGNIFICANT CHANGE UP
HCV AB SERPL-IMP: SIGNIFICANT CHANGE UP
HCV RNA SPEC NAA+PROBE-LOG IU: SIGNIFICANT CHANGE UP
HCV RNA SPEC NAA+PROBE-LOG IU: SIGNIFICANT CHANGE UP LOGIU/ML
HGB BLD-MCNC: 8.7 G/DL — LOW (ref 11.5–15.5)
HIV 1+2 AB+HIV1 P24 AG SERPL QL IA: SIGNIFICANT CHANGE UP
IMM GRANULOCYTES NFR BLD AUTO: 0.3 % — SIGNIFICANT CHANGE UP (ref 0–0.9)
INR BLD: 1.67 RATIO — HIGH (ref 0.85–1.16)
LYMPHOCYTES # BLD AUTO: 0.38 K/UL — LOW (ref 1–3.3)
LYMPHOCYTES # BLD AUTO: 9.9 % — LOW (ref 13–44)
MAGNESIUM SERPL-MCNC: 1.8 MG/DL — SIGNIFICANT CHANGE UP (ref 1.6–2.6)
MCHC RBC-ENTMCNC: 31.9 PG — SIGNIFICANT CHANGE UP (ref 27–34)
MCHC RBC-ENTMCNC: 32.8 G/DL — SIGNIFICANT CHANGE UP (ref 32–36)
MCV RBC AUTO: 97.1 FL — SIGNIFICANT CHANGE UP (ref 80–100)
MELD SCORE WITH DIALYSIS: 27 POINTS — SIGNIFICANT CHANGE UP
MELD SCORE WITHOUT DIALYSIS: 17 POINTS — SIGNIFICANT CHANGE UP
MONOCYTES # BLD AUTO: 0.7 K/UL — SIGNIFICANT CHANGE UP (ref 0–0.9)
MONOCYTES NFR BLD AUTO: 18.2 % — HIGH (ref 2–14)
NEUTROPHILS # BLD AUTO: 2.65 K/UL — SIGNIFICANT CHANGE UP (ref 1.8–7.4)
NEUTROPHILS NFR BLD AUTO: 68.8 % — SIGNIFICANT CHANGE UP (ref 43–77)
NRBC BLD AUTO-RTO: 0 /100 WBCS — SIGNIFICANT CHANGE UP (ref 0–0)
PHOSPHATE SERPL-MCNC: 2.7 MG/DL — SIGNIFICANT CHANGE UP (ref 2.5–4.5)
PLATELET # BLD AUTO: 39 K/UL — LOW (ref 150–400)
POTASSIUM SERPL-MCNC: 3.6 MMOL/L — SIGNIFICANT CHANGE UP (ref 3.5–5.3)
POTASSIUM SERPL-SCNC: 3.6 MMOL/L — SIGNIFICANT CHANGE UP (ref 3.5–5.3)
PROT SERPL-MCNC: 4.6 G/DL — LOW (ref 6–8.3)
PROTHROM AB SERPL-ACNC: 19.1 SEC — HIGH (ref 9.9–13.4)
RBC # BLD: 2.73 M/UL — LOW (ref 3.8–5.2)
RBC # FLD: 19.9 % — HIGH (ref 10.3–14.5)
RH IG SCN BLD-IMP: POSITIVE — SIGNIFICANT CHANGE UP
RSV RNA NPH QL NAA+NON-PROBE: SIGNIFICANT CHANGE UP
SARS-COV-2 RNA SPEC QL NAA+PROBE: SIGNIFICANT CHANGE UP
SODIUM SERPL-SCNC: 142 MMOL/L — SIGNIFICANT CHANGE UP (ref 135–145)
SODIUM SERPL-SCNC: 143 MMOL/L — SIGNIFICANT CHANGE UP (ref 135–145)
SOURCE RESPIRATORY: SIGNIFICANT CHANGE UP
WBC # BLD: 3.85 K/UL — SIGNIFICANT CHANGE UP (ref 3.8–10.5)
WBC # FLD AUTO: 3.85 K/UL — SIGNIFICANT CHANGE UP (ref 3.8–10.5)

## 2025-05-30 PROCEDURE — 71045 X-RAY EXAM CHEST 1 VIEW: CPT | Mod: 26

## 2025-05-30 PROCEDURE — 70553 MRI BRAIN STEM W/O & W/DYE: CPT | Mod: 26

## 2025-05-30 PROCEDURE — G0545: CPT

## 2025-05-30 PROCEDURE — 99223 1ST HOSP IP/OBS HIGH 75: CPT | Mod: GC

## 2025-05-30 PROCEDURE — 70549 MR ANGIOGRAPH NECK W/O&W/DYE: CPT | Mod: 26

## 2025-05-30 PROCEDURE — 70544 MR ANGIOGRAPHY HEAD W/O DYE: CPT | Mod: 26,XU

## 2025-05-30 PROCEDURE — 99223 1ST HOSP IP/OBS HIGH 75: CPT

## 2025-05-30 PROCEDURE — 70545 MR ANGIOGRAPHY HEAD W/DYE: CPT | Mod: 26,XU

## 2025-05-30 PROCEDURE — 99232 SBSQ HOSP IP/OBS MODERATE 35: CPT | Mod: GC

## 2025-05-30 RX ORDER — ALBUMIN (HUMAN) 12.5 G/50ML
250 INJECTION, SOLUTION INTRAVENOUS EVERY 6 HOURS
Refills: 0 | Status: DISCONTINUED | OUTPATIENT
Start: 2025-05-30 | End: 2025-05-30

## 2025-05-30 RX ORDER — VANCOMYCIN HCL IN 5 % DEXTROSE 1.5G/250ML
1000 PLASTIC BAG, INJECTION (ML) INTRAVENOUS EVERY 24 HOURS
Refills: 0 | Status: DISCONTINUED | OUTPATIENT
Start: 2025-05-30 | End: 2025-05-31

## 2025-05-30 RX ORDER — INSULIN GLARGINE-YFGN 100 [IU]/ML
4 INJECTION, SOLUTION SUBCUTANEOUS AT BEDTIME
Refills: 0 | Status: DISCONTINUED | OUTPATIENT
Start: 2025-05-30 | End: 2025-05-31

## 2025-05-30 RX ORDER — PIPERACILLIN-TAZO-DEXTROSE,ISO 3.375G/5
3.38 IV SOLUTION, PIGGYBACK PREMIX FROZEN(ML) INTRAVENOUS ONCE
Refills: 0 | Status: DISCONTINUED | OUTPATIENT
Start: 2025-05-30 | End: 2025-05-31

## 2025-05-30 RX ADMIN — Medication 10 MILLIGRAM(S): at 08:45

## 2025-05-30 RX ADMIN — Medication 800 MILLIGRAM(S): at 17:47

## 2025-05-30 RX ADMIN — Medication 800 MILLIGRAM(S): at 08:45

## 2025-05-30 RX ADMIN — URSODIOL 500 MILLIGRAM(S): 300 CAPSULE ORAL at 17:45

## 2025-05-30 RX ADMIN — Medication 40 MILLIGRAM(S): at 05:05

## 2025-05-30 RX ADMIN — URSODIOL 500 MILLIGRAM(S): 300 CAPSULE ORAL at 05:05

## 2025-05-30 RX ADMIN — INSULIN LISPRO 6 UNIT(S): 100 INJECTION, SOLUTION INTRAVENOUS; SUBCUTANEOUS at 08:45

## 2025-05-30 RX ADMIN — SUMATRIPTAN 25 MILLIGRAM(S): 100 TABLET, FILM COATED ORAL at 22:19

## 2025-05-30 RX ADMIN — Medication 40 MILLIEQUIVALENT(S): at 08:44

## 2025-05-30 RX ADMIN — INSULIN LISPRO 4: 100 INJECTION, SOLUTION INTRAVENOUS; SUBCUTANEOUS at 12:37

## 2025-05-30 RX ADMIN — LACTULOSE 20 GRAM(S): 10 SOLUTION ORAL at 05:05

## 2025-05-30 RX ADMIN — SUMATRIPTAN 25 MILLIGRAM(S): 100 TABLET, FILM COATED ORAL at 09:25

## 2025-05-30 RX ADMIN — INSULIN GLARGINE-YFGN 4 UNIT(S): 100 INJECTION, SOLUTION SUBCUTANEOUS at 22:11

## 2025-05-30 RX ADMIN — Medication 1 TABLET(S): at 08:45

## 2025-05-30 RX ADMIN — CEFTRIAXONE 100 MILLIGRAM(S): 500 INJECTION, POWDER, FOR SOLUTION INTRAMUSCULAR; INTRAVENOUS at 05:06

## 2025-05-30 RX ADMIN — Medication 40 MILLIGRAM(S): at 17:45

## 2025-05-30 RX ADMIN — INSULIN LISPRO 6 UNIT(S): 100 INJECTION, SOLUTION INTRAVENOUS; SUBCUTANEOUS at 17:48

## 2025-05-30 RX ADMIN — INSULIN LISPRO 2: 100 INJECTION, SOLUTION INTRAVENOUS; SUBCUTANEOUS at 08:47

## 2025-05-30 RX ADMIN — Medication 1 DOSE(S): at 05:06

## 2025-05-30 RX ADMIN — ATORVASTATIN CALCIUM 10 MILLIGRAM(S): 80 TABLET, FILM COATED ORAL at 21:39

## 2025-05-30 RX ADMIN — SUMATRIPTAN 25 MILLIGRAM(S): 100 TABLET, FILM COATED ORAL at 23:30

## 2025-05-30 RX ADMIN — ALBUMIN (HUMAN) 125 MILLILITER(S): 12.5 INJECTION, SOLUTION INTRAVENOUS at 10:33

## 2025-05-30 RX ADMIN — Medication 1 DOSE(S): at 18:15

## 2025-05-30 NOTE — DIETITIAN INITIAL EVALUATION ADULT - ETIOLOGY
Decreased ability to consume sufficient protein-energy intake Physiological causes resulting increased demand for nutrient

## 2025-05-30 NOTE — CONSULT NOTE ADULT - ASSESSMENT
70 F hx NAFLD cirrhosis and HCC (listed for liver transplant with MELD 20B), UGIB in 2022, chronic back pain (2/2 spinal fracture) presented from [facility] where she was admitted on 5/26 for coffee ground emesis and melena, EGD showed no active bleeding, patient was transfused and stabilized prior to transfer. Patient states she hasn't had bloody bowel movements or bloody emesis since Monday 5/26. Patient endorses low back pain and frontal lobe headache, denies urinary or bowel incontinence, loss of sensation, weakness, dizziness, changes in vision, photobia, blurry vision, nausea, fever, chills, chest pain, or shortness of breath 70 F hx NAFLD cirrhosis and HCC (listed for liver transplant with MELD 20B), UGIB in 2022, chronic back pain (2/2 spinal fracture) presented from [facility] where she was admitted on 5/26 for coffee ground emesis and melena, EGD showed no active bleeding, patient was transfused and stabilized prior to transfer. Patient states she hasn't had bloody bowel movements or bloody emesis since Monday 5/26. Patient endorses low back pain and frontal lobe headache, denies urinary or bowel incontinence, loss of sensation, weakness, dizziness, changes in vision, photobia, blurry vision, nausea, fever, chills, chest pain, or shortness of breath    Donor accepted for transplant scheduled for tomorrow morning    Donor:  Hep B sca+ NAAT negative  CMV IGG+  Toxo IGG+  EBV+    Recipient:  CMV IGG-  Toxo IGG-  Hep A and B not immune she recently received one dose of each of the vaccines as an out patient      Would:  CMV+/-  will be high risk and should start IV ganciclovir post op  Toxo +/-  will be high risk and should start Mepron prophylaxis post transplant  Hep B Cab+ donor and recipient not fully immune  would follow guidelines per protocol  check HBV viral load post transplant  start Vemlidy  Urine culture:  with low level E.faecium growing and sensitivity pending  Agree with Zosyn/fluconazole  can add Vancomycin periop [id number]                Thank you for involving us in the care of this patient    I reveiwed her history, documents in and out patient , labs, microbiology, radiology, and infection prevention risks and needs    Abhi Garcia MD  Can be called via Teams  After 5pm/weekends [phone]     70 F hx NAFLD cirrhosis and HCC (listed for liver transplant with MELD 20B), UGIB in 2022, chronic back pain (2/2 spinal fracture) presented from [facility] where she was admitted on 5/26 for coffee ground emesis and melena, EGD showed no active bleeding, patient was transfused and stabilized prior to transfer. Patient states she hasn't had bloody bowel movements or bloody emesis since Monday 5/26. Patient endorses low back pain and frontal lobe headache, denies urinary or bowel incontinence, loss of sensation, weakness, dizziness, changes in vision, photobia, blurry vision, nausea, fever, chills, chest pain, or shortness of breath    Donor accepted for transplant scheduled for tomorrow morning    Donor:  Hep B sca+ NAAT negative  CMV IGG+  Toxo IGG+  EBV+    Recipient:  CMV IGG-  Toxo IGG-  Hep A and B not immune she recently received one dose of each of the vaccines as an out patient    History of SBE with strep mitis/oralis-  post dental procedure  pcn sensitive  treated for endocarditis   would look for recent echo report      Would:  CMV+/-  will be high risk and should start IV ganciclovir post op  Toxo +/-  will be high risk and should start Mepron prophylaxis post transplant  Hep B Cab+ donor and recipient not fully immune  would follow guidelines per protocol  check HBV viral load post transplant  start Vemlidy  Urine culture:  with low level E.faecium growing and sensitivity pending  Agree with Zosyn/fluconazole  can add Vancomycin periop [id number]                Thank you for involving us in the care of this patient    I reviewed her history, documents in and out patient , labs, microbiology, radiology, and infection prevention risks and needs    Abhi Garcia MD  Can be called via Teams  After 5pm/weekends [phone]

## 2025-05-30 NOTE — DIETITIAN INITIAL EVALUATION ADULT - PERTINENT MEDS FT
MEDICATIONS  (STANDING):  atorvastatin 10 milliGRAM(s) Oral at bedtime  dextrose 5%. 1000 milliLiter(s) (50 mL/Hr) IV Continuous <Continuous>  dextrose 5%. 1000 milliLiter(s) (100 mL/Hr) IV Continuous <Continuous>  dextrose 50% Injectable 25 Gram(s) IV Push once  dextrose 50% Injectable 12.5 Gram(s) IV Push once  dextrose 50% Injectable 25 Gram(s) IV Push once  fluticasone propionate/ salmeterol 100-50 MICROgram(s) Diskus 1 Dose(s) Inhalation two times a day  glucagon  Injectable 1 milliGRAM(s) IntraMuscular once  insulin glargine Injectable (LANTUS) 7 Unit(s) SubCutaneous at bedtime  insulin lispro (ADMELOG) corrective regimen sliding scale   SubCutaneous three times a day before meals  insulin lispro Injectable (ADMELOG) 6 Unit(s) SubCutaneous three times a day before meals  magnesium oxide 800 milliGRAM(s) Oral two times a day with meals  multivitamin 1 Tablet(s) Oral daily  pantoprazole    Tablet 40 milliGRAM(s) Oral every 12 hours  piperacillin/tazobactam IVPB.. 3.375 Gram(s) IV Intermittent once  rifAXIMin 550 milliGRAM(s) Oral two times a day  torsemide 10 milliGRAM(s) Oral daily  ursodiol Tablet 500 milliGRAM(s) Oral two times a day  vancomycin  IVPB 1000 milliGRAM(s) IV Intermittent every 24 hours    MEDICATIONS  (PRN):  dextrose Oral Gel 15 Gram(s) Oral once PRN Blood Glucose LESS THAN 70 milliGRAM(s)/deciliter  SUMAtriptan 25 milliGRAM(s) Oral two times a day PRN Migraine

## 2025-05-30 NOTE — DIETITIAN INITIAL EVALUATION ADULT - REASON FOR ADMISSION
Chart Reviewed, Events Noted  "Patient is a 70y old  Female who presents with a chief complaint of Melena"

## 2025-05-30 NOTE — PROGRESS NOTE ADULT - ASSESSMENT
70F - NAFLD decompensated cirrhosis c/w HCC and UGIB (2022). Listed for liver transplant with MELD 20.B. Also, chronic back pain 2/2 spinal fracture. Presented from [facility] where she was admitted on 5/26 for coffee ground emesis and melena, EGD showed no active bleeding, patient was transfused and stabilized prior to transfer. Patient states she hasn't had bloody bowel movements or bloody emesis since Monday 5/26.     # Coffee Ground Emesis and Melena   - Prior history of EVB s/p banding and TIPS in 2022  - EGD with no active bleeding (full report not available, unable to obtain)   - Monitoring Hb trend   - Holding Coreg 3.125mg BID    - Continue Pantoprazole 40mg BID    - Continue empiric Ceftriaxone until 6/2   - Holding SVT prophylaxis. Continue bilateral CSD.     # Decompensated liver Cirrhosis s/p TIPS    - Etiology: NAFLD and HCC   - Primary Hepatologist:    - Listed for liver transplant with MELD 24 (until 6/4) and receiving donor offers  - ABO B. MELD 3.0 score of 22     - HE: AOx3. On Rifaximin and Lactulose titrated to 3-4 BM daily   - Volume: Continue Torsemide 10mg daily   - Continue close nutrition and PT optimization     - Continue Ursodiol 500mg BID     # Non-oliguric SCOTT   - SCr peaked 1.72 - now improving    - Likely ATN in the setting of acute bleeding    # Comorbidities:   - Migraine: CTH unremarkable. Sumatriptan 25mg as needed   - DM: On lantus and Lispro + ICS adjusted as needed   - Cards: Continue atorvastatin   - Asthma/COPD: Continue Advair     PLAN   - Listed for OLT and receiving donor offers  - Daily MELD labs   - Holding Coreg   - Continue Pantoprazole 40mg BID    - Continue empiric Ceftriaxone until 6/2   - Continue Rifaximin and Lactulose   - Continue Torsemide    - Continue Ursodiol    - Continue close nutrition and PT optimization     - Holding SVT prophylaxis. Continue bilateral CSD.    Note incomplete until finalized by attending signature/attestation.    Berkley Walker-My   Transplant Hepatology Fellow.       70F - NAFLD decompensated cirrhosis c/w HCC and UGIB (2022). Listed for liver transplant with MELD 20.B. Also, chronic back pain 2/2 spinal fracture. Presented from [facility] where she was admitted on 5/26 for coffee ground emesis and melena, EGD showed no active bleeding, patient was transfused and stabilized prior to transfer. Patient states she hasn't had bloody bowel movements or bloody emesis since Monday 5/26.     # Coffee Ground Emesis and Melena   - Prior history of EVB s/p banding and TIPS in 2022  - EGD with no active bleeding (full report not available, unable to obtain)   - Monitoring Hb trend   - Holding Coreg 3.125mg BID    - Continue Pantoprazole 40mg BID    - Continue empiric Ceftriaxone until 6/2   - Holding SVT prophylaxis. Continue bilateral CSD.     # Decompensated liver Cirrhosis s/p TIPS    - Etiology: NAFLD and HCC   - Primary Hepatologist:    - Listed for liver transplant with MELD 24 (until 6/4) and receiving donor offers  - ABO B. MELD 3.0 score of 22     - HE: AOx3. On Rifaximin and Lactulose titrated to 3-4 BM daily   - Volume: Continue Torsemide 10mg daily   - Continue close nutrition and PT optimization     - Continue Ursodiol 500mg BID     # Non-oliguric SCOTT   # Asymptomatic urinary infection   - SCr peaked 1.72 - now improving. Likely ATN in the setting of acute bleeding  - Urine Cx (5/28) +E faecium  - Transplant ID consulted       # Comorbidities:   - Migraine: CTH unremarkable. Sumatriptan 25mg as needed   - DM: On lantus and Lispro + ICS adjusted as needed   - Cards: Continue atorvastatin   - Asthma/COPD: Continue Advair     PLAN   - Listed for OLT and receiving donor offers  - Daily MELD labs   - Holding Coreg   - Continue Pantoprazole 40mg BID    - Continue empiric Ceftriaxone, awaiting ID recommendations   - Continue Rifaximin and Lactulose   - Continue Torsemide    - Continue Ursodiol    - Continue close nutrition and PT optimization     - Holding SVT prophylaxis. Continue bilateral CSD.    Note incomplete until finalized by attending signature/attestation.    Berkley Walker-My   Transplant Hepatology Fellow.

## 2025-05-30 NOTE — PROGRESS NOTE ADULT - SUBJECTIVE AND OBJECTIVE BOX
[facility] offers     Call   [phone] Interval Events:   - Afebrile I Hemodynamically stable    - Hgb stable I No more episodes of Melena   - Improving Scr I preserved UOP   - MELD 22 I Receiving donor offers     ROS:   Patient without acute symptoms at this time.    Hospital Medications:  albumin human  5% IVPB 250 milliLiter(s) IV Intermittent every 6 hours  atorvastatin 10 milliGRAM(s) Oral at bedtime  cefTRIAXone   IVPB 1000 milliGRAM(s) IV Intermittent every 24 hours  dextrose 5%. 1000 milliLiter(s) (50 mL/Hr) IV Continuous <Continuous>  dextrose 5%. 1000 milliLiter(s) (100 mL/Hr) IV Continuous <Continuous>  dextrose 50% Injectable 25 Gram(s) IV Push once  dextrose 50% Injectable 12.5 Gram(s) IV Push once  dextrose 50% Injectable 25 Gram(s) IV Push once  dextrose Oral Gel 15 Gram(s) Oral once PRN  fluticasone propionate/ salmeterol 100-50 MICROgram(s) Diskus 1 Dose(s) Inhalation two times a day  glucagon  Injectable 1 milliGRAM(s) IntraMuscular once  insulin glargine Injectable (LANTUS) 7 Unit(s) SubCutaneous at bedtime  insulin lispro (ADMELOG) corrective regimen sliding scale   SubCutaneous three times a day before meals  insulin lispro Injectable (ADMELOG) 6 Unit(s) SubCutaneous three times a day before meals  magnesium oxide 800 milliGRAM(s) Oral two times a day with meals  multivitamin 1 Tablet(s) Oral daily  pantoprazole    Tablet 40 milliGRAM(s) Oral every 12 hours  rifAXIMin 550 milliGRAM(s) Oral two times a day  SUMAtriptan 25 milliGRAM(s) Oral two times a day PRN  torsemide 10 milliGRAM(s) Oral daily  ursodiol Tablet 500 milliGRAM(s) Oral two times a day    MAR over past 24 hours:    albumin human  5% IVPB   125 mL/Hr IV Intermittent (05-30-25 @ 10:33)    atorvastatin   10 milliGRAM(s) Oral (05-29-25 @ 21:15)    cefTRIAXone   IVPB   100 mL/Hr IV Intermittent (05-30-25 @ 05:06)    fluticasone propionate/ salmeterol 100-50 MICROgram(s) Diskus   1 Dose(s) Inhalation (05-30-25 @ 05:06)   1 Dose(s) Inhalation (05-29-25 @ 17:22)    insulin glargine Injectable (LANTUS)   7 Unit(s) SubCutaneous (05-29-25 @ 21:16)    insulin lispro (ADMELOG) corrective regimen sliding scale   2 Unit(s) SubCutaneous (05-30-25 @ 08:47)   4 Unit(s) SubCutaneous (05-29-25 @ 12:50)    insulin lispro Injectable (ADMELOG)   6 Unit(s) SubCutaneous (05-30-25 @ 08:45)   6 Unit(s) SubCutaneous (05-29-25 @ 17:28)   6 Unit(s) SubCutaneous (05-29-25 @ 12:50)    lactulose Syrup   20 Gram(s) Oral (05-30-25 @ 05:05)   20 Gram(s) Oral (05-29-25 @ 17:23)    magnesium oxide   800 milliGRAM(s) Oral (05-30-25 @ 08:45)   800 milliGRAM(s) Oral (05-29-25 @ 17:20)    multivitamin   1 Tablet(s) Oral (05-30-25 @ 08:45)   1 Tablet(s) Oral (05-29-25 @ 17:20)    pantoprazole    Tablet   40 milliGRAM(s) Oral (05-30-25 @ 05:05)   40 milliGRAM(s) Oral (05-29-25 @ 17:21)    potassium chloride    Tablet ER   40 milliEquivalent(s) Oral (05-30-25 @ 08:44)    rifAXIMin   550 milliGRAM(s) Oral (05-30-25 @ 05:05)   550 milliGRAM(s) Oral (05-29-25 @ 17:21)    SUMAtriptan   25 milliGRAM(s) Oral (05-29-25 @ 15:12)    SUMAtriptan   25 milliGRAM(s) Oral (05-30-25 @ 09:25)   25 milliGRAM(s) Oral (05-29-25 @ 21:15)    torsemide   10 milliGRAM(s) Oral (05-30-25 @ 08:45)    ursodiol Tablet   500 milliGRAM(s) Oral (05-30-25 @ 05:05)   500 milliGRAM(s) Oral (05-29-25 @ 17:21)      PHYSICAL EXAM:   Vital Signs last 24 hours:  T(F): 98.6 (05-30-25 @ 09:00), Max: 99.6 (05-29-25 @ 13:00)  HR: 52 (05-30-25 @ 09:00) (50 - 55)  BP: 133/68 (05-30-25 @ 09:00) (115/54 - 136/62)  BP(mean): --  ABP: --  ABP(mean): --  RR: 18 (05-30-25 @ 09:00) (18 - 18)  SpO2: 95% (05-30-25 @ 09:00) (94% - 99%)    I&Os:    05-29-25 @ 07:01  -  05-30-25 @ 07:00  --------------------------------------------------------  IN:    Oral Fluid: 840 mL  Total IN: 840 mL    OUT:    Voided (mL): 2850 mL  Total OUT: 2850 mL    Total NET: -2010 mL        BMI (kg/m2): 26.9 (05-28-25 @ 00:03)  GENERAL: Pleasant woman in NAD   NEURO: AOX3.    HEENT: Scleral icterus.   CHEST: bilateral breath sounds.   CARDIAC: Regular rate and thythm.   ABDOMEN: Distended, soft, nontender.  EXTREMITIES: Warm and well perfused.    SKIN: No lesions noted.     DIAGNOSTICS:  WBC      Hg       PLT      Na       K        CO2     BUN      Cr       ALT      AST      TB       ALP  3.85     8.7      39       142      3.6      28       30       1.39     <5       29       1.7      79       05-30-25 @ 06:06  3.91     8.9      40       ------   ------   ------   ------   ------   ------   ------   ------   ------   05-29-25 @ 15:18  3.33     7.6      44       142      3.5      25       43       1.66     6        31       1.6      71       05-29-25 @ 00:50  3.94     8.0      33       ------   ------   ------   ------   ------   ------   ------   ------   ------   05-28-25 @ 17:59  4.61     8.4      47       143      3.9      23       46       1.72     10       35       3.2      78       05-28-25 @ 02:15    PT             INR            MELDwith  MELDw/o  19.1           1.67           27             17             05-30-25 @ 06:06  19.4           1.70           --             --             05-28-25 @ 02:15  18.3           1.64           27             14             02-29-24 @ 10:50  17.0           1.55           --             --             02-26-24 @ 08:06  15.3           1.39           --             --             01-25-24 @ 10:55   Interval Events:   - Afebrile I Hemodynamically stable    - Hgb stable I No more episodes of Melena   - Improving Scr I preserved UOP   - Urine Cx (5/28) +E faecium   - MELD 22 I Receiving donor offers     ROS:   Patient without acute symptoms at this time.    Hospital Medications:  albumin human  5% IVPB 250 milliLiter(s) IV Intermittent every 6 hours  atorvastatin 10 milliGRAM(s) Oral at bedtime  cefTRIAXone   IVPB 1000 milliGRAM(s) IV Intermittent every 24 hours  dextrose 5%. 1000 milliLiter(s) (50 mL/Hr) IV Continuous <Continuous>  dextrose 5%. 1000 milliLiter(s) (100 mL/Hr) IV Continuous <Continuous>  dextrose 50% Injectable 25 Gram(s) IV Push once  dextrose 50% Injectable 12.5 Gram(s) IV Push once  dextrose 50% Injectable 25 Gram(s) IV Push once  dextrose Oral Gel 15 Gram(s) Oral once PRN  fluticasone propionate/ salmeterol 100-50 MICROgram(s) Diskus 1 Dose(s) Inhalation two times a day  glucagon  Injectable 1 milliGRAM(s) IntraMuscular once  insulin glargine Injectable (LANTUS) 7 Unit(s) SubCutaneous at bedtime  insulin lispro (ADMELOG) corrective regimen sliding scale   SubCutaneous three times a day before meals  insulin lispro Injectable (ADMELOG) 6 Unit(s) SubCutaneous three times a day before meals  magnesium oxide 800 milliGRAM(s) Oral two times a day with meals  multivitamin 1 Tablet(s) Oral daily  pantoprazole    Tablet 40 milliGRAM(s) Oral every 12 hours  rifAXIMin 550 milliGRAM(s) Oral two times a day  SUMAtriptan 25 milliGRAM(s) Oral two times a day PRN  torsemide 10 milliGRAM(s) Oral daily  ursodiol Tablet 500 milliGRAM(s) Oral two times a day    MAR over past 24 hours:    albumin human  5% IVPB   125 mL/Hr IV Intermittent (05-30-25 @ 10:33)    atorvastatin   10 milliGRAM(s) Oral (05-29-25 @ 21:15)    cefTRIAXone   IVPB   100 mL/Hr IV Intermittent (05-30-25 @ 05:06)    fluticasone propionate/ salmeterol 100-50 MICROgram(s) Diskus   1 Dose(s) Inhalation (05-30-25 @ 05:06)   1 Dose(s) Inhalation (05-29-25 @ 17:22)    insulin glargine Injectable (LANTUS)   7 Unit(s) SubCutaneous (05-29-25 @ 21:16)    insulin lispro (ADMELOG) corrective regimen sliding scale   2 Unit(s) SubCutaneous (05-30-25 @ 08:47)   4 Unit(s) SubCutaneous (05-29-25 @ 12:50)    insulin lispro Injectable (ADMELOG)   6 Unit(s) SubCutaneous (05-30-25 @ 08:45)   6 Unit(s) SubCutaneous (05-29-25 @ 17:28)   6 Unit(s) SubCutaneous (05-29-25 @ 12:50)    lactulose Syrup   20 Gram(s) Oral (05-30-25 @ 05:05)   20 Gram(s) Oral (05-29-25 @ 17:23)    magnesium oxide   800 milliGRAM(s) Oral (05-30-25 @ 08:45)   800 milliGRAM(s) Oral (05-29-25 @ 17:20)    multivitamin   1 Tablet(s) Oral (05-30-25 @ 08:45)   1 Tablet(s) Oral (05-29-25 @ 17:20)    pantoprazole    Tablet   40 milliGRAM(s) Oral (05-30-25 @ 05:05)   40 milliGRAM(s) Oral (05-29-25 @ 17:21)    potassium chloride    Tablet ER   40 milliEquivalent(s) Oral (05-30-25 @ 08:44)    rifAXIMin   550 milliGRAM(s) Oral (05-30-25 @ 05:05)   550 milliGRAM(s) Oral (05-29-25 @ 17:21)    SUMAtriptan   25 milliGRAM(s) Oral (05-29-25 @ 15:12)    SUMAtriptan   25 milliGRAM(s) Oral (05-30-25 @ 09:25)   25 milliGRAM(s) Oral (05-29-25 @ 21:15)    torsemide   10 milliGRAM(s) Oral (05-30-25 @ 08:45)    ursodiol Tablet   500 milliGRAM(s) Oral (05-30-25 @ 05:05)   500 milliGRAM(s) Oral (05-29-25 @ 17:21)      PHYSICAL EXAM:   Vital Signs last 24 hours:  T(F): 98.6 (05-30-25 @ 09:00), Max: 99.6 (05-29-25 @ 13:00)  HR: 52 (05-30-25 @ 09:00) (50 - 55)  BP: 133/68 (05-30-25 @ 09:00) (115/54 - 136/62)  BP(mean): --  ABP: --  ABP(mean): --  RR: 18 (05-30-25 @ 09:00) (18 - 18)  SpO2: 95% (05-30-25 @ 09:00) (94% - 99%)    I&Os:    05-29-25 @ 07:01  -  05-30-25 @ 07:00  --------------------------------------------------------  IN:    Oral Fluid: 840 mL  Total IN: 840 mL    OUT:    Voided (mL): 2850 mL  Total OUT: 2850 mL    Total NET: -2010 mL        BMI (kg/m2): 26.9 (05-28-25 @ 00:03)  GENERAL: Pleasant woman in NAD   NEURO: AOX3.    HEENT: Scleral icterus.   CHEST: bilateral breath sounds.   CARDIAC: Regular rate and thythm.   ABDOMEN: Distended, soft, nontender.  EXTREMITIES: Warm and well perfused.    SKIN: No lesions noted.     DIAGNOSTICS:  WBC      Hg       PLT      Na       K        CO2     BUN      Cr       ALT      AST      TB       ALP  3.85     8.7      39       142      3.6      28       30       1.39     <5       29       1.7      79       05-30-25 @ 06:06  3.91     8.9      40       ------   ------   ------   ------   ------   ------   ------   ------   ------   05-29-25 @ 15:18  3.33     7.6      44       142      3.5      25       43       1.66     6        31       1.6      71       05-29-25 @ 00:50  3.94     8.0      33       ------   ------   ------   ------   ------   ------   ------   ------   ------   05-28-25 @ 17:59  4.61     8.4      47       143      3.9      23       46       1.72     10       35       3.2      78       05-28-25 @ 02:15    PT             INR            MELDwith  MELDw/o  19.1           1.67           27             17             05-30-25 @ 06:06  19.4           1.70           --             --             05-28-25 @ 02:15  18.3           1.64           27             14             02-29-24 @ 10:50  17.0           1.55           --             --             02-26-24 @ 08:06  15.3           1.39           --             --             01-25-24 @ 10:55

## 2025-05-30 NOTE — PRE-ANESTHESIA EVALUATION ADULT - NSANTHPMHFT_GEN_ALL_CORE
70F with NAFLD cirrhosis and HCCl, admitted ~1 week ago to outside hospital for hematesis/melena, now for liver transplant. Hx of DM, HTN, HLD.  2025 dobutamine stress test normal. 2025 TTE EF 55-60% with normal RV. 2025 [facility] non obstructive CAD. DISCHARGE

## 2025-05-30 NOTE — DIETITIAN INITIAL EVALUATION ADULT - SIGNS/SYMPTOMS
pt reported poor appetite and PO intakes since Monday (5 days total)  Hepatic cirrhosis & cannot rule out a deep tissue injuries present on admission

## 2025-05-30 NOTE — DIETITIAN INITIAL EVALUATION ADULT - NS FNS DIET ORDER
Diet, NPO:   NPO for Procedure/Test     NPO Start Date: 30-May-2025,   NPO Start Time: 18:00  Except Medications (05-30-25 @ 11:47)

## 2025-05-30 NOTE — DIETITIAN INITIAL EVALUATION ADULT - REASON INDICATOR FOR ASSESSMENT
Stage 2 Pressure Injury or greater  Information obtained from: Review of pt's current medical record; interview with pt in her assigned room on tower

## 2025-05-30 NOTE — CHART NOTE - NSCHARTNOTEFT_GEN_A_CORE
MRIs reviewed, no acute findings. No neurologic contraindication to proceeding with transplant.    Discussed with attending Dr. Howell.

## 2025-05-30 NOTE — CONSULT NOTE ADULT - SUBJECTIVE AND OBJECTIVE BOX
ID CONSULTATION-- Abhi Garcia [phone]    Patient is a 70y old  Female who presents with a chief complaint of melena (30 May 2025 09:26)    HPI:  70 F hx NAFLD cirrhosis and HCC (listed for liver transplant with MELD 20B), UGIB in , chronic back pain (2/2 spinal fracture) presented from [facility] where she was admitted on  for coffee ground emesis and melena, EGD showed no active bleeding, patient was transfused and stabilized prior to transfer. Patient states she hasn't had bloody bowel movements or bloody emesis since . Patient endorses low back pain and frontal lobe headache, denies urinary or bowel incontinence, loss of sensation, weakness, dizziness, changes in vision, photobia, blurry vision, nausea, fever, chills, chest pain, or shortness of breath.  (28 May 2025 00:58)      PAST MEDICAL & SURGICAL HISTORY:  HCC (hepatocellular carcinoma)      DM (diabetes mellitus)      HTN (hypertension)      HLD (hyperlipidemia)      Hepatic cirrhosis      IVEY (nonalcoholic steatohepatitis)      Former smoker      Ascites      Hepatic encephalopathy      History of cervical cancer      H/O  section      History of cholecystectomy      H/O carpal tunnel repair      H/O cataract extraction          SOCIAL:    FAMILY HISTORY:  FH: CAD (coronary artery disease) (Mother)    Family history of CVA (Father)      REVIEW OF SYSTEMS  General:	Denies any malaise fatigue or chills. Fevers absent    Skin:No rash  	  Ophthalmologic:Denies any visual complaints,discharge redness or photophobia  	  ENMT:No nasal discharge,headache,sinus congestion or throat pain.No dental complaints    Respiratory and Thorax:No cough,sputum or chest pain.Denies shortness of breath  	  Cardiovascular:	No chest pain,palpitaions or dizziness    Gastrointestinal:	NO nausea,abdominal pain or diarrhea.    Genitourinary:	No dysuria,frequency. No flank pain    Musculoskeletal:	No joint swelling or pain.No weakness    Neurological:No confusion,diziness.No extremity weakness.No bladder or bowel incontinence	    Psychiatric:No delusions or hallucinations	    Hematology/Lymphatics:	No LN swelling.No gum bleeding     Endocrine:	No recent weight gain or loss.No abnormal heat/cold intolerance    Allergic/Immunologic:	No hives or rash   Allergies    No Known Allergies    Intolerances        ANTIMICROBIALS:    cefTRIAXone   IVPB 1000 milliGRAM(s) IV Intermittent every 24 hours  rifAXIMin 550 milliGRAM(s) Oral two times a day      Vital Signs Last 24 Hrs  T(C): 37 (30 May 2025 09:00), Max: 37.6 (29 May 2025 13:00)  T(F): 98.6 (30 May 2025 09:00), Max: 99.6 (29 May 2025 13:00)  HR: 52 (30 May 2025 09:00) (50 - 55)  BP: 133/68 (30 May 2025 09:00) (115/54 - 136/62)  BP(mean): --  RR: 18 (30 May 2025 09:00) (18 - 18)  SpO2: 95% (30 May 2025 09:00) (94% - 99%)    Parameters below as of 30 May 2025 09:00  Patient On (Oxygen Delivery Method): room air        PHYSICAL EXAM:Pleasant patient in no acute distress.      Constitutional:Comfortable.Awake and alert  No cachexia     Eyes:PERRL EOMI.NO discharge or conjunctival injection    ENMT:No sinus tenderness.No thrush.No pharyngeal exudate or erythema.Fair dental hygiene    Neck:Supple,No LN,no JVD      Respiratory:Good air entry bilaterally,CTA    Cardiovascular:S1 S2 wnl, No murmurs,rub or gallops    Gastrointestinal:Soft BS(+) no tenderness no masses ,No rebound or guarding    Genitourinary:No CVA tendereness     Rectal:    Extremities:No cyanosis,clubbing or edema.    Vascular:peripheral pulses felt    Neurological:AAO X 3,No grossly focal deficits    Skin:No rash     Lymph Nodes:No palpable LNs    Musculoskeletal:No joint swelling or LOM    Psychiatric:Affect normal.                              8.7    3.85  )-----------( 39       ( 30 May 2025 06:06 )             26.5       05-30    142  |  102  |  30[H]  ----------------------------<  154[H]  3.6   |  28  |  1.39[H]    Ca    8.0[L]      30 May 2025 06:06  Phos  2.7     05-30  Mg     1.8     05-30    TPro  4.6[L]  /  Alb  2.7[L]  /  TBili  1.7[H]  /  DBili  x   /  AST  29  /  ALT  <5[L]  /  AlkPhos  79        RECENT CULTURES:   @ 10:20  Clean Catch Clean Catch (Midstream)  --  --  --    10,000 - 49,000 CFU/mL Enterococcus faecium  --   @ 09:40  Blood Blood-Peripheral  --  --  --    No growth at 24 hours  --   @ 09:20  Blood Blood-Peripheral  --  --  --    No growth at 24 hours  --      RADIOLOGY:  < from: CT Head No Cont (25 @ 14:07) >  IMPRESSION:    No evidence of acute intracranial abnormality.  No evidence of hemorrhage.    Chronic changes as above.    < end of copied text >      IMPRESSION:    Rx:   ID CONSULTATION-- Abhi Garcia [phone]    Patient is a 70y old  Female who presents with a chief complaint of melena (30 May 2025 09:26)    HPI:  70 F hx NAFLD cirrhosis and HCC (listed for liver transplant with MELD 20B), UGIB in , chronic back pain (2/2 spinal fracture) presented from [facility] where she was admitted on  for coffee ground emesis and melena, EGD showed no active bleeding, patient was transfused and stabilized prior to transfer. Patient states she hasn't had bloody bowel movements or bloody emesis since . Patient endorses low back pain and frontal lobe headache, denies urinary or bowel incontinence, loss of sensation, weakness, dizziness, changes in vision, photobia, blurry vision, nausea, fever, chills, chest pain, or shortness of breath.  (28 May 2025 00:58)      PAST MEDICAL & SURGICAL HISTORY:  HCC (hepatocellular carcinoma)      DM (diabetes mellitus)      HTN (hypertension)      HLD (hyperlipidemia)      Hepatic cirrhosis      IVEY (nonalcoholic steatohepatitis)      Former smoker      Ascites      Hepatic encephalopathy      History of cervical cancer      H/O  section      History of cholecystectomy      H/O carpal tunnel repair      H/O cataract extraction          SOCIAL:  Born in [facility]  lives on [place]  no pets    FAMILY HISTORY:  FH: CAD (coronary artery disease) (Mother)    Family history of CVA (Father)      REVIEW OF SYSTEMS  General:	Denies any malaise fatigue or chills. Fevers absent    Skin:No rash  	  Ophthalmologic:Denies any visual complaints,discharge redness or photophobia  	  ENMT:No nasal discharge,headache,sinus congestion or throat pain.No dental complaints    Respiratory and Thorax:No cough,sputum or chest pain.Denies shortness of breath  	  Cardiovascular:	No chest pain,palpitaions or dizziness    Gastrointestinal:	NO nausea,abdominal pain or diarrhea.    Genitourinary:	No dysuria,frequency. No flank pain    Musculoskeletal:	No joint swelling or pain.No weakness    Neurological:No confusion,diziness.No extremity weakness.No bladder or bowel incontinence	    Psychiatric:No delusions or hallucinations	    Hematology/Lymphatics:	No LN swelling.No gum bleeding     Endocrine:	No recent weight gain or loss.No abnormal heat/cold intolerance    Allergic/Immunologic:	No hives or rash   Allergies    No Known Allergies    Intolerances        ANTIMICROBIALS:    cefTRIAXone   IVPB 1000 milliGRAM(s) IV Intermittent every 24 hours  rifAXIMin 550 milliGRAM(s) Oral two times a day      Vital Signs Last 24 Hrs  T(C): 37 (30 May 2025 09:00), Max: 37.6 (29 May 2025 13:00)  T(F): 98.6 (30 May 2025 09:00), Max: 99.6 (29 May 2025 13:00)  HR: 52 (30 May 2025 09:00) (50 - 55)  BP: 133/68 (30 May 2025 09:00) (115/54 - 136/62)  BP(mean): --  RR: 18 (30 May 2025 09:00) (18 - 18)  SpO2: 95% (30 May 2025 09:00) (94% - 99%)    Parameters below as of 30 May 2025 09:00  Patient On (Oxygen Delivery Method): room air        PHYSICAL EXAM:Pleasant patient in no acute distress.      Constitutional:Comfortable.Awake and alert  No cachexia     Eyes:PERRL EOMI.NO discharge or conjunctival injection    ENMT:No sinus tenderness.No thrush.No pharyngeal exudate or erythema.Fair dental hygiene    Neck:Supple,No LN,no JVD      Respiratory:Good air entry bilaterally,CTA    Cardiovascular:S1 S2 wnl, No murmurs,rub or gallops    Gastrointestinal:Soft BS(+) no tenderness no masses ,No rebound or guarding    Genitourinary:No CVA tendereness     Rectal:    Extremities:No cyanosis,clubbing or edema.    Vascular:peripheral pulses felt    Neurological:AAO X 3,No grossly focal deficits    Skin:No rash     Lymph Nodes:No palpable LNs    Musculoskeletal:No joint swelling or LOM    Psychiatric:Affect normal.                              8.7    3.85  )-----------( 39       ( 30 May 2025 06:06 )             26.5       05-30    142  |  102  |  30[H]  ----------------------------<  154[H]  3.6   |  28  |  1.39[H]    Ca    8.0[L]      30 May 2025 06:06  Phos  2.7     05-30  Mg     1.8     05-30    TPro  4.6[L]  /  Alb  2.7[L]  /  TBili  1.7[H]  /  DBili  x   /  AST  29  /  ALT  <5[L]  /  AlkPhos  79        RECENT CULTURES:   @ 10:20  Clean Catch Clean Catch (Midstream)  --  --  --    10,000 - 49,000 CFU/mL Enterococcus faecium  --   @ 09:40  Blood Blood-Peripheral  --  --  --    No growth at 24 hours  --   @ 09:20  Blood Blood-Peripheral  --  --  --    No growth at 24 hours  --      RADIOLOGY:  < from: CT Head No Cont (25 @ 14:07) >  IMPRESSION:    No evidence of acute intracranial abnormality.  No evidence of hemorrhage.    Chronic changes as above.    < end of copied text >      IMPRESSION:    Rx:   ID CONSULTATION-- Abhi Garcia [phone]    Patient is a 70y old  Female who presents with a chief complaint of melena (30 May 2025 09:26)    HPI:  70 F hx NAFLD cirrhosis and HCC (listed for liver transplant with MELD 20B), UGIB in , chronic back pain (2/2 spinal fracture) presented from [facility] where she was admitted on  for coffee ground emesis and melena, EGD showed no active bleeding, patient was transfused and stabilized prior to transfer. Patient states she hasn't had bloody bowel movements or bloody emesis since . Patient endorses low back pain and frontal lobe headache, denies urinary or bowel incontinence, loss of sensation, weakness, dizziness, changes in vision, photobia, blurry vision, nausea, fever, chills, chest pain, or shortness of breath.  (28 May 2025 00:58)      PAST MEDICAL & SURGICAL HISTORY:  HCC (hepatocellular carcinoma)      DM (diabetes mellitus)      HTN (hypertension)      HLD (hyperlipidemia)      Hepatic cirrhosis      IVEY (nonalcoholic steatohepatitis)      Former smoker      Ascites      Hepatic encephalopathy      History of cervical cancer      H/O  section      History of cholecystectomy      H/O carpal tunnel repair      H/O cataract extraction    Past ID history  2024 treated for endocarditis with strep mitis/oralis group post dental procedure      SOCIAL:  [place]  [place]   has son/daughter and grandkids  no pets    FAMILY HISTORY:  FH: CAD (coronary artery disease) (Mother)    Family history of CVA (Father)      REVIEW OF SYSTEMS  General:	Denies any malaise fatigue or chills. Fevers absent    Skin:No rash  	  Ophthalmologic:Denies any visual complaints,discharge redness or photophobia  	  ENMT:No nasal discharge,headache,sinus congestion or throat pain.No dental complaints    Respiratory and Thorax:No cough,sputum or chest pain.Denies shortness of breath  	  Cardiovascular:	No chest pain,palpitaions or dizziness    Gastrointestinal:	NO nausea,abdominal pain or diarrhea. recent GI bleed    Genitourinary:	No dysuria,frequency. No flank pain    Musculoskeletal:	No joint swelling or pain.No weakness    Neurological:No confusion,diziness.No extremity weakness.No bladder or bowel incontinence	    Psychiatric:No delusions or hallucinations	    Hematology/Lymphatics:	No LN swelling.No gum bleeding     Endocrine:	No recent weight gain or loss.No abnormal heat/cold intolerance    Allergic/Immunologic:	No hives or rash   Allergies    No Known Allergies    Intolerances        ANTIMICROBIALS:    cefTRIAXone   IVPB 1000 milliGRAM(s) IV Intermittent every 24 hours  rifAXIMin 550 milliGRAM(s) Oral two times a day      Vital Signs Last 24 Hrs  T(C): 37 (30 May 2025 09:00), Max: 37.6 (29 May 2025 13:00)  T(F): 98.6 (30 May 2025 09:00), Max: 99.6 (29 May 2025 13:00)  HR: 52 (30 May 2025 09:00) (50 - 55)  BP: 133/68 (30 May 2025 09:00) (115/54 - 136/62)  BP(mean): --  RR: 18 (30 May 2025 09:00) (18 - 18)  SpO2: 95% (30 May 2025 09:00) (94% - 99%)    Parameters below as of 30 May 2025 09:00  Patient On (Oxygen Delivery Method): room air        PHYSICAL EXAM:Pleasant patient in no acute distress.      Constitutional:Comfortable.Awake and alert  No cachexia     Eyes:PERRL EOMI.NO discharge or conjunctival injection    ENMT:No sinus tenderness.No thrush.No pharyngeal exudate or erythema. Poor dental hygiene    Neck:Supple,No LN,no JVD      Respiratory:Good air entry bilaterally,CTA    Cardiovascular:S1 S2 wnl, No murmurs,rub or gallops    Gastrointestinal:Soft BS(+) no tenderness no masses ,No rebound or guarding  midline scar abdomen  ventral hernia    Genitourinary:No CVA tendereness     Rectal:    Extremities:No cyanosis,clubbing or edema.    Vascular:peripheral pulses felt    Neurological:AAO X 3,No grossly focal deficits    Skin:No rash , tattoo    Lymph Nodes:No palpable LNs    Musculoskeletal:No joint swelling or LOM    Psychiatric:Affect normal.                              8.7    3.85  )-----------( 39       ( 30 May 2025 06:06 )             26.5       05-30    142  |  102  |  30[H]  ----------------------------<  154[H]  3.6   |  28  |  1.39[H]    Ca    8.0[L]      30 May 2025 06:06  Phos  2.7       Mg     1.8         TPro  4.6[L]  /  Alb  2.7[L]  /  TBili  1.7[H]  /  DBili  x   /  AST  29  /  ALT  <5[L]  /  AlkPhos  79        RECENT CULTURES:   @ 10:20  Clean Catch Clean Catch (Midstream)  --  --  --    10,000 - 49,000 CFU/mL Enterococcus faecium  --   @ 09:40  Blood Blood-Peripheral  --  --  --    No growth at 24 hours  --   @ 09:20  Blood Blood-Peripheral  --  --  --    No growth at 24 hours  --      RADIOLOGY:    < from: Xray Chest 1 View- PORTABLE-Urgent (25 @ 13:17) >  FINDINGS:    The heart is not accurately assessed in this AP projection.  No focal consolidation.  There is no pneumothorax or pleural effusion.  No acute bony abnormality.    IMPRESSION:  No focal consolidation.    < end of copied text >    < from: CT Head No Cont (25 @ 14:07) >  IMPRESSION:    No evidence of acute intracranial abnormality.  No evidence of hemorrhage.    Chronic changes as above.    < end of copied text >      IMPRESSION:    Rx:

## 2025-05-30 NOTE — DIETITIAN INITIAL EVALUATION ADULT - PHYSCIAL ASSESSMENT
Weights:  - Source: Patient   - UBW: 140 pounds   - Reported weight changes:     Current Admission Weights:  - Dosing weight:  64.5 kg/142.2 pounds   (5/28/25)  - Daily weight: 60.1 kg/ 132.5 pounds  (05-30), 66.7 kg/ 147 pounds (05-29), 64.5 kg/ 142.2 pounds (05-28)    Weight Changes:  -Weight fluctuations noted during this admission, likely in setting of fluid shifts with GI distress, diuresis and hepatic disease. Will continue to monitor weight status as able     IBW: 105 pounds    %IBW: 126 %

## 2025-05-30 NOTE — DIETITIAN INITIAL EVALUATION ADULT - ORAL INTAKE PTA/DIET HISTORY
Pt reports poor appetite  and PO intakes since Monday (5/26); reports having vomiting PTA as well. Denies difficulty chewing/swallowing.  Pt reports following a diabetic diet at home. Did not take any oral nutritional supplement or vitamins/minerals

## 2025-05-30 NOTE — DIETITIAN INITIAL EVALUATION ADULT - ADD RECOMMEND
1. Medical team to advance diet when medically feasible via tolerated route.   2. Continue Multivitamin and consider vitamin C supplements if no medical contraindications to aid in wound healing.   3. RD remains available if needed, pt is aware.

## 2025-05-30 NOTE — DIETITIAN INITIAL EVALUATION ADULT - NSFNSPHYEXAMSKINFT_GEN_A_CORE
As per nursing wound care note on 5/29/25:  -B/L buttocks/sacral dark erythema, cannot rule out a deep tissue injury present on admission  -B/L heel hyperpigmentation, cannot rule out a deep tissue injury present on admission

## 2025-05-30 NOTE — DIETITIAN INITIAL EVALUATION ADULT - OTHER CALCULATIONS
-- Defer fluid needs to medical team  -- Estimated Calorie/protein Needs calucated using Upper IBW of 115.5 pounds 
n/a

## 2025-05-30 NOTE — DIETITIAN INITIAL EVALUATION ADULT - PERTINENT LABORATORY DATA
05-30    142  |  102  |  30[H]  ----------------------------<  154[H]  3.6   |  28  |  1.39[H]    Ca    8.0[L]      30 May 2025 06:06  Phos  2.7     05-30  Mg     1.8     05-30    TPro  4.6[L]  /  Alb  2.7[L]  /  TBili  1.7[H]  /  DBili  x   /  AST  29  /  ALT  <5[L]  /  AlkPhos  79  05-30    POCT Blood Glucose.: 207 mg/dL (05-30-25 @ 11:58)  POCT Blood Glucose.: 180 mg/dL (05-30-25 @ 08:19)  POCT Blood Glucose.: 240 mg/dL (05-29-25 @ 20:58)  POCT Blood Glucose.: 132 mg/dL (05-29-25 @ 17:03)    A1C with Estimated Average Glucose Result: 6.0 % (05-28-25 @ 10:20)

## 2025-05-31 ENCOUNTER — RESULT REVIEW (OUTPATIENT)
Age: 70
End: 2025-05-31

## 2025-05-31 ENCOUNTER — TRANSCRIPTION ENCOUNTER (OUTPATIENT)
Age: 70
End: 2025-05-31

## 2025-05-31 ENCOUNTER — APPOINTMENT (OUTPATIENT)
Dept: TRANSPLANT | Facility: HOSPITAL | Age: 70
End: 2025-05-31

## 2025-05-31 DIAGNOSIS — C22.0 LIVER CELL CARCINOMA: ICD-10-CM

## 2025-05-31 DIAGNOSIS — K74.60 UNSPECIFIED CIRRHOSIS OF LIVER: ICD-10-CM

## 2025-05-31 LAB
-  AMPICILLIN: SIGNIFICANT CHANGE UP
-  CIPROFLOXACIN: SIGNIFICANT CHANGE UP
-  LEVOFLOXACIN: SIGNIFICANT CHANGE UP
-  NITROFURANTOIN: SIGNIFICANT CHANGE UP
-  TETRACYCLINE: SIGNIFICANT CHANGE UP
-  VANCOMYCIN: SIGNIFICANT CHANGE UP
ALBUMIN SERPL ELPH-MCNC: 3.2 G/DL — LOW (ref 3.3–5)
ALBUMIN SERPL ELPH-MCNC: 3.3 G/DL — SIGNIFICANT CHANGE UP (ref 3.3–5)
ALBUMIN SERPL ELPH-MCNC: 3.5 G/DL — SIGNIFICANT CHANGE UP (ref 3.3–5)
ALP SERPL-CCNC: 55 U/L — SIGNIFICANT CHANGE UP (ref 40–120)
ALP SERPL-CCNC: 55 U/L — SIGNIFICANT CHANGE UP (ref 40–120)
ALP SERPL-CCNC: 58 U/L — SIGNIFICANT CHANGE UP (ref 40–120)
ALT FLD-CCNC: 402 U/L — HIGH (ref 10–45)
ALT FLD-CCNC: 434 U/L — HIGH (ref 10–45)
ALT FLD-CCNC: 502 U/L — HIGH (ref 10–45)
ANION GAP SERPL CALC-SCNC: 12 MMOL/L — SIGNIFICANT CHANGE UP (ref 5–17)
ANION GAP SERPL CALC-SCNC: 13 MMOL/L — SIGNIFICANT CHANGE UP (ref 5–17)
ANION GAP SERPL CALC-SCNC: 16 MMOL/L — SIGNIFICANT CHANGE UP (ref 5–17)
ANISOCYTOSIS BLD QL: SLIGHT — SIGNIFICANT CHANGE UP
APTT BLD: 31.6 SEC — SIGNIFICANT CHANGE UP (ref 26.1–36.8)
APTT BLD: 32.4 SEC — SIGNIFICANT CHANGE UP (ref 26.1–36.8)
APTT BLD: 32.5 SEC — SIGNIFICANT CHANGE UP (ref 26.1–36.8)
AST SERPL-CCNC: 1008 U/L — HIGH (ref 10–40)
AST SERPL-CCNC: 1038 U/L — HIGH (ref 10–40)
AST SERPL-CCNC: 923 U/L — HIGH (ref 10–40)
BASOPHILS # BLD AUTO: 0 K/UL — SIGNIFICANT CHANGE UP (ref 0–0.2)
BASOPHILS NFR BLD AUTO: 0 % — SIGNIFICANT CHANGE UP (ref 0–2)
BILIRUB SERPL-MCNC: 3.8 MG/DL — HIGH (ref 0.2–1.2)
BILIRUB SERPL-MCNC: 4.6 MG/DL — HIGH (ref 0.2–1.2)
BILIRUB SERPL-MCNC: 4.8 MG/DL — HIGH (ref 0.2–1.2)
BUN SERPL-MCNC: 21 MG/DL — SIGNIFICANT CHANGE UP (ref 7–23)
BUN SERPL-MCNC: 23 MG/DL — SIGNIFICANT CHANGE UP (ref 7–23)
BUN SERPL-MCNC: 24 MG/DL — HIGH (ref 7–23)
CALCIUM SERPL-MCNC: 8.4 MG/DL — SIGNIFICANT CHANGE UP (ref 8.4–10.5)
CALCIUM SERPL-MCNC: 8.8 MG/DL — SIGNIFICANT CHANGE UP (ref 8.4–10.5)
CALCIUM SERPL-MCNC: 9 MG/DL — SIGNIFICANT CHANGE UP (ref 8.4–10.5)
CHLORIDE SERPL-SCNC: 107 MMOL/L — SIGNIFICANT CHANGE UP (ref 96–108)
CHLORIDE SERPL-SCNC: 107 MMOL/L — SIGNIFICANT CHANGE UP (ref 96–108)
CHLORIDE SERPL-SCNC: 108 MMOL/L — SIGNIFICANT CHANGE UP (ref 96–108)
CMV IGG FLD QL: <0.2 U/ML — SIGNIFICANT CHANGE UP
CMV IGG SERPL-IMP: NEGATIVE — SIGNIFICANT CHANGE UP
CO2 SERPL-SCNC: 24 MMOL/L — SIGNIFICANT CHANGE UP (ref 22–31)
CO2 SERPL-SCNC: 26 MMOL/L — SIGNIFICANT CHANGE UP (ref 22–31)
CO2 SERPL-SCNC: 26 MMOL/L — SIGNIFICANT CHANGE UP (ref 22–31)
CREAT SERPL-MCNC: 1.15 MG/DL — SIGNIFICANT CHANGE UP (ref 0.5–1.3)
CREAT SERPL-MCNC: 1.17 MG/DL — SIGNIFICANT CHANGE UP (ref 0.5–1.3)
CREAT SERPL-MCNC: 1.22 MG/DL — SIGNIFICANT CHANGE UP (ref 0.5–1.3)
CULTURE RESULTS: ABNORMAL
EBV EA AB SER IA-ACNC: 124 U/ML — HIGH
EBV EA AB TITR SER IF: POSITIVE
EBV EA IGG SER-ACNC: POSITIVE
EBV NA IGG SER IA-ACNC: >600 U/ML — HIGH
EBV PATRN SPEC IB-IMP: SIGNIFICANT CHANGE UP
EBV VCA IGG AVIDITY SER QL IA: POSITIVE
EBV VCA IGM SER IA-ACNC: <10 U/ML — SIGNIFICANT CHANGE UP
EBV VCA IGM SER IA-ACNC: >750 U/ML — HIGH
EBV VCA IGM TITR FLD: NEGATIVE — SIGNIFICANT CHANGE UP
EGFR: 48 ML/MIN/1.73M2 — LOW
EGFR: 48 ML/MIN/1.73M2 — LOW
EGFR: 50 ML/MIN/1.73M2 — LOW
EGFR: 50 ML/MIN/1.73M2 — LOW
EGFR: 51 ML/MIN/1.73M2 — LOW
EGFR: 51 ML/MIN/1.73M2 — LOW
ELLIPTOCYTES BLD QL SMEAR: SLIGHT — SIGNIFICANT CHANGE UP
EOSINOPHIL # BLD AUTO: 0 K/UL — SIGNIFICANT CHANGE UP (ref 0–0.5)
EOSINOPHIL NFR BLD AUTO: 0 % — SIGNIFICANT CHANGE UP (ref 0–6)
GAS PNL BLDA: SIGNIFICANT CHANGE UP
GIANT PLATELETS BLD QL SMEAR: PRESENT — SIGNIFICANT CHANGE UP
GLUCOSE BLDC GLUCOMTR-MCNC: 128 MG/DL — HIGH (ref 70–99)
GLUCOSE BLDC GLUCOMTR-MCNC: 138 MG/DL — HIGH (ref 70–99)
GLUCOSE BLDC GLUCOMTR-MCNC: 154 MG/DL — HIGH (ref 70–99)
GLUCOSE BLDC GLUCOMTR-MCNC: 157 MG/DL — HIGH (ref 70–99)
GLUCOSE BLDC GLUCOMTR-MCNC: 160 MG/DL — HIGH (ref 70–99)
GLUCOSE BLDC GLUCOMTR-MCNC: 161 MG/DL — HIGH (ref 70–99)
GLUCOSE BLDC GLUCOMTR-MCNC: 164 MG/DL — HIGH (ref 70–99)
GLUCOSE BLDC GLUCOMTR-MCNC: 171 MG/DL — HIGH (ref 70–99)
GLUCOSE BLDC GLUCOMTR-MCNC: 174 MG/DL — HIGH (ref 70–99)
GLUCOSE BLDC GLUCOMTR-MCNC: 184 MG/DL — HIGH (ref 70–99)
GLUCOSE BLDC GLUCOMTR-MCNC: 186 MG/DL — HIGH (ref 70–99)
GLUCOSE SERPL-MCNC: 158 MG/DL — HIGH (ref 70–99)
GLUCOSE SERPL-MCNC: 158 MG/DL — HIGH (ref 70–99)
GLUCOSE SERPL-MCNC: 172 MG/DL — HIGH (ref 70–99)
GRAM STN FLD: SIGNIFICANT CHANGE UP
HBV CORE AB SER-ACNC: SIGNIFICANT CHANGE UP
HBV SURFACE AB SER-ACNC: <3.3 MIU/ML — LOW
HCT VFR BLD CALC: 21.1 % — LOW (ref 34.5–45)
HCT VFR BLD CALC: 22.5 % — LOW (ref 34.5–45)
HCT VFR BLD CALC: 25.1 % — LOW (ref 34.5–45)
HEPARINASE TEG R TIME: 6.7 MIN — SIGNIFICANT CHANGE UP (ref 4.3–8.3)
HEPARINASE TEG R TIME: 6.8 MIN — SIGNIFICANT CHANGE UP (ref 4.3–8.3)
HEPARINASE TEG R TIME: 7.5 MIN — SIGNIFICANT CHANGE UP (ref 4.3–8.3)
HEPARINASE TEG R TIME: 7.6 MIN — SIGNIFICANT CHANGE UP (ref 4.3–8.3)
HGB BLD-MCNC: 7.5 G/DL — LOW (ref 11.5–15.5)
HGB BLD-MCNC: 7.7 G/DL — LOW (ref 11.5–15.5)
HGB BLD-MCNC: 8.8 G/DL — LOW (ref 11.5–15.5)
HYPOCHROMIA BLD QL: SLIGHT — SIGNIFICANT CHANGE UP
INR BLD: 1.27 RATIO — HIGH (ref 0.85–1.16)
INR BLD: 1.36 RATIO — HIGH (ref 0.85–1.16)
INR BLD: 1.4 RATIO — HIGH (ref 0.85–1.16)
LYMPHOCYTES # BLD AUTO: 0.04 K/UL — LOW (ref 1–3.3)
LYMPHOCYTES # BLD AUTO: 0.9 % — LOW (ref 13–44)
MACROCYTES BLD QL: SLIGHT — SIGNIFICANT CHANGE UP
MAGNESIUM SERPL-MCNC: 2.4 MG/DL — SIGNIFICANT CHANGE UP (ref 1.6–2.6)
MAGNESIUM SERPL-MCNC: 2.4 MG/DL — SIGNIFICANT CHANGE UP (ref 1.6–2.6)
MAGNESIUM SERPL-MCNC: 2.5 MG/DL — SIGNIFICANT CHANGE UP (ref 1.6–2.6)
MANUAL SMEAR VERIFICATION: SIGNIFICANT CHANGE UP
MCHC RBC-ENTMCNC: 28.8 PG — SIGNIFICANT CHANGE UP (ref 27–34)
MCHC RBC-ENTMCNC: 29.8 PG — SIGNIFICANT CHANGE UP (ref 27–34)
MCHC RBC-ENTMCNC: 29.9 PG — SIGNIFICANT CHANGE UP (ref 27–34)
MCHC RBC-ENTMCNC: 34.2 G/DL — SIGNIFICANT CHANGE UP (ref 32–36)
MCHC RBC-ENTMCNC: 35.1 G/DL — SIGNIFICANT CHANGE UP (ref 32–36)
MCHC RBC-ENTMCNC: 35.5 G/DL — SIGNIFICANT CHANGE UP (ref 32–36)
MCV RBC AUTO: 84.1 FL — SIGNIFICANT CHANGE UP (ref 80–100)
MCV RBC AUTO: 84.3 FL — SIGNIFICANT CHANGE UP (ref 80–100)
MCV RBC AUTO: 85.1 FL — SIGNIFICANT CHANGE UP (ref 80–100)
METAMYELOCYTES # FLD: 0.9 % — HIGH (ref 0–0)
METAMYELOCYTES NFR BLD: 0.9 % — HIGH (ref 0–0)
METHOD TYPE: SIGNIFICANT CHANGE UP
MONOCYTES # BLD AUTO: 0.13 K/UL — SIGNIFICANT CHANGE UP (ref 0–0.9)
MONOCYTES NFR BLD AUTO: 2.6 % — SIGNIFICANT CHANGE UP (ref 2–14)
NEUTROPHILS # BLD AUTO: 4.71 K/UL — SIGNIFICANT CHANGE UP (ref 1.8–7.4)
NEUTROPHILS NFR BLD AUTO: 84.3 % — HIGH (ref 43–77)
NEUTS BAND # BLD: 11.3 % — HIGH (ref 0–8)
NEUTS BAND NFR BLD: 11.3 % — HIGH (ref 0–8)
NIGHT BLUE STAIN TISS: SIGNIFICANT CHANGE UP
NRBC BLD AUTO-RTO: 0 /100 WBCS — SIGNIFICANT CHANGE UP (ref 0–0)
NRBC BLD AUTO-RTO: 0 /100 WBCS — SIGNIFICANT CHANGE UP (ref 0–0)
ORGANISM # SPEC MICROSCOPIC CNT: ABNORMAL
ORGANISM # SPEC MICROSCOPIC CNT: ABNORMAL
OVALOCYTES BLD QL SMEAR: SLIGHT — SIGNIFICANT CHANGE UP
PHOSPHATE SERPL-MCNC: 3.6 MG/DL — SIGNIFICANT CHANGE UP (ref 2.5–4.5)
PHOSPHATE SERPL-MCNC: 4.1 MG/DL — SIGNIFICANT CHANGE UP (ref 2.5–4.5)
PHOSPHATE SERPL-MCNC: 4.2 MG/DL — SIGNIFICANT CHANGE UP (ref 2.5–4.5)
PLAT MORPH BLD: ABNORMAL
PLATELET # BLD AUTO: 37 K/UL — LOW (ref 150–400)
PLATELET # BLD AUTO: 39 K/UL — LOW (ref 150–400)
PLATELET # BLD AUTO: 51 K/UL — LOW (ref 150–400)
POIKILOCYTOSIS BLD QL AUTO: SLIGHT — SIGNIFICANT CHANGE UP
POTASSIUM SERPL-MCNC: 3.8 MMOL/L — SIGNIFICANT CHANGE UP (ref 3.5–5.3)
POTASSIUM SERPL-MCNC: 4 MMOL/L — SIGNIFICANT CHANGE UP (ref 3.5–5.3)
POTASSIUM SERPL-MCNC: 4.1 MMOL/L — SIGNIFICANT CHANGE UP (ref 3.5–5.3)
POTASSIUM SERPL-SCNC: 3.8 MMOL/L — SIGNIFICANT CHANGE UP (ref 3.5–5.3)
POTASSIUM SERPL-SCNC: 4 MMOL/L — SIGNIFICANT CHANGE UP (ref 3.5–5.3)
POTASSIUM SERPL-SCNC: 4.1 MMOL/L — SIGNIFICANT CHANGE UP (ref 3.5–5.3)
PROT SERPL-MCNC: 4.4 G/DL — LOW (ref 6–8.3)
PROT SERPL-MCNC: 4.4 G/DL — LOW (ref 6–8.3)
PROT SERPL-MCNC: 4.6 G/DL — LOW (ref 6–8.3)
PROTHROM AB SERPL-ACNC: 14.6 SEC — HIGH (ref 9.9–13.4)
PROTHROM AB SERPL-ACNC: 15.6 SEC — HIGH (ref 9.9–13.4)
PROTHROM AB SERPL-ACNC: 16 SEC — HIGH (ref 9.9–13.4)
RAPIDTEG MAXIMUM AMPLITUDE: 41.8 MM — LOW (ref 52–70)
RAPIDTEG MAXIMUM AMPLITUDE: 44.1 MM — LOW (ref 52–70)
RAPIDTEG MAXIMUM AMPLITUDE: 47 MM — LOW (ref 52–70)
RAPIDTEG MAXIMUM AMPLITUDE: 54.4 MM — SIGNIFICANT CHANGE UP (ref 52–70)
RBC # BLD: 2.51 M/UL — LOW (ref 3.8–5.2)
RBC # BLD: 2.67 M/UL — LOW (ref 3.8–5.2)
RBC # BLD: 2.95 M/UL — LOW (ref 3.8–5.2)
RBC # FLD: 14.4 % — SIGNIFICANT CHANGE UP (ref 10.3–14.5)
RBC # FLD: 14.5 % — SIGNIFICANT CHANGE UP (ref 10.3–14.5)
RBC # FLD: 14.8 % — HIGH (ref 10.3–14.5)
RBC BLD AUTO: ABNORMAL
SODIUM SERPL-SCNC: 145 MMOL/L — SIGNIFICANT CHANGE UP (ref 135–145)
SODIUM SERPL-SCNC: 146 MMOL/L — HIGH (ref 135–145)
SODIUM SERPL-SCNC: 148 MMOL/L — HIGH (ref 135–145)
SPECIMEN SOURCE: SIGNIFICANT CHANGE UP
TEG FUNCTIONAL FIBRINOGEN: 10.1 MM — LOW (ref 15–32)
TEG FUNCTIONAL FIBRINOGEN: 13.1 MM — LOW (ref 15–32)
TEG FUNCTIONAL FIBRINOGEN: 15.2 MM — SIGNIFICANT CHANGE UP (ref 15–32)
TEG FUNCTIONAL FIBRINOGEN: 17.8 MM — SIGNIFICANT CHANGE UP (ref 15–32)
TEG MAXIMUM AMPLITUDE: 44.6 MM — LOW (ref 52–69)
TEG MAXIMUM AMPLITUDE: 45.9 MM — LOW (ref 52–69)
TEG MAXIMUM AMPLITUDE: 47.1 MM — LOW (ref 52–69)
TEG MAXIMUM AMPLITUDE: 56.7 MM — SIGNIFICANT CHANGE UP (ref 52–69)
TEG REACTION TIME: 10.2 MIN — HIGH (ref 4.6–9.1)
TEG REACTION TIME: 7.6 MIN — SIGNIFICANT CHANGE UP (ref 4.6–9.1)
TEG REACTION TIME: 7.7 MIN — SIGNIFICANT CHANGE UP (ref 4.6–9.1)
TEG REACTION TIME: 7.7 MIN — SIGNIFICANT CHANGE UP (ref 4.6–9.1)
VZV IGG FLD QL IA: 25 S/CO — SIGNIFICANT CHANGE UP
VZV IGG FLD QL IA: POSITIVE — SIGNIFICANT CHANGE UP
WBC # BLD: 4.88 K/UL — SIGNIFICANT CHANGE UP (ref 3.8–10.5)
WBC # BLD: 4.93 K/UL — SIGNIFICANT CHANGE UP (ref 3.8–10.5)
WBC # BLD: 5.22 K/UL — SIGNIFICANT CHANGE UP (ref 3.8–10.5)
WBC # FLD AUTO: 4.88 K/UL — SIGNIFICANT CHANGE UP (ref 3.8–10.5)
WBC # FLD AUTO: 4.93 K/UL — SIGNIFICANT CHANGE UP (ref 3.8–10.5)
WBC # FLD AUTO: 5.22 K/UL — SIGNIFICANT CHANGE UP (ref 3.8–10.5)

## 2025-05-31 PROCEDURE — 88304 TISSUE EXAM BY PATHOLOGIST: CPT | Mod: 26

## 2025-05-31 PROCEDURE — 88307 TISSUE EXAM BY PATHOLOGIST: CPT | Mod: 26

## 2025-05-31 PROCEDURE — G0545: CPT

## 2025-05-31 PROCEDURE — 76705 ECHO EXAM OF ABDOMEN: CPT | Mod: 26,59

## 2025-05-31 PROCEDURE — 99232 SBSQ HOSP IP/OBS MODERATE 35: CPT

## 2025-05-31 PROCEDURE — 93975 VASCULAR STUDY: CPT | Mod: 26

## 2025-05-31 PROCEDURE — 47135 TRANSPLANTATION OF LIVER: CPT | Mod: GC,62

## 2025-05-31 PROCEDURE — 71045 X-RAY EXAM CHEST 1 VIEW: CPT | Mod: 26

## 2025-05-31 PROCEDURE — 88313 SPECIAL STAINS GROUP 2: CPT | Mod: 26

## 2025-05-31 PROCEDURE — 99291 CRITICAL CARE FIRST HOUR: CPT

## 2025-05-31 PROCEDURE — 88309 TISSUE EXAM BY PATHOLOGIST: CPT | Mod: 26

## 2025-05-31 PROCEDURE — 47135 TRANSPLANTATION OF LIVER: CPT | Mod: 62

## 2025-05-31 DEVICE — STAPLER ETHICON GST ECHELON 45MM WHITE RELOAD: Type: IMPLANTABLE DEVICE | Status: FUNCTIONAL

## 2025-05-31 DEVICE — STAPLER ETHICON ECHELON FLEX 45MM X 340MM: Type: IMPLANTABLE DEVICE | Status: FUNCTIONAL

## 2025-05-31 DEVICE — KIT A-LINE 1LUM 20G X 12CM SAFE KIT: Type: IMPLANTABLE DEVICE | Status: FUNCTIONAL

## 2025-05-31 DEVICE — KIT CVC 2LUM MAC 9FR CHG: Type: IMPLANTABLE DEVICE | Status: FUNCTIONAL

## 2025-05-31 DEVICE — SURGICEL 2 X 14": Type: IMPLANTABLE DEVICE | Status: FUNCTIONAL

## 2025-05-31 DEVICE — SURGICEL NU-KNIT 6 X 9": Type: IMPLANTABLE DEVICE | Status: FUNCTIONAL

## 2025-05-31 RX ORDER — METHYLPREDNISOLONE ACETATE 80 MG/ML
500 INJECTION, SUSPENSION INTRA-ARTICULAR; INTRALESIONAL; INTRAMUSCULAR; SOFT TISSUE EVERY 24 HOURS
Refills: 0 | Status: COMPLETED | OUTPATIENT
Start: 2025-06-01 | End: 2025-05-31

## 2025-05-31 RX ORDER — FLUCONAZOLE 150 MG
400 TABLET ORAL EVERY 24 HOURS
Refills: 0 | Status: DISCONTINUED | OUTPATIENT
Start: 2025-06-01 | End: 2025-06-03

## 2025-05-31 RX ORDER — METHYLPREDNISOLONE ACETATE 80 MG/ML
INJECTION, SUSPENSION INTRA-ARTICULAR; INTRALESIONAL; INTRAMUSCULAR; SOFT TISSUE
Refills: 0 | Status: COMPLETED | OUTPATIENT
Start: 2025-06-03 | End: 2025-06-06

## 2025-05-31 RX ORDER — ALBUMIN (HUMAN) 12.5 G/50ML
250 INJECTION, SOLUTION INTRAVENOUS ONCE
Refills: 0 | Status: COMPLETED | OUTPATIENT
Start: 2025-05-31 | End: 2025-05-31

## 2025-05-31 RX ORDER — MYCOPHENOLATE MOFETIL 500 MG/1
1000 TABLET, FILM COATED ORAL
Refills: 0 | Status: DISCONTINUED | OUTPATIENT
Start: 2025-05-31 | End: 2025-06-02

## 2025-05-31 RX ORDER — SENNA 187 MG
2 TABLET ORAL
Refills: 0 | Status: DISCONTINUED | OUTPATIENT
Start: 2025-05-31 | End: 2025-06-12

## 2025-05-31 RX ORDER — DEXMEDETOMIDINE HYDROCHLORIDE IN SODIUM CHLORIDE 4 UG/ML
0.5 INJECTION INTRAVENOUS
Qty: 200 | Refills: 0 | Status: DISCONTINUED | OUTPATIENT
Start: 2025-05-31 | End: 2025-05-31

## 2025-05-31 RX ORDER — METHYLPREDNISOLONE ACETATE 80 MG/ML
125 INJECTION, SUSPENSION INTRA-ARTICULAR; INTRALESIONAL; INTRAMUSCULAR; SOFT TISSUE EVERY 24 HOURS
Refills: 0 | Status: COMPLETED | OUTPATIENT
Start: 2025-06-03 | End: 2025-06-03

## 2025-05-31 RX ORDER — METHYLPREDNISOLONE ACETATE 80 MG/ML
250 INJECTION, SUSPENSION INTRA-ARTICULAR; INTRALESIONAL; INTRAMUSCULAR; SOFT TISSUE EVERY 24 HOURS
Refills: 0 | Status: COMPLETED | OUTPATIENT
Start: 2025-06-01 | End: 2025-06-01

## 2025-05-31 RX ORDER — VALGANCICLOVIR 450 MG/1
450 TABLET, FILM COATED ORAL DAILY
Refills: 0 | Status: DISCONTINUED | OUTPATIENT
Start: 2025-06-01 | End: 2025-06-02

## 2025-05-31 RX ORDER — SODIUM CHLORIDE 9 G/1000ML
1000 INJECTION, SOLUTION INTRAVENOUS
Refills: 0 | Status: DISCONTINUED | OUTPATIENT
Start: 2025-05-31 | End: 2025-06-02

## 2025-05-31 RX ORDER — PIPERACILLIN-TAZO-DEXTROSE,ISO 3.375G/5
3.38 IV SOLUTION, PIGGYBACK PREMIX FROZEN(ML) INTRAVENOUS EVERY 8 HOURS
Refills: 0 | Status: DISCONTINUED | OUTPATIENT
Start: 2025-05-31 | End: 2025-06-01

## 2025-05-31 RX ORDER — ENTECAVIR 0.5 MG/1
0.5 TABLET ORAL DAILY
Refills: 0 | Status: DISCONTINUED | OUTPATIENT
Start: 2025-06-01 | End: 2025-06-12

## 2025-05-31 RX ORDER — SULFAMETHOXAZOLE/TRIMETHOPRIM 800-160 MG
10 TABLET ORAL DAILY
Refills: 0 | Status: DISCONTINUED | OUTPATIENT
Start: 2025-06-01 | End: 2025-06-02

## 2025-05-31 RX ORDER — FLUCONAZOLE 150 MG
400 TABLET ORAL DAILY
Refills: 0 | Status: DISCONTINUED | OUTPATIENT
Start: 2025-05-31 | End: 2025-05-31

## 2025-05-31 RX ORDER — METHYLPREDNISOLONE ACETATE 80 MG/ML
INJECTION, SUSPENSION INTRA-ARTICULAR; INTRALESIONAL; INTRAMUSCULAR; SOFT TISSUE
Refills: 0 | Status: COMPLETED | OUTPATIENT
Start: 2025-06-01 | End: 2025-06-01

## 2025-05-31 RX ORDER — POLYETHYLENE GLYCOL 3350 17 G/17G
17 POWDER, FOR SOLUTION ORAL EVERY 24 HOURS
Refills: 0 | Status: DISCONTINUED | OUTPATIENT
Start: 2025-05-31 | End: 2025-06-02

## 2025-05-31 RX ORDER — HYDROMORPHONE/SOD CHLOR,ISO/PF 2 MG/10 ML
0.5 SYRINGE (ML) INJECTION
Refills: 0 | Status: DISCONTINUED | OUTPATIENT
Start: 2025-05-31 | End: 2025-06-01

## 2025-05-31 RX ADMIN — Medication 2 UNIT(S)/HR: at 13:14

## 2025-05-31 RX ADMIN — Medication 25 GRAM(S): at 23:07

## 2025-05-31 RX ADMIN — Medication 2 UNIT(S)/HR: at 19:42

## 2025-05-31 RX ADMIN — MYCOPHENOLATE MOFETIL 1000 MILLIGRAM(S): 500 TABLET, FILM COATED ORAL at 19:43

## 2025-05-31 RX ADMIN — DEXMEDETOMIDINE HYDROCHLORIDE IN SODIUM CHLORIDE 8.06 MICROGRAM(S)/KG/HR: 4 INJECTION INTRAVENOUS at 13:14

## 2025-05-31 RX ADMIN — ALBUMIN (HUMAN) 125 MILLILITER(S): 12.5 INJECTION, SOLUTION INTRAVENOUS at 13:47

## 2025-05-31 RX ADMIN — Medication 0.5 MILLIGRAM(S): at 18:26

## 2025-05-31 RX ADMIN — POLYETHYLENE GLYCOL 3350 17 GRAM(S): 17 POWDER, FOR SOLUTION ORAL at 21:29

## 2025-05-31 RX ADMIN — Medication 15 MILLILITER(S): at 17:09

## 2025-05-31 RX ADMIN — Medication 25 GRAM(S): at 16:34

## 2025-05-31 RX ADMIN — Medication 2 TABLET(S): at 17:09

## 2025-05-31 RX ADMIN — Medication 0.5 MILLIGRAM(S): at 21:44

## 2025-05-31 RX ADMIN — METHYLPREDNISOLONE ACETATE 100 MILLIGRAM(S): 80 INJECTION, SUSPENSION INTRA-ARTICULAR; INTRALESIONAL; INTRAMUSCULAR; SOFT TISSUE at 23:08

## 2025-05-31 RX ADMIN — Medication 0.5 MILLIGRAM(S): at 21:29

## 2025-05-31 RX ADMIN — Medication 0.5 MILLIGRAM(S): at 18:56

## 2025-05-31 RX ADMIN — ALBUMIN (HUMAN) 125 MILLILITER(S): 12.5 INJECTION, SOLUTION INTRAVENOUS at 14:10

## 2025-05-31 RX ADMIN — SODIUM CHLORIDE 125 MILLILITER(S): 9 INJECTION, SOLUTION INTRAVENOUS at 19:42

## 2025-05-31 RX ADMIN — SODIUM CHLORIDE 125 MILLILITER(S): 9 INJECTION, SOLUTION INTRAVENOUS at 14:47

## 2025-05-31 NOTE — PROGRESS NOTE ADULT - ASSESSMENT
70 F hx NAFLD cirrhosis and HCC (listed for liver transplant with MELD 20B), UGIB in 2022, chronic back pain (2/2 spinal fracture) presented from [facility] where she was admitted on 5/26 for coffee ground emesis and melena, EGD showed no active bleeding, transferred to [facility] now s/p OLT on 5/31/2025     [] s/p OLT - POD 0  - Liver doppler- patent vasc  - Diet NPO  - NGT/Perkins/JPx3   - IVF   - Strict i's and o's  - Ucx +10-49K E.Faecium UTI, Zosyn/Vanco x48hrs   - Donor HBV Core Ab +, discuss Entecavir daily  - CMV +/-, Valcyte 900 when able  - Incentive spirometry  - OT/PT/RD      [] Immuno: FK TBD, MMF 1/1, SST  - Simulect POD 4   - PPx: Bactrim, Valcyte 450 (inc 900mg as able), Fluc, PPI

## 2025-05-31 NOTE — PROGRESS NOTE ADULT - SUBJECTIVE AND OBJECTIVE BOX
Follow Up:      Interval History:    REVIEW OF SYSTEMS  [  ] ROS unobtainable because:    [  ] All other systems negative except as noted below    Constitutional:  [ ] fever [ ] chills  [ ] weight loss  [ ] weakness  Skin:  [ ] rash [ ] phlebitis	  Eyes: [ ] icterus [ ] pain  [ ] discharge	  ENMT: [ ] sore throat  [ ] thrush [ ] ulcers [ ] exudates  Respiratory: [ ] dyspnea [ ] hemoptysis [ ] cough [ ] sputum	  Cardiovascular:  [ ] chest pain [ ] palpitations [ ] edema	  Gastrointestinal:  [ ] nausea [ ] vomiting [ ] diarrhea [ ] constipation [ ] pain	  Genitourinary:  [ ] dysuria [ ] frequency [ ] hematuria [ ] discharge [ ] flank pain  [ ] incontinence  Musculoskeletal:  [ ] myalgias [ ] arthralgias [ ] arthritis  [ ] back pain  Neurological:  [ ] headache [ ] seizures  [ ] confusion/altered mental status    Allergies  No Known Allergies        ANTIMICROBIALS:  piperacillin/tazobactam IVPB.. 3.375 every 8 hours      OTHER MEDS:  MEDICATIONS  (STANDING):  dexMEDEtomidine Infusion 0.5 <Continuous>  HYDROmorphone  Injectable 0.5 every 3 hours PRN  insulin regular Infusion 2 <Continuous>  mycophenolate mofetil Suspension 1000 <User Schedule>  pantoprazole  Injectable 40 daily  senna 2 two times a day      Vital Signs Last 24 Hrs  T(C): 36.5 (31 May 2025 15:00), Max: 37.6 (31 May 2025 12:15)  T(F): 97.7 (31 May 2025 15:00), Max: 99.7 (31 May 2025 12:15)  HR: 61 (31 May 2025 15:00) (61 - 81)  BP: 123/57 (31 May 2025 12:15) (123/57 - 148/66)  BP(mean): 82 (31 May 2025 12:15) (82 - 82)  RR: 16 (31 May 2025 15:00) (15 - 18)  SpO2: 100% (31 May 2025 15:00) (94% - 100%)    Parameters below as of 31 May 2025 12:15  Patient On (Oxygen Delivery Method): ventilator    O2 Concentration (%): 50    PHYSICAL EXAMINATION:  General: Alert and Awake, NAD  HEENT: PERRL, EOMI  Neck: Supple  Cardiac: RRR, No M/R/G  Resp: CTAB, No Wh/Rh/Ra  Abdomen: NBS, NT/ND, No HSM, No rigidity or guarding  MSK: No LE edema. No Calf tenderness  : No love  Skin: No rashes or lesions. Skin is warm and dry to the touch.   Neuro: Alert and Awake. CN 2-12 Grossly intact. Moves all four extremities spontaneously.  Psych: Calm, Pleasant, Cooperative                          8.8    4.93  )-----------( 51       ( 31 May 2025 12:45 )             25.1       05-31    146[H]  |  107  |  21  ----------------------------<  172[H]  3.8   |  26  |  1.15    Ca    9.0      31 May 2025 12:45  Phos  3.6     05-31  Mg     2.5     05-31    TPro  4.4[L]  /  Alb  3.2[L]  /  TBili  4.6[H]  /  DBili  x   /  AST  1038[H]  /  ALT  502[H]  /  AlkPhos  58  05-31      Urinalysis Basic - ( 31 May 2025 12:45 )    Color: x / Appearance: x / SG: x / pH: x  Gluc: 172 mg/dL / Ketone: x  / Bili: x / Urobili: x   Blood: x / Protein: x / Nitrite: x   Leuk Esterase: x / RBC: x / WBC x   Sq Epi: x / Non Sq Epi: x / Bacteria: x        MICROBIOLOGY:  v  Clean Catch Clean Catch (Midstream)  05-28-25   10,000 - 49,000 CFU/mL Enterococcus faecium  --  Enterococcus faecium      Blood Blood-Peripheral  05-28-25   No growth at 72 Hours  --  --      Blood Blood-Peripheral  05-28-25   No growth at 72 Hours  --  --        CMV IgG Antibody: <0.20 U/mL (05-30-25 @ 13:55)          RADIOLOGY:    <The imaging below has been reviewed and visualized by me independently. Findings as detailed in report below> Follow Up:  pre liver transplant    Interval History: s/p OLT this AM. intubated in SICU.     REVIEW OF SYSTEMS  [ z ] ROS unobtainable because:  intubated/ sedated  [  ] All other systems negative except as noted below    Constitutional:  [ ] fever [ ] chills  [ ] weight loss  [ ] weakness  Skin:  [ ] rash [ ] phlebitis	  Eyes: [ ] icterus [ ] pain  [ ] discharge	  ENMT: [ ] sore throat  [ ] thrush [ ] ulcers [ ] exudates  Respiratory: [ ] dyspnea [ ] hemoptysis [ ] cough [ ] sputum	  Cardiovascular:  [ ] chest pain [ ] palpitations [ ] edema	  Gastrointestinal:  [ ] nausea [ ] vomiting [ ] diarrhea [ ] constipation [ ] pain	  Genitourinary:  [ ] dysuria [ ] frequency [ ] hematuria [ ] discharge [ ] flank pain  [ ] incontinence  Musculoskeletal:  [ ] myalgias [ ] arthralgias [ ] arthritis  [ ] back pain  Neurological:  [ ] headache [ ] seizures  [ ] confusion/altered mental status    Allergies  No Known Allergies        ANTIMICROBIALS:  piperacillin/tazobactam IVPB.. 3.375 every 8 hours      OTHER MEDS:  MEDICATIONS  (STANDING):  dexMEDEtomidine Infusion 0.5 <Continuous>  HYDROmorphone  Injectable 0.5 every 3 hours PRN  insulin regular Infusion 2 <Continuous>  mycophenolate mofetil Suspension 1000 <User Schedule>  pantoprazole  Injectable 40 daily  senna 2 two times a day      Vital Signs Last 24 Hrs  T(C): 36.5 (31 May 2025 15:00), Max: 37.6 (31 May 2025 12:15)  T(F): 97.7 (31 May 2025 15:00), Max: 99.7 (31 May 2025 12:15)  HR: 61 (31 May 2025 15:00) (61 - 81)  BP: 123/57 (31 May 2025 12:15) (123/57 - 148/66)  BP(mean): 82 (31 May 2025 12:15) (82 - 82)  RR: 16 (31 May 2025 15:00) (15 - 18)  SpO2: 100% (31 May 2025 15:00) (94% - 100%)    Parameters below as of 31 May 2025 12:15  Patient On (Oxygen Delivery Method): ventilator    O2 Concentration (%): 50    PHYSICAL EXAMINATION:  General: Intubated and Sedated  HEENT: +ETT  Cardiac: RRR, No M/R/G  Resp: CTAB, No Wh/Rh/Ra  Abdomen: Dressing over abdomen. 3x NASREEN with s/s drainage  MSK: No LE edema. No Calf tenderness  Skin: No rashes or lesions. Skin is warm and dry to the touch.   Neuro: Intubated and Sedated  Psych: Unable to assess - intubated and sedated                          8.8    4.93  )-----------( 51       ( 31 May 2025 12:45 )             25.1       05-31    146[H]  |  107  |  21  ----------------------------<  172[H]  3.8   |  26  |  1.15    Ca    9.0      31 May 2025 12:45  Phos  3.6     05-31  Mg     2.5     05-31    TPro  4.4[L]  /  Alb  3.2[L]  /  TBili  4.6[H]  /  DBili  x   /  AST  1038[H]  /  ALT  502[H]  /  AlkPhos  58  05-31      Urinalysis Basic - ( 31 May 2025 12:45 )    Color: x / Appearance: x / SG: x / pH: x  Gluc: 172 mg/dL / Ketone: x  / Bili: x / Urobili: x   Blood: x / Protein: x / Nitrite: x   Leuk Esterase: x / RBC: x / WBC x   Sq Epi: x / Non Sq Epi: x / Bacteria: x        MICROBIOLOGY:  v  Clean Catch Clean Catch (Midstream)  05-28-25   10,000 - 49,000 CFU/mL Enterococcus faecium  --  Enterococcus faecium    Blood Blood-Peripheral  05-28-25   No growth at 72 Hours  --  --    Blood Blood-Peripheral  05-28-25   No growth at 72 Hours  --  --    CMV IgG Antibody: <0.20 U/mL (05-30-25 @ 13:55)    RADIOLOGY:    <The imaging below has been reviewed and visualized by me independently. Findings as detailed in report below>    < from: US Trans Liver W/ Doppler (05.31.25 @ 13:48) >  IMPRESSION:  Status post liver transplant with patent vasculature. Elevated peak   systolic velocities in the proper hepatic artery.    < end of copied text >

## 2025-05-31 NOTE — BRIEF OPERATIVE NOTE - OPERATION/FINDINGS
S/P Orthotopic open liver transplantation from a  donor under steroids and simulect induction.    CIT  6  hours    Side-side Cavocavostomy (Running 3-0 Prolene sutures for both posterior and anterior walls)  End-end portoportal reconstruction (Running 6-0 Prolene sutures for both posterior and anterior walls)  End-end Single arterial reconstruction (Running 6-0 Prolene sutures for both posterior and anterior walls). Conventional arterial anatomy on the donor and low takeoff of the right hepatic artery on the recipient side. Donor celiac was anastomosed to the recipient right hepatic and the anastomosis left lying posterolateral to the bile duct.  End-end biliary reconstruction without stenting (Running 5-0 PDS sutures for posterior and interrupted for anterior wall)   Donor bile duct ~12mm and recipient bile duct ~20 mm ie size mismatch seen, ductoplasty of recipient duct was done

## 2025-05-31 NOTE — CONSULT NOTE ADULT - ASSESSMENT
70y Female with history of NAFLD cirrhosis and HCC, UGIB in 2022, chronic back pain (2/2 spinal fracture) admitted to [facility] 5/26 for coffee ground emesis, melena, and SCOTT. No active bleeding on EGD showed no active bleeding. Transferred to [facility] for liver transplant evaluation. Underwent OLT 5/31 and brought to SICU postoperatively for hemodynamic monitoring.     PLAN:  Neuro:  - A&Ox4 baseline  - Pain control with dilaudid    Resp:  - extubated after arrival to SICU  - Advair q12 (tx interchange for Symbicort)  - Maintain O2 saturation >92%    CV:  - HDS off pressors  - Maintain MAP >65  - holding home coreg    GI:  - s/p OLT 5/31  - NPO/NGT to LCWS  - Protonix qd  - Bowel reg with Miralax and senna  - Monitor NASREEN outputs    /Renal:  - Perkins  - PL @ 125    Heme:  - SCDs for ppx    ID:   - vanc by level/Zosyn x48h postop  - entecavir for hep B+ donor  - Transplant ppx Bactrim, Valcyte, diflucan  - Immunosuppression with Cellcept, solumedrol taper    Endo: hx T2DM  - insulin gtt   - Monitor glucose  - holding Lantus 14u, premeal 18u, ISS while on insulin gtt    Lines:  - L radial art line  - R IJ Intro TLC  - L IJ single   - Perkins    Disposition: SICU 70y Female with history of NAFLD cirrhosis and HCC, UGIB in 2022, chronic back pain (2/2 spinal fracture) admitted to [facility] 5/26 for coffee ground emesis, melena, and SCOTT. No active bleeding on EGD showed no active bleeding. Transferred to [facility] for liver transplant evaluation. Underwent OLT 5/31 and brought to SICU postoperatively for hemodynamic monitoring.     PLAN:  Neuro:  - A&Ox4 baseline  - Pain control with dilaudid    Resp:  - extubated after arrival to SICU  - Advair q12 (tx interchange for Symbicort)  - Maintain O2 saturation >92%    CV:  - HDS off pressors  - Maintain MAP >65  - holding home coreg    GI:  - s/p OLT 5/31  - NPO/NGT to LCWS  - Protonix qd  - Bowel reg with Miralax and senna  - Monitor NASREEN outputs    /Renal:  - Perkins  - PL @ 125    Heme:  - EBL 1500, received 9u pRBC 6 FFP 4 plt 4 cryo intraop  - Monitor H&H, transfuse prn  - SCDs for ppx    ID:   - vanc by level/Zosyn x48h postop  - entecavir for hep B+ donor  - Transplant ppx Bactrim, Valcyte, diflucan  - Immunosuppression with Cellcept, solumedrol taper    Endo: hx T2DM  - insulin gtt   - Monitor glucose  - holding Lantus 14u, premeal 18u, ISS while on insulin gtt    Lines:  - L radial art line  - R IJ Intro TLC  - L IJ single   - Perkins    Disposition: SICU 70y Female with history of NAFLD cirrhosis and HCC, UGIB in 2022, chronic back pain (2/2 spinal fracture) admitted to [facility] 5/26 for coffee ground emesis, melena, and SCOTT. No active bleeding on EGD showed no active bleeding. Transferred to [facility] for liver transplant evaluation. Underwent OLT 5/31 and brought to SICU postoperatively for hemodynamic monitoring.     PLAN:  Neuro:  - A&Ox4 baseline  - Pain control with dilaudid    Resp:  - extubated after arrival to SICU  - Advair q12 (tx interchange for Symbicort)  - Maintain O2 saturation >92%    CV:  - HDS off pressors  - Maintain MAP >65  - holding home coreg    GI:  - s/p OLT 5/31  - NPO/NGT to LCWS  - Protonix qd  - Bowel reg with Miralax and senna  - Monitor NASREEN outputs    /Renal:  - SCOTT at OSH, improving  - Perkins  - PL @ 125    Heme:  - EBL 1500, received 9u pRBC 6 FFP 4 plt 4 cryo intraop  - Monitor H&H, transfuse prn  - SCDs for ppx    ID:   - vanc by level/Zosyn x48h postop  - entecavir for hep B+ donor  - Transplant ppx Bactrim, Valcyte, diflucan  - Immunosuppression with Cellcept, solumedrol taper    Endo: hx T2DM  - insulin gtt   - Monitor glucose  - holding Lantus 14u, premeal 18u, ISS while on insulin gtt    Lines:  - L radial art line  - R IJ Intro TLC  - L IJ single   - Perkins    Disposition: SICU

## 2025-05-31 NOTE — PROGRESS NOTE ADULT - ASSESSMENT
70 F hx NAFLD cirrhosis and HCC (listed for liver transplant with MELD 20B), UGIB in 2022, chronic back pain (2/2 spinal fracture) presented from [facility] where she was admitted on 5/26 for coffee ground emesis and melena, EGD showed no active bleeding, patient was transfused and stabilized prior to transfer. Patient states she hasn't had bloody bowel movements or bloody emesis since Monday 5/26. Patient endorses low back pain and frontal lobe headache, denies urinary or bowel incontinence, loss of sensation, weakness, dizziness, changes in vision, photobia, blurry vision, nausea, fever, chills, chest pain, or shortness of breath    Donor accepted for transplant scheduled for tomorrow morning    Donor:  Hep B sca+ NAAT negative  CMV IGG+  Toxo IGG+  EBV+    Recipient:  CMV IGG-  Toxo IGG-  Hep A and B not immune she recently received one dose of each of the vaccines as an out patient    History of SBE with strep mitis/oralis-  post dental procedure  pcn sensitive  treated for endocarditis     s/p OLT 5/31/25    #Liver transplant recipient (CMV, prophylactic antibiotic)  --Recommend Vancomycin (E faecium on UCx) and Zosyn for 48H donaldo-operatively  --Continue Entecavir 0.5 mg PO Q24H for donor HBVc Positivity  --Continue Valcyte 450 mg PO Q24H (adjust for CrCl)  --Continue Fluconazole per protocol  --Continue Bactrim SS PO Q24H for PCP PPx    I will continue to follow. Please feel free to contact me with any further questions.    Demetris Mehta M.D.  [facility] Division of Infectious Disease  8AM-5PM Monday - Friday: Available on [employer] Teams  After Hours and Holidays (or if no response on [employer] Teams): Please contact the Infectious Diseases Office at [phone]

## 2025-05-31 NOTE — PROGRESS NOTE ADULT - SUBJECTIVE AND OBJECTIVE BOX
Transplant Surgery - Post OP Check   --------------------------------------------------------------   Donor OLTx      Date: 2025      POD# 0    70 F hx NAFLD cirrhosis and HCC (listed for liver transplant with MELD 20B), UGIB in , chronic back pain (2/2 spinal fracture) presented from [facility] where she was admitted on  for coffee ground emesis and melena, EGD showed no active bleeding, transferred to [facility] now s/p OLT on 2025     Interval Events:  - s/p OLT: OR: 3prbc, 6ffp, 4cryo, 4 plt  - intubated/sedated on precedex gtt   - BP stable-s/p albumin [id number]    - lactate cleared   - Liver doppler - patent vasc     Immunosuppression:  FK TBD, MMF , SST Simulect POD 4     Potential Discharge date: tbd     Education:  Medications    Plan of care:  See Below    MEDICATIONS  (STANDING):  chlorhexidine 0.12% Liquid 15 milliLiter(s) Oral Mucosa every 12 hours  chlorhexidine 2% Cloths 1 Application(s) Topical <User Schedule>  dexMEDEtomidine Infusion 0.5 MICROgram(s)/kG/Hr (8.06 mL/Hr) IV Continuous <Continuous>  insulin regular Infusion 2 Unit(s)/Hr (2 mL/Hr) IV Continuous <Continuous>  multiple electrolytes Injection Type 1 1000 milliLiter(s) (125 mL/Hr) IV Continuous <Continuous>  mycophenolate mofetil Suspension 1000 milliGRAM(s) Oral <User Schedule>  pantoprazole  Injectable 40 milliGRAM(s) IV Push daily  piperacillin/tazobactam IVPB.. 3.375 Gram(s) IV Intermittent every 8 hours  senna 2 Tablet(s) Oral two times a day    MEDICATIONS  (PRN):  HYDROmorphone  Injectable 0.5 milliGRAM(s) IV Push every 3 hours PRN Severe Pain (7 - 10)      PAST MEDICAL & SURGICAL HISTORY:  HCC (hepatocellular carcinoma)      DM (diabetes mellitus)      HTN (hypertension)      HLD (hyperlipidemia)      Hepatic cirrhosis      IVEY (nonalcoholic steatohepatitis)      Former smoker      Ascites      Hepatic encephalopathy      History of cervical cancer      H/O  section      History of cholecystectomy      H/O carpal tunnel repair      H/O cataract extraction          Vital Signs Last 24 Hrs  T(C): 36.5 (31 May 2025 15:00), Max: 37.6 (31 May 2025 12:15)  T(F): 97.7 (31 May 2025 15:00), Max: 99.7 (31 May 2025 12:15)  HR: 59 (31 May 2025 16:00) (59 - 81)  BP: 123/57 (31 May 2025 12:15) (123/57 - 148/66)  BP(mean): 82 (31 May 2025 12:15) (82 - 82)  RR: 18 (31 May 2025 16:00) (15 - 18)  SpO2: 100% (31 May 2025 16:00) (94% - 100%)    Parameters below as of 31 May 2025 12:15  Patient On (Oxygen Delivery Method): ventilator    O2 Concentration (%): 50    I&O's Summary    30 May 2025 07:01  -  31 May 2025 07:00  --------------------------------------------------------  IN: 200 mL / OUT: 1100 mL / NET: -900 mL    31 May 2025 07:01  -  31 May 2025 16:57  --------------------------------------------------------  IN: 954.4 mL / OUT: 675 mL / NET: 279.4 mL                              8.8    4.93  )-----------( 51       ( 31 May 2025 12:45 )             25.1     05-31    146[H]  |  107  |  21  ----------------------------<  172[H]  3.8   |  26  |  1.15    Ca    9.0      31 May 2025 12:45  Phos  3.6     05-31  Mg     2.5         TPro  4.4[L]  /  Alb  3.2[L]  /  TBili  4.6[H]  /  DBili  x   /  AST  1038[H]  /  ALT  502[H]  /  AlkPhos  58          Review of systems    Review of systems unable to be obtained 2/2 pt intubated/sedated       PHYSICAL EXAM:  Constitutional:  intubated/sedated   Eyes: PERRLA  ENMT: nc/at, no thrush  Neck: supple, central line  Respiratory: CTA B/L  Cardiovascular: RRR  Gastrointestinal: Soft abdomen, ND, appropriate incisional TTP. chevron incision c/d/i, staples in place. No signs of infection.  JPsx3 bloody   Genitourinary: Urinary catheter in place  Extremities: SCD's in place and working bilaterally  Vascular: Palpable dp pulses bilaterally.   Neurological:  intubated/sedated   Skin: no rashes, ulcerations, lesions  Musculoskeletal: Moving all extremities  Psychiatric: Responsive     Transplant Surgery - Post OP Check   --------------------------------------------------------------   Donor OLTx      Date: 2025      POD# 0    70 F hx NAFLD cirrhosis and HCC (listed for liver transplant with MELD 20B), UGIB in , chronic back pain (2/2 spinal fracture) presented from [facility] where she was admitted on  for coffee ground emesis and melena, EGD showed no active bleeding, transferred to [facility] now s/p OLT on 2025     Interval Events:  - s/p OLT: OR: 3prbc, 6ffp, 4cryo, 4 plt  - intubated/sedated on precedex gtt   - BP stable-s/p albumin [id number]    - lactate cleared   - Liver doppler - patent vasc     Immunosuppression:  FK TBD, MMF , SST Simulect POD 4     Potential Discharge date: tbd     Education:  Medications    Plan of care:  See Below    MEDICATIONS  (STANDING):  chlorhexidine 0.12% Liquid 15 milliLiter(s) Oral Mucosa every 12 hours  chlorhexidine 2% Cloths 1 Application(s) Topical <User Schedule>  dexMEDEtomidine Infusion 0.5 MICROgram(s)/kG/Hr (8.06 mL/Hr) IV Continuous <Continuous>  insulin regular Infusion 2 Unit(s)/Hr (2 mL/Hr) IV Continuous <Continuous>  multiple electrolytes Injection Type 1 1000 milliLiter(s) (125 mL/Hr) IV Continuous <Continuous>  mycophenolate mofetil Suspension 1000 milliGRAM(s) Oral <User Schedule>  pantoprazole  Injectable 40 milliGRAM(s) IV Push daily  piperacillin/tazobactam IVPB.. 3.375 Gram(s) IV Intermittent every 8 hours  senna 2 Tablet(s) Oral two times a day    MEDICATIONS  (PRN):  HYDROmorphone  Injectable 0.5 milliGRAM(s) IV Push every 3 hours PRN Severe Pain (7 - 10)      PAST MEDICAL & SURGICAL HISTORY:  HCC (hepatocellular carcinoma)      DM (diabetes mellitus)      HTN (hypertension)      HLD (hyperlipidemia)      Hepatic cirrhosis      IVEY (nonalcoholic steatohepatitis)      Former smoker      Ascites      Hepatic encephalopathy      History of cervical cancer      H/O  section      History of cholecystectomy      H/O carpal tunnel repair      H/O cataract extraction          Vital Signs Last 24 Hrs  T(C): 36.5 (31 May 2025 15:00), Max: 37.6 (31 May 2025 12:15)  T(F): 97.7 (31 May 2025 15:00), Max: 99.7 (31 May 2025 12:15)  HR: 59 (31 May 2025 16:00) (59 - 81)  BP: 123/57 (31 May 2025 12:15) (123/57 - 148/66)  BP(mean): 82 (31 May 2025 12:15) (82 - 82)  RR: 18 (31 May 2025 16:00) (15 - 18)  SpO2: 100% (31 May 2025 16:00) (94% - 100%)    Parameters below as of 31 May 2025 12:15  Patient On (Oxygen Delivery Method): ventilator    O2 Concentration (%): 50    I&O's Summary    30 May 2025 07:01  -  31 May 2025 07:00  --------------------------------------------------------  IN: 200 mL / OUT: 1100 mL / NET: -900 mL    31 May 2025 07:01  -  31 May 2025 16:57  --------------------------------------------------------  IN: 954.4 mL / OUT: 675 mL / NET: 279.4 mL                              8.8    4.93  )-----------( 51       ( 31 May 2025 12:45 )             25.1     05-31    146[H]  |  107  |  21  ----------------------------<  172[H]  3.8   |  26  |  1.15    Ca    9.0      31 May 2025 12:45  Phos  3.6     05-31  Mg     2.5         TPro  4.4[L]  /  Alb  3.2[L]  /  TBili  4.6[H]  /  DBili  x   /  AST  1038[H]  /  ALT  502[H]  /  AlkPhos  58          Review of systems    Review of systems unable to be obtained 2/2 pt intubated/sedated       PHYSICAL EXAM:  Constitutional:  intubated/sedated   Eyes: PERRLA  ENMT: nc/at, no thrush  Neck: supple, central line  Respiratory: CTA B/L  Cardiovascular: RRR  Gastrointestinal: Soft abdomen, ND, appropriate incisional TTP. chevron incision c/d/i, staples in place. No signs of infection.  JPsx3 bloody   Genitourinary: Urinary catheter in place  Extremities: SCD's in place and working bilaterally  Vascular: Palpable dp pulses bilaterally.   Neurological:  intubated/sedated   Skin: no rashes, ulcerations, lesions  Musculoskeletal:  intubated/sedated   Psychiatric: intubated/sedated

## 2025-05-31 NOTE — BRIEF OPERATIVE NOTE - NSICDXBRIEFPOSTOP_GEN_ALL_CORE_FT
POST-OP DIAGNOSIS:  Cirrhosis 31-May-2025 12:22:13  Kerry Rowan  Hepatocellular carcinoma in adult 31-May-2025 12:22:21  Kerry Rowan

## 2025-05-31 NOTE — CONSULT NOTE ADULT - SUBJECTIVE AND OBJECTIVE BOX
SICU Consultation Note  =====================================================  70y Female with history of NAFLD cirrhosis and HCC (listed for liver transplant with MELD 20B), UGIB in , chronic back pain (2/2 spinal fracture) presented from [facility] where she was admitted on  for coffee ground emesis and melena, EGD showed no active bleeding, labs with SCOTT; patient was transfused and stabilized prior to transfer. No reported bloody bowel movements or bloody emesis since . Patient transferred to [facility] for liver transplant evaluation. Underwent OLT  and brought to SICU postoperatively for hemodynamic monitoring.     Surgery Information  EBL: 1500         IV Fluids: 2L normosol  1.5L albumin        Blood Products: 9 pRBC 6 FFP 4 plt 4 cryo      UOP: 350         PAST MEDICAL & SURGICAL HISTORY:  HCC (hepatocellular carcinoma)  DM (diabetes mellitus)  HTN (hypertension)  HLD (hyperlipidemia)  Hepatic cirrhosis  IVEY (nonalcoholic steatohepatitis)  Former smoker  Ascites  Hepatic encephalopathy  History of cervical cancer    H/O  section  History of cholecystectomy  H/O carpal tunnel repair  H/O cataract extraction      Home Meds: Home Medications:  alendronate weekly: 70 milligram(s) orally once a week (2025 07:49)  carvedilol 3.125 mg oral tablet: 1 tablet orally 2 times a day (2025 09:43)  HumaLOG 100 units/mL injectable solution: 18 unit(s) injectable 3 times a day (before meals) (2025 07:43)  lactulose 10 g/15 mL oral solution: 20 milligram(s) orally 2 times a day (2025 07:46)  Lantus Solostar Pen 100 units/mL subcutaneous solution: 14 subcutaneous once a day (at bedtime) (2025 07:49)  Multiple Vitamins oral tablet: 1 tab(s) orally once a day (2025 07:49)  Protonix 40 mg oral delayed release tablet: 1 tab(s) orally once a day (2025 11:38)  rifAXIMin 550 mg oral tablet: 1 tab(s) orally 2 times a day (2025 07:49)  Symbicort 160 mcg-4.5 mcg/inh inhalation aerosol: 2 inhaled 2 times a day (2025 09:44)  torsemide 10 mg oral tablet: 1 tab(s) orally once a day (2025 09:43)  ursodiol 500 mg oral tablet: 1 tab(s) orally once a day (2025 07:49)    Allergies: Allergies    No Known Allergies    Intolerances      Soc:   Advanced Directives: Presumed Full Code     ROS:    REVIEW OF SYSTEMS    [ ] A ten-point review of systems was otherwise negative except as noted.  [x] Due to altered mental status/intubation, subjective information were not able to be obtained from the patient. History was obtained, to the extent possible, from review of the chart and collateral sources of information.      CURRENT MEDICATIONS:   --------------------------------------------------------------------------------------  Neurologic Medications  HYDROmorphone  Injectable 0.5 milliGRAM(s) IV Push every 3 hours PRN Severe Pain (7 - 10)    Respiratory Medications    Cardiovascular Medications    Gastrointestinal Medications  multiple electrolytes Injection Type 1 1000 milliLiter(s) IV Continuous <Continuous>  pantoprazole  Injectable 40 milliGRAM(s) IV Push daily  senna 2 Tablet(s) Oral two times a day    Genitourinary Medications    Hematologic/Oncologic Medications  mycophenolate mofetil Suspension 1000 milliGRAM(s) Oral <User Schedule>    Antimicrobial/Immunologic Medications  piperacillin/tazobactam IVPB.. 3.375 Gram(s) IV Intermittent every 8 hours    Endocrine/Metabolic Medications  insulin regular Infusion 2 Unit(s)/Hr IV Continuous <Continuous>    Topical/Other Medications  chlorhexidine 2% Cloths 1 Application(s) Topical <User Schedule>    --------------------------------------------------------------------------------------    VITAL SIGNS, INS/OUTS (last 24 hours):  --------------------------------------------------------------------------------------  ICU Vital Signs Last 24 Hrs  T(C): 36.5 (31 May 2025 15:00), Max: 37.6 (31 May 2025 12:15)  T(F): 97.7 (31 May 2025 15:00), Max: 99.7 (31 May 2025 12:15)  HR: 68 (31 May 2025 18:23) (58 - 80)  BP: 123/57 (31 May 2025 12:15) (123/57 - 142/71)  BP(mean): 82 (31 May 2025 12:15) (82 - 82)  ABP: 144/48 (31 May 2025 18:00) (119/43 - 170/54)  ABP(mean): 76 (31 May 2025 18:00) (65 - 88)  RR: 13 (31 May 2025 18:00) (13 - 18)  SpO2: 100% (31 May 2025 18:23) (94% - 100%)    O2 Parameters below as of 31 May 2025 18:24      O2 Concentration (%): 30      I&O's Summary    30 May 2025 07:  -  31 May 2025 07:00  --------------------------------------------------------  IN: 200 mL / OUT: 1100 mL / NET: -900 mL    31 May 2025 07:01  -  31 May 2025 18:50  --------------------------------------------------------  IN: 1416.4 mL / OUT: 810 mL / NET: 606.4 mL      --------------------------------------------------------------------------------------    EXAM:  General/Neuro  Exam: Normal, NAD, alert, oriented x 3, no focal deficits. PERRLA     Respiratory  Exam: Lungs clear to auscultation   [] Tracheostomy   [x] Intubated  Mechanical Ventilation: Mode: CPAP with PS  FiO2: 50  PEEP: 6  PS: 5  MAP: 8.3      Cardiovascular  Exam: 144/48 (76), not on pressors  Cardiac Rhythm: Normal Sinus Rhythm on tele    GI  Exam: Abdomen soft, nontender, nondistended. Dressing C/D/I. NASREEN x3 with ss output. NGT to suction      Tubes/Lines/Drains    [x] Peripheral IV  [x] Central Venous Line     	[x] R	[x] L	[x] IJ	[] Fem	[] SC        Type:	R IJ Intro triple, L IJ single    Date Placed:   [x] Arterial Line		[] R	[x] L	[] Fem	[x] Rad	[] Ax	Date Placed:   [] PICC:         	[] Midline		[] Mediport           [x] Urinary Catheter		Date Placed:     Extremities  Exam: Extremities warm, pink, well-perfused     Derm:  Exam: Good skin turgor, no skin breakdown     :   Exam: Perkins catheter in place draining clear yellow urine    LABS  --------------------------------------------------------------------------------------                        7.7    4.88  )-----------( 39       ( 31 May 2025 16:38 )             22.5       145  |  107  |  23  ----------------------------<  158[H]  4.1   |  26  |  1.17    Ca    8.8      31 May 2025 16:38  Phos  4.2       Mg     2.4         TPro  4.6[L]  /  Alb  3.5  /  TBili  4.8[H]  /  DBili  x   /  AST  1008[H]  /  ALT  434[H]  /  AlkPhos  55    ABG - ( 31 May 2025 17:29 )  pH, Arterial: 7.45  pH, Blood: x     /  pCO2: 40    /  pO2: 186   / HCO3: 28    / Base Excess: 3.5   /  SaO2: 99.5            Urinalysis Basic - ( 31 May 2025 16:38 )    Color: x / Appearance: x / SG: x / pH: x  Gluc: 158 mg/dL / Ketone: x  / Bili: x / Urobili: x   Blood: x / Protein: x / Nitrite: x   Leuk Esterase: x / RBC: x / WBC x   Sq Epi: x / Non Sq Epi: x / Bacteria: x    PT/INR - ( 31 May 2025 16:38 )   PT: 15.6 sec;   INR: 1.36 ratio         PTT - ( 31 May 2025 16:38 )  PTT:32.5 sec  --------------------------------------------------------------------------------------

## 2025-06-01 LAB
ALBUMIN SERPL ELPH-MCNC: 3.1 G/DL — LOW (ref 3.3–5)
ALBUMIN SERPL ELPH-MCNC: 3.1 G/DL — LOW (ref 3.3–5)
ALBUMIN SERPL ELPH-MCNC: 3.4 G/DL — SIGNIFICANT CHANGE UP (ref 3.3–5)
ALBUMIN SERPL ELPH-MCNC: 3.4 G/DL — SIGNIFICANT CHANGE UP (ref 3.3–5)
ALP SERPL-CCNC: 56 U/L — SIGNIFICANT CHANGE UP (ref 40–120)
ALP SERPL-CCNC: 64 U/L — SIGNIFICANT CHANGE UP (ref 40–120)
ALP SERPL-CCNC: 68 U/L — SIGNIFICANT CHANGE UP (ref 40–120)
ALP SERPL-CCNC: 71 U/L — SIGNIFICANT CHANGE UP (ref 40–120)
ALT FLD-CCNC: 397 U/L — HIGH (ref 10–45)
ALT FLD-CCNC: 403 U/L — HIGH (ref 10–45)
ALT FLD-CCNC: 435 U/L — HIGH (ref 10–45)
ALT FLD-CCNC: 441 U/L — HIGH (ref 10–45)
ANION GAP SERPL CALC-SCNC: 13 MMOL/L — SIGNIFICANT CHANGE UP (ref 5–17)
ANION GAP SERPL CALC-SCNC: 14 MMOL/L — SIGNIFICANT CHANGE UP (ref 5–17)
ANION GAP SERPL CALC-SCNC: 15 MMOL/L — SIGNIFICANT CHANGE UP (ref 5–17)
ANION GAP SERPL CALC-SCNC: 16 MMOL/L — SIGNIFICANT CHANGE UP (ref 5–17)
APTT BLD: 27.7 SEC — SIGNIFICANT CHANGE UP (ref 26.1–36.8)
APTT BLD: 28.8 SEC — SIGNIFICANT CHANGE UP (ref 26.1–36.8)
APTT BLD: 30.9 SEC — SIGNIFICANT CHANGE UP (ref 26.1–36.8)
APTT BLD: 31 SEC — SIGNIFICANT CHANGE UP (ref 26.1–36.8)
AST SERPL-CCNC: 619 U/L — HIGH (ref 10–40)
AST SERPL-CCNC: 699 U/L — HIGH (ref 10–40)
AST SERPL-CCNC: 780 U/L — HIGH (ref 10–40)
AST SERPL-CCNC: 805 U/L — HIGH (ref 10–40)
BILIRUB SERPL-MCNC: 2 MG/DL — HIGH (ref 0.2–1.2)
BILIRUB SERPL-MCNC: 2.1 MG/DL — HIGH (ref 0.2–1.2)
BILIRUB SERPL-MCNC: 2.3 MG/DL — HIGH (ref 0.2–1.2)
BILIRUB SERPL-MCNC: 2.4 MG/DL — HIGH (ref 0.2–1.2)
BUN SERPL-MCNC: 26 MG/DL — HIGH (ref 7–23)
BUN SERPL-MCNC: 29 MG/DL — HIGH (ref 7–23)
BUN SERPL-MCNC: 33 MG/DL — HIGH (ref 7–23)
BUN SERPL-MCNC: 39 MG/DL — HIGH (ref 7–23)
CALCIUM SERPL-MCNC: 8 MG/DL — LOW (ref 8.4–10.5)
CALCIUM SERPL-MCNC: 8 MG/DL — LOW (ref 8.4–10.5)
CALCIUM SERPL-MCNC: 8.1 MG/DL — LOW (ref 8.4–10.5)
CALCIUM SERPL-MCNC: 8.4 MG/DL — SIGNIFICANT CHANGE UP (ref 8.4–10.5)
CHLORIDE SERPL-SCNC: 105 MMOL/L — SIGNIFICANT CHANGE UP (ref 96–108)
CHLORIDE SERPL-SCNC: 106 MMOL/L — SIGNIFICANT CHANGE UP (ref 96–108)
CHLORIDE SERPL-SCNC: 107 MMOL/L — SIGNIFICANT CHANGE UP (ref 96–108)
CHLORIDE SERPL-SCNC: 107 MMOL/L — SIGNIFICANT CHANGE UP (ref 96–108)
CO2 SERPL-SCNC: 21 MMOL/L — LOW (ref 22–31)
CO2 SERPL-SCNC: 24 MMOL/L — SIGNIFICANT CHANGE UP (ref 22–31)
CREAT SERPL-MCNC: 1.25 MG/DL — SIGNIFICANT CHANGE UP (ref 0.5–1.3)
CREAT SERPL-MCNC: 1.27 MG/DL — SIGNIFICANT CHANGE UP (ref 0.5–1.3)
CREAT SERPL-MCNC: 1.3 MG/DL — SIGNIFICANT CHANGE UP (ref 0.5–1.3)
CREAT SERPL-MCNC: 1.33 MG/DL — HIGH (ref 0.5–1.3)
EGFR: 43 ML/MIN/1.73M2 — LOW
EGFR: 43 ML/MIN/1.73M2 — LOW
EGFR: 44 ML/MIN/1.73M2 — LOW
EGFR: 44 ML/MIN/1.73M2 — LOW
EGFR: 45 ML/MIN/1.73M2 — LOW
EGFR: 45 ML/MIN/1.73M2 — LOW
EGFR: 46 ML/MIN/1.73M2 — LOW
EGFR: 46 ML/MIN/1.73M2 — LOW
GAS PNL BLDA: SIGNIFICANT CHANGE UP
GLUCOSE BLDC GLUCOMTR-MCNC: 110 MG/DL — HIGH (ref 70–99)
GLUCOSE BLDC GLUCOMTR-MCNC: 138 MG/DL — HIGH (ref 70–99)
GLUCOSE BLDC GLUCOMTR-MCNC: 146 MG/DL — HIGH (ref 70–99)
GLUCOSE BLDC GLUCOMTR-MCNC: 150 MG/DL — HIGH (ref 70–99)
GLUCOSE BLDC GLUCOMTR-MCNC: 154 MG/DL — HIGH (ref 70–99)
GLUCOSE BLDC GLUCOMTR-MCNC: 155 MG/DL — HIGH (ref 70–99)
GLUCOSE BLDC GLUCOMTR-MCNC: 156 MG/DL — HIGH (ref 70–99)
GLUCOSE BLDC GLUCOMTR-MCNC: 159 MG/DL — HIGH (ref 70–99)
GLUCOSE BLDC GLUCOMTR-MCNC: 160 MG/DL — HIGH (ref 70–99)
GLUCOSE BLDC GLUCOMTR-MCNC: 175 MG/DL — HIGH (ref 70–99)
GLUCOSE BLDC GLUCOMTR-MCNC: 182 MG/DL — HIGH (ref 70–99)
GLUCOSE BLDC GLUCOMTR-MCNC: 212 MG/DL — HIGH (ref 70–99)
GLUCOSE BLDC GLUCOMTR-MCNC: 215 MG/DL — HIGH (ref 70–99)
GLUCOSE BLDC GLUCOMTR-MCNC: 220 MG/DL — HIGH (ref 70–99)
GLUCOSE BLDC GLUCOMTR-MCNC: 269 MG/DL — HIGH (ref 70–99)
GLUCOSE BLDC GLUCOMTR-MCNC: 282 MG/DL — HIGH (ref 70–99)
GLUCOSE BLDC GLUCOMTR-MCNC: 314 MG/DL — HIGH (ref 70–99)
GLUCOSE BLDC GLUCOMTR-MCNC: 331 MG/DL — HIGH (ref 70–99)
GLUCOSE SERPL-MCNC: 104 MG/DL — HIGH (ref 70–99)
GLUCOSE SERPL-MCNC: 151 MG/DL — HIGH (ref 70–99)
GLUCOSE SERPL-MCNC: 153 MG/DL — HIGH (ref 70–99)
GLUCOSE SERPL-MCNC: 336 MG/DL — HIGH (ref 70–99)
HCT VFR BLD CALC: 20.8 % — CRITICAL LOW (ref 34.5–45)
HCT VFR BLD CALC: 21.2 % — LOW (ref 34.5–45)
HCT VFR BLD CALC: 23.4 % — LOW (ref 34.5–45)
HCT VFR BLD CALC: 25.7 % — LOW (ref 34.5–45)
HGB BLD-MCNC: 7.3 G/DL — LOW (ref 11.5–15.5)
HGB BLD-MCNC: 7.5 G/DL — LOW (ref 11.5–15.5)
HGB BLD-MCNC: 7.9 G/DL — LOW (ref 11.5–15.5)
HGB BLD-MCNC: 8.8 G/DL — LOW (ref 11.5–15.5)
INR BLD: 1.19 RATIO — HIGH (ref 0.85–1.16)
INR BLD: 1.21 RATIO — HIGH (ref 0.85–1.16)
INR BLD: 1.27 RATIO — HIGH (ref 0.85–1.16)
INR BLD: 1.28 RATIO — HIGH (ref 0.85–1.16)
MAGNESIUM SERPL-MCNC: 2.4 MG/DL — SIGNIFICANT CHANGE UP (ref 1.6–2.6)
MAGNESIUM SERPL-MCNC: 2.5 MG/DL — SIGNIFICANT CHANGE UP (ref 1.6–2.6)
MCHC RBC-ENTMCNC: 28.7 PG — SIGNIFICANT CHANGE UP (ref 27–34)
MCHC RBC-ENTMCNC: 29.2 PG — SIGNIFICANT CHANGE UP (ref 27–34)
MCHC RBC-ENTMCNC: 29.2 PG — SIGNIFICANT CHANGE UP (ref 27–34)
MCHC RBC-ENTMCNC: 30.4 PG — SIGNIFICANT CHANGE UP (ref 27–34)
MCHC RBC-ENTMCNC: 33.8 G/DL — SIGNIFICANT CHANGE UP (ref 32–36)
MCHC RBC-ENTMCNC: 34.2 G/DL — SIGNIFICANT CHANGE UP (ref 32–36)
MCHC RBC-ENTMCNC: 34.4 G/DL — SIGNIFICANT CHANGE UP (ref 32–36)
MCHC RBC-ENTMCNC: 36.1 G/DL — HIGH (ref 32–36)
MCV RBC AUTO: 84.2 FL — SIGNIFICANT CHANGE UP (ref 80–100)
MCV RBC AUTO: 84.8 FL — SIGNIFICANT CHANGE UP (ref 80–100)
MCV RBC AUTO: 85.1 FL — SIGNIFICANT CHANGE UP (ref 80–100)
MCV RBC AUTO: 85.4 FL — SIGNIFICANT CHANGE UP (ref 80–100)
NRBC BLD AUTO-RTO: 0 /100 WBCS — SIGNIFICANT CHANGE UP (ref 0–0)
PHOSPHATE SERPL-MCNC: 3.9 MG/DL — SIGNIFICANT CHANGE UP (ref 2.5–4.5)
PHOSPHATE SERPL-MCNC: 4.1 MG/DL — SIGNIFICANT CHANGE UP (ref 2.5–4.5)
PHOSPHATE SERPL-MCNC: 4.3 MG/DL — SIGNIFICANT CHANGE UP (ref 2.5–4.5)
PHOSPHATE SERPL-MCNC: 4.4 MG/DL — SIGNIFICANT CHANGE UP (ref 2.5–4.5)
PLATELET # BLD AUTO: 44 K/UL — LOW (ref 150–400)
PLATELET # BLD AUTO: 46 K/UL — LOW (ref 150–400)
PLATELET # BLD AUTO: 55 K/UL — LOW (ref 150–400)
PLATELET # BLD AUTO: 63 K/UL — LOW (ref 150–400)
POTASSIUM SERPL-MCNC: 3.7 MMOL/L — SIGNIFICANT CHANGE UP (ref 3.5–5.3)
POTASSIUM SERPL-MCNC: 3.7 MMOL/L — SIGNIFICANT CHANGE UP (ref 3.5–5.3)
POTASSIUM SERPL-MCNC: 4.1 MMOL/L — SIGNIFICANT CHANGE UP (ref 3.5–5.3)
POTASSIUM SERPL-MCNC: 4.3 MMOL/L — SIGNIFICANT CHANGE UP (ref 3.5–5.3)
POTASSIUM SERPL-SCNC: 3.7 MMOL/L — SIGNIFICANT CHANGE UP (ref 3.5–5.3)
POTASSIUM SERPL-SCNC: 3.7 MMOL/L — SIGNIFICANT CHANGE UP (ref 3.5–5.3)
POTASSIUM SERPL-SCNC: 4.1 MMOL/L — SIGNIFICANT CHANGE UP (ref 3.5–5.3)
POTASSIUM SERPL-SCNC: 4.3 MMOL/L — SIGNIFICANT CHANGE UP (ref 3.5–5.3)
PROT SERPL-MCNC: 4.4 G/DL — LOW (ref 6–8.3)
PROT SERPL-MCNC: 4.6 G/DL — LOW (ref 6–8.3)
PROT SERPL-MCNC: 4.8 G/DL — LOW (ref 6–8.3)
PROT SERPL-MCNC: 4.9 G/DL — LOW (ref 6–8.3)
PROTHROM AB SERPL-ACNC: 13.7 SEC — HIGH (ref 9.9–13.4)
PROTHROM AB SERPL-ACNC: 13.9 SEC — HIGH (ref 9.9–13.4)
PROTHROM AB SERPL-ACNC: 14.6 SEC — HIGH (ref 9.9–13.4)
PROTHROM AB SERPL-ACNC: 14.7 SEC — HIGH (ref 9.9–13.4)
RBC # BLD: 2.47 M/UL — LOW (ref 3.8–5.2)
RBC # BLD: 2.5 M/UL — LOW (ref 3.8–5.2)
RBC # BLD: 2.75 M/UL — LOW (ref 3.8–5.2)
RBC # BLD: 3.01 M/UL — LOW (ref 3.8–5.2)
RBC # FLD: 14.8 % — HIGH (ref 10.3–14.5)
RBC # FLD: 15.1 % — HIGH (ref 10.3–14.5)
RBC # FLD: 15.4 % — HIGH (ref 10.3–14.5)
RBC # FLD: 15.5 % — HIGH (ref 10.3–14.5)
SODIUM SERPL-SCNC: 142 MMOL/L — SIGNIFICANT CHANGE UP (ref 135–145)
SODIUM SERPL-SCNC: 144 MMOL/L — SIGNIFICANT CHANGE UP (ref 135–145)
SODIUM SERPL-SCNC: 144 MMOL/L — SIGNIFICANT CHANGE UP (ref 135–145)
SODIUM SERPL-SCNC: 146 MMOL/L — HIGH (ref 135–145)
VANCOMYCIN FLD-MCNC: <4 UG/ML — SIGNIFICANT CHANGE UP
WBC # BLD: 5.95 K/UL — SIGNIFICANT CHANGE UP (ref 3.8–10.5)
WBC # BLD: 7.06 K/UL — SIGNIFICANT CHANGE UP (ref 3.8–10.5)
WBC # BLD: 8.85 K/UL — SIGNIFICANT CHANGE UP (ref 3.8–10.5)
WBC # BLD: 9.05 K/UL — SIGNIFICANT CHANGE UP (ref 3.8–10.5)
WBC # FLD AUTO: 5.95 K/UL — SIGNIFICANT CHANGE UP (ref 3.8–10.5)
WBC # FLD AUTO: 7.06 K/UL — SIGNIFICANT CHANGE UP (ref 3.8–10.5)
WBC # FLD AUTO: 8.85 K/UL — SIGNIFICANT CHANGE UP (ref 3.8–10.5)
WBC # FLD AUTO: 9.05 K/UL — SIGNIFICANT CHANGE UP (ref 3.8–10.5)

## 2025-06-01 PROCEDURE — 76705 ECHO EXAM OF ABDOMEN: CPT | Mod: 26,59

## 2025-06-01 PROCEDURE — 99232 SBSQ HOSP IP/OBS MODERATE 35: CPT

## 2025-06-01 PROCEDURE — ZZZZZ: CPT

## 2025-06-01 PROCEDURE — 93975 VASCULAR STUDY: CPT | Mod: 26

## 2025-06-01 PROCEDURE — 71045 X-RAY EXAM CHEST 1 VIEW: CPT | Mod: 26

## 2025-06-01 RX ORDER — BASILIXIMAB 20 MG/5ML
20 INJECTION, POWDER, FOR SOLUTION INTRAVENOUS ONCE
Refills: 0 | Status: COMPLETED | OUTPATIENT
Start: 2025-06-04 | End: 2025-06-04

## 2025-06-01 RX ORDER — INSULIN LISPRO 100 U/ML
INJECTION, SOLUTION INTRAVENOUS; SUBCUTANEOUS EVERY 4 HOURS
Refills: 0 | Status: DISCONTINUED | OUTPATIENT
Start: 2025-06-01 | End: 2025-06-01

## 2025-06-01 RX ORDER — ACETAMINOPHEN 500 MG/5ML
1000 LIQUID (ML) ORAL ONCE
Refills: 0 | Status: COMPLETED | OUTPATIENT
Start: 2025-06-01 | End: 2025-06-01

## 2025-06-01 RX ORDER — HYDROMORPHONE/SOD CHLOR,ISO/PF 2 MG/10 ML
0.5 SYRINGE (ML) INJECTION ONCE
Refills: 0 | Status: DISCONTINUED | OUTPATIENT
Start: 2025-06-01 | End: 2025-06-01

## 2025-06-01 RX ORDER — PREDNISONE 20 MG/1
20 TABLET ORAL DAILY
Refills: 0 | Status: DISCONTINUED | OUTPATIENT
Start: 2025-06-07 | End: 2025-06-12

## 2025-06-01 RX ORDER — OXYCODONE HYDROCHLORIDE 30 MG/1
5 TABLET ORAL EVERY 4 HOURS
Refills: 0 | Status: DISCONTINUED | OUTPATIENT
Start: 2025-06-01 | End: 2025-06-01

## 2025-06-01 RX ORDER — CALCIUM GLUCONATE 20 MG/ML
2 INJECTION, SOLUTION INTRAVENOUS ONCE
Refills: 0 | Status: COMPLETED | OUTPATIENT
Start: 2025-06-01 | End: 2025-06-01

## 2025-06-01 RX ORDER — INSULIN GLARGINE-YFGN 100 [IU]/ML
15 INJECTION, SOLUTION SUBCUTANEOUS AT BEDTIME
Refills: 0 | Status: DISCONTINUED | OUTPATIENT
Start: 2025-06-01 | End: 2025-06-02

## 2025-06-01 RX ORDER — PIPERACILLIN-TAZO-DEXTROSE,ISO 3.375G/5
3.38 IV SOLUTION, PIGGYBACK PREMIX FROZEN(ML) INTRAVENOUS EVERY 8 HOURS
Refills: 0 | Status: DISCONTINUED | OUTPATIENT
Start: 2025-06-01 | End: 2025-06-02

## 2025-06-01 RX ORDER — CALCIUM CARBONATE/VITAMIN D3 500MG-5MCG
1 TABLET ORAL
Refills: 0 | Status: DISCONTINUED | OUTPATIENT
Start: 2025-06-03 | End: 2025-06-12

## 2025-06-01 RX ORDER — INSULIN LISPRO 100 U/ML
INJECTION, SOLUTION INTRAVENOUS; SUBCUTANEOUS EVERY 4 HOURS
Refills: 0 | Status: DISCONTINUED | OUTPATIENT
Start: 2025-06-01 | End: 2025-06-02

## 2025-06-01 RX ORDER — TACROLIMUS 0.5 MG/1
0.5 CAPSULE ORAL ONCE
Refills: 0 | Status: COMPLETED | OUTPATIENT
Start: 2025-06-01 | End: 2025-06-01

## 2025-06-01 RX ORDER — TACROLIMUS 0.5 MG/1
0.5 CAPSULE ORAL
Refills: 0 | Status: DISCONTINUED | OUTPATIENT
Start: 2025-06-01 | End: 2025-06-03

## 2025-06-01 RX ORDER — HYDROMORPHONE/SOD CHLOR,ISO/PF 2 MG/10 ML
0.5 SYRINGE (ML) INJECTION
Refills: 0 | Status: DISCONTINUED | OUTPATIENT
Start: 2025-06-01 | End: 2025-06-03

## 2025-06-01 RX ORDER — MELATONIN 5 MG
5 TABLET ORAL AT BEDTIME
Refills: 0 | Status: DISCONTINUED | OUTPATIENT
Start: 2025-06-01 | End: 2025-06-12

## 2025-06-01 RX ORDER — OXYCODONE HYDROCHLORIDE 30 MG/1
10 TABLET ORAL EVERY 4 HOURS
Refills: 0 | Status: DISCONTINUED | OUTPATIENT
Start: 2025-06-01 | End: 2025-06-01

## 2025-06-01 RX ORDER — OXYCODONE HYDROCHLORIDE 30 MG/1
10 TABLET ORAL EVERY 4 HOURS
Refills: 0 | Status: DISCONTINUED | OUTPATIENT
Start: 2025-06-01 | End: 2025-06-03

## 2025-06-01 RX ORDER — OXYCODONE HYDROCHLORIDE 30 MG/1
5 TABLET ORAL EVERY 4 HOURS
Refills: 0 | Status: DISCONTINUED | OUTPATIENT
Start: 2025-06-01 | End: 2025-06-03

## 2025-06-01 RX ADMIN — CALCIUM GLUCONATE 200 GRAM(S): 20 INJECTION, SOLUTION INTRAVENOUS at 11:37

## 2025-06-01 RX ADMIN — Medication 400 MILLIGRAM(S): at 23:26

## 2025-06-01 RX ADMIN — Medication 0.5 MILLIGRAM(S): at 01:26

## 2025-06-01 RX ADMIN — Medication 0.5 MILLIGRAM(S): at 05:00

## 2025-06-01 RX ADMIN — INSULIN LISPRO 4: 100 INJECTION, SOLUTION INTRAVENOUS; SUBCUTANEOUS at 22:11

## 2025-06-01 RX ADMIN — Medication 0.5 MILLIGRAM(S): at 00:27

## 2025-06-01 RX ADMIN — Medication 25 GRAM(S): at 17:05

## 2025-06-01 RX ADMIN — OXYCODONE HYDROCHLORIDE 5 MILLIGRAM(S): 30 TABLET ORAL at 07:45

## 2025-06-01 RX ADMIN — VALGANCICLOVIR 450 MILLIGRAM(S): 450 TABLET, FILM COATED ORAL at 11:34

## 2025-06-01 RX ADMIN — Medication 0.5 MILLIGRAM(S): at 01:41

## 2025-06-01 RX ADMIN — TACROLIMUS 0.5 MILLIGRAM(S): 0.5 CAPSULE ORAL at 19:46

## 2025-06-01 RX ADMIN — OXYCODONE HYDROCHLORIDE 10 MILLIGRAM(S): 30 TABLET ORAL at 11:31

## 2025-06-01 RX ADMIN — Medication 0.5 MILLIGRAM(S): at 03:27

## 2025-06-01 RX ADMIN — OXYCODONE HYDROCHLORIDE 10 MILLIGRAM(S): 30 TABLET ORAL at 12:31

## 2025-06-01 RX ADMIN — Medication 100 MILLIGRAM(S): at 05:11

## 2025-06-01 RX ADMIN — Medication 2 TABLET(S): at 05:12

## 2025-06-01 RX ADMIN — Medication 0.5 MILLIGRAM(S): at 00:42

## 2025-06-01 RX ADMIN — MYCOPHENOLATE MOFETIL 1000 MILLIGRAM(S): 500 TABLET, FILM COATED ORAL at 08:48

## 2025-06-01 RX ADMIN — Medication 1 DOSE(S): at 17:07

## 2025-06-01 RX ADMIN — Medication 1000 MILLIGRAM(S): at 23:56

## 2025-06-01 RX ADMIN — TACROLIMUS 0.5 MILLIGRAM(S): 0.5 CAPSULE ORAL at 11:57

## 2025-06-01 RX ADMIN — OXYCODONE HYDROCHLORIDE 5 MILLIGRAM(S): 30 TABLET ORAL at 21:08

## 2025-06-01 RX ADMIN — Medication 0.5 MILLIGRAM(S): at 14:59

## 2025-06-01 RX ADMIN — Medication 10 MILLILITER(S): at 11:31

## 2025-06-01 RX ADMIN — Medication 0.5 MILLIGRAM(S): at 13:59

## 2025-06-01 RX ADMIN — Medication 100 MILLIEQUIVALENT(S): at 04:18

## 2025-06-01 RX ADMIN — INSULIN LISPRO 8: 100 INJECTION, SOLUTION INTRAVENOUS; SUBCUTANEOUS at 17:17

## 2025-06-01 RX ADMIN — INSULIN LISPRO 8: 100 INJECTION, SOLUTION INTRAVENOUS; SUBCUTANEOUS at 14:01

## 2025-06-01 RX ADMIN — Medication 100 MILLIEQUIVALENT(S): at 05:11

## 2025-06-01 RX ADMIN — Medication 0.5 MILLIGRAM(S): at 06:23

## 2025-06-01 RX ADMIN — Medication 0.5 MILLIGRAM(S): at 20:16

## 2025-06-01 RX ADMIN — OXYCODONE HYDROCHLORIDE 10 MILLIGRAM(S): 30 TABLET ORAL at 05:32

## 2025-06-01 RX ADMIN — Medication 2 TABLET(S): at 17:05

## 2025-06-01 RX ADMIN — MYCOPHENOLATE MOFETIL 1000 MILLIGRAM(S): 500 TABLET, FILM COATED ORAL at 19:47

## 2025-06-01 RX ADMIN — POLYETHYLENE GLYCOL 3350 17 GRAM(S): 17 POWDER, FOR SOLUTION ORAL at 22:00

## 2025-06-01 RX ADMIN — Medication 25 GRAM(S): at 08:47

## 2025-06-01 RX ADMIN — Medication 0.5 MILLIGRAM(S): at 19:46

## 2025-06-01 RX ADMIN — Medication 0.5 MILLIGRAM(S): at 03:42

## 2025-06-01 RX ADMIN — ENTECAVIR 0.5 MILLIGRAM(S): 0.5 TABLET ORAL at 11:33

## 2025-06-01 RX ADMIN — SODIUM CHLORIDE 125 MILLILITER(S): 9 INJECTION, SOLUTION INTRAVENOUS at 11:38

## 2025-06-01 RX ADMIN — OXYCODONE HYDROCHLORIDE 10 MILLIGRAM(S): 30 TABLET ORAL at 23:26

## 2025-06-01 RX ADMIN — SODIUM CHLORIDE 125 MILLILITER(S): 9 INJECTION, SOLUTION INTRAVENOUS at 19:21

## 2025-06-01 RX ADMIN — INSULIN GLARGINE-YFGN 15 UNIT(S): 100 INJECTION, SOLUTION SUBCUTANEOUS at 19:22

## 2025-06-01 RX ADMIN — Medication 0.5 MILLIGRAM(S): at 06:47

## 2025-06-01 RX ADMIN — Medication 0.5 MILLIGRAM(S): at 04:45

## 2025-06-01 RX ADMIN — OXYCODONE HYDROCHLORIDE 10 MILLIGRAM(S): 30 TABLET ORAL at 23:56

## 2025-06-01 RX ADMIN — Medication 40 MILLIGRAM(S): at 11:33

## 2025-06-01 RX ADMIN — OXYCODONE HYDROCHLORIDE 10 MILLIGRAM(S): 30 TABLET ORAL at 17:23

## 2025-06-01 RX ADMIN — OXYCODONE HYDROCHLORIDE 10 MILLIGRAM(S): 30 TABLET ORAL at 05:17

## 2025-06-01 RX ADMIN — OXYCODONE HYDROCHLORIDE 5 MILLIGRAM(S): 30 TABLET ORAL at 06:45

## 2025-06-01 RX ADMIN — METHYLPREDNISOLONE ACETATE 100 MILLIGRAM(S): 80 INJECTION, SUSPENSION INTRA-ARTICULAR; INTRALESIONAL; INTRAMUSCULAR; SOFT TISSUE at 22:15

## 2025-06-01 RX ADMIN — Medication 1 APPLICATION(S): at 05:11

## 2025-06-01 RX ADMIN — Medication 5 MILLIGRAM(S): at 22:00

## 2025-06-01 RX ADMIN — OXYCODONE HYDROCHLORIDE 10 MILLIGRAM(S): 30 TABLET ORAL at 16:53

## 2025-06-01 RX ADMIN — OXYCODONE HYDROCHLORIDE 5 MILLIGRAM(S): 30 TABLET ORAL at 20:38

## 2025-06-01 NOTE — OCCUPATIONAL THERAPY INITIAL EVALUATION ADULT - GENERAL OBSERVATIONS, REHAB EVAL
Pt received in chair at bedside with +A line, +Perkins, +NC, +cardiac monitor, +PIV, +spouse present.

## 2025-06-01 NOTE — OCCUPATIONAL THERAPY INITIAL EVALUATION ADULT - PERTINENT HX OF CURRENT PROBLEM, REHAB EVAL
CXR negative - No infiltrates, No consolidation, No atelectasis seen 70 F hx NAFLD cirrhosis and HCC (listed for liver transplant with MELD 20B), UGIB in 2022, chronic back pain (2/2 spinal fracture) presented from [facility] where she was admitted on 5/26 for coffee ground emesis and melena, EGD showed no active bleeding, transferred to [facility] now s/p OLT on 5/31/2025

## 2025-06-01 NOTE — PHYSICAL THERAPY INITIAL EVALUATION ADULT - PHYSICAL ASSIST/NONPHYSICAL ASSIST: SIT/STAND, REHAB EVAL
Patient resting without distress, calm and cooperative  Patient verbalized some anxiety about calling work, provided with phone and reported resolution of anxiety  1:1 maintained for safety  Stretcher locked in lowest position       King Angela RN  01/05/22 0729
supervision/verbal cues/nonverbal cues (demo/gestures)
verbal cues/nonverbal cues (demo/gestures)/1 person assist

## 2025-06-01 NOTE — PHYSICAL THERAPY INITIAL EVALUATION ADULT - TRANSFER TRAINING, PT EVAL
GOAL: Patient will perform sit to stand transfers independently at rolling walker with proper hand placement
GOAL: pt will perform sit<>stand independently with RW in 2 weeks

## 2025-06-01 NOTE — PROGRESS NOTE ADULT - SUBJECTIVE AND OBJECTIVE BOX
24 HOUR EVENTS:  - OR for OLT  - extubated in SICU    NEURO  Exam: A&O X4  Meds: HYDROmorphone  Injectable 0.5 milliGRAM(s) IV Push every 3 hours PRN Severe Pain (7 - 10)      RESPIRATORY  RR: 18 (05-31-25 @ 23:00) (12 - 18)  SpO2: 98% (05-31-25 @ 23:00) (94% - 100%)  Wt(kg): --  Exam: nonlabored breathing on NC  ABG - ( 31 May 2025 20:07 )  pH: 7.45  /  pCO2: 41    /  pO2: 88    / HCO3: 28    / Base Excess: 4.1   /  SaO2: 98.5    Lactate: x                Meds: fluticasone propionate/ salmeterol 100-50 MICROgram(s) Diskus 1 Dose(s) Inhalation two times a day      CARDIOVASCULAR  HR: 62 (05-31-25 @ 23:00) (58 - 80)  BP: 114/56 (05-31-25 @ 19:00) (114/56 - 142/71)  BP(mean): 80 (05-31-25 @ 19:00) (80 - 82)  ABP: 139/44 (05-31-25 @ 23:00) (119/43 - 170/54)  ABP(mean): 73 (05-31-25 @ 23:00) (65 - 88)  Wt(kg): --  CVP(cm H2O): --      Exam: hemodynamically stable, normotensive not on pressors  Cardiac Rhythm: NSR      GI/NUTRITION  Exam: soft, appropriately TTP, mildly distended. Dressing C/D/I. NASREEN drains x3, #1 and 3 sanguinous, #2 ss  Diet: NPO  Meds: pantoprazole  Injectable 40 milliGRAM(s) IV Push daily  polyethylene glycol 3350 17 Gram(s) Oral every 24 hours  senna 2 Tablet(s) Oral two times a day      GENITOURINARY  I&O's Detail    05-30 @ 07:01  -  05-31 @ 07:00  --------------------------------------------------------  IN:    Oral Fluid: 200 mL  Total IN: 200 mL    OUT:    Voided (mL): 1100 mL  Total OUT: 1100 mL    Total NET: -900 mL      05-31 @ 07:01  -  06-01 @ 00:38  --------------------------------------------------------  IN:    Albumin 5%  - 250 mL: 500 mL    Dexmedetomidine: 19.4 mL    Enteral Tube Flush: 30 mL    Insulin: 38 mL    IV PiggyBack: 50 mL    IV PiggyBack: 125 mL    multiple electrolytes Injection Type 1.: 1250 mL  Total IN: 2012.4 mL    OUT:    Drain (mL): 110 mL    Drain (mL): 270 mL    Drain (mL): 335 mL    Indwelling Catheter - Urethral (mL): 505 mL    Nasogastric/Oral tube (mL): 150 mL  Total OUT: 1370 mL    Total NET: 642.4 mL          05-31    148[H]  |  108  |  24[H]  ----------------------------<  158[H]  4.0   |  24  |  1.22    Ca    8.4      31 May 2025 20:31  Phos  4.1     05-31  Mg     2.4     05-31    TPro  4.4[L]  /  Alb  3.3  /  TBili  3.8[H]  /  DBili  x   /  AST  923[H]  /  ALT  402[H]  /  AlkPhos  55  05-31    Meds: multiple electrolytes Injection Type 1 1000 milliLiter(s) IV Continuous <Continuous>      HEMATOLOGIC  Meds:                         7.5    5.22  )-----------( 37       ( 31 May 2025 20:31 )             21.1     PT/INR - ( 31 May 2025 20:31 )   PT: 14.6 sec;   INR: 1.27 ratio         PTT - ( 31 May 2025 20:31 )  PTT:31.6 sec    INFECTIOUS DISEASES  T(C): 36.5 (05-31-25 @ 23:00), Max: 38 (05-31-25 @ 19:00)  Wt(kg): --  WBC Count: 5.22 K/uL (05-31 @ 20:31)  WBC Count: 4.88 K/uL (05-31 @ 16:38)  WBC Count: 4.93 K/uL (05-31 @ 12:45)    Recent Cultures:  Specimen Source: Body Fluid Ascites, 05-31 @ 14:10; Results --; Gram Stain:   No polymorphonuclear cells seen  No organisms seen  by cytocentrifuge; Organism: --  Specimen Source: Clean Catch Clean Catch (Midstream), 05-28 @ 10:20; Results   10,000 - 49,000 CFU/mL Enterococcus faecium[!]; Gram Stain: --; Organism: Enterococcus faecium[!]  Specimen Source: Blood Blood-Peripheral, 05-28 @ 09:40; Results   No growth at 72 Hours; Gram Stain: --; Organism: --  Specimen Source: Blood Blood-Peripheral, 05-28 @ 09:20; Results   No growth at 72 Hours; Gram Stain: --; Organism: --    Meds: entecavir 0.5 milliGRAM(s) Oral daily  fluconAZOLE IVPB 400 milliGRAM(s) IV Intermittent every 24 hours  mycophenolate mofetil Suspension 1000 milliGRAM(s) Oral <User Schedule>  piperacillin/tazobactam IVPB.. 3.375 Gram(s) IV Intermittent every 8 hours  trimethoprim  40 mG/sulfamethoxazole 200 mG Suspension 10 milliLiter(s) Oral daily  valGANciclovir 50 mG/mL Oral Solution 450 milliGRAM(s) Oral daily      ENDOCRINE  Capillary Blood Glucose    Meds: insulin regular Infusion 2 Unit(s)/Hr IV Continuous <Continuous>  methylPREDNISolone sodium succinate IVPB   IV Intermittent   methylPREDNISolone sodium succinate IVPB 250 milliGRAM(s) IV Intermittent every 24 hours      ACCESS DEVICES:  [ ] Peripheral IV  [x] Central Venous Line		[x] R	[x] L	[ ] IJ	[ ] Fem	[ ] SC	Placed:   [x] Arterial Line			[ ] R	[x] L	[ ] Fem	[x] Rad	[ ] Ax	Placed:   [ ] PICC:					[ ] Mediport  [x] Urinary Catheter, Date Placed:     OTHER MEDICATIONS:  chlorhexidine 2% Cloths 1 Application(s) Topical <User Schedule>      IMAGING:

## 2025-06-01 NOTE — OCCUPATIONAL THERAPY INITIAL EVALUATION ADULT - ADL RETRAINING, OT EVAL
Pt will perform toileting with independence within 2 weeks. Pt will perform LB dressing with independence within 2 weeks.

## 2025-06-01 NOTE — PHYSICAL THERAPY INITIAL EVALUATION ADULT - PLANNED THERAPY INTERVENTIONS, PT EVAL
Stair training... GOAL: In 2 weeks pt will negotiate 1 flight of stairs independently with least restrictive device./balance training/bed mobility training/gait training/strengthening/transfer training
GOAL: Patient will be able to negotiate 6 steps in 2 weeks/gait training/transfer training

## 2025-06-01 NOTE — PROGRESS NOTE ADULT - SUBJECTIVE AND OBJECTIVE BOX
Transplant Surgery -  Multidisciplinary Rounds  --------------------------------------------------------------   Donor OLTx      Date: 2025      POD# 1    Patient seen and examined with Surgeon Dr. Caicedo, Hepatologist Dr. Gaona, GUDELIA John/J Luis, and floor RN. Disciplines not in attendance will be notified of the plan      70 F hx NAFLD cirrhosis and HCC (listed for liver transplant with MELD 20B), UGIB in , chronic back pain (2/2 spinal fracture) presented from [facility] where she was admitted on  for coffee ground emesis and melena, EGD showed no active bleeding, transferred to [facility] now s/p OLT on 2025     Interval Events:  - s/p OLT  - LFTs downtrending appropriately  - entecavir started for donor + hep b  - extubated post OR  - doppler w/ patent vasculature     Immunosuppression:  FK TBD, MMF , SST Simulect POD 4     Potential Discharge date: tbd     Education:  Medications    Plan of care:  See Below      tx data here      Review of systems  Gen: No weight changes, fatigue, fevers/chills, weakness  Skin: No rashes  Head/Eyes/Ears/Mouth: No headache; Normal hearing; Normal vision w/o blurriness; No sinus pain/discomfort, sore throat  Respiratory: No dyspnea, cough, wheezing, hemoptysis  CV: No chest pain, PND, orthopnea  GI: C/O mild abdominal pain at surgical site,   : No increased frequency, dysuria, hematuria, nocturia  MSK: No joint pain/swelling; no back pain; no edema  Neuro: No dizziness/lightheadedness, weakness, seizures, numbness, tingling  Heme: No easy bruising or bleeding  Endo: No heat/cold intolerance  Psych: No significant nervousness, anxiety, stress, depression  All other systems were reviewed and are negative, except as noted.        PHYSICAL EXAM:  Constitutional:  intubated/sedated   Eyes: PERRLA  ENMT: nc/at, no thrush  Neck: supple, central line  Respiratory: CTA B/L  Cardiovascular: RRR  Gastrointestinal: Soft abdomen, ND, appropriate incisional TTP. chevron incision c/d/i, staples in place. No signs of infection.  JPsx3 bloody   Genitourinary: Urinary catheter in place  Extremities: SCD's in place and working bilaterally  Vascular: Palpable dp pulses bilaterally.   Neurological:  intubated/sedated   Skin: no rashes, ulcerations, lesions  Musculoskeletal:  intubated/sedated   Psychiatric: intubated/sedated      Transplant Surgery -  Multidisciplinary Progress Note  --------------------------------------------------------------  OLTx      Date: 2025      POD# 1    Patient seen and examined with Surgeon Dr. Caicedo, Hepatologist Dr. Gaona, GUDELIA John/J Luis, and SICU team. Disciplines not in attendance will be notified of the plan      70 F hx NAFLD cirrhosis and HCC (listed for liver transplant with MELD 20B), UGIB in , chronic back pain (2/2 spinal fracture) presented from [facility] where she was admitted on  for coffee ground emesis and melena, EGD showed no active bleeding, transferred to [facility] now s/p OLT on 2025     Interval Events:  - s/p OLT  - LFTs downtrending appropriately  - entecavir started for donor + hep b  - extubated post OR  - doppler w/ patent vasculature   - OOBTC    Immunosuppression:  FK to start today, MMF , SST, Simulect POD 4   Ongoing monitoring for signs of rejection    Potential Discharge date: tbd     Education:  Medications    Plan of care:  See Below      MEDICATIONS  (STANDING):  chlorhexidine 2% Cloths 1 Application(s) Topical <User Schedule>  entecavir 0.5 milliGRAM(s) Oral daily  fluconAZOLE IVPB 400 milliGRAM(s) IV Intermittent every 24 hours  fluticasone propionate/ salmeterol 100-50 MICROgram(s) Diskus 1 Dose(s) Inhalation two times a day  insulin lispro (ADMELOG) corrective regimen sliding scale   SubCutaneous every 4 hours  methylPREDNISolone sodium succinate IVPB 250 milliGRAM(s) IV Intermittent every 24 hours  methylPREDNISolone sodium succinate IVPB   IV Intermittent   multiple electrolytes Injection Type 1 1000 milliLiter(s) (125 mL/Hr) IV Continuous <Continuous>  mycophenolate mofetil Suspension 1000 milliGRAM(s) Oral <User Schedule>  pantoprazole  Injectable 40 milliGRAM(s) IV Push daily  piperacillin/tazobactam IVPB.. 3.375 Gram(s) IV Intermittent every 8 hours  polyethylene glycol 3350 17 Gram(s) Oral every 24 hours  senna 2 Tablet(s) Oral two times a day  tacrolimus 0.5 milliGRAM(s) Oral <User Schedule>  tacrolimus    0.5 mG/mL Suspension 0.5 milliGRAM(s) Oral once  trimethoprim  40 mG/sulfamethoxazole 200 mG Suspension 10 milliLiter(s) Oral daily  valGANciclovir 50 mG/mL Oral Solution 450 milliGRAM(s) Oral daily    MEDICATIONS  (PRN):  HYDROmorphone  Injectable 0.5 milliGRAM(s) IV Push every 3 hours PRN breakthrough pain  oxyCODONE    Solution 10 milliGRAM(s) Oral every 4 hours PRN Severe Pain (7 - 10)  oxyCODONE    Solution 5 milliGRAM(s) Oral every 4 hours PRN Moderate Pain (4 - 6)      PAST MEDICAL & SURGICAL HISTORY:  HCC (hepatocellular carcinoma)      DM (diabetes mellitus)      HTN (hypertension)      HLD (hyperlipidemia)      Hepatic cirrhosis      IVEY (nonalcoholic steatohepatitis)      Former smoker      Ascites      Hepatic encephalopathy      History of cervical cancer      H/O  section      History of cholecystectomy      H/O carpal tunnel repair      H/O cataract extraction          Vital Signs Last 24 Hrs  T(C): 37.3 (2025 07:00), Max: 38 (31 May 2025 19:00)  T(F): 99.1 (2025 07:00), Max: 100.4 (31 May 2025 19:00)  HR: 61 (2025 11:00) (57 - 80)  BP: 173/76 (2025 10:00) (114/56 - 184/76)  BP(mean): 109 (2025 10:00) (80 - 109)  RR: 17 (2025 11:00) (10 - 24)  SpO2: 93% (2025 11:00) (91% - 100%)    Parameters below as of 2025 03:00  Patient On (Oxygen Delivery Method): nasal cannula    O2 Concentration (%): 2    I&O's Summary    31 May 2025 07:01  -  2025 07:00  --------------------------------------------------------  IN: 3504.9 mL / OUT: 2140 mL / NET: 1364.9 mL    2025 07:01  -  2025 11:47  --------------------------------------------------------  IN: 606 mL / OUT: 200 mL / NET: 406 mL                              7.9    9.05  )-----------( 63       ( 2025 09:33 )             23.4     06-    144  |  106  |  33[H]  ----------------------------<  151[H]  4.3   |  24  |  1.30    Ca    8.0[L]      2025 09:33  Phos  4.4     06-  Mg     2.5     -    TPro  4.8[L]  /  Alb  3.4  /  TBili  2.0[H]  /  DBili  x   /  AST  699[H]  /  ALT  441[H]  /  AlkPhos  68  06-01          Culture - Acid Fast - Body Fluid w/Smear (collected 25 @ 14:10)  Source: Body Fluid    Culture - Body Fluid with Gram Stain (collected 25 @ 14:10)  Source: Body Fluid Ascites  Gram Stain (25 @ 17:32):    No polymorphonuclear cells seen    No organisms seen    by cytocentrifuge    Culture - Fungal, Body Fluid (collected 25 @ 14:10)  Source: Body Fluid Ascites  Preliminary Report (25 @ 10:52):    Testing in progress    Culture - Urine (collected 25 @ 10:20)  Source: Clean Catch Clean Catch (Midstream)  Final Report (25 @ 11:58):    10,000 - 49,000 CFU/mL Enterococcus faecium  Organism: Enterococcus faecium (25 @ 11:58)  Organism: Enterococcus faecium (25 @ 11:58)    Culture - Blood (collected 25 @ 09:40)  Source: Blood Blood-Peripheral  Preliminary Report (25 @ 13:01):    No growth at 72 Hours    Culture - Blood (collected 25 @ 09:20)  Source: Blood Blood-Peripheral  Preliminary Report (25 @ 13:01):    No growth at 72 Hours                  Review of systems  All other systems were reviewed and are negative, except as noted.        PHYSICAL EXAM:  Constitutional: NAD  Eyes: PERRLA  ENMT: nc/at, no thrush  Neck: supple, central line cdi  Respiratory: CTA B/L  Cardiovascular: RRR  Gastrointestinal: Soft abdomen, ND, appropriate incisional TTP. chevron incision c/d/i, staples in place. No signs of infection.  JPsx3 sang  Genitourinary: Urinary catheter in place  Extremities: SCD's in place and working bilaterally  Vascular: Palpable dp pulses bilaterally.   Neurological:  AAO3  Skin: no rashes, ulcerations, lesions  Musculoskeletal:  moving extremities without issues  Psychiatric: cooperative

## 2025-06-01 NOTE — PHYSICAL THERAPY INITIAL EVALUATION ADULT - GAIT TRAINING, PT EVAL
GOAL: pt will amb 200ft independently with RW in 2 weeks
GOAL: Patient will ambulate 300 feet independently with RW

## 2025-06-01 NOTE — PROGRESS NOTE ADULT - ASSESSMENT
70y Female with history of NAFLD cirrhosis and HCC, UGIB in 2022, chronic back pain (2/2 spinal fracture) admitted to [facility] 5/26 for coffee ground emesis, melena, and SCOTT. No active bleeding on EGD showed no active bleeding. Transferred to [facility] for liver transplant evaluation. Underwent OLT 5/31 and brought to SICU postoperatively for hemodynamic monitoring.     PLAN:  Neuro:  - A&Ox4 baseline  - Pain control with dilaudid    Resp:  - extubated after arrival to SICU  - Advair q12 (tx interchange for Symbicort)  - Maintain O2 saturation >92%    CV:  - HDS off pressors  - Maintain MAP >65  - holding home coreg    GI:  - s/p OLT 5/31  - NPO/NGT to LCWS  - Protonix qd  - Bowel reg with Miralax and senna  - Monitor NASREEN outputs    /Renal:  - Perkins  - PL @ 125    Heme:  - EBL 1500, received 9u pRBC 6 FFP 4 plt 4 cryo intraop  - Monitor H&H, transfuse prn  - SCDs for ppx    ID:   - vanc by level/Zosyn x48h postop  - entecavir for hep B+ donor  - Transplant ppx Bactrim, Valcyte, diflucan  - Immunosuppression with Cellcept, solumedrol taper    Endo: hx T2DM  - insulin gtt   - Monitor glucose  - holding Lantus 14u, premeal 18u, ISS while on insulin gtt    Lines:  - L radial art line  - R IJ MAC  - L IJ single   - Perkins

## 2025-06-01 NOTE — PROGRESS NOTE ADULT - NS ATTEND AMEND GEN_ALL_CORE FT
HECTOR RUBIN was seen and evaluated today.  As documented, HECTOR RUBIN is a 70y Female on our OLTx waiting list.  A  donor organ has been made available to HECTOR RUBIN , and HECTOR RUBIN has agreed to proceed with liver transplantation. HECTOR RUBIN has had no major illnesses or hospitalizations since the last clinic visit.    We discussed the risks and benefits of liver transplantation in depth.  Surgical risks included but were not limited to bleeding, infection, vascular thrombosis, graft non-function, bile leak, biliary stricture and potential need for re operation postoperatively.  We discussed the risk of dying as an immediate consequence of surgery.  We also discussed the risks of postoperative immunosuppression including but not limited to increased risk of infection, cancer, weight gain, new onset or worsening of diabetes or hypertension on a temporary or permanent basis, water retention, back pain, constipation, diarrhea, dizziness, headache, joint pain, loss of appetite, nausea, stomach pain or upset, trouble sleeping, vomiting, and/or mental or mood changes were fully disclosed. HECTOR RUBIN understands these risks and is willing to proceed with liver transplantation.
1u pRBCs  donor HepB Core + --> on Entecavir  good graft function.   Immuno: tac 0.5mg BID to start, MMF 1gm BID, steroid taper.   d/c laureen KNIGHT CVL, foley.

## 2025-06-01 NOTE — PROGRESS NOTE ADULT - ASSESSMENT
70 F hx NAFLD cirrhosis and HCC (listed for liver transplant with MELD 20B), UGIB in 2022, chronic back pain (2/2 spinal fracture) presented from [facility] where she was admitted on 5/26 for coffee ground emesis and melena, EGD showed no active bleeding, transferred to [facility] now s/p OLT on 5/31/2025     [] s/p OLT - POD 1  - Liver doppler- patent vasc  - Diet NPO  - NGT/Perkins/JPx3   - IVF   - Strict i's and o's  - Ucx +10-49K E.Faecium UTI, Zosyn/Vanco x48hrs   - Donor HBV Core Ab +, discuss Entecavir daily  - CMV +/-, Valcyte 900 when able  - Incentive spirometry  - OT/PT/RD      [] Immuno: FK TBD, MMF 1/1, SST  - Simulect POD 4   - PPx: Bactrim, Valcyte 450 (inc 900mg as able), Fluc, PPI  70 F hx NAFLD cirrhosis and HCC (listed for liver transplant with MELD 20B), UGIB in 2022, chronic back pain (2/2 spinal fracture) presented from [facility] where she was admitted on 5/26 for coffee ground emesis and melena, EGD showed no active bleeding, transferred to [facility] now s/p OLT on 5/31/2025     [] s/p OLT - POD 1  - HBV Core + donor: Entecavir  - Liver doppler- patent vasc  - LFTs trending down  - d/c NGT, d/c love  - watch JPs x3  - CLD today  - Albumin prn  - analgesia prn  - Strict i's and o's  - Ucx +10-49K E.Faecium UTI, Zosyn/Vanco x48hrs   - OT/PT/RD /IS  - bowel regimen    [] Immunosuppression:  - FK to start today, dose per level, MMF 1/1, SST  - Simulect POD 4   - PPx: Bactrim, Valcyte 450 (CMV +/-, inc 900mg as able), Fluc, PPI, OsCal

## 2025-06-01 NOTE — PHYSICAL THERAPY INITIAL EVALUATION ADULT - DIAGNOSIS, PT EVAL
Dec functional mobility secondary to dec strength, balance and endurance
Decreased functional mobility/capacity secondary to impairments listed below

## 2025-06-01 NOTE — PHYSICAL THERAPY INITIAL EVALUATION ADULT - PERTINENT HX OF CURRENT PROBLEM, REHAB EVAL
70 F hx NAFLD cirrhosis and HCC (listed for liver transplant with MELD 20B), UGIB in 2022, chronic back pain (2/2 spinal fracture) presented from [facility] where she was admitted on 5/26 for coffee ground emesis and melena, EGD showed no active bleeding, transferred to [facility] now s/p OLT on 5/31/2025
70 F hx NAFLD cirrhosis and HCC (listed for liver transplant with MELD 20B), UGIB in 2022, chronic back pain (2/2 spinal fracture) presented from [facility] where she was admitted on 5/26 for coffee ground emesis and melena, EGD showed no active bleeding, patient was transfused and stabilized prior to transfer. Patient states she hasn't had bloody bowel movements or bloody emesis since Monday 5/26. Patient endorses low back pain and frontal lobe headache, denies urinary or bowel incontinence, loss of sensation, weakness, dizziness, changes in vision, photobia, blurry vision, nausea, fever, chills, chest pain, or shortness of breath. Hospital course:

## 2025-06-01 NOTE — PHYSICAL THERAPY INITIAL EVALUATION ADULT - PHYSICAL ASSIST/NONPHYSICAL ASSIST: STAND/SIT, REHAB EVAL
supervision/verbal cues/nonverbal cues (demo/gestures)
verbal cues/nonverbal cues (demo/gestures)/1 person assist

## 2025-06-01 NOTE — PHYSICAL THERAPY INITIAL EVALUATION ADULT - GENERAL OBSERVATIONS, REHAB EVAL
Chart reviewed. Pt carol 45 mins PT eval; recd seated, NAD, VSS, A/Ox4,
Pt. rec' d sitting in chair, NAD, +IVL, agreeable to PT mina.

## 2025-06-01 NOTE — PHYSICAL THERAPY INITIAL EVALUATION ADULT - ADDITIONAL COMMENTS
6-13-23      Patient called mtm with q's about new drug prescribed from dr. swartz fo her thrombocythemia:(elevayted platelet count of 916).     1. Doctor Andry prescribed Hydroxyurea for patient bone marrow disease called thrombocythemia due to her high platelets count. Had high platelet count for 15 years . She restarted aspirin 81 mg daily on her own.  2. Hydroxyurea 500 mg take one tablet a day . Patient is scared to take medication due to some side effects she read on drug package insert - cancer, kidney problems , liver problems etc. Patient is willing to start the hydroxyurea 500mg one tablet a day  after dinner and follow up in a month for CBC and Blood Pressure check.   3. Numbness in her feet when she walks, she looses her balance easily and she uses her cane for support. She reports having a high fall risk due to this feet numbness. She states that this numbness only started in the last few months after she went off lisinopril and metoprolol , and she would like to go back to those medication to see if the numbness ends.   4.   BP Readings from Last 3 Encounters:   05/30/23 139/83   05/23/23 132/72   03/13/23 133/84     Plan   1. She would like to go back on the meds she was taken off .Start  - lisinopril 2.5 mg once a day(lunch) - metoprolol tartrate 25 mg, take 1/2 a pill twice a day(am , pm) until we can check Blood Pressure at the clinic July 11th 2023 @ 3:00pm .   2. Patient now agreeable to start hydroxyurea 500mg one tablet a day  after dinner , recheck CBC and Blood Pressure check in a month .     Madyson Kevin  Pharm D4 student   [facility].  Medication Therapy Management Provider  [phone]      
Patient lives with  in private house with 4STE w/one side handrail, first floor set up. PTA, pt. was independent in ADLs and mobility w/o AD; Pt. reports she does have RW.
PTA pt was independent with functional mobility and ADLs. Pt lives in a  with 4 steps to enter no steps in side with her

## 2025-06-01 NOTE — PHYSICAL THERAPY INITIAL EVALUATION ADULT - GAIT DEVIATIONS NOTED, PT EVAL
decreased basia/decreased weight-shifting ability
decreased basia/decreased velocity of limb motion/decreased step length/decreased weight-shifting ability

## 2025-06-02 ENCOUNTER — APPOINTMENT (OUTPATIENT)
Dept: CARDIOLOGY | Facility: CLINIC | Age: 70
End: 2025-06-02

## 2025-06-02 ENCOUNTER — RX CHANGE (OUTPATIENT)
Age: 70
End: 2025-06-02

## 2025-06-02 DIAGNOSIS — F41.1 GENERALIZED ANXIETY DISORDER: ICD-10-CM

## 2025-06-02 DIAGNOSIS — Z79.60 LONG TERM (CURRENT) USE OF UNSPECIFIED IMMUNOMODULATORS AND IMMUNOSUPPRESSANTS: ICD-10-CM

## 2025-06-02 LAB
ALBUMIN SERPL ELPH-MCNC: 3.3 G/DL — SIGNIFICANT CHANGE UP (ref 3.3–5)
ALBUMIN SERPL ELPH-MCNC: 3.4 G/DL — SIGNIFICANT CHANGE UP (ref 3.3–5)
ALP SERPL-CCNC: 76 U/L — SIGNIFICANT CHANGE UP (ref 40–120)
ALP SERPL-CCNC: 89 U/L — SIGNIFICANT CHANGE UP (ref 40–120)
ALT FLD-CCNC: 380 U/L — HIGH (ref 10–45)
ALT FLD-CCNC: 419 U/L — HIGH (ref 10–45)
ANION GAP SERPL CALC-SCNC: 14 MMOL/L — SIGNIFICANT CHANGE UP (ref 5–17)
ANION GAP SERPL CALC-SCNC: 15 MMOL/L — SIGNIFICANT CHANGE UP (ref 5–17)
APTT BLD: 27.4 SEC — SIGNIFICANT CHANGE UP (ref 26.1–36.8)
APTT BLD: 27.4 SEC — SIGNIFICANT CHANGE UP (ref 26.1–36.8)
AST SERPL-CCNC: 290 U/L — HIGH (ref 10–40)
AST SERPL-CCNC: 462 U/L — HIGH (ref 10–40)
BILIRUB SERPL-MCNC: 1.4 MG/DL — HIGH (ref 0.2–1.2)
BILIRUB SERPL-MCNC: 1.8 MG/DL — HIGH (ref 0.2–1.2)
BLD GP AB SCN SERPL QL: NEGATIVE — SIGNIFICANT CHANGE UP
BUN SERPL-MCNC: 46 MG/DL — HIGH (ref 7–23)
BUN SERPL-MCNC: 48 MG/DL — HIGH (ref 7–23)
CALCIUM SERPL-MCNC: 7.8 MG/DL — LOW (ref 8.4–10.5)
CALCIUM SERPL-MCNC: 8.2 MG/DL — LOW (ref 8.4–10.5)
CHLORIDE SERPL-SCNC: 100 MMOL/L — SIGNIFICANT CHANGE UP (ref 96–108)
CHLORIDE SERPL-SCNC: 101 MMOL/L — SIGNIFICANT CHANGE UP (ref 96–108)
CO2 SERPL-SCNC: 22 MMOL/L — SIGNIFICANT CHANGE UP (ref 22–31)
CO2 SERPL-SCNC: 23 MMOL/L — SIGNIFICANT CHANGE UP (ref 22–31)
CREAT SERPL-MCNC: 1.57 MG/DL — HIGH (ref 0.5–1.3)
CREAT SERPL-MCNC: 1.65 MG/DL — HIGH (ref 0.5–1.3)
CULTURE RESULTS: SIGNIFICANT CHANGE UP
CULTURE RESULTS: SIGNIFICANT CHANGE UP
EGFR: 33 ML/MIN/1.73M2 — LOW
EGFR: 33 ML/MIN/1.73M2 — LOW
EGFR: 35 ML/MIN/1.73M2 — LOW
EGFR: 35 ML/MIN/1.73M2 — LOW
GAS PNL BLDV: SIGNIFICANT CHANGE UP
GAS PNL BLDV: SIGNIFICANT CHANGE UP
GLUCOSE BLDC GLUCOMTR-MCNC: 220 MG/DL — HIGH (ref 70–99)
GLUCOSE BLDC GLUCOMTR-MCNC: 257 MG/DL — HIGH (ref 70–99)
GLUCOSE BLDC GLUCOMTR-MCNC: 275 MG/DL — HIGH (ref 70–99)
GLUCOSE BLDC GLUCOMTR-MCNC: 288 MG/DL — HIGH (ref 70–99)
GLUCOSE SERPL-MCNC: 230 MG/DL — HIGH (ref 70–99)
GLUCOSE SERPL-MCNC: 248 MG/DL — HIGH (ref 70–99)
HCT VFR BLD CALC: 24.3 % — LOW (ref 34.5–45)
HCT VFR BLD CALC: 24.7 % — LOW (ref 34.5–45)
HGB BLD-MCNC: 8.4 G/DL — LOW (ref 11.5–15.5)
HGB BLD-MCNC: 8.7 G/DL — LOW (ref 11.5–15.5)
INR BLD: 1.19 RATIO — HIGH (ref 0.85–1.16)
INR BLD: 1.23 RATIO — HIGH (ref 0.85–1.16)
MAGNESIUM SERPL-MCNC: 2.4 MG/DL — SIGNIFICANT CHANGE UP (ref 1.6–2.6)
MAGNESIUM SERPL-MCNC: 2.5 MG/DL — SIGNIFICANT CHANGE UP (ref 1.6–2.6)
MCHC RBC-ENTMCNC: 29.3 PG — SIGNIFICANT CHANGE UP (ref 27–34)
MCHC RBC-ENTMCNC: 30.4 PG — SIGNIFICANT CHANGE UP (ref 27–34)
MCHC RBC-ENTMCNC: 34.6 G/DL — SIGNIFICANT CHANGE UP (ref 32–36)
MCHC RBC-ENTMCNC: 35.2 G/DL — SIGNIFICANT CHANGE UP (ref 32–36)
MCV RBC AUTO: 84.7 FL — SIGNIFICANT CHANGE UP (ref 80–100)
MCV RBC AUTO: 86.4 FL — SIGNIFICANT CHANGE UP (ref 80–100)
NRBC BLD AUTO-RTO: 0 /100 WBCS — SIGNIFICANT CHANGE UP (ref 0–0)
NRBC BLD AUTO-RTO: 0 /100 WBCS — SIGNIFICANT CHANGE UP (ref 0–0)
PHOSPHATE SERPL-MCNC: 4.2 MG/DL — SIGNIFICANT CHANGE UP (ref 2.5–4.5)
PHOSPHATE SERPL-MCNC: 5 MG/DL — HIGH (ref 2.5–4.5)
PLATELET # BLD AUTO: 44 K/UL — LOW (ref 150–400)
PLATELET # BLD AUTO: 46 K/UL — LOW (ref 150–400)
POTASSIUM SERPL-MCNC: 3.9 MMOL/L — SIGNIFICANT CHANGE UP (ref 3.5–5.3)
POTASSIUM SERPL-MCNC: 4.3 MMOL/L — SIGNIFICANT CHANGE UP (ref 3.5–5.3)
POTASSIUM SERPL-SCNC: 3.9 MMOL/L — SIGNIFICANT CHANGE UP (ref 3.5–5.3)
POTASSIUM SERPL-SCNC: 4.3 MMOL/L — SIGNIFICANT CHANGE UP (ref 3.5–5.3)
PROT SERPL-MCNC: 4.8 G/DL — LOW (ref 6–8.3)
PROT SERPL-MCNC: 4.9 G/DL — LOW (ref 6–8.3)
PROTHROM AB SERPL-ACNC: 13.5 SEC — HIGH (ref 9.9–13.4)
PROTHROM AB SERPL-ACNC: 14.1 SEC — HIGH (ref 9.9–13.4)
RBC # BLD: 2.86 M/UL — LOW (ref 3.8–5.2)
RBC # BLD: 2.87 M/UL — LOW (ref 3.8–5.2)
RBC # FLD: 15.9 % — HIGH (ref 10.3–14.5)
RBC # FLD: 16.6 % — HIGH (ref 10.3–14.5)
RH IG SCN BLD-IMP: POSITIVE — SIGNIFICANT CHANGE UP
SODIUM SERPL-SCNC: 137 MMOL/L — SIGNIFICANT CHANGE UP (ref 135–145)
SODIUM SERPL-SCNC: 138 MMOL/L — SIGNIFICANT CHANGE UP (ref 135–145)
SPECIMEN SOURCE: SIGNIFICANT CHANGE UP
SPECIMEN SOURCE: SIGNIFICANT CHANGE UP
TACROLIMUS SERPL-MCNC: 2.1 NG/ML — SIGNIFICANT CHANGE UP
VANCOMYCIN FLD-MCNC: <4 UG/ML — SIGNIFICANT CHANGE UP
WBC # BLD: 6.52 K/UL — SIGNIFICANT CHANGE UP (ref 3.8–10.5)
WBC # BLD: 7.28 K/UL — SIGNIFICANT CHANGE UP (ref 3.8–10.5)
WBC # FLD AUTO: 6.52 K/UL — SIGNIFICANT CHANGE UP (ref 3.8–10.5)
WBC # FLD AUTO: 7.28 K/UL — SIGNIFICANT CHANGE UP (ref 3.8–10.5)

## 2025-06-02 PROCEDURE — 99232 SBSQ HOSP IP/OBS MODERATE 35: CPT | Mod: GC

## 2025-06-02 PROCEDURE — 99223 1ST HOSP IP/OBS HIGH 75: CPT

## 2025-06-02 PROCEDURE — 71045 X-RAY EXAM CHEST 1 VIEW: CPT | Mod: 26

## 2025-06-02 PROCEDURE — 99222 1ST HOSP IP/OBS MODERATE 55: CPT

## 2025-06-02 RX ORDER — VANCOMYCIN HCL IN 5 % DEXTROSE 1.5G/250ML
1000 PLASTIC BAG, INJECTION (ML) INTRAVENOUS EVERY 24 HOURS
Refills: 0 | Status: DISCONTINUED | OUTPATIENT
Start: 2025-06-02 | End: 2025-06-03

## 2025-06-02 RX ORDER — INSULIN LISPRO 100 U/ML
INJECTION, SOLUTION INTRAVENOUS; SUBCUTANEOUS
Refills: 0 | Status: DISCONTINUED | OUTPATIENT
Start: 2025-06-02 | End: 2025-06-12

## 2025-06-02 RX ORDER — BUDESONIDE AND FORMOTEROL FUMARATE DIHYDRATE 160; 4.5 UG/1; UG/1
160-4.5 AEROSOL RESPIRATORY (INHALATION) DAILY
Qty: 1 | Refills: 5 | Status: ACTIVE | COMMUNITY
Start: 2025-06-02 | End: 1900-01-01

## 2025-06-02 RX ORDER — SULFAMETHOXAZOLE AND TRIMETHOPRIM 400; 80 MG/1; MG/1
400-80 TABLET ORAL
Qty: 30 | Refills: 5 | Status: ACTIVE | COMMUNITY
Start: 2025-06-02 | End: 1900-01-01

## 2025-06-02 RX ORDER — MYCOPHENOLATE MOFETIL 500 MG/1
1000 TABLET, FILM COATED ORAL
Refills: 0 | Status: DISCONTINUED | OUTPATIENT
Start: 2025-06-02 | End: 2025-06-02

## 2025-06-02 RX ORDER — INSULIN GLARGINE-YFGN 100 [IU]/ML
18 INJECTION, SOLUTION SUBCUTANEOUS AT BEDTIME
Refills: 0 | Status: DISCONTINUED | OUTPATIENT
Start: 2025-06-02 | End: 2025-06-03

## 2025-06-02 RX ORDER — TACROLIMUS 1 MG/1
1 CAPSULE ORAL TWICE DAILY
Qty: 240 | Refills: 5 | Status: ACTIVE | COMMUNITY
Start: 2025-06-02 | End: 1900-01-01

## 2025-06-02 RX ORDER — ALENDRONATE SODIUM 70 MG/1
70 TABLET ORAL
Qty: 1 | Refills: 5 | Status: ACTIVE | COMMUNITY
Start: 2025-06-02 | End: 1900-01-01

## 2025-06-02 RX ORDER — INSULIN GLARGINE 100 [IU]/ML
100 INJECTION, SOLUTION SUBCUTANEOUS AT BEDTIME
Qty: 1 | Refills: 5 | Status: ACTIVE | COMMUNITY
Start: 2025-06-02 | End: 1900-01-01

## 2025-06-02 RX ORDER — TACROLIMUS 0.5 MG/1
0.5 CAPSULE ORAL
Qty: 60 | Refills: 5 | Status: ACTIVE | COMMUNITY
Start: 2025-06-02 | End: 1900-01-01

## 2025-06-02 RX ORDER — OMEGA-3/DHA/EPA/FISH OIL 300-1000MG
400 CAPSULE ORAL TWICE DAILY
Qty: 60 | Refills: 5 | Status: ACTIVE | COMMUNITY
Start: 2025-06-02 | End: 1900-01-01

## 2025-06-02 RX ORDER — PANTOPRAZOLE 40 MG/1
40 TABLET, DELAYED RELEASE ORAL DAILY
Qty: 30 | Refills: 5 | Status: ACTIVE | COMMUNITY
Start: 2025-06-02 | End: 1900-01-01

## 2025-06-02 RX ORDER — BLOOD SUGAR DIAGNOSTIC
32G X 5 MM STRIP MISCELLANEOUS
Qty: 2 | Refills: 5 | Status: ACTIVE | COMMUNITY
Start: 2025-06-02 | End: 1900-01-01

## 2025-06-02 RX ORDER — NIFEDIPINE 30 MG/1
30 TABLET, EXTENDED RELEASE ORAL DAILY
Qty: 30 | Refills: 5 | Status: ACTIVE | COMMUNITY
Start: 2025-06-02 | End: 1900-01-01

## 2025-06-02 RX ORDER — POLYETHYLENE GLYCOL 3350 17 G/17G
17 POWDER, FOR SOLUTION ORAL DAILY
Refills: 0 | Status: DISCONTINUED | OUTPATIENT
Start: 2025-06-02 | End: 2025-06-12

## 2025-06-02 RX ORDER — INSULIN LISPRO 100 U/ML
12 INJECTION, SOLUTION INTRAVENOUS; SUBCUTANEOUS
Refills: 0 | Status: DISCONTINUED | OUTPATIENT
Start: 2025-06-02 | End: 2025-06-03

## 2025-06-02 RX ORDER — SULFAMETHOXAZOLE/TRIMETHOPRIM 800-160 MG
1 TABLET ORAL DAILY
Refills: 0 | Status: DISCONTINUED | OUTPATIENT
Start: 2025-06-02 | End: 2025-06-12

## 2025-06-02 RX ORDER — ACETAMINOPHEN 325 MG/1
325 TABLET ORAL
Qty: 240 | Refills: 3 | Status: ACTIVE | COMMUNITY
Start: 2025-06-02 | End: 1900-01-01

## 2025-06-02 RX ORDER — QUETIAPINE FUMARATE 25 MG/1
25 TABLET ORAL AT BEDTIME
Refills: 0 | Status: DISCONTINUED | OUTPATIENT
Start: 2025-06-02 | End: 2025-06-03

## 2025-06-02 RX ORDER — HEPARIN SODIUM 1000 [USP'U]/ML
5000 INJECTION INTRAVENOUS; SUBCUTANEOUS EVERY 12 HOURS
Refills: 0 | Status: DISCONTINUED | OUTPATIENT
Start: 2025-06-02 | End: 2025-06-03

## 2025-06-02 RX ORDER — DIPHENHYDRAMINE HCL 12.5MG/5ML
25 ELIXIR ORAL ONCE
Refills: 0 | Status: COMPLETED | OUTPATIENT
Start: 2025-06-02 | End: 2025-06-02

## 2025-06-02 RX ORDER — NIFEDIPINE 30 MG
30 TABLET, EXTENDED RELEASE 24 HR ORAL EVERY 24 HOURS
Refills: 0 | Status: DISCONTINUED | OUTPATIENT
Start: 2025-06-02 | End: 2025-06-12

## 2025-06-02 RX ORDER — ALBUMIN (HUMAN) 12.5 G/50ML
250 INJECTION, SOLUTION INTRAVENOUS ONCE
Refills: 0 | Status: COMPLETED | OUTPATIENT
Start: 2025-06-02 | End: 2025-06-02

## 2025-06-02 RX ORDER — VALGANCICLOVIR 450 MG/1
450 TABLET, FILM COATED ORAL DAILY
Refills: 0 | Status: DISCONTINUED | OUTPATIENT
Start: 2025-06-02 | End: 2025-06-04

## 2025-06-02 RX ORDER — ENTECAVIR 0.5 MG/1
0.5 TABLET, FILM COATED ORAL DAILY
Qty: 30 | Refills: 5 | Status: ACTIVE | COMMUNITY
Start: 2025-06-02 | End: 1900-01-01

## 2025-06-02 RX ORDER — ISOPROPYL ALCOHOL 0.7 ML/ML
SWAB TOPICAL
Qty: 1 | Refills: 5 | Status: ACTIVE | COMMUNITY
Start: 2025-06-02 | End: 1900-01-01

## 2025-06-02 RX ORDER — CALCIUM GLUCONATE 20 MG/ML
2 INJECTION, SOLUTION INTRAVENOUS ONCE
Refills: 0 | Status: COMPLETED | OUTPATIENT
Start: 2025-06-02 | End: 2025-06-02

## 2025-06-02 RX ORDER — INSULIN GLARGINE-YFGN 100 [IU]/ML
14 INJECTION, SOLUTION SUBCUTANEOUS AT BEDTIME
Refills: 0 | Status: DISCONTINUED | OUTPATIENT
Start: 2025-06-02 | End: 2025-06-02

## 2025-06-02 RX ORDER — PREDNISONE 10 MG/1
10 TABLET ORAL DAILY
Qty: 60 | Refills: 5 | Status: ACTIVE | COMMUNITY
Start: 2025-06-02 | End: 1900-01-01

## 2025-06-02 RX ORDER — MYCOPHENOLATE MOFETIL 500 MG/1
1000 TABLET, FILM COATED ORAL
Refills: 0 | Status: DISCONTINUED | OUTPATIENT
Start: 2025-06-02 | End: 2025-06-12

## 2025-06-02 RX ORDER — INSULIN ASPART 100 [IU]/ML
100 INJECTION, SOLUTION INTRAVENOUS; SUBCUTANEOUS
Qty: 1 | Refills: 5 | Status: ACTIVE | COMMUNITY
Start: 2025-06-02 | End: 1900-01-01

## 2025-06-02 RX ORDER — MYCOPHENOLATE MOFETIL 500 MG/1
500 TABLET ORAL
Qty: 120 | Refills: 5 | Status: ACTIVE | COMMUNITY
Start: 2025-06-02 | End: 1900-01-01

## 2025-06-02 RX ORDER — SENNOSIDES 8.6 MG/1
8.6 TABLET, FILM COATED ORAL DAILY
Qty: 30 | Refills: 3 | Status: ACTIVE | COMMUNITY
Start: 2025-06-02 | End: 1900-01-01

## 2025-06-02 RX ORDER — VALGANCICLOVIR HYDROCHLORIDE 450 MG/1
450 TABLET ORAL DAILY
Qty: 60 | Refills: 5 | Status: ACTIVE | COMMUNITY
Start: 2025-06-02 | End: 1900-01-01

## 2025-06-02 RX ORDER — INSULIN LISPRO 100 U/ML
8 INJECTION, SOLUTION INTRAVENOUS; SUBCUTANEOUS
Refills: 0 | Status: DISCONTINUED | OUTPATIENT
Start: 2025-06-02 | End: 2025-06-02

## 2025-06-02 RX ORDER — B-COMPLEX WITH VITAMIN C
500-5 TABLET ORAL
Qty: 60 | Refills: 5 | Status: ACTIVE | COMMUNITY
Start: 2025-06-02 | End: 1900-01-01

## 2025-06-02 RX ADMIN — OXYCODONE HYDROCHLORIDE 10 MILLIGRAM(S): 30 TABLET ORAL at 08:44

## 2025-06-02 RX ADMIN — ALBUMIN (HUMAN) 500 MILLILITER(S): 12.5 INJECTION, SOLUTION INTRAVENOUS at 04:25

## 2025-06-02 RX ADMIN — INSULIN LISPRO 6: 100 INJECTION, SOLUTION INTRAVENOUS; SUBCUTANEOUS at 08:17

## 2025-06-02 RX ADMIN — SODIUM CHLORIDE 50 MILLILITER(S): 9 INJECTION, SOLUTION INTRAVENOUS at 03:20

## 2025-06-02 RX ADMIN — Medication 0.5 MILLIGRAM(S): at 10:42

## 2025-06-02 RX ADMIN — OXYCODONE HYDROCHLORIDE 5 MILLIGRAM(S): 30 TABLET ORAL at 23:41

## 2025-06-02 RX ADMIN — OXYCODONE HYDROCHLORIDE 5 MILLIGRAM(S): 30 TABLET ORAL at 22:37

## 2025-06-02 RX ADMIN — MYCOPHENOLATE MOFETIL 1000 MILLIGRAM(S): 500 TABLET, FILM COATED ORAL at 08:16

## 2025-06-02 RX ADMIN — TACROLIMUS 0.5 MILLIGRAM(S): 0.5 CAPSULE ORAL at 07:43

## 2025-06-02 RX ADMIN — Medication 250 MILLIGRAM(S): at 05:11

## 2025-06-02 RX ADMIN — SODIUM CHLORIDE 125 MILLILITER(S): 9 INJECTION, SOLUTION INTRAVENOUS at 00:21

## 2025-06-02 RX ADMIN — Medication 1 DOSE(S): at 05:32

## 2025-06-02 RX ADMIN — Medication 25 MILLIGRAM(S): at 06:48

## 2025-06-02 RX ADMIN — Medication 25 GRAM(S): at 08:17

## 2025-06-02 RX ADMIN — Medication 1 APPLICATION(S): at 05:11

## 2025-06-02 RX ADMIN — Medication 40 MILLIGRAM(S): at 12:16

## 2025-06-02 RX ADMIN — HEPARIN SODIUM 5000 UNIT(S): 1000 INJECTION INTRAVENOUS; SUBCUTANEOUS at 17:26

## 2025-06-02 RX ADMIN — Medication 0.5 MILLIGRAM(S): at 14:54

## 2025-06-02 RX ADMIN — Medication 0.5 MILLIGRAM(S): at 06:31

## 2025-06-02 RX ADMIN — INSULIN LISPRO 12 UNIT(S): 100 INJECTION, SOLUTION INTRAVENOUS; SUBCUTANEOUS at 17:27

## 2025-06-02 RX ADMIN — OXYCODONE HYDROCHLORIDE 10 MILLIGRAM(S): 30 TABLET ORAL at 18:33

## 2025-06-02 RX ADMIN — OXYCODONE HYDROCHLORIDE 10 MILLIGRAM(S): 30 TABLET ORAL at 07:44

## 2025-06-02 RX ADMIN — INSULIN LISPRO 8 UNIT(S): 100 INJECTION, SOLUTION INTRAVENOUS; SUBCUTANEOUS at 08:17

## 2025-06-02 RX ADMIN — MYCOPHENOLATE MOFETIL 1000 MILLIGRAM(S): 500 TABLET, FILM COATED ORAL at 20:17

## 2025-06-02 RX ADMIN — Medication 0.5 MILLIGRAM(S): at 06:02

## 2025-06-02 RX ADMIN — ENTECAVIR 0.5 MILLIGRAM(S): 0.5 TABLET ORAL at 12:16

## 2025-06-02 RX ADMIN — OXYCODONE HYDROCHLORIDE 10 MILLIGRAM(S): 30 TABLET ORAL at 19:03

## 2025-06-02 RX ADMIN — OXYCODONE HYDROCHLORIDE 10 MILLIGRAM(S): 30 TABLET ORAL at 04:08

## 2025-06-02 RX ADMIN — Medication 5 MILLIGRAM(S): at 22:37

## 2025-06-02 RX ADMIN — Medication 30 MILLIGRAM(S): at 12:16

## 2025-06-02 RX ADMIN — INSULIN LISPRO 6: 100 INJECTION, SOLUTION INTRAVENOUS; SUBCUTANEOUS at 17:27

## 2025-06-02 RX ADMIN — CALCIUM GLUCONATE 200 GRAM(S): 20 INJECTION, SOLUTION INTRAVENOUS at 05:40

## 2025-06-02 RX ADMIN — QUETIAPINE FUMARATE 25 MILLIGRAM(S): 25 TABLET ORAL at 22:37

## 2025-06-02 RX ADMIN — Medication 0.5 MILLIGRAM(S): at 15:54

## 2025-06-02 RX ADMIN — Medication 1 DOSE(S): at 17:06

## 2025-06-02 RX ADMIN — Medication 25 GRAM(S): at 00:22

## 2025-06-02 RX ADMIN — Medication 2 TABLET(S): at 17:26

## 2025-06-02 RX ADMIN — SODIUM CHLORIDE 50 MILLILITER(S): 9 INJECTION, SOLUTION INTRAVENOUS at 07:44

## 2025-06-02 RX ADMIN — OXYCODONE HYDROCHLORIDE 10 MILLIGRAM(S): 30 TABLET ORAL at 14:03

## 2025-06-02 RX ADMIN — Medication 100 MILLIGRAM(S): at 05:10

## 2025-06-02 RX ADMIN — INSULIN GLARGINE-YFGN 18 UNIT(S): 100 INJECTION, SOLUTION SUBCUTANEOUS at 22:37

## 2025-06-02 RX ADMIN — OXYCODONE HYDROCHLORIDE 10 MILLIGRAM(S): 30 TABLET ORAL at 03:38

## 2025-06-02 RX ADMIN — INSULIN LISPRO 4: 100 INJECTION, SOLUTION INTRAVENOUS; SUBCUTANEOUS at 22:38

## 2025-06-02 RX ADMIN — Medication 0.5 MILLIGRAM(S): at 18:33

## 2025-06-02 RX ADMIN — Medication 2 TABLET(S): at 05:11

## 2025-06-02 RX ADMIN — VALGANCICLOVIR 450 MILLIGRAM(S): 450 TABLET, FILM COATED ORAL at 12:16

## 2025-06-02 RX ADMIN — OXYCODONE HYDROCHLORIDE 10 MILLIGRAM(S): 30 TABLET ORAL at 15:03

## 2025-06-02 RX ADMIN — INSULIN LISPRO 8 UNIT(S): 100 INJECTION, SOLUTION INTRAVENOUS; SUBCUTANEOUS at 11:52

## 2025-06-02 RX ADMIN — Medication 0.5 MILLIGRAM(S): at 10:12

## 2025-06-02 RX ADMIN — Medication 1 TABLET(S): at 12:16

## 2025-06-02 RX ADMIN — INSULIN LISPRO 6: 100 INJECTION, SOLUTION INTRAVENOUS; SUBCUTANEOUS at 11:53

## 2025-06-02 RX ADMIN — TACROLIMUS 0.5 MILLIGRAM(S): 0.5 CAPSULE ORAL at 20:17

## 2025-06-02 RX ADMIN — Medication 0.5 MILLIGRAM(S): at 18:03

## 2025-06-02 NOTE — BH CONSULTATION LIAISON ASSESSMENT NOTE - SUMMARY
Patient noted to be intermittently labile on exam, with tearfulness and laughing.  Some concerns of disorientation and indicative of a mild hyperactive delirium.  Seen pretransplant with concerns of generalized anxiety disorder with panic attacks for which patient has been maintained on benzodiazepines in the past and SSRIs.  Amenable to acute use of Seroquel to aid with sleep architecture and concerns of some mood lability and setting of delirium.  Please monitor QTc to ensure it is less than 500 for continued use of Seroquel.  For acute anxiety exacerbations can use Seroquel 12.5 mg p.o. twice daily as needed.      Plan:    Delirium w/ beh disturbanc & anxiety: Seroquel 25 mg p.o. at bedtime if QTc less than 500  -Anxiety: Seroquel 12.5 mg p.o. 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         Patient noted to be intermittently labile on exam, with tearfulness and laughing.  Some concerns of disorientation and indicative of a mild hyperactive delirium.  Seen pretransplant with concerns of generalized anxiety disorder with panic attacks for which patient has been maintained on benzodiazepines in the past and SSRIs.  Amenable to acute use of Seroquel to aid with sleep architecture and concerns of some mood lability and setting of delirium.  Please monitor QTc to ensure it is less than 500 for continued use of Seroquel.  For acute anxiety exacerbations can use Seroquel 12.5 mg p.o. twice daily as needed.      Plan:    Delirium w/ beh disturbanc & anxiety: Seroquel 25 mg p.o. at bedtime if QTc less than 500  -Anxiety: Seroquel 12.5 mg p.o. twice daily as needed if QTc less than 500  -Please limit use of sedative hypnotics, and anticholinergic agents with which may further worsen mental status  -Patient [place], recommend family bring hearing aid from home to aid with orientation/mental status  -Frequent day and night reorientation recommended  **Continue monitoring EKG use of psychotropic agents  -Consider  consult and holistic RN  - Supportive therapy provided, nonpharmacologic means of anxiety alleviation recommended  -Post OLT: Management as per primary team

## 2025-06-02 NOTE — BH CONSULTATION LIAISON ASSESSMENT NOTE - NSICDXBHSECONDARYDX_PSY_ALL_CORE
Cirrhosis   K74.60  Hepatocellular carcinoma in adult   C22.0  Generalized anxiety disorder with panic attacks   F41.1

## 2025-06-02 NOTE — CHART NOTE - NSCHARTNOTEFT_GEN_A_CORE
NUTRITION FOLLOW UP NOTE    PATIENT SEEN FOR: post-transplant follow up    SOURCE: [x] Patient  [x] Current Medical Record  [] RN  [] Family/support person at bedside  [] Patient unavailable/inappropriate  [x] Other: Team (Rounds)    CHART REVIEWED/EVENTS NOTED.  [x] Nutrition Status:  -S/p OLT   -Extubated    -Plan to advance to regular PO diet     DIET ORDER:   Diet, Clear Liquid:   Consistent Carbohydrate {Evening Snack} (CSTCHOSN) (25)    NUTRITION INTAKE/PROVISION:  [x] PO: Moderate intake of clear liquids thus far     ANTHROPOMETRICS:  Drug Dosing Weight  Height (cm): 154.9 (30 May 2025 21:01)  Weight (kg): 64.5 (30 May 2025 21:01)  BMI (kg/m2): 26.9 (30 May 2025 21:01)  Daily Weight in k.1 (-30), Weight in k.7 (-29), Weight in k.5 (-28)   -Weight fluctuation likely in the setting of fluid shifts vs true weight loss/gain; continue to monitor weights daily.     NUTRITIONALLY PERTINENT MEDICATIONS:  MEDICATIONS  (STANDING):  entecavir  fluconAZOLE IVPB  insulin glargine Injectable (LANTUS)  insulin lispro (ADMELOG) corrective regimen sliding scale  insulin lispro Injectable (ADMELOG)  multiple electrolytes Injection Type 1  pantoprazole  Injectable  piperacillin/tazobactam IVPB..  polyethylene glycol 3350  senna  trimethoprim  40 mG/sulfamethoxazole 200 mG Suspension  valGANciclovir 50 mG/mL Oral Solution  vancomycin  IVPB       NUTRITIONALLY PERTINENT LABS:   Na138 mmol/L Glu 230 mg/dL[H] K+ 4.3 mmol/L Cr  1.57 mg/dL[H] BUN 46 mg/dL[H]  Phos 5.0 mg/dL[H]  Alb 3.3 g/dL  U/L[H]  U/L[H] Alkaline Phosphatase 76 U/L  25 @ 10:20 a1c 6.0    A1C with Estimated Average Glucose Result: 6.0 % (25 @ 10:20)      Finger Sticks:  POCT Blood Glucose.: 275 mg/dL ( @ 08:11)  POCT Blood Glucose.: 220 mg/dL ( @ 22:02)  POCT Blood Glucose.: 269 mg/dL ( @ 19:20)  POCT Blood Glucose.: 282 mg/dL ( @ 18:16)  POCT Blood Glucose.: 331 mg/dL ( @ 17:14)  POCT Blood Glucose.: 314 mg/dL ( @ 14:00)  POCT Blood Glucose.: 212 mg/dL ( @ 12:01)  POCT Blood Glucose.: 215 mg/dL ( @ 11:59)  POCT Blood Glucose.: 160 mg/dL ( @ 10:25) --> okay     --Hyperglycemic likely 2/2 steroid regimen; Team plans to adjust insulin regimen     NUTRITIONALLY PERTINENT MEDICATIONS/LABS:  [x] Reviewed  [x] Relevant notes on medications/labs:  -Simulect, Cellcept, Prograf  -Prednisone, Solu-medrol   -14 units long acting, 8 units short acting TID, sliding scale insulin   -Plasma-lyte   -Hyperphosphatemic   -Miralax, Senna     EDEMA:  [x] Reviewed  [x] Relevant notes:  -No edema note     GI/ I&O:  [x] Reviewed, per flowsheet   -No BMs documented post-transplant thus far   -x3 NASREEN drain in place     SKIN: per nursing flowsheet  [x] Pressure injury previously noted  -Sacrum: suspected deep tissue injury     ESTIMATED NEEDS:  [x] Updated:   Energy:   1574-1812kcal/day (33-38kcal/kg)  Protein:   62-81g/day (1.3-1.7 g/kg)  Fluid:   ml/day or [x] defer to team  Based on: 47.7kg     NUTRITION DIAGNOSIS:  [x] Prior Dx:  1. Inadequate protein-energy intake --> monitor for malnutrition   2. Increased protein-energy needs    EDUCATION:  [x] Yes: emphasized importance of protein intake and monitoring carbohydrate intake post-transplant. Defer full post-transplant nutrition ed until full advancement of diet.     NUTRITION CARE PLAN:  1. Diet: defer advancement to Team   -Recommend, Consistent carbohydrate diet   2. Supplements: Add Ensure Max x1/day when medically feasible   3. Multivitamin/mineral supplementation: [id number], multivitamin, vitamin C   4. Provide post-transplant ed at follow up     MONITORING AND EVALUATION:   RD remains available upon request and will follow up per protocol.    Janine Meyer MS RDN CDN CCTD (Teams)   Available on MS TEAMS

## 2025-06-02 NOTE — PROGRESS NOTE ADULT - ASSESSMENT
70y Female with history of NAFLD cirrhosis and HCC, UGIB in 2022, chronic back pain (2/2 spinal fracture) admitted to [facility] 5/26 for coffee ground emesis, melena, and SCOTT. No active bleeding on EGD showed no active bleeding. Transferred to [facility] for liver transplant evaluation. Underwent OLT 5/31 and brought to SICU postoperatively for hemodynamic monitoring.     PLAN:  Neuro:  - A&Ox4 baseline  - Pain control with dilaudid    Resp:  - Advair q12 (tx interchange for Symbicort)  - Maintain O2 saturation >92%    CV:  - HDS off pressors  - Maintain MAP >65  - holding home coreg    GI:  - s/p OLT 5/31  - CLD  - Protonix qd  - Bowel reg with Miralax and senna  - Monitor NASREEN outputs    /Renal:  - Maddie    Heme:  - EBL 1500, received 9u pRBC 6 FFP 4 plt 4 cryo intraop  - Monitor H&H, transfuse prn  - SCDs for ppx    ID:   - vanc by level/Zosyn x48h postop  - entecavir for hep B+ donor  - Transplant ppx Bactrim, Valcyte, diflucan  - Immunosuppression with Cellcept, solumedrol taper    Endo: hx T2DM  - insulin gtt > lantus  - restart pre meal 8u  - Monitor glucose    Lines:  - L radial art line  - R IJ LAUREN Orozco PA-C  Surgical Intensive Care Unit  [id number]

## 2025-06-02 NOTE — BH CONSULTATION LIAISON ASSESSMENT NOTE - NSBHCHARTREVIEWVS_PSY_A_CORE FT
Vital Signs Last 24 Hrs  T(C): 36.3 (02 Jun 2025 15:00), Max: 37.2 (01 Jun 2025 19:00)  T(F): 97.4 (02 Jun 2025 15:00), Max: 98.9 (01 Jun 2025 19:00)  HR: 64 (02 Jun 2025 16:00) (52 - 90)  BP: 133/61 (02 Jun 2025 16:00) (132/59 - 180/74)  BP(mean): 88 (02 Jun 2025 16:00) (81 - 111)  RR: 15 (02 Jun 2025 16:00) (12 - 29)  SpO2: 96% (02 Jun 2025 16:00) (91% - 100%)    Parameters below as of 02 Jun 2025 07:00  Patient On (Oxygen Delivery Method): room air

## 2025-06-02 NOTE — CONSULT NOTE ADULT - SUBJECTIVE AND OBJECTIVE BOX
Patient is a 70y old  Female who presents with a chief complaint of melena (2025 12:04)    Admission HPI:  70 F hx NAFLD cirrhosis and HCC (listed for liver transplant with MELD 20B), UGIB in , chronic back pain (2/2 spinal fracture) presented from [facility] where she was admitted on  for coffee ground emesis and melena, EGD showed no active bleeding, patient was transfused and stabilized prior to transfer. Patient states she hasn't had bloody bowel movements or bloody emesis since . Patient endorses low back pain and frontal lobe headache, denies urinary or bowel incontinence, loss of sensation, weakness, dizziness, changes in vision, photobia, blurry vision, nausea, fever, chills, chest pain, or shortness of breath.  (28 May 2025 00:58)    Interval History:  Patient s/p OLT on .  Patient with functional deficits post-op.    REVIEW OF SYSTEMS: No chest pain, shortness of breath, nausea, vomiting or diarhea; other ROS neg     PAST MEDICAL & SURGICAL HISTORY  HCC (hepatocellular carcinoma)    DM (diabetes mellitus)    HTN (hypertension)    HLD (hyperlipidemia)    Hepatic cirrhosis    IVEY (nonalcoholic steatohepatitis)    Former smoker    Ascites    Hepatic encephalopathy    History of cervical cancer    H/O  section    History of cholecystectomy    H/O carpal tunnel repair    H/O cataract extraction    FUNCTIONAL HISTORY:   Lives w spouse in home w 4 CHRIS  PTA Independent    CURRENT FUNCTIONAL STATUS:  Mod A transfers and gait    FAMILY HISTORY   FH: CAD (coronary artery disease) (Mother)  Family history of CVA (Father)    MEDICATIONS   chlorhexidine 2% Cloths 1 Application(s) Topical <User Schedule>  entecavir 0.5 milliGRAM(s) Oral daily  fluconAZOLE IVPB 400 milliGRAM(s) IV Intermittent every 24 hours  fluticasone propionate/ salmeterol 100-50 MICROgram(s) Diskus 1 Dose(s) Inhalation two times a day  heparin   Injectable 5000 Unit(s) SubCutaneous every 12 hours  HYDROmorphone  Injectable 0.5 milliGRAM(s) IV Push every 3 hours PRN  insulin glargine Injectable (LANTUS) 18 Unit(s) SubCutaneous at bedtime  insulin lispro (ADMELOG) corrective regimen sliding scale   SubCutaneous Before meals and at bedtime  insulin lispro Injectable (ADMELOG) 12 Unit(s) SubCutaneous three times a day with meals  melatonin 5 milliGRAM(s) Oral at bedtime  mycophenolate mofetil 1000 milliGRAM(s) Oral <User Schedule>  NIFEdipine XL 30 milliGRAM(s) Oral every 24 hours  oxyCODONE    IR 5 milliGRAM(s) Oral every 4 hours PRN  oxyCODONE    IR 10 milliGRAM(s) Oral every 4 hours PRN  pantoprazole  Injectable 40 milliGRAM(s) IV Push daily  polyethylene glycol 3350 17 Gram(s) Oral every 24 hours  senna 2 Tablet(s) Oral two times a day  tacrolimus 0.5 milliGRAM(s) Oral <User Schedule>  trimethoprim   80 mG/sulfamethoxazole 400 mG 1 Tablet(s) Oral daily  valGANciclovir 450 milliGRAM(s) Oral daily  vancomycin  IVPB 1000 milliGRAM(s) IV Intermittent every 24 hours    ALLERGIES  No Known Allergies    VITALS  T(C): 36.7 (25 @ 11:00), Max: 37.2 (25 @ 19:00)  HR: 69 (25 @ 12:00) (52 - 90)  BP: 154/69 (25 @ 12:00) (132/59 - 180/74)  RR: 24 (25 @ 12:00) (12 - 27)  SpO2: 97% (25 @ 12:00) (91% - 100%)  Wt(kg): --    PHYSICAL EXAM  Constitutional - NAD, Comfortable  HEENT - NCAT, EOMI  Neck - Supple  Chest - No distress, no use of accessory muscles for respiration  Cardiovascular -Well perfused  Abdomen - BS+, Soft, NTND  Extremities - No C/C/E, No calf tenderness   Neurologic Exam -                    Cognitive - Awake, Alert, AAO to self, place, date, year, situation     Communication - Fluent, No dysarthria, no aphasia     Cranial Nerves - CN 2-12 intact     Motor - No focal deficits      Sensory - Intact to LT     Reflexes - DTR Intact, No primitive reflexive  Psychiatric - Mood stable, Affect WNL    RECENT LABS/IMAGING  CBC Full  -  ( 2025 05:07 )  WBC Count : 7.28 K/uL  RBC Count : 2.87 M/uL  Hemoglobin : 8.4 g/dL  Hematocrit : 24.3 %  Platelet Count - Automated : 46 K/uL  Mean Cell Volume : 84.7 fl  Mean Cell Hemoglobin : 29.3 pg  Mean Cell Hemoglobin Concentration : 34.6 g/dL  Auto Neutrophil # : x  Auto Lymphocyte # : x  Auto Monocyte # : x  Auto Eosinophil # : x  Auto Basophil # : x  Auto Neutrophil % : x  Auto Lymphocyte % : x  Auto Monocyte % : x  Auto Eosinophil % : x  Auto Basophil % : x    06-02    138  |  101  |  46[H]  ----------------------------<  230[H]  4.3   |  22  |  1.57[H]    Ca    7.8[L]      2025 05:07  Phos  5.0     06  Mg     2.5     -    TPro  4.8[L]  /  Alb  3.3  /  TBili  1.8[H]  /  DBili  x   /  AST  462[H]  /  ALT  419[H]  /  AlkPhos  76  06-02    Urinalysis Basic - ( 2025 05:07 )    Color: x / Appearance: x / SG: x / pH: x  Gluc: 230 mg/dL / Ketone: x  / Bili: x / Urobili: x   Blood: x / Protein: x / Nitrite: x   Leuk Esterase: x / RBC: x / WBC x   Sq Epi: x / Non Sq Epi: x / Bacteria: x    Impression:  71 yo with functional deficits secondary to diagnosis of liver Ca s/p OLT with debility    Plan:  PT- ROM, Bed Mob, Transfers, Amb w AD and bracing as needed  OT- ADLs, bracing  Prec- Falls, Cardiac  DVT Prophylaxis- Heparin  Monitor anemia, plts, renal fxn  Skin- Turn q2 h  Dispo-     Total time taken to review relevant records and imaging results, examine patient, write note, and, when applicable, discuss case with patient, family, , resident, medical student and other medical providers:     [  ] 40 minutes (91816)  [  ] 55 minutes ([id number])  [  ] 75 minutes ([id number])    [  ] 25 minutes ([id number])  [  ] 35 minutes ([id number])  [  ] 50 minutes ([id number])           Patient is a 70y old  Female who presents with a chief complaint of melena (2025 12:04)    Admission HPI:  70 F hx NAFLD cirrhosis and HCC (listed for liver transplant with MELD 20B), UGIB in , chronic back pain (2/2 spinal fracture) presented from [facility] where she was admitted on  for coffee ground emesis and melena, EGD showed no active bleeding, patient was transfused and stabilized prior to transfer. Patient states she hasn't had bloody bowel movements or bloody emesis since . Patient endorses low back pain and frontal lobe headache, denies urinary or bowel incontinence, loss of sensation, weakness, dizziness, changes in vision, photobia, blurry vision, nausea, fever, chills, chest pain, or shortness of breath.  (28 May 2025 00:58)    Interval History:  Patient s/p OLT on .  Patient with functional deficits post-op.    REVIEW OF SYSTEMS:+ weakness, + difficulty walking,  incisional discomfort, No chest pain, shortness of breath, nausea, vomiting or diarhea; other ROS neg     PAST MEDICAL & SURGICAL HISTORY  HCC (hepatocellular carcinoma)    DM (diabetes mellitus)    HTN (hypertension)    HLD (hyperlipidemia)    Hepatic cirrhosis    IVEY (nonalcoholic steatohepatitis)    Former smoker    Ascites    Hepatic encephalopathy    History of cervical cancer    H/O  section    History of cholecystectomy    H/O carpal tunnel repair    H/O cataract extraction    FUNCTIONAL HISTORY:   Lives w spouse in home w 4 CHRIS  PTA Independent    CURRENT FUNCTIONAL STATUS:  Mod A transfers and gait    FAMILY HISTORY   FH: CAD (coronary artery disease) (Mother)  Family history of CVA (Father)    MEDICATIONS   chlorhexidine 2% Cloths 1 Application(s) Topical <User Schedule>  entecavir 0.5 milliGRAM(s) Oral daily  fluconAZOLE IVPB 400 milliGRAM(s) IV Intermittent every 24 hours  fluticasone propionate/ salmeterol 100-50 MICROgram(s) Diskus 1 Dose(s) Inhalation two times a day  heparin   Injectable 5000 Unit(s) SubCutaneous every 12 hours  HYDROmorphone  Injectable 0.5 milliGRAM(s) IV Push every 3 hours PRN  insulin glargine Injectable (LANTUS) 18 Unit(s) SubCutaneous at bedtime  insulin lispro (ADMELOG) corrective regimen sliding scale   SubCutaneous Before meals and at bedtime  insulin lispro Injectable (ADMELOG) 12 Unit(s) SubCutaneous three times a day with meals  melatonin 5 milliGRAM(s) Oral at bedtime  mycophenolate mofetil 1000 milliGRAM(s) Oral <User Schedule>  NIFEdipine XL 30 milliGRAM(s) Oral every 24 hours  oxyCODONE    IR 5 milliGRAM(s) Oral every 4 hours PRN  oxyCODONE    IR 10 milliGRAM(s) Oral every 4 hours PRN  pantoprazole  Injectable 40 milliGRAM(s) IV Push daily  polyethylene glycol 3350 17 Gram(s) Oral every 24 hours  senna 2 Tablet(s) Oral two times a day  tacrolimus 0.5 milliGRAM(s) Oral <User Schedule>  trimethoprim   80 mG/sulfamethoxazole 400 mG 1 Tablet(s) Oral daily  valGANciclovir 450 milliGRAM(s) Oral daily  vancomycin  IVPB 1000 milliGRAM(s) IV Intermittent every 24 hours    ALLERGIES  No Known Allergies    VITALS  T(C): 36.7 (25 @ 11:00), Max: 37.2 (25 @ 19:00)  HR: 69 (25 @ 12:00) (52 - 90)  BP: 154/69 (25 @ 12:00) (132/59 - 180/74)  RR: 24 (25 @ 12:00) (12 - 27)  SpO2: 97% (25 @ 12:00) (91% - 100%)  Wt(kg): --    PHYSICAL EXAM  Constitutional - NAD, Comfortable  HEENT - NCAT, EOMI  Neck - Supple  Chest - No distress, no use of accessory muscles for respiration  Cardiovascular -Well perfused  Abdomen - BS+, Soft, NTND  Extremities - Trace edema, No calf tenderness   Neurologic Exam -                 AAO x 3  Follows verbal instruction  Motor 4+/5 bl UE and LE     Psychiatric - Tearful/Emotional Lability    RECENT LABS/IMAGING  CBC Full  -  ( 2025 05:07 )  WBC Count : 7.28 K/uL  RBC Count : 2.87 M/uL  Hemoglobin : 8.4 g/dL  Hematocrit : 24.3 %  Platelet Count - Automated : 46 K/uL  Mean Cell Volume : 84.7 fl  Mean Cell Hemoglobin : 29.3 pg  Mean Cell Hemoglobin Concentration : 34.6 g/dL  Auto Neutrophil # : x  Auto Lymphocyte # : x  Auto Monocyte # : x  Auto Eosinophil # : x  Auto Basophil # : x  Auto Neutrophil % : x  Auto Lymphocyte % : x  Auto Monocyte % : x  Auto Eosinophil % : x  Auto Basophil % : x        138  |  101  |  46[H]  ----------------------------<  230[H]  4.3   |  22  |  1.57[H]    Ca    7.8[L]      2025 05:07  Phos  5.0       Mg     2.5         TPro  4.8[L]  /  Alb  3.3  /  TBili  1.8[H]  /  DBili  x   /  AST  462[H]  /  ALT  419[H]  /  AlkPhos  76      Urinalysis Basic - ( 2025 05:07 )    Color: x / Appearance: x / SG: x / pH: x  Gluc: 230 mg/dL / Ketone: x  / Bili: x / Urobili: x   Blood: x / Protein: x / Nitrite: x   Leuk Esterase: x / RBC: x / WBC x   Sq Epi: x / Non Sq Epi: x / Bacteria: x    Impression:  69 yo with functional deficits secondary to diagnosis of liver Ca s/p OLT with debility    Plan:  PT- ROM, Bed Mob, Transfers, Amb w AD and bracing as needed  OT- ADLs, bracing  Prec- Falls, Cardiac  DVT Prophylaxis- Heparin  Pain - Oxy prn  Monitor anemia, plts, renal fxn  Skin- Turn q2 h  Dispo- Acute Rehab- patient requires active and ongoing therapeutic interventions of multiple disciplines and can tolerate and benefit from 3 hours of intensive therapies x 2-4wks depending on progress at rehabilitation facility. Can actively participate and benefit from  an intensive rehabilitation program. Requires supervision by a rehabilitation physician and a coordinated interdisciplinary approach to providing rehabilitation.     Total time taken to review relevant records and imaging results, examine patient, write note, and, when applicable, discuss case with patient, family, , resident, medical student and other medical providers:   [  ] 40 minutes ([id number])  [X] 55 minutes (02824)  [  ] 75 minutes (78311)    [  ] 25 minutes (22542)  [  ] 35 minutes (47074)  [  ] 50 minutes ()

## 2025-06-02 NOTE — BH CONSULTATION LIAISON ASSESSMENT NOTE - NSBHCHARTREVIEWLAB_PSY_A_CORE FT
8.4    7.28  )-----------( 46       ( 02 Jun 2025 05:07 )             24.3     06-02    138  |  101  |  46[H]  ----------------------------<  230[H]  4.3   |  22  |  1.57[H]    Ca    7.8[L]      02 Jun 2025 05:07  Phos  5.0     06-02  Mg     2.5     06-02    TPro  4.8[L]  /  Alb  3.3  /  TBili  1.8[H]  /  DBili  x   /  AST  462[H]  /  ALT  419[H]  /  AlkPhos  76  06-02

## 2025-06-02 NOTE — PROGRESS NOTE ADULT - SUBJECTIVE AND OBJECTIVE BOX
HECTOR RUBIN is a 70y Female s/p liver transplant on 5/31/2025 . PMH is significant for NAFLD cirrhosis and HCC (listed for liver transplant with MELD 20B), UGIB in 2022, chronic back pain (2/2 spinal fracture), IDDM     NKDA    CMV+/-  EBV+/-    Transplant Medications  Induction  -Basiliximab 20 mg POD 0 (given in OR) and POD 4  -Methylprednisolone taper (switch to PO prednisone on POD 6)            POD 0: 1000 mg IV in OR            POD 1: 500 mg IV x 1            POD 2: 250 mg IV x 1            POD 3: 125 mg IV x 1            POD 4: 60 mg IV x 1            POD 5: 40 mg IV x 1        Maintenance Immunosuppression  -Tacrolimus 0.05 mg/kg/dose Q12H at 8AM and 8PM (Adjust for goal trough: 8-10) to be started on POD 1 per liver transplant team  -Mycophenolate 1,000 mg PO/IV Q12H  -Prednisone             POD 6: 20 mg PO daily    Anti-infection   -Bactrim SS tablet (frequency based on renal function)  -Valganciclovir (dose based on CMV serostatus and frequency based on renal function)  -Fluconazole 400 mg daily    Surgical prophylaxis pre- and intra-operative dosing  -Vancomycin  + Zosyn    Prophylaxis  -HAT ppx: aspirin 81 mg daily to start on POD3 per liver transplant team  -GI ppx: pantoprazole 40 mg IV daily (switch to PO when patient tolerates)  -Bowel ppx: senna  -DVT: sequential compression device  -Pain:            Mild: Acetaminophen 650 mg every 6 hours PRN           Moderate: Tramadol 25 mg every 4 hours PRN (adjust for renal function)           Severe: Tramadol 50 mg every 4 hours PRN (adjust for renal function)    Home Medications:  alendronate weekly: 70 milligram(s) orally once a week (03 Apr 2025 07:49)  carvedilol 3.125 mg oral tablet: 1 tablet orally 2 times a day (03 Apr 2025 09:43)  HumaLOG 100 units/mL injectable solution: 18 unit(s) injectable 3 times a day (before meals) (03 Apr 2025 07:43)  lactulose 10 g/15 mL oral solution: 20 milligram(s) orally 2 times a day (03 Apr 2025 07:46)  Lantus Solostar Pen 100 units/mL subcutaneous solution: 14 subcutaneous once a day (at bedtime) (03 Apr 2025 07:49)  Multiple Vitamins oral tablet: 1 tab(s) orally once a day (03 Apr 2025 07:49)  Protonix 40 mg oral delayed release tablet: 1 tab(s) orally once a day (03 Apr 2025 11:38)  rifAXIMin 550 mg oral tablet: 1 tab(s) orally 2 times a day (03 Apr 2025 07:49)  Symbicort 160 mcg-4.5 mcg/inh inhalation aerosol: 2 inhaled 2 times a day (03 Apr 2025 09:44)  torsemide 10 mg oral tablet: 1 tab(s) orally once a day (03 Apr 2025 09:43)  ursodiol 500 mg oral tablet: 1 tab(s) orally once a day (03 Apr 2025 07:49)    Outpatient medication reconciliation reviewed and will be re-started appropriately.  Plan discussed with multidisciplinary team.  HECTOR RUBIN is a 70y Female s/p liver transplant on 5/31/2025 . PMH is significant for NAFLD cirrhosis and HCC (listed for liver transplant with MELD 20B), UGIB in 2022, chronic back pain (2/2 spinal fracture), IDDM     NKDA    CMV+/-  EBV+/-  Donor HBc+    Transplant Medications  Induction  -Basiliximab 20 mg POD 0 (given in OR) and POD 4  -Methylprednisolone taper (switch to PO prednisone on POD 6)            POD 0: 1000 mg IV in OR            POD 1: 500 mg IV x 1            POD 2: 250 mg IV x 1            POD 3: 125 mg IV x 1            POD 4: 60 mg IV x 1            POD 5: 40 mg IV x 1        Maintenance Immunosuppression  -Tacrolimus 0.05 mg/kg/dose Q12H at 8AM and 8PM (Adjust for goal trough: 8-10) to be started on POD 1 per liver transplant team  -Mycophenolate 1,000 mg PO/IV Q12H  -Prednisone             POD 6: 20 mg PO daily    Anti-infection   -Bactrim SS tablet (frequency based on renal function)  -Valganciclovir (dose based on CMV serostatus and frequency based on renal function)  -Fluconazole 400 mg daily    Surgical prophylaxis pre- and intra-operative dosing  -Vancomycin  + Zosyn    Prophylaxis  -HAT ppx: aspirin 81 mg daily to start on POD3 per liver transplant team  -GI ppx: pantoprazole 40 mg IV daily (switch to PO when patient tolerates)  -Bowel ppx: senna  -DVT: sequential compression device  -Pain:            Mild: Acetaminophen 650 mg every 6 hours PRN           Moderate: Tramadol 25 mg every 4 hours PRN (adjust for renal function)           Severe: Tramadol 50 mg every 4 hours PRN (adjust for renal function)    Home Medications:  alendronate weekly: 70 milligram(s) orally once a week (03 Apr 2025 07:49)  carvedilol 3.125 mg oral tablet: 1 tablet orally 2 times a day (03 Apr 2025 09:43)  HumaLOG 100 units/mL injectable solution: 18 unit(s) injectable 3 times a day (before meals) (03 Apr 2025 07:43)  lactulose 10 g/15 mL oral solution: 20 milligram(s) orally 2 times a day (03 Apr 2025 07:46)  Lantus Solostar Pen 100 units/mL subcutaneous solution: 14 subcutaneous once a day (at bedtime) (03 Apr 2025 07:49)  Multiple Vitamins oral tablet: 1 tab(s) orally once a day (03 Apr 2025 07:49)  Protonix 40 mg oral delayed release tablet: 1 tab(s) orally once a day (03 Apr 2025 11:38)  rifAXIMin 550 mg oral tablet: 1 tab(s) orally 2 times a day (03 Apr 2025 07:49)  Symbicort 160 mcg-4.5 mcg/inh inhalation aerosol: 2 inhaled 2 times a day (03 Apr 2025 09:44)  torsemide 10 mg oral tablet: 1 tab(s) orally once a day (03 Apr 2025 09:43)  ursodiol 500 mg oral tablet: 1 tab(s) orally once a day (03 Apr 2025 07:49)    Outpatient medication reconciliation reviewed and will be re-started appropriately.  Plan discussed with multidisciplinary team.

## 2025-06-02 NOTE — BH CONSULTATION LIAISON ASSESSMENT NOTE - HPI (INCLUDE ILLNESS QUALITY, SEVERITY, DURATION, TIMING, CONTEXT, MODIFYING FACTORS, ASSOCIATED SIGNS AND SYMPTOMS)
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       Patient noted to be AAOx2-3 on exam, with intermittent bouts of tearfulness with laughter.  Of note patient is known to writer from pretransplant psychiatric assessment in which patient had disclosed extensive history of severe anxiety for which she was previously on benzodiazepines.  Patient was not currently connected to outpatient psychiatric care but was in the process of establishing in the outpatient setting prior to procedure.  Per primary team, patient noted to be overwhelmed and crying excessively in the AM reporting "depression."  When asked, patient initially with bright affect but with mild perseveration on procedure being a "shock."  Subsequently begins to cry when asked of "depression."  Notable mood lability on exam.  Patient starts speaking of hepatology appointment and cardiology appointment when asked to further disclose additional psychiatric symptoms.  Notes poor sleep architecture with both initial and middle insomnia and felt "mind was playing tricks."  Denies any overt SI/HI/AVH and has no plan or intent of self-harm.  Denies symptoms of hypomania/acrlos, PTSD or OCD.

## 2025-06-02 NOTE — PROGRESS NOTE ADULT - ASSESSMENT
70 F hx NAFLD cirrhosis and HCC (listed for liver transplant with MELD 20B), UGIB in 2022, chronic back pain (2/2 spinal fracture) presented from [facility] where she was admitted on 5/26 for coffee ground emesis and melena, EGD showed no active bleeding, transferred to [facility] now s/p OLT on 5/31/2025     [] s/p OLT - POD 2  - HBV Core + donor: Entecavir  - LFTs trending down  - Diet: adv to reg; dc IVF   - Strict I&O:  JPs x3  - Pain Management  - Bowel Regimen  - E.Faecium UTI, Zosyn/Vanco to completed today  - OT/PT/RD /IS  - BH consult today      [] Immunosuppression:  - FK by level,  MMF 1/1, SST  - Simulect POD 4   - PPx: Bactrim, Valcyte 450 (CMV +/-, inc 900mg as able), Fluc, PPI, OsCal    [] DM  - incr lantus/Lispro     [] DVT  - start SQH bid

## 2025-06-02 NOTE — PROGRESS NOTE ADULT - SUBJECTIVE AND OBJECTIVE BOX
24 HOUR EVENTS:  - pass TOV  - 1 PRBC  - 15 lantus x1, off insulin ggt  - 14 lantus HS, 8u premeal (18u at home)    NEURO  RASS (if intubated): 		CAM ICU (if concern for delirium):  Exam: AOx4  Meds: HYDROmorphone  Injectable 0.5 milliGRAM(s) IV Push every 3 hours PRN breakthrough pain  melatonin 5 milliGRAM(s) Oral at bedtime  oxyCODONE    IR 5 milliGRAM(s) Oral every 4 hours PRN Moderate Pain (4 - 6)  oxyCODONE    IR 10 milliGRAM(s) Oral every 4 hours PRN Severe Pain (7 - 10)      RESPIRATORY  RR: 12 (06-01-25 @ 23:00) (6 - 27)  SpO2: 98% (06-01-25 @ 23:00) (79% - 100%)  Wt(kg): --  Exam: Lungs CTA b/l  Mechanical Ventilation:   ABG - ( 01 Jun 2025 17:29 )  pH: 7.37  /  pCO2: 39    /  pO2: 94    / HCO3: 22    / Base Excess: -2.5  /  SaO2: 99.1    Lactate: x                Meds: fluticasone propionate/ salmeterol 100-50 MICROgram(s) Diskus 1 Dose(s) Inhalation two times a day      CARDIOVASCULAR  HR: 79 (06-01-25 @ 23:00) (57 - 79)  BP: 166/71 (06-01-25 @ 23:00) (149/69 - 191/79)  BP(mean): 102 (06-01-25 @ 23:00) (97 - 113)  ABP: 165/55 (06-01-25 @ 12:00) (140/44 - 189/74)  ABP(mean): 97 (06-01-25 @ 12:00) (77 - 121)  Wt(kg): --  CVP(cm H2O): --      Exam: Normal S1/S2 w/o murmurs or rubs  Cardiac Rhythm: sinus  Perfusion     [ x]Adequate   [ ]Inadequate  Mentation   [x ]Normal       [ ]Reduced  Extremities  [x ]Warm         [ ]Cool  Volume Status [ ]Hypervolemic [x ]Euvolemic [ ]Hypovolemic  Meds:     GI/NUTRITION  Exam: abd non distended, incision closed and dry  Diet: CLD  Last Bowel Movement: 29-May-2025 (05-30-25 @ 20:54)    Meds: pantoprazole  Injectable 40 milliGRAM(s) IV Push daily  polyethylene glycol 3350 17 Gram(s) Oral every 24 hours  senna 2 Tablet(s) Oral two times a day      GENITOURINARY  I&O's Detail    05-31 @ 07:01 - 06-01 @ 07:00  --------------------------------------------------------  IN:    Albumin 5%  - 250 mL: 500 mL    Dexmedetomidine: 19.4 mL    Enteral Tube Flush: 30 mL    Insulin: 55.5 mL    IV PiggyBack: 400 mL    IV PiggyBack: 250 mL    multiple electrolytes Injection Type 1.: 2250 mL  Total IN: 3504.9 mL    OUT:    Drain (mL): 555 mL    Drain (mL): 350 mL    Drain (mL): 155 mL    Indwelling Catheter - Urethral (mL): 930 mL    Nasogastric/Oral tube (mL): 150 mL  Total OUT: 2140 mL    Total NET: 1364.9 mL      06-01 @ 07:01 - 06-02 @ 00:28  --------------------------------------------------------  IN:    Insulin: 6 mL    IV PiggyBack: 100 mL    IV PiggyBack: 350 mL    multiple electrolytes Injection Type 1.: 2125 mL    Oral Fluid: 180 mL    PRBCs (Packed Red Blood Cells): 300 mL  Total IN: 3061 mL    OUT:    Drain (mL): 65 mL    Drain (mL): 85 mL    Drain (mL): 235 mL    Indwelling Catheter - Urethral (mL): 205 mL    Voided (mL): 150 mL  Total OUT: 740 mL    Total NET: 2321 mL          06-01    142  |  105  |  39[H]  ----------------------------<  336[H]  4.1   |  21[L]  |  1.33[H]    Ca    8.4      01 Jun 2025 17:32  Phos  4.3     06-01  Mg     2.5     06-01    TPro  4.9[L]  /  Alb  3.4  /  TBili  2.1[H]  /  DBili  x   /  AST  619[H]  /  ALT  435[H]  /  AlkPhos  71  06-01    Meds: multiple electrolytes Injection Type 1 1000 milliLiter(s) IV Continuous <Continuous>      HEMATOLOGIC  Meds:                         8.8    8.85  )-----------( 55       ( 01 Jun 2025 17:32 )             25.7     PT/INR - ( 01 Jun 2025 17:32 )   PT: 13.7 sec;   INR: 1.19 ratio         PTT - ( 01 Jun 2025 17:32 )  PTT:27.7 sec    INFECTIOUS DISEASES  T(C): 36.8 (06-01-25 @ 23:00), Max: 37.3 (06-01-25 @ 07:00)  Wt(kg): --  WBC Count: 8.85 K/uL (06-01 @ 17:32)  WBC Count: 9.05 K/uL (06-01 @ 09:33)  WBC Count: 7.06 K/uL (06-01 @ 04:25)  WBC Count: 5.95 K/uL (06-01 @ 00:48)    Recent Cultures:  Specimen Source: Body Fluid Ascites, 05-31 @ 14:10; Results   No growth; Gram Stain:   No polymorphonuclear cells seen  No organisms seen  by cytocentrifuge; Organism: --  Specimen Source: Clean Catch Clean Catch (Midstream), 05-28 @ 10:20; Results   10,000 - 49,000 CFU/mL Enterococcus faecium[!]; Gram Stain: --; Organism: Enterococcus faecium[!]  Specimen Source: Blood Blood-Peripheral, 05-28 @ 09:40; Results   No growth at 4 days; Gram Stain: --; Organism: --  Specimen Source: Blood Blood-Peripheral, 05-28 @ 09:20; Results   No growth at 4 days; Gram Stain: --; Organism: --    Meds: entecavir 0.5 milliGRAM(s) Oral daily  fluconAZOLE IVPB 400 milliGRAM(s) IV Intermittent every 24 hours  mycophenolate mofetil Suspension 1000 milliGRAM(s) Oral <User Schedule>  piperacillin/tazobactam IVPB.. 3.375 Gram(s) IV Intermittent every 8 hours  tacrolimus 0.5 milliGRAM(s) Oral <User Schedule>  trimethoprim  40 mG/sulfamethoxazole 200 mG Suspension 10 milliLiter(s) Oral daily  valGANciclovir 50 mG/mL Oral Solution 450 milliGRAM(s) Oral daily      ENDOCRINE  Capillary Blood Glucose    Meds: insulin glargine Injectable (LANTUS) 15 Unit(s) SubCutaneous at bedtime  insulin lispro (ADMELOG) corrective regimen sliding scale   SubCutaneous every 4 hours      ACCESS DEVICES:  [ ] Peripheral IV  [ ] Central Venous Line		[ ] R	[ ] L	[ ] IJ	[ ] Fem	[ ] SC	Placed:   [ ] Arterial Line			[ ] R	[ ] L	[ ] Fem	[ ] Rad	[ ] Ax	Placed:   [ ] PICC:					[ ] Mediport  [ ] Urinary Catheter, Date Placed:   [ ] Necessity of urinary, arterial, and venous catheters discussed    OTHER MEDICATIONS:  chlorhexidine 2% Cloths 1 Application(s) Topical <User Schedule>      IMAGING:

## 2025-06-02 NOTE — BH CONSULTATION LIAISON ASSESSMENT NOTE - CURRENT MEDICATION
MEDICATIONS  (STANDING):  chlorhexidine 2% Cloths 1 Application(s) Topical <User Schedule>  entecavir 0.5 milliGRAM(s) Oral daily  fluconAZOLE IVPB 400 milliGRAM(s) IV Intermittent every 24 hours  fluticasone propionate/ salmeterol 100-50 MICROgram(s) Diskus 1 Dose(s) Inhalation two times a day  heparin   Injectable 5000 Unit(s) SubCutaneous every 12 hours  insulin glargine Injectable (LANTUS) 18 Unit(s) SubCutaneous at bedtime  insulin lispro (ADMELOG) corrective regimen sliding scale   SubCutaneous Before meals and at bedtime  insulin lispro Injectable (ADMELOG) 12 Unit(s) SubCutaneous three times a day with meals  melatonin 5 milliGRAM(s) Oral at bedtime  mycophenolate mofetil 1000 milliGRAM(s) Oral <User Schedule>  NIFEdipine XL 30 milliGRAM(s) Oral every 24 hours  pantoprazole  Injectable 40 milliGRAM(s) IV Push daily  polyethylene glycol 3350 17 Gram(s) Oral every 24 hours  senna 2 Tablet(s) Oral two times a day  tacrolimus 0.5 milliGRAM(s) Oral <User Schedule>  trimethoprim   80 mG/sulfamethoxazole 400 mG 1 Tablet(s) Oral daily  valGANciclovir 450 milliGRAM(s) Oral daily  vancomycin  IVPB 1000 milliGRAM(s) IV Intermittent every 24 hours    MEDICATIONS  (PRN):  HYDROmorphone  Injectable 0.5 milliGRAM(s) IV Push every 3 hours PRN breakthrough pain  oxyCODONE    IR 5 milliGRAM(s) Oral every 4 hours PRN Moderate Pain (4 - 6)  oxyCODONE    IR 10 milliGRAM(s) Oral every 4 hours PRN Severe Pain (7 - 10)

## 2025-06-02 NOTE — BH CONSULTATION LIAISON ASSESSMENT NOTE - NSBHTIMEACTIVITIESPERFORMED_PSY_A_CORE
I have spent 75 minutes in the care of this patient, including: obtaining/reviewing labs, interviewing, providing supportive therapy, medication management in addition to documentation of the above. Time spent excludes time spent on separately reportable services/teaching during the encounter.

## 2025-06-02 NOTE — PROGRESS NOTE ADULT - SUBJECTIVE AND OBJECTIVE BOX
Transplant Surgery -  Multidisciplinary Progress Note  --------------------------------------------------------------  OLTx      Date: 2025      POD# 2  CMV +/-    Patient seen and examined with Surgeon Dr. Gaona, Hepatologist Dr. Gaona, GUDELIA John/Stu, Pharmacist, Nutritionist, and SICU team. Disciplines not in attendance will be notified of the plan      HPI: 70 F hx NAFLD cirrhosis and HCC (listed for liver transplant with MELD 20B), UGIB in , chronic back pain (2/2 spinal fracture) presented from [facility] where she was admitted on  for coffee ground emesis and melena, EGD showed no active bleeding, transferred to [facility] now s/p OLT on 2025     Interval Events:  - dced NGT, adv to clears, tolerating  - trasnf 1u PRBC  - love dced, passed void trial  - off insulin gtt  - anxious, tearing during am rounds    Immunosuppression:  Induction: Simulect on POD 4  Maintenance: FK by level, MMF , SST, S  Ongoing monitoring for signs of rejection    Potential Discharge date: tbd   Education:  Medications  Plan of care:  See Below    MEDICATIONS  (STANDING):  chlorhexidine 2% Cloths 1 Application(s) Topical <User Schedule>  entecavir 0.5 milliGRAM(s) Oral daily  fluconAZOLE IVPB 400 milliGRAM(s) IV Intermittent every 24 hours  fluticasone propionate/ salmeterol 100-50 MICROgram(s) Diskus 1 Dose(s) Inhalation two times a day  insulin glargine Injectable (LANTUS) 14 Unit(s) SubCutaneous at bedtime  insulin lispro (ADMELOG) corrective regimen sliding scale   SubCutaneous Before meals and at bedtime  insulin lispro Injectable (ADMELOG) 8 Unit(s) SubCutaneous three times a day with meals  melatonin 5 milliGRAM(s) Oral at bedtime  multiple electrolytes Injection Type 1 1000 milliLiter(s) (50 mL/Hr) IV Continuous <Continuous>  mycophenolate mofetil 1000 milliGRAM(s) Oral <User Schedule>  pantoprazole  Injectable 40 milliGRAM(s) IV Push daily  piperacillin/tazobactam IVPB.. 3.375 Gram(s) IV Intermittent every 8 hours  polyethylene glycol 3350 17 Gram(s) Oral every 24 hours  senna 2 Tablet(s) Oral two times a day  tacrolimus 0.5 milliGRAM(s) Oral <User Schedule>  trimethoprim  40 mG/sulfamethoxazole 200 mG Suspension 10 milliLiter(s) Oral daily  valGANciclovir 50 mG/mL Oral Solution 450 milliGRAM(s) Oral daily  vancomycin  IVPB 1000 milliGRAM(s) IV Intermittent every 24 hours    MEDICATIONS  (PRN):  HYDROmorphone  Injectable 0.5 milliGRAM(s) IV Push every 3 hours PRN breakthrough pain  oxyCODONE    IR 5 milliGRAM(s) Oral every 4 hours PRN Moderate Pain (4 - 6)  oxyCODONE    IR 10 milliGRAM(s) Oral every 4 hours PRN Severe Pain (7 - 10)      PAST MEDICAL & SURGICAL HISTORY:  HCC (hepatocellular carcinoma)  DM (diabetes mellitus)  HTN (hypertension)  HLD (hyperlipidemia)  Hepatic cirrhosis  IVEY (nonalcoholic steatohepatitis)  Former smoker  Ascites  Hepatic encephalopathy  History of cervical cancer  H/O  section  History of cholecystectomy  H/O carpal tunnel repair  H/O cataract extraction    Vital Signs Last 24 Hrs  T(C): 37 (2025 07:00), Max: 37.2 (2025 19:00)  T(F): 98.6 (2025 07:00), Max: 98.9 (2025 19:00)  HR: 71 (2025 08:00) (52 - 90)  BP: 162/75 (2025 08:00) (132/59 - 191/79)  BP(mean): 108 (2025 08:00) (81 - 113)  RR: 18 (2025 08:00) (6 - 27)  SpO2: 94% (2025 08:00) (79% - 100%)    Parameters below as of 2025 07:00  Patient On (Oxygen Delivery Method): room air      I&O's Summary    2025 07:01  -  2025 07:00  --------------------------------------------------------  IN: 4716 mL / OUT: 1045 mL / NET: 3671 mL    2025 07:01  -  2025 08:42  --------------------------------------------------------  IN: 200 mL / OUT: 0 mL / NET: 200 mL                        8.4    7.28  )-----------( 46       ( 2025 05:07 )             24.3     06-02    138  |  101  |  46[H]  ----------------------------<  230[H]  4.3   |  22  |  1.57[H]    Ca    7.8[L]      2025 05:07  Phos  5.0     06-02  Mg     2.5     06-02    TPro  4.8[L]  /  Alb  3.3  /  TBili  1.8[H]  /  DBili  x   /  AST  462[H]  /  ALT  419[H]  /  AlkPhos  76  06-02      Culture - Acid Fast - Body Fluid w/Smear (collected 25 @ 14:10)  Source: Body Fluid    Culture - Body Fluid with Gram Stain (collected 25 @ 14:10)  Source: Body Fluid Ascites  Gram Stain (25 @ 17:32):    No polymorphonuclear cells seen    No organisms seen    by cytocentrifuge  Preliminary Report (25 @ 16:11):    No growth    Culture - Fungal, Body Fluid (collected 25 @ 14:10)  Source: Body Fluid Ascites  Preliminary Report (25 @ 10:52):    Testing in progress    Culture - Urine (collected 25 @ 10:20)  Source: Clean Catch Clean Catch (Midstream)  Final Report (25 @ 11:58):    10,000 - 49,000 CFU/mL Enterococcus faecium  Organism: Enterococcus faecium (25 @ 11:58)  Organism: Enterococcus faecium (25 @ 11:58)    Culture - Blood (collected 25 @ 09:40)  Source: Blood Blood-Peripheral  Preliminary Report (25 @ 13:00):    No growth at 4 days    Culture - Blood (collected 25 @ 09:20)  Source: Blood Blood-Peripheral  Preliminary Report (25 @ 13:00):    No growth at 4 days      Review of systems  All other systems were reviewed and are negative, except as noted.      PHYSICAL EXAM:  Constitutional: NAD  Eyes: PERRLA  ENMT: nc/at, no thrush  Neck: supple,   Respiratory: CTA B/L  Cardiovascular: RRR  Gastrointestinal: Soft abdomen, ND, appropriate incisional TTP. chevron incision c/d/i, staples in place. No signs of infection.  JPsx3 sang  Genitourinary: voiding   Extremities: SCD's in place and working bilaterally  Vascular: Palpable dp pulses bilaterally.   Neurological:  AAO3  Skin: no rashes, ulcerations, lesions  Musculoskeletal:  moving extremities without issues  Psychiatric: cooperative

## 2025-06-02 NOTE — BH CONSULTATION LIAISON ASSESSMENT NOTE - RISK ASSESSMENT
Risk factors: AINSLEY, not in psychiatric treatment  Protective factors: no current SIIP/HIIP, no h/o SA/SIB, no h/o psych admissions, no access to weapons, no active substance abuse, social supports

## 2025-06-02 NOTE — PROGRESS NOTE ADULT - CRITICAL CARE ATTENDING COMMENT
Dr. Milian (Attending Physician)  Psych consult for depression  Room air, RLL atelectasis vs. effusion, IV locked  HTN start nifedipine  NAFLD cirrhosis and HCC - improving LFTs, advancing diet  start sqh for dvt ppx  vanc/zosyn periop will confirm no longer needed with tx, fluconazole  started lantus last night, added premeal today 8 units

## 2025-06-03 DIAGNOSIS — E11.9 TYPE 2 DIABETES MELLITUS WITHOUT COMPLICATIONS: ICD-10-CM

## 2025-06-03 DIAGNOSIS — I10 ESSENTIAL (PRIMARY) HYPERTENSION: ICD-10-CM

## 2025-06-03 LAB
ALBUMIN SERPL ELPH-MCNC: 3.2 G/DL — LOW (ref 3.3–5)
ALP SERPL-CCNC: 84 U/L — SIGNIFICANT CHANGE UP (ref 40–120)
ALT FLD-CCNC: 304 U/L — HIGH (ref 10–45)
ANION GAP SERPL CALC-SCNC: 12 MMOL/L — SIGNIFICANT CHANGE UP (ref 5–17)
APTT BLD: 27.3 SEC — SIGNIFICANT CHANGE UP (ref 26.1–36.8)
AST SERPL-CCNC: 170 U/L — HIGH (ref 10–40)
BILIRUB SERPL-MCNC: 1.2 MG/DL — SIGNIFICANT CHANGE UP (ref 0.2–1.2)
BUN SERPL-MCNC: 43 MG/DL — HIGH (ref 7–23)
CALCIUM SERPL-MCNC: 8 MG/DL — LOW (ref 8.4–10.5)
CHLORIDE SERPL-SCNC: 100 MMOL/L — SIGNIFICANT CHANGE UP (ref 96–108)
CO2 SERPL-SCNC: 20 MMOL/L — LOW (ref 22–31)
CREAT SERPL-MCNC: 1.45 MG/DL — HIGH (ref 0.5–1.3)
EGFR: 39 ML/MIN/1.73M2 — LOW
EGFR: 39 ML/MIN/1.73M2 — LOW
GAS PNL BLDV: SIGNIFICANT CHANGE UP
GLUCOSE BLDC GLUCOMTR-MCNC: 172 MG/DL — HIGH (ref 70–99)
GLUCOSE BLDC GLUCOMTR-MCNC: 222 MG/DL — HIGH (ref 70–99)
GLUCOSE BLDC GLUCOMTR-MCNC: 235 MG/DL — HIGH (ref 70–99)
GLUCOSE BLDC GLUCOMTR-MCNC: 261 MG/DL — HIGH (ref 70–99)
GLUCOSE SERPL-MCNC: 264 MG/DL — HIGH (ref 70–99)
HCT VFR BLD CALC: 24.4 % — LOW (ref 34.5–45)
HGB BLD-MCNC: 8.2 G/DL — LOW (ref 11.5–15.5)
INR BLD: 1.17 RATIO — HIGH (ref 0.85–1.16)
MAGNESIUM SERPL-MCNC: 2.5 MG/DL — SIGNIFICANT CHANGE UP (ref 1.6–2.6)
MCHC RBC-ENTMCNC: 29.4 PG — SIGNIFICANT CHANGE UP (ref 27–34)
MCHC RBC-ENTMCNC: 33.6 G/DL — SIGNIFICANT CHANGE UP (ref 32–36)
MCV RBC AUTO: 87.5 FL — SIGNIFICANT CHANGE UP (ref 80–100)
NRBC BLD AUTO-RTO: 0 /100 WBCS — SIGNIFICANT CHANGE UP (ref 0–0)
PHOSPHATE SERPL-MCNC: 3.2 MG/DL — SIGNIFICANT CHANGE UP (ref 2.5–4.5)
PLATELET # BLD AUTO: 30 K/UL — LOW (ref 150–400)
POTASSIUM SERPL-MCNC: 4.1 MMOL/L — SIGNIFICANT CHANGE UP (ref 3.5–5.3)
POTASSIUM SERPL-SCNC: 4.1 MMOL/L — SIGNIFICANT CHANGE UP (ref 3.5–5.3)
PROT SERPL-MCNC: 4.7 G/DL — LOW (ref 6–8.3)
PROTHROM AB SERPL-ACNC: 13.3 SEC — SIGNIFICANT CHANGE UP (ref 9.9–13.4)
RBC # BLD: 2.79 M/UL — LOW (ref 3.8–5.2)
RBC # FLD: 16.7 % — HIGH (ref 10.3–14.5)
SODIUM SERPL-SCNC: 132 MMOL/L — LOW (ref 135–145)
TACROLIMUS SERPL-MCNC: 2.5 NG/ML — SIGNIFICANT CHANGE UP
VANCOMYCIN FLD-MCNC: 45.3 UG/ML
WBC # BLD: 6.47 K/UL — SIGNIFICANT CHANGE UP (ref 3.8–10.5)
WBC # FLD AUTO: 6.47 K/UL — SIGNIFICANT CHANGE UP (ref 3.8–10.5)

## 2025-06-03 PROCEDURE — 99232 SBSQ HOSP IP/OBS MODERATE 35: CPT

## 2025-06-03 PROCEDURE — 99223 1ST HOSP IP/OBS HIGH 75: CPT

## 2025-06-03 PROCEDURE — 99233 SBSQ HOSP IP/OBS HIGH 50: CPT

## 2025-06-03 PROCEDURE — 93010 ELECTROCARDIOGRAM REPORT: CPT

## 2025-06-03 RX ORDER — TACROLIMUS 0.5 MG/1
0.5 CAPSULE ORAL
Refills: 0 | Status: DISCONTINUED | OUTPATIENT
Start: 2025-06-03 | End: 2025-06-04

## 2025-06-03 RX ORDER — FLUCONAZOLE 150 MG
200 TABLET ORAL DAILY
Refills: 0 | Status: DISCONTINUED | OUTPATIENT
Start: 2025-06-04 | End: 2025-06-05

## 2025-06-03 RX ORDER — TACROLIMUS 0.5 MG/1
1 CAPSULE ORAL
Refills: 0 | Status: DISCONTINUED | OUTPATIENT
Start: 2025-06-04 | End: 2025-06-04

## 2025-06-03 RX ORDER — INSULIN LISPRO 100 U/ML
16 INJECTION, SOLUTION INTRAVENOUS; SUBCUTANEOUS
Refills: 0 | Status: DISCONTINUED | OUTPATIENT
Start: 2025-06-03 | End: 2025-06-04

## 2025-06-03 RX ORDER — METHYLPREDNISOLONE ACETATE 80 MG/ML
60 INJECTION, SUSPENSION INTRA-ARTICULAR; INTRALESIONAL; INTRAMUSCULAR; SOFT TISSUE EVERY 24 HOURS
Refills: 0 | Status: COMPLETED | OUTPATIENT
Start: 2025-06-04 | End: 2025-06-04

## 2025-06-03 RX ORDER — METHYLPREDNISOLONE ACETATE 80 MG/ML
40 INJECTION, SUSPENSION INTRA-ARTICULAR; INTRALESIONAL; INTRAMUSCULAR; SOFT TISSUE EVERY 24 HOURS
Refills: 0 | Status: COMPLETED | OUTPATIENT
Start: 2025-06-05 | End: 2025-06-05

## 2025-06-03 RX ORDER — SUMATRIPTAN 100 MG/1
25 TABLET, FILM COATED ORAL ONCE
Refills: 0 | Status: COMPLETED | OUTPATIENT
Start: 2025-06-03 | End: 2025-06-03

## 2025-06-03 RX ORDER — TACROLIMUS 0.5 MG/1
0.5 CAPSULE ORAL ONCE
Refills: 0 | Status: COMPLETED | OUTPATIENT
Start: 2025-06-03 | End: 2025-06-03

## 2025-06-03 RX ORDER — TRAMADOL HYDROCHLORIDE 50 MG/1
50 TABLET, FILM COATED ORAL EVERY 8 HOURS
Refills: 0 | Status: DISCONTINUED | OUTPATIENT
Start: 2025-06-03 | End: 2025-06-10

## 2025-06-03 RX ORDER — QUETIAPINE FUMARATE 25 MG/1
50 TABLET ORAL AT BEDTIME
Refills: 0 | Status: DISCONTINUED | OUTPATIENT
Start: 2025-06-03 | End: 2025-06-06

## 2025-06-03 RX ORDER — INSULIN GLARGINE-YFGN 100 [IU]/ML
22 INJECTION, SOLUTION SUBCUTANEOUS AT BEDTIME
Refills: 0 | Status: DISCONTINUED | OUTPATIENT
Start: 2025-06-03 | End: 2025-06-04

## 2025-06-03 RX ORDER — TACROLIMUS 0.5 MG/1
0.5 CAPSULE ORAL
Refills: 0 | Status: DISCONTINUED | OUTPATIENT
Start: 2025-06-03 | End: 2025-06-03

## 2025-06-03 RX ORDER — METHYLPREDNISOLONE ACETATE 80 MG/ML
20 INJECTION, SUSPENSION INTRA-ARTICULAR; INTRALESIONAL; INTRAMUSCULAR; SOFT TISSUE EVERY 24 HOURS
Refills: 0 | Status: COMPLETED | OUTPATIENT
Start: 2025-06-06 | End: 2025-06-06

## 2025-06-03 RX ORDER — LIDOCAINE HCL/PF 10 MG/ML
10 VIAL (ML) INJECTION ONCE
Refills: 0 | Status: COMPLETED | OUTPATIENT
Start: 2025-06-03 | End: 2025-06-03

## 2025-06-03 RX ORDER — LANCING DEVICE
EACH MISCELLANEOUS 3 TIMES DAILY
Qty: 2 | Refills: 3 | Status: ACTIVE | COMMUNITY
Start: 2025-06-02 | End: 1900-01-01

## 2025-06-03 RX ORDER — TRAMADOL HYDROCHLORIDE 50 MG/1
25 TABLET, FILM COATED ORAL EVERY 6 HOURS
Refills: 0 | Status: DISCONTINUED | OUTPATIENT
Start: 2025-06-03 | End: 2025-06-10

## 2025-06-03 RX ORDER — LANCING DEVICE
W/DEVICE EACH MISCELLANEOUS
Qty: 1 | Refills: 3 | Status: ACTIVE | COMMUNITY
Start: 2025-06-02 | End: 1900-01-01

## 2025-06-03 RX ADMIN — TRAMADOL HYDROCHLORIDE 50 MILLIGRAM(S): 50 TABLET, FILM COATED ORAL at 08:32

## 2025-06-03 RX ADMIN — Medication 10 MILLILITER(S): at 12:49

## 2025-06-03 RX ADMIN — TRAMADOL HYDROCHLORIDE 50 MILLIGRAM(S): 50 TABLET, FILM COATED ORAL at 22:15

## 2025-06-03 RX ADMIN — Medication 2 TABLET(S): at 05:47

## 2025-06-03 RX ADMIN — ENTECAVIR 0.5 MILLIGRAM(S): 0.5 TABLET ORAL at 12:05

## 2025-06-03 RX ADMIN — TRAMADOL HYDROCHLORIDE 50 MILLIGRAM(S): 50 TABLET, FILM COATED ORAL at 20:54

## 2025-06-03 RX ADMIN — INSULIN LISPRO 12 UNIT(S): 100 INJECTION, SOLUTION INTRAVENOUS; SUBCUTANEOUS at 09:21

## 2025-06-03 RX ADMIN — INSULIN LISPRO 6: 100 INJECTION, SOLUTION INTRAVENOUS; SUBCUTANEOUS at 09:20

## 2025-06-03 RX ADMIN — INSULIN LISPRO 4: 100 INJECTION, SOLUTION INTRAVENOUS; SUBCUTANEOUS at 21:12

## 2025-06-03 RX ADMIN — Medication 1 DOSE(S): at 20:38

## 2025-06-03 RX ADMIN — TRAMADOL HYDROCHLORIDE 25 MILLIGRAM(S): 50 TABLET, FILM COATED ORAL at 12:58

## 2025-06-03 RX ADMIN — QUETIAPINE FUMARATE 50 MILLIGRAM(S): 25 TABLET ORAL at 21:12

## 2025-06-03 RX ADMIN — TACROLIMUS 0.5 MILLIGRAM(S): 0.5 CAPSULE ORAL at 14:34

## 2025-06-03 RX ADMIN — SUMATRIPTAN 25 MILLIGRAM(S): 100 TABLET, FILM COATED ORAL at 14:17

## 2025-06-03 RX ADMIN — POLYETHYLENE GLYCOL 3350 17 GRAM(S): 17 POWDER, FOR SOLUTION ORAL at 12:07

## 2025-06-03 RX ADMIN — Medication 1 DOSE(S): at 05:47

## 2025-06-03 RX ADMIN — MYCOPHENOLATE MOFETIL 1000 MILLIGRAM(S): 500 TABLET, FILM COATED ORAL at 20:38

## 2025-06-03 RX ADMIN — MYCOPHENOLATE MOFETIL 1000 MILLIGRAM(S): 500 TABLET, FILM COATED ORAL at 08:09

## 2025-06-03 RX ADMIN — Medication 250 MILLIGRAM(S): at 05:46

## 2025-06-03 RX ADMIN — Medication 1 TABLET(S): at 17:10

## 2025-06-03 RX ADMIN — INSULIN LISPRO 12 UNIT(S): 100 INJECTION, SOLUTION INTRAVENOUS; SUBCUTANEOUS at 12:30

## 2025-06-03 RX ADMIN — METHYLPREDNISOLONE ACETATE 125 MILLIGRAM(S): 80 INJECTION, SUSPENSION INTRA-ARTICULAR; INTRALESIONAL; INTRAMUSCULAR; SOFT TISSUE at 05:48

## 2025-06-03 RX ADMIN — Medication 2 TABLET(S): at 17:10

## 2025-06-03 RX ADMIN — INSULIN LISPRO 4: 100 INJECTION, SOLUTION INTRAVENOUS; SUBCUTANEOUS at 12:29

## 2025-06-03 RX ADMIN — Medication 1 APPLICATION(S): at 05:46

## 2025-06-03 RX ADMIN — TRAMADOL HYDROCHLORIDE 25 MILLIGRAM(S): 50 TABLET, FILM COATED ORAL at 13:50

## 2025-06-03 RX ADMIN — HEPARIN SODIUM 5000 UNIT(S): 1000 INJECTION INTRAVENOUS; SUBCUTANEOUS at 05:46

## 2025-06-03 RX ADMIN — INSULIN LISPRO 12 UNIT(S): 100 INJECTION, SOLUTION INTRAVENOUS; SUBCUTANEOUS at 17:12

## 2025-06-03 RX ADMIN — Medication 1 TABLET(S): at 12:06

## 2025-06-03 RX ADMIN — TRAMADOL HYDROCHLORIDE 50 MILLIGRAM(S): 50 TABLET, FILM COATED ORAL at 09:30

## 2025-06-03 RX ADMIN — SUMATRIPTAN 25 MILLIGRAM(S): 100 TABLET, FILM COATED ORAL at 15:15

## 2025-06-03 RX ADMIN — INSULIN LISPRO 2: 100 INJECTION, SOLUTION INTRAVENOUS; SUBCUTANEOUS at 17:11

## 2025-06-03 RX ADMIN — INSULIN GLARGINE-YFGN 22 UNIT(S): 100 INJECTION, SOLUTION SUBCUTANEOUS at 21:12

## 2025-06-03 RX ADMIN — TACROLIMUS 0.5 MILLIGRAM(S): 0.5 CAPSULE ORAL at 08:09

## 2025-06-03 RX ADMIN — VALGANCICLOVIR 450 MILLIGRAM(S): 450 TABLET, FILM COATED ORAL at 12:05

## 2025-06-03 RX ADMIN — TACROLIMUS 0.5 MILLIGRAM(S): 0.5 CAPSULE ORAL at 20:38

## 2025-06-03 RX ADMIN — Medication 1 TABLET(S): at 05:46

## 2025-06-03 RX ADMIN — Medication 30 MILLIGRAM(S): at 12:04

## 2025-06-03 RX ADMIN — Medication 5 MILLIGRAM(S): at 21:12

## 2025-06-03 RX ADMIN — Medication 100 MILLIGRAM(S): at 05:48

## 2025-06-03 NOTE — PROGRESS NOTE ADULT - SUBJECTIVE AND OBJECTIVE BOX
NEUROLOGY FOLLOW-UP CONSULT NOTE    RFC: Bifrontal headache    Interval history: No acute neurologic events overnight. Patient underwent liver transplant on 5/31. Seen and examined by neurology today, continues to report 9/10 bifrontal headache with radiation to the entire posterior head. Has not been receiving recommended headache medication. Reports associated photophobia and phonophobia as well. No other focal neurologic deficits or abnormal movements noted.    Meds:  MEDICATIONS  (STANDING):  calcium carbonate 1250 mG  + Vitamin D (OsCal 500 + D) 1 Tablet(s) Oral two times a day  chlorhexidine 2% Cloths 1 Application(s) Topical <User Schedule>  entecavir 0.5 milliGRAM(s) Oral daily  fluticasone propionate/ salmeterol 100-50 MICROgram(s) Diskus 1 Dose(s) Inhalation two times a day  insulin glargine Injectable (LANTUS) 18 Unit(s) SubCutaneous at bedtime  insulin lispro (ADMELOG) corrective regimen sliding scale   SubCutaneous Before meals and at bedtime  insulin lispro Injectable (ADMELOG) 12 Unit(s) SubCutaneous three times a day with meals  melatonin 5 milliGRAM(s) Oral at bedtime  methylPREDNISolone sodium succinate Injectable   IV Push   mycophenolate mofetil 1000 milliGRAM(s) Oral <User Schedule>  NIFEdipine XL 30 milliGRAM(s) Oral every 24 hours  pantoprazole    Tablet 40 milliGRAM(s) Oral before breakfast  polyethylene glycol 3350 17 Gram(s) Oral daily  QUEtiapine 50 milliGRAM(s) Oral at bedtime  senna 2 Tablet(s) Oral two times a day  tacrolimus 0.5 milliGRAM(s) Oral <User Schedule>  trimethoprim   80 mG/sulfamethoxazole 400 mG 1 Tablet(s) Oral daily  valGANciclovir 450 milliGRAM(s) Oral daily    MEDICATIONS  (PRN):  traMADol 25 milliGRAM(s) Oral every 6 hours PRN Moderate Pain (4 - 6)  traMADol 50 milliGRAM(s) Oral every 8 hours PRN Severe Pain (7 - 10)      Allergies:  No Known Allergies      ROS: All systems negative except as documented in Interval history    O:  T(C): 37.1 (06-03-25 @ 12:15), Max: 37.1 (06-03-25 @ 12:15)  HR: 70 (06-03-25 @ 12:15) (61 - 81)  BP: 126/63 (06-03-25 @ 12:15) (115/69 - 140/64)  RR: 18 (06-03-25 @ 12:15) (14 - 27)  SpO2: 100% (06-03-25 @ 12:15) (94% - 100%)    Focused neurologic exam:  MS - AAO x3, speech fluent  CN - B/l surgical pupils, minimally reactive b/l, EOMI, VFF, face sens/str/hearing WNL b/l, tongue/palate midline, trap 5/5 b/l  Motor - Normal bulk/tone, 5/5 all. Mild L>R asterixis  Sens - LTn intact all  DTR's - Downgoing b/l plantar response  Coord - FtN intact b/l. B/l intention tremor w/o clear postural component  Gait and station - Due to fall risk/safety concerns did not assess    Pertinent labs/studies:  .  LABS:                         8.2    6.47  )-----------( 30       ( 03 Jun 2025 07:21 )             24.4     06-03    132[L]  |  100  |  43[H]  ----------------------------<  264[H]  4.1   |  20[L]  |  1.45[H]    Ca    8.0[L]      03 Jun 2025 07:21  Phos  3.2     06-03  Mg     2.5     06-03    TPro  4.7[L]  /  Alb  3.2[L]  /  TBili  1.2  /  DBili  x   /  AST  170[H]  /  ALT  304[H]  /  AlkPhos  84  06-03    PT/INR - ( 03 Jun 2025 07:21 )   PT: 13.3 sec;   INR: 1.17 ratio         PTT - ( 03 Jun 2025 07:21 )  PTT:27.3 sec  Urinalysis Basic - ( 03 Jun 2025 07:21 )    Color: x / Appearance: x / SG: x / pH: x  Gluc: 264 mg/dL / Ketone: x  / Bili: x / Urobili: x   Blood: x / Protein: x / Nitrite: x   Leuk Esterase: x / RBC: x / WBC x   Sq Epi: x / Non Sq Epi: x / Bacteria: x        RADIOLOGY, EKG & ADDITIONAL TESTS: Reviewed.     < from: MR Head w/wo IV Cont (05.30.25 @ 17:02) >    IMPRESSION:    1.  MR brain: No acute intracranial abnormality. Severe chronic   microvascular ischemic disease. Old small multifocal infarcts as detailed   above. Mild diffuse cerebral volume loss.  2.  MRA head: No large vessel occlusion or aneurysm. Multifocal   intracranial stenoses as detailed above, likely sequela of   atherosclerotic disease.  3.  MRA NECK: No occlusion, aneurysm, or hemodynamically significant   stenosis.  4.  MRV head: No dural venous sinus thrombosis.    --- End of Report ---    < end of copied text >      < from: CT Head No Cont (05.29.25 @ 14:07) >    The ventricles and sulci are prominent, compatible with age-related   generalized cerebral volume loss.   There is no CT evidence for acute   cerebral cortical infarct. There is no evidence of hemorrhage. There is a   tiny chronic lacunar infarct in the right cerebellum. There is   periventricular and subcortical white matter hypoattenuation,  most   compatible with chronic microvascular ischemic changes.   No mass effect   is found in the brain.  There is no midline shift or herniation pattern.    IMPRESSION:    No evidence of acute intracranial abnormality.  No evidence of hemorrhage.    < end of copied text >

## 2025-06-03 NOTE — CONSULT NOTE ADULT - SUBJECTIVE AND OBJECTIVE BOX
HPI:  70 F hx NAFLD cirrhosis and HCC (listed for liver transplant with MELD 20B), UGIB in 2022, chronic back pain (2/2 spinal fracture) presented from [facility] where she was admitted on 5/26 for coffee ground emesis and melena, EGD showed no active bleeding, patient was transfused and stabilized prior to transfer. Patient states she hasn't had bloody bowel movements or bloody emesis since Monday 5/26. Patient endorses low back pain and frontal lobe headache, denies urinary or bowel incontinence, loss of sensation, weakness, dizziness, changes in vision, photobia, blurry vision, nausea, fever, chills, chest pain, or shortness of breath.  (28 May 2025 00:58)      Endocrinology HPI    Diabetes Mellitus Type 2  Diagnosis: >20 years ago   Symptoms: Denies any polyuria, polydipsia   Outpatient endocrinologist: Follows with endocrinology in [place], [place]   Last HgbA1c: 6.0%  Outpatient regimen: Lantus 14 units nightly + Humalog 18 units with meals  Compliance: Good   Blood sugars at home: 200s   Inpatient regimen: Lantus 18 units + Admelog 12 units with meals   FH: Denies FH of type 2 DM  Tobacco, etoh, drug use: Former smoker, denies history of alcohol use   Diet: BF is pink lemonade, toast, sugar free jelly and grape juice; lunch is yogurt, half a sandwich (PB&J); dinner is pasta or eating out several times a week   History of CAD/MI/Stroke: Denies     Review of Systems:  Constitutional: No fever, good appetite/po intake  Eyes: No blurry vision, diplopia  Neuro: No tremors  HEENT: No pain  Cardiovascular: No chest pain, palpitations  Respiratory: No SOB, no cough  GI: No nausea, vomiting,   : No dysuria, hematuria  Skin: no rash  Psych: no depression  Endocrine: no polyuria, polydipsia  Hem/lymph: no swelling  Osteoporosis: no fractures    ALL OTHER SYSTEMS REVIEWED AND NEGATIVE    PHYSICAL EXAM:  VITALS: T(C): 37.1 (06-03-25 @ 12:15)  T(F): 98.8 (06-03-25 @ 12:15), Max: 98.8 (06-03-25 @ 12:15)  HR: 70 (06-03-25 @ 12:15) (61 - 81)  BP: 126/63 (06-03-25 @ 12:15) (115/69 - 140/64)  RR:  (14 - 27)  SpO2:  (94% - 100%)  Wt(kg): --  GENERAL: NAD, well-groomed, well-developed  EYES: No proptosis, extraocular movements intact,  no lid lag, anicteric  HEENT:  Atraumatic, Normocephalic, moist mucous membranes  THYROID: Normal size, no palpable nodules, no thyromegaly  RESPIRATORY: Clear to auscultation bilaterally; No rales, rhonchi, wheezing, or rubs  CARDIOVASCULAR: Regular rate and rhythm; No murmurs; no peripheral edema  GI: Soft, nontender, non distended, normal bowel sounds  SKIN: Dry, intact, No rashes or lesions  EXTREMITIES: No foot ulcers, distal pedal pulses intact bilaterally  NEURO: sensation intact, no tremors  PSYCH: reactive affect, euthymic mood  CUSHING'S SIGNS: no striae or visible bruising                              8.2    6.47  )-----------( 30       ( 03 Jun 2025 07:21 )             24.4       06-03    132[L]  |  100  |  43[H]  ----------------------------<  264[H]  4.1   |  20[L]  |  1.45[H]    eGFR: 39[L]    Ca    8.0[L]      06-03  Mg     2.5     06-03  Phos  3.2     06-03    TPro  4.7[L]  /  Alb  3.2[L]  /  TBili  1.2  /  DBili  x   /  AST  170[H]  /  ALT  304[H]  /  AlkPhos  84  06-03      Thyroid Function Tests:          Radiology:

## 2025-06-03 NOTE — CONSULT NOTE ADULT - REASON FOR ADMISSION
September 26, 2022     Marisol Baez    Patient: Marisol Baez   YOB: 2017   Date of Visit: 9/26/2022       Dear Dr Acevedo Bias: Thank you for referring Marisol Baez to me for evaluation  Below are the relevant portions of my assessment and plan of care  If you have questions, please do not hesitate to call me  I look forward to following Deep along with you           Sincerely,        SEUN Christopher        CC: No Recipients
September 26, 2022     Patient: Miguelina Lopes   YOB: 2017   Date of Visit: 9/26/2022       To Whom it May Concern: Miguelina Lopes was seen in my clinic on 9/26/2022  She may return to school on 9/28/22  If you have any questions or concerns, please don't hesitate to call           Sincerely,          SEUN Brown        CC: No Recipients
khris

## 2025-06-03 NOTE — PROGRESS NOTE ADULT - ASSESSMENT
70 F hx NAFLD cirrhosis and HCC (listed for liver transplant with MELD 20B), UGIB in 2022, chronic back pain (2/2 spinal fracture) presented from [facility] where she was admitted on 5/26 for coffee ground emesis and melena, EGD showed no active bleeding, transferred to [facility] now s/p OLT on 5/31/2025     [] s/p OLT - POD 3  - HBV Core + donor: Entecavir  - LFTs trending down  - Diet: adv to reg; dc IVF   - Strict I&O:  JPs x3  - Pain Management  - Bowel Regimen  - E.Faecium UTI, Zosyn/Vanco to completed today  - OT/PT/RD /IS  - BH consult today      [] Immunosuppression:  - FK by level,  MMF 1/1, SST  - Simulect POD 4   - PPx: Bactrim, Valcyte 450 (CMV +/-, inc 900mg as able), Fluc, PPI, OsCal    [] DM  - incr lantus/Lispro     [] DVT  - start SQH bid    70 F hx NAFLD cirrhosis and HCC (listed for liver transplant with MELD 20B), UGIB in 2022, chronic back pain (2/2 spinal fracture) presented from [facility] where she was admitted on 5/26 for coffee ground emesis and melena, EGD showed no active bleeding, transferred to [facility] now s/p OLT on 5/31/2025     [] s/p OLT - POD 3  - HBV Core + donor: Entecavir  - LFTs trending down  - Diet: adv to reg  - Strict I&O:  JPs x3  - Pain Management  - Bowel Regimen  - E.Faecium UTI, Zosyn/Vanco to completed.   - OT/PT/RD /IS  -  consult;  started seroquel.      [] Immunosuppression:  - FK by level,  MMF 1/1, SST  - Simulect POD 4   - PPx: Bactrim, Valcyte 450 (CMV +/-, inc 900mg as able), Fluc, PPI, OsCal    [] DM  - incr lantus/Lispro     [] DVT  -  SQH bid    70 F hx NAFLD cirrhosis and HCC (listed for liver transplant with MELD 20B), UGIB in 2022, chronic back pain (2/2 spinal fracture) presented from [facility] where she was admitted on 5/26 for coffee ground emesis and melena, EGD showed no active bleeding, transferred to [facility] now s/p OLT on 5/31/2025     [] s/p OLT - POD 3  - HBV Core + donor: Entecavir  - LFTs trending down  - Diet: ADAT  - Strict I&O:  JPs x3, UO, (D/C JP1)  - Pain Management  - Bowel Regimen  - E.Faecium UTI, Zosyn/Vanco completed  - OT/PT/RD /IS  -  consult;  started seroquel 6/3   - mild SCOTT, improving, good UO, will monitor  - ASA on hold given PLT    [] Immunosuppression:  - FK by level,  MMF 1/1, SST  - Simulect POD 4   - PPx: Bactrim, Valcyte 450 (CMV +/-, inc 900mg as able), Fluc, PPI, OsCal    [] DM  - Endocrine team consulted    [] DVT  -hold SQH today given PLT 30, will reassess daily  -SCDs at all times   70 F hx NAFLD cirrhosis and HCC (listed for liver transplant with MELD 20B), UGIB in 2022, chronic back pain (2/2 spinal fracture) presented from [facility] where she was admitted on 5/26 for coffee ground emesis and melena, EGD showed no active bleeding, transferred to [facility] now s/p OLT on 5/31/2025     [] s/p OLT - POD 3  - HBV Core + donor: Entecavir  - LFTs trending down  - Diet: ADAT  - Strict I&O:  JPs x3, UO, (D/C JP1)  - Pain Management  - Bowel Regimen  - E.Faecium UTI, Zosyn/Vanco completed  - OT/PT/RD /IS  -  consult;  started seroquel 6/3   - mild SCOTT, improving, good UO, will monitor  - ASA on hold given PLT    [] Immunosuppression:  - FK by level,  MMF 1/1, SST  - Simulect POD 4   - PPx: Bactrim, Valcyte 450 (CMV +/-, inc 900mg as able), Fluc, PPI, OsCal    [] DM  - Endocrine team consulted    [] HTN  - Nifedipine, adjust prn    [] DVT  -hold SQH today given PLT 30, will reassess daily  -SCDs at all times

## 2025-06-03 NOTE — PROGRESS NOTE ADULT - SUBJECTIVE AND OBJECTIVE BOX
Transplant Surgery -  Multidisciplinary Progress Note  --------------------------------------------------------------  OLTx      Date: 5/31/2025      POD# 3  CMV +/-    Patient seen and examined with Surgeon Dr. Gaona, Hepatologist Dr. Gaona, ACP ROGERIO Dietz/Dora, Pharmacist, Nutritionist, and SICU team. Disciplines not in attendance will be notified of the plan      HPI: 70 F hx NAFLD cirrhosis and HCC (listed for liver transplant with MELD 20B), UGIB in 2022, chronic back pain (2/2 spinal fracture) presented from [facility] where she was admitted on 5/26 for coffee ground emesis and melena, EGD showed no active bleeding, transferred to [facility] now s/p OLT on 5/31/2025     Interval Events:      Immunosuppression:  Induction: Simulect on POD 4  Maintenance: FK by level, MMF 1/1, SST, S  Ongoing monitoring for signs of rejection    Potential Discharge date: tbd   Education:  Medications  Plan of care:  See Below    TX DATA HERE    Review of systems  All other systems were reviewed and are negative, except as noted.      PHYSICAL EXAM:  Constitutional: NAD  Eyes: PERRLA  ENMT: nc/at, no thrush  Neck: supple,   Respiratory: CTA B/L  Cardiovascular: RRR  Gastrointestinal: Soft abdomen, ND, appropriate incisional TTP. chevron incision c/d/i, staples in place. No signs of infection.  JPsx3 sang  Genitourinary: voiding   Extremities: SCD's in place and working bilaterally  Vascular: Palpable dp pulses bilaterally.   Neurological:  AAO3  Skin: no rashes, ulcerations, lesions  Musculoskeletal:  moving extremities without issues  Psychiatric: cooperative     Transplant Surgery -  Multidisciplinary Progress Note  --------------------------------------------------------------  OLTx      Date: 5/31/2025      POD# 3  CMV +/-    Patient seen and examined with Surgeon Dr. Gaona, Hepatologist Dr. Gaona, ACP ROGERIO Dietz/Dora, Pharmacist, Nutritionist, and SICU team. Disciplines not in attendance will be notified of the plan      HPI: 70 F hx NAFLD cirrhosis and HCC (listed for liver transplant with MELD 20B), UGIB in 2022, chronic back pain (2/2 spinal fracture) presented from [facility] where she was admitted on 5/26 for coffee ground emesis and melena, EGD showed no active bleeding, transferred to [facility] now s/p OLT on 5/31/2025     Interval Events:              Immunosuppression:  Induction: Simulect on POD 4  Maintenance: FK by level, MMF 1/1, SST, S  Ongoing monitoring for signs of rejection    Potential Discharge date: tbd   Education:  Medications  Plan of care:  See Below                          Review of systems  All other systems were reviewed and are negative, except as noted.    PHYSICAL EXAM:  Constitutional: NAD  Eyes: PERRLA  ENMT: nc/at, no thrush  Neck: supple,   Respiratory: CTA B/L  Cardiovascular: RRR  Gastrointestinal: Soft abdomen, ND, appropriate incisional TTP. chevron incision c/d/i, staples in place. No signs of infection.  JPsx3 sang  Genitourinary: voiding   Extremities: SCD's in place and working bilaterally  Vascular: Palpable dp pulses bilaterally.   Neurological:  AAO3  Skin: no rashes, ulcerations, lesions  Musculoskeletal:  moving extremities without issues  Psychiatric: cooperative Transplant Surgery -  Multidisciplinary Progress Note  --------------------------------------------------------------  OLTx      Date: 2025      POD# 3  CMV +/-    Patient seen and examined with multidisciplinary transplant team: Surgeon Dr. Arriola, Dr. Dale, Hepatologist Dr. Gaona, ROGERIO Dietz/Dora, Pharmacist Raj and RN team. Disciplines not in attendance will be notified of the plan      HPI: 70 F hx NAFLD cirrhosis and HCC (listed for liver transplant with MELD 20B), UGIB in , chronic back pain (2/2 spinal fracture) presented from [facility] where she was admitted on  for coffee ground emesis and melena, EGD showed no active bleeding, transferred to [facility] now s/p OLT on 2025     Interval Events:  Afebrile, VSS  good graft function  progressing well  required Seroquel last night with improvement in sleep/mood  no o/n events    Immunosuppression:  Induction: Simulect on POD 4  Maintenance: FK by level, MMF /, SST  Ongoing monitoring for signs of rejection    Potential Discharge date: tbd   Education:  Medications  Plan of care:  See Below      MEDICATIONS  (STANDING):  calcium carbonate 1250 mG  + Vitamin D (OsCal 500 + D) 1 Tablet(s) Oral two times a day  chlorhexidine 2% Cloths 1 Application(s) Topical <User Schedule>  entecavir 0.5 milliGRAM(s) Oral daily  fluticasone propionate/ salmeterol 100-50 MICROgram(s) Diskus 1 Dose(s) Inhalation two times a day  heparin   Injectable 5000 Unit(s) SubCutaneous every 12 hours  insulin glargine Injectable (LANTUS) 18 Unit(s) SubCutaneous at bedtime  insulin lispro (ADMELOG) corrective regimen sliding scale   SubCutaneous Before meals and at bedtime  insulin lispro Injectable (ADMELOG) 12 Unit(s) SubCutaneous three times a day with meals  melatonin 5 milliGRAM(s) Oral at bedtime  methylPREDNISolone sodium succinate Injectable   IV Push   mycophenolate mofetil 1000 milliGRAM(s) Oral <User Schedule>  NIFEdipine XL 30 milliGRAM(s) Oral every 24 hours  pantoprazole    Tablet 40 milliGRAM(s) Oral before breakfast  polyethylene glycol 3350 17 Gram(s) Oral daily  QUEtiapine 25 milliGRAM(s) Oral at bedtime  senna 2 Tablet(s) Oral two times a day  tacrolimus 0.5 milliGRAM(s) Oral <User Schedule>  trimethoprim   80 mG/sulfamethoxazole 400 mG 1 Tablet(s) Oral daily  valGANciclovir 450 milliGRAM(s) Oral daily    MEDICATIONS  (PRN):  traMADol 25 milliGRAM(s) Oral every 6 hours PRN Moderate Pain (4 - 6)  traMADol 50 milliGRAM(s) Oral every 8 hours PRN Severe Pain (7 - 10)      PAST MEDICAL & SURGICAL HISTORY:  HCC (hepatocellular carcinoma)      DM (diabetes mellitus)      HTN (hypertension)      HLD (hyperlipidemia)      Hepatic cirrhosis      IVEY (nonalcoholic steatohepatitis)      Former smoker      Ascites      Hepatic encephalopathy      History of cervical cancer      H/O  section      History of cholecystectomy      H/O carpal tunnel repair      H/O cataract extraction          Vital Signs Last 24 Hrs  T(C): 37.1 (2025 12:15), Max: 37.1 (2025 12:15)  T(F): 98.8 (2025 12:15), Max: 98.8 (2025 12:15)  HR: 70 (2025 12:15) (61 - 81)  BP: 126/63 (2025 12:15) (115/69 - 140/64)  BP(mean): 87 (2025 19:00) (87 - 111)  RR: 18 (2025 12:15) (14 - 29)  SpO2: 100% (2025 12:15) (93% - 100%)    Parameters below as of 2025 12:15  Patient On (Oxygen Delivery Method): room air        I&O's Summary    2025 07:01  -  2025 07:00  --------------------------------------------------------  IN: 1050 mL / OUT: 1117.5 mL / NET: -67.5 mL    2025 07:01  -  2025 13:06  --------------------------------------------------------  IN: 360 mL / OUT: 535 mL / NET: -175 mL                              8.2    6.47  )-----------( 30       ( 2025 07:21 )             24.4     06-    132[L]  |  100  |  43[H]  ----------------------------<  264[H]  4.1   |  20[L]  |  1.45[H]    Ca    8.0[L]      2025 07:21  Phos  3.2     -  Mg     2.5         TPro  4.7[L]  /  Alb  3.2[L]  /  TBili  1.2  /  DBili  x   /  AST  170[H]  /  ALT  304[H]  /  AlkPhos  84  -03    Tacrolimus (), Serum: 2.5 ng/mL ( @ 07:21)        Culture - Acid Fast - Body Fluid w/Smear (collected 25 @ 14:10)  Source: Body Fluid    Culture - Body Fluid with Gram Stain (collected 25 @ 14:10)  Source: Body Fluid Ascites  Gram Stain (25 @ 17:32):    No polymorphonuclear cells seen    No organisms seen    by cytocentrifuge  Preliminary Report (25 @ 16:11):    No growth    Culture - Fungal, Body Fluid (collected 25 @ 14:10)  Source: Body Fluid Ascites  Preliminary Report (25 @ 09:49):    No growth    Culture - Urine (collected 25 @ 10:20)  Source: Clean Catch Clean Catch (Midstream)  Final Report (25 @ 11:58):    10,000 - 49,000 CFU/mL Enterococcus faecium  Organism: Enterococcus faecium (25 @ 11:58)  Organism: Enterococcus faecium (25 @ 11:58)    Culture - Blood (collected 25 @ 09:40)  Source: Blood Blood-Peripheral  Final Report (25 @ 13:15):    No growth at 5 days    Culture - Blood (collected 25 @ 09:20)  Source: Blood Blood-Peripheral  Final Report (25 @ 13:15):    No growth at 5 days                                  Review of systems  All other systems were reviewed and are negative, except as noted.    PHYSICAL EXAM:  Constitutional: NAD  Eyes: PERRLA  ENMT: nc/at, no thrush  Neck: supple,   Respiratory: CTA B/L  Cardiovascular: RRR  Gastrointestinal: Soft abdomen, ND, appropriate incisional TTP. chevron incision c/d/i, staples in place. No signs of infection.  JPsx3 ss  Genitourinary: voiding   Extremities: SCD's in place and working bilaterally  Vascular: Palpable dp pulses bilaterally.   Neurological:  AAO3  Skin: no rashes, ulcerations, lesions  Musculoskeletal:  moving extremities without issues  Psychiatric: cooperative

## 2025-06-03 NOTE — CONSULT NOTE ADULT - ASSESSMENT
A/P: 70 F hx NAFLD cirrhosis and HCC (listed for liver transplant with MELD 20B), UGIB in 2022, chronic back pain (2/2 spinal fracture) presented from [facility] where she was admitted on 5/26 for coffee ground emesis and melena, EGD showed no active bleeding, patient was transfused and stabilized prior to transfer. Patient is currently post op day #3 for liver transplant. Patient is high risk with high level decision making due to uncontrolled diabetes which places patient at high risk for cardiovascular and cerebrovascular events. Patient with lability of glucose requiring close monitoring and insulin adjustments.  Endocrinology was consulted for management of diabetes mellitus.    #Type 2 Diabetes Mellitus  #Steroid- induced hyperglycemia   - HbA1c  6.0% - although not accurate in the setting of low gfr and liver disease      - Home regimen: Lantus 14 units + Humalog 18 units with meals. Sugars are usually high in the 200s   - Egfr: 39  - Diet is quite high in carbs- eating out, grape juice, pasta, fruit yogurts. Would benefit from a dietician consult   - sugars also exacerbated due to steroid use- currently on Solumedrol 125mg daily with taper tomorrow to 60mg daily --> 40mg daily --> 20mg daily.      Plan:   - Recommend to increase to 22 units of lantus QHS  - Recommend to increase to 16 units of Admelog TIDQAC  - Recommend moderate Admelog correction scale TIDQAC and QHS  - Will need daily adjustment of insulin due to steroid tapering   - Please check FSG before meals and QHS, or q6h while NPO  - Inpatient glucose goal 100-180   - Please keep patient on a diabetic, carb controlled diet   - Registered dietician consult  - hypoglycemia orderset prn  - extensively discussed importance of glycemic control to prevent micro- and macrovascular complications    - Discharge planning: Basal/bolus - final doses TBD    #Hypertension  - BP goal <130/80  - Management as per primary team    #Hyperlipidemia  - Goal LDL <70 in the setting of diabetes  - Please check fasting lipid panel if not checked recently     Messaged primary team regarding plan.     Ashleigh Vanegas,   Attending Physician   Department of Endocrinology, Diabetes and Metabolism     If before 9AM or after 5PM, or on weekends/holidays, please call the Endocrine answering service for assistance ([phone]).  For nonurgent matters, please email [email or web address] for assistance.     Please note that a different provider may be following this patient each day.          A/P: 70 F hx NAFLD cirrhosis and HCC (listed for liver transplant with MELD 20B), UGIB in 2022, chronic back pain (2/2 spinal fracture) presented from [facility] where she was admitted on 5/26 for coffee ground emesis and melena, EGD showed no active bleeding, patient was transfused and stabilized prior to transfer. Patient is currently post op day #3 for liver transplant. Endocrinology was consulted for management of diabetes mellitus.    #Type 2 Diabetes Mellitus  #Steroid- induced hyperglycemia   - HbA1c  6.0% - although not accurate in the setting of low gfr and liver disease      - Home regimen: Lantus 14 units + Humalog 18 units with meals. Sugars are usually high in the 200s   - Egfr: 39  - Diet is quite high in carbs- eating out, grape juice, pasta, fruit yogurts. Would benefit from a dietician consult   - sugars also exacerbated due to steroid use- currently on Solumedrol 125mg daily with taper tomorrow to 60mg daily --> 40mg daily --> 20mg daily.      Plan:   - Recommend to increase to 22 units of lantus QHS  - Recommend to increase to 16 units of Admelog TIDQAC  - Recommend moderate Admelog correction scale TIDQAC and QHS  - Will need daily adjustment of insulin due to steroid tapering   - Please check FSG before meals and QHS, or q6h while NPO  - Inpatient glucose goal 100-180   - Please keep patient on a diabetic, carb controlled diet   - Registered dietician consult  - hypoglycemia orderset prn  - extensively discussed importance of glycemic control to prevent micro- and macrovascular complications    - Discharge planning: Basal/bolus - final doses TBD    #Hypertension  - BP goal <130/80  - Management as per primary team    #Hyperlipidemia  - Goal LDL <70 in the setting of diabetes  - Please check fasting lipid panel if not checked recently     Messaged primary team regarding plan.     Ashleigh Vanegas, DO  Attending Physician   Department of Endocrinology, Diabetes and Metabolism     If before 9AM or after 5PM, or on weekends/holidays, please call the Endocrine answering service for assistance ([phone]).  For nonurgent matters, please email [email or web address] for assistance.     Please note that a different provider may be following this patient each day.

## 2025-06-03 NOTE — PROGRESS NOTE ADULT - TIME BILLING
Chart review, exam, counseling, coordination of care
I have personally seen and examined the patient.  I fully participated in the care of this patient.  I have made amendments to the documentation where necessary, and agree with the history, physical exam and plan as documented.  reviewed chart  reviewed laboratory data  reviewed plan of care with resident house staff  extubated yesterday and breathing comfortably  euvolemic anemia with hematocrit at 24   solumedrol taper - adjusting insulin to control hyperglycemia

## 2025-06-03 NOTE — CONSULT NOTE ADULT - CONSULT REASON
Bifrontal headache
DM management
postop liver transplant
Evaluate Rehabilitation Needs
pre liver transplant

## 2025-06-04 ENCOUNTER — APPOINTMENT (OUTPATIENT)
Dept: GASTROENTEROLOGY | Facility: CLINIC | Age: 70
End: 2025-06-04

## 2025-06-04 DIAGNOSIS — Z94.4 LIVER TRANSPLANT STATUS: ICD-10-CM

## 2025-06-04 DIAGNOSIS — Z01.818 ENCOUNTER FOR OTHER PREPROCEDURAL EXAMINATION: ICD-10-CM

## 2025-06-04 LAB
ALBUMIN SERPL ELPH-MCNC: 3.1 G/DL — LOW (ref 3.3–5)
ALP SERPL-CCNC: 93 U/L — SIGNIFICANT CHANGE UP (ref 40–120)
ALT FLD-CCNC: 237 U/L — HIGH (ref 10–45)
ANION GAP SERPL CALC-SCNC: 10 MMOL/L — SIGNIFICANT CHANGE UP (ref 5–17)
APTT BLD: 24.3 SEC — LOW (ref 26.1–36.8)
AST SERPL-CCNC: 80 U/L — HIGH (ref 10–40)
BILIRUB SERPL-MCNC: 1.2 MG/DL — SIGNIFICANT CHANGE UP (ref 0.2–1.2)
BUN SERPL-MCNC: 34 MG/DL — HIGH (ref 7–23)
CALCIUM SERPL-MCNC: 8.2 MG/DL — LOW (ref 8.4–10.5)
CHLORIDE SERPL-SCNC: 103 MMOL/L — SIGNIFICANT CHANGE UP (ref 96–108)
CO2 SERPL-SCNC: 22 MMOL/L — SIGNIFICANT CHANGE UP (ref 22–31)
CREAT SERPL-MCNC: 1.12 MG/DL — SIGNIFICANT CHANGE UP (ref 0.5–1.3)
EGFR: 53 ML/MIN/1.73M2 — LOW
EGFR: 53 ML/MIN/1.73M2 — LOW
GLUCOSE BLDC GLUCOMTR-MCNC: 136 MG/DL — HIGH (ref 70–99)
GLUCOSE BLDC GLUCOMTR-MCNC: 169 MG/DL — HIGH (ref 70–99)
GLUCOSE BLDC GLUCOMTR-MCNC: 182 MG/DL — HIGH (ref 70–99)
GLUCOSE BLDC GLUCOMTR-MCNC: 186 MG/DL — HIGH (ref 70–99)
GLUCOSE SERPL-MCNC: 155 MG/DL — HIGH (ref 70–99)
HCT VFR BLD CALC: 26.8 % — LOW (ref 34.5–45)
HGB BLD-MCNC: 8.9 G/DL — LOW (ref 11.5–15.5)
INR BLD: 1.12 RATIO — SIGNIFICANT CHANGE UP (ref 0.85–1.16)
MAGNESIUM SERPL-MCNC: 2.4 MG/DL — SIGNIFICANT CHANGE UP (ref 1.6–2.6)
MCHC RBC-ENTMCNC: 29.4 PG — SIGNIFICANT CHANGE UP (ref 27–34)
MCHC RBC-ENTMCNC: 33.2 G/DL — SIGNIFICANT CHANGE UP (ref 32–36)
MCV RBC AUTO: 88.4 FL — SIGNIFICANT CHANGE UP (ref 80–100)
NRBC BLD AUTO-RTO: 0 /100 WBCS — SIGNIFICANT CHANGE UP (ref 0–0)
PHOSPHATE SERPL-MCNC: 2.2 MG/DL — LOW (ref 2.5–4.5)
PLATELET # BLD AUTO: 45 K/UL — LOW (ref 150–400)
POTASSIUM SERPL-MCNC: 4.1 MMOL/L — SIGNIFICANT CHANGE UP (ref 3.5–5.3)
POTASSIUM SERPL-SCNC: 4.1 MMOL/L — SIGNIFICANT CHANGE UP (ref 3.5–5.3)
PROT SERPL-MCNC: 4.6 G/DL — LOW (ref 6–8.3)
PROTHROM AB SERPL-ACNC: 12.9 SEC — SIGNIFICANT CHANGE UP (ref 9.9–13.4)
RBC # BLD: 3.03 M/UL — LOW (ref 3.8–5.2)
RBC # FLD: 16.7 % — HIGH (ref 10.3–14.5)
SODIUM SERPL-SCNC: 135 MMOL/L — SIGNIFICANT CHANGE UP (ref 135–145)
TACROLIMUS SERPL-MCNC: 3.2 NG/ML — SIGNIFICANT CHANGE UP
VANCOMYCIN FLD-MCNC: 9.6 UG/ML — SIGNIFICANT CHANGE UP
WBC # BLD: 5.66 K/UL — SIGNIFICANT CHANGE UP (ref 3.8–10.5)
WBC # FLD AUTO: 5.66 K/UL — SIGNIFICANT CHANGE UP (ref 3.8–10.5)

## 2025-06-04 RX ORDER — ASPIRIN 325 MG
81 TABLET ORAL DAILY
Refills: 0 | Status: DISCONTINUED | OUTPATIENT
Start: 2025-06-04 | End: 2025-06-04

## 2025-06-04 RX ORDER — INSULIN LISPRO 100 U/ML
14 INJECTION, SOLUTION INTRAVENOUS; SUBCUTANEOUS
Refills: 0 | Status: DISCONTINUED | OUTPATIENT
Start: 2025-06-05 | End: 2025-06-05

## 2025-06-04 RX ORDER — INSULIN LISPRO 100 U/ML
INJECTION, SOLUTION INTRAVENOUS; SUBCUTANEOUS
Refills: 0 | Status: DISCONTINUED | OUTPATIENT
Start: 2025-06-05 | End: 2025-06-12

## 2025-06-04 RX ORDER — TACROLIMUS 0.5 MG/1
1 CAPSULE ORAL
Refills: 0 | Status: DISCONTINUED | OUTPATIENT
Start: 2025-06-04 | End: 2025-06-05

## 2025-06-04 RX ORDER — INSULIN GLARGINE-YFGN 100 [IU]/ML
18 INJECTION, SOLUTION SUBCUTANEOUS AT BEDTIME
Refills: 0 | Status: DISCONTINUED | OUTPATIENT
Start: 2025-06-04 | End: 2025-06-05

## 2025-06-04 RX ORDER — LIDOCAINE HCL/PF 10 MG/ML
10 VIAL (ML) INJECTION ONCE
Refills: 0 | Status: COMPLETED | OUTPATIENT
Start: 2025-06-04 | End: 2025-06-04

## 2025-06-04 RX ORDER — FLUCONAZOLE 200 MG/1
200 TABLET ORAL DAILY
Qty: 14 | Refills: 0 | Status: ACTIVE | COMMUNITY
Start: 2025-06-04 | End: 1900-01-01

## 2025-06-04 RX ORDER — VALGANCICLOVIR 450 MG/1
900 TABLET, FILM COATED ORAL DAILY
Refills: 0 | Status: DISCONTINUED | OUTPATIENT
Start: 2025-06-05 | End: 2025-06-12

## 2025-06-04 RX ORDER — ASPIRIN 325 MG
81 TABLET ORAL DAILY
Refills: 0 | Status: DISCONTINUED | OUTPATIENT
Start: 2025-06-04 | End: 2025-06-12

## 2025-06-04 RX ADMIN — Medication 1 TABLET(S): at 11:13

## 2025-06-04 RX ADMIN — INSULIN LISPRO 16 UNIT(S): 100 INJECTION, SOLUTION INTRAVENOUS; SUBCUTANEOUS at 16:57

## 2025-06-04 RX ADMIN — TRAMADOL HYDROCHLORIDE 25 MILLIGRAM(S): 50 TABLET, FILM COATED ORAL at 21:09

## 2025-06-04 RX ADMIN — METHYLPREDNISOLONE ACETATE 60 MILLIGRAM(S): 80 INJECTION, SUSPENSION INTRA-ARTICULAR; INTRALESIONAL; INTRAMUSCULAR; SOFT TISSUE at 05:12

## 2025-06-04 RX ADMIN — INSULIN LISPRO 2: 100 INJECTION, SOLUTION INTRAVENOUS; SUBCUTANEOUS at 08:32

## 2025-06-04 RX ADMIN — TRAMADOL HYDROCHLORIDE 50 MILLIGRAM(S): 50 TABLET, FILM COATED ORAL at 16:00

## 2025-06-04 RX ADMIN — BASILIXIMAB 100 MILLIGRAM(S): 20 INJECTION, POWDER, FOR SOLUTION INTRAVENOUS at 09:49

## 2025-06-04 RX ADMIN — QUETIAPINE FUMARATE 50 MILLIGRAM(S): 25 TABLET ORAL at 21:11

## 2025-06-04 RX ADMIN — Medication 1 TABLET(S): at 05:11

## 2025-06-04 RX ADMIN — TRAMADOL HYDROCHLORIDE 50 MILLIGRAM(S): 50 TABLET, FILM COATED ORAL at 15:00

## 2025-06-04 RX ADMIN — Medication 1 DOSE(S): at 05:11

## 2025-06-04 RX ADMIN — Medication 81 MILLIGRAM(S): at 09:48

## 2025-06-04 RX ADMIN — Medication 2 TABLET(S): at 16:54

## 2025-06-04 RX ADMIN — Medication 5 MILLIGRAM(S): at 21:10

## 2025-06-04 RX ADMIN — MYCOPHENOLATE MOFETIL 1000 MILLIGRAM(S): 500 TABLET, FILM COATED ORAL at 21:10

## 2025-06-04 RX ADMIN — Medication 1 TABLET(S): at 16:55

## 2025-06-04 RX ADMIN — INSULIN LISPRO 2: 100 INJECTION, SOLUTION INTRAVENOUS; SUBCUTANEOUS at 21:19

## 2025-06-04 RX ADMIN — TACROLIMUS 1 MILLIGRAM(S): 0.5 CAPSULE ORAL at 07:49

## 2025-06-04 RX ADMIN — INSULIN LISPRO 16 UNIT(S): 100 INJECTION, SOLUTION INTRAVENOUS; SUBCUTANEOUS at 12:46

## 2025-06-04 RX ADMIN — Medication 200 MILLIGRAM(S): at 11:09

## 2025-06-04 RX ADMIN — Medication 1 APPLICATION(S): at 05:11

## 2025-06-04 RX ADMIN — Medication 2 TABLET(S): at 05:11

## 2025-06-04 RX ADMIN — Medication 30 MILLIGRAM(S): at 11:12

## 2025-06-04 RX ADMIN — TRAMADOL HYDROCHLORIDE 25 MILLIGRAM(S): 50 TABLET, FILM COATED ORAL at 21:45

## 2025-06-04 RX ADMIN — POLYETHYLENE GLYCOL 3350 17 GRAM(S): 17 POWDER, FOR SOLUTION ORAL at 11:13

## 2025-06-04 RX ADMIN — INSULIN GLARGINE-YFGN 18 UNIT(S): 100 INJECTION, SOLUTION SUBCUTANEOUS at 21:19

## 2025-06-04 RX ADMIN — Medication 1 DOSE(S): at 21:09

## 2025-06-04 RX ADMIN — MYCOPHENOLATE MOFETIL 1000 MILLIGRAM(S): 500 TABLET, FILM COATED ORAL at 07:48

## 2025-06-04 RX ADMIN — INSULIN LISPRO 16 UNIT(S): 100 INJECTION, SOLUTION INTRAVENOUS; SUBCUTANEOUS at 08:33

## 2025-06-04 RX ADMIN — ENTECAVIR 0.5 MILLIGRAM(S): 0.5 TABLET ORAL at 11:12

## 2025-06-04 RX ADMIN — Medication 40 MILLIGRAM(S): at 05:11

## 2025-06-04 RX ADMIN — TACROLIMUS 1 MILLIGRAM(S): 0.5 CAPSULE ORAL at 21:10

## 2025-06-04 RX ADMIN — INSULIN LISPRO 2: 100 INJECTION, SOLUTION INTRAVENOUS; SUBCUTANEOUS at 16:57

## 2025-06-04 NOTE — PROGRESS NOTE ADULT - ASSESSMENT
70 F hx NAFLD cirrhosis and HCC (listed for liver transplant with MELD 20B), UGIB in 2022, chronic back pain (2/2 spinal fracture) presented from [facility] where she was admitted on 5/26 for coffee ground emesis and melena, EGD showed no active bleeding, transferred to [facility] now s/p OLT on 5/31/2025     [] s/p OLT - POD4  - HBV Core + donor: Entecavir  - LFTs trending down  - Diet: ADAT  - Strict I&O:  JPs x2, UO,   - Pain Management  - Bowel Regimen  - E.Faecium UTI, Zosyn/Vanco completed  - OT/PT/RD /IS  -  consult;  started seroquel 6/3   - mild SCOTT, improving, good UO, will monitor  - ASA on hold given PLT    [] Immunosuppression:  - FK by level,  MMF 1/1, SST  - Simulect POD 4   - PPx: Bactrim, Valcyte 450 (CMV +/-, inc 900mg as able), Fluc, PPI, OsCal    [] DM  - Endocrine team consulted    [] HTN  - Nifedipine, adjust prn    [] DVT  -hold SQH today given PLT 30, will reassess daily  -SCDs at all times   70 F hx NAFLD cirrhosis and HCC (listed for liver transplant with MELD 20B), UGIB in 2022, chronic back pain (2/2 spinal fracture) presented from [facility] where she was admitted on 5/26 for coffee ground emesis and melena, EGD showed no active bleeding, transferred to [facility] now s/p OLT on 5/31/2025     [] s/p OLT - POD 4  - HBV Core + donor: Entecavir  - LFTs trending down  - Diet: ADAT  - Strict I&O:  UO,  d/c JP3  - Pain Management  - Bowel Regimen  - E.Faecium UTI, asymptomatic, Zosyn/Vanco completed  - OT/PT/RD /IS  -  consult;  started seroquel 6/3   - SCOTT has resolved  - ASA today    [] Immunosuppression:  - FK by level,  MMF 1/1, SST  - Simulect today   - PPx: Bactrim, Valcyte 450 (CMV +/-, inc 900mg as able), Fluc, PPI, OsCal    [] DM  - Endocrine team  following    [] HTN  - Nifedipine, adjust prn    [] DVT  -SQH: will assess start in AM  -SCDs at all times   70 F hx NAFLD cirrhosis and HCC (listed for liver transplant with MELD 20B), UGIB in 2022, chronic back pain (2/2 spinal fracture) presented from [facility] where she was admitted on 5/26 for coffee ground emesis and melena, EGD showed no active bleeding, transferred to [facility] now s/p OLT on 5/31/2025     [] s/p OLT - POD 4  - HBV Core + donor: Entecavir  - LFTs trending down  - Diet: ADAT  - Strict I&O:  UO,  d/c JP3  - Pain Management  - Bowel Regimen  - E.Faecium UTI, asymptomatic, Zosyn/Vanco completed  - OT/PT/RD /IS  -  consult;  started seroquel 6/3   - SCOTT has resolved  - ASA today    [] Immunosuppression:  - FK by level,  MMF 1/1, SST  - Simulect today   - PPx: Bactrim, Valcyte 450 (CMV +/-, inc 900mg as able), Fluc, PPI, OsCal    [] DM  - Endocrine team  following    [] HTN  - Nifedipine, adjust prn    [] DVT  -SQH: will assess start in AM  -SCDs at all times    [] DISPO  -d/c to Acute Rehab (potential Friday)

## 2025-06-04 NOTE — PROGRESS NOTE ADULT - SUBJECTIVE AND OBJECTIVE BOX
Transplant Surgery -  Multidisciplinary Progress Note  --------------------------------------------------------------  OLTx      Date: 5/31/2025      POD#4  CMV +/-    Patient seen and examined with multidisciplinary transplant team: Surgeon Dr. Arriola, Dr. Dale, Hepatologist Dr. Gaona, ROGERIO Dietz/Jennifer, Pharmacist Raj and RN team. Disciplines not in attendance will be notified of the plan      HPI: 70 F hx NAFLD cirrhosis and HCC (listed for liver transplant with MELD 20B), UGIB in 2022, chronic back pain (2/2 spinal fracture) presented from [facility] where she was admitted on 5/26 for coffee ground emesis and melena, EGD showed no active bleeding, transferred to [facility] now s/p OLT on 5/31/2025     Interval Events:  - afebrile, VSS  - c/o some constipation O/N: good BS on exam, x1 BM     Immunosuppression:  Induction: Simulect on POD 4  Maintenance: FK by level, MMF 1/1, SST  Ongoing monitoring for signs of rejection    Potential Discharge date: tbd   Education:  Medications  Plan of care:  See Below        TX DATA                          Review of systems  All other systems were reviewed and are negative, except as noted.    PHYSICAL EXAM:  Constitutional: NAD  Eyes: PERRLA  ENMT: nc/at, no thrush  Neck: supple,   Respiratory: CTA B/L  Cardiovascular: RRR  Gastrointestinal: Soft abdomen, ND, appropriate incisional TTP. chevron incision c/d/i, staples in place. No signs of infection.  JPsx3 ss  Genitourinary: voiding   Extremities: SCD's in place and working bilaterally  Vascular: Palpable dp pulses bilaterally.   Neurological:  AAO3  Skin: no rashes, ulcerations, lesions  Musculoskeletal:  moving extremities without issues  Psychiatric: cooperative Transplant Surgery -  Multidisciplinary Progress Note  --------------------------------------------------------------  OLTx      Date: 2025      POD#4  CMV +/-    Patient seen and examined with multidisciplinary transplant team: Surgeon Dr. Arriola, Dr. Dale, Hepatologist Dr. Gaona, ROGERIO Dietz/Jennifer, Pharmacist Raj and RN team. Disciplines not in attendance will be notified of the plan      HPI: 70 F hx NAFLD cirrhosis and HCC (listed for liver transplant with MELD 20B), UGIB in , chronic back pain (2/2 spinal fracture) presented from [facility] where she was admitted on  for coffee ground emesis and melena, EGD showed no active bleeding, transferred to [facility] now s/p OLT on 2025     Interval Events:  - afebrile, VSS  - good graft function  - progressing well; tolerating PO, working with PT, having bowel function  - no o/n events    Immunosuppression:  Induction: Simulect today  Maintenance: FK by level, MMF , SST  Ongoing monitoring for signs of rejection    Potential Discharge date: tbd   Education:  Medications  Plan of care:  See Below        MEDICATIONS  (STANDING):  aspirin enteric coated 81 milliGRAM(s) Oral daily  calcium carbonate 1250 mG  + Vitamin D (OsCal 500 + D) 1 Tablet(s) Oral two times a day  chlorhexidine 2% Cloths 1 Application(s) Topical <User Schedule>  entecavir 0.5 milliGRAM(s) Oral daily  fluconAZOLE   Tablet 200 milliGRAM(s) Oral daily  fluticasone propionate/ salmeterol 100-50 MICROgram(s) Diskus 1 Dose(s) Inhalation two times a day  insulin glargine Injectable (LANTUS) 22 Unit(s) SubCutaneous at bedtime  insulin lispro (ADMELOG) corrective regimen sliding scale   SubCutaneous Before meals and at bedtime  insulin lispro Injectable (ADMELOG) 16 Unit(s) SubCutaneous three times a day with meals  melatonin 5 milliGRAM(s) Oral at bedtime  methylPREDNISolone sodium succinate Injectable   IV Push   mycophenolate mofetil 1000 milliGRAM(s) Oral <User Schedule>  NIFEdipine XL 30 milliGRAM(s) Oral every 24 hours  pantoprazole    Tablet 40 milliGRAM(s) Oral before breakfast  polyethylene glycol 3350 17 Gram(s) Oral daily  QUEtiapine 50 milliGRAM(s) Oral at bedtime  senna 2 Tablet(s) Oral two times a day  tacrolimus 0.5 milliGRAM(s) Oral <User Schedule>  tacrolimus 1 milliGRAM(s) Oral <User Schedule>  trimethoprim   80 mG/sulfamethoxazole 400 mG 1 Tablet(s) Oral daily    MEDICATIONS  (PRN):  traMADol 25 milliGRAM(s) Oral every 6 hours PRN Moderate Pain (4 - 6)  traMADol 50 milliGRAM(s) Oral every 8 hours PRN Severe Pain (7 - 10)      PAST MEDICAL & SURGICAL HISTORY:  HCC (hepatocellular carcinoma)      DM (diabetes mellitus)      HTN (hypertension)      HLD (hyperlipidemia)      Hepatic cirrhosis      IVEY (nonalcoholic steatohepatitis)      Former smoker      Ascites      Hepatic encephalopathy      History of cervical cancer      H/O  section      History of cholecystectomy      H/O carpal tunnel repair      H/O cataract extraction          Vital Signs Last 24 Hrs  T(C): 36.8 (2025 12:16), Max: 37.2 (2025 17:23)  T(F): 98.3 (2025 12:16), Max: 99 (2025 17:23)  HR: 67 (2025 12:16) (65 - 80)  BP: 147/70 (2025 12:16) (112/62 - 147/70)  BP(mean): --  RR: 18 (2025 12:16) (18 - 18)  SpO2: 97% (2025 12:16) (95% - 98%)    Parameters below as of 2025 12:16  Patient On (Oxygen Delivery Method): room air        I&O's Summary    2025 07:01  -  2025 07:00  --------------------------------------------------------  IN: 840 mL / OUT: 1307.5 mL / NET: -467.5 mL    2025 07:01  -  2025 12:52  --------------------------------------------------------  IN: 360 mL / OUT: 450 mL / NET: -90 mL                              8.9    5.66  )-----------( 45       ( 2025 06:37 )             26.8     06-04    135  |  103  |  34[H]  ----------------------------<  155[H]  4.1   |  22  |  1.12    Ca    8.2[L]      2025 06:37  Phos  2.2     06-  Mg     2.4     -    TPro  4.6[L]  /  Alb  3.1[L]  /  TBili  1.2  /  DBili  x   /  AST  80[H]  /  ALT  237[H]  /  AlkPhos  93  -04    Tacrolimus (), Serum: 3.2 ng/mL ( @ 06:37)        Culture - Fungal, Body Fluid (collected 25 @ 14:10)  Source: Body Fluid Ascites  Preliminary Report (25 @ 09:49):    No growth    Culture - Acid Fast - Body Fluid w/Smear (collected 25 @ 14:10)  Source: Body Fluid    Culture - Body Fluid with Gram Stain (collected 25 @ 14:10)  Source: Body Fluid Ascites  Gram Stain (25 @ 17:32):    No polymorphonuclear cells seen    No organisms seen    by cytocentrifuge  Preliminary Report (25 @ 16:11):    No growth                                      Review of systems  All other systems were reviewed and are negative, except as noted.    PHYSICAL EXAM:  Constitutional: NAD  Eyes: PERRLA  ENMT: nc/at, no thrush  Neck: supple,   Respiratory: CTA B/L  Cardiovascular: RRR  Gastrointestinal: Soft abdomen, ND, appropriate incisional TTP. chevron incision c/d/i, staples in place. No signs of infection.  JPsx2 ss  Genitourinary: voiding   Extremities: SCD's in place and working bilaterally  Vascular: Palpable dp pulses bilaterally.   Neurological:  AAO3  Skin: no rashes, ulcerations, lesions  Musculoskeletal:  moving extremities without issues  Psychiatric: cooperative

## 2025-06-04 NOTE — CHART NOTE - NSCHARTNOTEFT_GEN_A_CORE
POCT Blood Glucose:  182 mg/dL (06-04-25 @ 16:47)  136 mg/dL (06-04-25 @ 12:11)  186 mg/dL (06-04-25 @ 08:25)  235 mg/dL (06-03-25 @ 21:09)      eMAR:  insulin glargine Injectable (LANTUS)   22 Unit(s) SubCutaneous (06-03-25 @ 21:12)    insulin lispro (ADMELOG) corrective regimen sliding scale   2 Unit(s) SubCutaneous (06-04-25 @ 16:57)   2 Unit(s) SubCutaneous (06-04-25 @ 08:32)   4 Unit(s) SubCutaneous (06-03-25 @ 21:12)    insulin lispro Injectable (ADMELOG)   16 Unit(s) SubCutaneous (06-04-25 @ 16:57)   16 Unit(s) SubCutaneous (06-04-25 @ 12:46)   16 Unit(s) SubCutaneous (06-04-25 @ 08:33)    methylPREDNISolone sodium succinate Injectable   60 milliGRAM(s) IV Push (06-04-25 @ 05:12)      Diet, Consistent Carbohydrate w/Evening Snack:   Supplement Feeding Modality:  Oral  Ensure Max Cans or Servings Per Day:  1       Frequency:  Daily (06-02-25 @ 10:24) [Active]    Chart reviewed. Last 24 hour Bgs 136- 235 with fasting    On Methylprednisolone taper, received 125 mg yesterday, 60 mg today and decrease to 40 mg tomorrow   Would decrease insulin doses preventively     - Decrease Lantus to 18 units at HS   - Decrease Admelog to 14 units with meals tomorrow           Contact via Microsoft Teams during business hours  To reach covering provider access AMION via sunrise tools  For Urgent matters/after-hours/weekends/holidays please page endocrine fellow on call   For nonurgent matters please email [email or web address]    Please note that this patient may be followed by different provider tomorrow.  Notify endocrine 24 hours prior to discharge for final recommendations

## 2025-06-05 LAB
ALBUMIN SERPL ELPH-MCNC: 3 G/DL — LOW (ref 3.3–5)
ALP SERPL-CCNC: 87 U/L — SIGNIFICANT CHANGE UP (ref 40–120)
ALT FLD-CCNC: 168 U/L — HIGH (ref 10–45)
ANION GAP SERPL CALC-SCNC: 9 MMOL/L — SIGNIFICANT CHANGE UP (ref 5–17)
APTT BLD: 26.3 SEC — SIGNIFICANT CHANGE UP (ref 26.1–36.8)
AST SERPL-CCNC: 53 U/L — HIGH (ref 10–40)
BILIRUB SERPL-MCNC: 1.1 MG/DL — SIGNIFICANT CHANGE UP (ref 0.2–1.2)
BLD GP AB SCN SERPL QL: NEGATIVE — SIGNIFICANT CHANGE UP
BUN SERPL-MCNC: 29 MG/DL — HIGH (ref 7–23)
CALCIUM SERPL-MCNC: 8.1 MG/DL — LOW (ref 8.4–10.5)
CHLORIDE SERPL-SCNC: 106 MMOL/L — SIGNIFICANT CHANGE UP (ref 96–108)
CO2 SERPL-SCNC: 22 MMOL/L — SIGNIFICANT CHANGE UP (ref 22–31)
CREAT SERPL-MCNC: 0.95 MG/DL — SIGNIFICANT CHANGE UP (ref 0.5–1.3)
CULTURE RESULTS: SIGNIFICANT CHANGE UP
EGFR: 64 ML/MIN/1.73M2 — SIGNIFICANT CHANGE UP
EGFR: 64 ML/MIN/1.73M2 — SIGNIFICANT CHANGE UP
GAS PNL BLDV: SIGNIFICANT CHANGE UP
GLUCOSE BLDC GLUCOMTR-MCNC: 167 MG/DL — HIGH (ref 70–99)
GLUCOSE BLDC GLUCOMTR-MCNC: 189 MG/DL — HIGH (ref 70–99)
GLUCOSE BLDC GLUCOMTR-MCNC: 269 MG/DL — HIGH (ref 70–99)
GLUCOSE BLDC GLUCOMTR-MCNC: 78 MG/DL — SIGNIFICANT CHANGE UP (ref 70–99)
GLUCOSE SERPL-MCNC: 139 MG/DL — HIGH (ref 70–99)
HCT VFR BLD CALC: 26.1 % — LOW (ref 34.5–45)
HGB BLD-MCNC: 8.8 G/DL — LOW (ref 11.5–15.5)
INR BLD: 1.15 RATIO — SIGNIFICANT CHANGE UP (ref 0.85–1.16)
MAGNESIUM SERPL-MCNC: 1.9 MG/DL — SIGNIFICANT CHANGE UP (ref 1.6–2.6)
MCHC RBC-ENTMCNC: 29.8 PG — SIGNIFICANT CHANGE UP (ref 27–34)
MCHC RBC-ENTMCNC: 33.7 G/DL — SIGNIFICANT CHANGE UP (ref 32–36)
MCV RBC AUTO: 88.5 FL — SIGNIFICANT CHANGE UP (ref 80–100)
NRBC BLD AUTO-RTO: 0 /100 WBCS — SIGNIFICANT CHANGE UP (ref 0–0)
PHOSPHATE SERPL-MCNC: 2.2 MG/DL — LOW (ref 2.5–4.5)
PLATELET # BLD AUTO: 44 K/UL — LOW (ref 150–400)
POTASSIUM SERPL-MCNC: 4.2 MMOL/L — SIGNIFICANT CHANGE UP (ref 3.5–5.3)
POTASSIUM SERPL-SCNC: 4.2 MMOL/L — SIGNIFICANT CHANGE UP (ref 3.5–5.3)
PROT SERPL-MCNC: 4.3 G/DL — LOW (ref 6–8.3)
PROTHROM AB SERPL-ACNC: 13.1 SEC — SIGNIFICANT CHANGE UP (ref 9.9–13.4)
RBC # BLD: 2.95 M/UL — LOW (ref 3.8–5.2)
RBC # FLD: 16.8 % — HIGH (ref 10.3–14.5)
RH IG SCN BLD-IMP: POSITIVE — SIGNIFICANT CHANGE UP
SODIUM SERPL-SCNC: 137 MMOL/L — SIGNIFICANT CHANGE UP (ref 135–145)
SPECIMEN SOURCE: SIGNIFICANT CHANGE UP
TACROLIMUS SERPL-MCNC: 5.2 NG/ML — SIGNIFICANT CHANGE UP
VANCOMYCIN FLD-MCNC: 5.3 UG/ML — SIGNIFICANT CHANGE UP
WBC # BLD: 5.56 K/UL — SIGNIFICANT CHANGE UP (ref 3.8–10.5)
WBC # FLD AUTO: 5.56 K/UL — SIGNIFICANT CHANGE UP (ref 3.8–10.5)

## 2025-06-05 PROCEDURE — 99232 SBSQ HOSP IP/OBS MODERATE 35: CPT

## 2025-06-05 RX ORDER — INSULIN LISPRO 100 U/ML
16 INJECTION, SOLUTION INTRAVENOUS; SUBCUTANEOUS
Refills: 0 | Status: DISCONTINUED | OUTPATIENT
Start: 2025-06-05 | End: 2025-06-07

## 2025-06-05 RX ORDER — LIDOCAINE HCL/PF 10 MG/ML
10 VIAL (ML) INJECTION ONCE
Refills: 0 | Status: DISCONTINUED | OUTPATIENT
Start: 2025-06-05 | End: 2025-06-12

## 2025-06-05 RX ORDER — TACROLIMUS 0.5 MG/1
1 CAPSULE ORAL ONCE
Refills: 0 | Status: COMPLETED | OUTPATIENT
Start: 2025-06-05 | End: 2025-06-05

## 2025-06-05 RX ORDER — HEPARIN SODIUM 1000 [USP'U]/ML
5000 INJECTION INTRAVENOUS; SUBCUTANEOUS EVERY 12 HOURS
Refills: 0 | Status: DISCONTINUED | OUTPATIENT
Start: 2025-06-05 | End: 2025-06-12

## 2025-06-05 RX ORDER — SUMATRIPTAN 100 MG/1
25 TABLET, FILM COATED ORAL ONCE
Refills: 0 | Status: DISCONTINUED | OUTPATIENT
Start: 2025-06-05 | End: 2025-06-05

## 2025-06-05 RX ORDER — FLUCONAZOLE 150 MG
400 TABLET ORAL DAILY
Refills: 0 | Status: DISCONTINUED | OUTPATIENT
Start: 2025-06-05 | End: 2025-06-12

## 2025-06-05 RX ORDER — TACROLIMUS 0.5 MG/1
1 CAPSULE ORAL
Refills: 0 | Status: DISCONTINUED | OUTPATIENT
Start: 2025-06-05 | End: 2025-06-07

## 2025-06-05 RX ORDER — INSULIN GLARGINE-YFGN 100 [IU]/ML
14 INJECTION, SOLUTION SUBCUTANEOUS AT BEDTIME
Refills: 0 | Status: DISCONTINUED | OUTPATIENT
Start: 2025-06-05 | End: 2025-06-07

## 2025-06-05 RX ORDER — QUETIAPINE FUMARATE 25 MG/1
25 TABLET ORAL
Qty: 60 | Refills: 5 | Status: ACTIVE | COMMUNITY
Start: 2025-06-03 | End: 1900-01-01

## 2025-06-05 RX ORDER — ACETAMINOPHEN 500 MG/5ML
650 LIQUID (ML) ORAL ONCE
Refills: 0 | Status: DISCONTINUED | OUTPATIENT
Start: 2025-06-05 | End: 2025-06-05

## 2025-06-05 RX ORDER — SODIUM PHOSPHATE,DIBASIC DIHYD
15 POWDER (GRAM) MISCELLANEOUS ONCE
Refills: 0 | Status: COMPLETED | OUTPATIENT
Start: 2025-06-05 | End: 2025-06-05

## 2025-06-05 RX ORDER — ACETAMINOPHEN 500 MG/5ML
1000 LIQUID (ML) ORAL ONCE
Refills: 0 | Status: COMPLETED | OUTPATIENT
Start: 2025-06-05 | End: 2025-06-05

## 2025-06-05 RX ORDER — TACROLIMUS 0.5 MG/1
0.5 CAPSULE ORAL
Refills: 0 | Status: DISCONTINUED | OUTPATIENT
Start: 2025-06-05 | End: 2025-06-07

## 2025-06-05 RX ADMIN — Medication 1 TABLET(S): at 04:55

## 2025-06-05 RX ADMIN — INSULIN LISPRO 16 UNIT(S): 100 INJECTION, SOLUTION INTRAVENOUS; SUBCUTANEOUS at 17:31

## 2025-06-05 RX ADMIN — METHYLPREDNISOLONE ACETATE 40 MILLIGRAM(S): 80 INJECTION, SUSPENSION INTRA-ARTICULAR; INTRALESIONAL; INTRAMUSCULAR; SOFT TISSUE at 04:55

## 2025-06-05 RX ADMIN — ENTECAVIR 0.5 MILLIGRAM(S): 0.5 TABLET ORAL at 13:26

## 2025-06-05 RX ADMIN — Medication 1 TABLET(S): at 17:29

## 2025-06-05 RX ADMIN — MYCOPHENOLATE MOFETIL 1000 MILLIGRAM(S): 500 TABLET, FILM COATED ORAL at 19:23

## 2025-06-05 RX ADMIN — Medication 1 APPLICATION(S): at 04:56

## 2025-06-05 RX ADMIN — INSULIN LISPRO 6: 100 INJECTION, SOLUTION INTRAVENOUS; SUBCUTANEOUS at 13:26

## 2025-06-05 RX ADMIN — HEPARIN SODIUM 5000 UNIT(S): 1000 INJECTION INTRAVENOUS; SUBCUTANEOUS at 17:30

## 2025-06-05 RX ADMIN — TRAMADOL HYDROCHLORIDE 50 MILLIGRAM(S): 50 TABLET, FILM COATED ORAL at 03:15

## 2025-06-05 RX ADMIN — Medication 1 DOSE(S): at 04:55

## 2025-06-05 RX ADMIN — INSULIN LISPRO 14 UNIT(S): 100 INJECTION, SOLUTION INTRAVENOUS; SUBCUTANEOUS at 09:03

## 2025-06-05 RX ADMIN — VALGANCICLOVIR 900 MILLIGRAM(S): 450 TABLET, FILM COATED ORAL at 13:25

## 2025-06-05 RX ADMIN — TACROLIMUS 0.5 MILLIGRAM(S): 0.5 CAPSULE ORAL at 19:23

## 2025-06-05 RX ADMIN — POLYETHYLENE GLYCOL 3350 17 GRAM(S): 17 POWDER, FOR SOLUTION ORAL at 13:24

## 2025-06-05 RX ADMIN — MYCOPHENOLATE MOFETIL 1000 MILLIGRAM(S): 500 TABLET, FILM COATED ORAL at 09:02

## 2025-06-05 RX ADMIN — Medication 63.75 MILLIMOLE(S): at 09:02

## 2025-06-05 RX ADMIN — Medication 30 MILLIGRAM(S): at 13:25

## 2025-06-05 RX ADMIN — Medication 40 MILLIGRAM(S): at 04:56

## 2025-06-05 RX ADMIN — Medication 400 MILLIGRAM(S): at 23:01

## 2025-06-05 RX ADMIN — INSULIN LISPRO 2: 100 INJECTION, SOLUTION INTRAVENOUS; SUBCUTANEOUS at 17:30

## 2025-06-05 RX ADMIN — INSULIN LISPRO 14 UNIT(S): 100 INJECTION, SOLUTION INTRAVENOUS; SUBCUTANEOUS at 13:26

## 2025-06-05 RX ADMIN — Medication 5 MILLIGRAM(S): at 22:07

## 2025-06-05 RX ADMIN — INSULIN LISPRO 2: 100 INJECTION, SOLUTION INTRAVENOUS; SUBCUTANEOUS at 09:03

## 2025-06-05 RX ADMIN — TRAMADOL HYDROCHLORIDE 50 MILLIGRAM(S): 50 TABLET, FILM COATED ORAL at 02:45

## 2025-06-05 RX ADMIN — Medication 1 TABLET(S): at 13:26

## 2025-06-05 RX ADMIN — TACROLIMUS 1 MILLIGRAM(S): 0.5 CAPSULE ORAL at 13:25

## 2025-06-05 RX ADMIN — QUETIAPINE FUMARATE 50 MILLIGRAM(S): 25 TABLET ORAL at 22:07

## 2025-06-05 RX ADMIN — Medication 1 DOSE(S): at 17:30

## 2025-06-05 RX ADMIN — Medication 2 TABLET(S): at 17:30

## 2025-06-05 RX ADMIN — Medication 400 MILLIGRAM(S): at 13:25

## 2025-06-05 RX ADMIN — Medication 81 MILLIGRAM(S): at 13:25

## 2025-06-05 NOTE — PROGRESS NOTE ADULT - ASSESSMENT
70 F hx NAFLD cirrhosis and HCC (listed for liver transplant with MELD 20B), UGIB in 2022, chronic back pain (2/2 spinal fracture) [facility] for coffee ground emesis and melena, EGD showed no active bleeding, transferred to [facility] now s/p OLT on 5/31/2025     [] s/p OLT - POD 5  - HBV Core + donor: Entecavir  - LFTs trending down  - Diet: ADAT  - Strict I&O:  UO,  d/c JP3  - Pain Management  - Bowel Regimen  - E.Faecium UTI, asymptomatic, Zosyn/Vanco completed  - OT/PT/RD /IS  -  consult;  started seroquel 6/3   - SCOTT has resolved  - ASA today    [] Immunosuppression:  - FK by level,  MMF 1/1, SST  - Simulect today   - PPx: Bactrim, Valcyte 450 (CMV +/-, inc 900mg as able), Fluc, PPI, OsCal    [] DM  - Endocrine team  following    [] HTN  - Nifedipine, adjust prn    [] DVT  -SQH: will assess start in AM  -SCDs at all times    [] DISPO  -d/c to Acute Rehab (potential Friday) 70 F hx NAFLD cirrhosis and HCC (listed for liver transplant with MELD 20B), UGIB in 2022, chronic back pain (2/2 spinal fracture) presented from [facility] where she was admitted on 5/26 for coffee ground emesis and melena, EGD showed no active bleeding, transferred to [facility] now s/p OLT on 5/31/2025     [] s/p OLT - POD 5  - HBV Core + donor: Entecavir  - LFTs trending down  - Diet: ADAT  - Strict I&O:  UO,  d/c JP3  - Pain Management  - Bowel Regimen  - E.Faecium UTI, asymptomatic, Zosyn/Vanco completed  - OT/PT/RD /IS  -  consult;  started seroquel 6/3   - SCOTT has resolved  - Inc fluconazole to 400mg qd    [] Immunosuppression: Simulect induction  - FK by level,  MMF 1/1, SST  - PPx: Bactrim, Valcyte 450 (CMV +/-, inc 900mg as able), Fluc, PPI, OsCal    [] DM  - Endocrine team  following    [] HTN  - Nifedipine, adjust prn    [] DVT  -SQH/asa  -SCDs at all times    [] DISPO  -d/c to Acute Rehab (potential Friday)

## 2025-06-05 NOTE — PHARMACY EDUCATION NOTE - EDUCATION SUMMARY
Discharge immunosuppressant medications and prophylatic anti-infective agents reviewed with the patient. Outpatient medication schedule was discussed in detail including: medication name, indication, dose, administration times, treatment duration, side effects, drug interactions, and special instructions. Mycophenolate REMS education completed. Patient questions and concerns were answered and addressed. Patient demonstrated understanding. Stent education was provided to patient.

## 2025-06-05 NOTE — PROGRESS NOTE ADULT - SUBJECTIVE AND OBJECTIVE BOX
Transplant Surgery -  Multidisciplinary Progress Note  --------------------------------------------------------------  OLTx      Date: 5/31/2025      POD#5  CMV +/-    Patient seen and examined with multidisciplinary transplant team: Surgeon Dr. Arriola, Dr. Dale, Hepatologist Dr. Gaona, ROGERIO Dietz/Jennifer, Pharmacist Raj and RN team. Disciplines not in attendance will be notified of the plan      HPI: 70 F hx NAFLD cirrhosis and HCC (listed for liver transplant with MELD 20B), UGIB in 2022, chronic back pain (2/2 spinal fracture) presented from [facility] where she was admitted on 5/26 for coffee ground emesis and melena, EGD showed no active bleeding, transferred to [facility] now s/p OLT on 5/31/2025     Interval Events:      Immunosuppression:  Induction: Simulect today  Maintenance: FK by level, MMF 1/1, SST  Ongoing monitoring for signs of rejection    Potential Discharge date: tbd   Education:  Medications  Plan of care:  See Below      TX DATA                                Review of systems  All other systems were reviewed and are negative, except as noted.    PHYSICAL EXAM:  Constitutional: NAD  Eyes: PERRLA  ENMT: nc/at, no thrush  Neck: supple,   Respiratory: CTA B/L  Cardiovascular: RRR  Gastrointestinal: Soft abdomen, ND, appropriate incisional TTP. chevron incision c/d/i, staples in place. No signs of infection.  JPsx2 ss  Genitourinary: voiding   Extremities: SCD's in place and working bilaterally  Vascular: Palpable dp pulses bilaterally.   Neurological:  AAO3  Skin: no rashes, ulcerations, lesions  Musculoskeletal:  moving extremities without issues  Psychiatric: cooperative Transplant Surgery -  Multidisciplinary Progress Note  --------------------------------------------------------------  OLTx      Date: 2025      POD#5  CMV +/-    Patient seen and examined with multidisciplinary transplant team: Surgeon Dr. Arriola, Dr. Dale, Hepatologist Dr. Gaona, ACP's: David/Jennifer, Pharmacist Raj and RN team. Disciplines not in attendance will be notified of the plan      HPI: 70 F hx NAFLD cirrhosis and HCC (listed for liver transplant with MELD 20B), UGIB in , chronic back pain (2/2 spinal fracture) presented from [facility] where she was admitted on  for coffee ground emesis and melena, EGD showed no active bleeding, transferred to [facility] now s/p OLT on 2025     Interval Events:  Started asa  S/P second dose of simulect  NASREEN#3 more bloody stable H&H    Immunosuppression:  Induction: Simulect today  Maintenance: FK by level, MMF , SST  Ongoing monitoring for signs of rejection    Potential Discharge date: tbd   Education:  Medications  Plan of care:  See Below      MEDICATIONS  (STANDING):  aspirin enteric coated 81 milliGRAM(s) Oral daily  calcium carbonate 1250 mG  + Vitamin D (OsCal 500 + D) 1 Tablet(s) Oral two times a day  chlorhexidine 2% Cloths 1 Application(s) Topical <User Schedule>  entecavir 0.5 milliGRAM(s) Oral daily  fluconAZOLE   Tablet 400 milliGRAM(s) Oral daily  fluticasone propionate/ salmeterol 100-50 MICROgram(s) Diskus 1 Dose(s) Inhalation two times a day  insulin glargine Injectable (LANTUS) 14 Unit(s) SubCutaneous at bedtime  insulin lispro (ADMELOG) corrective regimen sliding scale   SubCutaneous <User Schedule>  insulin lispro (ADMELOG) corrective regimen sliding scale   SubCutaneous Before meals and at bedtime  insulin lispro Injectable (ADMELOG) 16 Unit(s) SubCutaneous three times a day with meals  lidocaine 1% (Preservative-free) Injectable 10 milliLiter(s) Local Injection once  melatonin 5 milliGRAM(s) Oral at bedtime  methylPREDNISolone sodium succinate Injectable   IV Push   mycophenolate mofetil 1000 milliGRAM(s) Oral <User Schedule>  NIFEdipine XL 30 milliGRAM(s) Oral every 24 hours  pantoprazole    Tablet 40 milliGRAM(s) Oral before breakfast  polyethylene glycol 3350 17 Gram(s) Oral daily  QUEtiapine 50 milliGRAM(s) Oral at bedtime  senna 2 Tablet(s) Oral two times a day  tacrolimus 1 milliGRAM(s) Oral <User Schedule>  tacrolimus 0.5 milliGRAM(s) Oral <User Schedule>  trimethoprim   80 mG/sulfamethoxazole 400 mG 1 Tablet(s) Oral daily  valGANciclovir 900 milliGRAM(s) Oral daily    MEDICATIONS  (PRN):  traMADol 25 milliGRAM(s) Oral every 6 hours PRN Moderate Pain (4 - 6)  traMADol 50 milliGRAM(s) Oral every 8 hours PRN Severe Pain (7 - 10)      PAST MEDICAL & SURGICAL HISTORY:  HCC (hepatocellular carcinoma)      DM (diabetes mellitus)      HTN (hypertension)      HLD (hyperlipidemia)      Hepatic cirrhosis      IVEY (nonalcoholic steatohepatitis)      Former smoker      Ascites      Hepatic encephalopathy      History of cervical cancer      H/O  section      History of cholecystectomy      H/O carpal tunnel repair      H/O cataract extraction          Vital Signs Last 24 Hrs  T(C): 36.8 (2025 13:26), Max: 37.1 (2025 09:00)  T(F): 98.3 (2025 13:26), Max: 98.7 (2025 09:00)  HR: 66 (2025 13:26) (65 - 75)  BP: 148/58 (2025 13:26) (134/67 - 148/58)  BP(mean): --  RR: 18 (2025 13:26) (18 - 18)  SpO2: 98% (2025 13:26) (97% - 100%)    Parameters below as of 2025 13:26  Patient On (Oxygen Delivery Method): room air        I&O's Summary    2025 07:01  -  2025 07:00  --------------------------------------------------------  IN: 900 mL / OUT: 1595 mL / NET: -695 mL    2025 07:01  -  2025 14:46  --------------------------------------------------------  IN: 0 mL / OUT: 260 mL / NET: -260 mL                              8.8    5.56  )-----------( 44       ( 2025 04:49 )             26.1         137  |  106  |  29[H]  ----------------------------<  139[H]  4.2   |  22  |  0.95    Ca    8.1[L]      2025 04:49  Phos  2.2       Mg     1.9         TPro  4.3[L]  /  Alb  3.0[L]  /  TBili  1.1  /  DBili  x   /  AST  53[H]  /  ALT  168[H]  /  AlkPhos  87      Tacrolimus (), Serum: 5.2 ng/mL ( @ 04:49)        Culture - Fungal, Body Fluid (collected 25 @ 14:10)  Source: Body Fluid Ascites  Preliminary Report (25 @ 09:23):    No growth    Culture - Acid Fast - Body Fluid w/Smear (collected 25 @ 14:10)  Source: Body Fluid  Preliminary Report (25 @ 23:07):    Culture is being performed.    Culture - Body Fluid with Gram Stain (collected 25 @ 14:10)  Source: Body Fluid Ascites  Gram Stain (25 @ 17:32):    No polymorphonuclear cells seen    No organisms seen    by cytocentrifuge  Preliminary Report (25 @ 16:11):    No growth    Review of systems  All other systems were reviewed and are negative, except as noted.    PHYSICAL EXAM:  Constitutional: NAD  Eyes: PERRLA  ENMT: nc/at, no thrush  Neck: supple,   Respiratory: CTA B/L  Cardiovascular: RRR  Gastrointestinal: Soft abdomen, ND, appropriate incisional TTP. chevron incision c/d/i, staples in place. No signs of infection.  JPsx2 ss  Genitourinary: voiding   Extremities: SCD's in place and working bilaterally  Vascular: Palpable dp pulses bilaterally.   Neurological:  AAO3  Skin: no rashes, ulcerations, lesions  Musculoskeletal:  moving extremities without issues  Psychiatric: cooperative

## 2025-06-05 NOTE — PROGRESS NOTE ADULT - ASSESSMENT
70 F hx NAFLD cirrhosis and HCC (listed for liver transplant with MELD 20B), UGIB in 2022, chronic back pain (2/2 spinal fracture) presented from [facility] where she was admitted on 5/26 for coffee ground emesis and melena, EGD showed no active bleeding, patient was transfused and stabilized prior to transfer. Patient is currently post op day #3 for liver transplant. Endocrinology was consulted for management of diabetes mellitus. BG Goal 100-180mg/dl   Tolerating POs, eats about 50% of meals. Last 24 hour BGs in 100s with serum fasting . BGs mostly at or close to goal ( 100-180) but noted prelunch BG was 269 today. On steroid taper, s/p Methylprednisolone 40 mg today and plan for 20 mg tomorrow. Will adjust insulin doses for BG Goal 100-180mg/dl  without hypoglycemia         #Type 2 Diabetes Mellitus  #Steroid- induced hyperglycemia   - HbA1c  6.0% - although not accurate in the setting of low gfr and liver disease      - Home regimen: Lantus 14 units + Humalog 18 units with meals. Sugars are usually high in the 200s   - Egfr: 39  - Diet is quite high in carbs- eating out, grape juice, pasta, fruit yogurts. Would benefit from a dietician consult   - sugars also exacerbated due to steroid use- currently on Solumedrol 125mg daily with taper tomorrow to 60mg daily --> 40mg daily --> 20mg daily.      Plan:   - Serum fasting BG at goal, will decrease Lantus to 14 units at HS  - Slightly increase Admelog to 16 units with meals today and decrease to 14 units of Admelog TIDQAC tomorrow   - Recommend moderate Admelog correction scale TIDQAC and QHS  - Will need daily adjustment of insulin due to steroid tapering   - Please check FSG before meals and QHS, or q6h while NPO  - Inpatient glucose goal 100-180   - Please keep patient on a diabetic, carb controlled diet   - Registered dietician consult  - hypoglycemia orderset prn  - extensively discussed importance of glycemic control to prevent micro- and macrovascular complications    - Discharge planning: Basal/bolus - final doses TBD    #Hypertension  - BP goal <130/80  - Management as per primary team    #Hyperlipidemia  - Goal LDL <70 in the setting of diabetes  - Please check fasting lipid panel if not checked recently       Contact via Microsoft Teams during business hours  To reach covering provider access KRISTINA via sunrise tools  For Urgent matters/after-hours/weekends/holidays please page endocrine fellow on call   For nonurgent matters please email [email or web address]    Please note that this patient may be followed by different provider tomorrow.  Notify endocrine 24 hours prior to discharge for final recommendations 70 F hx NAFLD cirrhosis and HCC (listed for liver transplant with MELD 20B), UGIB in 2022, chronic back pain (2/2 spinal fracture) presented from [facility] where she was admitted on 5/26 for coffee ground emesis and melena, EGD showed no active bleeding, patient was transfused and stabilized prior to transfer. Patient is currently post op day #3 for liver transplant. Endocrinology was consulted for management of diabetes mellitus. BG Goal 100-180mg/dl   Tolerating POs, eats about 50% of meals. Last 24 hour BGs in 100s with serum fasting . BGs mostly at or close to goal ( 100-180) but noted prelunch BG was 269 today. On steroid taper, s/p Methylprednisolone 40 mg today and plan for 20 mg tomorrow. Will adjust insulin doses for BG Goal 100-180mg/dl  without hypoglycemia         #Type 2 Diabetes Mellitus  #Steroid- induced hyperglycemia   - HbA1c  6.0% - although not accurate in the setting of low gfr and liver disease      - Home regimen: Lantus 14 units + Humalog 18 units with meals. Sugars are usually high in the 200s   - Egfr: 39  - Diet is quite high in carbs- eating out, grape juice, pasta, fruit yogurts. Would benefit from a dietician consult   - sugars also exacerbated due to steroid use- currently on Solumedrol 125mg daily with taper tomorrow to 60mg daily --> 40mg daily --> 20mg daily.      Plan:   - Serum fasting BG at goal, will decrease Lantus to 14 units at HS  - Slightly increase Admelog to 16 units with meals  TIDQAC ( Hold if NPO) Will further adjust meal coverage insulin tomorrow   - Recommend moderate Admelog correction scale TIDQAC and QHS  - Will need daily adjustment of insulin due to steroid tapering   - Please check FSG before meals and QHS, or q6h while NPO  - Inpatient glucose goal 100-180   - Please keep patient on a diabetic, carb controlled diet   - Registered dietician consult  - hypoglycemia orderset prn  - extensively discussed importance of glycemic control to prevent micro- and macrovascular complications    - Discharge planning: Basal/bolus - final doses TBD    #Hypertension  - BP goal <130/80  - Management as per primary team    #Hyperlipidemia  - Goal LDL <70 in the setting of diabetes  - Please check fasting lipid panel if not checked recently       Contact via Microsoft Teams during business hours  To reach covering provider access AMION via sunrise tools  For Urgent matters/after-hours/weekends/holidays please page endocrine fellow on call   For nonurgent matters please email [email or web address]    Please note that this patient may be followed by different provider tomorrow.  Notify endocrine 24 hours prior to discharge for final recommendations

## 2025-06-05 NOTE — PROGRESS NOTE ADULT - SUBJECTIVE AND OBJECTIVE BOX
Seen earlier today     Chief Complaint: Diabetes Mellitus follow up    INTERVAL HX: " Feel better" Tolerating POs, eats about 50% of meals. Last 24 hour BGs in 100s with serum fasting . BGs mostly at or close to goal ( 100-180) On steroid taper, s/p Methylprednisolone 40 mg today and plan for 20 mg tomorrow.       Review of Systems:  General: As above  GI: No nausea, vomiting  Endocrine: no  S&Sx of hypoglycemia    Allergies    No Known Allergies    Intolerances      MEDICATIONS  (STANDING):  aspirin enteric coated 81 milliGRAM(s) Oral daily  calcium carbonate 1250 mG  + Vitamin D (OsCal 500 + D) 1 Tablet(s) Oral two times a day  chlorhexidine 2% Cloths 1 Application(s) Topical <User Schedule>  entecavir 0.5 milliGRAM(s) Oral daily  fluconAZOLE   Tablet 400 milliGRAM(s) Oral daily  fluticasone propionate/ salmeterol 100-50 MICROgram(s) Diskus 1 Dose(s) Inhalation two times a day  insulin glargine Injectable (LANTUS) 18 Unit(s) SubCutaneous at bedtime  insulin lispro (ADMELOG) corrective regimen sliding scale   SubCutaneous <User Schedule>  insulin lispro (ADMELOG) corrective regimen sliding scale   SubCutaneous Before meals and at bedtime  insulin lispro Injectable (ADMELOG) 14 Unit(s) SubCutaneous three times a day with meals  lidocaine 1% (Preservative-free) Injectable 10 milliLiter(s) Local Injection once  melatonin 5 milliGRAM(s) Oral at bedtime  methylPREDNISolone sodium succinate Injectable   IV Push   mycophenolate mofetil 1000 milliGRAM(s) Oral <User Schedule>  NIFEdipine XL 30 milliGRAM(s) Oral every 24 hours  pantoprazole    Tablet 40 milliGRAM(s) Oral before breakfast  polyethylene glycol 3350 17 Gram(s) Oral daily  QUEtiapine 50 milliGRAM(s) Oral at bedtime  senna 2 Tablet(s) Oral two times a day  tacrolimus 1 milliGRAM(s) Oral <User Schedule>  tacrolimus 0.5 milliGRAM(s) Oral <User Schedule>  tacrolimus 1 milliGRAM(s) Oral once  trimethoprim   80 mG/sulfamethoxazole 400 mG 1 Tablet(s) Oral daily  valGANciclovir 900 milliGRAM(s) Oral daily        insulin glargine Injectable (LANTUS)   18 Unit(s) SubCutaneous (06-04-25 @ 21:19)    insulin lispro (ADMELOG) corrective regimen sliding scale   2 Unit(s) SubCutaneous (06-05-25 @ 09:03)   2 Unit(s) SubCutaneous (06-04-25 @ 21:19)   2 Unit(s) SubCutaneous (06-04-25 @ 16:57)    insulin lispro Injectable (ADMELOG)   14 Unit(s) SubCutaneous (06-05-25 @ 09:03)    insulin lispro Injectable (ADMELOG)   16 Unit(s) SubCutaneous (06-04-25 @ 16:57)    methylPREDNISolone sodium succinate Injectable   40 milliGRAM(s) IV Push (06-05-25 @ 04:55)        PHYSICAL EXAM:  VITALS: T(C): 37.1 (06-05-25 @ 09:00)  T(F): 98.7 (06-05-25 @ 09:00), Max: 98.7 (06-05-25 @ 09:00)  HR: 74 (06-05-25 @ 09:00) (65 - 75)  BP: 147/61 (06-05-25 @ 09:00) (134/67 - 147/61)  RR:  (18 - 18)  SpO2:  (97% - 100%)  Wt(kg): --  GENERAL: Female laying in bed, in NAD  Respiratory: Respirations unlabored   Extremities: Warm, no edema  NEURO: Alert , appropriate     LABS:  POCT Blood Glucose.: 269 mg/dL (06-05-25 @ 12:59)  POCT Blood Glucose.: 189 mg/dL (06-05-25 @ 08:42)  POCT Blood Glucose.: 169 mg/dL (06-04-25 @ 21:16)  POCT Blood Glucose.: 182 mg/dL (06-04-25 @ 16:47)  POCT Blood Glucose.: 136 mg/dL (06-04-25 @ 12:11)  POCT Blood Glucose.: 186 mg/dL (06-04-25 @ 08:25)  POCT Blood Glucose.: 235 mg/dL (06-03-25 @ 21:09)  POCT Blood Glucose.: 172 mg/dL (06-03-25 @ 17:01)  POCT Blood Glucose.: 222 mg/dL (06-03-25 @ 12:26)  POCT Blood Glucose.: 261 mg/dL (06-03-25 @ 09:06)  POCT Blood Glucose.: 220 mg/dL (06-02-25 @ 22:04)  POCT Blood Glucose.: 257 mg/dL (06-02-25 @ 17:19)                          8.8    5.56  )-----------( 44       ( 05 Jun 2025 04:49 )             26.1     06-05    137  |  106  |  29[H]  ----------------------------<  139[H]  4.2   |  22  |  0.95    Ca    8.1[L]      05 Jun 2025 04:49  Phos  2.2     06-05  Mg     1.9     06-05    TPro  4.3[L]  /  Alb  3.0[L]  /  TBili  1.1  /  DBili  x   /  AST  53[H]  /  ALT  168[H]  /  AlkPhos  87  06-05    eGFR: 64 mL/min/1.73m2 (05 Jun 2025 04:49)      Thyroid Function Tests:      A1C with Estimated Average Glucose Result: 6.0 % (05-28-25 @ 10:20)    Estimated Average Glucose: 126 mg/dL (05-28-25 @ 10:20)        Diet, Consistent Carbohydrate w/Evening Snack:   Supplement Feeding Modality:  Oral  Ensure Max Cans or Servings Per Day:  1       Frequency:  Daily (06-02-25 @ 10:24) [Active]

## 2025-06-06 LAB
ALBUMIN SERPL ELPH-MCNC: 2.9 G/DL — LOW (ref 3.3–5)
ALP SERPL-CCNC: 89 U/L — SIGNIFICANT CHANGE UP (ref 40–120)
ALT FLD-CCNC: 150 U/L — HIGH (ref 10–45)
ANION GAP SERPL CALC-SCNC: 13 MMOL/L — SIGNIFICANT CHANGE UP (ref 5–17)
APTT BLD: 26.2 SEC — SIGNIFICANT CHANGE UP (ref 26.1–36.8)
AST SERPL-CCNC: 62 U/L — HIGH (ref 10–40)
BILIRUB SERPL-MCNC: 1.2 MG/DL — SIGNIFICANT CHANGE UP (ref 0.2–1.2)
BUN SERPL-MCNC: 23 MG/DL — SIGNIFICANT CHANGE UP (ref 7–23)
CALCIUM SERPL-MCNC: 8.5 MG/DL — SIGNIFICANT CHANGE UP (ref 8.4–10.5)
CHLORIDE SERPL-SCNC: 104 MMOL/L — SIGNIFICANT CHANGE UP (ref 96–108)
CO2 SERPL-SCNC: 21 MMOL/L — LOW (ref 22–31)
CREAT SERPL-MCNC: 1.02 MG/DL — SIGNIFICANT CHANGE UP (ref 0.5–1.3)
EGFR: 59 ML/MIN/1.73M2 — LOW
EGFR: 59 ML/MIN/1.73M2 — LOW
GLUCOSE BLDC GLUCOMTR-MCNC: 107 MG/DL — HIGH (ref 70–99)
GLUCOSE BLDC GLUCOMTR-MCNC: 125 MG/DL — HIGH (ref 70–99)
GLUCOSE BLDC GLUCOMTR-MCNC: 135 MG/DL — HIGH (ref 70–99)
GLUCOSE BLDC GLUCOMTR-MCNC: 146 MG/DL — HIGH (ref 70–99)
GLUCOSE BLDC GLUCOMTR-MCNC: 151 MG/DL — HIGH (ref 70–99)
GLUCOSE BLDC GLUCOMTR-MCNC: 58 MG/DL — LOW (ref 70–99)
GLUCOSE BLDC GLUCOMTR-MCNC: 71 MG/DL — SIGNIFICANT CHANGE UP (ref 70–99)
GLUCOSE SERPL-MCNC: 92 MG/DL — SIGNIFICANT CHANGE UP (ref 70–99)
HCT VFR BLD CALC: 28.3 % — LOW (ref 34.5–45)
HGB BLD-MCNC: 9.2 G/DL — LOW (ref 11.5–15.5)
INR BLD: 1.07 RATIO — SIGNIFICANT CHANGE UP (ref 0.85–1.16)
MAGNESIUM SERPL-MCNC: 1.7 MG/DL — SIGNIFICANT CHANGE UP (ref 1.6–2.6)
MCHC RBC-ENTMCNC: 29.5 PG — SIGNIFICANT CHANGE UP (ref 27–34)
MCHC RBC-ENTMCNC: 32.5 G/DL — SIGNIFICANT CHANGE UP (ref 32–36)
MCV RBC AUTO: 90.7 FL — SIGNIFICANT CHANGE UP (ref 80–100)
NRBC BLD AUTO-RTO: 0 /100 WBCS — SIGNIFICANT CHANGE UP (ref 0–0)
PHOSPHATE SERPL-MCNC: 2.8 MG/DL — SIGNIFICANT CHANGE UP (ref 2.5–4.5)
PLATELET # BLD AUTO: 56 K/UL — LOW (ref 150–400)
POTASSIUM SERPL-MCNC: 4.3 MMOL/L — SIGNIFICANT CHANGE UP (ref 3.5–5.3)
POTASSIUM SERPL-SCNC: 4.3 MMOL/L — SIGNIFICANT CHANGE UP (ref 3.5–5.3)
PROT SERPL-MCNC: 4.5 G/DL — LOW (ref 6–8.3)
PROTHROM AB SERPL-ACNC: 12.2 SEC — SIGNIFICANT CHANGE UP (ref 9.9–13.4)
RBC # BLD: 3.12 M/UL — LOW (ref 3.8–5.2)
RBC # FLD: 17.7 % — HIGH (ref 10.3–14.5)
SODIUM SERPL-SCNC: 138 MMOL/L — SIGNIFICANT CHANGE UP (ref 135–145)
TACROLIMUS SERPL-MCNC: 3.8 NG/ML — SIGNIFICANT CHANGE UP
VANCOMYCIN FLD-MCNC: <4 UG/ML — SIGNIFICANT CHANGE UP
WBC # BLD: 6.27 K/UL — SIGNIFICANT CHANGE UP (ref 3.8–10.5)
WBC # FLD AUTO: 6.27 K/UL — SIGNIFICANT CHANGE UP (ref 3.8–10.5)

## 2025-06-06 PROCEDURE — 99232 SBSQ HOSP IP/OBS MODERATE 35: CPT

## 2025-06-06 PROCEDURE — 93010 ELECTROCARDIOGRAM REPORT: CPT | Mod: 77,76

## 2025-06-06 PROCEDURE — 93010 ELECTROCARDIOGRAM REPORT: CPT | Mod: 77

## 2025-06-06 RX ORDER — DEXTROSE 50 % IN WATER 50 %
25 SYRINGE (ML) INTRAVENOUS ONCE
Refills: 0 | Status: DISCONTINUED | OUTPATIENT
Start: 2025-06-06 | End: 2025-06-12

## 2025-06-06 RX ORDER — MAGNESIUM OXIDE 400 MG
400 TABLET ORAL
Refills: 0 | Status: DISCONTINUED | OUTPATIENT
Start: 2025-06-06 | End: 2025-06-09

## 2025-06-06 RX ORDER — DEXTROSE 50 % IN WATER 50 %
15 SYRINGE (ML) INTRAVENOUS ONCE
Refills: 0 | Status: DISCONTINUED | OUTPATIENT
Start: 2025-06-06 | End: 2025-06-12

## 2025-06-06 RX ORDER — SUMATRIPTAN 100 MG/1
25 TABLET, FILM COATED ORAL ONCE
Refills: 0 | Status: COMPLETED | OUTPATIENT
Start: 2025-06-06 | End: 2025-06-06

## 2025-06-06 RX ORDER — DEXTROSE 50 % IN WATER 50 %
12.5 SYRINGE (ML) INTRAVENOUS ONCE
Refills: 0 | Status: DISCONTINUED | OUTPATIENT
Start: 2025-06-06 | End: 2025-06-12

## 2025-06-06 RX ORDER — MAGNESIUM SULFATE 500 MG/ML
1 SYRINGE (ML) INJECTION ONCE
Refills: 0 | Status: COMPLETED | OUTPATIENT
Start: 2025-06-06 | End: 2025-06-06

## 2025-06-06 RX ORDER — SODIUM CHLORIDE 9 G/1000ML
1000 INJECTION, SOLUTION INTRAVENOUS
Refills: 0 | Status: DISCONTINUED | OUTPATIENT
Start: 2025-06-06 | End: 2025-06-12

## 2025-06-06 RX ORDER — GLUCAGON 3 MG/1
1 POWDER NASAL ONCE
Refills: 0 | Status: DISCONTINUED | OUTPATIENT
Start: 2025-06-06 | End: 2025-06-12

## 2025-06-06 RX ORDER — QUETIAPINE FUMARATE 25 MG/1
75 TABLET ORAL AT BEDTIME
Refills: 0 | Status: DISCONTINUED | OUTPATIENT
Start: 2025-06-06 | End: 2025-06-12

## 2025-06-06 RX ORDER — LIDOCAINE HCL/PF 10 MG/ML
10 VIAL (ML) INJECTION ONCE
Refills: 0 | Status: COMPLETED | OUTPATIENT
Start: 2025-06-06 | End: 2025-06-06

## 2025-06-06 RX ORDER — DEXTROSE 50 % IN WATER 50 %
12.5 SYRINGE (ML) INTRAVENOUS ONCE
Refills: 0 | Status: COMPLETED | OUTPATIENT
Start: 2025-06-06 | End: 2025-06-06

## 2025-06-06 RX ADMIN — Medication 40 MILLIGRAM(S): at 05:28

## 2025-06-06 RX ADMIN — Medication 100 GRAM(S): at 12:46

## 2025-06-06 RX ADMIN — INSULIN LISPRO 2: 100 INJECTION, SOLUTION INTRAVENOUS; SUBCUTANEOUS at 21:15

## 2025-06-06 RX ADMIN — Medication 400 MILLIGRAM(S): at 08:44

## 2025-06-06 RX ADMIN — Medication 400 MILLIGRAM(S): at 17:40

## 2025-06-06 RX ADMIN — SUMATRIPTAN 25 MILLIGRAM(S): 100 TABLET, FILM COATED ORAL at 03:15

## 2025-06-06 RX ADMIN — Medication 30 MILLIGRAM(S): at 12:46

## 2025-06-06 RX ADMIN — Medication 1 TABLET(S): at 17:40

## 2025-06-06 RX ADMIN — TRAMADOL HYDROCHLORIDE 50 MILLIGRAM(S): 50 TABLET, FILM COATED ORAL at 14:03

## 2025-06-06 RX ADMIN — SUMATRIPTAN 25 MILLIGRAM(S): 100 TABLET, FILM COATED ORAL at 02:14

## 2025-06-06 RX ADMIN — QUETIAPINE FUMARATE 75 MILLIGRAM(S): 25 TABLET ORAL at 22:22

## 2025-06-06 RX ADMIN — ENTECAVIR 0.5 MILLIGRAM(S): 0.5 TABLET ORAL at 08:45

## 2025-06-06 RX ADMIN — HEPARIN SODIUM 5000 UNIT(S): 1000 INJECTION INTRAVENOUS; SUBCUTANEOUS at 05:28

## 2025-06-06 RX ADMIN — Medication 1 DOSE(S): at 05:29

## 2025-06-06 RX ADMIN — Medication 81 MILLIGRAM(S): at 08:44

## 2025-06-06 RX ADMIN — Medication 1 DOSE(S): at 17:41

## 2025-06-06 RX ADMIN — Medication 1 TABLET(S): at 08:45

## 2025-06-06 RX ADMIN — TACROLIMUS 0.5 MILLIGRAM(S): 0.5 CAPSULE ORAL at 20:07

## 2025-06-06 RX ADMIN — MYCOPHENOLATE MOFETIL 1000 MILLIGRAM(S): 500 TABLET, FILM COATED ORAL at 08:45

## 2025-06-06 RX ADMIN — METHYLPREDNISOLONE ACETATE 20 MILLIGRAM(S): 80 INJECTION, SUSPENSION INTRA-ARTICULAR; INTRALESIONAL; INTRAMUSCULAR; SOFT TISSUE at 05:29

## 2025-06-06 RX ADMIN — TACROLIMUS 1 MILLIGRAM(S): 0.5 CAPSULE ORAL at 08:44

## 2025-06-06 RX ADMIN — Medication 12.5 GRAM(S): at 17:37

## 2025-06-06 RX ADMIN — Medication 10 MILLILITER(S): at 18:00

## 2025-06-06 RX ADMIN — HEPARIN SODIUM 5000 UNIT(S): 1000 INJECTION INTRAVENOUS; SUBCUTANEOUS at 17:41

## 2025-06-06 RX ADMIN — TRAMADOL HYDROCHLORIDE 25 MILLIGRAM(S): 50 TABLET, FILM COATED ORAL at 21:14

## 2025-06-06 RX ADMIN — TRAMADOL HYDROCHLORIDE 50 MILLIGRAM(S): 50 TABLET, FILM COATED ORAL at 15:03

## 2025-06-06 RX ADMIN — Medication 5 MILLIGRAM(S): at 22:22

## 2025-06-06 RX ADMIN — INSULIN LISPRO 16 UNIT(S): 100 INJECTION, SOLUTION INTRAVENOUS; SUBCUTANEOUS at 12:47

## 2025-06-06 RX ADMIN — Medication 1 TABLET(S): at 05:28

## 2025-06-06 RX ADMIN — POLYETHYLENE GLYCOL 3350 17 GRAM(S): 17 POWDER, FOR SOLUTION ORAL at 08:44

## 2025-06-06 RX ADMIN — Medication 1000 MILLIGRAM(S): at 00:00

## 2025-06-06 RX ADMIN — INSULIN GLARGINE-YFGN 14 UNIT(S): 100 INJECTION, SOLUTION SUBCUTANEOUS at 21:12

## 2025-06-06 RX ADMIN — MYCOPHENOLATE MOFETIL 1000 MILLIGRAM(S): 500 TABLET, FILM COATED ORAL at 20:07

## 2025-06-06 RX ADMIN — VALGANCICLOVIR 900 MILLIGRAM(S): 450 TABLET, FILM COATED ORAL at 08:44

## 2025-06-06 RX ADMIN — Medication 1 APPLICATION(S): at 08:45

## 2025-06-06 RX ADMIN — TRAMADOL HYDROCHLORIDE 25 MILLIGRAM(S): 50 TABLET, FILM COATED ORAL at 22:14

## 2025-06-06 RX ADMIN — INSULIN LISPRO 16 UNIT(S): 100 INJECTION, SOLUTION INTRAVENOUS; SUBCUTANEOUS at 08:45

## 2025-06-06 NOTE — PROVIDER CONTACT NOTE (OTHER) - ASSESSMENT
pt able to correctly state name, , location (hospital), and reason for admission (liver transplant) but seemed confused as to the time and requesting to get into bed while being in the bed. Pt reoriented to time and recognized she was in the bed after reorientation. see chart for VS
Pt is A&Ox4 but confused at times. Pt denies CP or SOB. Pt resting comfortably in bed.

## 2025-06-06 NOTE — PROGRESS NOTE ADULT - ASSESSMENT
Patient previously was on DKA insulin protocol per Dr. Zambrano's note and night reporting RN. Insulin order reflected Endo IP insulin order. Writer contacted Dr. Chen to confirm Endo IP protocol, patient was moved into correct protocol and insulin tracking paperwork was updated. MD and Dr. Chen aware.    7/9 1400 Insulin order was changed by Dr. Zambrano to Endo IP insulin order. DKA protocol was still being used to adjust insulin dose. MD and Dr. Chen aware.     70 F hx NAFLD cirrhosis and HCC (listed for liver transplant with MELD 20B), UGIB in 2022, chronic back pain (2/2 spinal fracture) presented from [facility] where she was admitted on 5/26 for coffee ground emesis and melena, EGD showed no active bleeding, patient was transfused and stabilized prior to transfer. Patient is currently post op day #3 for liver transplant. Endocrinology was consulted for management of diabetes mellitus. BG Goal 100-180mg/dl   Tolerating POs, eats about 50% of meals. Last 24 hour BGs in 100s with serum fasting . BGs mostly at or close to goal ( 100-180) but noted prelunch BG was 269 today. On steroid taper, s/p Methylprednisolone 40 mg today and plan for 20 mg tomorrow. Will adjust insulin doses for BG Goal 100-180mg/dl  without hypoglycemia       #Type 2 Diabetes Mellitus  #Steroid- induced hyperglycemia   - HbA1c  6.0% - although not accurate in the setting of low gfr and liver disease      - Home regimen: Lantus 14 units + Humalog 18 units with meals. Sugars are usually high in the 200s   - Egfr: 39  - Diet is quite high in carbs- eating out, grape juice, pasta, fruit yogurts. Would benefit from a dietician consult   - sugars also exacerbated due to steroid use  - received Solumedrol 20mg IV today (equivalent to prednisone 25mg) and will be tapered to prednisone 20mg daily starting tomorrow     Plan:   - Serum fasting BG 90s today AM, overnight in the 70s, recommend to decrease to Lantus to 12 units at HS  - Slightly DECREASE to Admelog to 14 units with meals  TIDQAC ( Hold if NPO)  - Recommend moderate Admelog correction scale TIDQAC and change to LOW dose scale at bedtime   - Will need daily adjustment of insulin due to steroid tapering   - Please check FSG before meals and QHS, or q6h while NPO  - Inpatient glucose goal 100-180   - Please keep patient on a diabetic, carb controlled diet   - Registered dietician consult  - hypoglycemia orderset prn  - extensively discussed importance of glycemic control to prevent micro- and macrovascular complications    - Discharge planning: Basal/bolus - final doses TBD    #Hypertension  - BP goal <130/80  - Management as per primary team  - currently on nifedipine     #Hyperlipidemia  - Goal LDL <70 in the setting of diabetes  - outpatient fasting lipid panel         Contact via [employer] Teams during business hours  To reach covering provider access AMION via sunrise tools  For Urgent matters/after-hours/weekends/holidays please page endocrine fellow on call   For nonurgent matters please email [email or web address]    Please note that this patient may be followed by different provider tomorrow.  Notify endocrine 24 hours prior to discharge for final recommendations

## 2025-06-06 NOTE — PROGRESS NOTE ADULT - ASSESSMENT
70 F hx NAFLD cirrhosis and HCC (listed for liver transplant with MELD 20B), UGIB in 2022, chronic back pain (2/2 spinal fracture) presented from [facility] where she was admitted on 5/26 for coffee ground emesis and melena, EGD showed no active bleeding, transferred to [facility] now s/p OLT on 5/31/2025     [] s/p OLT - POD 6  - HBV Core + donor: Entecavir  - LFTs trending down  - Diet: ADAT  - Strict I&O:  UO,  NASREEN#2   - Pain Management  - Bowel Regimen  - E.Faecium UTI, asymptomatic, Zosyn/Vanco completed  - OT/PT/RD /IS  -  consult;  started seroquel 6/3   - SCOTT has resolved  - Inc fluconazole to 400mg qd    [] Immunosuppression: Simulect induction  - FK by level,  MMF 1/1, SST  - PPx: Bactrim, Valcyte 450 (CMV +/-, inc 900mg as able), Fluc, PPI, OsCal    [] DM  - Endocrine team  following    [] HTN  - Nifedipine, adjust prn    [] DVT  -SQH/asa  -SCDs at all times    [] DISPO  -d/c to Acute Rehab (potential Friday) 70 F hx NAFLD cirrhosis and HCC (listed for liver transplant with MELD 20B), UGIB in 2022, chronic back pain (2/2 spinal fracture) presented from [facility] where she was admitted on 5/26 for coffee ground emesis and melena, EGD showed no active bleeding, transferred to [facility] now s/p OLT on 5/31/2025     [] s/p OLT - POD 6  - HBV Core + donor: Entecavir  - Good graft function   - Diet: Consistent carb diet   - Strict I&O:  ELIJAH MENDEZ NASREEN#2   - Pain Management  - Bowel Regimen  - E.Faecium UTI, asymptomatic, Zosyn/Vanco completed  - OT/PT/RD /IS  - Hallucinations -->  consult;  started seroquel 6/3, keep FK low    [] Immunosuppression: Simulect induction  - FK by level,  MMF 1/1, SST  - PPx: Bactrim, Valcyte 450 (CMV +/-, inc 900mg as able), Fluc, PPI, OsCal    [] DM  - Endocrine team  following    [] HTN  - Nifedipine, adjust prn    [] DVT  -SQH/asa  -SCDs at all times    [] DISPO  -d/c planning to Acute Rehab

## 2025-06-06 NOTE — PROGRESS NOTE ADULT - SUBJECTIVE AND OBJECTIVE BOX
ENDOCRINE FOLLOW UP     Chief Complaint: steroid induced hyperglycemia/dm    History: BG levels tightly controlled this morning - fasting serum glucose in 90s, overnight fs in 70s   received solumedrol 20mg IV this morning. Will be tapered to prednisone 20mg daily starting tomorrow     MEDICATIONS  (STANDING):  aspirin enteric coated 81 milliGRAM(s) Oral daily  calcium carbonate 1250 mG  + Vitamin D (OsCal 500 + D) 1 Tablet(s) Oral two times a day  chlorhexidine 2% Cloths 1 Application(s) Topical <User Schedule>  entecavir 0.5 milliGRAM(s) Oral daily  fluconAZOLE   Tablet 400 milliGRAM(s) Oral daily  fluticasone propionate/ salmeterol 100-50 MICROgram(s) Diskus 1 Dose(s) Inhalation two times a day  heparin   Injectable 5000 Unit(s) SubCutaneous every 12 hours  insulin glargine Injectable (LANTUS) 14 Unit(s) SubCutaneous at bedtime  insulin lispro (ADMELOG) corrective regimen sliding scale   SubCutaneous <User Schedule>  insulin lispro (ADMELOG) corrective regimen sliding scale   SubCutaneous Before meals and at bedtime  insulin lispro Injectable (ADMELOG) 16 Unit(s) SubCutaneous three times a day with meals  lidocaine 1% (Preservative-free) Injectable 10 milliLiter(s) Local Injection once  lidocaine 2% Injectable 10 milliLiter(s) Local Injection once  magnesium oxide 400 milliGRAM(s) Oral two times a day with meals  magnesium sulfate  IVPB 1 Gram(s) IV Intermittent once  melatonin 5 milliGRAM(s) Oral at bedtime  mycophenolate mofetil 1000 milliGRAM(s) Oral <User Schedule>  NIFEdipine XL 30 milliGRAM(s) Oral every 24 hours  pantoprazole    Tablet 40 milliGRAM(s) Oral before breakfast  polyethylene glycol 3350 17 Gram(s) Oral daily  QUEtiapine 50 milliGRAM(s) Oral at bedtime  senna 2 Tablet(s) Oral two times a day  tacrolimus 1 milliGRAM(s) Oral <User Schedule>  tacrolimus 0.5 milliGRAM(s) Oral <User Schedule>  trimethoprim   80 mG/sulfamethoxazole 400 mG 1 Tablet(s) Oral daily  valGANciclovir 900 milliGRAM(s) Oral daily    MEDICATIONS  (PRN):  traMADol 25 milliGRAM(s) Oral every 6 hours PRN Moderate Pain (4 - 6)  traMADol 50 milliGRAM(s) Oral every 8 hours PRN Severe Pain (7 - 10)      Allergies    No Known Allergies    Intolerances        ROS: All other systems reviewed and negative    PHYSICAL EXAM:  VITALS: T(C): 36.8 (06-06-25 @ 09:00)  T(F): 98.3 (06-06-25 @ 09:00), Max: 98.6 (06-05-25 @ 22:00)  HR: 71 (06-06-25 @ 09:00) (66 - 78)  BP: 146/66 (06-06-25 @ 09:00) (124/71 - 148/58)  RR:  (18 - 18)  SpO2:  (96% - 98%)  Wt(kg): --  GENERAL: NAD, resting comfortably   EYES: No proptosis,  anicteric  HEENT:  Atraumatic, Normocephalic, moist mucous membranes  RESPIRATORY: Nonlabored respirations, normal rate/effort   NEURO: Awake, alert   PSYCH:  reactive affect, euthymic mood    POCT Blood Glucose.: 146 mg/dL (06-06-25 @ 08:18)  POCT Blood Glucose.: 107 mg/dL (06-06-25 @ 02:28)  POCT Blood Glucose.: 71 mg/dL (06-06-25 @ 01:54)  POCT Blood Glucose.: 78 mg/dL (06-05-25 @ 21:48)  POCT Blood Glucose.: 167 mg/dL (06-05-25 @ 16:50)  POCT Blood Glucose.: 269 mg/dL (06-05-25 @ 12:59)  POCT Blood Glucose.: 189 mg/dL (06-05-25 @ 08:42)  POCT Blood Glucose.: 169 mg/dL (06-04-25 @ 21:16)  POCT Blood Glucose.: 182 mg/dL (06-04-25 @ 16:47)  POCT Blood Glucose.: 136 mg/dL (06-04-25 @ 12:11)  POCT Blood Glucose.: 186 mg/dL (06-04-25 @ 08:25)  POCT Blood Glucose.: 235 mg/dL (06-03-25 @ 21:09)  POCT Blood Glucose.: 172 mg/dL (06-03-25 @ 17:01)  POCT Blood Glucose.: 222 mg/dL (06-03-25 @ 12:26)    eMAR:  insulin lispro (ADMELOG) corrective regimen sliding scale   2 Unit(s) SubCutaneous (06-05-25 @ 17:30)   6 Unit(s) SubCutaneous (06-05-25 @ 13:26)    insulin lispro Injectable (ADMELOG)   16 Unit(s) SubCutaneous (06-06-25 @ 08:45)   16 Unit(s) SubCutaneous (06-05-25 @ 17:31)    insulin lispro Injectable (ADMELOG)   14 Unit(s) SubCutaneous (06-05-25 @ 13:26)    methylPREDNISolone sodium succinate Injectable (equivalent to prednisone 25mg daily)    20 milliGRAM(s) IV Push (06-06-25 @ 05:29)        06-06    138  |  104  |  23  ----------------------------<  92  4.3   |  21[L]  |  1.02    eGFR: 59[L]    Ca    8.5      06-06  Mg     1.7     06-06  Phos  2.8     06-06    TPro  4.5[L]  /  Alb  2.9[L]  /  TBili  1.2  /  DBili  x   /  AST  62[H]  /  ALT  150[H]  /  AlkPhos  89  06-06      A1C with Estimated Average Glucose Result: 6.0 % (05-28-25 @ 10:20)

## 2025-06-06 NOTE — PROGRESS NOTE ADULT - SUBJECTIVE AND OBJECTIVE BOX
Transplant Surgery -  Multidisciplinary Progress Note  --------------------------------------------------------------  OLTx      Date: 5/31/2025      POD#6  CMV +/-    Patient seen and examined with multidisciplinary transplant team: Surgeon Dr. Arriola, Dr. Dale, Hepatologist Dr. Gaona, ACP's: Brandi/Jennifer, Pharmacist Raj and RN team. Disciplines not in attendance will be notified of the plan      HPI: 70 F hx NAFLD cirrhosis and HCC (listed for liver transplant with MELD 20B), UGIB in 2022, chronic back pain (2/2 spinal fracture) presented from [facility] where she was admitted on 5/26 for coffee ground emesis and melena, EGD showed no active bleeding, transferred to [facility] now s/p OLT on 5/31/2025     Interval Events:  - afebrile, VSS  - LFTs downtrending appropriately  - d/c NASREEN 3      Immunosuppression:  Induction: Simulect completed  Maintenance: FK by level, MMF 1/1, SST  Ongoing monitoring for signs of rejection    Potential Discharge date: tbd   Education:  Medications  Plan of care:  See Below      tx data here      Review of systems  All other systems were reviewed and are negative, except as noted.    PHYSICAL EXAM:  Constitutional: NAD  Eyes: PERRLA  ENMT: nc/at, no thrush  Neck: supple,   Respiratory: CTA B/L  Cardiovascular: RRR  Gastrointestinal: Soft abdomen, ND, appropriate incisional TTP. chevron incision c/d/i, staples in place. No signs of infection.  JPsx2 ss  Genitourinary: voiding   Extremities: SCD's in place and working bilaterally  Vascular: Palpable dp pulses bilaterally.   Neurological:  AAO3  Skin: no rashes, ulcerations, lesions  Musculoskeletal:  moving extremities without issues  Psychiatric: cooperative Transplant Surgery -  Multidisciplinary Progress Note  --------------------------------------------------------------  OLTx      Date: 2025      POD#6  CMV +/-    Patient seen and examined with multidisciplinary transplant team: Surgeon Dr. Arriola, Dr. Dale, Hepatologist Dr. Gaona, ACP's: Jonathan, Pharmacist Raj and RN team. Disciplines not in attendance will be notified of the plan      HPI: 70 F hx NAFLD cirrhosis and HCC (listed for liver transplant with MELD 20B), UGIB in , chronic back pain (2/2 spinal fracture) presented from [facility] where she was admitted on  for coffee ground emesis and melena, EGD showed no active bleeding, transferred to [facility] now s/p OLT on 2025     Interval Events:  - afebrile, VSS  - LFTs downtrending appropriately  - NASREEN 3 d/c'd       Immunosuppression:  Induction: Simulect completed  Maintenance: FK by level, MMF , SST  Ongoing monitoring for signs of rejection    Potential Discharge date: tbd   Education:  Medications  Plan of care:  See Below        MEDICATIONS  (STANDING):  aspirin enteric coated 81 milliGRAM(s) Oral daily  calcium carbonate 1250 mG  + Vitamin D (OsCal 500 + D) 1 Tablet(s) Oral two times a day  chlorhexidine 2% Cloths 1 Application(s) Topical <User Schedule>  entecavir 0.5 milliGRAM(s) Oral daily  fluconAZOLE   Tablet 400 milliGRAM(s) Oral daily  fluticasone propionate/ salmeterol 100-50 MICROgram(s) Diskus 1 Dose(s) Inhalation two times a day  heparin   Injectable 5000 Unit(s) SubCutaneous every 12 hours  insulin glargine Injectable (LANTUS) 14 Unit(s) SubCutaneous at bedtime  insulin lispro (ADMELOG) corrective regimen sliding scale   SubCutaneous <User Schedule>  insulin lispro (ADMELOG) corrective regimen sliding scale   SubCutaneous Before meals and at bedtime  insulin lispro Injectable (ADMELOG) 16 Unit(s) SubCutaneous three times a day with meals  lidocaine 1% (Preservative-free) Injectable 10 milliLiter(s) Local Injection once  lidocaine 2% Injectable 10 milliLiter(s) Local Injection once  magnesium oxide 400 milliGRAM(s) Oral two times a day with meals  magnesium sulfate  IVPB 1 Gram(s) IV Intermittent once  melatonin 5 milliGRAM(s) Oral at bedtime  mycophenolate mofetil 1000 milliGRAM(s) Oral <User Schedule>  NIFEdipine XL 30 milliGRAM(s) Oral every 24 hours  pantoprazole    Tablet 40 milliGRAM(s) Oral before breakfast  polyethylene glycol 3350 17 Gram(s) Oral daily  QUEtiapine 50 milliGRAM(s) Oral at bedtime  senna 2 Tablet(s) Oral two times a day  tacrolimus 1 milliGRAM(s) Oral <User Schedule>  tacrolimus 0.5 milliGRAM(s) Oral <User Schedule>  trimethoprim   80 mG/sulfamethoxazole 400 mG 1 Tablet(s) Oral daily  valGANciclovir 900 milliGRAM(s) Oral daily    MEDICATIONS  (PRN):  traMADol 25 milliGRAM(s) Oral every 6 hours PRN Moderate Pain (4 - 6)  traMADol 50 milliGRAM(s) Oral every 8 hours PRN Severe Pain (7 - 10)      PAST MEDICAL & SURGICAL HISTORY:  HCC (hepatocellular carcinoma)      DM (diabetes mellitus)      HTN (hypertension)      HLD (hyperlipidemia)      Hepatic cirrhosis      IVEY (nonalcoholic steatohepatitis)      Former smoker      Ascites      Hepatic encephalopathy      History of cervical cancer      H/O  section      History of cholecystectomy      H/O carpal tunnel repair      H/O cataract extraction          Vital Signs Last 24 Hrs  T(C): 36.8 (2025 09:00), Max: 37 (2025 22:00)  T(F): 98.3 (2025 09:00), Max: 98.6 (2025 22:00)  HR: 71 (2025 09:00) (66 - 78)  BP: 146/66 (2025 09:00) (124/71 - 148/58)  BP(mean): --  RR: 18 (2025 09:00) (18 - 18)  SpO2: 98% (2025 09:00) (96% - 98%)    Parameters below as of 2025 09:00  Patient On (Oxygen Delivery Method): room air        I&O's Summary    2025 07:01  -  2025 07:00  --------------------------------------------------------  IN: 0 mL / OUT: 1215 mL / NET: -1215 mL    2025 07:01  -  2025 12:12  --------------------------------------------------------  IN: 200 mL / OUT: 170 mL / NET: 30 mL                              9.2    6.27  )-----------( 56       ( 2025 06:54 )             28.3     06-    138  |  104  |  23  ----------------------------<  92  4.3   |  21[L]  |  1.02    Ca    8.5      2025 06:56  Phos  2.8     06-  Mg     1.7     -    TPro  4.5[L]  /  Alb  2.9[L]  /  TBili  1.2  /  DBili  x   /  AST  62[H]  /  ALT  150[H]  /  AlkPhos  89  06-06    Tacrolimus (), Serum: 3.8 ng/mL ( @ 06:54)        Culture - Acid Fast - Body Fluid w/Smear (collected 25 @ 14:10)  Source: Body Fluid  Preliminary Report (25 @ 23:07):    Culture is being performed.    Culture - Fungal, Body Fluid (collected 25 @ 14:10)  Source: Body Fluid Ascites  Preliminary Report (25 @ 09:23):    No growth    Culture - Body Fluid with Gram Stain (collected 25 @ 14:10)  Source: Body Fluid Ascites  Gram Stain (25 @ 17:32):    No polymorphonuclear cells seen    No organisms seen    by cytocentrifuge  Final Report (25 @ 16:02):    No growth at 5 days          Review of systems  All other systems were reviewed and are negative, except as noted.    PHYSICAL EXAM:  Constitutional: NAD  Eyes: PERRLA  ENMT: nc/at, no thrush  Neck: supple,   Respiratory: CTA B/L  Cardiovascular: RRR  Gastrointestinal: Soft abdomen, ND, appropriate incisional TTP. chevron incision c/d/i, staples in place. No signs of infection.  JPsx1 ss  Genitourinary: voiding   Extremities: SCD's in place and working bilaterally  Vascular: Palpable dp pulses bilaterally.   Neurological:  AAO3  Skin: no rashes, ulcerations, lesions  Musculoskeletal:  moving extremities without issues  Psychiatric: cooperative

## 2025-06-06 NOTE — PROVIDER CONTACT NOTE (OTHER) - ACTION/TREATMENT ORDERED:
Per PA draw and send labs STAT, PA came to bedside to assess patient
Provider notified. EKG ordered. Nursing care continues.

## 2025-06-06 NOTE — CHART NOTE - NSCHARTNOTEFT_GEN_A_CORE
Interim events: Patient seen and examined by neurology on 6/3, continued to report 9/10 headache. Migraine 'cocktail' was recommended however patient still not receiving said medications.     Recommendations:  -For headache control recommend Reglan 10mg IV [id number] prn, Benadryl 25mg IV [id number] prn akathisia/dystonia, and Tylenol 500mg IV [id number] prn (if OK with liver transplant). Would avoid NSAID's given thrombocytopenia and SCOTT  -Please do not hesitate to call neurology if any concerns, can formally follow up if requested  -No further inpatient neurologic workup  -Rest of care per primary team    Neurology will sign off at this time.  Thank you [id number]

## 2025-06-06 NOTE — CHART NOTE - NSCHARTNOTEFT_GEN_A_CORE
NUTRITION FOLLOW UP NOTE    PATIENT SEEN FOR: OLT follow-up, consult for nutrition education     SOURCE: [x] Patient  [x] Current Medical Record  [x] RN  [] Family/support person at bedside  [] Patient unavailable/inappropriate  [] Other:    CHART REVIEWED/EVENTS NOTED.  [] No changes to nutrition care plan to note  [x] Nutrition Status:  -S/p OLT , POD#6  -Extubated      DIET ORDER:   Diet, Consistent Carbohydrate w/Evening Snack:   Supplement Feeding Modality:  Oral  Ensure Max Cans or Servings Per Day:  1       Frequency:  Daily (25)      CURRENT DIET ORDER IS:  [] Appropriate:  [] Inadequate:  [x] Other: see below for recommendation     NUTRITION INTAKE/PROVISION:  [x] PO: Pt reports fluctuating appetite/PO intake, reports eating >75% for breakfast and ~50% for lunch today, reports drinking Ensure Max provided, requesting for another chocolate milk shake if feasible. Pt is aware of menu ordering procedure in house, has been ordering preferred foods as needed.   [] Enteral Nutrition:  [] Parenteral Nutrition:    ANTHROPOMETRICS:  Drug Dosing Weight  Height (cm): 154.9 (30 May 2025 21:01)  Weight (kg): 64.5 (30 May 2025 21:01)  BMI (kg/m2): 26.9 (30 May 2025 21:01)  Weights:   Daily Weight in k.2 (-06), Weight in k.7 (-05), Weight in k.3 (-04), Weight in k.2 (-03)   -Weight fluctuation likely in the setting of fluid shifts vs true weight loss/gain; continue to monitor weights daily.     MEDICATIONS:  MEDICATIONS  (STANDING):  aspirin enteric coated 81 milliGRAM(s) Oral daily  calcium carbonate 1250 mG  + Vitamin D (OsCal 500 + D) 1 Tablet(s) Oral two times a day  chlorhexidine 2% Cloths 1 Application(s) Topical <User Schedule>  entecavir 0.5 milliGRAM(s) Oral daily  fluconAZOLE   Tablet 400 milliGRAM(s) Oral daily  fluticasone propionate/ salmeterol 100-50 MICROgram(s) Diskus 1 Dose(s) Inhalation two times a day  heparin   Injectable 5000 Unit(s) SubCutaneous every 12 hours  insulin glargine Injectable (LANTUS) 14 Unit(s) SubCutaneous at bedtime  insulin lispro (ADMELOG) corrective regimen sliding scale   SubCutaneous <User Schedule>  insulin lispro (ADMELOG) corrective regimen sliding scale   SubCutaneous Before meals and at bedtime  insulin lispro Injectable (ADMELOG) 16 Unit(s) SubCutaneous three times a day with meals  lidocaine 1% (Preservative-free) Injectable 10 milliLiter(s) Local Injection once  magnesium oxide 400 milliGRAM(s) Oral two times a day with meals  melatonin 5 milliGRAM(s) Oral at bedtime  mycophenolate mofetil 1000 milliGRAM(s) Oral <User Schedule>  NIFEdipine XL 30 milliGRAM(s) Oral every 24 hours  pantoprazole    Tablet 40 milliGRAM(s) Oral before breakfast  polyethylene glycol 3350 17 Gram(s) Oral daily  QUEtiapine 50 milliGRAM(s) Oral at bedtime  senna 2 Tablet(s) Oral two times a day  tacrolimus 1 milliGRAM(s) Oral <User Schedule>  tacrolimus 0.5 milliGRAM(s) Oral <User Schedule>  trimethoprim   80 mG/sulfamethoxazole 400 mG 1 Tablet(s) Oral daily  valGANciclovir 900 milliGRAM(s) Oral daily    MEDICATIONS  (PRN):  traMADol 25 milliGRAM(s) Oral every 6 hours PRN Moderate Pain (4 - 6)  traMADol 50 milliGRAM(s) Oral every 8 hours PRN Severe Pain (7 - 10)      NUTRITIONALLY PERTINENT LABS:   Na138 mmol/L Glu 92 mg/dL K+ 4.3 mmol/L Cr  1.02 mg/dL BUN 23 mg/dL  Phos 2.8 mg/dL  Alb 2.9 g/dL[L]  U/L[H] AST 62 U/L[H] Alkaline Phosphatase 89 U/L  25 @ 10:20 a1c 6.0    A1C with Estimated Average Glucose Result: 6.0 % (25 @ 10:20)    Finger Sticks:  POCT Blood Glucose.: 135 mg/dL ( @ 12:44)  POCT Blood Glucose.: 146 mg/dL ( @ 08:18)  POCT Blood Glucose.: 107 mg/dL ( @ 02:28)  POCT Blood Glucose.: 71 mg/dL ( @ 01:54)  POCT Blood Glucose.: 78 mg/dL ( @ 21:48)  POCT Blood Glucose.: 167 mg/dL ( @ 16:50)      NUTRITIONALLY PERTINENT MEDICATIONS/LABS:  [x] Reviewed  [x] Relevant notes on medications/labs:  - Transplant medications: Tacrolimus, Cellcept, Prednisone (s/p Solu-medrol today)  - DM: ordered for Lantus 14 units QHS, Lispro 3 units TID and ISS to regulate blood glucose in house  - Micronutrient/Other supplementation: OsCal 500 + D, MgO  - s/p MgSO4 today     EDEMA:  [x] Reviewed  [x] Relevant notes: no noted edema per nursing flowsheet     GI/ I&O:  [x] Reviewed  [x] Relevant notes: denies GI distress, last BM on  per flowsheet, ordered for Protonix, Miralax and Senna   [x] Other: x2 NASREEN drain in place    SKIN:   [] No pressure injuries documented, per nursing flowsheet  [x] Pressure injury previously noted:  bilateral buttocks suspected deep tissue injury   [] Change in pressure injury documentation:  [] Other:    Nutrition focused physical exam conducted:    Subcutaneous fat loss: [mild] Orbital fat pads region, [mild]Buccal fat region, [ ]Triceps region,  [ ]Ribs region.    Muscle wasting: [mild]Temples region, [mild]Clavicle region, [ ]Shoulder region, [ ]Scapula region, [ ]Interosseous region,  [ ]thigh region, [ ]Calf region.     ESTIMATED NEEDS:  [x] updated   Energy:   1771-9213 kcal/day (35-40 kcal/kg)  Protein:    g/day (1.5-2.0 g/kg)  Fluid:   ml/day or [x] defer to team  Based on: IBW of 52.3kg     NUTRITION DIAGNOSIS:  [x] Prior Dx:  1. Inadequate protein-energy intake progressing into   2. Increased protein-energy needs  [] New Dx:  1. acute moderate malnutrition related to decreased ability to consume adequate protein-energy in setting of increased physiological demand for nutrients as evidenced by mild fat and muscle wasting.   Goal: Pt will meet >75% estimated nutrient needs as tolerated.   2. Food and nutrition related knowledge deficit related to limited prior education on post-transplant nutrition therapy and food safety guidelines as evidenced by s/p new OLT.   Goal: Pt will be able to provide 1-3 teach-back points of nutrition education provided.     EDUCATION:  [x] Yes: Provided education on post transplant nutrition therapy and food safety guidelines for transplant recipients. Discussed importance of thoroughly washing all fresh fruits/vegetables, importance of avoiding uncooked/raw/unpasteurized foods, avoiding pre-made deli/buffet/salad bar meals. Foods recommended as healthy well balanced diet and importance of adequate protein intakes for proper post-surgical healing discussed. Reviewed recommendations to avoid grapefruit, pomegranate and star fruit while taking immunosuppressant medication. Reviewed recommendations for moderate intake of sodium and carbohydrates with transplant medications. Reviewed effect of steroids on BG levels and importance of limiting concentrated sweets. Pt was receptive and expressed understanding. All questions answered. Provided nutrition handout: Provided nutrition package including: USDA Food Safety for Transplant Recipients booklet; Type 2 Diabetes Nutrition Therapy handout; food safety and BG control handouts, fridge magnet with cooking temperatures, and RD information card.   [] Not appropriate/warranted    NUTRITION CARE PLAN:  1. Diet: continue Consistent Carbohydrate diet with evening snack as tolerated   2. Supplements: Recommend increase Ensure Max 2x daily (320 calories, 60g proteins) - both vanilla and chocolate flavor   3. Multivitamin/mineral supplementation: Recommend addition of MVI and Vitamin C, pending no medical contraindication, for micronutrient support/aid wound healing.   4. malnutrition alert placed in chart   4:     [] Achieved - Continue current nutrition intervention(s)  [] Current medical condition precludes nutrition intervention at this time.    MONITORING AND EVALUATION:   RD remains available upon request and will follow up per protocol:     Available on [facility] TEAMS NUTRITION FOLLOW UP NOTE    PATIENT SEEN FOR: OLT follow-up, consult for nutrition education     SOURCE: [x] Patient  [x] Current Medical Record  [x] RN  [] Family/support person at bedside  [] Patient unavailable/inappropriate  [] Other:    CHART REVIEWED/EVENTS NOTED.  [] No changes to nutrition care plan to note  [x] Nutrition Status:  -S/p OLT , POD#6  -Extubated      DIET ORDER:   Diet, Consistent Carbohydrate w/Evening Snack:   Supplement Feeding Modality:  Oral  Ensure Max Cans or Servings Per Day:  1       Frequency:  Daily (25)      CURRENT DIET ORDER IS:  [] Appropriate:  [] Inadequate:  [x] Other: see below for recommendation     NUTRITION INTAKE/PROVISION:  [x] PO: Pt reports fluctuating appetite/PO intake, reports eating >75% for breakfast and ~50% for lunch today, reports drinking Ensure Max provided, requesting for another chocolate milk shake if feasible. Pt is aware of menu ordering procedure in house, has been ordering preferred foods as needed.   [] Enteral Nutrition:  [] Parenteral Nutrition:    ANTHROPOMETRICS:  Drug Dosing Weight  Height (cm): 154.9 (30 May 2025 21:01)  Weight (kg): 64.5 (30 May 2025 21:01)  BMI (kg/m2): 26.9 (30 May 2025 21:01)  Weights:   Daily Weight in k.2 (-06), Weight in k.7 (-05), Weight in k.3 (-04), Weight in k.2 (-03)   -Weight fluctuation likely in the setting of fluid shifts vs true weight loss/gain; continue to monitor weights daily.     MEDICATIONS:  MEDICATIONS  (STANDING):  aspirin enteric coated 81 milliGRAM(s) Oral daily  calcium carbonate 1250 mG  + Vitamin D (OsCal 500 + D) 1 Tablet(s) Oral two times a day  chlorhexidine 2% Cloths 1 Application(s) Topical <User Schedule>  entecavir 0.5 milliGRAM(s) Oral daily  fluconAZOLE   Tablet 400 milliGRAM(s) Oral daily  fluticasone propionate/ salmeterol 100-50 MICROgram(s) Diskus 1 Dose(s) Inhalation two times a day  heparin   Injectable 5000 Unit(s) SubCutaneous every 12 hours  insulin glargine Injectable (LANTUS) 14 Unit(s) SubCutaneous at bedtime  insulin lispro (ADMELOG) corrective regimen sliding scale   SubCutaneous <User Schedule>  insulin lispro (ADMELOG) corrective regimen sliding scale   SubCutaneous Before meals and at bedtime  insulin lispro Injectable (ADMELOG) 16 Unit(s) SubCutaneous three times a day with meals  lidocaine 1% (Preservative-free) Injectable 10 milliLiter(s) Local Injection once  magnesium oxide 400 milliGRAM(s) Oral two times a day with meals  melatonin 5 milliGRAM(s) Oral at bedtime  mycophenolate mofetil 1000 milliGRAM(s) Oral <User Schedule>  NIFEdipine XL 30 milliGRAM(s) Oral every 24 hours  pantoprazole    Tablet 40 milliGRAM(s) Oral before breakfast  polyethylene glycol 3350 17 Gram(s) Oral daily  QUEtiapine 50 milliGRAM(s) Oral at bedtime  senna 2 Tablet(s) Oral two times a day  tacrolimus 1 milliGRAM(s) Oral <User Schedule>  tacrolimus 0.5 milliGRAM(s) Oral <User Schedule>  trimethoprim   80 mG/sulfamethoxazole 400 mG 1 Tablet(s) Oral daily  valGANciclovir 900 milliGRAM(s) Oral daily    MEDICATIONS  (PRN):  traMADol 25 milliGRAM(s) Oral every 6 hours PRN Moderate Pain (4 - 6)  traMADol 50 milliGRAM(s) Oral every 8 hours PRN Severe Pain (7 - 10)      NUTRITIONALLY PERTINENT LABS:   Na138 mmol/L Glu 92 mg/dL K+ 4.3 mmol/L Cr  1.02 mg/dL BUN 23 mg/dL  Phos 2.8 mg/dL  Alb 2.9 g/dL[L]  U/L[H] AST 62 U/L[H] Alkaline Phosphatase 89 U/L  25 @ 10:20 a1c 6.0    A1C with Estimated Average Glucose Result: 6.0 % (25 @ 10:20)    Finger Sticks:  POCT Blood Glucose.: 135 mg/dL ( @ 12:44)  POCT Blood Glucose.: 146 mg/dL ( @ 08:18)  POCT Blood Glucose.: 107 mg/dL ( @ 02:28)  POCT Blood Glucose.: 71 mg/dL ( @ 01:54)  POCT Blood Glucose.: 78 mg/dL ( @ 21:48)  POCT Blood Glucose.: 167 mg/dL ( @ 16:50)      NUTRITIONALLY PERTINENT MEDICATIONS/LABS:  [x] Reviewed  [x] Relevant notes on medications/labs:  - Transplant medications: Tacrolimus, Cellcept, Prednisone (s/p Solu-medrol today)  - DM: ordered for Lantus 14 units QHS, Lispro 3 units TID and ISS to regulate blood glucose in house  - Micronutrient/Other supplementation: OsCal 500 + D, MgO  - s/p MgSO4 today     EDEMA:  [x] Reviewed  [x] Relevant notes: no noted edema per nursing flowsheet     GI/ I&O:  [x] Reviewed  [x] Relevant notes: denies GI distress, last BM on  per flowsheet, ordered for Protonix, Miralax and Senna   [x] Other: x2 NASREEN drain in place    SKIN:   [] No pressure injuries documented, per nursing flowsheet  [x] Pressure injury previously noted:  bilateral buttocks suspected deep tissue injury   [] Change in pressure injury documentation:  [] Other:    Nutrition focused physical exam conducted:    Subcutaneous fat loss: [mild] Orbital fat pads region, [mild]Buccal fat region, [ ]Triceps region,  [ ]Ribs region.    Muscle wasting: [mild]Temples region, [mild]Clavicle region, [ ]Shoulder region, [ ]Scapula region, [ ]Interosseous region,  [ ]thigh region, [ ]Calf region.     ESTIMATED NEEDS:  [x] updated   Energy:   6820-9871 kcal/day (35-40 kcal/kg)  Protein:    g/day (1.5-2.0 g/kg)  Fluid:   ml/day or [x] defer to team  Based on: IBW of 52.3kg     NUTRITION DIAGNOSIS:  [x] Prior Dx:  1. Inadequate protein-energy intake progressing into   2. Increased protein-energy needs  [] New Dx:  1. acute moderate malnutrition related to decreased ability to consume adequate protein-energy in setting of increased physiological demand for nutrients as evidenced by mild fat and muscle wasting.   Goal: Pt will meet >75% estimated nutrient needs as tolerated.   2. Food and nutrition related knowledge deficit related to limited prior education on post-transplant nutrition therapy and food safety guidelines as evidenced by s/p new OLT.   Goal: Pt will be able to provide 1-3 teach-back points of nutrition education provided.     EDUCATION:  [x] Yes: Provided education on post transplant nutrition therapy and food safety guidelines for transplant recipients. Discussed importance of thoroughly washing all fresh fruits/vegetables, importance of avoiding uncooked/raw/unpasteurized foods, avoiding pre-made deli/buffet/salad bar meals. Foods recommended as healthy well balanced diet and importance of adequate protein intakes for proper post-surgical healing discussed. Reviewed recommendations to avoid grapefruit, pomegranate and star fruit while taking immunosuppressant medication. Reviewed recommendations for moderate intake of sodium and carbohydrates with transplant medications. Reviewed effect of steroids on BG levels and importance of limiting concentrated sweets. Pt was receptive and expressed understanding. All questions answered. Provided nutrition handout: Provided nutrition package including: USDA Food Safety for Transplant Recipients booklet; Type 2 Diabetes Nutrition Therapy handout; food safety and BG control handouts, fridge magnet with cooking temperatures, and RD information card.   [] Not appropriate/warranted    NUTRITION CARE PLAN:  1. Diet: continue Consistent Carbohydrate diet with evening snack as tolerated   2. Supplements: Recommend increase Ensure Max 2x daily (320 calories, 60g proteins) - both vanilla and chocolate flavor   3. Multivitamin/mineral supplementation: Recommend addition of MVI and Vitamin C, pending no medical contraindication, for micronutrient support/aid wound healing.   4. malnutrition alert placed in chart   5. Recommend follow up visit with Transplant MD and outpatient RD for dietary modifications as warranted.    [x] Achieved - Continue current nutrition intervention(s)  [] Current medical condition precludes nutrition intervention at this time.    MONITORING AND EVALUATION:   RD remains available upon request and will follow up per protocol:   Laura Rojas, MS, RDN, CDN   Available on [facility] TEAMS

## 2025-06-06 NOTE — PROVIDER CONTACT NOTE (OTHER) - SITUATION
pt used call bell, when primary RN entered room patient asked, "How can I get to bed," while currently in the bed. RN asked patient to state name and , the time, location, and reason for admission.
PAT 3.7 and 5.1 sec HR up to 150s as per tele tech Saturnino Cabello

## 2025-06-06 NOTE — DIETITIAN NUTRITION RISK NOTIFICATION - TREATMENT: THE FOLLOWING DIET HAS BEEN RECOMMENDED
Diet, Consistent Carbohydrate w/Evening Snack:   Supplement Feeding Modality:  Oral  Ensure Max Cans or Servings Per Day:  2       Frequency:  Daily (06-06-25 @ 15:46) [Pending Verification By Attending]  Diet, Consistent Carbohydrate w/Evening Snack:   Supplement Feeding Modality:  Oral  Ensure Max Cans or Servings Per Day:  1       Frequency:  Daily (06-02-25 @ 10:24) [Active]

## 2025-06-07 LAB
ALBUMIN SERPL ELPH-MCNC: 3 G/DL — LOW (ref 3.3–5)
ALP SERPL-CCNC: 87 U/L — SIGNIFICANT CHANGE UP (ref 40–120)
ALT FLD-CCNC: 119 U/L — HIGH (ref 10–45)
ANION GAP SERPL CALC-SCNC: 11 MMOL/L — SIGNIFICANT CHANGE UP (ref 5–17)
APTT BLD: 23.9 SEC — LOW (ref 26.1–36.8)
AST SERPL-CCNC: 43 U/L — HIGH (ref 10–40)
BILIRUB SERPL-MCNC: 1.2 MG/DL — SIGNIFICANT CHANGE UP (ref 0.2–1.2)
BUN SERPL-MCNC: 18 MG/DL — SIGNIFICANT CHANGE UP (ref 7–23)
CALCIUM SERPL-MCNC: 8.6 MG/DL — SIGNIFICANT CHANGE UP (ref 8.4–10.5)
CHLORIDE SERPL-SCNC: 105 MMOL/L — SIGNIFICANT CHANGE UP (ref 96–108)
CO2 SERPL-SCNC: 20 MMOL/L — LOW (ref 22–31)
CREAT SERPL-MCNC: 0.93 MG/DL — SIGNIFICANT CHANGE UP (ref 0.5–1.3)
EGFR: 66 ML/MIN/1.73M2 — SIGNIFICANT CHANGE UP
EGFR: 66 ML/MIN/1.73M2 — SIGNIFICANT CHANGE UP
GLUCOSE BLDC GLUCOMTR-MCNC: 151 MG/DL — HIGH (ref 70–99)
GLUCOSE BLDC GLUCOMTR-MCNC: 152 MG/DL — HIGH (ref 70–99)
GLUCOSE BLDC GLUCOMTR-MCNC: 156 MG/DL — HIGH (ref 70–99)
GLUCOSE BLDC GLUCOMTR-MCNC: 163 MG/DL — HIGH (ref 70–99)
GLUCOSE BLDC GLUCOMTR-MCNC: 234 MG/DL — HIGH (ref 70–99)
GLUCOSE BLDC GLUCOMTR-MCNC: 67 MG/DL — LOW (ref 70–99)
GLUCOSE SERPL-MCNC: 100 MG/DL — HIGH (ref 70–99)
HCT VFR BLD CALC: 28.1 % — LOW (ref 34.5–45)
HGB BLD-MCNC: 9.1 G/DL — LOW (ref 11.5–15.5)
INR BLD: 1.08 RATIO — SIGNIFICANT CHANGE UP (ref 0.85–1.16)
MAGNESIUM SERPL-MCNC: 1.6 MG/DL — SIGNIFICANT CHANGE UP (ref 1.6–2.6)
MCHC RBC-ENTMCNC: 29.7 PG — SIGNIFICANT CHANGE UP (ref 27–34)
MCHC RBC-ENTMCNC: 32.4 G/DL — SIGNIFICANT CHANGE UP (ref 32–36)
MCV RBC AUTO: 91.8 FL — SIGNIFICANT CHANGE UP (ref 80–100)
NRBC BLD AUTO-RTO: 0 /100 WBCS — SIGNIFICANT CHANGE UP (ref 0–0)
PHOSPHATE SERPL-MCNC: 3.1 MG/DL — SIGNIFICANT CHANGE UP (ref 2.5–4.5)
PLATELET # BLD AUTO: 62 K/UL — LOW (ref 150–400)
POTASSIUM SERPL-MCNC: 4.2 MMOL/L — SIGNIFICANT CHANGE UP (ref 3.5–5.3)
POTASSIUM SERPL-SCNC: 4.2 MMOL/L — SIGNIFICANT CHANGE UP (ref 3.5–5.3)
PROT SERPL-MCNC: 4.8 G/DL — LOW (ref 6–8.3)
PROTHROM AB SERPL-ACNC: 12.3 SEC — SIGNIFICANT CHANGE UP (ref 9.9–13.4)
RBC # BLD: 3.06 M/UL — LOW (ref 3.8–5.2)
RBC # FLD: 18.3 % — HIGH (ref 10.3–14.5)
SODIUM SERPL-SCNC: 136 MMOL/L — SIGNIFICANT CHANGE UP (ref 135–145)
TACROLIMUS SERPL-MCNC: 4.3 NG/ML — SIGNIFICANT CHANGE UP
WBC # BLD: 5.78 K/UL — SIGNIFICANT CHANGE UP (ref 3.8–10.5)
WBC # FLD AUTO: 5.78 K/UL — SIGNIFICANT CHANGE UP (ref 3.8–10.5)

## 2025-06-07 PROCEDURE — 99232 SBSQ HOSP IP/OBS MODERATE 35: CPT

## 2025-06-07 RX ORDER — DEXTROSE 50 % IN WATER 50 %
12.5 SYRINGE (ML) INTRAVENOUS ONCE
Refills: 0 | Status: COMPLETED | OUTPATIENT
Start: 2025-06-07 | End: 2025-06-07

## 2025-06-07 RX ORDER — SUMATRIPTAN 100 MG/1
50 TABLET, FILM COATED ORAL ONCE
Refills: 0 | Status: COMPLETED | OUTPATIENT
Start: 2025-06-07 | End: 2025-06-07

## 2025-06-07 RX ORDER — INSULIN GLARGINE-YFGN 100 [IU]/ML
10 INJECTION, SOLUTION SUBCUTANEOUS AT BEDTIME
Refills: 0 | Status: DISCONTINUED | OUTPATIENT
Start: 2025-06-07 | End: 2025-06-08

## 2025-06-07 RX ORDER — INSULIN LISPRO 100 U/ML
8 INJECTION, SOLUTION INTRAVENOUS; SUBCUTANEOUS
Refills: 0 | Status: DISCONTINUED | OUTPATIENT
Start: 2025-06-08 | End: 2025-06-09

## 2025-06-07 RX ORDER — INSULIN GLARGINE-YFGN 100 [IU]/ML
12 INJECTION, SOLUTION SUBCUTANEOUS AT BEDTIME
Refills: 0 | Status: DISCONTINUED | OUTPATIENT
Start: 2025-06-07 | End: 2025-06-07

## 2025-06-07 RX ORDER — INSULIN LISPRO 100 U/ML
12 INJECTION, SOLUTION INTRAVENOUS; SUBCUTANEOUS
Refills: 0 | Status: DISCONTINUED | OUTPATIENT
Start: 2025-06-07 | End: 2025-06-07

## 2025-06-07 RX ORDER — INSULIN LISPRO 100 U/ML
8 INJECTION, SOLUTION INTRAVENOUS; SUBCUTANEOUS
Refills: 0 | Status: DISCONTINUED | OUTPATIENT
Start: 2025-06-08 | End: 2025-06-12

## 2025-06-07 RX ORDER — ACETAMINOPHEN 500 MG/5ML
650 LIQUID (ML) ORAL ONCE
Refills: 0 | Status: COMPLETED | OUTPATIENT
Start: 2025-06-07 | End: 2025-06-07

## 2025-06-07 RX ORDER — INSULIN LISPRO 100 U/ML
12 INJECTION, SOLUTION INTRAVENOUS; SUBCUTANEOUS
Refills: 0 | Status: DISCONTINUED | OUTPATIENT
Start: 2025-06-08 | End: 2025-06-08

## 2025-06-07 RX ORDER — ALBUTEROL SULFATE 2.5 MG/3ML
2 VIAL, NEBULIZER (ML) INHALATION EVERY 6 HOURS
Refills: 0 | Status: DISCONTINUED | OUTPATIENT
Start: 2025-06-07 | End: 2025-06-12

## 2025-06-07 RX ORDER — MAGNESIUM SULFATE 500 MG/ML
1 SYRINGE (ML) INJECTION ONCE
Refills: 0 | Status: COMPLETED | OUTPATIENT
Start: 2025-06-07 | End: 2025-06-07

## 2025-06-07 RX ADMIN — SUMATRIPTAN 50 MILLIGRAM(S): 100 TABLET, FILM COATED ORAL at 01:46

## 2025-06-07 RX ADMIN — MYCOPHENOLATE MOFETIL 1000 MILLIGRAM(S): 500 TABLET, FILM COATED ORAL at 19:56

## 2025-06-07 RX ADMIN — INSULIN GLARGINE-YFGN 10 UNIT(S): 100 INJECTION, SOLUTION SUBCUTANEOUS at 21:06

## 2025-06-07 RX ADMIN — TRAMADOL HYDROCHLORIDE 50 MILLIGRAM(S): 50 TABLET, FILM COATED ORAL at 10:00

## 2025-06-07 RX ADMIN — TRAMADOL HYDROCHLORIDE 50 MILLIGRAM(S): 50 TABLET, FILM COATED ORAL at 22:00

## 2025-06-07 RX ADMIN — Medication 1 DOSE(S): at 17:21

## 2025-06-07 RX ADMIN — Medication 1 DOSE(S): at 05:57

## 2025-06-07 RX ADMIN — Medication 400 MILLIGRAM(S): at 17:21

## 2025-06-07 RX ADMIN — Medication 1 TABLET(S): at 05:57

## 2025-06-07 RX ADMIN — INSULIN LISPRO 4: 100 INJECTION, SOLUTION INTRAVENOUS; SUBCUTANEOUS at 21:07

## 2025-06-07 RX ADMIN — INSULIN LISPRO 12 UNIT(S): 100 INJECTION, SOLUTION INTRAVENOUS; SUBCUTANEOUS at 12:16

## 2025-06-07 RX ADMIN — HEPARIN SODIUM 5000 UNIT(S): 1000 INJECTION INTRAVENOUS; SUBCUTANEOUS at 17:21

## 2025-06-07 RX ADMIN — Medication 12.5 GRAM(S): at 17:21

## 2025-06-07 RX ADMIN — TRAMADOL HYDROCHLORIDE 50 MILLIGRAM(S): 50 TABLET, FILM COATED ORAL at 08:59

## 2025-06-07 RX ADMIN — VALGANCICLOVIR 900 MILLIGRAM(S): 450 TABLET, FILM COATED ORAL at 12:14

## 2025-06-07 RX ADMIN — Medication 100 GRAM(S): at 14:23

## 2025-06-07 RX ADMIN — Medication 81 MILLIGRAM(S): at 12:14

## 2025-06-07 RX ADMIN — Medication 40 MILLIGRAM(S): at 05:56

## 2025-06-07 RX ADMIN — Medication 2 TABLET(S): at 05:57

## 2025-06-07 RX ADMIN — INSULIN LISPRO 12 UNIT(S): 100 INJECTION, SOLUTION INTRAVENOUS; SUBCUTANEOUS at 09:19

## 2025-06-07 RX ADMIN — Medication 30 MILLIGRAM(S): at 12:14

## 2025-06-07 RX ADMIN — Medication 2 PUFF(S): at 17:22

## 2025-06-07 RX ADMIN — SUMATRIPTAN 50 MILLIGRAM(S): 100 TABLET, FILM COATED ORAL at 00:46

## 2025-06-07 RX ADMIN — PREDNISONE 20 MILLIGRAM(S): 20 TABLET ORAL at 05:56

## 2025-06-07 RX ADMIN — INSULIN LISPRO 2: 100 INJECTION, SOLUTION INTRAVENOUS; SUBCUTANEOUS at 09:00

## 2025-06-07 RX ADMIN — Medication 400 MILLIGRAM(S): at 08:58

## 2025-06-07 RX ADMIN — Medication 650 MILLIGRAM(S): at 14:23

## 2025-06-07 RX ADMIN — Medication 1 TABLET(S): at 17:21

## 2025-06-07 RX ADMIN — Medication 1 TABLET(S): at 12:14

## 2025-06-07 RX ADMIN — Medication 1 APPLICATION(S): at 05:57

## 2025-06-07 RX ADMIN — Medication 400 MILLIGRAM(S): at 12:14

## 2025-06-07 RX ADMIN — INSULIN LISPRO 2: 100 INJECTION, SOLUTION INTRAVENOUS; SUBCUTANEOUS at 12:15

## 2025-06-07 RX ADMIN — ENTECAVIR 0.5 MILLIGRAM(S): 0.5 TABLET ORAL at 12:14

## 2025-06-07 RX ADMIN — HEPARIN SODIUM 5000 UNIT(S): 1000 INJECTION INTRAVENOUS; SUBCUTANEOUS at 05:57

## 2025-06-07 RX ADMIN — QUETIAPINE FUMARATE 75 MILLIGRAM(S): 25 TABLET ORAL at 21:06

## 2025-06-07 RX ADMIN — MYCOPHENOLATE MOFETIL 1000 MILLIGRAM(S): 500 TABLET, FILM COATED ORAL at 08:58

## 2025-06-07 RX ADMIN — TRAMADOL HYDROCHLORIDE 50 MILLIGRAM(S): 50 TABLET, FILM COATED ORAL at 21:06

## 2025-06-07 RX ADMIN — Medication 5 MILLIGRAM(S): at 21:06

## 2025-06-07 NOTE — PROVIDER CONTACT NOTE (HYPOGLYCEMIA EVENT) - NS PROVIDER CONTACT BACKGROUND-HYPO
Age: 70y    Gender: Female    POCT Blood Glucose:  67 mg/dL (06-07-25 @ 17:08)  152 mg/dL (06-07-25 @ 11:48)  156 mg/dL (06-07-25 @ 08:36)  151 mg/dL (06-07-25 @ 01:20)  151 mg/dL (06-06-25 @ 21:06)  125 mg/dL (06-06-25 @ 18:09)      eMAR:  dextrose 50% Injectable   12.5 Gram(s) IV Push (06-07-25 @ 17:21)    insulin glargine Injectable (LANTUS)   14 Unit(s) SubCutaneous (06-06-25 @ 21:12)    insulin lispro (ADMELOG) corrective regimen sliding scale   2 Unit(s) SubCutaneous (06-07-25 @ 12:15)   2 Unit(s) SubCutaneous (06-07-25 @ 09:00)   2 Unit(s) SubCutaneous (06-06-25 @ 21:15)    insulin lispro Injectable (ADMELOG)   12 Unit(s) SubCutaneous (06-07-25 @ 12:16)   12 Unit(s) SubCutaneous (06-07-25 @ 09:19)    predniSONE   Tablet   20 milliGRAM(s) Oral (06-07-25 @ 05:56)    
Age: 70y    Gender: Female    POCT Blood Glucose:  58 mg/dL (06-06-25 @ 17:18)  135 mg/dL (06-06-25 @ 12:44)  146 mg/dL (06-06-25 @ 08:18)  107 mg/dL (06-06-25 @ 02:28)  71 mg/dL (06-06-25 @ 01:54)  78 mg/dL (06-05-25 @ 21:48)      eMAR:  dextrose 50% Injectable   12.5 Gram(s) IV Push (06-06-25 @ 17:37)    insulin lispro Injectable (ADMELOG)   16 Unit(s) SubCutaneous (06-06-25 @ 12:47)   16 Unit(s) SubCutaneous (06-06-25 @ 08:45)    methylPREDNISolone sodium succinate Injectable   20 milliGRAM(s) IV Push (06-06-25 @ 05:29)

## 2025-06-07 NOTE — PROGRESS NOTE ADULT - SUBJECTIVE AND OBJECTIVE BOX
Transplant Surgery - Multidisciplinary Rounds  --------------------------------------------------------------   Donor OLTx      Date:  Date: 2025      POD#7  CMV +/-      Present:   Patient seen and examined with multidisciplinary Transplant team including Surgeon Dr. Arriola, Hepatologist Dr. Julian, Surgical fellow Chito, GUDELIA,  and bedside RN during AM rounds.   Disciplines not in attendance will be notified of the plan.     HPI: 70 F hx NAFLD cirrhosis and HCC (listed for liver transplant with MELD 20B), UGIB in , chronic back pain (2/2 spinal fracture) presented from [facility] where she was admitted on  for coffee ground emesis and melena, EGD showed no active bleeding, transferred to [facility] now s/p OLT on 2025     Interval Events:  - Headache requiring 50m sumatriptan    Immunosupression:  Induction: Simulect completed  Maintenance: FK by level, MMF , SST  Ongoing monitoring for signs of rejection    Potential Discharge date: TBD  Education:  Medications  Plan of care:  See Below    MEDICATIONS  (STANDING):  aspirin enteric coated 81 milliGRAM(s) Oral daily  calcium carbonate 1250 mG  + Vitamin D (OsCal 500 + D) 1 Tablet(s) Oral two times a day  chlorhexidine 2% Cloths 1 Application(s) Topical <User Schedule>  dextrose 5%. 1000 milliLiter(s) (50 mL/Hr) IV Continuous <Continuous>  dextrose 5%. 1000 milliLiter(s) (100 mL/Hr) IV Continuous <Continuous>  dextrose 50% Injectable 25 Gram(s) IV Push once  dextrose 50% Injectable 12.5 Gram(s) IV Push once  dextrose 50% Injectable 25 Gram(s) IV Push once  dextrose Oral Gel 15 Gram(s) Oral once  entecavir 0.5 milliGRAM(s) Oral daily  fluconAZOLE   Tablet 400 milliGRAM(s) Oral daily  fluticasone propionate/ salmeterol 100-50 MICROgram(s) Diskus 1 Dose(s) Inhalation two times a day  glucagon  Injectable 1 milliGRAM(s) IntraMuscular once  heparin   Injectable 5000 Unit(s) SubCutaneous every 12 hours  insulin glargine Injectable (LANTUS) 14 Unit(s) SubCutaneous at bedtime  insulin lispro (ADMELOG) corrective regimen sliding scale   SubCutaneous <User Schedule>  insulin lispro (ADMELOG) corrective regimen sliding scale   SubCutaneous Before meals and at bedtime  insulin lispro Injectable (ADMELOG) 16 Unit(s) SubCutaneous three times a day with meals  lidocaine 1% (Preservative-free) Injectable 10 milliLiter(s) Local Injection once  magnesium oxide 400 milliGRAM(s) Oral two times a day with meals  melatonin 5 milliGRAM(s) Oral at bedtime  mycophenolate mofetil 1000 milliGRAM(s) Oral <User Schedule>  NIFEdipine XL 30 milliGRAM(s) Oral every 24 hours  pantoprazole    Tablet 40 milliGRAM(s) Oral before breakfast  polyethylene glycol 3350 17 Gram(s) Oral daily  predniSONE   Tablet 20 milliGRAM(s) Oral daily  QUEtiapine 75 milliGRAM(s) Oral at bedtime  senna 2 Tablet(s) Oral two times a day  tacrolimus 1 milliGRAM(s) Oral <User Schedule>  tacrolimus 0.5 milliGRAM(s) Oral <User Schedule>  trimethoprim   80 mG/sulfamethoxazole 400 mG 1 Tablet(s) Oral daily  valGANciclovir 900 milliGRAM(s) Oral daily    MEDICATIONS  (PRN):  traMADol 25 milliGRAM(s) Oral every 6 hours PRN Moderate Pain (4 - 6)  traMADol 50 milliGRAM(s) Oral every 8 hours PRN Severe Pain (7 - 10)      PAST MEDICAL & SURGICAL HISTORY:  HCC (hepatocellular carcinoma)      DM (diabetes mellitus)      HTN (hypertension)      HLD (hyperlipidemia)      Hepatic cirrhosis      IVEY (nonalcoholic steatohepatitis)      Former smoker      Ascites      Hepatic encephalopathy      History of cervical cancer      H/O  section      History of cholecystectomy      H/O carpal tunnel repair      H/O cataract extraction          Vital Signs Last 24 Hrs  T(C): 36.5 (2025 00:42), Max: 36.9 (2025 13:00)  T(F): 97.7 (2025 00:42), Max: 98.5 (2025 13:00)  HR: 80 (2025 00:42) (68 - 81)  BP: 117/57 (2025 00:42) (117/57 - 146/66)  BP(mean): --  RR: 18 (2025 00:42) (18 - 18)  SpO2: 100% (2025 00:42) (98% - 100%)    Parameters below as of 2025 00:42  Patient On (Oxygen Delivery Method): room air        I&O's Summary    2025 07:01  -  2025 07:00  --------------------------------------------------------  IN: 0 mL / OUT: 1215 mL / NET: -1215 mL    2025 07:01  -  2025 03:37  --------------------------------------------------------  IN: 480 mL / OUT: 905 mL / NET: -425 mL                              9.2    6.27  )-----------( 56       ( 2025 06:54 )             28.3     -    138  |  104  |  23  ----------------------------<  92  4.3   |  21[L]  |  1.02    Ca    8.5      2025 06:56  Phos  2.8     -  Mg     1.7     -    TPro  4.5[L]  /  Alb  2.9[L]  /  TBili  1.2  /  DBili  x   /  AST  62[H]  /  ALT  150[H]  /  AlkPhos  89  -    Tacrolimus (), Serum: 3.8 ng/mL ( @ 06:54)      Review of systems  All other systems were reviewed and are negative, except as noted.      PHYSICAL EXAM:  Constitutional: NAD  Eyes: PERRLA  ENMT: nc/at, no thrush  Neck: supple,   Respiratory: CTA B/L  Cardiovascular: RRR  Gastrointestinal: Soft abdomen, ND, appropriate incisional TTP. chevron incision c/d/i, staples in place. No signs of infection.  JPsx1 ss  Genitourinary: voiding   Extremities: SCD's in place and working bilaterally  Vascular: Palpable dp pulses bilaterally.   Neurological:  AAO3  Skin: no rashes, ulcerations, lesions  Musculoskeletal:  moving extremities without issues  Psychiatric: cooperative   Transplant Surgery - Multidisciplinary Rounds  --------------------------------------------------------------   Donor OLTx      Date:  Date: 2025      POD#7  CMV +/-    Present:   Patient seen and examined with multidisciplinary Transplant team including Surgeon Dr. Arriola, Hepatologist Dr. Julian, Surgical fellow Chito, GUDELIA,  and bedside RN during AM rounds.   Disciplines not in attendance will be notified of the plan.     HPI: 70 F hx NAFLD cirrhosis and HCC (listed for liver transplant with MELD 20B), UGIB in , chronic back pain (2/2 spinal fracture) presented from [facility] where she was admitted on  for coffee ground emesis and melena, EGD showed no active bleeding, transferred to [facility] now s/p OLT on 2025     Interval Events:  - Headache requiring 50m sumatriptan  - hallucinating   - AO x 3  - held AM tacro    Immunosuppression  Induction: Simulect completed  Maintenance: FK by level, MMF , SST  Ongoing monitoring for signs of rejection    Potential Discharge date: TBD  Education:  Medications  Plan of care:  See Below    MEDICATIONS  (STANDING):  aspirin enteric coated 81 milliGRAM(s) Oral daily  calcium carbonate 1250 mG  + Vitamin D (OsCal 500 + D) 1 Tablet(s) Oral two times a day  chlorhexidine 2% Cloths 1 Application(s) Topical <User Schedule>  dextrose 5%. 1000 milliLiter(s) (50 mL/Hr) IV Continuous <Continuous>  dextrose 5%. 1000 milliLiter(s) (100 mL/Hr) IV Continuous <Continuous>  dextrose 50% Injectable 25 Gram(s) IV Push once  dextrose 50% Injectable 12.5 Gram(s) IV Push once  dextrose 50% Injectable 25 Gram(s) IV Push once  dextrose Oral Gel 15 Gram(s) Oral once  entecavir 0.5 milliGRAM(s) Oral daily  fluconAZOLE   Tablet 400 milliGRAM(s) Oral daily  fluticasone propionate/ salmeterol 100-50 MICROgram(s) Diskus 1 Dose(s) Inhalation two times a day  glucagon  Injectable 1 milliGRAM(s) IntraMuscular once  heparin   Injectable 5000 Unit(s) SubCutaneous every 12 hours  insulin glargine Injectable (LANTUS) 10 Unit(s) SubCutaneous at bedtime  insulin lispro (ADMELOG) corrective regimen sliding scale   SubCutaneous <User Schedule>  insulin lispro (ADMELOG) corrective regimen sliding scale   SubCutaneous Before meals and at bedtime  insulin lispro Injectable (ADMELOG) 12 Unit(s) SubCutaneous three times a day with meals  lidocaine 1% (Preservative-free) Injectable 10 milliLiter(s) Local Injection once  magnesium oxide 400 milliGRAM(s) Oral two times a day with meals  melatonin 5 milliGRAM(s) Oral at bedtime  mycophenolate mofetil 1000 milliGRAM(s) Oral <User Schedule>  NIFEdipine XL 30 milliGRAM(s) Oral every 24 hours  pantoprazole    Tablet 40 milliGRAM(s) Oral before breakfast  polyethylene glycol 3350 17 Gram(s) Oral daily  predniSONE   Tablet 20 milliGRAM(s) Oral daily  QUEtiapine 75 milliGRAM(s) Oral at bedtime  senna 2 Tablet(s) Oral two times a day  trimethoprim   80 mG/sulfamethoxazole 400 mG 1 Tablet(s) Oral daily  valGANciclovir 900 milliGRAM(s) Oral daily    MEDICATIONS  (PRN):  albuterol    90 MICROgram(s) HFA Inhaler 2 Puff(s) Inhalation every 6 hours PRN Bronchospasm  traMADol 25 milliGRAM(s) Oral every 6 hours PRN Moderate Pain (4 - 6)  traMADol 50 milliGRAM(s) Oral every 8 hours PRN Severe Pain (7 - 10)      PAST MEDICAL & SURGICAL HISTORY:  HCC (hepatocellular carcinoma)  DM (diabetes mellitus)  HTN (hypertension)  HLD (hyperlipidemia)  Hepatic cirrhosis  IVEY (nonalcoholic steatohepatitis)  Former smoker  Ascites  Hepatic encephalopathy  History of cervical cancer  H/O  section  History of cholecystectomy  H/O carpal tunnel repair  H/O cataract extraction    Vital Signs Last 24 Hrs  T(C): 36.8 (2025 12:37), Max: 36.8 (2025 17:18)  T(F): 98.2 (2025 12:37), Max: 98.3 (2025 08:32)  HR: 92 (2025 12:37) (69 - 92)  BP: 126/71 (2025 12:37) (117/57 - 139/70)  BP(mean): --  RR: 18 (2025 12:37) (18 - 18)  SpO2: 100% (2025 12:37) (95% - 100%)    Parameters below as of 2025 12:37  Patient On (Oxygen Delivery Method): room air    I&O's Summary    2025 07:01  -  2025 07:00  --------------------------------------------------------  IN: 600 mL / OUT: 1105 mL / NET: -505 mL    2025 07:01  -  2025 16:41  --------------------------------------------------------  IN: 0 mL / OUT: 200 mL / NET: -200 mL                        9.1    5.78  )-----------( 62       ( 2025 06:44 )             28.1     -07    136  |  105  |  18  ----------------------------<  100[H]  4.2   |  20[L]  |  0.93    Ca    8.6      2025 06:46  Phos  3.1     06-07  Mg     1.6     -07    TPro  4.8[L]  /  Alb  3.0[L]  /  TBili  1.2  /  DBili  x   /  AST  43[H]  /  ALT  119[H]  /  AlkPhos  87  -07    Tacrolimus (), Serum: 4.3 ng/mL ( @ 06:44)    Review of systems  All other systems were reviewed and are negative, except as noted.      PHYSICAL EXAM:  Constitutional: NAD  Eyes: PERRLA  ENMT: nc/at, no thrush  Neck: supple,   Respiratory: CTA B/L  Cardiovascular: RRR  Gastrointestinal: Soft abdomen, ND, appropriate incisional TTP. chevron incision c/d/i, staples in place. No signs of infection.  JPsx1 ss  Genitourinary: voiding   Extremities: SCD's in place and working bilaterally  Vascular: Palpable dp pulses bilaterally.   Neurological:  AAO3  Skin: no rashes, ulcerations, lesions  Musculoskeletal:  moving extremities without issues  Psychiatric: cooperative

## 2025-06-07 NOTE — PROGRESS NOTE ADULT - ASSESSMENT
70 F hx NAFLD cirrhosis and HCC (listed for liver transplant with MELD 20B), UGIB in 2022, chronic back pain (2/2 spinal fracture) presented from [facility] where she was admitted on 5/26 for coffee ground emesis and melena, EGD showed no active bleeding, transferred to [facility] now s/p OLT on 5/31/2025     [] s/p OLT - POD 7  - HBV Core + donor: Entecavir  - Good graft function   - Diet: Consistent carb diet   - Strict I&O:  UO  - Pain Management  - Bowel Regimen  - E.Faecium UTI, asymptomatic, Zosyn/Vanco completed  - OT/PT/RD /IS  - Hallucinations -->  consult;  started seroquel 6/3, keep FK low    [] Immunosuppression: Simulect induction  - FK by level,  MMF 1/1, SST  - PPx: Bactrim, Valcyte 450 (CMV +/-, inc 900mg as able), Fluc, PPI, OsCal    [] DM  - Endocrine team  following    [] HTN  - Nifedipine, adjust prn    [] DVT  -SQH/asa  -SCDs at all times    [] DISPO  -d/c planning to Acute Rehab      70 F hx NAFLD cirrhosis and HCC (listed for liver transplant with MELD 20B), UGIB in 2022, chronic back pain (2/2 spinal fracture) presented from [facility] where she was admitted on 5/26 for coffee ground emesis and melena, EGD showed no active bleeding, transferred to [facility] now s/p OLT on 5/31/2025     [] s/p OLT - POD 7  - HBV Core + donor: Entecavir  - Good graft function   - Diet: Consistent carb diet   - Strict I&O:  UO  - Pain Management  - Bowel Regimen  - E.Faecium UTI, asymptomatic, Zosyn/Vanco completed  - OT/PT/RD /IS  - Hallucinations -->  consult;  started seroquel 6/3, HOLD FK today    [] Immunosuppression: Simulect induction  - FK by level,  MMF 1/1, SST  - PPx: Bactrim, Valcyte 450 (CMV +/-, inc 900mg as able), Fluc, PPI, OsCal    [] DM  - Endocrine team  following    [] HTN  - Nifedipine, adjust prn    [] DVT  -SQH/asa  -SCDs at all times    [] DISPO  -d/c planning to Acute Rehab

## 2025-06-07 NOTE — PROGRESS NOTE ADULT - ASSESSMENT
70 F hx NAFLD cirrhosis and HCC (listed for liver transplant with MELD 20B), UGIB in 2022, chronic back pain (2/2 spinal fracture) presented from [facility] where she was admitted on 5/26 for coffee ground emesis and melena, EGD showed no active bleeding, patient was transfused and stabilized prior to transfer. Patient is currently post op day #3 for liver transplant. Endocrinology was consulted for management of diabetes mellitus. BG Goal 100-180mg/dl   Tolerating POs, eats well. Last 24 hours  58-100s with serum fasting  and hypoglycemia 58 at predinner yesterday after eating not much for lunch. On steroid taper, s/p Methylprednisolone 20 mg yesterday and Prednisone 20 mg daily started today.  Will adjust insulin doses for BG Goal 100-180mg/dl  without hypoglycemia       #Type 2 Diabetes Mellitus  #Steroid- induced hyperglycemia   - HbA1c  6.0% - although not accurate in the setting of low gfr and liver disease      - Home regimen: Lantus 14 units + Humalog 18 units with meals. Sugars are usually high in the 200s   - Diet is quite high in carbs- eating out, grape juice, pasta, fruit yogurts. Would benefit from a dietician consult   - sugars also exacerbated due to steroid use  - received Solumedrol 20mg IV today (equivalent to prednisone 25mg) and will be tapered to prednisone 20mg daily starting tomorrow     Plan:   - Decrease lantus to 10 units at HS  - Decrease to Admelog to 12 units with meals  TIDQAC ( Hold if NPO)  - Recommend moderate Admelog correction scale TIDQAC and change to LOW dose scale at bedtime   - Will need daily adjustment of insulin due to steroid tapering   - Please check FSG before meals and QHS, or q6h while NPO  - Inpatient glucose goal 100-180   - Please keep patient on a diabetic, carb controlled diet   - Registered dietician consult  - hypoglycemia orderset prn  - extensively discussed importance of glycemic control to prevent micro- and macrovascular complications    - Discharge planning: Basal/bolus - final doses TBD    #Hypertension  - BP goal <130/80  - Management as per primary team  - currently on nifedipine     #Hyperlipidemia  - Goal LDL <70 in the setting of diabetes  - outpatient fasting lipid panel         Contact via Microsoft Teams during business hours  To reach covering provider access AMION via sunrise tools  For Urgent matters/after-hours/weekends/holidays please page endocrine fellow on call   For nonurgent matters please email [email or web address]    Please note that this patient may be followed by different provider tomorrow.  Notify endocrine 24 hours prior to discharge for final recommendations 70 F hx NAFLD cirrhosis and HCC (listed for liver transplant with MELD 20B), UGIB in 2022, chronic back pain (2/2 spinal fracture) presented from [facility] where she was admitted on 5/26 for coffee ground emesis and melena, EGD showed no active bleeding, patient was transfused and stabilized prior to transfer. Patient is currently post op day #3 for liver transplant. Endocrinology was consulted for management of diabetes mellitus. BG Goal 100-180mg/dl   Tolerating POs, eats well. Last 24 hours  58-100s with serum fasting  and hypoglycemia 58 at predinner yesterday after eating not much for lunch. On steroid taper, s/p Methylprednisolone 20 mg yesterday and Prednisone 20 mg daily started today.  Will adjust insulin doses for BG Goal 100-180mg/dl  without hypoglycemia       #Type 2 Diabetes Mellitus  #Steroid- induced hyperglycemia   - HbA1c  6.0% - although not accurate in the setting of low gfr and liver disease      - Home regimen: Lantus 14 units + Humalog 18 units with meals. Sugars are usually high in the 200s   - Diet is quite high in carbs- eating out, grape juice, pasta, fruit yogurts. Would benefit from a dietician consult   - sugars also exacerbated due to steroid use  - received Solumedrol 20mg IV today (equivalent to prednisone 25mg) and will be tapered to prednisone 20mg daily starting tomorrow     Plan:   - Decrease lantus to 10 units at HS  - Decrease to Admelog to 12 units with meals  TIDQAC ( Hold if NPO)  - Recommend moderate Admelog correction scale TIDQAC and change to LOW dose scale at bedtime   - Please check FSG before meals and QHS, or q6h while NPO  - Inpatient glucose goal 100-180   - Please keep patient on a diabetic, carb controlled diet   - Registered dietician consult  - hypoglycemia orderset prn  - extensively discussed importance of glycemic control to prevent micro- and macrovascular complications    - Discharge planning: Basal/bolus - final doses TBD    #Hypertension  - BP goal <130/80  - Management as per primary team  - currently on nifedipine     #Hyperlipidemia  - Goal LDL <70 in the setting of diabetes  - outpatient fasting lipid panel         Contact via Microsoft Teams during business hours  To reach covering provider access AMION via sunrise tools  For Urgent matters/after-hours/weekends/holidays please page endocrine fellow on call   For nonurgent matters please email [email or web address]    Please note that this patient may be followed by different provider tomorrow.  Notify endocrine 24 hours prior to discharge for final recommendations

## 2025-06-07 NOTE — PROGRESS NOTE ADULT - SUBJECTIVE AND OBJECTIVE BOX
Seen earlier today     Chief Complaint: Diabetes Mellitus follow up    INTERVAL HX: " Feel good" Tolerating POs, eats well. Last 24 hours  58-100s with serum fasting  and hypoglycemia 58 at predinner yesterday after eating not much for lunch. On steroid taper, s/p Methylprednisolone 20 mg yesterday and Prednisone 20 mg daily started today.       Review of Systems:  General: As above  GI: No nausea, vomiting  Endocrine: no  S&Sx of hypoglycemia    Allergies    No Known Allergies    Intolerances      MEDICATIONS  (STANDING):  aspirin enteric coated 81 milliGRAM(s) Oral daily  calcium carbonate 1250 mG  + Vitamin D (OsCal 500 + D) 1 Tablet(s) Oral two times a day  chlorhexidine 2% Cloths 1 Application(s) Topical <User Schedule>  dextrose 5%. 1000 milliLiter(s) (50 mL/Hr) IV Continuous <Continuous>  dextrose 5%. 1000 milliLiter(s) (100 mL/Hr) IV Continuous <Continuous>  dextrose 50% Injectable 25 Gram(s) IV Push once  dextrose 50% Injectable 12.5 Gram(s) IV Push once  dextrose 50% Injectable 25 Gram(s) IV Push once  dextrose Oral Gel 15 Gram(s) Oral once  entecavir 0.5 milliGRAM(s) Oral daily  fluconAZOLE   Tablet 400 milliGRAM(s) Oral daily  fluticasone propionate/ salmeterol 100-50 MICROgram(s) Diskus 1 Dose(s) Inhalation two times a day  glucagon  Injectable 1 milliGRAM(s) IntraMuscular once  heparin   Injectable 5000 Unit(s) SubCutaneous every 12 hours  insulin glargine Injectable (LANTUS) 10 Unit(s) SubCutaneous at bedtime  insulin lispro (ADMELOG) corrective regimen sliding scale   SubCutaneous <User Schedule>  insulin lispro (ADMELOG) corrective regimen sliding scale   SubCutaneous Before meals and at bedtime  insulin lispro Injectable (ADMELOG) 12 Unit(s) SubCutaneous three times a day with meals  lidocaine 1% (Preservative-free) Injectable 10 milliLiter(s) Local Injection once  magnesium oxide 400 milliGRAM(s) Oral two times a day with meals  melatonin 5 milliGRAM(s) Oral at bedtime  mycophenolate mofetil 1000 milliGRAM(s) Oral <User Schedule>  NIFEdipine XL 30 milliGRAM(s) Oral every 24 hours  pantoprazole    Tablet 40 milliGRAM(s) Oral before breakfast  polyethylene glycol 3350 17 Gram(s) Oral daily  predniSONE   Tablet 20 milliGRAM(s) Oral daily  QUEtiapine 75 milliGRAM(s) Oral at bedtime  senna 2 Tablet(s) Oral two times a day  trimethoprim   80 mG/sulfamethoxazole 400 mG 1 Tablet(s) Oral daily  valGANciclovir 900 milliGRAM(s) Oral daily        dextrose 50% Injectable   12.5 Gram(s) IV Push (06-06-25 @ 17:37)    insulin glargine Injectable (LANTUS)   14 Unit(s) SubCutaneous (06-06-25 @ 21:12)    insulin lispro (ADMELOG) corrective regimen sliding scale   2 Unit(s) SubCutaneous (06-07-25 @ 12:15)   2 Unit(s) SubCutaneous (06-07-25 @ 09:00)   2 Unit(s) SubCutaneous (06-06-25 @ 21:15)    insulin lispro Injectable (ADMELOG)   12 Unit(s) SubCutaneous (06-07-25 @ 12:16)   12 Unit(s) SubCutaneous (06-07-25 @ 09:19)    predniSONE   Tablet   20 milliGRAM(s) Oral (06-07-25 @ 05:56)        PHYSICAL EXAM:  VITALS: T(C): 36.8 (06-07-25 @ 12:37)  T(F): 98.2 (06-07-25 @ 12:37), Max: 98.3 (06-07-25 @ 08:32)  HR: 92 (06-07-25 @ 12:37) (69 - 92)  BP: 126/71 (06-07-25 @ 12:37) (117/57 - 139/70)  RR:  (18 - 18)  SpO2:  (95% - 100%)  Wt(kg): --  GENERAL: Female sitting in chair, in NAD  Respiratory: Respirations unlabored   Extremities: Warm, no edema  NEURO: Alert , appropriate     LABS:  POCT Blood Glucose.: 152 mg/dL (06-07-25 @ 11:48)  POCT Blood Glucose.: 156 mg/dL (06-07-25 @ 08:36)  POCT Blood Glucose.: 151 mg/dL (06-07-25 @ 01:20)  POCT Blood Glucose.: 151 mg/dL (06-06-25 @ 21:06)  POCT Blood Glucose.: 125 mg/dL (06-06-25 @ 18:09)  POCT Blood Glucose.: 58 mg/dL (06-06-25 @ 17:18)  POCT Blood Glucose.: 135 mg/dL (06-06-25 @ 12:44)  POCT Blood Glucose.: 146 mg/dL (06-06-25 @ 08:18)  POCT Blood Glucose.: 107 mg/dL (06-06-25 @ 02:28)  POCT Blood Glucose.: 71 mg/dL (06-06-25 @ 01:54)  POCT Blood Glucose.: 78 mg/dL (06-05-25 @ 21:48)  POCT Blood Glucose.: 167 mg/dL (06-05-25 @ 16:50)  POCT Blood Glucose.: 269 mg/dL (06-05-25 @ 12:59)  POCT Blood Glucose.: 189 mg/dL (06-05-25 @ 08:42)  POCT Blood Glucose.: 169 mg/dL (06-04-25 @ 21:16)  POCT Blood Glucose.: 182 mg/dL (06-04-25 @ 16:47)                          9.1    5.78  )-----------( 62       ( 07 Jun 2025 06:44 )             28.1     06-07    136  |  105  |  18  ----------------------------<  100[H]  4.2   |  20[L]  |  0.93    Ca    8.6      07 Jun 2025 06:46  Phos  3.1     06-07  Mg     1.6     06-07    TPro  4.8[L]  /  Alb  3.0[L]  /  TBili  1.2  /  DBili  x   /  AST  43[H]  /  ALT  119[H]  /  AlkPhos  87  06-07    eGFR: 66 mL/min/1.73m2 (07 Jun 2025 06:46)      Thyroid Function Tests:      A1C with Estimated Average Glucose Result: 6.0 % (05-28-25 @ 10:20)    Estimated Average Glucose: 126 mg/dL (05-28-25 @ 10:20)        Diet, Consistent Carbohydrate w/Evening Snack:   Supplement Feeding Modality:  Oral  Ensure Max Cans or Servings Per Day:  2       Frequency:  Daily (06-06-25 @ 15:46) [Active]

## 2025-06-07 NOTE — PROVIDER CONTACT NOTE (HYPOGLYCEMIA EVENT) - NS PROVIDER CONTACT ASSESS-HYPO
PT AAOx4. VSS. Asymptomatic hypoglycemia. Pt administered 12.5 dextrose and dinner tray being consumed at this time. 
PT AAOx4. VSS. Asymptomatic hypoglycemia. Pt administered 12.5 dextrose and dinner tray being consumed at this time.

## 2025-06-08 LAB
ALBUMIN SERPL ELPH-MCNC: 3.1 G/DL — LOW (ref 3.3–5)
ALP SERPL-CCNC: 87 U/L — SIGNIFICANT CHANGE UP (ref 40–120)
ALT FLD-CCNC: 98 U/L — HIGH (ref 10–45)
ANION GAP SERPL CALC-SCNC: 13 MMOL/L — SIGNIFICANT CHANGE UP (ref 5–17)
APPEARANCE UR: CLEAR — SIGNIFICANT CHANGE UP
APTT BLD: 24.9 SEC — LOW (ref 26.1–36.8)
AST SERPL-CCNC: 32 U/L — SIGNIFICANT CHANGE UP (ref 10–40)
BACTERIA # UR AUTO: NEGATIVE /HPF — SIGNIFICANT CHANGE UP
BILIRUB SERPL-MCNC: 1.2 MG/DL — SIGNIFICANT CHANGE UP (ref 0.2–1.2)
BILIRUB UR-MCNC: NEGATIVE — SIGNIFICANT CHANGE UP
BLD GP AB SCN SERPL QL: NEGATIVE — SIGNIFICANT CHANGE UP
BUN SERPL-MCNC: 16 MG/DL — SIGNIFICANT CHANGE UP (ref 7–23)
CALCIUM SERPL-MCNC: 8.6 MG/DL — SIGNIFICANT CHANGE UP (ref 8.4–10.5)
CAST: 0 /LPF — SIGNIFICANT CHANGE UP (ref 0–4)
CHLORIDE SERPL-SCNC: 105 MMOL/L — SIGNIFICANT CHANGE UP (ref 96–108)
CO2 SERPL-SCNC: 21 MMOL/L — LOW (ref 22–31)
COLOR SPEC: YELLOW — SIGNIFICANT CHANGE UP
CREAT SERPL-MCNC: 0.97 MG/DL — SIGNIFICANT CHANGE UP (ref 0.5–1.3)
DIFF PNL FLD: ABNORMAL
EGFR: 63 ML/MIN/1.73M2 — SIGNIFICANT CHANGE UP
EGFR: 63 ML/MIN/1.73M2 — SIGNIFICANT CHANGE UP
GLUCOSE BLDC GLUCOMTR-MCNC: 109 MG/DL — HIGH (ref 70–99)
GLUCOSE BLDC GLUCOMTR-MCNC: 133 MG/DL — HIGH (ref 70–99)
GLUCOSE BLDC GLUCOMTR-MCNC: 138 MG/DL — HIGH (ref 70–99)
GLUCOSE BLDC GLUCOMTR-MCNC: 171 MG/DL — HIGH (ref 70–99)
GLUCOSE BLDC GLUCOMTR-MCNC: 180 MG/DL — HIGH (ref 70–99)
GLUCOSE BLDC GLUCOMTR-MCNC: 259 MG/DL — HIGH (ref 70–99)
GLUCOSE BLDC GLUCOMTR-MCNC: 99 MG/DL — SIGNIFICANT CHANGE UP (ref 70–99)
GLUCOSE SERPL-MCNC: 112 MG/DL — HIGH (ref 70–99)
GLUCOSE UR QL: NEGATIVE MG/DL — SIGNIFICANT CHANGE UP
HCT VFR BLD CALC: 28.6 % — LOW (ref 34.5–45)
HGB BLD-MCNC: 9.3 G/DL — LOW (ref 11.5–15.5)
INR BLD: 1.03 RATIO — SIGNIFICANT CHANGE UP (ref 0.85–1.16)
KETONES UR QL: ABNORMAL MG/DL
LEUKOCYTE ESTERASE UR-ACNC: NEGATIVE — SIGNIFICANT CHANGE UP
MAGNESIUM SERPL-MCNC: 1.6 MG/DL — SIGNIFICANT CHANGE UP (ref 1.6–2.6)
MCHC RBC-ENTMCNC: 29.8 PG — SIGNIFICANT CHANGE UP (ref 27–34)
MCHC RBC-ENTMCNC: 32.5 G/DL — SIGNIFICANT CHANGE UP (ref 32–36)
MCV RBC AUTO: 91.7 FL — SIGNIFICANT CHANGE UP (ref 80–100)
NITRITE UR-MCNC: NEGATIVE — SIGNIFICANT CHANGE UP
NRBC BLD AUTO-RTO: 0 /100 WBCS — SIGNIFICANT CHANGE UP (ref 0–0)
PH UR: 7 — SIGNIFICANT CHANGE UP (ref 5–8)
PHOSPHATE SERPL-MCNC: 3.1 MG/DL — SIGNIFICANT CHANGE UP (ref 2.5–4.5)
PLATELET # BLD AUTO: 71 K/UL — LOW (ref 150–400)
POTASSIUM SERPL-MCNC: 4.2 MMOL/L — SIGNIFICANT CHANGE UP (ref 3.5–5.3)
POTASSIUM SERPL-SCNC: 4.2 MMOL/L — SIGNIFICANT CHANGE UP (ref 3.5–5.3)
PROT SERPL-MCNC: 4.6 G/DL — LOW (ref 6–8.3)
PROT UR-MCNC: NEGATIVE MG/DL — SIGNIFICANT CHANGE UP
PROTHROM AB SERPL-ACNC: 11.8 SEC — SIGNIFICANT CHANGE UP (ref 9.9–13.4)
RBC # BLD: 3.12 M/UL — LOW (ref 3.8–5.2)
RBC # FLD: 19.2 % — HIGH (ref 10.3–14.5)
RBC CASTS # UR COMP ASSIST: 26 /HPF — HIGH (ref 0–4)
RH IG SCN BLD-IMP: POSITIVE — SIGNIFICANT CHANGE UP
SODIUM SERPL-SCNC: 139 MMOL/L — SIGNIFICANT CHANGE UP (ref 135–145)
SP GR SPEC: 1.01 — SIGNIFICANT CHANGE UP (ref 1–1.03)
SQUAMOUS # UR AUTO: 2 /HPF — SIGNIFICANT CHANGE UP (ref 0–5)
TACROLIMUS SERPL-MCNC: 1.9 NG/ML — SIGNIFICANT CHANGE UP
UROBILINOGEN FLD QL: 2 MG/DL (ref 0.2–1)
WBC # BLD: 5.85 K/UL — SIGNIFICANT CHANGE UP (ref 3.8–10.5)
WBC # FLD AUTO: 5.85 K/UL — SIGNIFICANT CHANGE UP (ref 3.8–10.5)
WBC UR QL: 0 /HPF — SIGNIFICANT CHANGE UP (ref 0–5)

## 2025-06-08 RX ORDER — INSULIN GLARGINE-YFGN 100 [IU]/ML
8 INJECTION, SOLUTION SUBCUTANEOUS AT BEDTIME
Refills: 0 | Status: DISCONTINUED | OUTPATIENT
Start: 2025-06-08 | End: 2025-06-09

## 2025-06-08 RX ORDER — INSULIN LISPRO 100 U/ML
12 INJECTION, SOLUTION INTRAVENOUS; SUBCUTANEOUS
Refills: 0 | Status: DISCONTINUED | OUTPATIENT
Start: 2025-06-08 | End: 2025-06-09

## 2025-06-08 RX ORDER — MAGNESIUM SULFATE 500 MG/ML
2 SYRINGE (ML) INJECTION ONCE
Refills: 0 | Status: COMPLETED | OUTPATIENT
Start: 2025-06-08 | End: 2025-06-08

## 2025-06-08 RX ORDER — SUMATRIPTAN 100 MG/1
25 TABLET, FILM COATED ORAL ONCE
Refills: 0 | Status: COMPLETED | OUTPATIENT
Start: 2025-06-08 | End: 2025-06-08

## 2025-06-08 RX ADMIN — INSULIN LISPRO 6: 100 INJECTION, SOLUTION INTRAVENOUS; SUBCUTANEOUS at 12:08

## 2025-06-08 RX ADMIN — INSULIN LISPRO 8 UNIT(S): 100 INJECTION, SOLUTION INTRAVENOUS; SUBCUTANEOUS at 12:09

## 2025-06-08 RX ADMIN — INSULIN LISPRO 2: 100 INJECTION, SOLUTION INTRAVENOUS; SUBCUTANEOUS at 17:14

## 2025-06-08 RX ADMIN — HEPARIN SODIUM 5000 UNIT(S): 1000 INJECTION INTRAVENOUS; SUBCUTANEOUS at 06:02

## 2025-06-08 RX ADMIN — Medication 25 GRAM(S): at 12:08

## 2025-06-08 RX ADMIN — POLYETHYLENE GLYCOL 3350 17 GRAM(S): 17 POWDER, FOR SOLUTION ORAL at 12:06

## 2025-06-08 RX ADMIN — Medication 400 MILLIGRAM(S): at 12:06

## 2025-06-08 RX ADMIN — VALGANCICLOVIR 900 MILLIGRAM(S): 450 TABLET, FILM COATED ORAL at 12:07

## 2025-06-08 RX ADMIN — Medication 30 MILLIGRAM(S): at 12:07

## 2025-06-08 RX ADMIN — TRAMADOL HYDROCHLORIDE 50 MILLIGRAM(S): 50 TABLET, FILM COATED ORAL at 21:24

## 2025-06-08 RX ADMIN — INSULIN LISPRO 2: 100 INJECTION, SOLUTION INTRAVENOUS; SUBCUTANEOUS at 08:40

## 2025-06-08 RX ADMIN — Medication 81 MILLIGRAM(S): at 12:08

## 2025-06-08 RX ADMIN — Medication 2 TABLET(S): at 06:02

## 2025-06-08 RX ADMIN — MYCOPHENOLATE MOFETIL 1000 MILLIGRAM(S): 500 TABLET, FILM COATED ORAL at 21:24

## 2025-06-08 RX ADMIN — SUMATRIPTAN 25 MILLIGRAM(S): 100 TABLET, FILM COATED ORAL at 01:15

## 2025-06-08 RX ADMIN — Medication 2 TABLET(S): at 17:15

## 2025-06-08 RX ADMIN — INSULIN LISPRO 8 UNIT(S): 100 INJECTION, SOLUTION INTRAVENOUS; SUBCUTANEOUS at 17:15

## 2025-06-08 RX ADMIN — SUMATRIPTAN 25 MILLIGRAM(S): 100 TABLET, FILM COATED ORAL at 02:15

## 2025-06-08 RX ADMIN — Medication 400 MILLIGRAM(S): at 08:40

## 2025-06-08 RX ADMIN — Medication 1 TABLET(S): at 12:08

## 2025-06-08 RX ADMIN — PREDNISONE 20 MILLIGRAM(S): 20 TABLET ORAL at 06:02

## 2025-06-08 RX ADMIN — Medication 1 TABLET(S): at 17:15

## 2025-06-08 RX ADMIN — Medication 40 MILLIGRAM(S): at 06:02

## 2025-06-08 RX ADMIN — Medication 1 DOSE(S): at 17:15

## 2025-06-08 RX ADMIN — Medication 1 TABLET(S): at 06:02

## 2025-06-08 RX ADMIN — TRAMADOL HYDROCHLORIDE 50 MILLIGRAM(S): 50 TABLET, FILM COATED ORAL at 22:30

## 2025-06-08 RX ADMIN — ENTECAVIR 0.5 MILLIGRAM(S): 0.5 TABLET ORAL at 12:07

## 2025-06-08 RX ADMIN — MYCOPHENOLATE MOFETIL 1000 MILLIGRAM(S): 500 TABLET, FILM COATED ORAL at 08:40

## 2025-06-08 RX ADMIN — Medication 1 DOSE(S): at 06:02

## 2025-06-08 RX ADMIN — Medication 400 MILLIGRAM(S): at 17:14

## 2025-06-08 RX ADMIN — INSULIN LISPRO 12 UNIT(S): 100 INJECTION, SOLUTION INTRAVENOUS; SUBCUTANEOUS at 08:41

## 2025-06-08 RX ADMIN — HEPARIN SODIUM 5000 UNIT(S): 1000 INJECTION INTRAVENOUS; SUBCUTANEOUS at 17:12

## 2025-06-08 RX ADMIN — Medication 2 PUFF(S): at 12:14

## 2025-06-08 NOTE — PROGRESS NOTE ADULT - SUBJECTIVE AND OBJECTIVE BOX
Transplant Surgery - Multidisciplinary Rounds  --------------------------------------------------------------   Donor OLTx      Date:  Date: 2025      POD#8  CMV +/-    Present:   Patient seen and examined with multidisciplinary Transplant team including Surgeon Dr. Arriola, Hepatologist Dr. Julian, Surgical fellow Chito, GUDELIA Paz,  and bedside RN during AM rounds.   Disciplines not in attendance will be notified of the plan.     HPI: 70 F hx NAFLD cirrhosis and HCC (listed for liver transplant with MELD 20B), UGIB in , chronic back pain (2/2 spinal fracture) presented from [facility] where she was admitted on  for coffee ground emesis and melena, EGD showed no active bleeding, transferred to [facility] now s/p OLT on 2025     Interval Events:  -good graft function  - pt still having hallucinations ON   - afebrile, VSS     Immunosuppression  Induction: Simulect completed  Maintenance: FK by level, MMF , SST  Ongoing monitoring for signs of rejection    Potential Discharge date: TBD  Education:  Medications  Plan of care:  See Below          MEDICATIONS  (STANDING):  aspirin enteric coated 81 milliGRAM(s) Oral daily  calcium carbonate 1250 mG  + Vitamin D (OsCal 500 + D) 1 Tablet(s) Oral two times a day  chlorhexidine 2% Cloths 1 Application(s) Topical <User Schedule>  dextrose 5%. 1000 milliLiter(s) (50 mL/Hr) IV Continuous <Continuous>  dextrose 5%. 1000 milliLiter(s) (100 mL/Hr) IV Continuous <Continuous>  dextrose 50% Injectable 25 Gram(s) IV Push once  dextrose 50% Injectable 12.5 Gram(s) IV Push once  dextrose 50% Injectable 25 Gram(s) IV Push once  dextrose Oral Gel 15 Gram(s) Oral once  entecavir 0.5 milliGRAM(s) Oral daily  fluconAZOLE   Tablet 400 milliGRAM(s) Oral daily  fluticasone propionate/ salmeterol 100-50 MICROgram(s) Diskus 1 Dose(s) Inhalation two times a day  glucagon  Injectable 1 milliGRAM(s) IntraMuscular once  heparin   Injectable 5000 Unit(s) SubCutaneous every 12 hours  insulin glargine Injectable (LANTUS) 8 Unit(s) SubCutaneous at bedtime  insulin lispro (ADMELOG) corrective regimen sliding scale   SubCutaneous <User Schedule>  insulin lispro (ADMELOG) corrective regimen sliding scale   SubCutaneous Before meals and at bedtime  insulin lispro Injectable (ADMELOG) 12 Unit(s) SubCutaneous with breakfast  insulin lispro Injectable (ADMELOG) 8 Unit(s) SubCutaneous before lunch  insulin lispro Injectable (ADMELOG) 8 Unit(s) SubCutaneous before dinner  lidocaine 1% (Preservative-free) Injectable 10 milliLiter(s) Local Injection once  magnesium oxide 400 milliGRAM(s) Oral two times a day with meals  melatonin 5 milliGRAM(s) Oral at bedtime  mycophenolate mofetil 1000 milliGRAM(s) Oral <User Schedule>  NIFEdipine XL 30 milliGRAM(s) Oral every 24 hours  pantoprazole    Tablet 40 milliGRAM(s) Oral before breakfast  polyethylene glycol 3350 17 Gram(s) Oral daily  predniSONE   Tablet 20 milliGRAM(s) Oral daily  QUEtiapine 75 milliGRAM(s) Oral at bedtime  senna 2 Tablet(s) Oral two times a day  trimethoprim   80 mG/sulfamethoxazole 400 mG 1 Tablet(s) Oral daily  valGANciclovir 900 milliGRAM(s) Oral daily    MEDICATIONS  (PRN):  albuterol    90 MICROgram(s) HFA Inhaler 2 Puff(s) Inhalation every 6 hours PRN Bronchospasm  traMADol 25 milliGRAM(s) Oral every 6 hours PRN Moderate Pain (4 - 6)  traMADol 50 milliGRAM(s) Oral every 8 hours PRN Severe Pain (7 - 10)      PAST MEDICAL & SURGICAL HISTORY:  HCC (hepatocellular carcinoma)      DM (diabetes mellitus)      HTN (hypertension)      HLD (hyperlipidemia)      Hepatic cirrhosis      IVEY (nonalcoholic steatohepatitis)      Former smoker      Ascites      Hepatic encephalopathy      History of cervical cancer      H/O  section      History of cholecystectomy      H/O carpal tunnel repair      H/O cataract extraction          Vital Signs Last 24 Hrs  T(C): 36.6 (2025 17:00), Max: 36.8 (2025 08:36)  T(F): 97.9 (2025 17:00), Max: 98.2 (2025 08:36)  HR: 77 (2025 17:00) (76 - 89)  BP: 121/56 (2025 17:00) (121/56 - 142/66)  BP(mean): --  RR: 18 (2025 12:00) (18 - 18)  SpO2: 95% (2025 12:00) (94% - 100%)    Parameters below as of 2025 12:00  Patient On (Oxygen Delivery Method): room air        I&O's Summary    2025 07:01  -  2025 07:00  --------------------------------------------------------  IN: 1640 mL / OUT: 1700 mL / NET: -60 mL    2025 07:01  -  2025 17:49  --------------------------------------------------------  IN: 200 mL / OUT: 100 mL / NET: 100 mL                              9.3    5.85  )-----------( 71       ( 2025 07:18 )             28.6     06-08    139  |  105  |  16  ----------------------------<  112[H]  4.2   |  21[L]  |  0.97    Ca    8.6      2025 07:16  Phos  3.1     06-08  Mg     1.6     06-08    TPro  4.6[L]  /  Alb  3.1[L]  /  TBili  1.2  /  DBili  x   /  AST  32  /  ALT  98[H]  /  AlkPhos  87  06-08    Tacrolimus (), Serum: 1.9 ng/mL (-08 @ 07:18)            Review of systems  All other systems were reviewed and are negative, except as noted.      PHYSICAL EXAM:  Constitutional: NAD  Eyes: PERRLA  ENMT: nc/at, no thrush  Neck: supple,   Respiratory: CTA B/L  Cardiovascular: RRR  Gastrointestinal: Soft abdomen, ND, appropriate incisional TTP. chevron incision c/d/i, staples in place. No signs of infection.   Genitourinary: voiding   Extremities: SCD's in place and working bilaterally  Vascular: Palpable dp pulses bilaterally.   Neurological:  AAO3  Skin: no rashes, ulcerations, lesions  Musculoskeletal:  moving extremities without issues  Psychiatric: cooperative

## 2025-06-08 NOTE — PROGRESS NOTE ADULT - ASSESSMENT
70 F hx NAFLD cirrhosis and HCC (listed for liver transplant with MELD 20B), UGIB in 2022, chronic back pain (2/2 spinal fracture) presented from [facility] where she was admitted on 5/26 for coffee ground emesis and melena, EGD showed no active bleeding, transferred to [facility] now s/p OLT on 5/31/2025     [] s/p OLT - POD 8  - HBV Core + donor: Entecavir  - Good graft function   - Diet: Consistent carb diet   - Strict I&O:  UO  - ASA 81   - Pain Management  - Bowel Regimen  - E.Faecium UTI, asymptomatic, Zosyn/Vanco completed  - OT/PT/RD /IS  - Hallucinations -->  holding FK,  consult; on seroquel, UA sent today, ro UTI     [] Immunosuppression: Simulect induction  - FK held,  MMF 1/1, SST  - PPx: Bactrim, Valcyte 450 (CMV +/-, inc 900mg as able), Fluc, PPI, OsCal    [] DM  - Endocrine team  following    [] HTN  - Nifedipine, adjust prn    [] DVT  -SQH  -SCDs at all times    [] DISPO  -d/c planning to Acute Rehab

## 2025-06-08 NOTE — CHART NOTE - NSCHARTNOTEFT_GEN_A_CORE
POCT Blood Glucose:  180 mg/dL (06-08-25 @ 17:11)  259 mg/dL (06-08-25 @ 12:04)  171 mg/dL (06-08-25 @ 08:38)  138 mg/dL (06-08-25 @ 01:46)  234 mg/dL (06-07-25 @ 20:59)      eMAR:  insulin glargine Injectable (LANTUS)   10 Unit(s) SubCutaneous (06-07-25 @ 21:06)    insulin lispro (ADMELOG) corrective regimen sliding scale   2 Unit(s) SubCutaneous (06-08-25 @ 17:14)   6 Unit(s) SubCutaneous (06-08-25 @ 12:08)   2 Unit(s) SubCutaneous (06-08-25 @ 08:40)   4 Unit(s) SubCutaneous (06-07-25 @ 21:07)    insulin lispro Injectable (ADMELOG)   12 Unit(s) SubCutaneous (06-08-25 @ 08:41)    insulin lispro Injectable (ADMELOG)   8 Unit(s) SubCutaneous (06-08-25 @ 12:09)    insulin lispro Injectable (ADMELOG)   8 Unit(s) SubCutaneous (06-08-25 @ 17:15)    predniSONE   Tablet   20 milliGRAM(s) Oral (06-08-25 @ 06:02)    Chart reviewed. Hypoglycemia ( BG 67) prior to dinner yesterday. Will decrease admelog dose preventively   Currently on Prednisone 20 mg daily     - Adjust Admelog to 12-8-8 units with meals ( Hold for NPO or if not eating )       Contact via [employer] Teams during business hours  To reach covering provider access AMION via sunrise tools  For Urgent matters/after-hours/weekends/holidays please page endocrine fellow on call   For nonurgent matters please email [email or web address]    Please note that this patient may be followed by different provider tomorrow.  Notify endocrine 24 hours prior to discharge for final recommendations

## 2025-06-09 LAB
ALBUMIN SERPL ELPH-MCNC: 3.1 G/DL — LOW (ref 3.3–5)
ALP SERPL-CCNC: 92 U/L — SIGNIFICANT CHANGE UP (ref 40–120)
ALT FLD-CCNC: 84 U/L — HIGH (ref 10–45)
ANION GAP SERPL CALC-SCNC: 13 MMOL/L — SIGNIFICANT CHANGE UP (ref 5–17)
APTT BLD: 25.2 SEC — LOW (ref 26.1–36.8)
AST SERPL-CCNC: 45 U/L — HIGH (ref 10–40)
BILIRUB SERPL-MCNC: 1.1 MG/DL — SIGNIFICANT CHANGE UP (ref 0.2–1.2)
BUN SERPL-MCNC: 15 MG/DL — SIGNIFICANT CHANGE UP (ref 7–23)
CALCIUM SERPL-MCNC: 8.5 MG/DL — SIGNIFICANT CHANGE UP (ref 8.4–10.5)
CHLORIDE SERPL-SCNC: 104 MMOL/L — SIGNIFICANT CHANGE UP (ref 96–108)
CO2 SERPL-SCNC: 21 MMOL/L — LOW (ref 22–31)
CREAT SERPL-MCNC: 1.12 MG/DL — SIGNIFICANT CHANGE UP (ref 0.5–1.3)
EGFR: 53 ML/MIN/1.73M2 — LOW
EGFR: 53 ML/MIN/1.73M2 — LOW
GLUCOSE BLDC GLUCOMTR-MCNC: 144 MG/DL — HIGH (ref 70–99)
GLUCOSE BLDC GLUCOMTR-MCNC: 172 MG/DL — HIGH (ref 70–99)
GLUCOSE BLDC GLUCOMTR-MCNC: 180 MG/DL — HIGH (ref 70–99)
GLUCOSE BLDC GLUCOMTR-MCNC: 264 MG/DL — HIGH (ref 70–99)
GLUCOSE BLDC GLUCOMTR-MCNC: 88 MG/DL — SIGNIFICANT CHANGE UP (ref 70–99)
GLUCOSE SERPL-MCNC: 146 MG/DL — HIGH (ref 70–99)
HCT VFR BLD CALC: 26.3 % — LOW (ref 34.5–45)
HGB BLD-MCNC: 8.9 G/DL — LOW (ref 11.5–15.5)
INR BLD: 1.03 RATIO — SIGNIFICANT CHANGE UP (ref 0.85–1.16)
MAGNESIUM SERPL-MCNC: 1.7 MG/DL — SIGNIFICANT CHANGE UP (ref 1.6–2.6)
MCHC RBC-ENTMCNC: 30.7 PG — SIGNIFICANT CHANGE UP (ref 27–34)
MCHC RBC-ENTMCNC: 33.8 G/DL — SIGNIFICANT CHANGE UP (ref 32–36)
MCV RBC AUTO: 90.7 FL — SIGNIFICANT CHANGE UP (ref 80–100)
NRBC BLD AUTO-RTO: 0 /100 WBCS — SIGNIFICANT CHANGE UP (ref 0–0)
PHOSPHATE SERPL-MCNC: 2.8 MG/DL — SIGNIFICANT CHANGE UP (ref 2.5–4.5)
PLATELET # BLD AUTO: 81 K/UL — LOW (ref 150–400)
POTASSIUM SERPL-MCNC: 4.3 MMOL/L — SIGNIFICANT CHANGE UP (ref 3.5–5.3)
POTASSIUM SERPL-SCNC: 4.3 MMOL/L — SIGNIFICANT CHANGE UP (ref 3.5–5.3)
PROT SERPL-MCNC: 4.5 G/DL — LOW (ref 6–8.3)
PROTHROM AB SERPL-ACNC: 11.7 SEC — SIGNIFICANT CHANGE UP (ref 9.9–13.4)
RBC # BLD: 2.9 M/UL — LOW (ref 3.8–5.2)
RBC # FLD: 19.8 % — HIGH (ref 10.3–14.5)
SODIUM SERPL-SCNC: 138 MMOL/L — SIGNIFICANT CHANGE UP (ref 135–145)
TACROLIMUS SERPL-MCNC: 1.4 NG/ML — SIGNIFICANT CHANGE UP
WBC # BLD: 5.47 K/UL — SIGNIFICANT CHANGE UP (ref 3.8–10.5)
WBC # FLD AUTO: 5.47 K/UL — SIGNIFICANT CHANGE UP (ref 3.8–10.5)

## 2025-06-09 PROCEDURE — 99232 SBSQ HOSP IP/OBS MODERATE 35: CPT

## 2025-06-09 RX ORDER — CYCLOSPORINE 100 MG/1
50 CAPSULE, LIQUID FILLED ORAL
Refills: 0 | Status: DISCONTINUED | OUTPATIENT
Start: 2025-06-09 | End: 2025-06-10

## 2025-06-09 RX ORDER — CYCLOSPORINE 100 MG/1
50 CAPSULE, LIQUID FILLED ORAL ONCE
Refills: 0 | Status: COMPLETED | OUTPATIENT
Start: 2025-06-09 | End: 2025-06-09

## 2025-06-09 RX ORDER — INSULIN LISPRO 100 U/ML
5 INJECTION, SOLUTION INTRAVENOUS; SUBCUTANEOUS
Refills: 0 | Status: DISCONTINUED | OUTPATIENT
Start: 2025-06-09 | End: 2025-06-12

## 2025-06-09 RX ORDER — INSULIN GLARGINE-YFGN 100 [IU]/ML
5 INJECTION, SOLUTION SUBCUTANEOUS AT BEDTIME
Refills: 0 | Status: DISCONTINUED | OUTPATIENT
Start: 2025-06-09 | End: 2025-06-12

## 2025-06-09 RX ORDER — MAGNESIUM SULFATE 500 MG/ML
1 SYRINGE (ML) INJECTION ONCE
Refills: 0 | Status: COMPLETED | OUTPATIENT
Start: 2025-06-09 | End: 2025-06-09

## 2025-06-09 RX ORDER — INSULIN LISPRO 100 U/ML
10 INJECTION, SOLUTION INTRAVENOUS; SUBCUTANEOUS
Refills: 0 | Status: DISCONTINUED | OUTPATIENT
Start: 2025-06-10 | End: 2025-06-11

## 2025-06-09 RX ORDER — MAGNESIUM OXIDE 400 MG
400 TABLET ORAL
Refills: 0 | Status: DISCONTINUED | OUTPATIENT
Start: 2025-06-09 | End: 2025-06-12

## 2025-06-09 RX ADMIN — MYCOPHENOLATE MOFETIL 1000 MILLIGRAM(S): 500 TABLET, FILM COATED ORAL at 21:12

## 2025-06-09 RX ADMIN — Medication 30 MILLIGRAM(S): at 12:30

## 2025-06-09 RX ADMIN — Medication 1 TABLET(S): at 12:30

## 2025-06-09 RX ADMIN — TRAMADOL HYDROCHLORIDE 25 MILLIGRAM(S): 50 TABLET, FILM COATED ORAL at 22:00

## 2025-06-09 RX ADMIN — PREDNISONE 20 MILLIGRAM(S): 20 TABLET ORAL at 06:15

## 2025-06-09 RX ADMIN — HEPARIN SODIUM 5000 UNIT(S): 1000 INJECTION INTRAVENOUS; SUBCUTANEOUS at 17:29

## 2025-06-09 RX ADMIN — TRAMADOL HYDROCHLORIDE 25 MILLIGRAM(S): 50 TABLET, FILM COATED ORAL at 21:12

## 2025-06-09 RX ADMIN — INSULIN LISPRO 6: 100 INJECTION, SOLUTION INTRAVENOUS; SUBCUTANEOUS at 17:28

## 2025-06-09 RX ADMIN — INSULIN LISPRO 5 UNIT(S): 100 INJECTION, SOLUTION INTRAVENOUS; SUBCUTANEOUS at 17:28

## 2025-06-09 RX ADMIN — Medication 1 APPLICATION(S): at 08:37

## 2025-06-09 RX ADMIN — CYCLOSPORINE 50 MILLIGRAM(S): 100 CAPSULE, LIQUID FILLED ORAL at 12:31

## 2025-06-09 RX ADMIN — TRAMADOL HYDROCHLORIDE 25 MILLIGRAM(S): 50 TABLET, FILM COATED ORAL at 12:31

## 2025-06-09 RX ADMIN — Medication 1 DOSE(S): at 06:16

## 2025-06-09 RX ADMIN — TRAMADOL HYDROCHLORIDE 25 MILLIGRAM(S): 50 TABLET, FILM COATED ORAL at 03:30

## 2025-06-09 RX ADMIN — TRAMADOL HYDROCHLORIDE 25 MILLIGRAM(S): 50 TABLET, FILM COATED ORAL at 02:22

## 2025-06-09 RX ADMIN — TRAMADOL HYDROCHLORIDE 50 MILLIGRAM(S): 50 TABLET, FILM COATED ORAL at 15:00

## 2025-06-09 RX ADMIN — INSULIN LISPRO 12 UNIT(S): 100 INJECTION, SOLUTION INTRAVENOUS; SUBCUTANEOUS at 08:35

## 2025-06-09 RX ADMIN — TRAMADOL HYDROCHLORIDE 50 MILLIGRAM(S): 50 TABLET, FILM COATED ORAL at 14:30

## 2025-06-09 RX ADMIN — Medication 100 GRAM(S): at 08:34

## 2025-06-09 RX ADMIN — Medication 400 MILLIGRAM(S): at 08:34

## 2025-06-09 RX ADMIN — QUETIAPINE FUMARATE 75 MILLIGRAM(S): 25 TABLET ORAL at 00:08

## 2025-06-09 RX ADMIN — Medication 1 TABLET(S): at 17:28

## 2025-06-09 RX ADMIN — Medication 400 MILLIGRAM(S): at 12:31

## 2025-06-09 RX ADMIN — INSULIN LISPRO 2: 100 INJECTION, SOLUTION INTRAVENOUS; SUBCUTANEOUS at 08:35

## 2025-06-09 RX ADMIN — Medication 5 MILLIGRAM(S): at 22:07

## 2025-06-09 RX ADMIN — POLYETHYLENE GLYCOL 3350 17 GRAM(S): 17 POWDER, FOR SOLUTION ORAL at 12:28

## 2025-06-09 RX ADMIN — ENTECAVIR 0.5 MILLIGRAM(S): 0.5 TABLET ORAL at 12:29

## 2025-06-09 RX ADMIN — CYCLOSPORINE 50 MILLIGRAM(S): 100 CAPSULE, LIQUID FILLED ORAL at 21:11

## 2025-06-09 RX ADMIN — INSULIN LISPRO 2: 100 INJECTION, SOLUTION INTRAVENOUS; SUBCUTANEOUS at 21:23

## 2025-06-09 RX ADMIN — HEPARIN SODIUM 5000 UNIT(S): 1000 INJECTION INTRAVENOUS; SUBCUTANEOUS at 06:15

## 2025-06-09 RX ADMIN — Medication 2 TABLET(S): at 06:15

## 2025-06-09 RX ADMIN — Medication 1 DOSE(S): at 17:27

## 2025-06-09 RX ADMIN — Medication 400 MILLIGRAM(S): at 17:28

## 2025-06-09 RX ADMIN — Medication 81 MILLIGRAM(S): at 12:29

## 2025-06-09 RX ADMIN — Medication 40 MILLIGRAM(S): at 06:15

## 2025-06-09 RX ADMIN — TRAMADOL HYDROCHLORIDE 25 MILLIGRAM(S): 50 TABLET, FILM COATED ORAL at 13:00

## 2025-06-09 RX ADMIN — QUETIAPINE FUMARATE 75 MILLIGRAM(S): 25 TABLET ORAL at 22:07

## 2025-06-09 RX ADMIN — VALGANCICLOVIR 900 MILLIGRAM(S): 450 TABLET, FILM COATED ORAL at 12:31

## 2025-06-09 RX ADMIN — Medication 2 PUFF(S): at 12:27

## 2025-06-09 RX ADMIN — Medication 1 TABLET(S): at 06:15

## 2025-06-09 RX ADMIN — INSULIN GLARGINE-YFGN 5 UNIT(S): 100 INJECTION, SOLUTION SUBCUTANEOUS at 21:23

## 2025-06-09 RX ADMIN — Medication 400 MILLIGRAM(S): at 12:27

## 2025-06-09 RX ADMIN — MYCOPHENOLATE MOFETIL 1000 MILLIGRAM(S): 500 TABLET, FILM COATED ORAL at 08:34

## 2025-06-09 NOTE — BH CONSULTATION LIAISON PROGRESS NOTE - NSBHCONSULTFOLLOWAFTERCARE_PSY_A_CORE FT
[facility] psychiatry clinic 
[facility] psychiatry clinic 
[facility] psychiatry clinic

## 2025-06-09 NOTE — BH CONSULTATION LIAISON PROGRESS NOTE - NSBHASSESSMENTFT_PSY_ALL_CORE
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 now s/p OLT on 5/31/2025; for whom psychiatry was consulted d/t crying spells and reporting depression. Patient noted to be intermittently labile on exam, with tearfulness and laughing.  Some concerns of disorientation and indicative of a mild hyperactive delirium.  Seen pretransplant with concerns of generalized anxiety disorder with panic attacks for which patient has been maintained on benzodiazepines in the past and SSRIs.  Amenable to acute use of Seroquel to aid with sleep architecture and concerns of some mood lability and setting of delirium.  Please monitor QTc to ensure it is less than 500 for continued use of Seroquel.  For acute anxiety exacerbations can use Seroquel 12.5 mg p.o. twice daily as needed.      Plan:  **Repeat EKG to ensure ongoing use of Seroquel     Delirium w/ beh disturbance (resolved) & AINSLEY:   -Seroquel 75 mg p.o. at bedtime if QTc less than 500   -Seroquel 12.5 mg p.o. twice daily as needed if QTc less than 500    -Please limit use of sedative hypnotics, and anticholinergic agents with which may further worsen mental status  -Patient [place], recommend family bring hearing aid from home to aid with orientation/mental status  -Frequent day and night reorientation recommended  **Continue monitoring EKG use of psychotropic agents  -  Consider  consult and holistic RN  - Supportive therapy provided, nonpharmacologic means of anxiety alleviation recommended  -Post OLT: Management as per primary team  
70 F w/ PPHx of AINSLEY with possible MDD presumed to be in remission hx NAFLD cirrhosis and HCC (listed for liver transplant with MELD 20B), UGIB in 2022, chronic back pain (2/2 spinal fracture) presented from [facility] where she was admitted on 5/26 for coffee ground emesis and melena, EGD showed no active bleeding, transferred to [facility] now s/p OLT on 5/31/2025; for whom psychiatry was consulted d/t crying spells and reporting depression. Patient noted to be intermittently labile on exam, with tearfulness and laughing.  Some concerns of disorientation and indicative of a mild hyperactive delirium.  Seen pretransplant with concerns of generalized anxiety disorder with panic attacks for which patient has been maintained on benzodiazepines in the past and SSRIs.  Amenable to acute use of Seroquel to aid with sleep architecture and concerns of some mood lability and setting of delirium.  Please monitor QTc to ensure it is less than 500 for continued use of Seroquel.  For acute anxiety exacerbations can use Seroquel 12.5 mg p.o. twice daily as needed.    6/3: please f/u EKG for Qtc monitoring. Can consider further titration of Seroquel to aid w/ sleep. Less labile today.    Plan:    Delirium w/ beh disturbance: consider increase in Seroquel 50 mg p.o. at bedtime if QTc less than 500  	-Anxiety: Seroquel 12.5 mg p.o. twice daily as needed if QTc less than 500    -Please limit use of sedative hypnotics, and anticholinergic agents with which may further worsen mental status  -Patient [place], recommend family bring hearing aid from home to aid with orientation/mental status  -Frequent day and night reorientation recommended  **Continue monitoring EKG use of psychotropic agents  -Consider  consult and holistic RN  - Supportive therapy provided, nonpharmacologic means of anxiety alleviation recommended  -Post OLT: Management as per primary team  
70 F w/ PPHx of AINSLEY with possible MDD presumed to be in remission hx NAFLD cirrhosis and HCC (listed for liver transplant with MELD 20B), UGIB in 2022, chronic back pain (2/2 spinal fracture) presented from [facility] where she was admitted on 5/26 for coffee ground emesis and melena, EGD showed no active bleeding, transferred to [facility] now s/p OLT on 5/31/2025; for whom psychiatry was consulted d/t crying spells and reporting depression. Patient noted to be intermittently labile on exam, with tearfulness and laughing.  Some concerns of disorientation and indicative of a mild hyperactive delirium.  Seen pretransplant with concerns of generalized anxiety disorder with panic attacks for which patient has been maintained on benzodiazepines in the past and SSRIs.  Amenable to acute use of Seroquel to aid with sleep architecture and concerns of some mood lability and setting of delirium.  Please monitor QTc to ensure it is less than 500 for continued use of Seroquel.  For acute anxiety exacerbations can use Seroquel 12.5 mg p.o. twice daily as needed.      Plan:    Delirium w/ beh disturbance: consider increase to Seroquel 75 mg p.o. at bedtime if QTc less than 500  	-Anxiety: Seroquel 12.5 mg p.o. twice daily as needed if QTc less than 500    -Please limit use of sedative hypnotics, and anticholinergic agents with which may further worsen mental status  -Patient [place], recommend family bring hearing aid from home to aid with orientation/mental status  -Frequent day and night reorientation recommended  **Continue monitoring EKG use of psychotropic agents  -Consider  consult and holistic RN  - Supportive therapy provided, nonpharmacologic means of anxiety alleviation recommended  -Post OLT: Management as per primary team

## 2025-06-09 NOTE — BH CONSULTATION LIAISON PROGRESS NOTE - NSBHTIMEACTIVITIESPERFORMED_PSY_A_CORE
I have spent 36 minutes in the care of this patient, including: obtaining/reviewing labs, interviewing, providing supportive therapy, medication management in addition to documentation of the above. Time spent excludes time spent on separately reportable services/teaching during the encounter. 
I have spent 35 minutes in the care of this patient, including: obtaining/reviewing labs, interviewing, providing supportive therapy, medication management in addition to documentation of the above. Time spent excludes time spent on separately reportable services/teaching during the encounter. 
I have spent 36 minutes in the care of this patient, including: obtaining/reviewing labs, interviewing, providing supportive therapy, medication management in addition to documentation of the above. Time spent excludes time spent on separately reportable services/teaching during the encounter.

## 2025-06-09 NOTE — BH CONSULTATION LIAISON PROGRESS NOTE - NSBHCHARTREVIEWVS_PSY_A_CORE FT
Vital Signs Last 24 Hrs  T(C): 36.9 (06 Jun 2025 13:00), Max: 37 (05 Jun 2025 22:00)  T(F): 98.5 (06 Jun 2025 13:00), Max: 98.6 (05 Jun 2025 22:00)  HR: 81 (06 Jun 2025 13:00) (68 - 81)  BP: 120/68 (06 Jun 2025 13:00) (120/68 - 146/66)  BP(mean): --  RR: 18 (06 Jun 2025 13:00) (18 - 18)  SpO2: 100% (06 Jun 2025 13:00) (96% - 100%)    Parameters below as of 06 Jun 2025 09:00  Patient On (Oxygen Delivery Method): room air    
Vital Signs Last 24 Hrs  T(C): 36.8 (09 Jun 2025 09:10), Max: 36.9 (09 Jun 2025 00:00)  T(F): 98.3 (09 Jun 2025 09:10), Max: 98.4 (09 Jun 2025 00:00)  HR: 81 (09 Jun 2025 09:10) (77 - 84)  BP: 108/57 (09 Jun 2025 09:10) (108/57 - 130/59)  BP(mean): --  RR: 20 (09 Jun 2025 09:10) (18 - 20)  SpO2: 98% (09 Jun 2025 09:10) (95% - 98%)    Parameters below as of 09 Jun 2025 09:10  Patient On (Oxygen Delivery Method): room air    
Vital Signs Last 24 Hrs  T(C): 37.1 (03 Jun 2025 12:15), Max: 37.1 (03 Jun 2025 12:15)  T(F): 98.8 (03 Jun 2025 12:15), Max: 98.8 (03 Jun 2025 12:15)  HR: 70 (03 Jun 2025 12:15) (61 - 81)  BP: 126/63 (03 Jun 2025 12:15) (115/69 - 140/64)  BP(mean): 87 (02 Jun 2025 19:00) (87 - 88)  RR: 18 (03 Jun 2025 12:15) (14 - 27)  SpO2: 100% (03 Jun 2025 12:15) (94% - 100%)    Parameters below as of 03 Jun 2025 12:15  Patient On (Oxygen Delivery Method): room air

## 2025-06-09 NOTE — BH CONSULTATION LIAISON PROGRESS NOTE - NSBHCHARTREVIEWLAB_PSY_A_CORE FT
8.4    7.28  )-----------( 46       ( 02 Jun 2025 05:07 )             24.3     06-02    138  |  101  |  46[H]  ----------------------------<  230[H]  4.3   |  22  |  1.57[H]    Ca    7.8[L]      02 Jun 2025 05:07  Phos  5.0     06-02  Mg     2.5     06-02    TPro  4.8[L]  /  Alb  3.3  /  TBili  1.8[H]  /  DBili  x   /  AST  462[H]  /  ALT  419[H]  /  AlkPhos  76  06-02  
                      8.4    7.28  )-----------( 46       ( 02 Jun 2025 05:07 )             24.3     06-02    138  |  101  |  46[H]  ----------------------------<  230[H]  4.3   |  22  |  1.57[H]    Ca    7.8[L]      02 Jun 2025 05:07  Phos  5.0     06-02  Mg     2.5     06-02    TPro  4.8[L]  /  Alb  3.3  /  TBili  1.8[H]  /  DBili  x   /  AST  462[H]  /  ALT  419[H]  /  AlkPhos  76  06-02  
                      8.9    5.47  )-----------( 81       ( 09 Jun 2025 06:42 )             26.3     06-09    138  |  104  |  15  ----------------------------<  146[H]  4.3   |  21[L]  |  1.12    Ca    8.5      09 Jun 2025 06:43  Phos  2.8     06-09  Mg     1.7     06-09    TPro  4.5[L]  /  Alb  3.1[L]  /  TBili  1.1  /  DBili  x   /  AST  45[H]  /  ALT  84[H]  /  AlkPhos  92  06-09

## 2025-06-09 NOTE — BH CONSULTATION LIAISON PROGRESS NOTE - NSBHMSEAFFQUAL_PSY_A_CORE
Behavioral Health  Serenity Uriostegui PsyD.  Psychologist    Start time: 9:59 AM  Stop time: 10:41 AM  Time spent directly with patient/family/caregiver: 42     Reason for Appointment:   Adjustment concerns  Pediatrician/PCP/referring provider: Mishel Jaramillo MD   Accompanied by: Nkechi (mom)  Preferred name: Phillip DAS:  Pt reports that she has been okay. She reports that she likes [employer].  She does report that she is a little behind in math because they are doing different math at [employer] than she was at her old school.  She reports that she has a [employer] concert today that she is a little bit nervous for her.  She discussed some difficulties at dad's house.  She and mom report that the new visitation schedule has started and they are doing 2-2-3.     O:  MSE:  Appearance: well groomed  Behavior: fidgety  Speech: regular rate and rhythm  Mood: neutral   Affect: normal   Thought Content: no delusions, no homicidal ideations, no hallucinations, and no suicidal ideations  Thought Process: goal directed, associations intact, sequential  Insight: age appropriate  Judgment: age appropriate  Orientation: oriented to person, place, time, and general circumstances  Memory: intact  Attention/Concentration: age appropriate  Morbid ideation: None  Suicide Assessment:  none     History:    Medications:   Current Outpatient Medications   Medication Sig Dispense Refill    albuterol 2.5 mg /3 mL (0.083 %) nebulizer solution Inhale.      albuterol 90 mcg/actuation inhaler Inhale 2 puffs every 4 hours if needed for wheezing or shortness of breath. 18 g 0    amoxicillin (Amoxil) 400 mg/5 mL suspension Take 12.5 ml by mouth BID for 10 days. 250 mL 0    fluticasone (Flovent) 110 mcg/actuation inhaler Inhale 1 puff once daily. Rinse mouth with water after use to reduce aftertaste and incidence of candidiasis. Do not swallow. 12 g 11    sodium chloride-Aloe vera gel ([place] Saline) gel topical gel Apply to affected 
nostril(s).       No current facility-administered medications for this visit.   :    Family history: Patient reports that at her mothers house is; her stepfather (calls him David and sometimes dad, younger half brothers (Valentin and Andrzej), and younger half-sister (Cecilia) as well as their kitten and dog. Patient reports that at her father's house is; father's fiancee (Judith), younger stepsister (Ninoska), and their 4 cats. She and mom report that the visitation schedule is 2-2-3.      Educational history: Patient reports that she is in 3rd grade at [employer].     A:  Pt and her mother report that there are continued problems with adjustment and family conflict. Patient would likely benefit from resuming  services to learn new coping skills and to help with symptom management/reduction.     Diagnosis:    Adjustment Disorder , unspecified  Family conflict    Plan:  Pt interventions:  [place] setting to identify the pt's primary goals for visit, Provided emotion identification/expression/validation re: relational issues, and CBT to target anxiety at concert including; best, worst, most likely case scenario and how to handle worst case scenario (making a mistake).    Treatment Goals:    1. Talk through stressors and adjustment difficulties related to family relationships.  2. Increase confidence and improve body image.    In these goals, Ceraphina demonstrated no improvement.    Pt Behavioral Change Plan:  F/U in 2-3 weeks     In the next treatment session, we will focus on thematic material raised in this session as appropriate.    Please note this report has been partially produced using speech recognition software  And may cause contain errors related to that system including grammar, punctuation and spelling as well as words and phrases that may seem inappropriate. If there are questions or concerns please feel free to contact me to clarify.   
Elevated
Euthymic
Elevated

## 2025-06-09 NOTE — PROGRESS NOTE ADULT - ASSESSMENT
70 F hx NAFLD cirrhosis and HCC (listed for liver transplant with MELD 20B), UGIB in 2022, chronic back pain (2/2 spinal fracture) presented from [facility] where she was admitted on 5/26 for coffee ground emesis and melena, EGD showed no active bleeding, transferred to [facility] now s/p OLT on 5/31/2025     [] s/p OLT - POD 9  - HBV Core + donor: Entecavir  - Good graft function   - Diet: Consistent carb diet   - Strict I&O:  UO  - ASA 81   - Pain Management  - Bowel Regimen  - E.Faecium UTI, asymptomatic, Zosyn/Vanco completed  - OT/PT/RD /IS  - Hallucinations -->  holding FK,  consult; on seroquel, UA sent today, ro UTI     [] Immunosuppression: Simulect induction  - FK held,  MMF 1/1, SST  - PPx: Bactrim, Valcyte 450 (CMV +/-, inc 900mg as able), Fluc, PPI, OsCal    [] DM  - Endocrine team  following    [] HTN  - Nifedipine, adjust prn    [] DVT  -SQH  -SCDs at all times    [] DISPO  -d/c planning to Acute Rehab      70 F hx NAFLD cirrhosis and HCC (listed for liver transplant with MELD 20B), UGIB in 2022, chronic back pain (2/2 spinal fracture) presented from [facility] where she was admitted on 5/26 for coffee ground emesis and melena, EGD showed no active bleeding, transferred to [facility] now s/p OLT on 5/31/2025     [] s/p OLT - POD 9  - HBV Core + donor: Entecavir  - Good graft function   - Diet: Consistent carb diet   - Strict I&O:  UO  - ASA 81   - Pain Management  - Bowel Regimen  - E.Faecium UTI, asymptomatic, Zosyn/Vanco completed  - OT/PT/RD /IS  - Hallucinations -->  holding FK,  consult; on seroquel, UA: Neg.     [] Immunosuppression: Simulect induction  - FK held,  MMF 1/1, SST  - PPx: Bactrim, Valcyte 450 (CMV +/-, inc 900mg as able), Fluc, PPI, OsCal    [] DM  - Endocrine team  following    [] HTN  - Nifedipine, adjust prn    [] DVT  -SQH  -SCDs at all times    [] DISPO  -d/c planning to Acute Rehab  70 F hx NAFLD cirrhosis and HCC (listed for liver transplant with MELD 20B), UGIB in 2022, chronic back pain (2/2 spinal fracture) presented from [facility] where she was admitted on 5/26 for coffee ground emesis and melena, EGD showed no active bleeding, transferred to [facility] now s/p OLT on 5/31/2025     [] s/p OLT - POD 9  - HBV Core + donor: Entecavir  - Good graft function   - Diet: Consistent carb diet   - Strict I&O:  UO  - ASA 81   - Pain Management  - Bowel Regimen  - E.Faecium UTI, asymptomatic, Zosyn/Vanco completed  - OT/PT/RD /IS  - Hallucinations -->  holding FK, BH following; on seroquel, UA: Neg.     [] Immunosuppression: Simulect induction  - FK stopped, start Cyclo 50mg bid,  MMF 1/1, Pred 20  - PPx: Bactrim, Valcyte 450 (CMV +/-, inc 900mg as able), Fluc, PPI, OsCal    [] DM  - Endocrine team  following    [] HTN  - Nifedipine, adjust prn    [] DVT  -SQH bid  -SCDs at all times    [] DISPO  -d/c planning to Acute Rehab

## 2025-06-09 NOTE — BH CONSULTATION LIAISON PROGRESS NOTE - NSBHFUPINTERVALHXFT_PSY_A_CORE
Patient seen at bedside, labs/chart reviewed.  Coordination of care provided.  Per primary team patient with concerns of AVH at HS. Patient continues to report some concerns of poor sleep architecture. Denies any overt SI/HI/AVH with no plan or intent of self-harm. 
Patient seen at bedside, labs/chart reviewed.  Coordination of care provided.  Patient reports resolution in AVH and has had improved sleep architecture with increase in Seroquel. Brighter affect without any overt anxiety since dose optimization. Notes she broke her hearing aids hence is awaiting a new pair. Denies any overt SI/HI/AVH with no plan or intent of self-harm. 
Patient seen at bedside, labs/chart reviewed.  Coordination of care provided.  Notes having concerns of both initial and middle insomnia.  Requests provider further optimize use of Seroquel.  Denies any overt SI/HI/AVH with no plan or intent of self-harm.  Brighter affect with no overt mood lability on exam today.

## 2025-06-09 NOTE — BH CONSULTATION LIAISON PROGRESS NOTE - NSBHCHARTREVIEWINVESTIGATE_PSY_A_CORE FT
Ventricular Rate 77 BPM    Atrial Rate 77 BPM    P-R Interval 114 ms    QRS Duration 82 ms    Q-T Interval 374 ms    QTC Calculation(Bazett) 423 ms    P Axis 34 degrees    R Axis -2 degrees    T Axis 41 degrees    Diagnosis Line SINUS RHYTHM WITH PREMATURE ATRIAL COMPLEXES  OTHERWISE NORMAL ECG  WHEN COMPARED WITH ECG OF 06-JUN-2025 02:05, (UNCONFIRMED)  SIGNIFICANT CHANGES HAVE OCCURRED  - APCs are new  Confirmed by MD Christina, Eben ([id number]) on 6/7/2025 8:28:28 PM

## 2025-06-09 NOTE — BH CONSULTATION LIAISON PROGRESS NOTE - CURRENT MEDICATION
MEDICATIONS  (STANDING):  aspirin enteric coated 81 milliGRAM(s) Oral daily  calcium carbonate 1250 mG  + Vitamin D (OsCal 500 + D) 1 Tablet(s) Oral two times a day  chlorhexidine 2% Cloths 1 Application(s) Topical <User Schedule>  entecavir 0.5 milliGRAM(s) Oral daily  fluconAZOLE   Tablet 400 milliGRAM(s) Oral daily  fluticasone propionate/ salmeterol 100-50 MICROgram(s) Diskus 1 Dose(s) Inhalation two times a day  heparin   Injectable 5000 Unit(s) SubCutaneous every 12 hours  insulin glargine Injectable (LANTUS) 14 Unit(s) SubCutaneous at bedtime  insulin lispro (ADMELOG) corrective regimen sliding scale   SubCutaneous <User Schedule>  insulin lispro (ADMELOG) corrective regimen sliding scale   SubCutaneous Before meals and at bedtime  insulin lispro Injectable (ADMELOG) 16 Unit(s) SubCutaneous three times a day with meals  lidocaine 1% (Preservative-free) Injectable 10 milliLiter(s) Local Injection once  lidocaine 2% Injectable 10 milliLiter(s) Local Injection once  magnesium oxide 400 milliGRAM(s) Oral two times a day with meals  melatonin 5 milliGRAM(s) Oral at bedtime  mycophenolate mofetil 1000 milliGRAM(s) Oral <User Schedule>  NIFEdipine XL 30 milliGRAM(s) Oral every 24 hours  pantoprazole    Tablet 40 milliGRAM(s) Oral before breakfast  polyethylene glycol 3350 17 Gram(s) Oral daily  QUEtiapine 50 milliGRAM(s) Oral at bedtime  senna 2 Tablet(s) Oral two times a day  tacrolimus 1 milliGRAM(s) Oral <User Schedule>  tacrolimus 0.5 milliGRAM(s) Oral <User Schedule>  trimethoprim   80 mG/sulfamethoxazole 400 mG 1 Tablet(s) Oral daily  valGANciclovir 900 milliGRAM(s) Oral daily    MEDICATIONS  (PRN):  traMADol 25 milliGRAM(s) Oral every 6 hours PRN Moderate Pain (4 - 6)  traMADol 50 milliGRAM(s) Oral every 8 hours PRN Severe Pain (7 - 10)  
MEDICATIONS  (STANDING):  aspirin enteric coated 81 milliGRAM(s) Oral daily  calcium carbonate 1250 mG  + Vitamin D (OsCal 500 + D) 1 Tablet(s) Oral two times a day  chlorhexidine 2% Cloths 1 Application(s) Topical <User Schedule>  dextrose 5%. 1000 milliLiter(s) (50 mL/Hr) IV Continuous <Continuous>  dextrose 5%. 1000 milliLiter(s) (100 mL/Hr) IV Continuous <Continuous>  dextrose 50% Injectable 25 Gram(s) IV Push once  dextrose 50% Injectable 12.5 Gram(s) IV Push once  dextrose 50% Injectable 25 Gram(s) IV Push once  dextrose Oral Gel 15 Gram(s) Oral once  entecavir 0.5 milliGRAM(s) Oral daily  fluconAZOLE   Tablet 400 milliGRAM(s) Oral daily  fluticasone propionate/ salmeterol 100-50 MICROgram(s) Diskus 1 Dose(s) Inhalation two times a day  glucagon  Injectable 1 milliGRAM(s) IntraMuscular once  heparin   Injectable 5000 Unit(s) SubCutaneous every 12 hours  insulin glargine Injectable (LANTUS) 8 Unit(s) SubCutaneous at bedtime  insulin lispro (ADMELOG) corrective regimen sliding scale   SubCutaneous <User Schedule>  insulin lispro (ADMELOG) corrective regimen sliding scale   SubCutaneous Before meals and at bedtime  insulin lispro Injectable (ADMELOG) 12 Unit(s) SubCutaneous with breakfast  insulin lispro Injectable (ADMELOG) 8 Unit(s) SubCutaneous before lunch  insulin lispro Injectable (ADMELOG) 8 Unit(s) SubCutaneous before dinner  lidocaine 1% (Preservative-free) Injectable 10 milliLiter(s) Local Injection once  magnesium oxide 400 milliGRAM(s) Oral three times a day with meals  melatonin 5 milliGRAM(s) Oral at bedtime  mycophenolate mofetil 1000 milliGRAM(s) Oral <User Schedule>  NIFEdipine XL 30 milliGRAM(s) Oral every 24 hours  pantoprazole    Tablet 40 milliGRAM(s) Oral before breakfast  polyethylene glycol 3350 17 Gram(s) Oral daily  predniSONE   Tablet 20 milliGRAM(s) Oral daily  QUEtiapine 75 milliGRAM(s) Oral at bedtime  senna 2 Tablet(s) Oral two times a day  trimethoprim   80 mG/sulfamethoxazole 400 mG 1 Tablet(s) Oral daily  valGANciclovir 900 milliGRAM(s) Oral daily    MEDICATIONS  (PRN):  albuterol    90 MICROgram(s) HFA Inhaler 2 Puff(s) Inhalation every 6 hours PRN Bronchospasm  traMADol 25 milliGRAM(s) Oral every 6 hours PRN Moderate Pain (4 - 6)  traMADol 50 milliGRAM(s) Oral every 8 hours PRN Severe Pain (7 - 10)  
MEDICATIONS  (STANDING):  calcium carbonate 1250 mG  + Vitamin D (OsCal 500 + D) 1 Tablet(s) Oral two times a day  chlorhexidine 2% Cloths 1 Application(s) Topical <User Schedule>  entecavir 0.5 milliGRAM(s) Oral daily  fluticasone propionate/ salmeterol 100-50 MICROgram(s) Diskus 1 Dose(s) Inhalation two times a day  insulin glargine Injectable (LANTUS) 18 Unit(s) SubCutaneous at bedtime  insulin lispro (ADMELOG) corrective regimen sliding scale   SubCutaneous Before meals and at bedtime  insulin lispro Injectable (ADMELOG) 12 Unit(s) SubCutaneous three times a day with meals  melatonin 5 milliGRAM(s) Oral at bedtime  methylPREDNISolone sodium succinate Injectable   IV Push   mycophenolate mofetil 1000 milliGRAM(s) Oral <User Schedule>  NIFEdipine XL 30 milliGRAM(s) Oral every 24 hours  pantoprazole    Tablet 40 milliGRAM(s) Oral before breakfast  polyethylene glycol 3350 17 Gram(s) Oral daily  QUEtiapine 50 milliGRAM(s) Oral at bedtime  senna 2 Tablet(s) Oral two times a day  tacrolimus 0.5 milliGRAM(s) Oral <User Schedule>  trimethoprim   80 mG/sulfamethoxazole 400 mG 1 Tablet(s) Oral daily  valGANciclovir 450 milliGRAM(s) Oral daily    MEDICATIONS  (PRN):  traMADol 25 milliGRAM(s) Oral every 6 hours PRN Moderate Pain (4 - 6)  traMADol 50 milliGRAM(s) Oral every 8 hours PRN Severe Pain (7 - 10)

## 2025-06-09 NOTE — BH CONSULTATION LIAISON PROGRESS NOTE - LEVEL OF CONSCIOUSNESS
Lethargic, arousable to verbal stimulus

## 2025-06-09 NOTE — PROGRESS NOTE ADULT - SUBJECTIVE AND OBJECTIVE BOX
Transplant Surgery - Multidisciplinary Rounds  --------------------------------------------------------------   Donor OLTx      Date:  Date: 2025      POD#9  CMV +/-    Present:   Patient seen and examined with multidisciplinary Transplant team including Surgeon Dr. Jim, Hepatologist Dr. Julian, Surgical fellow Chito, GUDELIA Queen,  and bedside RN during AM rounds.   Disciplines not in attendance will be notified of the plan.     HPI: 70 F hx NAFLD cirrhosis and HCC (listed for liver transplant with MELD 20B), UGIB in , chronic back pain (2/2 spinal fracture) presented from [facility] where she was admitted on  for coffee ground emesis and melena, EGD showed no active bleeding, transferred to [facility] now s/p OLT on 2025     Interval Events:  -good graft function  - pt still having hallucinations ON   - afebrile, VSS     Immunosuppression  Induction: Simulect completed  Maintenance: FK by level, MMF , SST  Ongoing monitoring for signs of rejection    Potential Discharge date: TBD  Education:  Medications  Plan of care:  See Below      TX DATA HERE    Review of systems  All other systems were reviewed and are negative, except as noted.      PHYSICAL EXAM:  Constitutional: NAD  Eyes: PERRLA  ENMT: nc/at, no thrush  Neck: supple,   Respiratory: CTA B/L  Cardiovascular: RRR  Gastrointestinal: Soft abdomen, ND, appropriate incisional TTP. chevron incision c/d/i, staples in place. No signs of infection.   Genitourinary: voiding   Extremities: SCD's in place and working bilaterally  Vascular: Palpable dp pulses bilaterally.   Neurological:  AAO3  Skin: no rashes, ulcerations, lesions  Musculoskeletal:  moving extremities without issues  Psychiatric: cooperative   Transplant Surgery - Multidisciplinary Rounds  --------------------------------------------------------------   Donor OLTx      Date:  Date: 2025      POD#9  CMV +/-    Present:   Patient seen and examined with multidisciplinary Transplant team including Surgeon Dr. Jim, Hepatologist Dr. Julian, Surgical fellow Chito, GUDELIA Sandoval/Stu,  and bedside RN during AM rounds.   Disciplines not in attendance will be notified of the plan.     HPI: 70 F hx NAFLD cirrhosis and HCC (listed for liver transplant with MELD 20B), UGIB in , chronic back pain (2/2 spinal fracture) presented from [facility] where she was admitted on  for coffee ground emesis and melena, EGD showed no active bleeding, transferred to [facility] now s/p OLT on 2025     Interval Events:                Immunosuppression  Induction: Simulect completed  Maintenance: FK by level, MMF , SST  Ongoing monitoring for signs of rejection    Potential Discharge date: TBD  Education:  Medications  Plan of care:  See Below                        Review of systems  All other systems were reviewed and are negative, except as noted.    PHYSICAL EXAM:  Constitutional: NAD  Eyes: PERRLA  ENMT: nc/at, no thrush  Neck: supple,   Respiratory: CTA B/L  Cardiovascular: RRR  Gastrointestinal: Soft abdomen, ND, appropriate incisional TTP. chevron incision c/d/i, staples in place. No signs of infection.   Genitourinary: voiding   Extremities: SCD's in place and working bilaterally  Vascular: Palpable dp pulses bilaterally.   Neurological: waxes and wanes  Skin: no rashes, ulcerations, lesions  Musculoskeletal:  moving extremities without issues  Psychiatric:  waxes and wanes.  Transplant Surgery - Multidisciplinary Rounds  --------------------------------------------------------------   Donor OLTx      Date:  Date: 2025      POD#9  CMV +/-    Present:   Patient seen and examined with multidisciplinary Transplant team including Surgeon Dr. Jim, Hepatologist Dr. Julian, Surgical fellow Chito, GUDELIA Sandoval/Stu,  and bedside RN during AM rounds.   Disciplines not in attendance will be notified of the plan.     HPI: 70 F hx NAFLD cirrhosis and HCC (listed for liver transplant with MELD 20B), UGIB in , chronic back pain (2/2 spinal fracture) presented from [facility] where she was admitted on  for coffee ground emesis and melena, EGD showed no active bleeding, transferred to [facility] now s/p OLT on 2025     Interval Events:  - MS better  - no acute events overnight     Immunosuppression  Induction: Simulect completed  Maintenance: FK by level, MMF , SST  Ongoing monitoring for signs of rejection    Potential Discharge date: TBD  Education:  Medications  Plan of care:  See Below      MEDICATIONS  (STANDING):  aspirin enteric coated 81 milliGRAM(s) Oral daily  calcium carbonate 1250 mG  + Vitamin D (OsCal 500 + D) 1 Tablet(s) Oral two times a day  chlorhexidine 2% Cloths 1 Application(s) Topical <User Schedule>  cycloSPORINE  , modified (GENGRAF) 50 milliGRAM(s) Oral <User Schedule>  cycloSPORINE  , modified (GENGRAF) 50 milliGRAM(s) Oral once  dextrose 5%. 1000 milliLiter(s) (50 mL/Hr) IV Continuous <Continuous>  dextrose 5%. 1000 milliLiter(s) (100 mL/Hr) IV Continuous <Continuous>  dextrose 50% Injectable 25 Gram(s) IV Push once  dextrose 50% Injectable 12.5 Gram(s) IV Push once  dextrose 50% Injectable 25 Gram(s) IV Push once  dextrose Oral Gel 15 Gram(s) Oral once  entecavir 0.5 milliGRAM(s) Oral daily  fluconAZOLE   Tablet 400 milliGRAM(s) Oral daily  fluticasone propionate/ salmeterol 100-50 MICROgram(s) Diskus 1 Dose(s) Inhalation two times a day  glucagon  Injectable 1 milliGRAM(s) IntraMuscular once  heparin   Injectable 5000 Unit(s) SubCutaneous every 12 hours  insulin glargine Injectable (LANTUS) 8 Unit(s) SubCutaneous at bedtime  insulin lispro (ADMELOG) corrective regimen sliding scale   SubCutaneous <User Schedule>  insulin lispro (ADMELOG) corrective regimen sliding scale   SubCutaneous Before meals and at bedtime  insulin lispro Injectable (ADMELOG) 12 Unit(s) SubCutaneous with breakfast  insulin lispro Injectable (ADMELOG) 8 Unit(s) SubCutaneous before lunch  insulin lispro Injectable (ADMELOG) 8 Unit(s) SubCutaneous before dinner  lidocaine 1% (Preservative-free) Injectable 10 milliLiter(s) Local Injection once  magnesium oxide 400 milliGRAM(s) Oral three times a day with meals  melatonin 5 milliGRAM(s) Oral at bedtime  mycophenolate mofetil 1000 milliGRAM(s) Oral <User Schedule>  NIFEdipine XL 30 milliGRAM(s) Oral every 24 hours  pantoprazole    Tablet 40 milliGRAM(s) Oral before breakfast  polyethylene glycol 3350 17 Gram(s) Oral daily  predniSONE   Tablet 20 milliGRAM(s) Oral daily  QUEtiapine 75 milliGRAM(s) Oral at bedtime  senna 2 Tablet(s) Oral two times a day  trimethoprim   80 mG/sulfamethoxazole 400 mG 1 Tablet(s) Oral daily  valGANciclovir 900 milliGRAM(s) Oral daily    MEDICATIONS  (PRN):  albuterol    90 MICROgram(s) HFA Inhaler 2 Puff(s) Inhalation every 6 hours PRN Bronchospasm  traMADol 25 milliGRAM(s) Oral every 6 hours PRN Moderate Pain (4 - 6)  traMADol 50 milliGRAM(s) Oral every 8 hours PRN Severe Pain (7 - 10)      PAST MEDICAL & SURGICAL HISTORY:  HCC (hepatocellular carcinoma)  DM (diabetes mellitus)  HTN (hypertension)  HLD (hyperlipidemia)  Hepatic cirrhosis  IVEY (nonalcoholic steatohepatitis)  Former smoker  Ascites  Hepatic encephalopathy  History of cervical cancer  H/O  section  History of cholecystectomy  H/O carpal tunnel repair  H/O cataract extraction    Vital Signs Last 24 Hrs  T(C): 36.8 (2025 09:10), Max: 36.9 (2025 00:00)  T(F): 98.3 (2025 09:10), Max: 98.4 (2025 00:00)  HR: 81 (2025 09:10) (77 - 84)  BP: 108/57 (2025 09:10) (108/57 - 130/59)  BP(mean): --  RR: 20 (2025 09:10) (18 - 20)  SpO2: 98% (2025 09:10) (95% - 98%)    Parameters below as of 2025 09:10  Patient On (Oxygen Delivery Method): room air    I&O's Summary    2025 07:01  -  2025 07:00  --------------------------------------------------------  IN: 540 mL / OUT: 1000 mL / NET: -460 mL                        8.9    5.47  )-----------( 81       ( 2025 06:42 )             26.3     06-09    138  |  104  |  15  ----------------------------<  146[H]  4.3   |  21[L]  |  1.12    Ca    8.5      2025 06:43  Phos  2.8       Mg     1.7         TPro  4.5[L]  /  Alb  3.1[L]  /  TBili  1.1  /  DBili  x   /  AST  45[H]  /  ALT  84[H]  /  AlkPhos  92  09    Tacrolimus (), Serum: 1.4 ng/mL ( @ 06:42)    Review of systems  All other systems were reviewed and are negative, except as noted.    PHYSICAL EXAM:  Constitutional: NAD  Eyes: PERRLA  ENMT: nc/at, no thrush  Neck: supple,   Respiratory: CTA B/L  Cardiovascular: RRR  Gastrointestinal: Soft abdomen, ND, appropriate incisional TTP. chevron incision c/d/i, staples in place. No signs of infection.   Genitourinary: voiding   Extremities: SCD's in place and working bilaterally  Vascular: Palpable dp pulses bilaterally.   Neurological: waxes and wanes  Skin: no rashes, ulcerations, lesions  Musculoskeletal:  moving extremities without issues  Psychiatric:  waxes and wanes.

## 2025-06-09 NOTE — BH CONSULTATION LIAISON PROGRESS NOTE - NSBHFUPREASONCONS_PSY_A_CORE
depression/transplant/sleep disturbance

## 2025-06-10 LAB
ALBUMIN SERPL ELPH-MCNC: 3.4 G/DL — SIGNIFICANT CHANGE UP (ref 3.3–5)
ALP SERPL-CCNC: 109 U/L — SIGNIFICANT CHANGE UP (ref 40–120)
ALT FLD-CCNC: 96 U/L — HIGH (ref 10–45)
ANION GAP SERPL CALC-SCNC: 14 MMOL/L — SIGNIFICANT CHANGE UP (ref 5–17)
APTT BLD: 25 SEC — LOW (ref 26.1–36.8)
AST SERPL-CCNC: 56 U/L — HIGH (ref 10–40)
BILIRUB SERPL-MCNC: 1.3 MG/DL — HIGH (ref 0.2–1.2)
BUN SERPL-MCNC: 17 MG/DL — SIGNIFICANT CHANGE UP (ref 7–23)
CALCIUM SERPL-MCNC: 8.8 MG/DL — SIGNIFICANT CHANGE UP (ref 8.4–10.5)
CHLORIDE SERPL-SCNC: 102 MMOL/L — SIGNIFICANT CHANGE UP (ref 96–108)
CO2 SERPL-SCNC: 20 MMOL/L — LOW (ref 22–31)
CREAT SERPL-MCNC: 1.02 MG/DL — SIGNIFICANT CHANGE UP (ref 0.5–1.3)
CYCLOSPORINE SER-MCNC: 113 NG/ML — LOW (ref 150–400)
EGFR: 59 ML/MIN/1.73M2 — LOW
EGFR: 59 ML/MIN/1.73M2 — LOW
GLUCOSE BLDC GLUCOMTR-MCNC: 105 MG/DL — HIGH (ref 70–99)
GLUCOSE BLDC GLUCOMTR-MCNC: 170 MG/DL — HIGH (ref 70–99)
GLUCOSE BLDC GLUCOMTR-MCNC: 178 MG/DL — HIGH (ref 70–99)
GLUCOSE BLDC GLUCOMTR-MCNC: 97 MG/DL — SIGNIFICANT CHANGE UP (ref 70–99)
GLUCOSE SERPL-MCNC: 130 MG/DL — HIGH (ref 70–99)
HCT VFR BLD CALC: 27.6 % — LOW (ref 34.5–45)
HGB BLD-MCNC: 9 G/DL — LOW (ref 11.5–15.5)
INR BLD: 0.96 RATIO — SIGNIFICANT CHANGE UP (ref 0.85–1.16)
MAGNESIUM SERPL-MCNC: 2 MG/DL — SIGNIFICANT CHANGE UP (ref 1.6–2.6)
MCHC RBC-ENTMCNC: 30.1 PG — SIGNIFICANT CHANGE UP (ref 27–34)
MCHC RBC-ENTMCNC: 32.6 G/DL — SIGNIFICANT CHANGE UP (ref 32–36)
MCV RBC AUTO: 92.3 FL — SIGNIFICANT CHANGE UP (ref 80–100)
NRBC BLD AUTO-RTO: 0 /100 WBCS — SIGNIFICANT CHANGE UP (ref 0–0)
PHOSPHATE SERPL-MCNC: 2.8 MG/DL — SIGNIFICANT CHANGE UP (ref 2.5–4.5)
PLATELET # BLD AUTO: 89 K/UL — LOW (ref 150–400)
POTASSIUM SERPL-MCNC: 4.1 MMOL/L — SIGNIFICANT CHANGE UP (ref 3.5–5.3)
POTASSIUM SERPL-SCNC: 4.1 MMOL/L — SIGNIFICANT CHANGE UP (ref 3.5–5.3)
PROT SERPL-MCNC: 5.1 G/DL — LOW (ref 6–8.3)
PROTHROM AB SERPL-ACNC: 11 SEC — SIGNIFICANT CHANGE UP (ref 9.9–13.4)
RBC # BLD: 2.99 M/UL — LOW (ref 3.8–5.2)
RBC # FLD: 20.1 % — HIGH (ref 10.3–14.5)
SODIUM SERPL-SCNC: 136 MMOL/L — SIGNIFICANT CHANGE UP (ref 135–145)
SURGICAL PATHOLOGY STUDY: SIGNIFICANT CHANGE UP
WBC # BLD: 5.83 K/UL — SIGNIFICANT CHANGE UP (ref 3.8–10.5)
WBC # FLD AUTO: 5.83 K/UL — SIGNIFICANT CHANGE UP (ref 3.8–10.5)

## 2025-06-10 PROCEDURE — 76705 ECHO EXAM OF ABDOMEN: CPT | Mod: 26,XU

## 2025-06-10 PROCEDURE — 99232 SBSQ HOSP IP/OBS MODERATE 35: CPT

## 2025-06-10 PROCEDURE — 93975 VASCULAR STUDY: CPT | Mod: 26

## 2025-06-10 RX ORDER — TRAMADOL HYDROCHLORIDE 50 MG/1
25 TABLET, FILM COATED ORAL EVERY 6 HOURS
Refills: 0 | Status: DISCONTINUED | OUTPATIENT
Start: 2025-06-10 | End: 2025-06-12

## 2025-06-10 RX ORDER — TRAMADOL HYDROCHLORIDE 50 MG/1
50 TABLET, FILM COATED ORAL EVERY 8 HOURS
Refills: 0 | Status: DISCONTINUED | OUTPATIENT
Start: 2025-06-10 | End: 2025-06-12

## 2025-06-10 RX ORDER — CYCLOSPORINE 100 MG/1
25 CAPSULE, LIQUID FILLED ORAL ONCE
Refills: 0 | Status: COMPLETED | OUTPATIENT
Start: 2025-06-10 | End: 2025-06-10

## 2025-06-10 RX ORDER — CYCLOSPORINE 100 MG/1
75 CAPSULE, LIQUID FILLED ORAL
Refills: 0 | Status: DISCONTINUED | OUTPATIENT
Start: 2025-06-11 | End: 2025-06-12

## 2025-06-10 RX ORDER — CYCLOSPORINE 100 MG/1
50 CAPSULE, LIQUID FILLED ORAL
Refills: 0 | Status: DISCONTINUED | OUTPATIENT
Start: 2025-06-10 | End: 2025-06-12

## 2025-06-10 RX ADMIN — TRAMADOL HYDROCHLORIDE 50 MILLIGRAM(S): 50 TABLET, FILM COATED ORAL at 05:21

## 2025-06-10 RX ADMIN — INSULIN LISPRO 10 UNIT(S): 100 INJECTION, SOLUTION INTRAVENOUS; SUBCUTANEOUS at 08:08

## 2025-06-10 RX ADMIN — Medication 1 TABLET(S): at 17:06

## 2025-06-10 RX ADMIN — ENTECAVIR 0.5 MILLIGRAM(S): 0.5 TABLET ORAL at 12:20

## 2025-06-10 RX ADMIN — MYCOPHENOLATE MOFETIL 1000 MILLIGRAM(S): 500 TABLET, FILM COATED ORAL at 08:08

## 2025-06-10 RX ADMIN — HEPARIN SODIUM 5000 UNIT(S): 1000 INJECTION INTRAVENOUS; SUBCUTANEOUS at 17:06

## 2025-06-10 RX ADMIN — Medication 1 DOSE(S): at 17:07

## 2025-06-10 RX ADMIN — PREDNISONE 20 MILLIGRAM(S): 20 TABLET ORAL at 05:22

## 2025-06-10 RX ADMIN — TRAMADOL HYDROCHLORIDE 50 MILLIGRAM(S): 50 TABLET, FILM COATED ORAL at 20:25

## 2025-06-10 RX ADMIN — INSULIN LISPRO 5 UNIT(S): 100 INJECTION, SOLUTION INTRAVENOUS; SUBCUTANEOUS at 17:38

## 2025-06-10 RX ADMIN — TRAMADOL HYDROCHLORIDE 50 MILLIGRAM(S): 50 TABLET, FILM COATED ORAL at 21:30

## 2025-06-10 RX ADMIN — TRAMADOL HYDROCHLORIDE 50 MILLIGRAM(S): 50 TABLET, FILM COATED ORAL at 06:30

## 2025-06-10 RX ADMIN — INSULIN LISPRO 8 UNIT(S): 100 INJECTION, SOLUTION INTRAVENOUS; SUBCUTANEOUS at 12:15

## 2025-06-10 RX ADMIN — HEPARIN SODIUM 5000 UNIT(S): 1000 INJECTION INTRAVENOUS; SUBCUTANEOUS at 05:22

## 2025-06-10 RX ADMIN — Medication 1 TABLET(S): at 05:22

## 2025-06-10 RX ADMIN — MYCOPHENOLATE MOFETIL 1000 MILLIGRAM(S): 500 TABLET, FILM COATED ORAL at 20:26

## 2025-06-10 RX ADMIN — Medication 40 MILLIGRAM(S): at 05:22

## 2025-06-10 RX ADMIN — Medication 81 MILLIGRAM(S): at 12:17

## 2025-06-10 RX ADMIN — Medication 400 MILLIGRAM(S): at 08:04

## 2025-06-10 RX ADMIN — Medication 30 MILLIGRAM(S): at 12:17

## 2025-06-10 RX ADMIN — Medication 400 MILLIGRAM(S): at 17:06

## 2025-06-10 RX ADMIN — QUETIAPINE FUMARATE 75 MILLIGRAM(S): 25 TABLET ORAL at 21:21

## 2025-06-10 RX ADMIN — Medication 400 MILLIGRAM(S): at 12:18

## 2025-06-10 RX ADMIN — Medication 5 MILLIGRAM(S): at 21:21

## 2025-06-10 RX ADMIN — CYCLOSPORINE 25 MILLIGRAM(S): 100 CAPSULE, LIQUID FILLED ORAL at 17:06

## 2025-06-10 RX ADMIN — Medication 1 DOSE(S): at 05:23

## 2025-06-10 RX ADMIN — INSULIN GLARGINE-YFGN 5 UNIT(S): 100 INJECTION, SOLUTION SUBCUTANEOUS at 21:38

## 2025-06-10 RX ADMIN — VALGANCICLOVIR 900 MILLIGRAM(S): 450 TABLET, FILM COATED ORAL at 12:17

## 2025-06-10 RX ADMIN — Medication 2 TABLET(S): at 05:22

## 2025-06-10 RX ADMIN — Medication 1 TABLET(S): at 12:16

## 2025-06-10 RX ADMIN — Medication 400 MILLIGRAM(S): at 12:16

## 2025-06-10 RX ADMIN — CYCLOSPORINE 50 MILLIGRAM(S): 100 CAPSULE, LIQUID FILLED ORAL at 20:26

## 2025-06-10 RX ADMIN — CYCLOSPORINE 50 MILLIGRAM(S): 100 CAPSULE, LIQUID FILLED ORAL at 08:05

## 2025-06-10 RX ADMIN — INSULIN LISPRO 2: 100 INJECTION, SOLUTION INTRAVENOUS; SUBCUTANEOUS at 12:16

## 2025-06-10 RX ADMIN — Medication 2 PUFF(S): at 20:30

## 2025-06-10 RX ADMIN — INSULIN LISPRO 2: 100 INJECTION, SOLUTION INTRAVENOUS; SUBCUTANEOUS at 08:06

## 2025-06-10 RX ADMIN — Medication 1 APPLICATION(S): at 08:08

## 2025-06-10 NOTE — PROGRESS NOTE ADULT - ASSESSMENT
70 F hx NAFLD cirrhosis and HCC (listed for liver transplant with MELD 20B), UGIB in 2022, chronic back pain (2/2 spinal fracture) presented from [facility] where she was admitted on 5/26 for coffee ground emesis and melena, EGD showed no active bleeding, transferred to [facility] now s/p OLT on 5/31/2025     [] s/p OLT - POD 10  - HBV Core + donor: Entecavir  - Good graft function   - Diet: Consistent carb diet   - Strict I&O:  UO  - ASA 81   - Pain Management  - Bowel Regimen  - E.Faecium UTI, asymptomatic, Zosyn/Vanco completed  - OT/PT/RD /IS  - Hallucinations -->  holding FK, BH following; on seroquel, UA: Neg.     [] Immunosuppression: Simulect induction  - FK stopped, start Cyclo 50mg bid,  MMF 1/1, Pred 20  - PPx: Bactrim, Valcyte 450 (CMV +/-, inc 900mg as able), Fluc, PPI, OsCal    [] DM  - Endocrine team  following    [] HTN  - Nifedipine, adjust prn    [] DVT  -SQH bid  -SCDs at all times    [] DISPO  -d/c planning to Acute Rehab  70 F hx NAFLD cirrhosis and HCC (listed for liver transplant with MELD 20B), UGIB in 2022, chronic back pain (2/2 spinal fracture) [facility] for coffee ground emesis and melena, EGD showed no active bleeding, transferred to [facility] now s/p OLT on 5/31/2025     [] s/p OLT - POD 10  - HBV Core + donor: Entecavir  - Good graft function   - Diet: Consistent carb diet   - Strict I&O:  UO  - ASA 81   - Pain Management  - Bowel Regimen  - E.Faecium UTI, asymptomatic, Zosyn/Vanco completed  - OT/PT/RD /IS  - Hallucinations -->  holding FK, BH following; on seroquel, UA: Neg.   - repeat doppler 6/10 w/ patent vasculature decreased collection size     [] Immunosuppression: Simulect induction  - FK stopped, switched to Cyclo for mental status changes,  MMF 1/1, Pred 20  - PPx: Bactrim, Valcyte 450 (CMV +/-, inc 900mg as able), Fluc, PPI, OsCal    [] DM  - Endocrine team  following    [] HTN  - Nifedipine, adjust prn    [] DVT  -SQH bid  -SCDs at all times    [] DISPO  -d/c planning to Acute Rehab

## 2025-06-10 NOTE — PROGRESS NOTE ADULT - SUBJECTIVE AND OBJECTIVE BOX
Transplant Surgery - Multidisciplinary Rounds  --------------------------------------------------------------   Donor OLTx      Date:  Date: 2025      POD#10  CMV +/-    Present:   Patient seen and examined with multidisciplinary Transplant team including Surgeon Dr. Jim, Hepatologist Dr. Julian, Surgical fellow Chito, GUDELIA Sandoval/Jennifer,  and bedside RN during AM rounds.   Disciplines not in attendance will be notified of the plan.     HPI: 70 F hx NAFLD cirrhosis and HCC (listed for liver transplant with MELD 20B), UGIB in , chronic back pain (2/2 spinal fracture) presented from [facility] where she was admitted on  for coffee ground emesis and melena, EGD showed no active bleeding, transferred to [facility] now s/p OLT on 2025     Interval Events:      Immunosuppression  Induction: Simulect completed  Maintenance: FK by level, MMF , SST  Ongoing monitoring for signs of rejection    Potential Discharge date: TBD  Education:  Medications  Plan of care:  See Below    TX DATA HERE    Review of systems  All other systems were reviewed and are negative, except as noted.    PHYSICAL EXAM:  Constitutional: NAD  Eyes: PERRLA  ENMT: nc/at, no thrush  Neck: supple,   Respiratory: CTA B/L  Cardiovascular: RRR  Gastrointestinal: Soft abdomen, ND, appropriate incisional TTP. chevron incision c/d/i, staples in place. No signs of infection.   Genitourinary: voiding   Extremities: SCD's in place and working bilaterally  Vascular: Palpable dp pulses bilaterally.   Neurological: waxes and wanes  Skin: no rashes, ulcerations, lesions  Musculoskeletal:  moving extremities without issues  Psychiatric:  waxes and wanes.  Transplant Surgery - Multidisciplinary Rounds  --------------------------------------------------------------   Donor OLTx      Date:  Date: 2025      POD#10  CMV +/-    Present:   Patient seen and examined with multidisciplinary Transplant team including Surgeon Dr. Jim, Hepatologist Dr. Julian, Surgical fellow Chito, GUDELIA Sandoval/Jennifer,  and bedside RN during AM rounds.   Disciplines not in attendance will be notified of the plan.     HPI: 70 F hx NAFLD cirrhosis and HCC (listed for liver transplant with MELD 20B), UGIB in , chronic back pain (2/2 spinal fracture) presented from [facility] where she was admitted on  for coffee ground emesis and melena, EGD showed no active bleeding, transferred to [facility] now s/p OLT on 2025     Interval Events:  - afebrile, vss  - MS waxing and waning, with hallucinations switched to cyclosporine     Immunosuppression  Induction: Simulect completed  Maintenance: Cyclosporine by level, MMF , SST  Ongoing monitoring for signs of rejection    Potential Discharge date: TBD  Education:  Medications  Plan of care:  See Below      MEDICATIONS  (STANDING):  aspirin enteric coated 81 milliGRAM(s) Oral daily  calcium carbonate 1250 mG  + Vitamin D (OsCal 500 + D) 1 Tablet(s) Oral two times a day  chlorhexidine 2% Cloths 1 Application(s) Topical <User Schedule>  cycloSPORINE  , modified (GENGRAF) 50 milliGRAM(s) Oral <User Schedule>  dextrose 5%. 1000 milliLiter(s) (100 mL/Hr) IV Continuous <Continuous>  dextrose 5%. 1000 milliLiter(s) (50 mL/Hr) IV Continuous <Continuous>  dextrose 50% Injectable 25 Gram(s) IV Push once  dextrose 50% Injectable 12.5 Gram(s) IV Push once  dextrose 50% Injectable 25 Gram(s) IV Push once  dextrose Oral Gel 15 Gram(s) Oral once  entecavir 0.5 milliGRAM(s) Oral daily  fluconAZOLE   Tablet 400 milliGRAM(s) Oral daily  fluticasone propionate/ salmeterol 100-50 MICROgram(s) Diskus 1 Dose(s) Inhalation two times a day  glucagon  Injectable 1 milliGRAM(s) IntraMuscular once  heparin   Injectable 5000 Unit(s) SubCutaneous every 12 hours  insulin glargine Injectable (LANTUS) 5 Unit(s) SubCutaneous at bedtime  insulin lispro (ADMELOG) corrective regimen sliding scale   SubCutaneous <User Schedule>  insulin lispro (ADMELOG) corrective regimen sliding scale   SubCutaneous Before meals and at bedtime  insulin lispro Injectable (ADMELOG) 10 Unit(s) SubCutaneous with breakfast  insulin lispro Injectable (ADMELOG) 5 Unit(s) SubCutaneous before dinner  insulin lispro Injectable (ADMELOG) 8 Unit(s) SubCutaneous before lunch  lidocaine 1% (Preservative-free) Injectable 10 milliLiter(s) Local Injection once  magnesium oxide 400 milliGRAM(s) Oral three times a day with meals  melatonin 5 milliGRAM(s) Oral at bedtime  mycophenolate mofetil 1000 milliGRAM(s) Oral <User Schedule>  NIFEdipine XL 30 milliGRAM(s) Oral every 24 hours  pantoprazole    Tablet 40 milliGRAM(s) Oral before breakfast  polyethylene glycol 3350 17 Gram(s) Oral daily  predniSONE   Tablet 20 milliGRAM(s) Oral daily  QUEtiapine 75 milliGRAM(s) Oral at bedtime  senna 2 Tablet(s) Oral two times a day  trimethoprim   80 mG/sulfamethoxazole 400 mG 1 Tablet(s) Oral daily  valGANciclovir 900 milliGRAM(s) Oral daily    MEDICATIONS  (PRN):  albuterol    90 MICROgram(s) HFA Inhaler 2 Puff(s) Inhalation every 6 hours PRN Bronchospasm  traMADol 25 milliGRAM(s) Oral every 6 hours PRN Moderate Pain (4 - 6)  traMADol 50 milliGRAM(s) Oral every 8 hours PRN Severe Pain (7 - 10)      PAST MEDICAL & SURGICAL HISTORY:  HCC (hepatocellular carcinoma)      DM (diabetes mellitus)      HTN (hypertension)      HLD (hyperlipidemia)      Hepatic cirrhosis      IVEY (nonalcoholic steatohepatitis)      Former smoker      Ascites      Hepatic encephalopathy      History of cervical cancer      H/O  section      History of cholecystectomy      H/O carpal tunnel repair      H/O cataract extraction          Vital Signs Last 24 Hrs  T(C): 36.7 (10 Albert 2025 12:48), Max: 36.9 (10 Albert 2025 00:15)  T(F): 98.1 (10 Albert 2025 12:48), Max: 98.5 (10 Albert 2025 00:15)  HR: 76 (10 Albert 2025 12:48) (76 - 85)  BP: 132/62 (10 Albert 2025 12:48) (117/58 - 132/62)  BP(mean): 86 (10 Albert 2025 05:06) (86 - 89)  RR: 20 (10 Albert 2025 12:48) (17 - 20)  SpO2: 99% (10 Albert 2025 12:48) (97% - 100%)    Parameters below as of 10 Albert 2025 12:48  Patient On (Oxygen Delivery Method): room air        I&O's Summary    2025 07:01  -  10 Albert 2025 07:00  --------------------------------------------------------  IN: 920 mL / OUT: 2000 mL / NET: -1080 mL                              9.0    5.83  )-----------( 89       ( 10 Albert 2025 06:58 )             27.6     06-10    136  |  102  |  17  ----------------------------<  130[H]  4.1   |  20[L]  |  1.02    Ca    8.8      10 Albert 2025 06:58  Phos  2.8     06-10  Mg     2.0     06-10    TPro  5.1[L]  /  Alb  3.4  /  TBili  1.3[H]  /  DBili  x   /  AST  56[H]  /  ALT  96[H]  /  AlkPhos  109  06-10    Tacrolimus (), Serum: 1.4 ng/mL ( @ 06:42)                    Review of systems  All other systems were reviewed and are negative, except as noted.    PHYSICAL EXAM:  Constitutional: NAD  Eyes: PERRLA  ENMT: nc/at, no thrush  Neck: supple,   Respiratory: CTA B/L  Cardiovascular: RRR  Gastrointestinal: Soft abdomen, ND, appropriate incisional TTP. chevron incision c/d/i, staples in place. No signs of infection.   Genitourinary: voiding   Extremities: SCD's in place and working bilaterally  Vascular: Palpable dp pulses bilaterally.   Neurological: waxes and wanes  Skin: no rashes, ulcerations, lesions  Musculoskeletal:  moving extremities without issues  Psychiatric:  waxes and wanes.

## 2025-06-10 NOTE — PROGRESS NOTE ADULT - NSPROGADDITIONALINFOA_GEN_ALL_CORE
09:00 Contact via [employer] Teams during business hours  To reach covering provider access AMION via sunrise tools  For Urgent matters/after-hours/weekends/holidays please page endocrine fellow on call   For nonurgent matters please email [email or web address]    Please note that this patient may be followed by different provider tomorrow.  Notify endocrine 24 hours prior to discharge for final recommendations

## 2025-06-10 NOTE — PROGRESS NOTE ADULT - SUBJECTIVE AND OBJECTIVE BOX
Patient seen today for follow up inpatient Diabetes Mellitus management.    Chief Complaint: Type 2 Diabetes Mellitus, steroid induced hyperglycemia     INTERVAL HX:  Patient seen in [facility] 8TWR 17. Patient is alert and oriented, sitting up in chair. Patient reports feeling good, eating about 50% of her meals, notes low appetite tolerating POs. FBG stable this am to 170mg/dL, 130mg/dL on blood serum. No hypoglycemia. Pending d/c to rehab per primary team. Continues on oral Prednisone 20mg daily. Blood glucose levels in the last 24hrs have been 144-264mg/dL.     Review of Systems:  General: As above.  Respiratory: Denies any SOB, MOORE, or cough.  Gastrointestinal: Denies any n/v/d or abdominal pain.   Endocrine: Denies any polyuria, polydipsia, polyphagia, visual changes, or numbness in feet.     Allergies  No Known Allergies      Intolerances  None.       MEDICATIONS  (STANDING):  albuterol    90 MICROgram(s) HFA Inhaler 2 Puff(s) Inhalation every 6 hours PRN  aspirin enteric coated 81 milliGRAM(s) Oral daily  calcium carbonate 1250 mG  + Vitamin D (OsCal 500 + D) 1 Tablet(s) Oral two times a day  chlorhexidine 2% Cloths 1 Application(s) Topical <User Schedule>  cycloSPORINE  , modified (GENGRAF) 50 milliGRAM(s) Oral <User Schedule>  cycloSPORINE  , modified (GENGRAF) 25 milliGRAM(s) Oral once  dextrose 5%. 1000 milliLiter(s) IV Continuous <Continuous>  dextrose 5%. 1000 milliLiter(s) IV Continuous <Continuous>  dextrose 50% Injectable 25 Gram(s) IV Push once  dextrose 50% Injectable 12.5 Gram(s) IV Push once  dextrose 50% Injectable 25 Gram(s) IV Push once  dextrose Oral Gel 15 Gram(s) Oral once  entecavir 0.5 milliGRAM(s) Oral daily  fluconAZOLE   Tablet 400 milliGRAM(s) Oral daily  fluticasone propionate/ salmeterol 100-50 MICROgram(s) Diskus 1 Dose(s) Inhalation two times a day  glucagon  Injectable 1 milliGRAM(s) IntraMuscular once  heparin   Injectable 5000 Unit(s) SubCutaneous every 12 hours  insulin glargine Injectable (LANTUS) 5 Unit(s) SubCutaneous at bedtime  insulin lispro (ADMELOG) corrective regimen sliding scale   SubCutaneous <User Schedule>  insulin lispro (ADMELOG) corrective regimen sliding scale   SubCutaneous Before meals and at bedtime  insulin lispro Injectable (ADMELOG) 5 Unit(s) SubCutaneous before dinner  insulin lispro Injectable (ADMELOG) 10 Unit(s) SubCutaneous with breakfast  insulin lispro Injectable (ADMELOG) 8 Unit(s) SubCutaneous before lunch  lidocaine 1% (Preservative-free) Injectable 10 milliLiter(s) Local Injection once  magnesium oxide 400 milliGRAM(s) Oral three times a day with meals  melatonin 5 milliGRAM(s) Oral at bedtime  mycophenolate mofetil 1000 milliGRAM(s) Oral <User Schedule>  NIFEdipine XL 30 milliGRAM(s) Oral every 24 hours  pantoprazole    Tablet 40 milliGRAM(s) Oral before breakfast  polyethylene glycol 3350 17 Gram(s) Oral daily  predniSONE   Tablet 20 milliGRAM(s) Oral daily  QUEtiapine 75 milliGRAM(s) Oral at bedtime  senna 2 Tablet(s) Oral two times a day  traMADol 25 milliGRAM(s) Oral every 6 hours PRN  traMADol 50 milliGRAM(s) Oral every 8 hours PRN  trimethoprim   80 mG/sulfamethoxazole 400 mG 1 Tablet(s) Oral daily  valGANciclovir 900 milliGRAM(s) Oral daily      dextrose 50% Injectable 25 Gram(s) IV Push once  dextrose 50% Injectable 12.5 Gram(s) IV Push once  dextrose 50% Injectable 25 Gram(s) IV Push once  dextrose Oral Gel 15 Gram(s) Oral once  glucagon  Injectable 1 milliGRAM(s) IntraMuscular once  insulin glargine Injectable (LANTUS) 5 Unit(s) SubCutaneous at bedtime  insulin lispro (ADMELOG) corrective regimen sliding scale   SubCutaneous <User Schedule>  insulin lispro (ADMELOG) corrective regimen sliding scale   SubCutaneous Before meals and at bedtime  insulin lispro Injectable (ADMELOG) 5 Unit(s) SubCutaneous before dinner  insulin lispro Injectable (ADMELOG) 10 Unit(s) SubCutaneous with breakfast  insulin lispro Injectable (ADMELOG) 8 Unit(s) SubCutaneous before lunch  predniSONE   Tablet 20 milliGRAM(s) Oral daily      insulin lispro (ADMELOG) corrective regimen sliding scale   SubCutaneous <User Schedule>  insulin lispro (ADMELOG) corrective regimen sliding scale   SubCutaneous Before meals and at bedtime  insulin lispro Injectable (ADMELOG) 5 Unit(s) SubCutaneous before dinner  insulin lispro Injectable (ADMELOG) 10 Unit(s) SubCutaneous with breakfast  insulin lispro Injectable (ADMELOG) 8 Unit(s) SubCutaneous before lunch      PHYSICAL EXAM:  VITALS:   T(C): 36.7 (06-10-25 @ 12:48), Max: 36.9 (06-10-25 @ 00:15)  HR: 76 (06-10-25 @ 12:48) (76 - 87)  BP: 132/62 (06-10-25 @ 12:48) (117/58 - 132/62)  RR: 20 (06-10-25 @ 12:48) (17 - 20)  SpO2: 99% (06-10-25 @ 12:48) (97% - 100%)    GENERAL: In no acute distress  Respiratory: Respirations unlabored  Extremities: Warm and dry, no edema  NEURO: Alert and oriented, appropriate     LABS:  POCT Blood Glucose.: 178 mg/dL (06-10-25 @ 11:32)  POCT Blood Glucose.: 170 mg/dL (06-10-25 @ 07:33)  POCT Blood Glucose.: 144 mg/dL (06-09-25 @ 23:54)  POCT Blood Glucose.: 172 mg/dL (06-09-25 @ 21:19)  POCT Blood Glucose.: 264 mg/dL (06-09-25 @ 16:57)  POCT Blood Glucose.: 88 mg/dL (06-09-25 @ 11:26)  POCT Blood Glucose.: 180 mg/dL (06-09-25 @ 08:07)  POCT Blood Glucose.: 133 mg/dL (06-08-25 @ 23:57)  POCT Blood Glucose.: 109 mg/dL (06-08-25 @ 22:00)  POCT Blood Glucose.: 99 mg/dL (06-08-25 @ 21:22)  POCT Blood Glucose.: 180 mg/dL (06-08-25 @ 17:11)  POCT Blood Glucose.: 259 mg/dL (06-08-25 @ 12:04)  POCT Blood Glucose.: 171 mg/dL (06-08-25 @ 08:38)  POCT Blood Glucose.: 138 mg/dL (06-08-25 @ 01:46)  POCT Blood Glucose.: 234 mg/dL (06-07-25 @ 20:59)  POCT Blood Glucose.: 163 mg/dL (06-07-25 @ 17:58)  POCT Blood Glucose.: 67 mg/dL (06-07-25 @ 17:08)                          9.0    5.83  )-----------( 89       ( 10 Albert 2025 06:58 )             27.6     06-10    136  |  102  |  17  ----------------------------<  130[H]  4.1   |  20[L]  |  1.02    Ca    8.8      10 Albert 2025 06:58  Phos  2.8     06-10  Mg     2.0     06-10    TPro  5.1[L]  /  Alb  3.4  /  TBili  1.3[H]  /  DBili  x   /  AST  56[H]  /  ALT  96[H]  /  AlkPhos  109  06-10    LIVER FUNCTIONS - ( 10 Albert 2025 06:58 )  Alb: 3.4 g/dL / Pro: 5.1 g/dL / ALK PHOS: 109 U/L / ALT: 96 U/L / AST: 56 U/L / GGT: x           PT/INR - ( 10 Albert 2025 06:58 )   PT: 11.0 sec;   INR: 0.96 ratio         PTT - ( 10 Albert 2025 06:58 )  PTT:25.0 sec      Urinalysis Basic - ( 10 Albert 2025 06:58 )    Color: x / Appearance: x / SG: x / pH: x  Gluc: 130 mg/dL / Ketone: x  / Bili: x / Urobili: x   Blood: x / Protein: x / Nitrite: x   Leuk Esterase: x / RBC: x / WBC x   Sq Epi: x / Non Sq Epi: x / Bacteria: x          A1C with Estimated Average Glucose Result: A1C with Estimated Average Glucose Result: 6.0 % (05-28-25 @ 10:20)

## 2025-06-10 NOTE — CHART NOTE - NSCHARTNOTEFT_GEN_A_CORE
NUTRITION FOLLOW UP NOTE    PATIENT SEEN FOR: malnutrition and liver transplant recipient follow up     SOURCE: [x] Patient  [x] Current Medical Record  [x] RN  [] Family/support person at bedside  [] Patient unavailable/inappropriate  [] Other:    CHART REVIEWED/EVENTS NOTED.  [] No changes to nutrition care plan to note  [x] Nutrition Status:  -S/p OLT , POD#10  -Good graft function     DIET ORDER:   Diet, Consistent Carbohydrate w/Evening Snack:   Supplement Feeding Modality:  Oral  Ensure Max Cans or Servings Per Day:  2       Frequency:  Daily (25 @ 15:46) [Active]    CURRENT DIET ORDER IS:  [] Appropriate:  [] Inadequate:  [x] Other: see below for recommendation     NUTRITION INTAKE/PROVISION:  [x] PO: Pt reports tolerating oral nutritional supplement and meals   [] Enteral Nutrition:  [] Parenteral Nutrition:    ANTHROPOMETRICS:  Drug Dosing Weight  Height (cm): 154.9 (30 May 2025 21:01)  Weight (kg): 64.5 (30 May 2025 21:01)  BMI (kg/m2): 26.9 (30 May 2025 21:01)  Daily Weights: Weight in k (-10), Weight in k (-), Weight in k.5 (-), Weight in k.4 (-),   Weight in k.2 (-),  Weight in k.7 (-), Weight in k.3 (-), Weight in k.2 (-)   -Weight fluctuation likely in the setting of fluid shifts vs true weight loss/gain; continue to monitor weights daily.       MEDICATIONS  (STANDING):  aspirin enteric coated 81 milliGRAM(s) Oral daily  calcium carbonate 1250 mG  + Vitamin D (OsCal 500 + D) 1 Tablet(s) Oral two times a day  cycloSPORINE  , modified (GENGRAF) 50 milliGRAM(s) Oral <User Schedule>  entecavir 0.5 milliGRAM(s) Oral daily  fluconAZOLE   Tablet 400 milliGRAM(s) Oral daily  insulin glargine Injectable (LANTUS) 5 Unit(s) SubCutaneous at bedtime  insulin lispro (ADMELOG) corrective regimen sliding scale   SubCutaneous <User Schedule>  insulin lispro (ADMELOG) corrective regimen sliding scale   SubCutaneous Before meals and at bedtime  insulin lispro Injectable (ADMELOG) 10 Unit(s) SubCutaneous with breakfast  insulin lispro Injectable (ADMELOG) 5 Unit(s) SubCutaneous before dinner  insulin lispro Injectable (ADMELOG) 8 Unit(s) SubCutaneous before lunch  magnesium oxide 400 milliGRAM(s) Oral three times a day with meals  mycophenolate mofetil 1000 milliGRAM(s) Oral <User Schedule>  NIFEdipine XL 30 milliGRAM(s) Oral every 24 hours  pantoprazole    Tablet 40 milliGRAM(s) Oral before breakfast  polyethylene glycol 3350 17 Gram(s) Oral daily  predniSONE   Tablet 20 milliGRAM(s) Oral daily  QUEtiapine 75 milliGRAM(s) Oral at bedtime  senna 2 Tablet(s) Oral two times a day  trimethoprim   80 mG/sulfamethoxazole 400 mG 1 Tablet(s) Oral daily  valGANciclovir 900 milliGRAM(s) Oral daily    MEDICATIONS  (PRN):  albuterol    90 MICROgram(s) HFA Inhaler 2 Puff(s) Inhalation every 6 hours PRN Bronchospasm  traMADol 25 milliGRAM(s) Oral every 6 hours PRN Moderate Pain (4 - 6)  traMADol 50 milliGRAM(s) Oral every 8 hours PRN Severe Pain (7 - 10)      NUTRITIONALLY PERTINENT LABS:  06-10 @ 06:58: Na 136, BUN 17, Cr 1.02, [H], K+ 4.1, Phos 2.8, Mg 2.0, Alk Phos 109, ALT/SGPT 96[H], AST/SGOT 56[H], HbA1c --      A1C with Estimated Average Glucose Result: 6.0 % (25 @ 10:20)    Finger Sticks:  POCT Blood Glucose.: 170 mg/dL (06-10-25 @ 07:33)  POCT Blood Glucose.: 144 mg/dL (25 @ 23:54)  POCT Blood Glucose.: 172 mg/dL (25 @ 21:19)  POCT Blood Glucose.: 264 mg/dL (25 @ 16:57)  POCT Blood Glucose.: 88 mg/dL (25 @ 11:26)      NUTRITIONALLY PERTINENT MEDICATIONS/LABS:  [x] Reviewed  [x] Relevant notes on medications/labs:  - Transplant medications: Gengraf, Cellcept, Prednisone   - DM: ordered for Lantus 5 units hs  Lispro 3 units 3x/day  and ISS to regulate blood glucose in house  - Micronutrient/Other supplementation: OsCal 500 + D and magnesium oxide      EDEMA:  [x] Reviewed  [x] Relevant notes: no noted edema per nursing flowsheets     GI/ I&O:  [x] Reviewed  [x] Relevant notes:  last bowel movement on 25; ordered for Miralax and Senna for bowel regimen   [] Other:    SKIN:   [] No pressure injuries documented, per nursing flowsheet  [x] Pressure injury previously noted:  bilateral buttocks suspected deep tissue injury   [] Change in pressure injury documentation:  [] Other:    ESTIMATED NEEDS:  [x] updated   Energy:   2001-9947 kcal/day (35-40 kcal/kg)  Protein:    g/day (1.5-2.0 g/kg)  Fluid:   ml/day or [x] defer to team  Based on: IBW of 52.3kg     NUTRITION DIAGNOSIS:  [x] Prior Dx: 1. Acute moderate malnutrition   2. Increased protein-energy needs  [] New Dx:      EDUCATION:  [x] Yes: Reviewed post-transplant nutrition therapy and food safety guidelines for transplant recipients.  Reviewed recommendations to avoid grapefruit, pomegranate and star fruit while taking immunosuppressant medication.  Reviewed recommendations for moderate intake of sodium with transplant medications.  Reviewed Consistent Carbohydrate diet, including carbohydrate content of foods and portion sizes.   [] Not appropriate/warranted    NUTRITION CARE PLAN:  1. Diet: continue Consistent Carbohydrate diet with evening snack as tolerated   2. Supplements:  Ensure Max 2x daily (320 calories, 60g protein) - both vanilla and chocolate flavor   3. Multivitamin/mineral supplementation: Recommend addition of MVI and Vitamin C, pending no medical contraindication, for micronutrient support/aid wound healing.   4.  Recommend follow up visit with Transplant MD and outpatient RD for dietary modifications as warranted.    [] Achieved - Continue current nutrition intervention(s)  [] Current medical condition precludes nutrition intervention at this time.    MONITORING AND EVALUATION:   RD remains available upon request and will follow up per protocol:     Mariah Villatoro, MS,RDN,CDN,CNSC   Available on [facility] TEAMS

## 2025-06-10 NOTE — PROGRESS NOTE ADULT - PROBLEM SELECTOR PLAN 1
Inpatient Plan:  - Check BG TID AC and HS while on PO diet   - C/w Lantus 5u QHS  - C/w Admelog 10u with breakfast, 8u with lunch, 5u with dinner (HOLD if NPO or not eating)  - C/w moderate dose Admelog correctional scales TID AC and HS     Discharge Plan:  - Discharge to rehab on current basal/bolus insulin as doses noted above/current active order. Will need adjustments in rehab.   - Patient should check BG TID AC and HS at rehab/home. Can use her Dexcom G7 CGM to monitor BGs but if BG <100mg/dL or >250mg/dL, patient should confirm with fingerstick BG. Please tell patient to contact Endocrinologist if BG <70mg/dL x1 or >200mg/dL consistently or >400mg/dL x1.  - Please send RX for CGM Dexcom G7 sensors x3. She has reader at home.   - Endocrine follow up: can establish care at [address] [place] [address] ([phone])- please provide in d/c paperwork for patient to make appt. Will add to appt list closer to d/c.   - Recommend routine outpatient ophthalmology and podiatry follow up.  - Patient will need RX for basal insulin pen (ie. Basaglar, Lantus, Tresiba, Toujeo) and bolus insulin pen (ie. humalog/novolog/admelog) depending on insurance coverage; please send test scripts to see which is covered. Please make sure patient has all diabetes supplies on discharge (glucometer, test strips, lancets, alcohol swabs, insulin pen needles). Please write RX for glucose tablets/gel> use as directed plus Glucagon nasal/ IM injection (use as directed)> Can prescribe any covered by pt's insurance. Inpatient Plan:  - Check BG TID AC and HS while on PO diet   - C/w Lantus 5u QHS  - C/w Admelog 10u with breakfast, 8u with lunch, 5u with dinner (HOLD if NPO or not eating)  - C/w moderate dose Admelog correctional scales TID AC and HS     Discharge Plan:  - Discharge to rehab on current basal/bolus insulin as doses noted above/current active order. Will need adjustments in rehab.   - Patient should check BG TID AC and HS at rehab/home. Can use her Dexcom G7 CGM to monitor BGs but if BG <100mg/dL or >250mg/dL, patient should confirm with fingerstick BG. Please tell patient to contact Endocrinologist if BG <70mg/dL x1 or >200mg/dL consistently or >400mg/dL x1.  - Please send RX for CGM Dexcom G7 sensors x3. She has reader at home.   - Endocrine follow up: can establish care at [address] [place], [place] [address] [place] [address] Phone: [phone]- please provide in d/c paperwork for patient to make appt. Will add to appt list closer to d/c.   - Recommend routine outpatient ophthalmology and podiatry follow up.  - Patient will need RX for basal insulin pen (ie. Basaglar, Lantus, Tresiba, Toujeo) and bolus insulin pen (ie. humalog/novolog/admelog) depending on insurance coverage; please send test scripts to see which is covered. Please make sure patient has all diabetes supplies on discharge (glucometer, test strips, lancets, alcohol swabs, insulin pen needles). Please write RX for glucose tablets/gel> use as directed plus Glucagon nasal/ IM injection (use as directed)> Can prescribe any covered by pt's insurance.

## 2025-06-10 NOTE — PROGRESS NOTE ADULT - ASSESSMENT
70 F hx NAFLD cirrhosis and HCC (listed for liver transplant with MELD 20B), UGIB in 2022, chronic back pain (2/2 spinal fracture) presented from [facility] where she was admitted on 5/26 for coffee ground emesis and melena, EGD showed no active bleeding, patient was transfused and stabilized prior to transfer. Patient is currently post op day #3 for liver transplant. Endocrinology was consulted for management of diabetes mellitus and steroid induced hyperglycemia s/p tx. Patient reports feeling good, eating about 50% of meals and tolerating POs. FBG stable, no hypoglycemia. Noted insulin reg adjusted, well keep doses as is for now given good glycemic control. Continues on oral Pred 20mg daily. Pending d/c to rehab. Endocrine will closely monitor BG and adjust insulin as needed for BG goal 100-200mg/dL given age and comorbidities.        #Type 2 Diabetes Mellitus  #Steroid- induced hyperglycemia   - HbA1c  6.0% - although not accurate in the setting of low gfr and liver disease      - Home regimen: Lantus 14 units + Humalog 18 units with meals. Sugars are usually high in the 200s; uses Dexcom G7 with reader   - Diet is quite high in carbs- eating out, grape juice, pasta, fruit yogurts. Would benefit from a dietician consult   - sugars also exacerbated due to steroid use

## 2025-06-10 NOTE — CHART NOTE - NSCHARTNOTESELECT_GEN_ALL_CORE
Nutrition Services
Order fixed.     
Transplant Hepatology/Event Note
Endocrine follow  up/Event Note
Endocrine follow up/Event Note
Neurology
Neurology

## 2025-06-11 LAB
ALBUMIN SERPL ELPH-MCNC: 3.2 G/DL — LOW (ref 3.3–5)
ALP SERPL-CCNC: 101 U/L — SIGNIFICANT CHANGE UP (ref 40–120)
ALT FLD-CCNC: 77 U/L — HIGH (ref 10–45)
ANION GAP SERPL CALC-SCNC: 14 MMOL/L — SIGNIFICANT CHANGE UP (ref 5–17)
APTT BLD: 25.5 SEC — LOW (ref 26.1–36.8)
AST SERPL-CCNC: 46 U/L — HIGH (ref 10–40)
BILIRUB SERPL-MCNC: 1.1 MG/DL — SIGNIFICANT CHANGE UP (ref 0.2–1.2)
BUN SERPL-MCNC: 14 MG/DL — SIGNIFICANT CHANGE UP (ref 7–23)
CALCIUM SERPL-MCNC: 8.6 MG/DL — SIGNIFICANT CHANGE UP (ref 8.4–10.5)
CHLORIDE SERPL-SCNC: 104 MMOL/L — SIGNIFICANT CHANGE UP (ref 96–108)
CO2 SERPL-SCNC: 19 MMOL/L — LOW (ref 22–31)
CREAT SERPL-MCNC: 1.06 MG/DL — SIGNIFICANT CHANGE UP (ref 0.5–1.3)
CYCLOSPORINE SER-MCNC: 155 NG/ML — SIGNIFICANT CHANGE UP (ref 150–400)
EGFR: 57 ML/MIN/1.73M2 — LOW
EGFR: 57 ML/MIN/1.73M2 — LOW
GLUCOSE BLDC GLUCOMTR-MCNC: 120 MG/DL — HIGH (ref 70–99)
GLUCOSE BLDC GLUCOMTR-MCNC: 134 MG/DL — HIGH (ref 70–99)
GLUCOSE BLDC GLUCOMTR-MCNC: 190 MG/DL — HIGH (ref 70–99)
GLUCOSE BLDC GLUCOMTR-MCNC: 197 MG/DL — HIGH (ref 70–99)
GLUCOSE BLDC GLUCOMTR-MCNC: 231 MG/DL — HIGH (ref 70–99)
GLUCOSE SERPL-MCNC: 121 MG/DL — HIGH (ref 70–99)
HCT VFR BLD CALC: 27.1 % — LOW (ref 34.5–45)
HGB BLD-MCNC: 8.8 G/DL — LOW (ref 11.5–15.5)
INR BLD: 1 RATIO — SIGNIFICANT CHANGE UP (ref 0.85–1.16)
MAGNESIUM SERPL-MCNC: 1.8 MG/DL — SIGNIFICANT CHANGE UP (ref 1.6–2.6)
MCHC RBC-ENTMCNC: 31 PG — SIGNIFICANT CHANGE UP (ref 27–34)
MCHC RBC-ENTMCNC: 32.5 G/DL — SIGNIFICANT CHANGE UP (ref 32–36)
MCV RBC AUTO: 95.4 FL — SIGNIFICANT CHANGE UP (ref 80–100)
NRBC BLD AUTO-RTO: 0 /100 WBCS — SIGNIFICANT CHANGE UP (ref 0–0)
PHOSPHATE SERPL-MCNC: 3.2 MG/DL — SIGNIFICANT CHANGE UP (ref 2.5–4.5)
PLATELET # BLD AUTO: 79 K/UL — LOW (ref 150–400)
POTASSIUM SERPL-MCNC: 4.3 MMOL/L — SIGNIFICANT CHANGE UP (ref 3.5–5.3)
POTASSIUM SERPL-SCNC: 4.3 MMOL/L — SIGNIFICANT CHANGE UP (ref 3.5–5.3)
PROT SERPL-MCNC: 4.9 G/DL — LOW (ref 6–8.3)
PROTHROM AB SERPL-ACNC: 11.4 SEC — SIGNIFICANT CHANGE UP (ref 9.9–13.4)
RBC # BLD: 2.84 M/UL — LOW (ref 3.8–5.2)
RBC # FLD: 20.7 % — HIGH (ref 10.3–14.5)
SODIUM SERPL-SCNC: 137 MMOL/L — SIGNIFICANT CHANGE UP (ref 135–145)
WBC # BLD: 5.61 K/UL — SIGNIFICANT CHANGE UP (ref 3.8–10.5)
WBC # FLD AUTO: 5.61 K/UL — SIGNIFICANT CHANGE UP (ref 3.8–10.5)

## 2025-06-11 RX ORDER — MAGNESIUM SULFATE 500 MG/ML
2 SYRINGE (ML) INJECTION ONCE
Refills: 0 | Status: COMPLETED | OUTPATIENT
Start: 2025-06-11 | End: 2025-06-11

## 2025-06-11 RX ORDER — LEVETIRACETAM 10 MG/ML
500 INJECTION, SOLUTION INTRAVENOUS
Refills: 0 | Status: DISCONTINUED | OUTPATIENT
Start: 2025-06-11 | End: 2025-06-12

## 2025-06-11 RX ORDER — INSULIN LISPRO 100 U/ML
12 INJECTION, SOLUTION INTRAVENOUS; SUBCUTANEOUS
Refills: 0 | Status: DISCONTINUED | OUTPATIENT
Start: 2025-06-12 | End: 2025-06-12

## 2025-06-11 RX ADMIN — Medication 2 TABLET(S): at 05:22

## 2025-06-11 RX ADMIN — MYCOPHENOLATE MOFETIL 1000 MILLIGRAM(S): 500 TABLET, FILM COATED ORAL at 07:59

## 2025-06-11 RX ADMIN — INSULIN LISPRO 2: 100 INJECTION, SOLUTION INTRAVENOUS; SUBCUTANEOUS at 08:00

## 2025-06-11 RX ADMIN — Medication 1 TABLET(S): at 17:06

## 2025-06-11 RX ADMIN — Medication 1 DOSE(S): at 17:07

## 2025-06-11 RX ADMIN — Medication 400 MILLIGRAM(S): at 17:06

## 2025-06-11 RX ADMIN — Medication 400 MILLIGRAM(S): at 12:11

## 2025-06-11 RX ADMIN — HEPARIN SODIUM 5000 UNIT(S): 1000 INJECTION INTRAVENOUS; SUBCUTANEOUS at 17:06

## 2025-06-11 RX ADMIN — Medication 81 MILLIGRAM(S): at 12:02

## 2025-06-11 RX ADMIN — Medication 40 MILLIGRAM(S): at 05:22

## 2025-06-11 RX ADMIN — Medication 400 MILLIGRAM(S): at 07:59

## 2025-06-11 RX ADMIN — CYCLOSPORINE 75 MILLIGRAM(S): 100 CAPSULE, LIQUID FILLED ORAL at 07:59

## 2025-06-11 RX ADMIN — Medication 25 GRAM(S): at 08:03

## 2025-06-11 RX ADMIN — INSULIN LISPRO 2: 100 INJECTION, SOLUTION INTRAVENOUS; SUBCUTANEOUS at 17:07

## 2025-06-11 RX ADMIN — MYCOPHENOLATE MOFETIL 1000 MILLIGRAM(S): 500 TABLET, FILM COATED ORAL at 20:35

## 2025-06-11 RX ADMIN — LEVETIRACETAM 500 MILLIGRAM(S): 10 INJECTION, SOLUTION INTRAVENOUS at 17:06

## 2025-06-11 RX ADMIN — Medication 1 DOSE(S): at 05:23

## 2025-06-11 RX ADMIN — INSULIN LISPRO 8 UNIT(S): 100 INJECTION, SOLUTION INTRAVENOUS; SUBCUTANEOUS at 12:05

## 2025-06-11 RX ADMIN — Medication 30 MILLIGRAM(S): at 12:03

## 2025-06-11 RX ADMIN — INSULIN LISPRO 4: 100 INJECTION, SOLUTION INTRAVENOUS; SUBCUTANEOUS at 12:01

## 2025-06-11 RX ADMIN — POLYETHYLENE GLYCOL 3350 17 GRAM(S): 17 POWDER, FOR SOLUTION ORAL at 12:03

## 2025-06-11 RX ADMIN — VALGANCICLOVIR 900 MILLIGRAM(S): 450 TABLET, FILM COATED ORAL at 12:04

## 2025-06-11 RX ADMIN — INSULIN LISPRO 10 UNIT(S): 100 INJECTION, SOLUTION INTRAVENOUS; SUBCUTANEOUS at 08:00

## 2025-06-11 RX ADMIN — Medication 1 TABLET(S): at 05:22

## 2025-06-11 RX ADMIN — INSULIN GLARGINE-YFGN 5 UNIT(S): 100 INJECTION, SOLUTION SUBCUTANEOUS at 21:44

## 2025-06-11 RX ADMIN — ENTECAVIR 0.5 MILLIGRAM(S): 0.5 TABLET ORAL at 12:03

## 2025-06-11 RX ADMIN — Medication 1 TABLET(S): at 12:03

## 2025-06-11 RX ADMIN — CYCLOSPORINE 50 MILLIGRAM(S): 100 CAPSULE, LIQUID FILLED ORAL at 20:35

## 2025-06-11 RX ADMIN — Medication 1 APPLICATION(S): at 05:22

## 2025-06-11 RX ADMIN — INSULIN LISPRO 5 UNIT(S): 100 INJECTION, SOLUTION INTRAVENOUS; SUBCUTANEOUS at 17:08

## 2025-06-11 RX ADMIN — PREDNISONE 20 MILLIGRAM(S): 20 TABLET ORAL at 05:22

## 2025-06-11 RX ADMIN — HEPARIN SODIUM 5000 UNIT(S): 1000 INJECTION INTRAVENOUS; SUBCUTANEOUS at 05:22

## 2025-06-11 RX ADMIN — Medication 400 MILLIGRAM(S): at 12:04

## 2025-06-11 NOTE — PROGRESS NOTE ADULT - ASSESSMENT
70 F hx NAFLD cirrhosis and HCC (listed for liver transplant with MELD 20B), UGIB in 2022, chronic back pain (2/2 spinal fracture) presented from [facility] where she was admitted on 5/26 for coffee ground emesis and melena, EGD showed no active bleeding, transferred to [facility] now s/p OLT on 5/31/2025     [] s/p OLT - POD 11  - HBV Core + donor: Entecavir  - Good graft function   - Diet: Consistent carb diet   - Strict I&O:  UO  - ASA 81   - Pain Management  - Bowel Regimen  - E.Faecium UTI, asymptomatic, Zosyn/Vanco completed  - OT/PT/RD /IS  - Hallucinations -->  holding FK, BH following; on seroquel, UA: Neg. keppra ppx     [] Immunosuppression: Simulect induction  - FK stopped, switched to Cyclo for mental status changes,  MMF 1/1, Pred 20  - PPx: Bactrim, Valcyte 450 (CMV +/-, inc 900mg as able), Fluc, PPI, OsCal    [] DM  - Endocrine team  following    [] HTN  - Nifedipine, adjust prn    [] DVT  -SQH bid  -SCDs at all times    [] DISPO  -d/c planning to Acute Rehab

## 2025-06-11 NOTE — PROGRESS NOTE ADULT - SUBJECTIVE AND OBJECTIVE BOX
Transplant Surgery - Multidisciplinary Rounds  --------------------------------------------------------------   Donor OLTx      Date:  Date: 2025      POD#11   CMV +/-    Present:   Patient seen and examined with multidisciplinary Transplant team including Surgeon Dr. Jim, Hepatologist Dr. Julian, Surgical fellow Chito, GUDELIA Kumari,  and bedside RN during AM rounds.   Disciplines not in attendance will be notified of the plan.     HPI: 70 F hx NAFLD cirrhosis and HCC (listed for liver transplant with MELD 20B), UGIB in , chronic back pain (2/2 spinal fracture) presented from [facility] where she was admitted on  for coffee ground emesis and melena, EGD showed no active bleeding, transferred to [facility] now s/p OLT on 2025     Interval Events:  - afebrile, vss  - liver doppler - patent vasc, decreased size of collection     Immunosuppression  Induction: Simulect completed  Maintenance: Cyclosporine by level, MMF , SST  Ongoing monitoring for signs of rejection    Potential Discharge date: TBD  Education:  Medications  Plan of care:  See Below      MEDICATIONS  (STANDING):  aspirin enteric coated 81 milliGRAM(s) Oral daily  calcium carbonate 1250 mG  + Vitamin D (OsCal 500 + D) 1 Tablet(s) Oral two times a day  chlorhexidine 2% Cloths 1 Application(s) Topical <User Schedule>  cycloSPORINE  , modified (GENGRAF) 50 milliGRAM(s) Oral <User Schedule>  cycloSPORINE  , modified (GENGRAF) 75 milliGRAM(s) Oral <User Schedule>  dextrose 5%. 1000 milliLiter(s) (50 mL/Hr) IV Continuous <Continuous>  dextrose 5%. 1000 milliLiter(s) (100 mL/Hr) IV Continuous <Continuous>  dextrose 50% Injectable 25 Gram(s) IV Push once  dextrose 50% Injectable 12.5 Gram(s) IV Push once  dextrose 50% Injectable 25 Gram(s) IV Push once  dextrose Oral Gel 15 Gram(s) Oral once  entecavir 0.5 milliGRAM(s) Oral daily  fluconAZOLE   Tablet 400 milliGRAM(s) Oral daily  fluticasone propionate/ salmeterol 100-50 MICROgram(s) Diskus 1 Dose(s) Inhalation two times a day  glucagon  Injectable 1 milliGRAM(s) IntraMuscular once  heparin   Injectable 5000 Unit(s) SubCutaneous every 12 hours  insulin glargine Injectable (LANTUS) 5 Unit(s) SubCutaneous at bedtime  insulin lispro (ADMELOG) corrective regimen sliding scale   SubCutaneous <User Schedule>  insulin lispro (ADMELOG) corrective regimen sliding scale   SubCutaneous Before meals and at bedtime  insulin lispro Injectable (ADMELOG) 5 Unit(s) SubCutaneous before dinner  insulin lispro Injectable (ADMELOG) 8 Unit(s) SubCutaneous before lunch  levETIRAcetam 500 milliGRAM(s) Oral two times a day  lidocaine 1% (Preservative-free) Injectable 10 milliLiter(s) Local Injection once  magnesium oxide 400 milliGRAM(s) Oral three times a day with meals  melatonin 5 milliGRAM(s) Oral at bedtime  mycophenolate mofetil 1000 milliGRAM(s) Oral <User Schedule>  NIFEdipine XL 30 milliGRAM(s) Oral every 24 hours  pantoprazole    Tablet 40 milliGRAM(s) Oral before breakfast  polyethylene glycol 3350 17 Gram(s) Oral daily  predniSONE   Tablet 20 milliGRAM(s) Oral daily  QUEtiapine 75 milliGRAM(s) Oral at bedtime  senna 2 Tablet(s) Oral two times a day  trimethoprim   80 mG/sulfamethoxazole 400 mG 1 Tablet(s) Oral daily  valGANciclovir 900 milliGRAM(s) Oral daily    MEDICATIONS  (PRN):  albuterol    90 MICROgram(s) HFA Inhaler 2 Puff(s) Inhalation every 6 hours PRN Bronchospasm  traMADol 25 milliGRAM(s) Oral every 6 hours PRN Moderate Pain (4 - 6)  traMADol 50 milliGRAM(s) Oral every 8 hours PRN Severe Pain (7 - 10)      PAST MEDICAL & SURGICAL HISTORY:  HCC (hepatocellular carcinoma)      DM (diabetes mellitus)      HTN (hypertension)      HLD (hyperlipidemia)      Hepatic cirrhosis      IVEY (nonalcoholic steatohepatitis)      Former smoker      Ascites      Hepatic encephalopathy      History of cervical cancer      H/O  section      History of cholecystectomy      H/O carpal tunnel repair      H/O cataract extraction          Vital Signs Last 24 Hrs  T(C): 36.6 (2025 13:03), Max: 37.1 (10 Albert 2025 21:22)  T(F): 97.8 (2025 13:03), Max: 98.7 (10 Albert 2025 21:22)  HR: 83 (2025 13:03) (81 - 100)  BP: 121/67 (2025 13:03) (121/67 - 145/67)  BP(mean): 92 (10 Albert 2025 21:) (92 - 92)  RR: 20 (2025 13:03) (17 - 20)  SpO2: 97% (:03) (96% - 100%)    Parameters below as of 2025 13:03  Patient On (Oxygen Delivery Method): room air        I&O's Summary    10 Albert 2025 07:01  -  2025 07:00  --------------------------------------------------------  IN: 780 mL / OUT: 1900 mL / NET: -1120 mL    2025 07:01  -  2025 15:34  --------------------------------------------------------  IN: 340 mL / OUT: 0 mL / NET: 340 mL                              8.8    5.61  )-----------( 79       ( 2025 06:59 )             27.1     11    137  |  104  |  14  ----------------------------<  121[H]  4.3   |  19[L]  |  1.06    Ca    8.6      2025 06:58  Phos  3.2     0611  Mg     1.8         TPro  4.9[L]  /  Alb  3.2[L]  /  TBili  1.1  /  DBili  x   /  AST  46[H]  /  ALT  77[H]  /  AlkPhos  101            Review of systems  All other systems were reviewed and are negative, except as noted.    PHYSICAL EXAM:  Constitutional: NAD  Eyes: PERRLA  ENMT: nc/at, no thrush  Neck: supple,   Respiratory: CTA B/L  Cardiovascular: RRR  Gastrointestinal: Soft abdomen, ND, appropriate incisional TTP. chevron incision c/d/i, staples in place. No signs of infection.   Genitourinary: voiding   Extremities: SCD's in place and working bilaterally  Vascular: Palpable dp pulses bilaterally.   Neurological: waxes and wanes  Skin: no rashes, ulcerations, lesions  Musculoskeletal:  moving extremities without issues  Psychiatric:  waxes and wanes.

## 2025-06-12 ENCOUNTER — TRANSCRIPTION ENCOUNTER (OUTPATIENT)
Age: 70
End: 2025-06-12

## 2025-06-12 ENCOUNTER — INPATIENT (INPATIENT)
Facility: HOSPITAL | Age: 70
LOS: 7 days | Discharge: HOME | DRG: 443 | End: 2025-06-20
Attending: INTERNAL MEDICINE | Admitting: INTERNAL MEDICINE
Payer: MEDICARE

## 2025-06-12 VITALS
DIASTOLIC BLOOD PRESSURE: 75 MMHG | TEMPERATURE: 97 F | RESPIRATION RATE: 16 BRPM | HEIGHT: 61 IN | HEART RATE: 75 BPM | WEIGHT: 139.77 LBS | SYSTOLIC BLOOD PRESSURE: 118 MMHG | OXYGEN SATURATION: 95 %

## 2025-06-12 VITALS
TEMPERATURE: 98 F | OXYGEN SATURATION: 100 % | HEART RATE: 85 BPM | RESPIRATION RATE: 18 BRPM | SYSTOLIC BLOOD PRESSURE: 117 MMHG | DIASTOLIC BLOOD PRESSURE: 56 MMHG

## 2025-06-12 DIAGNOSIS — Z98.49 CATARACT EXTRACTION STATUS, UNSPECIFIED EYE: Chronic | ICD-10-CM

## 2025-06-12 DIAGNOSIS — Z94.4 LIVER TRANSPLANT STATUS: ICD-10-CM

## 2025-06-12 DIAGNOSIS — Z90.49 ACQUIRED ABSENCE OF OTHER SPECIFIED PARTS OF DIGESTIVE TRACT: Chronic | ICD-10-CM

## 2025-06-12 DIAGNOSIS — Z98.891 HISTORY OF UTERINE SCAR FROM PREVIOUS SURGERY: Chronic | ICD-10-CM

## 2025-06-12 DIAGNOSIS — Z98.890 OTHER SPECIFIED POSTPROCEDURAL STATES: Chronic | ICD-10-CM

## 2025-06-12 LAB
ALBUMIN SERPL ELPH-MCNC: 2.9 G/DL — LOW (ref 3.3–5)
ALP SERPL-CCNC: 94 U/L — SIGNIFICANT CHANGE UP (ref 40–120)
ALT FLD-CCNC: 61 U/L — HIGH (ref 10–45)
ANION GAP SERPL CALC-SCNC: 9 MMOL/L — SIGNIFICANT CHANGE UP (ref 5–17)
APTT BLD: 25.4 SEC — LOW (ref 26.1–36.8)
AST SERPL-CCNC: 30 U/L — SIGNIFICANT CHANGE UP (ref 10–40)
BILIRUB SERPL-MCNC: 0.9 MG/DL — SIGNIFICANT CHANGE UP (ref 0.2–1.2)
BLD GP AB SCN SERPL QL: NEGATIVE — SIGNIFICANT CHANGE UP
BUN SERPL-MCNC: 16 MG/DL — SIGNIFICANT CHANGE UP (ref 7–23)
CALCIUM SERPL-MCNC: 8.7 MG/DL — SIGNIFICANT CHANGE UP (ref 8.4–10.5)
CHLORIDE SERPL-SCNC: 107 MMOL/L — SIGNIFICANT CHANGE UP (ref 96–108)
CO2 SERPL-SCNC: 21 MMOL/L — LOW (ref 22–31)
CREAT SERPL-MCNC: 1.14 MG/DL — SIGNIFICANT CHANGE UP (ref 0.5–1.3)
CYCLOSPORINE SER-MCNC: 107 NG/ML — LOW (ref 150–400)
EGFR: 52 ML/MIN/1.73M2 — LOW
EGFR: 52 ML/MIN/1.73M2 — LOW
GLUCOSE BLDC GLUCOMTR-MCNC: 109 MG/DL — HIGH (ref 70–99)
GLUCOSE BLDC GLUCOMTR-MCNC: 138 MG/DL — HIGH (ref 70–99)
GLUCOSE BLDC GLUCOMTR-MCNC: 140 MG/DL — HIGH (ref 70–99)
GLUCOSE BLDC GLUCOMTR-MCNC: 156 MG/DL — HIGH (ref 70–99)
GLUCOSE BLDC GLUCOMTR-MCNC: 171 MG/DL — HIGH (ref 70–99)
GLUCOSE SERPL-MCNC: 100 MG/DL — HIGH (ref 70–99)
HCT VFR BLD CALC: 27.4 % — LOW (ref 34.5–45)
HGB BLD-MCNC: 8.7 G/DL — LOW (ref 11.5–15.5)
INR BLD: 1.02 RATIO — SIGNIFICANT CHANGE UP (ref 0.85–1.16)
MAGNESIUM SERPL-MCNC: 1.9 MG/DL — SIGNIFICANT CHANGE UP (ref 1.6–2.6)
MCHC RBC-ENTMCNC: 29.9 PG — SIGNIFICANT CHANGE UP (ref 27–34)
MCHC RBC-ENTMCNC: 31.8 G/DL — LOW (ref 32–36)
MCV RBC AUTO: 94.2 FL — SIGNIFICANT CHANGE UP (ref 80–100)
NRBC BLD AUTO-RTO: 0 /100 WBCS — SIGNIFICANT CHANGE UP (ref 0–0)
PHOSPHATE SERPL-MCNC: 3.4 MG/DL — SIGNIFICANT CHANGE UP (ref 2.5–4.5)
PLATELET # BLD AUTO: 105 K/UL — LOW (ref 150–400)
POTASSIUM SERPL-MCNC: 4.4 MMOL/L — SIGNIFICANT CHANGE UP (ref 3.5–5.3)
POTASSIUM SERPL-SCNC: 4.4 MMOL/L — SIGNIFICANT CHANGE UP (ref 3.5–5.3)
PROT SERPL-MCNC: 4.5 G/DL — LOW (ref 6–8.3)
PROTHROM AB SERPL-ACNC: 11.7 SEC — SIGNIFICANT CHANGE UP (ref 9.9–13.4)
RBC # BLD: 2.91 M/UL — LOW (ref 3.8–5.2)
RBC # FLD: 21.4 % — HIGH (ref 10.3–14.5)
RH IG SCN BLD-IMP: POSITIVE — SIGNIFICANT CHANGE UP
SODIUM SERPL-SCNC: 137 MMOL/L — SIGNIFICANT CHANGE UP (ref 135–145)
WBC # BLD: 6.4 K/UL — SIGNIFICANT CHANGE UP (ref 3.8–10.5)
WBC # FLD AUTO: 6.4 K/UL — SIGNIFICANT CHANGE UP (ref 3.8–10.5)

## 2025-06-12 PROCEDURE — 70545 MR ANGIOGRAPHY HEAD W/DYE: CPT

## 2025-06-12 PROCEDURE — 86923 COMPATIBILITY TEST ELECTRIC: CPT

## 2025-06-12 PROCEDURE — 86663 EPSTEIN-BARR ANTIBODY: CPT

## 2025-06-12 PROCEDURE — 88307 TISSUE EXAM BY PATHOLOGIST: CPT

## 2025-06-12 PROCEDURE — 87116 MYCOBACTERIA CULTURE: CPT

## 2025-06-12 PROCEDURE — 85027 COMPLETE CBC AUTOMATED: CPT

## 2025-06-12 PROCEDURE — P9100: CPT

## 2025-06-12 PROCEDURE — 70553 MRI BRAIN STEM W/O & W/DYE: CPT

## 2025-06-12 PROCEDURE — 86704 HEP B CORE ANTIBODY TOTAL: CPT

## 2025-06-12 PROCEDURE — P9012: CPT

## 2025-06-12 PROCEDURE — 97161 PT EVAL LOW COMPLEX 20 MIN: CPT

## 2025-06-12 PROCEDURE — 82330 ASSAY OF CALCIUM: CPT

## 2025-06-12 PROCEDURE — 86803 HEPATITIS C AB TEST: CPT

## 2025-06-12 PROCEDURE — 0241U: CPT

## 2025-06-12 PROCEDURE — 85018 HEMOGLOBIN: CPT

## 2025-06-12 PROCEDURE — 88309 TISSUE EXAM BY PATHOLOGIST: CPT

## 2025-06-12 PROCEDURE — 85730 THROMBOPLASTIN TIME PARTIAL: CPT

## 2025-06-12 PROCEDURE — 87075 CULTR BACTERIA EXCEPT BLOOD: CPT

## 2025-06-12 PROCEDURE — 93325 DOPPLER ECHO COLOR FLOW MAPG: CPT

## 2025-06-12 PROCEDURE — 87102 FUNGUS ISOLATION CULTURE: CPT

## 2025-06-12 PROCEDURE — 80158 DRUG ASSAY CYCLOSPORINE: CPT

## 2025-06-12 PROCEDURE — 97530 THERAPEUTIC ACTIVITIES: CPT

## 2025-06-12 PROCEDURE — C1751: CPT

## 2025-06-12 PROCEDURE — 87015 SPECIMEN INFECT AGNT CONCNTJ: CPT

## 2025-06-12 PROCEDURE — 70450 CT HEAD/BRAIN W/O DYE: CPT

## 2025-06-12 PROCEDURE — 85384 FIBRINOGEN ACTIVITY: CPT

## 2025-06-12 PROCEDURE — 93005 ELECTROCARDIOGRAM TRACING: CPT

## 2025-06-12 PROCEDURE — 83605 ASSAY OF LACTIC ACID: CPT

## 2025-06-12 PROCEDURE — 87206 SMEAR FLUORESCENT/ACID STAI: CPT

## 2025-06-12 PROCEDURE — 86644 CMV ANTIBODY: CPT

## 2025-06-12 PROCEDURE — 36430 TRANSFUSION BLD/BLD COMPNT: CPT

## 2025-06-12 PROCEDURE — 86985 SPLIT BLOOD OR PRODUCTS: CPT

## 2025-06-12 PROCEDURE — 88313 SPECIAL STAINS GROUP 2: CPT

## 2025-06-12 PROCEDURE — 70544 MR ANGIOGRAPHY HEAD W/O DYE: CPT

## 2025-06-12 PROCEDURE — 70549 MR ANGIOGRAPH NECK W/O&W/DYE: CPT

## 2025-06-12 PROCEDURE — P9037: CPT

## 2025-06-12 PROCEDURE — 82247 BILIRUBIN TOTAL: CPT

## 2025-06-12 PROCEDURE — 87205 SMEAR GRAM STAIN: CPT

## 2025-06-12 PROCEDURE — A9585: CPT

## 2025-06-12 PROCEDURE — 97535 SELF CARE MNGMENT TRAINING: CPT

## 2025-06-12 PROCEDURE — 86787 VARICELLA-ZOSTER ANTIBODY: CPT

## 2025-06-12 PROCEDURE — 84295 ASSAY OF SERUM SODIUM: CPT

## 2025-06-12 PROCEDURE — 83735 ASSAY OF MAGNESIUM: CPT

## 2025-06-12 PROCEDURE — 86900 BLOOD TYPING SEROLOGIC ABO: CPT

## 2025-06-12 PROCEDURE — 80197 ASSAY OF TACROLIMUS: CPT

## 2025-06-12 PROCEDURE — 82803 BLOOD GASES ANY COMBINATION: CPT

## 2025-06-12 PROCEDURE — 80053 COMPREHEN METABOLIC PANEL: CPT

## 2025-06-12 PROCEDURE — 83036 HEMOGLOBIN GLYCOSYLATED A1C: CPT

## 2025-06-12 PROCEDURE — 84100 ASSAY OF PHOSPHORUS: CPT

## 2025-06-12 PROCEDURE — 36600 WITHDRAWAL OF ARTERIAL BLOOD: CPT

## 2025-06-12 PROCEDURE — 82947 ASSAY GLUCOSE BLOOD QUANT: CPT

## 2025-06-12 PROCEDURE — 93320 DOPPLER ECHO COMPLETE: CPT

## 2025-06-12 PROCEDURE — 87086 URINE CULTURE/COLONY COUNT: CPT

## 2025-06-12 PROCEDURE — C1889: CPT

## 2025-06-12 PROCEDURE — 76705 ECHO EXAM OF ABDOMEN: CPT

## 2025-06-12 PROCEDURE — 97166 OT EVAL MOD COMPLEX 45 MIN: CPT

## 2025-06-12 PROCEDURE — 86664 EPSTEIN-BARR NUCLEAR ANTIGEN: CPT

## 2025-06-12 PROCEDURE — 94640 AIRWAY INHALATION TREATMENT: CPT

## 2025-06-12 PROCEDURE — 87040 BLOOD CULTURE FOR BACTERIA: CPT

## 2025-06-12 PROCEDURE — 36415 COLL VENOUS BLD VENIPUNCTURE: CPT

## 2025-06-12 PROCEDURE — 85025 COMPLETE CBC W/AUTO DIFF WBC: CPT

## 2025-06-12 PROCEDURE — 82435 ASSAY OF BLOOD CHLORIDE: CPT

## 2025-06-12 PROCEDURE — 82977 ASSAY OF GGT: CPT

## 2025-06-12 PROCEDURE — 94002 VENT MGMT INPAT INIT DAY: CPT

## 2025-06-12 PROCEDURE — 86706 HEP B SURFACE ANTIBODY: CPT

## 2025-06-12 PROCEDURE — 87186 SC STD MICRODIL/AGAR DIL: CPT

## 2025-06-12 PROCEDURE — P9040: CPT

## 2025-06-12 PROCEDURE — 86665 EPSTEIN-BARR CAPSID VCA: CPT

## 2025-06-12 PROCEDURE — 86965 POOLING BLOOD PLATELETS: CPT

## 2025-06-12 PROCEDURE — 93975 VASCULAR STUDY: CPT

## 2025-06-12 PROCEDURE — 97116 GAIT TRAINING THERAPY: CPT

## 2025-06-12 PROCEDURE — 87389 HIV-1 AG W/HIV-1&-2 AB AG IA: CPT

## 2025-06-12 PROCEDURE — 81001 URINALYSIS AUTO W/SCOPE: CPT

## 2025-06-12 PROCEDURE — 85610 PROTHROMBIN TIME: CPT

## 2025-06-12 PROCEDURE — 85396 CLOTTING ASSAY WHOLE BLOOD: CPT

## 2025-06-12 PROCEDURE — 86880 COOMBS TEST DIRECT: CPT

## 2025-06-12 PROCEDURE — 87637 SARSCOV2&INF A&B&RSV AMP PRB: CPT

## 2025-06-12 PROCEDURE — 86850 RBC ANTIBODY SCREEN: CPT

## 2025-06-12 PROCEDURE — C1769: CPT

## 2025-06-12 PROCEDURE — 80202 ASSAY OF VANCOMYCIN: CPT

## 2025-06-12 PROCEDURE — 86891 AUTOLOGOUS BLOOD OP SALVAGE: CPT

## 2025-06-12 PROCEDURE — P9059: CPT

## 2025-06-12 PROCEDURE — 71045 X-RAY EXAM CHEST 1 VIEW: CPT

## 2025-06-12 PROCEDURE — P9011: CPT

## 2025-06-12 PROCEDURE — 87340 HEPATITIS B SURFACE AG IA: CPT

## 2025-06-12 PROCEDURE — 88304 TISSUE EXAM BY PATHOLOGIST: CPT

## 2025-06-12 PROCEDURE — 87522 HEPATITIS C REVRS TRNSCRPJ: CPT

## 2025-06-12 PROCEDURE — 86901 BLOOD TYPING SEROLOGIC RH(D): CPT

## 2025-06-12 PROCEDURE — 85014 HEMATOCRIT: CPT

## 2025-06-12 PROCEDURE — 97110 THERAPEUTIC EXERCISES: CPT

## 2025-06-12 PROCEDURE — 82565 ASSAY OF CREATININE: CPT

## 2025-06-12 PROCEDURE — 87070 CULTURE OTHR SPECIMN AEROBIC: CPT

## 2025-06-12 PROCEDURE — 97164 PT RE-EVAL EST PLAN CARE: CPT

## 2025-06-12 PROCEDURE — 82962 GLUCOSE BLOOD TEST: CPT

## 2025-06-12 PROCEDURE — 87077 CULTURE AEROBIC IDENTIFY: CPT

## 2025-06-12 PROCEDURE — 84132 ASSAY OF SERUM POTASSIUM: CPT

## 2025-06-12 PROCEDURE — P9045: CPT

## 2025-06-12 RX ORDER — FLUCONAZOLE 150 MG
400 TABLET ORAL DAILY
Refills: 0 | Status: DISCONTINUED | OUTPATIENT
Start: 2025-06-13 | End: 2025-06-18

## 2025-06-12 RX ORDER — MYCOPHENOLATE MOFETIL 500 MG/1
1000 TABLET, FILM COATED ORAL
Refills: 0 | Status: DISCONTINUED | OUTPATIENT
Start: 2025-06-12 | End: 2025-06-20

## 2025-06-12 RX ORDER — DEXTROSE 50 % IN WATER 50 %
12.5 SYRINGE (ML) INTRAVENOUS ONCE
Refills: 0 | Status: DISCONTINUED | OUTPATIENT
Start: 2025-06-12 | End: 2025-06-20

## 2025-06-12 RX ORDER — MELATONIN 5 MG
6 TABLET ORAL AT BEDTIME
Refills: 0 | Status: DISCONTINUED | OUTPATIENT
Start: 2025-06-12 | End: 2025-06-13

## 2025-06-12 RX ORDER — INSULIN LISPRO 100 U/ML
8 INJECTION, SOLUTION INTRAVENOUS; SUBCUTANEOUS
Qty: 0 | Refills: 0 | DISCHARGE
Start: 2025-06-12

## 2025-06-12 RX ORDER — LEVETIRACETAM 10 MG/ML
1 INJECTION, SOLUTION INTRAVENOUS
Qty: 0 | Refills: 0 | DISCHARGE
Start: 2025-06-12

## 2025-06-12 RX ORDER — ENTECAVIR 0.5 MG/1
0.5 TABLET ORAL DAILY
Refills: 0 | Status: DISCONTINUED | OUTPATIENT
Start: 2025-06-13 | End: 2025-06-20

## 2025-06-12 RX ORDER — MAGNESIUM OXIDE 400 MG
400 TABLET ORAL
Refills: 0 | Status: DISCONTINUED | OUTPATIENT
Start: 2025-06-12 | End: 2025-06-20

## 2025-06-12 RX ORDER — FLUCONAZOLE 150 MG
400 TABLET ORAL EVERY 24 HOURS
Refills: 0 | Status: DISCONTINUED | OUTPATIENT
Start: 2025-06-12 | End: 2025-06-12

## 2025-06-12 RX ORDER — QUETIAPINE FUMARATE 25 MG/1
3 TABLET ORAL
Qty: 0 | Refills: 0 | DISCHARGE
Start: 2025-06-12

## 2025-06-12 RX ORDER — NIFEDIPINE 30 MG
30 TABLET, EXTENDED RELEASE 24 HR ORAL DAILY
Refills: 0 | Status: DISCONTINUED | OUTPATIENT
Start: 2025-06-12 | End: 2025-06-20

## 2025-06-12 RX ORDER — TRAMADOL HYDROCHLORIDE 50 MG/1
25 TABLET, FILM COATED ORAL EVERY 6 HOURS
Refills: 0 | Status: DISCONTINUED | OUTPATIENT
Start: 2025-06-12 | End: 2025-06-13

## 2025-06-12 RX ORDER — INSULIN LISPRO 100 U/ML
12 INJECTION, SOLUTION INTRAVENOUS; SUBCUTANEOUS
Refills: 0 | Status: DISCONTINUED | OUTPATIENT
Start: 2025-06-13 | End: 2025-06-13

## 2025-06-12 RX ORDER — INSULIN LISPRO 100 U/ML
INJECTION, SOLUTION INTRAVENOUS; SUBCUTANEOUS
Refills: 0 | Status: DISCONTINUED | OUTPATIENT
Start: 2025-06-12 | End: 2025-06-20

## 2025-06-12 RX ORDER — CALCIUM CARBONATE/VITAMIN D3 500MG-5MCG
1 TABLET ORAL
Refills: 0 | Status: DISCONTINUED | OUTPATIENT
Start: 2025-06-12 | End: 2025-06-20

## 2025-06-12 RX ORDER — INSULIN LISPRO 100 U/ML
5 INJECTION, SOLUTION INTRAVENOUS; SUBCUTANEOUS
Qty: 0 | Refills: 0 | DISCHARGE
Start: 2025-06-12

## 2025-06-12 RX ORDER — NIFEDIPINE 30 MG
1 TABLET, EXTENDED RELEASE 24 HR ORAL
Qty: 0 | Refills: 0 | DISCHARGE
Start: 2025-06-12

## 2025-06-12 RX ORDER — SODIUM CHLORIDE 9 G/1000ML
1000 INJECTION, SOLUTION INTRAVENOUS
Refills: 0 | Status: DISCONTINUED | OUTPATIENT
Start: 2025-06-12 | End: 2025-06-20

## 2025-06-12 RX ORDER — ENTECAVIR 0.5 MG/1
1 TABLET ORAL
Qty: 0 | Refills: 0 | DISCHARGE
Start: 2025-06-12

## 2025-06-12 RX ORDER — LEVETIRACETAM 10 MG/ML
500 INJECTION, SOLUTION INTRAVENOUS
Refills: 0 | Status: DISCONTINUED | OUTPATIENT
Start: 2025-06-12 | End: 2025-06-20

## 2025-06-12 RX ORDER — GLUCAGON 3 MG/1
1 POWDER NASAL ONCE
Refills: 0 | Status: DISCONTINUED | OUTPATIENT
Start: 2025-06-12 | End: 2025-06-20

## 2025-06-12 RX ORDER — SUMATRIPTAN 100 MG/1
100 TABLET, FILM COATED ORAL
Refills: 0 | Status: DISCONTINUED | OUTPATIENT
Start: 2025-06-12 | End: 2025-06-13

## 2025-06-12 RX ORDER — POLYETHYLENE GLYCOL 3350 17 G/17G
17 POWDER, FOR SOLUTION ORAL DAILY
Refills: 0 | Status: DISCONTINUED | OUTPATIENT
Start: 2025-06-12 | End: 2025-06-16

## 2025-06-12 RX ORDER — CYCLOSPORINE 100 MG/1
75 CAPSULE, LIQUID FILLED ORAL ONCE
Refills: 0 | Status: DISCONTINUED | OUTPATIENT
Start: 2025-06-12 | End: 2025-06-13

## 2025-06-12 RX ORDER — INSULIN GLARGINE-YFGN 100 [IU]/ML
5 INJECTION, SOLUTION SUBCUTANEOUS
Qty: 0 | Refills: 0 | DISCHARGE
Start: 2025-06-12

## 2025-06-12 RX ORDER — TRAMADOL HYDROCHLORIDE 50 MG/1
50 TABLET, FILM COATED ORAL EVERY 6 HOURS
Refills: 0 | Status: DISCONTINUED | OUTPATIENT
Start: 2025-06-12 | End: 2025-06-13

## 2025-06-12 RX ORDER — VALGANCICLOVIR 450 MG/1
900 TABLET, FILM COATED ORAL DAILY
Refills: 0 | Status: DISCONTINUED | OUTPATIENT
Start: 2025-06-13 | End: 2025-06-20

## 2025-06-12 RX ORDER — SENNA 187 MG
2 TABLET ORAL
Qty: 0 | Refills: 0 | DISCHARGE
Start: 2025-06-12

## 2025-06-12 RX ORDER — SENNA 187 MG
2 TABLET ORAL AT BEDTIME
Refills: 0 | Status: DISCONTINUED | OUTPATIENT
Start: 2025-06-12 | End: 2025-06-20

## 2025-06-12 RX ORDER — FLUCONAZOLE 150 MG
2 TABLET ORAL
Qty: 0 | Refills: 0 | DISCHARGE
Start: 2025-06-12

## 2025-06-12 RX ORDER — CYCLOSPORINE 100 MG/1
75 CAPSULE, LIQUID FILLED ORAL
Refills: 0 | Status: DISCONTINUED | OUTPATIENT
Start: 2025-06-13 | End: 2025-06-13

## 2025-06-12 RX ORDER — CYCLOSPORINE 100 MG/1
75 CAPSULE, LIQUID FILLED ORAL
Refills: 0 | Status: DISCONTINUED | OUTPATIENT
Start: 2025-06-12 | End: 2025-06-12

## 2025-06-12 RX ORDER — INSULIN LISPRO 100 U/ML
INJECTION, SOLUTION INTRAVENOUS; SUBCUTANEOUS AT BEDTIME
Refills: 0 | Status: DISCONTINUED | OUTPATIENT
Start: 2025-06-12 | End: 2025-06-20

## 2025-06-12 RX ORDER — ASPIRIN 325 MG
1 TABLET ORAL
Qty: 0 | Refills: 0 | DISCHARGE
Start: 2025-06-12

## 2025-06-12 RX ORDER — QUETIAPINE FUMARATE 25 MG/1
75 TABLET ORAL AT BEDTIME
Refills: 0 | Status: DISCONTINUED | OUTPATIENT
Start: 2025-06-12 | End: 2025-06-20

## 2025-06-12 RX ORDER — INSULIN LISPRO 100 U/ML
8 INJECTION, SOLUTION INTRAVENOUS; SUBCUTANEOUS
Refills: 0 | Status: DISCONTINUED | OUTPATIENT
Start: 2025-06-13 | End: 2025-06-20

## 2025-06-12 RX ORDER — VALGANCICLOVIR 450 MG/1
2 TABLET, FILM COATED ORAL
Qty: 0 | Refills: 0 | DISCHARGE
Start: 2025-06-12

## 2025-06-12 RX ORDER — DEXTROSE 50 % IN WATER 50 %
15 SYRINGE (ML) INTRAVENOUS ONCE
Refills: 0 | Status: DISCONTINUED | OUTPATIENT
Start: 2025-06-12 | End: 2025-06-20

## 2025-06-12 RX ORDER — MAGNESIUM SULFATE 500 MG/ML
1 SYRINGE (ML) INJECTION ONCE
Refills: 0 | Status: COMPLETED | OUTPATIENT
Start: 2025-06-12 | End: 2025-06-12

## 2025-06-12 RX ORDER — INSULIN GLARGINE-YFGN 100 [IU]/ML
5 INJECTION, SOLUTION SUBCUTANEOUS AT BEDTIME
Refills: 0 | Status: DISCONTINUED | OUTPATIENT
Start: 2025-06-12 | End: 2025-06-20

## 2025-06-12 RX ORDER — SULFAMETHOXAZOLE/TRIMETHOPRIM 800-160 MG
1 TABLET ORAL DAILY
Refills: 0 | Status: DISCONTINUED | OUTPATIENT
Start: 2025-06-13 | End: 2025-06-20

## 2025-06-12 RX ORDER — ASPIRIN 325 MG
81 TABLET ORAL DAILY
Refills: 0 | Status: DISCONTINUED | OUTPATIENT
Start: 2025-06-12 | End: 2025-06-20

## 2025-06-12 RX ORDER — ALBUTEROL SULFATE 2.5 MG/3ML
2 VIAL, NEBULIZER (ML) INHALATION EVERY 6 HOURS
Refills: 0 | Status: DISCONTINUED | OUTPATIENT
Start: 2025-06-12 | End: 2025-06-20

## 2025-06-12 RX ORDER — SUMATRIPTAN 100 MG/1
25 TABLET, FILM COATED ORAL ONCE
Refills: 0 | Status: COMPLETED | OUTPATIENT
Start: 2025-06-12 | End: 2025-06-12

## 2025-06-12 RX ORDER — PREDNISONE 20 MG/1
1 TABLET ORAL
Qty: 0 | Refills: 0 | DISCHARGE
Start: 2025-06-12

## 2025-06-12 RX ORDER — CYCLOSPORINE 100 MG/1
3 CAPSULE, LIQUID FILLED ORAL
Qty: 0 | Refills: 0 | DISCHARGE
Start: 2025-06-12

## 2025-06-12 RX ORDER — HEPARIN SODIUM 1000 [USP'U]/ML
5000 INJECTION INTRAVENOUS; SUBCUTANEOUS EVERY 12 HOURS
Refills: 0 | Status: DISCONTINUED | OUTPATIENT
Start: 2025-06-12 | End: 2025-06-20

## 2025-06-12 RX ORDER — INSULIN LISPRO 100 U/ML
5 INJECTION, SOLUTION INTRAVENOUS; SUBCUTANEOUS
Refills: 0 | Status: DISCONTINUED | OUTPATIENT
Start: 2025-06-13 | End: 2025-06-20

## 2025-06-12 RX ORDER — PREDNISONE 20 MG/1
20 TABLET ORAL DAILY
Refills: 0 | Status: DISCONTINUED | OUTPATIENT
Start: 2025-06-12 | End: 2025-06-18

## 2025-06-12 RX ORDER — DEXTROSE 50 % IN WATER 50 %
25 SYRINGE (ML) INTRAVENOUS ONCE
Refills: 0 | Status: DISCONTINUED | OUTPATIENT
Start: 2025-06-12 | End: 2025-06-20

## 2025-06-12 RX ORDER — CALCIUM CARBONATE/VITAMIN D3 500MG-5MCG
1 TABLET ORAL
Qty: 0 | Refills: 0 | DISCHARGE
Start: 2025-06-12

## 2025-06-12 RX ORDER — SULFAMETHOXAZOLE/TRIMETHOPRIM 800-160 MG
1 TABLET ORAL
Qty: 0 | Refills: 0 | DISCHARGE
Start: 2025-06-12

## 2025-06-12 RX ORDER — MAGNESIUM OXIDE 400 MG
1 TABLET ORAL
Qty: 0 | Refills: 0 | DISCHARGE
Start: 2025-06-12

## 2025-06-12 RX ORDER — MYCOPHENOLATE MOFETIL 500 MG/1
1000 TABLET, FILM COATED ORAL
Qty: 0 | Refills: 0 | DISCHARGE
Start: 2025-06-12

## 2025-06-12 RX ADMIN — Medication 400 MILLIGRAM(S): at 17:03

## 2025-06-12 RX ADMIN — VALGANCICLOVIR 900 MILLIGRAM(S): 450 TABLET, FILM COATED ORAL at 11:21

## 2025-06-12 RX ADMIN — LEVETIRACETAM 500 MILLIGRAM(S): 10 INJECTION, SOLUTION INTRAVENOUS at 05:43

## 2025-06-12 RX ADMIN — INSULIN LISPRO 8 UNIT(S): 100 INJECTION, SOLUTION INTRAVENOUS; SUBCUTANEOUS at 12:14

## 2025-06-12 RX ADMIN — Medication 6 MILLIGRAM(S): at 23:33

## 2025-06-12 RX ADMIN — Medication 1 DOSE(S): at 17:05

## 2025-06-12 RX ADMIN — QUETIAPINE FUMARATE 75 MILLIGRAM(S): 25 TABLET ORAL at 23:34

## 2025-06-12 RX ADMIN — Medication 81 MILLIGRAM(S): at 11:21

## 2025-06-12 RX ADMIN — Medication 400 MILLIGRAM(S): at 11:21

## 2025-06-12 RX ADMIN — INSULIN GLARGINE-YFGN 5 UNIT(S): 100 INJECTION, SOLUTION SUBCUTANEOUS at 22:21

## 2025-06-12 RX ADMIN — Medication 30 MILLIGRAM(S): at 11:21

## 2025-06-12 RX ADMIN — SUMATRIPTAN 25 MILLIGRAM(S): 100 TABLET, FILM COATED ORAL at 16:55

## 2025-06-12 RX ADMIN — Medication 2 TABLET(S): at 05:43

## 2025-06-12 RX ADMIN — Medication 1 TABLET(S): at 05:43

## 2025-06-12 RX ADMIN — TRAMADOL HYDROCHLORIDE 50 MILLIGRAM(S): 50 TABLET, FILM COATED ORAL at 03:26

## 2025-06-12 RX ADMIN — TRAMADOL HYDROCHLORIDE 50 MILLIGRAM(S): 50 TABLET, FILM COATED ORAL at 04:26

## 2025-06-12 RX ADMIN — Medication 2 TABLET(S): at 23:33

## 2025-06-12 RX ADMIN — Medication 1 DOSE(S): at 07:55

## 2025-06-12 RX ADMIN — INSULIN LISPRO 5 UNIT(S): 100 INJECTION, SOLUTION INTRAVENOUS; SUBCUTANEOUS at 17:05

## 2025-06-12 RX ADMIN — INSULIN LISPRO 2: 100 INJECTION, SOLUTION INTRAVENOUS; SUBCUTANEOUS at 08:23

## 2025-06-12 RX ADMIN — Medication 400 MILLIGRAM(S): at 08:23

## 2025-06-12 RX ADMIN — TRAMADOL HYDROCHLORIDE 50 MILLIGRAM(S): 50 TABLET, FILM COATED ORAL at 21:35

## 2025-06-12 RX ADMIN — INSULIN LISPRO 12 UNIT(S): 100 INJECTION, SOLUTION INTRAVENOUS; SUBCUTANEOUS at 08:23

## 2025-06-12 RX ADMIN — POLYETHYLENE GLYCOL 3350 17 GRAM(S): 17 POWDER, FOR SOLUTION ORAL at 11:21

## 2025-06-12 RX ADMIN — HEPARIN SODIUM 5000 UNIT(S): 1000 INJECTION INTRAVENOUS; SUBCUTANEOUS at 05:43

## 2025-06-12 RX ADMIN — Medication 40 MILLIGRAM(S): at 05:43

## 2025-06-12 RX ADMIN — ENTECAVIR 0.5 MILLIGRAM(S): 0.5 TABLET ORAL at 11:21

## 2025-06-12 RX ADMIN — MYCOPHENOLATE MOFETIL 1000 MILLIGRAM(S): 500 TABLET, FILM COATED ORAL at 23:33

## 2025-06-12 RX ADMIN — HEPARIN SODIUM 5000 UNIT(S): 1000 INJECTION INTRAVENOUS; SUBCUTANEOUS at 17:04

## 2025-06-12 RX ADMIN — PREDNISONE 20 MILLIGRAM(S): 20 TABLET ORAL at 05:43

## 2025-06-12 RX ADMIN — Medication 400 MILLIGRAM(S): at 12:14

## 2025-06-12 RX ADMIN — INSULIN LISPRO 2: 100 INJECTION, SOLUTION INTRAVENOUS; SUBCUTANEOUS at 17:04

## 2025-06-12 RX ADMIN — Medication 1 TABLET(S): at 17:03

## 2025-06-12 RX ADMIN — CYCLOSPORINE 75 MILLIGRAM(S): 100 CAPSULE, LIQUID FILLED ORAL at 08:22

## 2025-06-12 RX ADMIN — MYCOPHENOLATE MOFETIL 1000 MILLIGRAM(S): 500 TABLET, FILM COATED ORAL at 08:22

## 2025-06-12 RX ADMIN — Medication 1 APPLICATION(S): at 05:43

## 2025-06-12 RX ADMIN — TRAMADOL HYDROCHLORIDE 50 MILLIGRAM(S): 50 TABLET, FILM COATED ORAL at 22:30

## 2025-06-12 RX ADMIN — LEVETIRACETAM 500 MILLIGRAM(S): 10 INJECTION, SOLUTION INTRAVENOUS at 17:03

## 2025-06-12 RX ADMIN — TRAMADOL HYDROCHLORIDE 50 MILLIGRAM(S): 50 TABLET, FILM COATED ORAL at 11:27

## 2025-06-12 RX ADMIN — Medication 1 TABLET(S): at 11:21

## 2025-06-12 NOTE — PROGRESS NOTE ADULT - PROVIDER SPECIALTY LIST ADULT
SICU
SICU
Transplant Surgery
Endocrinology
Pharmacy
Transplant Hepatology
Transplant Hepatology
Transplant Surgery
Transplant ID
Transplant Surgery
Endocrinology
Neurology
Endocrinology
Endocrinology

## 2025-06-12 NOTE — H&P ADULT - NSHPLABSRESULTS_GEN_ALL_CORE
US Trans Liver w/ Doppler 6/10 @ 1600  IMPRESSION:    Status post liver transplant with intact vasculature.  Interval decrease in the velocity through the proper hepatic artery.  Decrease in size of perihepatic fluid collection compared to prior.    US Trans Liver w/ Doppler 6/1 @ 2159  IMPRESSION:  Postop day 1 status post orthotopic liver transplant with patent hepatic   vasculature.    Elevated peak systolic velocities in the proper hepatic artery, similarly   seen on ultrasound from 5/31/2025.      US Trans Liver w/ Doppler 5/31 @ 1348  IMPRESSION:  Status post liver transplant with patent vasculature. Elevated peak   systolic velocities in the proper hepatic artery.      MR Angio Neck w/wo IV Cont 5/30 @ 1704   IMPRESSION:    1.  MR brain: No acute intracranial abnormality. Severe chronic   microvascular ischemic disease. Old small multifocal infarcts as detailed   above. Mild diffuse cerebral volume loss.  2.  MRA head: No large vessel occlusion or aneurysm. Multifocal   intracranial stenoses as detailed above, likely sequela of   atherosclerotic disease.  3.  MRA NECK: No occlusion, aneurysm, or hemodynamically significant   stenosis.  4.  MRV head: No dural venous sinus thrombosis.    CT Head No Cont 5/39 @ 1407  IMPRESSION:    No evidence of acute intracranial abnormality.  No evidence of hemorrhage.    Chronic changes as above.    Complete Blood Count STAT (06.12.25 @ 07:03)   Auto NRBC: 0 /100 WBCs  WBC Count: 6.40 K/uL  RBC Count: 2.91 M/uL  Hemoglobin: 8.7 g/dL  Hematocrit: 27.4 %  Mean Cell Volume: 94.2 fl  Mean Cell Hemoglobin: 29.9 pg  Mean Cell Hemoglobin Conc: 31.8 g/dL  Red Cell Distrib Width: 21.4 %  Platelet Count - Automated: 105 K/uL      Comprehensive Metabolic Panel (06.12.25 @ 06:51)   Sodium: 137 mmol/L  Potassium: 4.4 mmol/L  Chloride: 107 mmol/L  Carbon Dioxide: 21 mmol/L  Anion Gap: 9 mmol/L  Blood Urea Nitrogen: 16 mg/dL  Creatinine: 1.14 mg/dL  Glucose: 100 mg/dL  Calcium: 8.7 mg/dL  Protein Total: 4.5 g/dL  Albumin: 2.9 g/dL  Bilirubin Total: 0.9 mg/dL  Alkaline Phosphatase: 94 U/L  Aspartate Aminotransferase (AST/SGOT): 30 U/L  Alanine Aminotransferase (ALT/SGPT): 61 U/L  eGFR: 52: The estimated glomerular filtration rate (eGFR) calculation is based on   the 2021 CKD-EPI creatinine equation, which is validated in male and   female population 18 years of age and older (N Engl J Med 2021;   385:9195-1091). mL/min/1.73m2

## 2025-06-12 NOTE — H&P ADULT - ATTENDING COMMENTS
I have personally seen and examined the patient.  I fully participated in the care of this patient.  I have made amendments to the documentation where necessary, and agree with the history, physical exam, and plan as documented by the Resident.       71 y/o. F, Hx as noted  Acute rehab admission post Liver transplant 5/31, post op UTI, Hypoglycemia, Mild transaminitis and low platelet with SCOTT treated. Acute rehab admission 6/12    Reports good family support  Long hx of migrate with light sensitivity, MRI brain unremarkable  Independent with ADLs without device at baseline    Currently has decreased ambulatory distance, and impaired dynamic balance   Good muscle strength    EXAM  AAO x 3  Tone normal  MMT UE 5/5 LE 4/5  T incision with staples on abdomen    RECENT LABS/IMAGING                        8.3    4.78  )-----------( 91       ( 13 Jun 2025 06:35 )             25.3     06-13    141  |  108  |  18  ----------------------------<  135[H]  4.2   |  25  |  1.31[H]    Ca    8.4      13 Jun 2025 06:35  Phos  3.4     06-12  Mg     1.9     06-12    TPro  4.6[L]  /  Alb  2.6[L]  /  TBili  1.0  /  DBili  x   /  AST  25  /  ALT  57[H]  /  AlkPhos  89  06-13    PT/INR - ( 12 Jun 2025 07:11 )   PT: 11.7 sec;   INR: 1.02 ratio         PTT - ( 12 Jun 2025 07:11 )  PTT:25.4 sec  Urinalysis Basic - ( 13 Jun 2025 06:35 )    Color: x / Appearance: x / SG: x / pH: x  Gluc: 135 mg/dL / Ketone: x  / Bili: x / Urobili: x   Blood: x / Protein: x / Nitrite: x   Leuk Esterase: x / RBC: x / WBC x   Sq Epi: x / Non Sq Epi: x / Bacteria: x      Dx  Debility 2/2 Liver transplant due to decompensative liver disease  Commence therapy    Labs discussed Cr elevated, with concentrated urine--f/u UA and Repeat CBC BMP tomorrow, if Cr still rising, will get renal consult  Transaminitis--monitor LFT  Monitoring of [place] level in liaison with transplant team  Continue all supportive treatment  Hospitalist f/u for chronic med conditions   Liaison with transplant team  Collateral hx to be obtained  Est dc approx 7-10 days

## 2025-06-12 NOTE — PROGRESS NOTE ADULT - REASON FOR ADMISSION
Yamile
khris
krhis
khris

## 2025-06-12 NOTE — PATIENT PROFILE ADULT - HOW PATIENT ADDRESSED, PROFILE
Kaitlin 
able to follow multistep instructions/able to follow single-step instructions/100% of the time

## 2025-06-12 NOTE — DISCHARGE NOTE PROVIDER - HOSPITAL COURSE
70 F hx NAFLD cirrhosis and HCC (listed for liver transplant with MELD 20B), UGIB in 2022, chronic back pain (2/2 spinal fracture), IDDM presented from [facility] where she was admitted on 5/26 for coffee ground emesis and melena, EGD showed no active bleeding, patient was transfused and stabilized prior to transfer.    Patient is now s/p BDD OLT under Simulect induction on 5/31/2025.     Liver Doppler patent vasculature. Liver enzymes trended down appropriately.  ASA was started for PPx.      Pt was downgraded from ICU care and progressed well from a surgical perspective. Tolerated regular diet, had bowel function, had good pain control with minimal narcotics, and ambulated well with PT.    He/She tolerated all of the new immunosuppression medication regimen.  Medication/diet education was provided regularly by Transplant Pharmacy and Nutrition teams.    He/She was followed closely by our multidisciplinary transplant team:  Surgeons, Hepatologists, ID, Nephrologists, Pharmacists, [place], ACPs, RNs, SW, Coordinators, Dieticians, PT/OT and was deemed safe for discharge home with the following plan:     D/C plan:  [] s/p OLT   - Liver doppler- patent vasc   - Donor HBV Core Ab +, on entecavir 0.5mg  - CMV +/-, Valcyte 900   - JPx3 removed     [] IMMUNO  - Completed Simulect  - Cyclo 75/75, MMF 1g BID, pred 20 daily   - PPX: fluc, bactrim, valcyte, ASA 81   - protonix, magOx TID, oscal     [] AMS/Hallucinations/ Hx Depression  - FK discontinued, switched to cyclo on 6/9/2025  - MS improved, on keppra for PPX   - Per : seroquel 75, monitoring QTC (6/3 446)    [] Asymptomatic 10-49K EFaecium UTI  - Completed course of Zosyn/Vanco x48hrs    [] Headaches  - CT head neg, 5/30 MRH neg, MRA H/N- multifocal stenosis   - Neuro (HA) -Reglan 10mg IV [id number] prn, Benadryl 25mg IV [id number] prn akathisia/dystonia, and/or Tylenol 500mg IV.  --If sumatriptan desired, would give as 100mg PO BID prn (no more than 2 doses in a day and 3 doses/week), received 25mg doses inpatient    [] HTN  - nifedipine 30mg QD    [] DM  - Endo following 6/3  - Lantus 5U, Premeal 12U breakfast - 8U Lunch - 5U Dinner      ======================  Follow-up:    1. Discharge to [facility] with Follow up by Transplant Surgeon  2. Follow up with Dr. Hatfield / Psych Clinic  3. Follow up with Neurology for further headache management plan          70 F hx NAFLD cirrhosis and HCC (listed for liver transplant with MELD 20B), UGIB in 2022, chronic back pain (2/2 spinal fracture), IDDM presented from [facility] where she was admitted on 5/26 for coffee ground emesis and melena, EGD showed no active bleeding, patient was transfused and stabilized prior to transfer.    Patient is now s/p BDD OLT under Simulect induction on 5/31/2025.     Liver Doppler patent vasculature. Liver enzymes trended down appropriately.  ASA was started for PPx.      Pt was downgraded from ICU care and progressed well from a surgical perspective. Tolerated regular diet, had bowel function, had good pain control with minimal narcotics, and ambulated well with PT.    He/She tolerated all of the new immunosuppression medication regimen.  Medication/diet education was provided regularly by Transplant Pharmacy and Nutrition teams.    He/She was followed closely by our multidisciplinary transplant team:  Surgeons, Hepatologists, ID, Nephrologists, Pharmacists, [place], ACPs, RNs, SW, Coordinators, Dieticians, PT/OT and was deemed safe for discharge home with the following plan:     D/C plan:  [] s/p OLT   - Liver doppler- patent vasc   - Donor HBV Core Ab +, on entecavir 0.5mg  - CMV +/-, Valcyte 900   - JPx3 removed     [] IMMUNO  - Completed Simulect  - Cyclo 75/75, MMF 1g BID, pred 20 daily   - PPX: fluc, bactrim, valcyte, ASA 81   - protonix, magOx TID, oscal     [] AMS/Hallucinations/ Hx Depression  - FK discontinued, switched to cyclo on 6/9/2025  - MS improved, on keppra for PPX   - Per : seroquel 75, monitoring QTC (6/3 446, 6/6 423)    [] Asymptomatic 10-49K EFaecium UTI  - Completed course of Zosyn/Vanco x48hrs    [] Headaches  - CT head neg, 5/30 MRH neg, MRA H/N- multifocal stenosis   - Neuro (HA) -Reglan 10mg IV [id number] prn, Benadryl 25mg IV [id number] prn akathisia/dystonia, and/or Tylenol 500mg IV.  --If sumatriptan desired, would give as 100mg PO BID prn (no more than 2 doses in a day and 3 doses/week), received 25mg doses inpatient PRN    [] HTN  - nifedipine 30mg QD    [] DM  - Endo following 6/3  - Lantus 5U, Premeal 12U breakfast - 8U Lunch - 5U Dinner      ======================  Follow-up:    1. Discharge to [facility] with Follow up by Transplant Surgeon  2. Follow up with Dr. Hatfield / Psych Clinic  3. Follow up with Neurology for further headache management plan          70 F hx NAFLD cirrhosis and HCC (listed for liver transplant with MELD 20B), UGIB in 2022, chronic back pain (2/2 spinal fracture), IDDM presented from [facility] where she was admitted on 5/26 for coffee ground emesis and melena, EGD showed no active bleeding, patient was transfused and stabilized prior to transfer.    Patient is now s/p BDD OLT under Simulect induction on 5/31/2025.     Liver Doppler patent vasculature. Liver enzymes trended down appropriately.  ASA was started for PPx.      Pt was downgraded from ICU care and progressed well from a surgical perspective. Tolerated regular diet, had bowel function, had good pain control with minimal narcotics, and ambulated well with PT.    He/She tolerated all of the new immunosuppression medication regimen.  Medication/diet education was provided regularly by Transplant Pharmacy and Nutrition teams.    He/She was followed closely by our multidisciplinary transplant team:  Surgeons, Hepatologists, ID, Nephrologists, Pharmacists, [place], ACPs, RNs, SW, Coordinators, Dieticians, PT/OT and was deemed safe for discharge home with the following plan:     D/C plan:  [] s/p OLT   - Liver doppler- patent vasc   - Donor HBV Core Ab +, on entecavir 0.5mg  - CMV +/-, Valcyte 900   - JPx3 removed     [] IMMUNO  - Completed Simulect  - Cyclo 75/75, MMF 1g BID, pred 20 daily   - PPX: fluc, bactrim, valcyte, ASA 81   - protonix, magOx TID, oscal     [] AMS/Hallucinations/ Hx Depression  - FK discontinued, switched to cyclo on 6/9/2025  - MS improved, on keppra for PPX   - Per : seroquel 75, monitoring QTC (6/3 446, 6/6 423)    [] Asymptomatic 10-49K EFaecium UTI  - Completed course of Zosyn/Vanco x48hrs    [] Headaches  - CT head neg, 5/30 MRH neg, MRA H/N- multifocal stenosis   - Neuro (HA) -Reglan 10mg IV [id number] prn, Benadryl 25mg IV [id number] prn akathisia/dystonia, and/or Tylenol 500mg IV.  --If sumatriptan desired, would give as 100mg PO BID prn (no more than 2 doses in a day and 3 doses/week), received 25mg doses inpatient PRN    [] HTN  - nifedipine 30mg QD    [] DM  - Endo following 6/3  - Lantus 5U, Premeal 12U breakfast - 8U Lunch - 5U Dinner      ======================  Follow-up:    1. Discharge to [facility] with Follow up by Transplant Surgeon  2. Follow up with Dr. Hatfield / Psych Clinic  3. Follow up with Neurology for further headache management plan         **SCOTT with likely ATN  **acute delirium 2/2 tacrolimus, switched to cyclosporine with relief

## 2025-06-12 NOTE — PATIENT PROFILE ADULT - PATIENT'S PREFERRED PRONOUN
Detail Level: Detailed Quality 431: Preventive Care And Screening: Unhealthy Alcohol Use - Screening: Patient screened for unhealthy alcohol use using a single question and scores less than 2 times per year Quality 226: Preventive Care And Screening: Tobacco Use: Screening And Cessation Intervention: Patient screened for tobacco use and is an ex/non-smoker Quality 130: Documentation Of Current Medications In The Medical Record: Current Medications Documented Her/She

## 2025-06-12 NOTE — DISCHARGE NOTE NURSING/CASE MANAGEMENT/SOCIAL WORK - NSDCFUADDAPPT_GEN_ALL_CORE_FT
Follow-up:    1. Discharge to [facility] with Follow up by Transplant Surgeon  2. Follow up with Dr. Hatfield / Psych Clinic  3. Follow up with Neurology for further headache management plan

## 2025-06-12 NOTE — PROGRESS NOTE ADULT - NUTRITIONAL ASSESSMENT
Diet, Consistent Carbohydrate w/Evening Snack:   Supplement Feeding Modality:  Oral  Ensure Max Cans or Servings Per Day:  1       Frequency:  Daily (06-02-25 @ 10:24) [Active]
This patient has been assessed with a concern for Malnutrition and has been determined to have a diagnosis/diagnoses of Moderate protein-calorie malnutrition.    This patient is being managed with:   Diet Consistent Carbohydrate w/Evening Snack-  Supplement Feeding Modality:  Oral  Ensure Max Cans or Servings Per Day:  2       Frequency:  Daily  Entered: Jun 6 2025  3:45PM  
This patient has been assessed with a concern for Malnutrition and has been determined to have a diagnosis/diagnoses of Moderate protein-calorie malnutrition.    This patient is being managed with:   Diet Consistent Carbohydrate w/Evening Snack-  Supplement Feeding Modality:  Oral  Ensure Max Cans or Servings Per Day:  2       Frequency:  Daily  Entered: Jun 6 2025  3:45PM    This patient has been assessed with a concern for Malnutrition and has been determined to have a diagnosis/diagnoses of Moderate protein-calorie malnutrition.    This patient is being managed with:   Diet Consistent Carbohydrate w/Evening Snack-  Supplement Feeding Modality:  Oral  Ensure Max Cans or Servings Per Day:  2       Frequency:  Daily  Entered: Jun 6 2025  3:45PM  
This patient has been assessed with a concern for Malnutrition and has been determined to have a diagnosis/diagnoses of Moderate protein-calorie malnutrition.    This patient is being managed with:   Diet Consistent Carbohydrate w/Evening Snack-  Supplement Feeding Modality:  Oral  Ensure Max Cans or Servings Per Day:  2       Frequency:  Daily  Entered: Jun 6 2025  3:45PM  
This patient has been assessed with a concern for Malnutrition and has been determined to have a diagnosis/diagnoses of Moderate protein-calorie malnutrition.    This patient is being managed with:   Diet Consistent Carbohydrate w/Evening Snack-  Supplement Feeding Modality:  Oral  Ensure Max Cans or Servings Per Day:  2       Frequency:  Daily  Entered: Jun 6 2025  3:45PM  
Diet, Consistent Carbohydrate w/Evening Snack:   Supplement Feeding Modality:  Oral  Ensure Max Cans or Servings Per Day:  1       Frequency:  Daily (06-02-25 @ 10:24) [Active]
Diet, Consistent Carbohydrate w/Evening Snack:   Supplement Feeding Modality:  Oral  Ensure Max Cans or Servings Per Day:  1       Frequency:  Daily (06-02-25 @ 10:24) [Active]
Diet, Consistent Carbohydrate w/Evening Snack:   Supplement Feeding Modality:  Oral  Ensure Max Cans or Servings Per Day:  2       Frequency:  Daily (06-06-25 @ 15:46) [Active]

## 2025-06-12 NOTE — DISCHARGE NOTE PROVIDER - CARE PROVIDER_API CALL
Esvin Jim Yuriy  Surgery  [address] [place] [address]  Phone: [phone]  Fax: [phone]  Follow Up Time:     Skylar Julian  Transplant Hepatology  [address] [place] [address]  Phone: [phone]  Fax: [phone]  Follow Up Time:

## 2025-06-12 NOTE — H&P ADULT - NSHPREVIEWOFSYSTEMS_GEN_ALL_CORE
REVIEW OF SYSTEMS  Constitutional: No fever, No Chills, + fatigue with exertion  HEENT: No eye pain, No visual disturbances, + difficulty hearing  Pulm: No cough,  + shortness of breath with exertion  Cardio: No chest pain, No palpitations  GI:  No abdominal pain, No nausea, No vomiting, No diarrhea, No constipation, +incontinence   : No dysuria, No frequency, No hematuria, +incontinence   Neuro: + headaches, No memory loss, No loss of strength, No numbness, No tremors, +light sensitive   Skin: No itching, No rashes, No lesions   Endo: No temperature intolerance  MSK: +BLE edema, No Neck pain,  No back pain  Psych:  + depression, No anxiety

## 2025-06-12 NOTE — H&P ADULT - NSHPSOCIALHISTORY_GEN_ALL_CORE
SOCIAL HISTORY  Smoking - denies  EtOH - denies  Drugs - denies    FUNCTIONAL HISTORY  PTA patient was independent with functional mobility and ADLs. Patient lives in a  with 4 steps to enter no steps in side with her .    CURRENT FUNCTIONAL STATUS  - Bed Mobility: Mod A; 1PA   - Transfers: Mod A; 1PA; FWB  - Gait: Mod A; 1PA; FWB; 3 feet chair to bed     - ADLs:  -Upper Body Dressing: Mod A; 1PA   -Grooming: Min A; 1PA   -Eating: Min A; 1PA

## 2025-06-12 NOTE — DISCHARGE NOTE NURSING/CASE MANAGEMENT/SOCIAL WORK - PATIENT PORTAL LINK FT
You can access the FollowMyHealth Patient Portal offered by [facility] by registering at the following website: [email or web address]. By joining [employer]’s FollowMyHealth portal, you will also be able to view your health information using other applications (apps) compatible with our system.

## 2025-06-12 NOTE — DISCHARGE NOTE PROVIDER - DETAILS OF MALNUTRITION DIAGNOSIS/DIAGNOSES
This patient has been assessed with a concern for Malnutrition and was treated during this hospitalization for the following Nutrition diagnosis/diagnoses:     -  06/06/2025: Moderate protein-calorie malnutrition

## 2025-06-12 NOTE — PROGRESS NOTE ADULT - SUBJECTIVE AND OBJECTIVE BOX
Transplant Surgery - Multidisciplinary Rounds  --------------------------------------------------------------   Donor OLTx      Date:  Date: 2025      POD#12  CMV +/-    Present:   Patient seen and examined with multidisciplinary Transplant team including Surgeon Dr. Jim, Hepatologist Dr. Julian, Surgical fellow Chito, GUDELIA Sandoval/Jennifer,  and bedside RN during AM rounds.   Disciplines not in attendance will be notified of the plan.     HPI: 70 F hx NAFLD cirrhosis and HCC (listed for liver transplant with MELD 20B), UGIB in , chronic back pain (2/2 spinal fracture) presented from [facility] where she was admitted on  for coffee ground emesis and melena, EGD showed no active bleeding, transferred to [facility] now s/p OLT on 2025     Interval Events:  - afebrile, vss      Immunosuppression  Induction: Simulect completed  Maintenance: Cyclosporine by level, MMF , SST  Ongoing monitoring for signs of rejection    Potential Discharge date: TBD  Education:  Medications  Plan of care:  See Below          Review of systems  All other systems were reviewed and are negative, except as noted.    PHYSICAL EXAM:  Constitutional: NAD  Eyes: PERRLA  ENMT: nc/at, no thrush  Neck: supple,   Respiratory: CTA B/L  Cardiovascular: RRR  Gastrointestinal: Soft abdomen, ND, appropriate incisional TTP. chevron incision c/d/i, staples in place. No signs of infection.   Genitourinary: voiding   Extremities: SCD's in place and working bilaterally  Vascular: Palpable dp pulses bilaterally.   Neurological: waxes and wanes  Skin: no rashes, ulcerations, lesions  Musculoskeletal:  moving extremities without issues  Psychiatric:  waxes and wanes.  Transplant Surgery - Multidisciplinary Rounds  --------------------------------------------------------------   Donor OLTx      Date:  Date: 2025      POD#12  CMV +/-    Present:   Patient seen and examined with multidisciplinary Transplant team including Surgeon Dr. Jim, Hepatologist Dr. Julian, Surgical fellow Chito, GUDELIA Paz/Rancho,  and bedside RN during AM rounds.   Disciplines not in attendance will be notified of the plan.     HPI: 70 F hx NAFLD cirrhosis and HCC (listed for liver transplant with MELD 20B), UGIB in , chronic back pain (2/2 spinal fracture) presented from [facility] where she was admitted on  for coffee ground emesis and melena, EGD showed no active bleeding, transferred to [facility] now s/p OLT on 2025     Interval Events:  - afebrile, vss  - MS improved on cyclo  - c/w keppra PPX  - LFTs downtrended      Immunosuppression  Induction: Simulect completed  Maintenance: Cyclosporine by level, MMF , SST  Ongoing monitoring for signs of rejection    Potential Discharge date: TBD  Education:  Medications  Plan of care:  See Below        MEDICATIONS  (STANDING):  aspirin enteric coated 81 milliGRAM(s) Oral daily  calcium carbonate 1250 mG  + Vitamin D (OsCal 500 + D) 1 Tablet(s) Oral two times a day  chlorhexidine 2% Cloths 1 Application(s) Topical <User Schedule>  cycloSPORINE  , modified (GENGRAF) 75 milliGRAM(s) Oral <User Schedule>  dextrose 5%. 1000 milliLiter(s) (50 mL/Hr) IV Continuous <Continuous>  dextrose 5%. 1000 milliLiter(s) (100 mL/Hr) IV Continuous <Continuous>  dextrose 50% Injectable 25 Gram(s) IV Push once  dextrose 50% Injectable 12.5 Gram(s) IV Push once  dextrose 50% Injectable 25 Gram(s) IV Push once  dextrose Oral Gel 15 Gram(s) Oral once  entecavir 0.5 milliGRAM(s) Oral daily  fluconAZOLE   Tablet 400 milliGRAM(s) Oral daily  fluticasone propionate/ salmeterol 100-50 MICROgram(s) Diskus 1 Dose(s) Inhalation two times a day  glucagon  Injectable 1 milliGRAM(s) IntraMuscular once  heparin   Injectable 5000 Unit(s) SubCutaneous every 12 hours  insulin glargine Injectable (LANTUS) 5 Unit(s) SubCutaneous at bedtime  insulin lispro (ADMELOG) corrective regimen sliding scale   SubCutaneous <User Schedule>  insulin lispro (ADMELOG) corrective regimen sliding scale   SubCutaneous Before meals and at bedtime  insulin lispro Injectable (ADMELOG) 5 Unit(s) SubCutaneous before dinner  insulin lispro Injectable (ADMELOG) 8 Unit(s) SubCutaneous before lunch  insulin lispro Injectable (ADMELOG) 12 Unit(s) SubCutaneous with breakfast  levETIRAcetam 500 milliGRAM(s) Oral two times a day  lidocaine 1% (Preservative-free) Injectable 10 milliLiter(s) Local Injection once  magnesium oxide 400 milliGRAM(s) Oral three times a day with meals  melatonin 5 milliGRAM(s) Oral at bedtime  mycophenolate mofetil 1000 milliGRAM(s) Oral <User Schedule>  NIFEdipine XL 30 milliGRAM(s) Oral every 24 hours  pantoprazole    Tablet 40 milliGRAM(s) Oral before breakfast  polyethylene glycol 3350 17 Gram(s) Oral daily  predniSONE   Tablet 20 milliGRAM(s) Oral daily  QUEtiapine 75 milliGRAM(s) Oral at bedtime  senna 2 Tablet(s) Oral two times a day  trimethoprim   80 mG/sulfamethoxazole 400 mG 1 Tablet(s) Oral daily  valGANciclovir 900 milliGRAM(s) Oral daily    MEDICATIONS  (PRN):  albuterol    90 MICROgram(s) HFA Inhaler 2 Puff(s) Inhalation every 6 hours PRN Bronchospasm  traMADol 25 milliGRAM(s) Oral every 6 hours PRN Moderate Pain (4 - 6)  traMADol 50 milliGRAM(s) Oral every 8 hours PRN Severe Pain (7 - 10)      PAST MEDICAL & SURGICAL HISTORY:  HCC (hepatocellular carcinoma)      DM (diabetes mellitus)      HTN (hypertension)      HLD (hyperlipidemia)      Hepatic cirrhosis      IVEY (nonalcoholic steatohepatitis)      Former smoker      Ascites      Hepatic encephalopathy      History of cervical cancer      H/O  section      History of cholecystectomy      H/O carpal tunnel repair      H/O cataract extraction          Vital Signs Last 24 Hrs  T(C): 36.8 (2025 12:51), Max: 37.1 (2025 05:40)  T(F): 98.2 (2025 12:51), Max: 98.7 (2025 05:40)  HR: 72 (2025 12:51) (72 - 82)  BP: 128/66 (2025 12:51) (128/60 - 139/65)  BP(mean): 93 (2025 05:40) (86 - 93)  RR: 20 (2025 12:51) (17 - 20)  SpO2: 100% (:51) (95% - 100%)    Parameters below as of :51  Patient On (Oxygen Delivery Method): room air        I&O's Summary    2025 07:01  -  2025 07:00  --------------------------------------------------------  IN: 1000 mL / OUT: 2000 mL / NET: -1000 mL    2025 07:01  -  2025 15:50  --------------------------------------------------------  IN: 740 mL / OUT: 0 mL / NET: 740 mL                              8.7    6.40  )-----------( 105      ( 2025 07:03 )             27.4     06-12    137  |  107  |  16  ----------------------------<  100[H]  4.4   |  21[L]  |  1.14    Ca    8.7      2025 06:51  Phos  3.4     0612  Mg     1.9     12    TPro  4.5[L]  /  Alb  2.9[L]  /  TBili  0.9  /  DBili  x   /  AST  30  /  ALT  61[H]  /  AlkPhos  94  06-12                      Review of systems  All other systems were reviewed and are negative, except as noted.    PHYSICAL EXAM:  Constitutional: NAD  Eyes: PERRLA  ENMT: nc/at, no thrush  Neck: supple,   Respiratory: CTA B/L  Cardiovascular: RRR  Gastrointestinal: Soft abdomen, ND, appropriate incisional TTP. chevron incision c/d/i, staples in place. No signs of infection.   Genitourinary: voiding   Extremities: SCD's in place and working bilaterally  Vascular: Palpable dp pulses bilaterally.   Neurological: waxes and wanes  Skin: no rashes, ulcerations, lesions  Musculoskeletal:  moving extremities without issues  Psychiatric:  waxes and wanes.

## 2025-06-12 NOTE — H&P ADULT - NSHPPHYSICALEXAM_GEN_ALL_CORE
PHYSICAL EXAM  VITALS  T(C): --  HR: --  BP: --  RR: --  SpO2: --    Gen - NAD, Comfortable  HEENT - NCAT, EOMI, MMM, Normal Conjunctivae  Neck - Supple, No limited ROM  Pulm - CTAB  Cardiovascular - RRR, S1S2, No murmurs  Chest - good chest expansion, good respiratory effort  Abdomen - Soft, NT/ND, +BS, +abd rounded   Extremities - No C/C, no calf tenderness, +BLE trace edema , +b/l resting tremors   Neuro-     Cognitive - awake, alert, oriented to person, place, date, year, and situation.  Able to follow command     Communication - Fluent, Comprehensible, No dysarthria, No aphasia      Cranial Nerves -No facial asymmetry, Tongue midline, EOMI, Shoulder shrug intact     Motor -                     LEFT    UE - ShAB 5/5, EF 5/5, EE 5/5,  5/5                    RIGHT UE - ShAB 5/5, EF 5/5, EE 5/5,   5/5                    LEFT    LE - HF 5/5, KE 5/5, DF 5/5, PF 5/5                    RIGHT LE - HF 5/5, KE 5/5, DF 5/5, PF 5/5        Sensory - Intact  to LT     Tone - Normal  MSK: No limited ROM  Psychiatric - Mood stable, Affect WNL  Skin: BUE ecchymosis; RUE wound scabbed 2.5x2cm; vertical abd incision 9cm with 14 staples; transverse abd incision 30cm with 38 staples; LUQ drain site 1cm with 3 sutures; RUQ drain site x 2 1) 1cm with 4 sutures 2) 1cm with 3 sutures; RLQ wound scabbed 1.8x0.7cm; R hip scratch marks; blanchable redness sacrum/buttocks; R groin and perineal area ecchymosis ICU Vital Signs Last 24 Hrs  T(C): 36.6 (13 Jun 2025 08:19), Max: 36.8 (12 Jun 2025 12:51)  T(F): 97.8 (13 Jun 2025 08:19), Max: 98.2 (12 Jun 2025 12:51)  HR: 77 (13 Jun 2025 08:19) (72 - 85)  BP: 117/61 (13 Jun 2025 08:19) (117/56 - 128/66)  BP(mean): 81 (12 Jun 2025 16:29) (81 - 81)  RR: 16 (13 Jun 2025 08:19) (16 - 20)  SpO2: 98% (13 Jun 2025 08:19) (95% - 100%)  O2 Parameters below as of 13 Jun 2025 08:19  Patient On (Oxygen Delivery Method): room air      EXAM  Gen - NAD, Comfortable  HEENT - NCAT, EOMI, MMM, Normal Conjunctivae  Neck - Supple, No limited ROM  Pulm - CTAB  Cardiovascular - RRR, S1S2, No murmurs  Chest - good chest expansion, good respiratory effort  Abdomen - Soft, non tender, +BS, +abd rounded   Extremities - No C/C, no calf tenderness, +BLE trace edema , +b/l resting tremors   Neuro-     Cognitive - awake, alert, oriented to person, place, date, year, and situation.  Able to follow command     Communication - Fluent, Comprehensible, No dysarthria, No aphasia      Cranial Nerves -No facial asymmetry, Tongue midline, EOMI, Shoulder shrug intact     Motor -                     LEFT    UE - ShAB 5/5, EF 5/5, EE 5/5,  5/5                    RIGHT UE - ShAB 5/5, EF 5/5, EE 5/5,   5/5                    LEFT    LE - HF 5/5, KE 5/5, DF 5/5, PF 5/5                    RIGHT LE - HF 5/5, KE 5/5, DF 5/5, PF 5/5        Sensory - Intact  to LT     Tone - Normal  MSK: No limited ROM  Psychiatric - Mood stable, Affect WNL  Skin: BUE ecchymosis; RUE wound scabbed 2.5x2cm; vertical abd incision 9cm with 14 staples; transverse abd incision 30cm with 38 staples; LUQ drain site 1cm with 3 sutures; RUQ drain site x 2 1) 1cm with 4 sutures 2) 1cm with 3 sutures; RLQ wound scabbed 1.8x0.7cm; R hip scratch marks; blanchable redness sacrum/buttocks; R groin and perineal area ecchymosis

## 2025-06-12 NOTE — DISCHARGE NOTE PROVIDER - NSDCFUSCHEDAPPT_GEN_ALL_CORE_FT
Lulu Torres  [facility]  [address]  Scheduled Appointment: 07/09/2025     Myrtle Mazariegos  [facility]  [address]PL[address]   Scheduled Appointment: 06/30/2025    Dao Culp  [facility]  [address]   Scheduled Appointment: 07/01/2025    Lulu Torres  [facility]  [address]  Scheduled Appointment: 07/09/2025

## 2025-06-12 NOTE — PATIENT PROFILE ADULT - NSPROPTRIGHTCAREGIVER_GEN_A_NUR
[facility]  INTERNAL MEDICINE OFFICE VISIT      CHIEF COMPLAINT:    Darci Bella is a 68 year old male who presents with C/O Finger (Dislocated 5th digit both hands x 6 days ago. Has some pain and brusing. )    Finger injury x 6 days. Was pulling log out of woods & slipped & fell forward. Notes pinky fingers were pointed outwards on both hands after fall, he pulled them back into place. Notes some bruising & swelling. Denies laceration or cuts, fever, numbness or tingling, prior injury. He does have some decreased range of motion towards the tips of his fingers. Some pain, but not enough to take any medications for the pain. He did ice the injuries.        Patient Active Problem List   Diagnosis   • Other and unspecified hyperlipidemia   • Calculus of kidney   • SENSORNEUR HEAR LOSS -left ? etiol   • Acute atopic conjunctivitis   • Presbyopia   • Benign neoplasm of choroid   • Central serous retinopathy   • Esophageal reflux   • Ulcerative (chronic) proctitis (CMD)   • Sensorineural hearing loss, unilateral   • Hyperglycemia       Past Medical History:   Diagnosis Date   • Allergic rhinitis, cause unspecified    • Allergy    • Car's esophagus 8/18/08   • Calculus of kidney 12/92    L side, removed by basket extraction, stent placed, Dr. Bowling   • Diaphragmatic hernia without mention of obstruction or gangrene 3/3/08; 10/11/10    2 cm   • Diaphragmatic hernia without mention of obstruction or gangrene 8/18/08; 7/11/13    4 cm   • Duodenitis without mention of hemorrhage 7/11/13    H Pylori negative   • Dysplastic nevus    • Esophageal reflux 4/97    hiatal hernia per UGI   • Esophageal reflux 3/3/08   • Esophageal reflux 8/18/08; 10/11/10; 7/11/13    patulous EG junction   • Esophagitis, unspecified 10/11/10    Barretts identified on bx   • Failed moderate sedation during procedure    • Other and unspecified hyperlipidemia    • Other specified gastritis without mention of hemorrhage 3/3/08     H Pylori negative   • Other specified gastritis without mention of hemorrhage 10/11/10    HPylori negative   • Reflux esophagitis 3/3/08   • Ulcerative (chronic) proctitis (CMD) 3/3/08; 10/11/10    bx focal active colitis   • Ulcerative (chronic) proctitis (CMD) 10/11/10    bx quercent colitis   • Ulcerative (chronic) proctitis (CMD) 7/11/13    mild patchy eryuthema rectally Bx normal   • Ulcerative colitis (CMD)    • Unspecified hearing loss 10/98    80 percent hearing loss L ear   • Unspecified hemorrhoids without mention of complication 3/3/08    internal       Family History   Problem Relation Age of Onset   • Hypertension Mother    • Thyroid Mother         thyroidectomy   • Cancer Mother         Breast cancer   • Gastrointestinal Father         colon polyps, diverticulosis   • Stroke Father         april 2002   • Cancer Father         melanoma       Current Outpatient Medications   Medication Sig   • zoster vaccine recomb adjuvanted (Shingrix) 50 MCG/0.5ML injection Repeat dose in 2 to 6 months (unless 1 dose already given), for a total of 2 doses.   • tamsulosin (FLOMAX) 0.4 MG Cap Take 1 capsule by mouth nightly. ONE HALF HOUR AFTER EVENING MEAL   • atorvastatin (LIPITOR) 10 MG tablet Take 1 tablet by mouth daily.   • metoPROLOL succinate (TOPROL-XL) 25 MG 24 hr tablet Take 1 tablet by mouth daily.   • Mesalamine 800 MG Tablet Enteric Coated Take one tablet in the morning and 2 in the evening.   • esomeprazole (NEXIUM) 20 MG capsule Take 40 mg by mouth every evening.   • Vitamins-Lipotropics (LIPOGEN SG PO) Take 1 tablet by mouth daily.   • cholecalciferol (VITAMIN D3) 1000 UNITS tablet Take 2,000 Units by mouth daily.   • Cyanocobalamin (B-12 PO) Take 1 tablet by mouth daily.    • Loratadine (CLARITIN) 10 MG OR TABS ONE TABLET BY MOUTH DAILY (Patient taking differently: as needed.)   • MULTIVITAMINS PO CAPS Take 1 capsule by mouth daily.     No current facility-administered medications for this visit.        ALLERGIES:   Allergen Reactions   • Atorvastatin HIVES   • Metoprolol HIVES   • Grass Other (See Comments)     SINUS CONGESTION         PAST HISTORIES:  I have reviewed the patient's medications and allergies, past medical, surgical, social and family history, updating these as appropriate.  See Histories section of the electronic medical record for a display of this information.      REVIEW OF SYSTEMS:       Review of systems:  See above        PHYSICAL EXAM:    Vital Signs:    Vitals:    05/09/23 0753 05/09/23 0800   BP: (!) 142/86 132/80   BP Location: LUE - Left upper extremity    Patient Position: Sitting    Cuff Size: Regular    Pulse: 60    Resp: 14    Temp: 96.7 °F (35.9 °C)    TempSrc: Temporal    SpO2: 96%    Weight: 103.4 kg (227 lb 14.4 oz)    Height: 5' 10\" (1.778 m)         Wt Readings from Last 3 Encounters:   05/09/23 103.4 kg (227 lb 14.4 oz)   04/18/23 103.1 kg (227 lb 6.4 oz)   03/24/23 106 kg (233 lb 11 oz)         General: Well developed. Well nourished. In no apparent distress.    Eyes: PERRL (pupils equal, round, and reactive to light), EOMI (extraocular movements are intact). Conjunctivae pink. Lids within normal limits. Sclerae anicteric.    HENT: Normocephalic.   Atraumatic.   Neck: Supple. Trachea midline.   Respiratory: Normal respiratory effort. Symmetrical chest expansion. No chest wall tenderness. Lungs clear to auscultation bilaterally. No wheezes. No rhonchi. No rales. No rubs.  Cardiovascular: Regular rate and rhythm.   Neurologic: Alert and oriented x 3. Gait is normal. Normal sensory function. No motor deficits in all 4 extremities.   Integumentary: Warm. Dry. Pink. No rashes Psychiatric: Cooperative. Appropriate mood and affect. Normal judgment. Recent and remote memory intact.  Musculoskeletal:  Strength, sensation, circulation intact in 5th digits bilaterally. +moderate edema & mild bruising over 5th digits bilaterally, +decrease in ROM due to edema     LAB  RESULTS:  Pertinent labs reviewed.      ASSESSMENT:   1. Dislocation of proximal interphalangeal joint of finger, initial encounter          PLAN:     Dislocation of proximal interphalangeal joint of finger, initial encounter  (primary encounter diagnosis)  Plan: XR Hand 3+ View Bilateral     -check xrays   -continue icing, tylenol as needed   -follow up depending on xrays        Orders Placed This Encounter   • XR Hand 3+ View Bilateral       Return if symptoms worsen or fail to improve.    Pt voiced understanding and agreement with the plan of care.  Provider wore mask throughout entire visit.      no

## 2025-06-12 NOTE — DISCHARGE NOTE PROVIDER - NSDCFUADDAPPT_GEN_ALL_CORE_FT
Follow-up:    1. Discharge to [facility] with Follow up by Transplant Surgeon  2. Follow up with Dr. Hatfield / [facility]  3. Follow up with Neurology for further headache management plan

## 2025-06-12 NOTE — H&P ADULT - NSICDXPASTMEDICALHX_GEN_ALL_CORE_FT
PAST MEDICAL HISTORY:  HCC (hepatocellular carcinoma)     Hepatic cirrhosis     History of ascites     HLD (hyperlipidemia)     HTN (hypertension)     Nonalcoholic fatty liver disease without nonalcoholic steatohepatitis (IVEY)     Type 2 diabetes mellitus      PAST MEDICAL HISTORY:  COPD (chronic obstructive pulmonary disease)     HCC (hepatocellular carcinoma)     Hepatic cirrhosis     History of ascites     HLD (hyperlipidemia)     HTN (hypertension)     Nonalcoholic fatty liver disease without nonalcoholic steatohepatitis (IVEY)     Type 2 diabetes mellitus

## 2025-06-12 NOTE — PROGRESS NOTE ADULT - ASSESSMENT
70 F hx NAFLD cirrhosis and HCC (listed for liver transplant with MELD 20B), UGIB in 2022, chronic back pain (2/2 spinal fracture) presented from [facility] where she was admitted on 5/26 for coffee ground emesis and melena, EGD showed no active bleeding, transferred to [facility] now s/p OLT on 5/31/2025     [] s/p OLT - POD 11  - HBV Core + donor: Entecavir  - Good graft function   - Diet: Consistent carb diet   - Strict I&O:  UO  - ASA 81   - Pain Management  - Bowel Regimen  - E.Faecium UTI, asymptomatic, Zosyn/Vanco completed  - OT/PT/RD /IS  - Hallucinations -->  holding FK, BH following; on seroquel, UA: Neg. keppra ppx     [] Immunosuppression: Simulect induction  - FK stopped, switched to Cyclo for mental status changes,  MMF 1/1, Pred 20  - PPx: Bactrim, Valcyte 450 (CMV +/-, inc 900mg as able), Fluc, PPI, OsCal    [] DM  - Endocrine team  following    [] HTN  - Nifedipine, adjust prn    [] DVT  -SQH bid  -SCDs at all times    [] DISPO  -d/c planning to Acute Rehab  70 F hx NAFLD cirrhosis and HCC (listed for liver transplant with MELD 20B), UGIB in 2022, chronic back pain (2/2 spinal fracture) presented from [facility] where she was admitted on 5/26 for coffee ground emesis and melena, EGD showed no active bleeding, transferred to [facility] now s/p OLT on 5/31/2025     [] s/p OLT - POD 12  - HBV Core + donor: Entecavir  - Good graft function   - Diet: Consistent carb diet   - Strict I&O:  UO  - ASA 81   - Pain Management  - Bowel Regimen  - E.Faecium UTI, asymptomatic, Zosyn/Vanco completed  - OT/PT/RD /IS  - Hallucinations -->  holding FK, BH following; on seroquel, UA: Neg. keppra ppx     [] Immunosuppression: Simulect induction  - FK stopped, switched to Cyclo for mental status changes,  MMF 1/1, Pred 20  - PPx: Bactrim, Valcyte 450 (CMV +/-, inc 900mg as able), Fluc, PPI, OsCal    [] DM  - Endocrine team  following    [] HTN  - Nifedipine, adjust prn    [] DVT  -SQH bid  -SCDs at all times    [] DISPO  -d/c planning to Acute Rehab dispo to [facility] pending bed availability today

## 2025-06-12 NOTE — DISCHARGE NOTE PROVIDER - NSDCCPCAREPLAN_GEN_ALL_CORE_FT
PRINCIPAL DISCHARGE DIAGNOSIS  Diagnosis: S/P liver transplant  Assessment and Plan of Treatment:   D/C Instructions:  - Pain: You will have some pain after surgery. Take pain medication as prescribed. Avoid Aspirin, Motrin and Ibuprofen, unless instructed by the team. You should NOT drive while on narcotic medications.   -Incision: You will have incision from the transplant surgery that will most often be held closed by staples until they heal, and will be removed in the transplant clinic.   The incision should be left open to air unless it's draining fluid; if it's draining, it should be covered by gauze and changed when gauze becomes wet. It is ok to shower with staples. Gently cleanse the area around your incision with only soap and water and pat dry. Do NOT take bath until your incision is healed and you are cleared by your surgeon.    -Activity: avoid strenuous activity/exercise and heavy lifting for the first 6-8 weeks after surgery. You should not lift anything over 10 lbs. You should not drive until cleared by your transplant team. Avoid straining. Use stool softeners as needed. Stop stool softeners if diarrhea develops.   -Diet: Follow FOOD SAFETY instruction provided to you in your patient education guide booklet   -Women:  using adequate contraception is highly recommended. In general, women who receive a transplant should not become pregnant for at least 18 months after transplant.   -Skin care: Transplant medication can increase your risk for skin cancer. Avoid extended exposure to the sun and wear SPH 30 sunscreen or higher. Visit a dermatologist at least once a year.   -You should have an annual eye exam.   -You should never smoke.   -You should avoid ALL types of alcohol  Call  the Transplant team if  you experience any of the following:   [] Fever of 100.3F (38C) or above  [] Surgical incision is bleeding, red or warm to touch or there's discharge from the incision  [] Worsening abdominal pain, nausea or vomiting  [] Shortness of breath  [] Difficulty with urinating such as burning, frequency or urgency, blood in urine   ========================  Immunosuppress

## 2025-06-12 NOTE — DISCHARGE NOTE NURSING/CASE MANAGEMENT/SOCIAL WORK - FINANCIAL ASSISTANCE
[facility] provides services at a reduced cost to those who are determined to be eligible through [facility]’s financial assistance program. Information regarding [facility]’s financial assistance program can be found by going to [email or web address] or by calling [phone].

## 2025-06-12 NOTE — DISCHARGE NOTE PROVIDER - CARE PROVIDERS DIRECT ADDRESSES
[email or web address]

## 2025-06-12 NOTE — DISCHARGE NOTE PROVIDER - NSDCMRMEDTOKEN_GEN_ALL_CORE_FT
alendronate weekly: 70 milligram(s) orally once a week  aspirin 81 mg oral delayed release tablet: 1 tab(s) orally once a day  cycloSPORINE modified 25 mg oral capsule: 3 cap(s) orally 2 times a day Please take at 8AM and  8PM  entecavir 0.5 mg oral tablet: 1 tab(s) orally once a day  fluconazole 200 mg oral tablet: 2 tab(s) orally once a day  insulin glargine 100 units/mL subcutaneous solution: 5 unit(s) subcutaneous once a day (at bedtime)  insulin lispro 100 units/mL injectable solution: 12 unit(s) injectable once a day before breakfast  insulin lispro 100 units/mL injectable solution: 8 unit(s) injectable once a day before lunch  insulin lispro 100 units/mL injectable solution: 5 unit(s) injectable once a day before dinner  levETIRAcetam 500 mg oral tablet: 1 tab(s) orally 2 times a day  magnesium oxide 400 mg oral tablet: 1 tab(s) orally 3 times a day (with meals)  mycophenolate mofetil 250 mg oral capsule: 1,000 milligram(s) orally 2 times a day Take at 8AM and 8PM  NIFEdipine 30 mg oral tablet, extended release: 1 tab(s) orally every 24 hours  pantoprazole 40 mg oral delayed release tablet: 1 tab(s) orally once a day (before a meal)  predniSONE 20 mg oral tablet: 1 tab(s) orally once a day  QUEtiapine 25 mg oral tablet: 3 tab(s) orally once a day (at bedtime)  senna leaf extract oral tablet: 2 tab(s) orally 2 times a day  sulfamethoxazole-trimethoprim 400 mg-80 mg oral tablet: 1 tab(s) orally once a day  Symbicort 160 mcg-4.5 mcg/inh inhalation aerosol: 2 inhaled 2 times a day  valGANciclovir 450 mg oral tablet: 2 tab(s) orally once a day  vitamin D-calcium (as carbonate) 5 mcg-500 mg oral tablet: 1 tab(s) orally 2 times a day   alendronate 70 mg oral tablet: 1 tab(s) orally once a week  aspirin 81 mg oral delayed release tablet: 1 tab(s) orally once a day  bisacodyl 10 mg rectal suppository: 1 suppository(ies) rectal once a day As needed Refractory Constipation  cycloSPORINE modified 25 mg oral capsule: 1 cap(s) orally 2 times a day  diphenhydrAMINE 25 mg oral capsule: 1 cap(s) orally every 8 hours as needed for  headache Take with Reglan 10mg for headache.  entecavir 0.5 mg oral tablet: 1 tab(s) orally once a day  insulin glargine 100 units/mL subcutaneous solution: 5 unit(s) subcutaneous once a day (at bedtime)  insulin lispro 100 units/mL injectable solution: injectable 3 times a day (before meals) DM per moderate Insulin Sliding Scale (ISS):  Please check your FS before each meal, and give insulin per ISS:  2 Unit(s) if Glucose 151 - 200  4 Unit(s) if Glucose 201 - 250  6 Unit(s) if Glucose 251 - 300  8 Unit(s) if Glucose 301 - 350  10 Unit(s) if Glucose 351 - 400  12 Unit(s) if Glucose Greater Than 400  insulin lispro 100 units/mL injectable solution: injectable once a day (at bedtime) Please check your FS at bedtime, and give insulin per ISS:  0 Unit(s) if Glucose 61 - 250  2 Unit(s) if Glucose 251 - 300  4 Unit(s) if Glucose 301 - 350  6 Unit(s) if Glucose 351 - 400  8 Unit(s) if Glucose Greater Than 400    Give correctional scale insulin  REGARDLESS of PO status NOTIFY Provider for blood glucose LESS THAN 70 milliGRAM(s)/deciLiter or GREATER THAN 400 milliGRAM(s)/deciLiter  insulin lispro 100 units/mL injectable solution: 8 unit(s) injectable once a day before breakfast  insulin lispro 100 units/mL injectable solution: 8 unit(s) injectable once a day before lunch  insulin lispro 100 units/mL injectable solution: 5 unit(s) injectable once a day before dinner  lactulose 10 g/15 mL oral syrup: 15 milliliter(s) orally once a day  levETIRAcetam 500 mg oral tablet: 1 tab(s) orally 2 times a day  magnesium oxide 400 mg oral tablet: 1 tab(s) orally 3 times a day (with meals)  melatonin 3 mg oral tablet: 3 tab(s) orally once a day (at bedtime)  metoclopramide 10 mg oral tablet: 1 tab(s) orally 3 times a day as needed for  headache Take with Benadryl for headache.  mycophenolate mofetil 250 mg oral capsule: 1,000 milligram(s) orally 2 times a day Take at 8AM and 8PM  NIFEdipine 30 mg oral tablet, extended release: 1 tab(s) orally once a day  pantoprazole 40 mg oral delayed release tablet: 1 tab(s) orally once a day (before a meal)  predniSONE 10 mg oral tablet: 1 tab(s) orally once a day  QUEtiapine 25 mg oral tablet: 3 tab(s) orally once a day (at bedtime)  senna leaf extract oral tablet: 2 tab(s) orally once a day (at bedtime)  sulfamethoxazole-trimethoprim 400 mg-80 mg oral tablet: 1 tab(s) orally once a day  SUMAtriptan 100 mg oral tablet: 1 tab(s) orally once a day as needed for Migraine No more than 3 doses in 1 week  Symbicort 160 mcg-4.5 mcg/inh inhalation aerosol: 2 puff(s) inhaled 2 times a day  traMADol 50 mg oral tablet: 0.5 tab(s) orally every 6 hours as needed for Moderate Pain (4 - 6) Please take 0.5 tab every 6 hours for moderate pain.  Please take 1 tab every 6 hours for severe pain.  Tylenol 325 mg oral tablet: 2 tab(s) orally every 8 hours as needed for  mild pain  valGANciclovir 450 mg oral tablet: 2 tab(s) orally once a day  vitamin D-calcium (as carbonate) 5 mcg-500 mg oral tablet: 1 tab(s) orally 2 times a day

## 2025-06-12 NOTE — H&P ADULT - HISTORY OF PRESENT ILLNESS
70 F with PMHx NAFLD cirrhosis, migraines, T2DM, COPD, and HCC (listed for liver transplant with MELD 20B), UGIB, chronic back pain 2/2 spinal fracture, originally presented to [facility] on  for coffee ground emesis and melena, EGD showed no active bleeding, patient was transfused and stabilized prior to transfer to [facility] on  for further management. Patient denies bloody bowel movements or bloody emesis since . Patient endorses low back pain and frontal lobe headache, Neurology consulted for persistent headache, recommended to start nortriptyline. When the appropriate organ was available, the patient was brought to OR and S/p OLT from a  doner under steroids and Simulect induction with Dr. Caicedo on . Post op course significant for E. Faecium UTI , SCOTT, acute blood loss anemia requiring multiple blood products, s/p insulin gtt to maintain euglycemia, donor + hep B, thrombocytopenia, hypoglycemia episodes, and waxing and waning mental status with hallucinations. Patient optimized and was evaluated by PM&R and therapy for functional deficits, gait/ADL impairments and acute rehabilitation was recommended. Patient was cleared for discharge to [facility] IRF on 25.

## 2025-06-13 LAB
A1C WITH ESTIMATED AVERAGE GLUCOSE RESULT: 5.3 % — SIGNIFICANT CHANGE UP (ref 4–5.6)
ALBUMIN SERPL ELPH-MCNC: 2.6 G/DL — LOW (ref 3.3–5)
ALP SERPL-CCNC: 89 U/L — SIGNIFICANT CHANGE UP (ref 40–120)
ALT FLD-CCNC: 57 U/L — HIGH (ref 10–45)
ANION GAP SERPL CALC-SCNC: 8 MMOL/L — SIGNIFICANT CHANGE UP (ref 5–17)
AST SERPL-CCNC: 25 U/L — SIGNIFICANT CHANGE UP (ref 10–40)
BASOPHILS # BLD AUTO: 0.04 K/UL — SIGNIFICANT CHANGE UP (ref 0–0.2)
BASOPHILS NFR BLD AUTO: 0.8 % — SIGNIFICANT CHANGE UP (ref 0–2)
BILIRUB SERPL-MCNC: 1 MG/DL — SIGNIFICANT CHANGE UP (ref 0.2–1.2)
BUN SERPL-MCNC: 18 MG/DL — SIGNIFICANT CHANGE UP (ref 7–23)
CALCIUM SERPL-MCNC: 8.4 MG/DL — SIGNIFICANT CHANGE UP (ref 8.4–10.5)
CHLORIDE SERPL-SCNC: 108 MMOL/L — SIGNIFICANT CHANGE UP (ref 96–108)
CMV DNA CSF QL NAA+PROBE: SIGNIFICANT CHANGE UP IU/ML
CMV DNA SPEC NAA+PROBE-LOG#: SIGNIFICANT CHANGE UP LOG10IU/ML
CO2 SERPL-SCNC: 25 MMOL/L — SIGNIFICANT CHANGE UP (ref 22–31)
CREAT SERPL-MCNC: 1.31 MG/DL — HIGH (ref 0.5–1.3)
EGFR: 44 ML/MIN/1.73M2 — LOW
EGFR: 44 ML/MIN/1.73M2 — LOW
EOSINOPHIL # BLD AUTO: 0.08 K/UL — SIGNIFICANT CHANGE UP (ref 0–0.5)
EOSINOPHIL NFR BLD AUTO: 1.7 % — SIGNIFICANT CHANGE UP (ref 0–6)
ESTIMATED AVERAGE GLUCOSE: 105 MG/DL — SIGNIFICANT CHANGE UP (ref 68–114)
GLUCOSE BLDC GLUCOMTR-MCNC: 169 MG/DL — HIGH (ref 70–99)
GLUCOSE BLDC GLUCOMTR-MCNC: 173 MG/DL — HIGH (ref 70–99)
GLUCOSE BLDC GLUCOMTR-MCNC: 236 MG/DL — HIGH (ref 70–99)
GLUCOSE BLDC GLUCOMTR-MCNC: 272 MG/DL — HIGH (ref 70–99)
GLUCOSE SERPL-MCNC: 135 MG/DL — HIGH (ref 70–99)
HCT VFR BLD CALC: 25.3 % — LOW (ref 34.5–45)
HGB BLD-MCNC: 8.3 G/DL — LOW (ref 11.5–15.5)
IMM GRANULOCYTES NFR BLD AUTO: 0.8 % — SIGNIFICANT CHANGE UP (ref 0–0.9)
LYMPHOCYTES # BLD AUTO: 0.29 K/UL — LOW (ref 1–3.3)
LYMPHOCYTES # BLD AUTO: 6.1 % — LOW (ref 13–44)
MCHC RBC-ENTMCNC: 31.1 PG — SIGNIFICANT CHANGE UP (ref 27–34)
MCHC RBC-ENTMCNC: 32.8 G/DL — SIGNIFICANT CHANGE UP (ref 32–36)
MCV RBC AUTO: 94.8 FL — SIGNIFICANT CHANGE UP (ref 80–100)
MONOCYTES # BLD AUTO: 0.31 K/UL — SIGNIFICANT CHANGE UP (ref 0–0.9)
MONOCYTES NFR BLD AUTO: 6.5 % — SIGNIFICANT CHANGE UP (ref 2–14)
NEUTROPHILS # BLD AUTO: 4.02 K/UL — SIGNIFICANT CHANGE UP (ref 1.8–7.4)
NEUTROPHILS NFR BLD AUTO: 84.1 % — HIGH (ref 43–77)
NRBC BLD AUTO-RTO: 0 /100 WBCS — SIGNIFICANT CHANGE UP (ref 0–0)
PLATELET # BLD AUTO: 91 K/UL — LOW (ref 150–400)
POTASSIUM SERPL-MCNC: 4.2 MMOL/L — SIGNIFICANT CHANGE UP (ref 3.5–5.3)
POTASSIUM SERPL-SCNC: 4.2 MMOL/L — SIGNIFICANT CHANGE UP (ref 3.5–5.3)
PROT SERPL-MCNC: 4.6 G/DL — LOW (ref 6–8.3)
RBC # BLD: 2.67 M/UL — LOW (ref 3.8–5.2)
RBC # FLD: 21.2 % — HIGH (ref 10.3–14.5)
SARS-COV-2 RNA SPEC QL NAA+PROBE: SIGNIFICANT CHANGE UP
SODIUM SERPL-SCNC: 141 MMOL/L — SIGNIFICANT CHANGE UP (ref 135–145)
WBC # BLD: 4.78 K/UL — SIGNIFICANT CHANGE UP (ref 3.8–10.5)
WBC # FLD AUTO: 4.78 K/UL — SIGNIFICANT CHANGE UP (ref 3.8–10.5)

## 2025-06-13 PROCEDURE — 99223 1ST HOSP IP/OBS HIGH 75: CPT

## 2025-06-13 PROCEDURE — 90832 PSYTX W PT 30 MINUTES: CPT

## 2025-06-13 RX ORDER — MAGNESIUM SULFATE 500 MG/ML
1 SYRINGE (ML) INJECTION ONCE
Refills: 0 | Status: COMPLETED | OUTPATIENT
Start: 2025-06-13 | End: 2025-06-13

## 2025-06-13 RX ORDER — INSULIN LISPRO 100 U/ML
8 INJECTION, SOLUTION INTRAVENOUS; SUBCUTANEOUS
Refills: 0 | Status: DISCONTINUED | OUTPATIENT
Start: 2025-06-14 | End: 2025-06-20

## 2025-06-13 RX ORDER — TRAMADOL HYDROCHLORIDE 50 MG/1
25 TABLET, FILM COATED ORAL EVERY 6 HOURS
Refills: 0 | Status: DISCONTINUED | OUTPATIENT
Start: 2025-06-13 | End: 2025-06-13

## 2025-06-13 RX ORDER — SUMATRIPTAN 100 MG/1
100 TABLET, FILM COATED ORAL
Refills: 0 | Status: DISCONTINUED | OUTPATIENT
Start: 2025-06-13 | End: 2025-06-16

## 2025-06-13 RX ORDER — TRAMADOL HYDROCHLORIDE 50 MG/1
50 TABLET, FILM COATED ORAL EVERY 6 HOURS
Refills: 0 | Status: DISCONTINUED | OUTPATIENT
Start: 2025-06-13 | End: 2025-06-19

## 2025-06-13 RX ORDER — CYCLOSPORINE 100 MG/1
75 CAPSULE, LIQUID FILLED ORAL ONCE
Refills: 0 | Status: COMPLETED | OUTPATIENT
Start: 2025-06-13 | End: 2025-06-13

## 2025-06-13 RX ORDER — MELATONIN 5 MG
9 TABLET ORAL AT BEDTIME
Refills: 0 | Status: DISCONTINUED | OUTPATIENT
Start: 2025-06-13 | End: 2025-06-20

## 2025-06-13 RX ORDER — CYCLOSPORINE 100 MG/1
100 CAPSULE, LIQUID FILLED ORAL
Refills: 0 | Status: DISCONTINUED | OUTPATIENT
Start: 2025-06-13 | End: 2025-06-16

## 2025-06-13 RX ADMIN — INSULIN LISPRO 5 UNIT(S): 100 INJECTION, SOLUTION INTRAVENOUS; SUBCUTANEOUS at 17:05

## 2025-06-13 RX ADMIN — MYCOPHENOLATE MOFETIL 1000 MILLIGRAM(S): 500 TABLET, FILM COATED ORAL at 08:47

## 2025-06-13 RX ADMIN — Medication 1 TABLET(S): at 17:04

## 2025-06-13 RX ADMIN — VALGANCICLOVIR 900 MILLIGRAM(S): 450 TABLET, FILM COATED ORAL at 11:55

## 2025-06-13 RX ADMIN — CYCLOSPORINE 75 MILLIGRAM(S): 100 CAPSULE, LIQUID FILLED ORAL at 00:30

## 2025-06-13 RX ADMIN — MYCOPHENOLATE MOFETIL 1000 MILLIGRAM(S): 500 TABLET, FILM COATED ORAL at 20:05

## 2025-06-13 RX ADMIN — Medication 40 MILLIGRAM(S): at 05:44

## 2025-06-13 RX ADMIN — Medication 1 DOSE(S): at 08:47

## 2025-06-13 RX ADMIN — INSULIN LISPRO 2: 100 INJECTION, SOLUTION INTRAVENOUS; SUBCUTANEOUS at 08:16

## 2025-06-13 RX ADMIN — Medication 81 MILLIGRAM(S): at 11:55

## 2025-06-13 RX ADMIN — TRAMADOL HYDROCHLORIDE 50 MILLIGRAM(S): 50 TABLET, FILM COATED ORAL at 20:49

## 2025-06-13 RX ADMIN — CYCLOSPORINE 75 MILLIGRAM(S): 100 CAPSULE, LIQUID FILLED ORAL at 08:47

## 2025-06-13 RX ADMIN — Medication 400 MILLIGRAM(S): at 11:55

## 2025-06-13 RX ADMIN — TRAMADOL HYDROCHLORIDE 50 MILLIGRAM(S): 50 TABLET, FILM COATED ORAL at 15:30

## 2025-06-13 RX ADMIN — Medication 125 MILLILITER(S): at 16:43

## 2025-06-13 RX ADMIN — INSULIN LISPRO 6: 100 INJECTION, SOLUTION INTRAVENOUS; SUBCUTANEOUS at 11:56

## 2025-06-13 RX ADMIN — Medication 1 APPLICATION(S): at 12:01

## 2025-06-13 RX ADMIN — PREDNISONE 20 MILLIGRAM(S): 20 TABLET ORAL at 05:42

## 2025-06-13 RX ADMIN — LEVETIRACETAM 500 MILLIGRAM(S): 10 INJECTION, SOLUTION INTRAVENOUS at 17:04

## 2025-06-13 RX ADMIN — Medication 1 DOSE(S): at 20:10

## 2025-06-13 RX ADMIN — Medication 400 MILLIGRAM(S): at 17:04

## 2025-06-13 RX ADMIN — TRAMADOL HYDROCHLORIDE 50 MILLIGRAM(S): 50 TABLET, FILM COATED ORAL at 14:46

## 2025-06-13 RX ADMIN — LEVETIRACETAM 500 MILLIGRAM(S): 10 INJECTION, SOLUTION INTRAVENOUS at 05:44

## 2025-06-13 RX ADMIN — SUMATRIPTAN 100 MILLIGRAM(S): 100 TABLET, FILM COATED ORAL at 09:05

## 2025-06-13 RX ADMIN — ENTECAVIR 0.5 MILLIGRAM(S): 0.5 TABLET ORAL at 11:55

## 2025-06-13 RX ADMIN — TRAMADOL HYDROCHLORIDE 50 MILLIGRAM(S): 50 TABLET, FILM COATED ORAL at 21:45

## 2025-06-13 RX ADMIN — CYCLOSPORINE 100 MILLIGRAM(S): 100 CAPSULE, LIQUID FILLED ORAL at 20:07

## 2025-06-13 RX ADMIN — HEPARIN SODIUM 5000 UNIT(S): 1000 INJECTION INTRAVENOUS; SUBCUTANEOUS at 05:45

## 2025-06-13 RX ADMIN — Medication 30 MILLIGRAM(S): at 05:45

## 2025-06-13 RX ADMIN — INSULIN LISPRO 8 UNIT(S): 100 INJECTION, SOLUTION INTRAVENOUS; SUBCUTANEOUS at 11:56

## 2025-06-13 RX ADMIN — Medication 1 TABLET(S): at 05:44

## 2025-06-13 RX ADMIN — Medication 1 TABLET(S): at 11:55

## 2025-06-13 RX ADMIN — HEPARIN SODIUM 5000 UNIT(S): 1000 INJECTION INTRAVENOUS; SUBCUTANEOUS at 17:05

## 2025-06-13 RX ADMIN — INSULIN GLARGINE-YFGN 5 UNIT(S): 100 INJECTION, SOLUTION SUBCUTANEOUS at 22:34

## 2025-06-13 RX ADMIN — Medication 400 MILLIGRAM(S): at 08:47

## 2025-06-13 RX ADMIN — INSULIN LISPRO 4: 100 INJECTION, SOLUTION INTRAVENOUS; SUBCUTANEOUS at 17:05

## 2025-06-13 RX ADMIN — Medication 2 TABLET(S): at 22:33

## 2025-06-13 RX ADMIN — SUMATRIPTAN 100 MILLIGRAM(S): 100 TABLET, FILM COATED ORAL at 08:15

## 2025-06-13 RX ADMIN — QUETIAPINE FUMARATE 75 MILLIGRAM(S): 25 TABLET ORAL at 22:33

## 2025-06-13 RX ADMIN — Medication 100 GRAM(S): at 16:43

## 2025-06-13 NOTE — PROGRESS NOTE ADULT - ASSESSMENT
Patient answers all questions during the clinical interview. Patient is alert and oriented to person, place, and situation. Largely oriented to time (knows month, day of week, year, and misstates date). Speech is fluent and comprehensible. Auditory comprehension for conversation is functional.     Mood is normal, affect euthymic. She does not report ideation, plan, or intent to harm self/other. Behavior is calm and cooperative. Hallucinations or delusions are not elicited. Thought processes are logical and future-oriented.     Patient describes health issues over the past several years, recent hospitalization and learning of donor match for transplant, and change in mental status while in the hospital with hallucinations. Patient recalls being confused and thinking her  was in the room talking with her when he was not. Patient indicates she is feeling better, no longer hallucinating, and is looking forward to participating in acute rehabilitation to improve her independence. Support and encouragement are provided.       Plan: Continue the assessment process. Neuropsychology will follow-up with supportive sessions to address adjustment to change in functioning and remains available through discharge.

## 2025-06-13 NOTE — CONSULT NOTE ADULT - SUBJECTIVE AND OBJECTIVE BOX
Patient is a 70y old  Female who presents with a chief complaint of Liver Transplant (2025 09:08)      HPI:  70 F with PMHx NAFLD cirrhosis, migraines, T2DM, COPD, and HCC (listed for liver transplant with MELD 20B), UGIB, chronic back pain 2/2 spinal fracture, originally presented to [facility] on  for coffee ground emesis and melena, EGD showed no active bleeding, patient was transfused and stabilized prior to transfer to [facility] on  for further management. Patient denies bloody bowel movements or bloody emesis since . Patient endorses low back pain and frontal lobe headache, Neurology consulted for persistent headache, recommended to start nortriptyline. When the appropriate organ was available, the patient was brought to OR and S/p OLT from a  doner under steroids and Simulect induction with Dr. Caicedo on . Post op course significant for E. Faecium UTI , SCOTT, acute blood loss anemia requiring multiple blood products, s/p insulin gtt to maintain euglycemia, donor + hep B, thrombocytopenia, hypoglycemia episodes, and waxing and waning mental status with hallucinations. Patient optimized and was evaluated by PM&R and therapy for functional deficits, gait/ADL impairments and acute rehabilitation was recommended. Patient was cleared for discharge to [facility] IRF on 25. (2025 15:31)      TODAY:  Patient seen and examined in NAD  No overnight event ***    PAST MEDICAL & SURGICAL HISTORY:  HCC (hepatocellular carcinoma)      DM (diabetes mellitus)      HTN (hypertension)      HLD (hyperlipidemia)      Hepatic cirrhosis      IVEY (nonalcoholic steatohepatitis)      Former smoker      Ascites      Hepatic encephalopathy      History of cervical cancer      HCC (hepatocellular carcinoma)      Hepatic cirrhosis      HLD (hyperlipidemia)      HTN (hypertension)      Type 2 diabetes mellitus      History of ascites      Nonalcoholic fatty liver disease without nonalcoholic steatohepatitis (IVEY)      COPD (chronic obstructive pulmonary disease)      H/O  section      History of cholecystectomy      H/O carpal tunnel repair      H/O cataract extraction      H/O carpal tunnel repair      H/O  section      H/O cataract extraction      History of cholecystectomy          Father: - at age - with history of   Mother: - at age - with history of           Substance Use (street drugs): (  ) never used  (  ) other:  Tobacco Usage:  (   ) never smoked   (   ) former smoker   (   ) current smoker  (     ) pack year  Alcohol Usage:  Sexual History:   Recent Travel:      ALLERGIES:  Allergies    No Known Allergies    Intolerances        melatonin 9 milliGRAM(s) Oral at bedtime  SUMAtriptan 100 milliGRAM(s) Oral two times a day PRN      REVIEW OF SYSTEMS:  CONSTITUTIONAL: No fever, weight loss, or fatigue  EYES: No eye pain, visual disturbances, or discharge  RESPIRATORY: No cough, wheezing, chills or hemoptysis; No shortness of breath  CARDIOVASCULAR: No chest pain, palpitations, dizziness, or leg swelling  GASTROINTESTINAL: No abdominal or epigastric pain. No nausea, vomiting, or hematemesis; No diarrhea or constipation. No melena or hematochezia.  GENITOURINARY: No dysuria, frequency, hematuria, or incontinence  NEUROLOGICAL: No headaches, memory loss, loss of strength, numbness, or tremors  SKIN: No itching, burning, rashes, or lesions   MUSCULOSKELETAL: No joint pain or swelling; No muscle, back, or extremity pain  PSYCHIATRIC: No depression, anxiety, mood swings, or difficulty sleeping    ALL OTHER SYSTEMS REVIEWED AND ARE NEGATIVE    VITAL SIGNS:  T(C): 36.6 (25 @ 08:19), Max: 36.8 (25 @ 12:51)  HR: 77 (25 @ 08:19) (72 - 85)  BP: 117/61 (25 @ 08:19) (117/56 - 128/66)  RR: 16 (25 @ 08:19) (16 - 20)  SpO2: 98% (25 @ 08:19) (95% - 100%)  Wt(kg): --Vital Signs Last 24 Hrs  T(C): 36.6 (2025 08:19), Max: 36.8 (2025 12:51)  T(F): 97.8 (2025 08:19), Max: 98.2 (2025 12:51)  HR: 77 (2025 08:19) (72 - 85)  BP: 117/61 (2025 08:19) (117/56 - 128/66)  BP(mean): 81 (2025 16:29) (81 - 81)  RR: 16 (2025 08:19) (16 - 20)  SpO2: 98% (2025 08:19) (95% - 100%)    Parameters below as of 2025 08:19  Patient On (Oxygen Delivery Method): room air        PHYSICAL EXAM: ***      LABS:                        8.3    4.78  )-----------( 91       ( 2025 06:35 )             25.3     06-13    141  |  108  |  18  ----------------------------<  135[H]  4.2   |  25  |  1.31[H]    Ca    8.4      2025 06:35  Phos  3.4     06-12  Mg     1.9     06-12    TPro  4.6[L]  /  Alb  2.6[L]  /  TBili  1.0  /  DBili  x   /  AST  25  /  ALT  57[H]  /  AlkPhos  89  06-13    PT/INR - ( 2025 07:11 )   PT: 11.7 sec;   INR: 1.02 ratio         PTT - ( 2025 07:11 )  PTT:25.4 sec  Urinalysis Basic - ( 2025 06:35 )    Color: x / Appearance: x / SG: x / pH: x  Gluc: 135 mg/dL / Ketone: x  / Bili: x / Urobili: x   Blood: x / Protein: x / Nitrite: x   Leuk Esterase: x / RBC: x / WBC x   Sq Epi: x / Non Sq Epi: x / Bacteria: x       CAPILLARY BLOOD GLUCOSE      POCT Blood Glucose.: 169 mg/dL (2025 07:59)  POCT Blood Glucose.: 138 mg/dL (2025 21:32)  POCT Blood Glucose.: 171 mg/dL (2025 16:31)  POCT Blood Glucose.: 140 mg/dL (2025 11:31)        Urinalysis Basic - ( 2025 06:35 )    Color: x / Appearance: x / SG: x / pH: x  Gluc: 135 mg/dL / Ketone: x  / Bili: x / Urobili: x   Blood: x / Protein: x / Nitrite: x   Leuk Esterase: x / RBC: x / WBC x   Sq Epi: x / Non Sq Epi: x / Bacteria: x          Previous Consultant(s) Notes Reviewed:  YES  Care Discussed with Consultants/Other Providers YES  Previous Imaging Personally Reviewed:  YES Patient is a 70y old  Female who presents with a chief complaint of Liver Transplant (2025 09:08)      HPI:  70 F with PMHx NAFLD cirrhosis, migraines, T2DM, COPD, and HCC (listed for liver transplant with MELD 20B), UGIB, chronic back pain 2/2 spinal fracture, originally presented to [facility] on  for coffee ground emesis and melena, EGD showed no active bleeding, patient was transfused and stabilized prior to transfer to [facility] on  for further management. Patient denies bloody bowel movements or bloody emesis since . Patient endorses low back pain and frontal lobe headache, Neurology consulted for persistent headache, recommended to start nortriptyline. When the appropriate organ was available, the patient was brought to OR and S/p OLT from a  doner under steroids and Simulect induction with Dr. Caicedo on . Post op course significant for E. Faecium UTI , SCOTT, acute blood loss anemia requiring multiple blood products, s/p insulin gtt to maintain euglycemia, donor + hep B, thrombocytopenia, hypoglycemia episodes, and waxing and waning mental status with hallucinations. Patient optimized and was evaluated by PM&R and therapy for functional deficits, gait/ADL impairments and acute rehabilitation was recommended. Patient was cleared for discharge to [facility] IRF on 25. (2025 15:31)      TODAY:  Patient seen and examined in afternoon as she was participating in therapy earlier.   No overnight event   reports she bruises easily and had required multiple attempts for blood work.  no current pain. no HA currently.   denies urinary sxs. had frequency earlier but has resolved.   no f/chills.     PAST MEDICAL & SURGICAL HISTORY:  HCC (hepatocellular carcinoma)      DM (diabetes mellitus)      HTN (hypertension)      HLD (hyperlipidemia)      Hepatic cirrhosis      IVEY (nonalcoholic steatohepatitis)      Former smoker      Ascites      Hepatic encephalopathy      History of cervical cancer      HCC (hepatocellular carcinoma)      Hepatic cirrhosis      HLD (hyperlipidemia)      HTN (hypertension)      Type 2 diabetes mellitus      History of ascites      Nonalcoholic fatty liver disease without nonalcoholic steatohepatitis (IVEY)      COPD (chronic obstructive pulmonary disease)      H/O  section      History of cholecystectomy      H/O carpal tunnel repair      H/O cataract extraction      H/O carpal tunnel repair      H/O  section      H/O cataract extraction      History of cholecystectomy      Fm hx noncontributory        Substance Use (street drugs): ( x ) never used  (  ) other:  Tobacco Usage:  (   ) never smoked   ( x  ) former smoker quit 3 yrs ago.  Alcohol Usage: denied    ALLERGIES:  Allergies    No Known Allergies    Intolerances        melatonin 9 milliGRAM(s) Oral at bedtime  SUMAtriptan 100 milliGRAM(s) Oral two times a day PRN      REVIEW OF SYSTEMS:  CONSTITUTIONAL: No fever, weight loss. + fatigue. + poor appetite.   EYES: No eye pain, visual disturbances, or discharge  RESPIRATORY: No cough, wheezing, chills or hemoptysis; No shortness of breath at rest. + MOORE  CARDIOVASCULAR: No chest pain, palpitations, dizziness, or leg swelling  GASTROINTESTINAL: No abdominal or epigastric pain. No nausea, vomiting. No recurrence of hematemesis; No diarrhea or constipation. last bm   GENITOURINARY: No dysuria, frequency, hematuria  NEUROLOGICAL: + migraines but none currently. no one sided weakness.   SKIN: No rashes. +generalized itchiness. + easy bruising  MUSCULOSKELETAL: No joint pain or swelling;    ALL OTHER SYSTEMS REVIEWED AND ARE NEGATIVE    VITAL SIGNS:  T(C): 36.6 (25 @ 08:19), Max: 36.8 (25 @ 12:51)  HR: 77 (25 @ 08:19) (72 - 85)  BP: 117/61 (25 @ 08:19) (117/56 - 128/66)  RR: 16 (25 @ 08:19) (16 - 20)  SpO2: 98% (25 @ 08:19) (95% - 100%)  Wt(kg): --Vital Signs Last 24 Hrs  T(C): 36.6 (2025 08:19), Max: 36.8 (2025 12:51)  T(F): 97.8 (2025 08:19), Max: 98.2 (2025 12:51)  HR: 77 (2025 08:19) (72 - 85)  BP: 117/61 (2025 08:19) (117/56 - 128/66)  BP(mean): 81 (2025 16:29) (81 - 81)  RR: 16 (2025 08:19) (16 - 20)  SpO2: 98% (2025 08:19) (95% - 100%)    Parameters below as of 2025 08:19  Patient On (Oxygen Delivery Method): room air        PHYSICAL EXAM:   GENERAL: NAD in bed.   HENT:  Atraumatic, Normocephalic; No tonsillar erythema, exudates. Moist mucous membranes;   EYES: EOMI, PERRLA, conjunctiva normal.  NECK: Supple, No JVD  NERVOUS SYSTEM:  speech clear. strength symmetrical. follows commands.   CHEST/LUNG: Clear bilaterally; No rales, rhonchi, wheezing, or rubs; normal respiratory effort, no intercostal retractions; no pitting edema in LE.  HEART: Regular rate and rhythm; No murmurs, rubs  ABDOMEN: Soft, Nontender, Nondistended; Bowel sounds present; abdominal surgical incision with staples.   SKIN: multiple areas of bruises   PSYCH: Appropriate affect, Alert & Oriented x 3      LABS:                        8.3    4.78  )-----------( 91       ( 2025 06:35 )             25.3     06-13    141  |  108  |  18  ----------------------------<  135[H]  4.2   |  25  |  1.31[H]    Ca    8.4      2025 06:35  Phos  3.4     06-12  Mg     1.9     06-12    TPro  4.6[L]  /  Alb  2.6[L]  /  TBili  1.0  /  DBili  x   /  AST  25  /  ALT  57[H]  /  AlkPhos  89  06-13    PT/INR - ( 2025 07:11 )   PT: 11.7 sec;   INR: 1.02 ratio         PTT - ( 2025 07:11 )  PTT:25.4 sec  Urinalysis Basic - ( 2025 06:35 )    Color: x / Appearance: x / SG: x / pH: x  Gluc: 135 mg/dL / Ketone: x  / Bili: x / Urobili: x   Blood: x / Protein: x / Nitrite: x   Leuk Esterase: x / RBC: x / WBC x   Sq Epi: x / Non Sq Epi: x / Bacteria: x       CAPILLARY BLOOD GLUCOSE      POCT Blood Glucose.: 169 mg/dL (2025 07:59)  POCT Blood Glucose.: 138 mg/dL (2025 21:32)  POCT Blood Glucose.: 171 mg/dL (2025 16:31)  POCT Blood Glucose.: 140 mg/dL (2025 11:31)        Urinalysis Basic - ( 2025 06:35 )    Color: x / Appearance: x / SG: x / pH: x  Gluc: 135 mg/dL / Ketone: x  / Bili: x / Urobili: x   Blood: x / Protein: x / Nitrite: x   Leuk Esterase: x / RBC: x / WBC x   Sq Epi: x / Non Sq Epi: x / Bacteria: x          Previous Consultant(s) Notes Reviewed:  YES  Care Discussed with Consultants/Other Providers YES  Previous Imaging Personally Reviewed:  YES

## 2025-06-13 NOTE — DIETITIAN INITIAL EVALUATION ADULT - ORAL INTAKE PTA/DIET HISTORY
Pt endorses poor appetite over the last 2 weeks since admission to [facility]. Stating that she has a very low desire to eat. No chewing/swallowing issues. NKFA. Hx DMII, A1c 6.0% (5/29). 
5 (moderate pain)

## 2025-06-13 NOTE — DIETITIAN INITIAL EVALUATION ADULT - NSICDXPASTMEDICALHX_GEN_ALL_CORE_FT
PAST MEDICAL HISTORY:  COPD (chronic obstructive pulmonary disease)     HCC (hepatocellular carcinoma)     Hepatic cirrhosis     History of ascites     HLD (hyperlipidemia)     HTN (hypertension)     Nonalcoholic fatty liver disease without nonalcoholic steatohepatitis (IVEY)     Type 2 diabetes mellitus

## 2025-06-13 NOTE — DIETITIAN INITIAL EVALUATION ADULT - NS FNS DIET ORDER
Diet, Consistent Carbohydrate w/Evening Snack:   Supplement Feeding Modality:  Oral  Ensure Max Cans or Servings Per Day:  2       Frequency:  Daily (06-12-25 @ 16:40)

## 2025-06-13 NOTE — DIETITIAN INITIAL EVALUATION ADULT - OTHER INFO
HECTOR RUBIN is a 70 F with PMHx NAFLD cirrhosis, migraines, T2DM, COPD, and HCC (listed for liver transplant with MELD 20B), UGIB, chronic back pain 2/2 spinal fracture, originally presented to [facility] on  for coffee ground emesis and melena, EGD showed no active bleeding, patient was transfused and stabilized prior to transfer to [facility] on  for further management. Patient denies bloody bowel movements or bloody emesis since . Patient endorses low back pain and frontal lobe headache, Neurology consulted for persistent headache, recommended to start nortriptyline. When the appropriate organ was available, the patient was brought to OR and S/p OLT from a  doner under steroids and Simulect induction with Dr. Caicedo on . Now admitted to [facility] for functional deficits and initiation of a multidisciplinary rehab program consisting focused on functional mobility, transfers and ADLs.    Pt seen this am with fair intake of bfast- good acceptance of hot cereal in the mornings. Pt states that she likes the Ensure Max, but finds that it sits a bit "heavy". Improved acceptance when chilled/over ice. Pt endorses ~30lbs weight loss over the last few weeks. .7lbs, although pt states that she is as low as 115lbs now. NFPE findings consistent with severe, acute malnutrition. No pressure ulcers or edema. Last BM 2 days ago per pt- senna/miralax ordered for bowel regimen. Encouraged adequate oral hydration. Noted POCT- pt continues on lantus & SSI. +prednisone. Other pertinent meds: magnesium oxide, cyclosporine, cellcept, [id number]. Pt educated on transplant nutrition therapy- food safety as well as food & drug interactions (avoidance of pomegranate, starfruit, grapefruit). Pt continues on consistent carbohydrate to help maintain optimal glycemic control. Additional food preferences obtained to optimize intake & on file with nutrition office.

## 2025-06-13 NOTE — DIETITIAN INITIAL EVALUATION ADULT - PERTINENT LABORATORY DATA
06-13    141  |  108  |  18  ----------------------------<  135[H]  4.2   |  25  |  1.31[H]    Ca    8.4      13 Jun 2025 06:35  Phos  3.4     06-12  Mg     1.9     06-12    TPro  4.6[L]  /  Alb  2.6[L]  /  TBili  1.0  /  DBili  x   /  AST  25  /  ALT  57[H]  /  AlkPhos  89  06-13  POCT Blood Glucose.: 169 mg/dL (06-13-25 @ 07:59)  A1C with Estimated Average Glucose Result: 6.0 % (05-28-25 @ 10:20)

## 2025-06-13 NOTE — PROGRESS NOTE ADULT - SUBJECTIVE AND OBJECTIVE BOX
Patient referred by Dr. Renteria. Chart records are reviewed.       Patent is approached at bedside. Neuropsychologist is introduced as a member of the rehabilitation team and the purpose of the session is explained. Patient agrees to participate.      Patient seen for 25-minute supportive therapy session. Session focuses on establishing rapport with patient. Patient provides pertinent information about her medical and social history.       Per patient, "I'm feeling better."

## 2025-06-13 NOTE — CONSULT NOTE ADULT - ASSESSMENT
70 F with PMHx NAFLD cirrhosis, migraines, T2DM, COPD, and HCC (listed for liver transplant with MELD 20B), UGIB, chronic back pain 2/2 spinal fracture, originally presented to [facility] on  for coffee ground emesis and melena, EGD showed no active bleeding, patient was transfused and stabilized prior to transfer to [facility] on  for further management. Patient denies bloody bowel movements or bloody emesis since . Patient endorses low back pain and frontal lobe headache, Neurology consulted for persistent headache, recommended to start nortriptyline. When the appropriate organ was available, the patient was brought to OR and S/p OLT from a  doner under steroids and Simulect induction with Dr. Caicedo on . Now admitted to [facility] for functional deficits and initiation of a multidisciplinary rehab program consisting focused on functional mobility, transfers and ADLs.    * Labs discussed Cr elevated, with concentrated urine--f/u UA and Repeat CBC BMP tomorrow, if Cr still rising, will get renal consult  # Debility 2/2 Liver transplant due to decompensative liver disease  - HBV Core + donor: Entecavir 0.5mg daily  - Good graft  function   - S/p solumedrol ---> Prednisone   - ASA 81mg daily   - FK stopped d/t hallucinations. UA neg. switched to Cyclo for mental status changes,  MMF , Pred 20. keppra ppx.   - PPx: Bactrim, Valcyte 450 (CMV +/-, inc 900mg as able), Fluc, PPI, OsCal  - Simulect completed  - PT/OT    #Type 2 Diabetes Mellitus  #Steroid induced hyperglycemia   - follows w. endocrine. s/p insulin gtt  - HbA1c  6.0% - although not accurate in the setting of low gfr and liver disease and dietary indiscretion  - Patient should check BG TID AC and HS at rehab/home. Can use her Dexcom G7 CGM to monitor BGs but if BG <100mg/dL or >250mg/dL, patient should confirm with fingerstick BG.   - Home regimen: Lantus 14 units + Humalog 18 units with meals. Sugars are usually high in the 200s; uses Dexcom G7 with reader   -FBG ACHS + Mod ISS  -Lantus 5U HS  -Admelog 12U breakfast; 8U Lunch; 5U dinner - ****    #SCOTT  - creat 1.3.   - repeat BMP in am, if rising then recommend nephro consult.   - Renal dose meds  - Avoid nephrotoxic meds     #Transaminitis (improving)  - Trend LFTs    #Acute blood loss anemia  -S/p multiple blood products  -Trend H/H  -Transfuse if hgb <7 PRN    #HTN/HLD  -Nifedipine 30mg daily     #Migraines  -If sumatriptan desired, recommendations of 100mg PO BID prn (no more than 2 doses in a day and 3 doses/week)    #COPD  -Advair daily   -Albuterol PRN    #Depression  #Hallucinations   -Seroquel 75mg HS   -Neuropsychology consult PRN  -Melatonin 6mg HS PRN    70 F with PMHx NAFLD cirrhosis, migraines, T2DM, COPD, and HCC (listed for liver transplant with MELD 20B), UGIB, chronic back pain 2/2 spinal fracture, s/p OLT from  donor at [facility]. Required insulin gtt for euglycemia and hospital course complicated w. migraines, scott, uti, Now admitted to [place] re[facility] program on .    # Debility 2/2 Liver transplant due to NAFLD  - HBV Core + donor: Entecavir 0.5mg daily  - S/p solumedrol ---> Prednisone   - ASA 81mg daily   - FK stopped d/t hallucinations. UA neg. switched to Cyclo for mental status changes,  MMF , Pred 20. keppra ppx.   - PPx: Bactrim, Valcyte 450 (CMV +/-, inc 900mg as able), Fluc, PPI, OsCal  - Simulect completed  - PT/OT    #Type 2 Diabetes Mellitus  #Steroid induced hyperglycemia   - follows w. endocrine. s/p insulin gtt  - HbA1c  6.0% - per endo, not accurate in the setting of low gfr and liver disease and dietary indiscretion  - Home regimen: Lantus 14 units + Humalog 18 units with meals. Sugars are usually high in the 200s; uses Dexcom G7 with reader   - monitor FBG ACHS + Mod ISS  -was d/c on Lantus 5U HS and Admelog 12U breakfast; 8U Lunch; 5U dinner   - admelog held this am due to glucose 169; recieved 2U per sliding scale; then hyperglycemic 272 for lunch. has poor appetite. encourage po. nutrition consulted. will dec breakfast mealtime insulin to 8 U, please give if eating; hold if NPO.     #SCOTT  - creat 1.3 from 1.14  - IVF 250ml noted  - repeat BMP in am, if rising then recommend nephro consult.   - Renal dose meds  - Avoid nephrotoxic meds     #Transaminitis (improving)  - Trend LFTs    #Acute blood loss anemia  -S/p multiple blood products  -Trend H/H. recent baseline 8-9's  -Transfuse if hgb <7 PRN    #HTN  -Nifedipine 30mg daily     #Migraines  -If sumatriptan desired, recommendations of 100mg PO BID prn (no more than 2 doses in a day and 3 doses/week)    #COPD  -Advair daily   -Albuterol q 6hrs PRN    #Depression  #Hallucinations   -Seroquel 75mg HS   -Neuropsychology consult PRN  -Melatonin 6mg HS PRN

## 2025-06-13 NOTE — DIETITIAN NUTRITION RISK NOTIFICATION - TREATMENT: THE FOLLOWING DIET HAS BEEN RECOMMENDED
Diet, Consistent Carbohydrate w/Evening Snack:   Supplement Feeding Modality:  Oral  Ensure Max Cans or Servings Per Day:  2       Frequency:  Daily (06-12-25 @ 16:40) [Active]

## 2025-06-13 NOTE — DIETITIAN INITIAL EVALUATION ADULT - PERTINENT MEDS FT
MEDICATIONS  (STANDING):  aspirin enteric coated 81 milliGRAM(s) Oral daily  calcium carbonate 1250 mG  + Vitamin D (OsCal 500 + D) 1 Tablet(s) Oral two times a day  chlorhexidine 2% Cloths 1 Application(s) Topical daily  cycloSPORINE  , modified (GENGRAF) 75 milliGRAM(s) Oral two times a day  dextrose 5%. 1000 milliLiter(s) (50 mL/Hr) IV Continuous <Continuous>  dextrose 5%. 1000 milliLiter(s) (100 mL/Hr) IV Continuous <Continuous>  dextrose 50% Injectable 25 Gram(s) IV Push once  dextrose 50% Injectable 12.5 Gram(s) IV Push once  dextrose 50% Injectable 25 Gram(s) IV Push once  entecavir 0.5 milliGRAM(s) Oral daily  fluconAZOLE   Tablet 400 milliGRAM(s) Oral daily  fluticasone propionate/ salmeterol 100-50 MICROgram(s) Diskus 1 Dose(s) Inhalation two times a day  glucagon  Injectable 1 milliGRAM(s) IntraMuscular once  heparin   Injectable 5000 Unit(s) SubCutaneous every 12 hours  insulin glargine Injectable (LANTUS) 5 Unit(s) SubCutaneous at bedtime  insulin lispro (ADMELOG) corrective regimen sliding scale   SubCutaneous three times a day before meals  insulin lispro (ADMELOG) corrective regimen sliding scale   SubCutaneous at bedtime  levETIRAcetam 500 milliGRAM(s) Oral two times a day  magnesium oxide 400 milliGRAM(s) Oral three times a day with meals  melatonin 9 milliGRAM(s) Oral at bedtime  mycophenolate mofetil 1000 milliGRAM(s) Oral <User Schedule>  NIFEdipine XL 30 milliGRAM(s) Oral daily  pantoprazole    Tablet 40 milliGRAM(s) Oral before breakfast  polyethylene glycol 3350 17 Gram(s) Oral daily  predniSONE   Tablet 20 milliGRAM(s) Oral daily  QUEtiapine 75 milliGRAM(s) Oral at bedtime  senna 2 Tablet(s) Oral at bedtime  trimethoprim   80 mG/sulfamethoxazole 400 mG 1 Tablet(s) Oral daily  valGANciclovir 900 milliGRAM(s) Oral daily    MEDICATIONS  (PRN):  albuterol    90 MICROgram(s) HFA Inhaler 2 Puff(s) Inhalation every 6 hours PRN Shortness of Breath and/or Wheezing  dextrose Oral Gel 15 Gram(s) Oral once PRN Blood Glucose LESS THAN 70 milliGRAM(s)/deciliter  SUMAtriptan 100 milliGRAM(s) Oral two times a day PRN Migraine  traMADol 25 milliGRAM(s) Oral every 6 hours PRN Moderate Pain (4 - 6)  traMADol 50 milliGRAM(s) Oral every 6 hours PRN Severe Pain (7 - 10)

## 2025-06-13 NOTE — DIETITIAN INITIAL EVALUATION ADULT - ADD RECOMMEND
1. Continue with current nutrition plan of care: Ensure Max BID  2. Preferences on file with nutrition office: strawberry Greek yogurt TID, oatmeal at bfast, chix salad at lunch   3. Ongoing diet education: transplant nutrition therapy, consistent carbohydrate

## 2025-06-14 LAB
ANION GAP SERPL CALC-SCNC: 8 MMOL/L — SIGNIFICANT CHANGE UP (ref 5–17)
APPEARANCE UR: CLEAR — SIGNIFICANT CHANGE UP
BACTERIA # UR AUTO: NEGATIVE /HPF — SIGNIFICANT CHANGE UP
BILIRUB UR-MCNC: NEGATIVE — SIGNIFICANT CHANGE UP
BUN SERPL-MCNC: 21 MG/DL — SIGNIFICANT CHANGE UP (ref 7–23)
CALCIUM SERPL-MCNC: 8.3 MG/DL — LOW (ref 8.4–10.5)
CHLORIDE SERPL-SCNC: 106 MMOL/L — SIGNIFICANT CHANGE UP (ref 96–108)
CO2 SERPL-SCNC: 21 MMOL/L — LOW (ref 22–31)
COLOR SPEC: YELLOW — SIGNIFICANT CHANGE UP
CREAT SERPL-MCNC: 1.38 MG/DL — HIGH (ref 0.5–1.3)
DIFF PNL FLD: ABNORMAL
EGFR: 41 ML/MIN/1.73M2 — LOW
EGFR: 41 ML/MIN/1.73M2 — LOW
EPI CELLS # UR: SIGNIFICANT CHANGE UP
GLUCOSE BLDC GLUCOMTR-MCNC: 138 MG/DL — HIGH (ref 70–99)
GLUCOSE BLDC GLUCOMTR-MCNC: 176 MG/DL — HIGH (ref 70–99)
GLUCOSE BLDC GLUCOMTR-MCNC: 182 MG/DL — HIGH (ref 70–99)
GLUCOSE BLDC GLUCOMTR-MCNC: 184 MG/DL — HIGH (ref 70–99)
GLUCOSE SERPL-MCNC: 181 MG/DL — HIGH (ref 70–99)
GLUCOSE UR QL: NEGATIVE MG/DL — SIGNIFICANT CHANGE UP
KETONES UR QL: NEGATIVE MG/DL — SIGNIFICANT CHANGE UP
LEUKOCYTE ESTERASE UR-ACNC: NEGATIVE — SIGNIFICANT CHANGE UP
NITRITE UR-MCNC: NEGATIVE — SIGNIFICANT CHANGE UP
PH UR: 5 — SIGNIFICANT CHANGE UP (ref 5–8)
POTASSIUM SERPL-MCNC: 4.6 MMOL/L — SIGNIFICANT CHANGE UP (ref 3.5–5.3)
POTASSIUM SERPL-SCNC: 4.6 MMOL/L — SIGNIFICANT CHANGE UP (ref 3.5–5.3)
PROT UR-MCNC: NEGATIVE MG/DL — SIGNIFICANT CHANGE UP
RBC CASTS # UR COMP ASSIST: 5 /HPF — HIGH (ref 0–4)
SODIUM SERPL-SCNC: 135 MMOL/L — SIGNIFICANT CHANGE UP (ref 135–145)
SP GR SPEC: 1.01 — SIGNIFICANT CHANGE UP (ref 1–1.03)
UROBILINOGEN FLD QL: 0.2 MG/DL — SIGNIFICANT CHANGE UP (ref 0.2–1)
WBC UR QL: 0 /HPF — SIGNIFICANT CHANGE UP (ref 0–5)

## 2025-06-14 PROCEDURE — 99232 SBSQ HOSP IP/OBS MODERATE 35: CPT

## 2025-06-14 RX ORDER — MAGNESIUM SULFATE 500 MG/ML
2 SYRINGE (ML) INJECTION ONCE
Refills: 0 | Status: COMPLETED | OUTPATIENT
Start: 2025-06-14 | End: 2025-06-14

## 2025-06-14 RX ORDER — TRAMADOL HYDROCHLORIDE 50 MG/1
25 TABLET, FILM COATED ORAL ONCE
Refills: 0 | Status: DISCONTINUED | OUTPATIENT
Start: 2025-06-14 | End: 2025-06-14

## 2025-06-14 RX ORDER — BISACODYL 5 MG
5 TABLET, DELAYED RELEASE (ENTERIC COATED) ORAL AT BEDTIME
Refills: 0 | Status: COMPLETED | OUTPATIENT
Start: 2025-06-14 | End: 2025-06-14

## 2025-06-14 RX ADMIN — HEPARIN SODIUM 5000 UNIT(S): 1000 INJECTION INTRAVENOUS; SUBCUTANEOUS at 17:00

## 2025-06-14 RX ADMIN — TRAMADOL HYDROCHLORIDE 25 MILLIGRAM(S): 50 TABLET, FILM COATED ORAL at 12:09

## 2025-06-14 RX ADMIN — POLYETHYLENE GLYCOL 3350 17 GRAM(S): 17 POWDER, FOR SOLUTION ORAL at 12:09

## 2025-06-14 RX ADMIN — HEPARIN SODIUM 5000 UNIT(S): 1000 INJECTION INTRAVENOUS; SUBCUTANEOUS at 05:29

## 2025-06-14 RX ADMIN — ENTECAVIR 0.5 MILLIGRAM(S): 0.5 TABLET ORAL at 12:10

## 2025-06-14 RX ADMIN — TRAMADOL HYDROCHLORIDE 50 MILLIGRAM(S): 50 TABLET, FILM COATED ORAL at 05:20

## 2025-06-14 RX ADMIN — VALGANCICLOVIR 900 MILLIGRAM(S): 450 TABLET, FILM COATED ORAL at 12:11

## 2025-06-14 RX ADMIN — Medication 40 MILLIGRAM(S): at 05:27

## 2025-06-14 RX ADMIN — Medication 1 TABLET(S): at 17:00

## 2025-06-14 RX ADMIN — Medication 400 MILLIGRAM(S): at 12:11

## 2025-06-14 RX ADMIN — CYCLOSPORINE 100 MILLIGRAM(S): 100 CAPSULE, LIQUID FILLED ORAL at 07:40

## 2025-06-14 RX ADMIN — INSULIN LISPRO 2: 100 INJECTION, SOLUTION INTRAVENOUS; SUBCUTANEOUS at 16:59

## 2025-06-14 RX ADMIN — LEVETIRACETAM 500 MILLIGRAM(S): 10 INJECTION, SOLUTION INTRAVENOUS at 17:00

## 2025-06-14 RX ADMIN — SUMATRIPTAN 100 MILLIGRAM(S): 100 TABLET, FILM COATED ORAL at 20:25

## 2025-06-14 RX ADMIN — SUMATRIPTAN 100 MILLIGRAM(S): 100 TABLET, FILM COATED ORAL at 19:57

## 2025-06-14 RX ADMIN — TRAMADOL HYDROCHLORIDE 50 MILLIGRAM(S): 50 TABLET, FILM COATED ORAL at 04:23

## 2025-06-14 RX ADMIN — Medication 1 APPLICATION(S): at 12:12

## 2025-06-14 RX ADMIN — Medication 400 MILLIGRAM(S): at 12:12

## 2025-06-14 RX ADMIN — CYCLOSPORINE 100 MILLIGRAM(S): 100 CAPSULE, LIQUID FILLED ORAL at 19:34

## 2025-06-14 RX ADMIN — QUETIAPINE FUMARATE 75 MILLIGRAM(S): 25 TABLET ORAL at 21:36

## 2025-06-14 RX ADMIN — LEVETIRACETAM 500 MILLIGRAM(S): 10 INJECTION, SOLUTION INTRAVENOUS at 05:29

## 2025-06-14 RX ADMIN — PREDNISONE 20 MILLIGRAM(S): 20 TABLET ORAL at 05:27

## 2025-06-14 RX ADMIN — Medication 1 TABLET(S): at 05:29

## 2025-06-14 RX ADMIN — Medication 30 MILLIGRAM(S): at 05:28

## 2025-06-14 RX ADMIN — INSULIN LISPRO 2: 100 INJECTION, SOLUTION INTRAVENOUS; SUBCUTANEOUS at 07:38

## 2025-06-14 RX ADMIN — MYCOPHENOLATE MOFETIL 1000 MILLIGRAM(S): 500 TABLET, FILM COATED ORAL at 19:33

## 2025-06-14 RX ADMIN — Medication 81 MILLIGRAM(S): at 12:10

## 2025-06-14 RX ADMIN — Medication 5 MILLIGRAM(S): at 23:15

## 2025-06-14 RX ADMIN — Medication 400 MILLIGRAM(S): at 07:41

## 2025-06-14 RX ADMIN — TRAMADOL HYDROCHLORIDE 25 MILLIGRAM(S): 50 TABLET, FILM COATED ORAL at 13:09

## 2025-06-14 RX ADMIN — INSULIN GLARGINE-YFGN 5 UNIT(S): 100 INJECTION, SOLUTION SUBCUTANEOUS at 21:28

## 2025-06-14 RX ADMIN — Medication 75 MILLILITER(S): at 12:12

## 2025-06-14 RX ADMIN — Medication 9 MILLIGRAM(S): at 21:29

## 2025-06-14 RX ADMIN — Medication 1 TABLET(S): at 12:12

## 2025-06-14 RX ADMIN — INSULIN LISPRO 8 UNIT(S): 100 INJECTION, SOLUTION INTRAVENOUS; SUBCUTANEOUS at 07:38

## 2025-06-14 RX ADMIN — MYCOPHENOLATE MOFETIL 1000 MILLIGRAM(S): 500 TABLET, FILM COATED ORAL at 07:41

## 2025-06-14 RX ADMIN — Medication 1 DOSE(S): at 19:35

## 2025-06-14 RX ADMIN — Medication 400 MILLIGRAM(S): at 17:00

## 2025-06-14 RX ADMIN — INSULIN LISPRO 8 UNIT(S): 100 INJECTION, SOLUTION INTRAVENOUS; SUBCUTANEOUS at 12:10

## 2025-06-14 RX ADMIN — INSULIN LISPRO 5 UNIT(S): 100 INJECTION, SOLUTION INTRAVENOUS; SUBCUTANEOUS at 16:59

## 2025-06-14 RX ADMIN — INSULIN LISPRO 2: 100 INJECTION, SOLUTION INTRAVENOUS; SUBCUTANEOUS at 12:10

## 2025-06-14 RX ADMIN — Medication 1 DOSE(S): at 08:41

## 2025-06-14 RX ADMIN — Medication 25 GRAM(S): at 14:21

## 2025-06-14 RX ADMIN — Medication 2 TABLET(S): at 21:29

## 2025-06-14 NOTE — PROGRESS NOTE ADULT - SUBJECTIVE AND OBJECTIVE BOX
Patient is a 70y old  Female who presents with a chief complaint of Debility 2/2 Liver transplant due to decompensative liver disease (14 Jun 2025 10:30)      Subjective and overnight events:  Patient seen and examined at bedside. +migraine headache improved earlier but came back. no fever, chills, sob, cp, abd pain. surgical pain stable    ALLERGIES:  No Known Allergies    MEDICATIONS  (STANDING):  aspirin enteric coated 81 milliGRAM(s) Oral daily  calcium carbonate 1250 mG  + Vitamin D (OsCal 500 + D) 1 Tablet(s) Oral two times a day  chlorhexidine 2% Cloths 1 Application(s) Topical daily  cycloSPORINE  , modified (GENGRAF) 100 milliGRAM(s) Oral two times a day  dextrose 5%. 1000 milliLiter(s) (50 mL/Hr) IV Continuous <Continuous>  dextrose 5%. 1000 milliLiter(s) (100 mL/Hr) IV Continuous <Continuous>  dextrose 50% Injectable 25 Gram(s) IV Push once  dextrose 50% Injectable 12.5 Gram(s) IV Push once  dextrose 50% Injectable 25 Gram(s) IV Push once  entecavir 0.5 milliGRAM(s) Oral daily  fluconAZOLE   Tablet 400 milliGRAM(s) Oral daily  fluticasone propionate/ salmeterol 100-50 MICROgram(s) Diskus 1 Dose(s) Inhalation two times a day  glucagon  Injectable 1 milliGRAM(s) IntraMuscular once  heparin   Injectable 5000 Unit(s) SubCutaneous every 12 hours  insulin glargine Injectable (LANTUS) 5 Unit(s) SubCutaneous at bedtime  insulin lispro (ADMELOG) corrective regimen sliding scale   SubCutaneous three times a day before meals  insulin lispro (ADMELOG) corrective regimen sliding scale   SubCutaneous at bedtime  insulin lispro Injectable (ADMELOG) 8 Unit(s) SubCutaneous before lunch  insulin lispro Injectable (ADMELOG) 5 Unit(s) SubCutaneous before dinner  insulin lispro Injectable (ADMELOG) 8 Unit(s) SubCutaneous with breakfast  levETIRAcetam 500 milliGRAM(s) Oral two times a day  magnesium oxide 400 milliGRAM(s) Oral three times a day with meals  magnesium sulfate  IVPB 2 Gram(s) IV Intermittent once  melatonin 9 milliGRAM(s) Oral at bedtime  mycophenolate mofetil 1000 milliGRAM(s) Oral <User Schedule>  NIFEdipine XL 30 milliGRAM(s) Oral daily  pantoprazole    Tablet 40 milliGRAM(s) Oral before breakfast  polyethylene glycol 3350 17 Gram(s) Oral daily  predniSONE   Tablet 20 milliGRAM(s) Oral daily  QUEtiapine 75 milliGRAM(s) Oral at bedtime  senna 2 Tablet(s) Oral at bedtime  sodium chloride 0.9%. 1000 milliLiter(s) (75 mL/Hr) IV Continuous <Continuous>  traMADol 25 milliGRAM(s) Oral once  trimethoprim   80 mG/sulfamethoxazole 400 mG 1 Tablet(s) Oral daily  valGANciclovir 900 milliGRAM(s) Oral daily    MEDICATIONS  (PRN):  albuterol    90 MICROgram(s) HFA Inhaler 2 Puff(s) Inhalation every 6 hours PRN Shortness of Breath and/or Wheezing  dextrose Oral Gel 15 Gram(s) Oral once PRN Blood Glucose LESS THAN 70 milliGRAM(s)/deciliter  SUMAtriptan 100 milliGRAM(s) Oral two times a day PRN Migraine  traMADol 25 milliGRAM(s) Oral every 6 hours PRN Moderate Pain (4 - 6)  traMADol 50 milliGRAM(s) Oral every 6 hours PRN Severe Pain (7 - 10)    Vital Signs Last 24 Hrs  T(F): 97.8 (14 Jun 2025 07:44), Max: 97.8 (13 Jun 2025 19:55)  HR: 84 (14 Jun 2025 07:44) (82 - 84)  BP: 127/65 (14 Jun 2025 07:44) (122/54 - 127/65)  RR: 16 (14 Jun 2025 07:44) (16 - 16)  SpO2: 96% (14 Jun 2025 07:44) (96% - 98%)  I&O's Summary    13 Jun 2025 07:01  -  14 Jun 2025 07:00  --------------------------------------------------------  IN: 0 mL / OUT: 200 mL / NET: -200 mL      PHYSICAL EXAM:  General: NAD, A/O x 3  ENT: MMM  Neck: Supple, No JVD  Lungs: Clear to auscultation bilaterally  Cardio: RRR, S1/S2, No murmurs  Abdomen: Soft, Nontender, Nondistended; Bowel sounds present  Extremities: No calf tenderness, No pitting edema    LABS:                        8.3    4.78  )-----------( 91       ( 13 Jun 2025 06:35 )             25.3     06-14    135  |  106  |  21  ----------------------------<  181  4.6   |  21  |  1.38    Ca    8.3      14 Jun 2025 08:18  Phos  3.4     06-12  Mg     1.9     06-12    TPro  4.6  /  Alb  2.6  /  TBili  1.0  /  DBili  x   /  AST  25  /  ALT  57  /  AlkPhos  89  06-13      PT/INR - ( 12 Jun 2025 07:11 )   PT: 11.7 sec;   INR: 1.02 ratio         PTT - ( 12 Jun 2025 07:11 )  PTT:25.4 sec    POCT Blood Glucose.: 184 mg/dL (14 Jun 2025 07:37)  POCT Blood Glucose.: 173 mg/dL (13 Jun 2025 22:28)  POCT Blood Glucose.: 236 mg/dL (13 Jun 2025 16:34)    Urinalysis Basic - ( 14 Jun 2025 08:18 )    Color: x / Appearance: x / SG: x / pH: x  Gluc: 181 mg/dL / Ketone: x  / Bili: x / Urobili: x   Blood: x / Protein: x / Nitrite: x   Leuk Esterase: x / RBC: x / WBC x   Sq Epi: x / Non Sq Epi: x / Bacteria: x        COVID-19 PCR: NotDetec (06-13-25 @ 06:45)      RADIOLOGY & ADDITIONAL TESTS:    Care Discussed with Consultants/Other Providers:

## 2025-06-14 NOTE — CONSULT NOTE ADULT - SUBJECTIVE AND OBJECTIVE BOX
HPI:  Patient is a 70y old  Female with a hx of CKD ( sees a nephrologist in [facility], [facility] recall name), ESLD sec to IVEY who is her for rehab after a OLT on .   Noted mild Cr flux 1.1-1.38. Hence renal evaluation requested.   Pt is aware of having had some renal dysfx but unsure as of the degree and etiology.       PAST MEDICAL & SURGICAL HISTORY:  HCC (hepatocellular carcinoma)  DM (diabetes mellitus)  HTN (hypertension)  HLD (hyperlipidemia)  Hepatic cirrhosis  IVEY (nonalcoholic steatohepatitis)  Former smoker  Ascites  Hepatic encephalopathy  History of cervical cancer  HCC (hepatocellular carcinoma)  Hepatic cirrhosis  HLD (hyperlipidemia)  HTN (hypertension)  Type 2 diabetes mellitus  History of ascites  Nonalcoholic fatty liver disease without nonalcoholic steatohepatitis (IVEY)  COPD (chronic obstructive pulmonary disease)  H/O  section  History of cholecystectomy  H/O carpal tunnel repair  H/O cataract extraction  H/O carpal tunnel repair  H/O  section  H/O cataract extraction  History of cholecystectomy        FAMILY HISTORY:  FH: CAD (coronary artery disease) (Mother)    Family history of CVA (Father)        Allergies:  No Known Allergies          MEDICATIONS  (STANDING):  aspirin enteric coated 81 milliGRAM(s) Oral daily  calcium carbonate 1250 mG  + Vitamin D (OsCal 500 + D) 1 Tablet(s) Oral two times a day  chlorhexidine 2% Cloths 1 Application(s) Topical daily  cycloSPORINE  , modified (GENGRAF) 100 milliGRAM(s) Oral two times a day  dextrose 5%. 1000 milliLiter(s) (100 mL/Hr) IV Continuous <Continuous>  dextrose 5%. 1000 milliLiter(s) (50 mL/Hr) IV Continuous <Continuous>  dextrose 50% Injectable 25 Gram(s) IV Push once  dextrose 50% Injectable 12.5 Gram(s) IV Push once  dextrose 50% Injectable 25 Gram(s) IV Push once  entecavir 0.5 milliGRAM(s) Oral daily  fluconAZOLE   Tablet 400 milliGRAM(s) Oral daily  fluticasone propionate/ salmeterol 100-50 MICROgram(s) Diskus 1 Dose(s) Inhalation two times a day  glucagon  Injectable 1 milliGRAM(s) IntraMuscular once  heparin   Injectable 5000 Unit(s) SubCutaneous every 12 hours  insulin glargine Injectable (LANTUS) 5 Unit(s) SubCutaneous at bedtime  insulin lispro (ADMELOG) corrective regimen sliding scale   SubCutaneous three times a day before meals  insulin lispro (ADMELOG) corrective regimen sliding scale   SubCutaneous at bedtime  insulin lispro Injectable (ADMELOG) 8 Unit(s) SubCutaneous before lunch  insulin lispro Injectable (ADMELOG) 5 Unit(s) SubCutaneous before dinner  insulin lispro Injectable (ADMELOG) 8 Unit(s) SubCutaneous with breakfast  levETIRAcetam 500 milliGRAM(s) Oral two times a day  magnesium oxide 400 milliGRAM(s) Oral three times a day with meals  melatonin 9 milliGRAM(s) Oral at bedtime  mycophenolate mofetil 1000 milliGRAM(s) Oral <User Schedule>  NIFEdipine XL 30 milliGRAM(s) Oral daily  pantoprazole    Tablet 40 milliGRAM(s) Oral before breakfast  polyethylene glycol 3350 17 Gram(s) Oral daily  predniSONE   Tablet 20 milliGRAM(s) Oral daily  QUEtiapine 75 milliGRAM(s) Oral at bedtime  senna 2 Tablet(s) Oral at bedtime  trimethoprim   80 mG/sulfamethoxazole 400 mG 1 Tablet(s) Oral daily  valGANciclovir 900 milliGRAM(s) Oral daily    MEDICATIONS  (PRN):  albuterol    90 MICROgram(s) HFA Inhaler 2 Puff(s) Inhalation every 6 hours PRN Shortness of Breath and/or Wheezing  dextrose Oral Gel 15 Gram(s) Oral once PRN Blood Glucose LESS THAN 70 milliGRAM(s)/deciliter  SUMAtriptan 100 milliGRAM(s) Oral two times a day PRN Migraine  traMADol 25 milliGRAM(s) Oral every 6 hours PRN Moderate Pain (4 - 6)  traMADol 50 milliGRAM(s) Oral every 6 hours PRN Severe Pain (7 - 10)      Daily     Daily Weight in k (2025 07:44)    Drug Dosing Weight  Height (cm): 154.9 (2025 19:57)  Weight (kg): 63.4 (2025 19:57)  BMI (kg/m2): 26.4 (2025 19:57)  BSA (m2): 1.62 (2025 19:57)      REVIEW OF SYSTEMS:    CKD  Liver transplant            I&O's Detail    2025 07:01  -  2025 07:00  --------------------------------------------------------  IN:  Total IN: 0 mL    OUT:    Voided (mL): 200 mL  Total OUT: 200 mL    Total NET: -200 mL           @ 07:01  -   @ 07:00  --------------------------------------------------------  IN: 0 mL / OUT: 200 mL / NET: -200 mL        PHYSICAL EXAM:    GENERAL: NAD  HEAD:  Atraumatic, normocephalic  CHEST/LUNG: Clear to auscultation bilaterally  HEART: Regular rate and rhythm. No murmurs, rubs, or gallops  ABDOMEN: Soft, extensive post op abd scar.  EXTREMITIES:  no edema    LABS:  CBC Full  -  ( 2025 06:35 )  WBC Count : 4.78 K/uL  RBC Count : 2.67 M/uL  Hemoglobin : 8.3 g/dL  Hematocrit : 25.3 %  Platelet Count - Automated : 91 K/uL  Mean Cell Volume : 94.8 fl  Mean Cell Hemoglobin : 31.1 pg  Mean Cell Hemoglobin Concentration : 32.8 g/dL  Auto Neutrophil # : x  Auto Lymphocyte # : x  Auto Monocyte # : x  Auto Eosinophil # : x  Auto Basophil # : x  Auto Neutrophil % : x  Auto Lymphocyte % : x  Auto Monocyte % : x  Auto Eosinophil % : x  Auto Basophil % : x        135  |  106  |  21  ----------------------------<  181[H]  4.6   |  21[L]  |  1.38[H]    Ca    8.3[L]      2025 08:18    TPro  4.6[L]  /  Alb  2.6[L]  /  TBili  1.0  /  DBili  x   /  AST  25  /  ALT  57[H]  /  AlkPhos  89  06-13    CAPILLARY BLOOD GLUCOSE      Impression:  CKD with some mild hemodynamic, volume dependent flux. Exclude cyclosporin toxicity  ESLD sec to IVEY. S/p OLT on   Her for post op rehab    Recommendations:  Check cyclosporin T  Immunosuppression per GI/hepotology/transplant  Daily BMP  UA, FeNa

## 2025-06-14 NOTE — PROGRESS NOTE ADULT - ASSESSMENT
70 F with PMHx NAFLD cirrhosis, migraines, T2DM, COPD, and HCC (listed for liver transplant with MELD 20B), UGIB, chronic back pain 2/2 spinal fracture, s/p OLT from  donor at [facility]. Required insulin gtt for euglycemia and hospital course complicated w. migraines, scott, uti, Now admitted to [place] rehab program on .    # Debility 2/2 Liver transplant due to NAFLD  - HBV Core + donor: Entecavir 0.5mg daily  - S/p solumedrol ---> Prednisone   - ASA 81mg daily   - FK stopped d/t hallucinations. UA neg. switched to Cyclo for mental status changes,  MMF , Pred 20. keppra ppx.   - PPx: Bactrim, Valcyte 450 (CMV +/-, inc 900mg as able), Fluc, PPI, OsCal  - Simulect completed  - PT/OT    #Type 2 Diabetes Mellitus  #Steroid induced hyperglycemia   - follows w. endocrine. s/p insulin gtt  - HbA1c  6.0% - per endo, not accurate in the setting of low gfr and liver disease and dietary indiscretion  - Home regimen: Lantus 14 units + Humalog 18 units with meals. Sugars are usually high in the 200s; uses Dexcom G7 with reader   - monitor FBG ACHS + Mod ISS  - c/w Lantus 5U HS and Admelog 12U breakfast; 8U Lunch; 5U dinner     #SCOTT  - baseline Cr 1.14  - s/p IVF 250cc yesterday, Cr remains elevated. follow up nephro consult  - c/w gentle IVF 75cc/hr   - Renal dose meds  - Avoid nephrotoxic meds     #Transaminitis (improving)  - Trend LFTs    #Acute blood loss anemia  -S/p multiple blood products  -Trend H/H. recent baseline 8-9's  -Transfuse if hgb <7 PRN    #HTN  -Nifedipine 30mg daily     #Migraines  -If sumatriptan desired, recommendations of 100mg PO BID prn (no more than 2 doses in a day and 3 doses/week)  -IV mag x1 today    #COPD  -Advair daily   -Albuterol q 6hrs PRN    #Depression  #Hallucinations   -Seroquel 75mg HS   -Neuropsychology consult PRN  -Melatonin 6mg HS PRN

## 2025-06-14 NOTE — PROGRESS NOTE ADULT - SUBJECTIVE AND OBJECTIVE BOX
Cc: Gait dysfunction    HPI: Patient seen and examined at bedside. No acute events overnight.   Pain controlled.  Has been tolerating rehabilitation program.    albuterol    90 MICROgram(s) HFA Inhaler 2 Puff(s) Inhalation every 6 hours PRN  aspirin enteric coated 81 milliGRAM(s) Oral daily  calcium carbonate 1250 mG  + Vitamin D (OsCal 500 + D) 1 Tablet(s) Oral two times a day  chlorhexidine 2% Cloths 1 Application(s) Topical daily  cycloSPORINE  , modified (GENGRAF) 100 milliGRAM(s) Oral two times a day  dextrose 5%. 1000 milliLiter(s) IV Continuous <Continuous>  dextrose 5%. 1000 milliLiter(s) IV Continuous <Continuous>  dextrose 50% Injectable 25 Gram(s) IV Push once  dextrose 50% Injectable 12.5 Gram(s) IV Push once  dextrose 50% Injectable 25 Gram(s) IV Push once  dextrose Oral Gel 15 Gram(s) Oral once PRN  entecavir 0.5 milliGRAM(s) Oral daily  fluconAZOLE   Tablet 400 milliGRAM(s) Oral daily  fluticasone propionate/ salmeterol 100-50 MICROgram(s) Diskus 1 Dose(s) Inhalation two times a day  glucagon  Injectable 1 milliGRAM(s) IntraMuscular once  heparin   Injectable 5000 Unit(s) SubCutaneous every 12 hours  insulin glargine Injectable (LANTUS) 5 Unit(s) SubCutaneous at bedtime  insulin lispro (ADMELOG) corrective regimen sliding scale   SubCutaneous three times a day before meals  insulin lispro (ADMELOG) corrective regimen sliding scale   SubCutaneous at bedtime  insulin lispro Injectable (ADMELOG) 8 Unit(s) SubCutaneous before lunch  insulin lispro Injectable (ADMELOG) 5 Unit(s) SubCutaneous before dinner  insulin lispro Injectable (ADMELOG) 8 Unit(s) SubCutaneous with breakfast  levETIRAcetam 500 milliGRAM(s) Oral two times a day  magnesium oxide 400 milliGRAM(s) Oral three times a day with meals  melatonin 9 milliGRAM(s) Oral at bedtime  mycophenolate mofetil 1000 milliGRAM(s) Oral <User Schedule>  NIFEdipine XL 30 milliGRAM(s) Oral daily  pantoprazole    Tablet 40 milliGRAM(s) Oral before breakfast  polyethylene glycol 3350 17 Gram(s) Oral daily  predniSONE   Tablet 20 milliGRAM(s) Oral daily  QUEtiapine 75 milliGRAM(s) Oral at bedtime  senna 2 Tablet(s) Oral at bedtime  SUMAtriptan 100 milliGRAM(s) Oral two times a day PRN  traMADol 25 milliGRAM(s) Oral every 6 hours PRN  traMADol 50 milliGRAM(s) Oral every 6 hours PRN  trimethoprim   80 mG/sulfamethoxazole 400 mG 1 Tablet(s) Oral daily  valGANciclovir 900 milliGRAM(s) Oral daily      T(C): 36.6 (06-14-25 @ 07:44), Max: 36.6 (06-13-25 @ 19:55)  HR: 84 (06-14-25 @ 07:44) (82 - 84)  BP: 127/65 (06-14-25 @ 07:44) (122/54 - 127/65)  RR: 16 (06-14-25 @ 07:44) (16 - 16)  SpO2: 96% (06-14-25 @ 07:44) (96% - 98%)    In NAD  HEENT- EOMI  Heart- warm extremities  Lungs- no accessory muscle use  Abd- non-distended, non-tender  Ext- No calf pain  Neuro- Exam unchanged    06-14    135  |  106  |  21  ----------------------------<  181[H]  4.6   |  21[L]  |  1.38[H]  06-13    141  |  108  |  18  ----------------------------<  135[H]  4.2   |  25  |  1.31[H]  06-12    137  |  107  |  16  ----------------------------<  100[H]  4.4   |  21[L]  |  1.14  06-11    137  |  104  |  14  ----------------------------<  121[H]  4.3   |  19[L]  |  1.06  06-10    136  |  102  |  17  ----------------------------<  130[H]  4.1   |  20[L]  |  1.02    Ca    8.3[L]      14 Jun 2025 08:18  Ca    8.4      13 Jun 2025 06:35  Ca    8.7      12 Jun 2025 06:51  Ca    8.6      11 Jun 2025 06:58  Ca    8.8      10 Jun 2025 06:58  Phos  3.4     06-12  Phos  3.2     06-11  Phos  2.8     06-10  Phos  2.8     06-09  Phos  3.1     06-08  Mg     1.9     06-12  Mg     1.8     06-11  Mg     2.0     06-10  Mg     1.7     06-09  Mg     1.6     06-08    TPro  4.6[L]  /  Alb  2.6[L]  /  TBili  1.0  /  DBili  x   /  AST  25  /  ALT  57[H]  /  AlkPhos  89  06-13  TPro  4.5[L]  /  Alb  2.9[L]  /  TBili  0.9  /  DBili  x   /  AST  30  /  ALT  61[H]  /  AlkPhos  94  06-12  TPro  4.9[L]  /  Alb  3.2[L]  /  TBili  1.1  /  DBili  x   /  AST  46[H]  /  ALT  77[H]  /  AlkPhos  101  06-11  TPro  5.1[L]  /  Alb  3.4  /  TBili  1.3[H]  /  DBili  x   /  AST  56[H]  /  ALT  96[H]  /  AlkPhos  109  06-10  TPro  4.5[L]  /  Alb  3.1[L]  /  TBili  1.1  /  DBili  x   /  AST  45[H]  /  ALT  84[H]  /  AlkPhos  92  06-09            Imp: Patient with diagnosis of s/p liver transplant admitted for comprehensive acute rehabilitation.    Plan:  - Continue PT/OT/SLP as indicated  - DVT prophylaxis - heparin SC  - Skin- Turn q2h, check skin daily  - Continue current medications  -Active issues-   SCOTT - creatinine still rising, will consider renal consult  Pain - Contine Tramadol PRN  - Patient is stable to continue current rehabilitation program.

## 2025-06-14 NOTE — CONSULT NOTE ADULT - REASON FOR ADMISSION
Debility 2/2 Liver transplant due to decompensative liver disease
Debility 2/2 Liver transplant due to decompensative liver disease

## 2025-06-15 LAB
ANION GAP SERPL CALC-SCNC: 9 MMOL/L — SIGNIFICANT CHANGE UP (ref 5–17)
APPEARANCE UR: ABNORMAL
BACTERIA # UR AUTO: ABNORMAL /HPF
BILIRUB SERPL-MCNC: 0.8 MG/DL — SIGNIFICANT CHANGE UP (ref 0.2–1.2)
BILIRUB UR-MCNC: NEGATIVE — SIGNIFICANT CHANGE UP
BUN SERPL-MCNC: 17 MG/DL — SIGNIFICANT CHANGE UP (ref 7–23)
CALCIUM SERPL-MCNC: 8.2 MG/DL — LOW (ref 8.4–10.5)
CHLORIDE SERPL-SCNC: 106 MMOL/L — SIGNIFICANT CHANGE UP (ref 96–108)
CO2 SERPL-SCNC: 21 MMOL/L — LOW (ref 22–31)
COLOR SPEC: YELLOW — SIGNIFICANT CHANGE UP
CREAT SERPL-MCNC: 1.18 MG/DL — SIGNIFICANT CHANGE UP (ref 0.5–1.3)
DIFF PNL FLD: ABNORMAL
EGFR: 50 ML/MIN/1.73M2 — LOW
EGFR: 50 ML/MIN/1.73M2 — LOW
EPI CELLS # UR: PRESENT
GLUCOSE BLDC GLUCOMTR-MCNC: 133 MG/DL — HIGH (ref 70–99)
GLUCOSE BLDC GLUCOMTR-MCNC: 142 MG/DL — HIGH (ref 70–99)
GLUCOSE BLDC GLUCOMTR-MCNC: 146 MG/DL — HIGH (ref 70–99)
GLUCOSE BLDC GLUCOMTR-MCNC: 187 MG/DL — HIGH (ref 70–99)
GLUCOSE BLDC GLUCOMTR-MCNC: 221 MG/DL — HIGH (ref 70–99)
GLUCOSE SERPL-MCNC: 181 MG/DL — HIGH (ref 70–99)
GLUCOSE UR QL: NEGATIVE MG/DL — SIGNIFICANT CHANGE UP
INR BLD: 1 RATIO — SIGNIFICANT CHANGE UP (ref 0.85–1.16)
KETONES UR QL: NEGATIVE MG/DL — SIGNIFICANT CHANGE UP
LEUKOCYTE ESTERASE UR-ACNC: NEGATIVE — SIGNIFICANT CHANGE UP
MELD SCORE WITH DIALYSIS: 21 POINTS — SIGNIFICANT CHANGE UP
MELD SCORE WITHOUT DIALYSIS: 8 POINTS — SIGNIFICANT CHANGE UP
NITRITE UR-MCNC: NEGATIVE — SIGNIFICANT CHANGE UP
PH UR: 5.5 — SIGNIFICANT CHANGE UP (ref 5–8)
POTASSIUM SERPL-MCNC: 4.9 MMOL/L — SIGNIFICANT CHANGE UP (ref 3.5–5.3)
POTASSIUM SERPL-SCNC: 4.9 MMOL/L — SIGNIFICANT CHANGE UP (ref 3.5–5.3)
PROT UR-MCNC: SIGNIFICANT CHANGE UP MG/DL
PROTHROM AB SERPL-ACNC: 11.8 SEC — SIGNIFICANT CHANGE UP (ref 9.9–13.4)
RBC CASTS # UR COMP ASSIST: 8 /HPF — HIGH (ref 0–4)
SODIUM SERPL-SCNC: 136 MMOL/L — SIGNIFICANT CHANGE UP (ref 135–145)
SP GR SPEC: 1.01 — SIGNIFICANT CHANGE UP (ref 1–1.03)
UROBILINOGEN FLD QL: 1 MG/DL — SIGNIFICANT CHANGE UP (ref 0.2–1)
WBC UR QL: 0 /HPF — SIGNIFICANT CHANGE UP (ref 0–5)

## 2025-06-15 PROCEDURE — 99232 SBSQ HOSP IP/OBS MODERATE 35: CPT

## 2025-06-15 RX ORDER — BISACODYL 5 MG
10 TABLET, DELAYED RELEASE (ENTERIC COATED) ORAL DAILY
Refills: 0 | Status: DISCONTINUED | OUTPATIENT
Start: 2025-06-15 | End: 2025-06-20

## 2025-06-15 RX ADMIN — VALGANCICLOVIR 900 MILLIGRAM(S): 450 TABLET, FILM COATED ORAL at 11:55

## 2025-06-15 RX ADMIN — SUMATRIPTAN 100 MILLIGRAM(S): 100 TABLET, FILM COATED ORAL at 23:27

## 2025-06-15 RX ADMIN — Medication 9 MILLIGRAM(S): at 22:48

## 2025-06-15 RX ADMIN — INSULIN GLARGINE-YFGN 5 UNIT(S): 100 INJECTION, SOLUTION SUBCUTANEOUS at 22:48

## 2025-06-15 RX ADMIN — HEPARIN SODIUM 5000 UNIT(S): 1000 INJECTION INTRAVENOUS; SUBCUTANEOUS at 17:22

## 2025-06-15 RX ADMIN — Medication 400 MILLIGRAM(S): at 07:48

## 2025-06-15 RX ADMIN — LEVETIRACETAM 500 MILLIGRAM(S): 10 INJECTION, SOLUTION INTRAVENOUS at 05:10

## 2025-06-15 RX ADMIN — Medication 10 MILLIGRAM(S): at 13:30

## 2025-06-15 RX ADMIN — Medication 1 TABLET(S): at 11:55

## 2025-06-15 RX ADMIN — LEVETIRACETAM 500 MILLIGRAM(S): 10 INJECTION, SOLUTION INTRAVENOUS at 17:22

## 2025-06-15 RX ADMIN — Medication 40 MILLIGRAM(S): at 05:10

## 2025-06-15 RX ADMIN — INSULIN LISPRO 8 UNIT(S): 100 INJECTION, SOLUTION INTRAVENOUS; SUBCUTANEOUS at 11:53

## 2025-06-15 RX ADMIN — TRAMADOL HYDROCHLORIDE 50 MILLIGRAM(S): 50 TABLET, FILM COATED ORAL at 15:50

## 2025-06-15 RX ADMIN — MYCOPHENOLATE MOFETIL 1000 MILLIGRAM(S): 500 TABLET, FILM COATED ORAL at 07:49

## 2025-06-15 RX ADMIN — TRAMADOL HYDROCHLORIDE 50 MILLIGRAM(S): 50 TABLET, FILM COATED ORAL at 14:50

## 2025-06-15 RX ADMIN — MYCOPHENOLATE MOFETIL 1000 MILLIGRAM(S): 500 TABLET, FILM COATED ORAL at 19:36

## 2025-06-15 RX ADMIN — CYCLOSPORINE 100 MILLIGRAM(S): 100 CAPSULE, LIQUID FILLED ORAL at 08:20

## 2025-06-15 RX ADMIN — Medication 30 MILLIGRAM(S): at 05:10

## 2025-06-15 RX ADMIN — INSULIN LISPRO 5 UNIT(S): 100 INJECTION, SOLUTION INTRAVENOUS; SUBCUTANEOUS at 17:22

## 2025-06-15 RX ADMIN — Medication 400 MILLIGRAM(S): at 17:22

## 2025-06-15 RX ADMIN — PREDNISONE 20 MILLIGRAM(S): 20 TABLET ORAL at 05:11

## 2025-06-15 RX ADMIN — Medication 1 APPLICATION(S): at 11:25

## 2025-06-15 RX ADMIN — INSULIN LISPRO 4: 100 INJECTION, SOLUTION INTRAVENOUS; SUBCUTANEOUS at 11:53

## 2025-06-15 RX ADMIN — Medication 400 MILLIGRAM(S): at 11:55

## 2025-06-15 RX ADMIN — Medication 1 TABLET(S): at 05:10

## 2025-06-15 RX ADMIN — Medication 1 DOSE(S): at 08:49

## 2025-06-15 RX ADMIN — HEPARIN SODIUM 5000 UNIT(S): 1000 INJECTION INTRAVENOUS; SUBCUTANEOUS at 05:11

## 2025-06-15 RX ADMIN — CYCLOSPORINE 100 MILLIGRAM(S): 100 CAPSULE, LIQUID FILLED ORAL at 19:36

## 2025-06-15 RX ADMIN — INSULIN LISPRO 8 UNIT(S): 100 INJECTION, SOLUTION INTRAVENOUS; SUBCUTANEOUS at 07:46

## 2025-06-15 RX ADMIN — Medication 2 TABLET(S): at 22:47

## 2025-06-15 RX ADMIN — QUETIAPINE FUMARATE 75 MILLIGRAM(S): 25 TABLET ORAL at 22:47

## 2025-06-15 RX ADMIN — INSULIN LISPRO 2: 100 INJECTION, SOLUTION INTRAVENOUS; SUBCUTANEOUS at 07:46

## 2025-06-15 RX ADMIN — Medication 1 TABLET(S): at 17:22

## 2025-06-15 RX ADMIN — ENTECAVIR 0.5 MILLIGRAM(S): 0.5 TABLET ORAL at 11:55

## 2025-06-15 RX ADMIN — Medication 81 MILLIGRAM(S): at 11:55

## 2025-06-15 RX ADMIN — Medication 1 DOSE(S): at 22:19

## 2025-06-15 NOTE — PROGRESS NOTE ADULT - SUBJECTIVE AND OBJECTIVE BOX
Patient is a 70y old  Female who presents with a chief complaint of Debility 2/2 Liver transplant due to decompensative liver disease (14 Jun 2025 15:22)      Subjective and overnight events:   Patient seen and examined at bedside. no fever, chills, sob, cp, abd pain. surgical pain well controlled. +intermittent migraine headaches. +constipation for a few days    ALLERGIES:  No Known Allergies    MEDICATIONS  (STANDING):  aspirin enteric coated 81 milliGRAM(s) Oral daily  calcium carbonate 1250 mG  + Vitamin D (OsCal 500 + D) 1 Tablet(s) Oral two times a day  chlorhexidine 2% Cloths 1 Application(s) Topical daily  cycloSPORINE  , modified (GENGRAF) 100 milliGRAM(s) Oral two times a day  dextrose 5%. 1000 milliLiter(s) (100 mL/Hr) IV Continuous <Continuous>  dextrose 5%. 1000 milliLiter(s) (50 mL/Hr) IV Continuous <Continuous>  dextrose 50% Injectable 25 Gram(s) IV Push once  dextrose 50% Injectable 12.5 Gram(s) IV Push once  dextrose 50% Injectable 25 Gram(s) IV Push once  entecavir 0.5 milliGRAM(s) Oral daily  fluconAZOLE   Tablet 400 milliGRAM(s) Oral daily  fluticasone propionate/ salmeterol 100-50 MICROgram(s) Diskus 1 Dose(s) Inhalation two times a day  glucagon  Injectable 1 milliGRAM(s) IntraMuscular once  heparin   Injectable 5000 Unit(s) SubCutaneous every 12 hours  insulin glargine Injectable (LANTUS) 5 Unit(s) SubCutaneous at bedtime  insulin lispro (ADMELOG) corrective regimen sliding scale   SubCutaneous three times a day before meals  insulin lispro (ADMELOG) corrective regimen sliding scale   SubCutaneous at bedtime  insulin lispro Injectable (ADMELOG) 8 Unit(s) SubCutaneous before lunch  insulin lispro Injectable (ADMELOG) 5 Unit(s) SubCutaneous before dinner  insulin lispro Injectable (ADMELOG) 8 Unit(s) SubCutaneous with breakfast  levETIRAcetam 500 milliGRAM(s) Oral two times a day  magnesium oxide 400 milliGRAM(s) Oral three times a day with meals  melatonin 9 milliGRAM(s) Oral at bedtime  mycophenolate mofetil 1000 milliGRAM(s) Oral <User Schedule>  NIFEdipine XL 30 milliGRAM(s) Oral daily  pantoprazole    Tablet 40 milliGRAM(s) Oral before breakfast  polyethylene glycol 3350 17 Gram(s) Oral daily  predniSONE   Tablet 20 milliGRAM(s) Oral daily  QUEtiapine 75 milliGRAM(s) Oral at bedtime  senna 2 Tablet(s) Oral at bedtime  trimethoprim   80 mG/sulfamethoxazole 400 mG 1 Tablet(s) Oral daily  valGANciclovir 900 milliGRAM(s) Oral daily    MEDICATIONS  (PRN):  albuterol    90 MICROgram(s) HFA Inhaler 2 Puff(s) Inhalation every 6 hours PRN Shortness of Breath and/or Wheezing  dextrose Oral Gel 15 Gram(s) Oral once PRN Blood Glucose LESS THAN 70 milliGRAM(s)/deciliter  SUMAtriptan 100 milliGRAM(s) Oral two times a day PRN Migraine  traMADol 25 milliGRAM(s) Oral every 6 hours PRN Moderate Pain (4 - 6)  traMADol 50 milliGRAM(s) Oral every 6 hours PRN Severe Pain (7 - 10)    Vital Signs Last 24 Hrs  T(F): 97.8 (14 Jun 2025 19:40), Max: 97.8 (14 Jun 2025 19:40)  HR: 86 (15 Albert 2025 05:08) (83 - 86)  BP: 129/65 (15 Albert 2025 05:08) (127/52 - 129/65)  RR: 16 (14 Jun 2025 19:40) (16 - 16)  SpO2: 97% (14 Jun 2025 19:40) (97% - 97%)  I&O's Summary    PHYSICAL EXAM:  General: NAD, A/O x 3  ENT: MMM  Neck: Supple, No JVD  Lungs: Clear to auscultation bilaterally  Cardio: RRR, S1/S2, No murmurs  Abdomen: Soft, Nontender, Nondistended; Bowel sounds present  Extremities: No calf tenderness, No pitting edema    LABS:                        8.3    4.78  )-----------( 91       ( 13 Jun 2025 06:35 )             25.3     06-15    136  |  106  |  17  ----------------------------<  181  4.9   |  21  |  1.18    Ca    8.2      15 Albert 2025 07:40    TPro  x   /  Alb  x   /  TBili  0.8  /  DBili  x   /  AST  x   /  ALT  x   /  AlkPhos  x   06-15          POCT Blood Glucose.: 187 mg/dL (15 Albert 2025 07:45)  POCT Blood Glucose.: 138 mg/dL (14 Jun 2025 21:25)  POCT Blood Glucose.: 182 mg/dL (14 Jun 2025 16:22)  POCT Blood Glucose.: 176 mg/dL (14 Jun 2025 11:50)      Urinalysis Basic - ( 15 Albert 2025 07:40 )    Color: x / Appearance: x / SG: x / pH: x  Gluc: 181 mg/dL / Ketone: x  / Bili: x / Urobili: x   Blood: x / Protein: x / Nitrite: x   Leuk Esterase: x / RBC: x / WBC x   Sq Epi: x / Non Sq Epi: x / Bacteria: x        COVID-19 PCR: NotDetec (06-13-25 @ 06:45)      RADIOLOGY & ADDITIONAL TESTS:    Care Discussed with Consultants/Other Providers:

## 2025-06-15 NOTE — PROGRESS NOTE ADULT - ASSESSMENT
70 F with PMHx NAFLD cirrhosis, migraines, T2DM, COPD, and HCC (listed for liver transplant with MELD 20B), UGIB, chronic back pain 2/2 spinal fracture, s/p OLT from  donor at [facility]. Required insulin gtt for euglycemia and hospital course complicated w. migraines, scott, uti, Now admitted to [place] rehab program on .    # Debility 2/2 Liver transplant due to NAFLD  - HBV Core + donor: Entecavir 0.5mg daily  - S/p solumedrol ---> Prednisone   - ASA 81mg daily   - FK stopped d/t hallucinations. UA neg. switched to Cyclo for mental status changes,  MMF , Pred 20. keppra ppx.   - PPx: Bactrim, Valcyte 450 (CMV +/-, inc 900mg as able), Fluc, PPI, OsCal  - Simulect completed  - PT/OT    #Type 2 Diabetes Mellitus  #Steroid induced hyperglycemia   - follows w. endocrine. s/p insulin gtt  - HbA1c  6.0% - per endo, not accurate in the setting of low gfr and liver disease and dietary indiscretion  - Home regimen: Lantus 14 units + Humalog 18 units with meals. Sugars are usually high in the 200s; uses Dexcom G7 with reader   - monitor FBG ACHS + Mod ISS  - c/w Lantus 5U HS and Admelog 12U breakfast; 8U Lunch; 5U dinner     #SCOTT  - baseline Cr 1.14  - s/p IVF   - Cr improved, back to baseline   - appreciate nephro consult. check cyclosporin level, UA, FeNa   - Avoid nephrotoxic meds     #Transaminitis (improving)  - Trend LFTs    #Acute blood loss anemia  -S/p multiple blood products  -Trend H/H. recent baseline 8-9's  -Transfuse if hgb <7 PRN    #HTN  -Nifedipine 30mg daily     #Migraines  -If sumatriptan desired, recommendations of 100mg PO BID prn (no more than 2 doses in a day and 3 doses/week)  -IV mag x1 today    #COPD  -Advair daily   -Albuterol q 6hrs PRN    #Depression  #Hallucinations   -Seroquel 75mg HS   -Neuropsychology consult PRN  -Melatonin 6mg HS PRN

## 2025-06-15 NOTE — PROGRESS NOTE ADULT - SUBJECTIVE AND OBJECTIVE BOX
Subjective: c/o constipation.       MEDICATIONS  (STANDING):  aspirin enteric coated 81 milliGRAM(s) Oral daily  calcium carbonate 1250 mG  + Vitamin D (OsCal 500 + D) 1 Tablet(s) Oral two times a day  chlorhexidine 2% Cloths 1 Application(s) Topical daily  cycloSPORINE  , modified (GENGRAF) 100 milliGRAM(s) Oral two times a day  dextrose 5%. 1000 milliLiter(s) (50 mL/Hr) IV Continuous <Continuous>  dextrose 5%. 1000 milliLiter(s) (100 mL/Hr) IV Continuous <Continuous>  dextrose 50% Injectable 25 Gram(s) IV Push once  dextrose 50% Injectable 25 Gram(s) IV Push once  dextrose 50% Injectable 12.5 Gram(s) IV Push once  entecavir 0.5 milliGRAM(s) Oral daily  fluconAZOLE   Tablet 400 milliGRAM(s) Oral daily  fluticasone propionate/ salmeterol 100-50 MICROgram(s) Diskus 1 Dose(s) Inhalation two times a day  glucagon  Injectable 1 milliGRAM(s) IntraMuscular once  heparin   Injectable 5000 Unit(s) SubCutaneous every 12 hours  insulin glargine Injectable (LANTUS) 5 Unit(s) SubCutaneous at bedtime  insulin lispro (ADMELOG) corrective regimen sliding scale   SubCutaneous three times a day before meals  insulin lispro (ADMELOG) corrective regimen sliding scale   SubCutaneous at bedtime  insulin lispro Injectable (ADMELOG) 8 Unit(s) SubCutaneous before lunch  insulin lispro Injectable (ADMELOG) 5 Unit(s) SubCutaneous before dinner  insulin lispro Injectable (ADMELOG) 8 Unit(s) SubCutaneous with breakfast  levETIRAcetam 500 milliGRAM(s) Oral two times a day  magnesium oxide 400 milliGRAM(s) Oral three times a day with meals  melatonin 9 milliGRAM(s) Oral at bedtime  mycophenolate mofetil 1000 milliGRAM(s) Oral <User Schedule>  NIFEdipine XL 30 milliGRAM(s) Oral daily  pantoprazole    Tablet 40 milliGRAM(s) Oral before breakfast  polyethylene glycol 3350 17 Gram(s) Oral daily  predniSONE   Tablet 20 milliGRAM(s) Oral daily  QUEtiapine 75 milliGRAM(s) Oral at bedtime  senna 2 Tablet(s) Oral at bedtime  trimethoprim   80 mG/sulfamethoxazole 400 mG 1 Tablet(s) Oral daily  valGANciclovir 900 milliGRAM(s) Oral daily    MEDICATIONS  (PRN):  albuterol    90 MICROgram(s) HFA Inhaler 2 Puff(s) Inhalation every 6 hours PRN Shortness of Breath and/or Wheezing  bisacodyl Suppository 10 milliGRAM(s) Rectal daily PRN Refractory Constipation  dextrose Oral Gel 15 Gram(s) Oral once PRN Blood Glucose LESS THAN 70 milliGRAM(s)/deciliter  SUMAtriptan 100 milliGRAM(s) Oral two times a day PRN Migraine  traMADol 25 milliGRAM(s) Oral every 6 hours PRN Moderate Pain (4 - 6)  traMADol 50 milliGRAM(s) Oral every 6 hours PRN Severe Pain (7 - 10)          T(C): 36.6 (06-14-25 @ 19:40), Max: 36.6 (06-14-25 @ 19:40)  HR: 86 (06-15-25 @ 05:08) (83 - 86)  BP: 129/65 (06-15-25 @ 05:08) (127/52 - 129/65)  RR: 16 (06-14-25 @ 19:40) (16 - 16)  SpO2: 97% (06-14-25 @ 19:40) (97% - 97%)  Wt(kg): --        I&O's Detail           PHYSICAL EXAM:    CHEST/LUNG: Clear  HEART: S1S2  ABDOMEN: Soft, post op surg wound.   EXTREMITIES:  trace edema  NEURO: no asterixis      LABS:    06-15    136  |  106  |  17  ----------------------------<  181[H]  4.9   |  21[L]  |  1.18    Ca    8.2[L]      15 Albert 2025 07:40    TPro  x   /  Alb  x   /  TBili  0.8  /  DBili  x   /  AST  x   /  ALT  x   /  AlkPhos  x   06-15    PT/INR - ( 15 Albert 2025 07:40 )   PT: 11.8 sec;   INR: 1.00 ratio           Impression:  CKD with some mild hemodynamic, volume dependent flux. Improved.   ESLD sec to IVEY. S/p OLT on 5/31  Here for post op rehab    Recommendations:  Check cyclosporin T  Immunosuppression per GI/hepotology/transplant  Daily BMP  UA, FeNa

## 2025-06-15 NOTE — PROGRESS NOTE ADULT - SUBJECTIVE AND OBJECTIVE BOX
Cc: Gait dysfunction    HPI: Patient seen and examined at bedside. No acute events overnight. Does complain of constipation x few days, no abdominal pain or discomfort.   Pain controlled, no chest pain, no N/V, no Fevers/Chills. No other new ROS  Has been tolerating rehabilitation program.    albuterol    90 MICROgram(s) HFA Inhaler 2 Puff(s) Inhalation every 6 hours PRN  aspirin enteric coated 81 milliGRAM(s) Oral daily  calcium carbonate 1250 mG  + Vitamin D (OsCal 500 + D) 1 Tablet(s) Oral two times a day  chlorhexidine 2% Cloths 1 Application(s) Topical daily  cycloSPORINE  , modified (GENGRAF) 100 milliGRAM(s) Oral two times a day  dextrose 5%. 1000 milliLiter(s) IV Continuous <Continuous>  dextrose 5%. 1000 milliLiter(s) IV Continuous <Continuous>  dextrose 50% Injectable 25 Gram(s) IV Push once  dextrose 50% Injectable 12.5 Gram(s) IV Push once  dextrose 50% Injectable 25 Gram(s) IV Push once  dextrose Oral Gel 15 Gram(s) Oral once PRN  entecavir 0.5 milliGRAM(s) Oral daily  fluconAZOLE   Tablet 400 milliGRAM(s) Oral daily  fluticasone propionate/ salmeterol 100-50 MICROgram(s) Diskus 1 Dose(s) Inhalation two times a day  glucagon  Injectable 1 milliGRAM(s) IntraMuscular once  heparin   Injectable 5000 Unit(s) SubCutaneous every 12 hours  insulin glargine Injectable (LANTUS) 5 Unit(s) SubCutaneous at bedtime  insulin lispro (ADMELOG) corrective regimen sliding scale   SubCutaneous three times a day before meals  insulin lispro (ADMELOG) corrective regimen sliding scale   SubCutaneous at bedtime  insulin lispro Injectable (ADMELOG) 8 Unit(s) SubCutaneous before lunch  insulin lispro Injectable (ADMELOG) 5 Unit(s) SubCutaneous before dinner  insulin lispro Injectable (ADMELOG) 8 Unit(s) SubCutaneous with breakfast  levETIRAcetam 500 milliGRAM(s) Oral two times a day  magnesium oxide 400 milliGRAM(s) Oral three times a day with meals  melatonin 9 milliGRAM(s) Oral at bedtime  mycophenolate mofetil 1000 milliGRAM(s) Oral <User Schedule>  NIFEdipine XL 30 milliGRAM(s) Oral daily  pantoprazole    Tablet 40 milliGRAM(s) Oral before breakfast  polyethylene glycol 3350 17 Gram(s) Oral daily  predniSONE   Tablet 20 milliGRAM(s) Oral daily  QUEtiapine 75 milliGRAM(s) Oral at bedtime  senna 2 Tablet(s) Oral at bedtime  SUMAtriptan 100 milliGRAM(s) Oral two times a day PRN  traMADol 25 milliGRAM(s) Oral every 6 hours PRN  traMADol 50 milliGRAM(s) Oral every 6 hours PRN  trimethoprim   80 mG/sulfamethoxazole 400 mG 1 Tablet(s) Oral daily  valGANciclovir 900 milliGRAM(s) Oral daily      T(C): 36.6 (06-14-25 @ 19:40), Max: 36.6 (06-14-25 @ 19:40)  HR: 86 (06-15-25 @ 05:08) (83 - 86)  BP: 129/65 (06-15-25 @ 05:08) (127/52 - 129/65)  RR: 16 (06-14-25 @ 19:40) (16 - 16)  SpO2: 97% (06-14-25 @ 19:40) (97% - 97%)    In NAD  HEENT- EOMI  Heart- warm extremities  Lungs- no accessory muscle use  Abd- non-distended, non-tender  Ext- No calf pain  Neuro- Exam unchanged    06-14    135  |  106  |  21  ----------------------------<  181[H]  4.6   |  21[L]  |  1.38[H]  06-13    141  |  108  |  18  ----------------------------<  135[H]  4.2   |  25  |  1.31[H]  06-12    137  |  107  |  16  ----------------------------<  100[H]  4.4   |  21[L]  |  1.14  06-11    137  |  104  |  14  ----------------------------<  121[H]  4.3   |  19[L]  |  1.06  06-10    136  |  102  |  17  ----------------------------<  130[H]  4.1   |  20[L]  |  1.02    Ca    8.3[L]      14 Jun 2025 08:18  Ca    8.4      13 Jun 2025 06:35  Ca    8.7      12 Jun 2025 06:51  Ca    8.6      11 Jun 2025 06:58  Ca    8.8      10 Jun 2025 06:58  Phos  3.4     06-12  Phos  3.2     06-11  Phos  2.8     06-10  Phos  2.8     06-09  Phos  3.1     06-08  Mg     1.9     06-12  Mg     1.8     06-11  Mg     2.0     06-10  Mg     1.7     06-09  Mg     1.6     06-08    TPro  4.6[L]  /  Alb  2.6[L]  /  TBili  1.0  /  DBili  x   /  AST  25  /  ALT  57[H]  /  AlkPhos  89  06-13  TPro  4.5[L]  /  Alb  2.9[L]  /  TBili  0.9  /  DBili  x   /  AST  30  /  ALT  61[H]  /  AlkPhos  94  06-12  TPro  4.9[L]  /  Alb  3.2[L]  /  TBili  1.1  /  DBili  x   /  AST  46[H]  /  ALT  77[H]  /  AlkPhos  101  06-11  TPro  5.1[L]  /  Alb  3.4  /  TBili  1.3[H]  /  DBili  x   /  AST  56[H]  /  ALT  96[H]  /  AlkPhos  109  06-10  TPro  4.5[L]  /  Alb  3.1[L]  /  TBili  1.1  /  DBili  x   /  AST  45[H]  /  ALT  84[H]  /  AlkPhos  92  06-09      CXR 6/1/25 reviewed and interpreted by me: mild basilar congestion seen  ACC: [id number] EXAM: XR CHEST PORTABLE ROUTINE 1V ORDERED BY: AMANDEEP ROA    ACC: [id number] EXAM: XR CHEST PORTABLE ROUTINE 1V ORDERED BY: DARA NUÑEZ    PROCEDURE DATE: 06/01/2025        INTERPRETATION: Chest one view 6/1/2025 5:56 AM    HISTORY: Respiratory failure    COMPARISON STUDY: 5/31/2025    Frontal expiratory view of the chest shows the heart to be borderline in size. Right jugular dialysis catheter, right upper quadrant catheter and nasogastric tube are present.    The lungs show mild basilar congestion with smaller left effusion. Trace right effusion is present and there is no evidence of pneumothorax.    Chest one view 6/2/2025 6:16 AM  Compared to the prior study, there is slight progression of small right effusion.    IMPRESSION:  Mild congestive changes as noted.        Thank you for the courtesy of this referral.    --- End of Report ---            IVONNE HAN MD; Attending Interventional Radiologist  This document has been electronically signed. Albert 3 2025 2:22PM        Imp: Patient with diagnosis of s/p liver transplant admitted for comprehensive acute rehabilitation.    Plan:  - Continue PT/OT/SLP as indicated  - DVT prophylaxis - heparin SC  - Skin- Turn q2h, check skin daily  - Continue current medications  -Active issues-   SCOTT - creatinine improved today, renal recs appreciated  Pain - Continue Tramadol PRN  Constipation - will treat given it has been 4-5 days  - Patient is stable to continue current rehabilitation program.

## 2025-06-16 LAB
ALBUMIN SERPL ELPH-MCNC: 2.3 G/DL — LOW (ref 3.3–5)
ALP SERPL-CCNC: 191 U/L — HIGH (ref 40–120)
ALT FLD-CCNC: 40 U/L — SIGNIFICANT CHANGE UP (ref 10–45)
ANION GAP SERPL CALC-SCNC: 9 MMOL/L — SIGNIFICANT CHANGE UP (ref 5–17)
ANISOCYTOSIS BLD QL: SLIGHT — SIGNIFICANT CHANGE UP
AST SERPL-CCNC: 38 U/L — SIGNIFICANT CHANGE UP (ref 10–40)
BASOPHILS # BLD AUTO: 0.03 K/UL — SIGNIFICANT CHANGE UP (ref 0–0.2)
BASOPHILS NFR BLD AUTO: 0.5 % — SIGNIFICANT CHANGE UP (ref 0–2)
BILIRUB SERPL-MCNC: 0.9 MG/DL — SIGNIFICANT CHANGE UP (ref 0.2–1.2)
BUN SERPL-MCNC: 18 MG/DL — SIGNIFICANT CHANGE UP (ref 7–23)
CALCIUM SERPL-MCNC: 8.4 MG/DL — SIGNIFICANT CHANGE UP (ref 8.4–10.5)
CHLORIDE SERPL-SCNC: 107 MMOL/L — SIGNIFICANT CHANGE UP (ref 96–108)
CO2 SERPL-SCNC: 20 MMOL/L — LOW (ref 22–31)
CREAT SERPL-MCNC: 1.38 MG/DL — HIGH (ref 0.5–1.3)
CYCLOSPORINE SER-MCNC: 235 NG/ML — SIGNIFICANT CHANGE UP (ref 150–400)
EGFR: 41 ML/MIN/1.73M2 — LOW
EGFR: 41 ML/MIN/1.73M2 — LOW
EOSINOPHIL # BLD AUTO: 0.02 K/UL — SIGNIFICANT CHANGE UP (ref 0–0.5)
EOSINOPHIL NFR BLD AUTO: 0.3 % — SIGNIFICANT CHANGE UP (ref 0–6)
GLUCOSE BLDC GLUCOMTR-MCNC: 134 MG/DL — HIGH (ref 70–99)
GLUCOSE BLDC GLUCOMTR-MCNC: 142 MG/DL — HIGH (ref 70–99)
GLUCOSE BLDC GLUCOMTR-MCNC: 179 MG/DL — HIGH (ref 70–99)
GLUCOSE BLDC GLUCOMTR-MCNC: 193 MG/DL — HIGH (ref 70–99)
GLUCOSE SERPL-MCNC: 160 MG/DL — HIGH (ref 70–99)
HCT VFR BLD CALC: 26.7 % — LOW (ref 34.5–45)
HGB BLD-MCNC: 8.5 G/DL — LOW (ref 11.5–15.5)
HYPOCHROMIA BLD QL: SIGNIFICANT CHANGE UP
IMM GRANULOCYTES NFR BLD AUTO: 0.7 % — SIGNIFICANT CHANGE UP (ref 0–0.9)
LYMPHOCYTES # BLD AUTO: 0.23 K/UL — LOW (ref 1–3.3)
LYMPHOCYTES # BLD AUTO: 3.9 % — LOW (ref 13–44)
MAGNESIUM SERPL-MCNC: 1.8 MG/DL — SIGNIFICANT CHANGE UP (ref 1.6–2.6)
MANUAL SMEAR VERIFICATION: SIGNIFICANT CHANGE UP
MCHC RBC-ENTMCNC: 30.8 PG — SIGNIFICANT CHANGE UP (ref 27–34)
MCHC RBC-ENTMCNC: 31.8 G/DL — LOW (ref 32–36)
MCV RBC AUTO: 96.7 FL — SIGNIFICANT CHANGE UP (ref 80–100)
MONOCYTES # BLD AUTO: 0.18 K/UL — SIGNIFICANT CHANGE UP (ref 0–0.9)
MONOCYTES NFR BLD AUTO: 3.1 % — SIGNIFICANT CHANGE UP (ref 2–14)
NEUTROPHILS # BLD AUTO: 5.34 K/UL — SIGNIFICANT CHANGE UP (ref 1.8–7.4)
NEUTROPHILS NFR BLD AUTO: 91.5 % — HIGH (ref 43–77)
NRBC BLD AUTO-RTO: 0 /100 WBCS — SIGNIFICANT CHANGE UP (ref 0–0)
PHOSPHATE SERPL-MCNC: 4.3 MG/DL — SIGNIFICANT CHANGE UP (ref 2.5–4.5)
PLAT MORPH BLD: NORMAL — SIGNIFICANT CHANGE UP
PLATELET # BLD AUTO: 81 K/UL — LOW (ref 150–400)
POTASSIUM SERPL-MCNC: 5 MMOL/L — SIGNIFICANT CHANGE UP (ref 3.5–5.3)
POTASSIUM SERPL-SCNC: 5 MMOL/L — SIGNIFICANT CHANGE UP (ref 3.5–5.3)
PROT SERPL-MCNC: 5 G/DL — LOW (ref 6–8.3)
RBC # BLD: 2.76 M/UL — LOW (ref 3.8–5.2)
RBC # FLD: 22.4 % — HIGH (ref 10.3–14.5)
RBC BLD AUTO: ABNORMAL
SODIUM SERPL-SCNC: 136 MMOL/L — SIGNIFICANT CHANGE UP (ref 135–145)
WBC # BLD: 5.84 K/UL — SIGNIFICANT CHANGE UP (ref 3.8–10.5)
WBC # FLD AUTO: 5.84 K/UL — SIGNIFICANT CHANGE UP (ref 3.8–10.5)

## 2025-06-16 PROCEDURE — 99232 SBSQ HOSP IP/OBS MODERATE 35: CPT

## 2025-06-16 RX ORDER — SUMATRIPTAN 100 MG/1
100 TABLET, FILM COATED ORAL DAILY
Refills: 0 | Status: DISCONTINUED | OUTPATIENT
Start: 2025-06-16 | End: 2025-06-20

## 2025-06-16 RX ORDER — SALINE 7; 19 G/118ML; G/118ML
1 ENEMA RECTAL ONCE
Refills: 0 | Status: COMPLETED | OUTPATIENT
Start: 2025-06-16 | End: 2025-06-16

## 2025-06-16 RX ORDER — CYCLOSPORINE 100 MG/1
75 CAPSULE, LIQUID FILLED ORAL
Refills: 0 | Status: DISCONTINUED | OUTPATIENT
Start: 2025-06-17 | End: 2025-06-19

## 2025-06-16 RX ORDER — SUMATRIPTAN 100 MG/1
100 TABLET, FILM COATED ORAL
Refills: 0 | Status: DISCONTINUED | OUTPATIENT
Start: 2025-06-16 | End: 2025-06-20

## 2025-06-16 RX ORDER — LACTULOSE 10 G/15ML
10 SOLUTION ORAL
Refills: 0 | Status: DISCONTINUED | OUTPATIENT
Start: 2025-06-16 | End: 2025-06-20

## 2025-06-16 RX ADMIN — INSULIN LISPRO 2: 100 INJECTION, SOLUTION INTRAVENOUS; SUBCUTANEOUS at 11:38

## 2025-06-16 RX ADMIN — Medication 400 MILLIGRAM(S): at 11:41

## 2025-06-16 RX ADMIN — CYCLOSPORINE 100 MILLIGRAM(S): 100 CAPSULE, LIQUID FILLED ORAL at 07:46

## 2025-06-16 RX ADMIN — Medication 40 MILLIGRAM(S): at 05:13

## 2025-06-16 RX ADMIN — Medication 81 MILLIGRAM(S): at 11:41

## 2025-06-16 RX ADMIN — MYCOPHENOLATE MOFETIL 1000 MILLIGRAM(S): 500 TABLET, FILM COATED ORAL at 07:46

## 2025-06-16 RX ADMIN — Medication 400 MILLIGRAM(S): at 07:46

## 2025-06-16 RX ADMIN — INSULIN LISPRO 2: 100 INJECTION, SOLUTION INTRAVENOUS; SUBCUTANEOUS at 07:51

## 2025-06-16 RX ADMIN — PREDNISONE 20 MILLIGRAM(S): 20 TABLET ORAL at 05:13

## 2025-06-16 RX ADMIN — Medication 2 PUFF(S): at 20:39

## 2025-06-16 RX ADMIN — Medication 30 MILLIGRAM(S): at 05:13

## 2025-06-16 RX ADMIN — VALGANCICLOVIR 900 MILLIGRAM(S): 450 TABLET, FILM COATED ORAL at 11:41

## 2025-06-16 RX ADMIN — HEPARIN SODIUM 5000 UNIT(S): 1000 INJECTION INTRAVENOUS; SUBCUTANEOUS at 17:01

## 2025-06-16 RX ADMIN — INSULIN LISPRO 5 UNIT(S): 100 INJECTION, SOLUTION INTRAVENOUS; SUBCUTANEOUS at 16:59

## 2025-06-16 RX ADMIN — TRAMADOL HYDROCHLORIDE 50 MILLIGRAM(S): 50 TABLET, FILM COATED ORAL at 18:10

## 2025-06-16 RX ADMIN — TRAMADOL HYDROCHLORIDE 50 MILLIGRAM(S): 50 TABLET, FILM COATED ORAL at 07:54

## 2025-06-16 RX ADMIN — INSULIN LISPRO 8 UNIT(S): 100 INJECTION, SOLUTION INTRAVENOUS; SUBCUTANEOUS at 11:39

## 2025-06-16 RX ADMIN — HEPARIN SODIUM 5000 UNIT(S): 1000 INJECTION INTRAVENOUS; SUBCUTANEOUS at 05:14

## 2025-06-16 RX ADMIN — Medication 1 TABLET(S): at 17:02

## 2025-06-16 RX ADMIN — LEVETIRACETAM 500 MILLIGRAM(S): 10 INJECTION, SOLUTION INTRAVENOUS at 17:02

## 2025-06-16 RX ADMIN — QUETIAPINE FUMARATE 75 MILLIGRAM(S): 25 TABLET ORAL at 20:31

## 2025-06-16 RX ADMIN — Medication 1 TABLET(S): at 05:13

## 2025-06-16 RX ADMIN — Medication 1 APPLICATION(S): at 11:43

## 2025-06-16 RX ADMIN — TRAMADOL HYDROCHLORIDE 50 MILLIGRAM(S): 50 TABLET, FILM COATED ORAL at 08:54

## 2025-06-16 RX ADMIN — Medication 9 MILLIGRAM(S): at 20:31

## 2025-06-16 RX ADMIN — INSULIN GLARGINE-YFGN 5 UNIT(S): 100 INJECTION, SOLUTION SUBCUTANEOUS at 20:30

## 2025-06-16 RX ADMIN — LEVETIRACETAM 500 MILLIGRAM(S): 10 INJECTION, SOLUTION INTRAVENOUS at 05:13

## 2025-06-16 RX ADMIN — Medication 1 DOSE(S): at 08:34

## 2025-06-16 RX ADMIN — SUMATRIPTAN 100 MILLIGRAM(S): 100 TABLET, FILM COATED ORAL at 20:31

## 2025-06-16 RX ADMIN — Medication 1 DOSE(S): at 20:37

## 2025-06-16 RX ADMIN — MYCOPHENOLATE MOFETIL 1000 MILLIGRAM(S): 500 TABLET, FILM COATED ORAL at 20:31

## 2025-06-16 RX ADMIN — Medication 400 MILLIGRAM(S): at 17:02

## 2025-06-16 RX ADMIN — Medication 1 TABLET(S): at 11:41

## 2025-06-16 RX ADMIN — INSULIN LISPRO 8 UNIT(S): 100 INJECTION, SOLUTION INTRAVENOUS; SUBCUTANEOUS at 07:51

## 2025-06-16 RX ADMIN — SUMATRIPTAN 100 MILLIGRAM(S): 100 TABLET, FILM COATED ORAL at 21:31

## 2025-06-16 RX ADMIN — ENTECAVIR 0.5 MILLIGRAM(S): 0.5 TABLET ORAL at 11:41

## 2025-06-16 NOTE — PROGRESS NOTE ADULT - SUBJECTIVE AND OBJECTIVE BOX
CC: Patient is a 70y old  Female who presents with a chief complaint of Debility 2/2 Liver transplant due to decompensative liver disease (16 Jun 2025 09:31)      Interval History:  Patient seen and examined at bedside.  No overnight events  reports constipation. small bm yest. no n/v.     ALLERGIES:  No Known Allergies    MEDICATIONS  (STANDING):  aspirin enteric coated 81 milliGRAM(s) Oral daily  calcium carbonate 1250 mG  + Vitamin D (OsCal 500 + D) 1 Tablet(s) Oral two times a day  chlorhexidine 2% Cloths 1 Application(s) Topical daily  cycloSPORINE  , modified (GENGRAF) 100 milliGRAM(s) Oral two times a day  dextrose 5%. 1000 milliLiter(s) (50 mL/Hr) IV Continuous <Continuous>  dextrose 5%. 1000 milliLiter(s) (100 mL/Hr) IV Continuous <Continuous>  dextrose 50% Injectable 25 Gram(s) IV Push once  dextrose 50% Injectable 12.5 Gram(s) IV Push once  dextrose 50% Injectable 25 Gram(s) IV Push once  entecavir 0.5 milliGRAM(s) Oral daily  fluconAZOLE   Tablet 400 milliGRAM(s) Oral daily  fluticasone propionate/ salmeterol 100-50 MICROgram(s) Diskus 1 Dose(s) Inhalation two times a day  glucagon  Injectable 1 milliGRAM(s) IntraMuscular once  heparin   Injectable 5000 Unit(s) SubCutaneous every 12 hours  insulin glargine Injectable (LANTUS) 5 Unit(s) SubCutaneous at bedtime  insulin lispro (ADMELOG) corrective regimen sliding scale   SubCutaneous three times a day before meals  insulin lispro (ADMELOG) corrective regimen sliding scale   SubCutaneous at bedtime  insulin lispro Injectable (ADMELOG) 8 Unit(s) SubCutaneous before lunch  insulin lispro Injectable (ADMELOG) 5 Unit(s) SubCutaneous before dinner  insulin lispro Injectable (ADMELOG) 8 Unit(s) SubCutaneous with breakfast  lactulose Syrup 10 Gram(s) Oral <User Schedule>  levETIRAcetam 500 milliGRAM(s) Oral two times a day  magnesium oxide 400 milliGRAM(s) Oral three times a day with meals  melatonin 9 milliGRAM(s) Oral at bedtime  mycophenolate mofetil 1000 milliGRAM(s) Oral <User Schedule>  NIFEdipine XL 30 milliGRAM(s) Oral daily  pantoprazole    Tablet 40 milliGRAM(s) Oral before breakfast  predniSONE   Tablet 20 milliGRAM(s) Oral daily  QUEtiapine 75 milliGRAM(s) Oral at bedtime  saline laxative (FLEET) Rectal Enema 1 Enema Rectal once  senna 2 Tablet(s) Oral at bedtime  SUMAtriptan 100 milliGRAM(s) Oral <User Schedule>  trimethoprim   80 mG/sulfamethoxazole 400 mG 1 Tablet(s) Oral daily  valGANciclovir 900 milliGRAM(s) Oral daily    MEDICATIONS  (PRN):  albuterol    90 MICROgram(s) HFA Inhaler 2 Puff(s) Inhalation every 6 hours PRN Shortness of Breath and/or Wheezing  bisacodyl Suppository 10 milliGRAM(s) Rectal daily PRN Refractory Constipation  dextrose Oral Gel 15 Gram(s) Oral once PRN Blood Glucose LESS THAN 70 milliGRAM(s)/deciliter  SUMAtriptan 100 milliGRAM(s) Oral daily PRN Migraine  traMADol 25 milliGRAM(s) Oral every 6 hours PRN Moderate Pain (4 - 6)  traMADol 50 milliGRAM(s) Oral every 6 hours PRN Severe Pain (7 - 10)    Vital Signs Last 24 Hrs  T(F): 98.1 (16 Jun 2025 07:40), Max: 98.6 (15 Albert 2025 19:32)  HR: 85 (16 Jun 2025 07:40) (82 - 87)  BP: 129/65 (16 Jun 2025 07:40) (129/65 - 153/68)  RR: 16 (16 Jun 2025 07:40) (16 - 16)  SpO2: 97% (16 Jun 2025 07:40) (97% - 98%)  I&O's Summary    BMI (kg/m2): 26.4 (06-12-25 @ 19:57)    PHYSICAL EXAM:  General: NAD, A/O x 3  ENT: MMM  Neck: Supple, No JVD  Lungs: Clear to auscultation bilaterally  Cardio: RRR, S1/S2, No murmurs  Abdomen: Soft, Nontender, Nondistended; Bowel sounds present    LABS:                        8.5    5.84  )-----------( 81       ( 16 Jun 2025 07:07 )             26.7       06-16    136  |  107  |  18  ----------------------------<  160  5.0   |  20  |  1.38    Ca    8.4      16 Jun 2025 07:07  Phos  4.3     06-16  Mg     1.8     06-16    TPro  5.0  /  Alb  2.3  /  TBili  0.9  /  DBili  x   /  AST  38  /  ALT  40  /  AlkPhos  191  06-16       PT/INR - ( 15 Albert 2025 07:40 )   PT: 11.8 sec;   INR: 1.00 ratio           POCT Blood Glucose.: 193 mg/dL (16 Jun 2025 07:50)  POCT Blood Glucose.: 133 mg/dL (15 Albert 2025 22:31)  POCT Blood Glucose.: 142 mg/dL (15 Albert 2025 21:05)  POCT Blood Glucose.: 146 mg/dL (15 Albert 2025 16:47)  POCT Blood Glucose.: 221 mg/dL (15 Albert 2025 11:51)      Urinalysis Basic - ( 16 Jun 2025 07:07 )    Color: x / Appearance: x / SG: x / pH: x  Gluc: 160 mg/dL / Ketone: x  / Bili: x / Urobili: x   Blood: x / Protein: x / Nitrite: x   Leuk Esterase: x / RBC: x / WBC x   Sq Epi: x / Non Sq Epi: x / Bacteria: x        COVID-19 PCR: NotDetec (06-13-25 @ 06:45)      Care Discussed with Consultants/Other Providers: Yes

## 2025-06-16 NOTE — PROGRESS NOTE ADULT - ASSESSMENT
70 F with PMHx NAFLD cirrhosis, migraines, T2DM, COPD, and HCC (listed for liver transplant with MELD 20B), UGIB, chronic back pain 2/2 spinal fracture, s/p OLT from  donor at [facility]. Required insulin gtt for euglycemia and hospital course complicated w. migraines, scott, uti, Now admitted to [facility] rehab program on .    # Debility 2/2 Liver transplant due to NAFLD  - HBV Core + donor: Entecavir 0.5mg daily  - S/p solumedrol ---> Prednisone   - ASA 81mg daily   - FK stopped d/t hallucinations. UA neg. switched to Cyclo for mental status changes,  MMF , Pred 20. keppra ppx.   - PPx: Bactrim, Valcyte 450 (CMV +/-, inc 900mg as able), Fluc, PPI, OsCal  - Simulect completed  - PT/OT    #Type 2 Diabetes Mellitus  #Steroid induced hyperglycemia   - follows w. endocrine. s/p insulin gtt  - HbA1c  6.0% - per endo, not accurate in the setting of low gfr and liver disease and dietary indiscretion  - Home regimen: Lantus 14 units + Humalog 18 units with meals. Sugars are usually high in the 200s; uses Dexcom G7 with reader   - monitor FBG ACHS + Mod ISS  - c/w Lantus 5U HS and Admelog 8U breakfast; 8U Lunch; 5U dinner     #SCOTT  - baseline Cr 1.14  - s/p IVF   - Cr 1.38 again. encourage oral fluids. no urinary sxs.  - appreciate nephro consult. cyclosporin level, UA, FeNa   - Avoid nephrotoxic meds     # Constipation  - escalate bowel regimen. enema if needed.     #Transaminitis (improving)  - Trend LFTs    #Acute blood loss anemia  -S/p multiple blood products  -Trend H/H. recent baseline 8-9's  -Transfuse if hgb <7 PRN    #HTN  -Nifedipine 30mg daily     #Migraines  -If sumatriptan desired, recommendations of 100mg PO BID prn (no more than 2 doses in a day and 3 doses/week)  -IV mag x1 today    #COPD  -Advair daily   -Albuterol q 6hrs PRN    #Depression  #Hallucinations   -Seroquel 75mg HS   -Neuropsychology consult PRN  -Melatonin 6mg HS PRN

## 2025-06-16 NOTE — PROGRESS NOTE ADULT - ASSESSMENT
70 F with PMHx NAFLD cirrhosis, migraines, T2DM, COPD, and HCC (listed for liver transplant with MELD 20B), UGIB, chronic back pain 2/2 spinal fracture, originally presented to [facility] on  for coffee ground emesis and melena, EGD showed no active bleeding, patient was transfused and stabilized prior to transfer to [facility] on  for further management. Patient denies bloody bowel movements or bloody emesis since . Patient endorses low back pain and frontal lobe headache, Neurology consulted for persistent headache, recommended to start nortriptyline. When the appropriate organ was available, the patient was brought to OR and S/p OLT from a  doner under steroids and Simulect induction with Dr. Caicedo on . Now admitted to [facility] for functional deficits and initiation of a multidisciplinary rehab program consisting focused on functional mobility, transfers and ADLs.    LIVER TRANSPLANT PATIENT: DO NOT COMMENCE ANY NEW MEDICATION OR CHANGE TREATMENT PLAN WITHOUT CLEARANCE FROM TRANSPLANT TEAM OR PRIMARY REHAB TEAM:   24 hr contact in provider hand off and 24hr teams vincent    * Labs discussed Cr elevated, with concentrated urine--f/u UA and Repeat CBC BMP tomorrow, if Cr still rising, will get renal consult  * Monitoring of Tacro level in liaison with transplant team  * Commence therapy as stated   * Migrane, with light intolerance. c/w tylenol, avoid precipitants of the head ache (mri brain unremarkable)       # Debility 2/2 Liver transplant due to decompensative liver disease  - HBV Core + donor: Entecavir 0.5mg daily  - Good graft  function   - S/p solumedrol ---> Prednisone   - Pain Management: Tramadol PRN   - ASA 81mg daily   - Hallucinations -->  holding FK, BH following; on seroquel, UA: Neg. keppra ppx   - FK stopped, switched to Cyclo for mental status changes,  MMF , Pred 20  - PPx: Bactrim, Valcyte 450 (CMV +/-, inc 900mg as able), Fluc, PPI, OsCal  - Simulect completed    Comprehensive Multidisciplinary Rehab Program:  - Commence comprehensive rehab program, 3 hours a day, 5 days a week.  - PT 1hr/day: Focused on improving strength, endurance, coordination, balance, functional mobility, and transfers  - OT 1hr/day: Focused on improving strength, fine motor skills, coordination, posture and ADLs.    - Speech Therapy 1hr/day: to diagnose and treat deficits in swallowing, cognition and communication.   - Activity Limitations: Decreased social, vocational and leisure activities, decreased self care and ADLs, decreased mobility, decreased ability to manage household and finances.     -----------------------------------------------------------------------------------  Concurrent Medical Problems    #Migraines  -If sumatriptan desired, would give as 100mg PO BID prn (no more than 2 doses in a day and 3 doses/week)    #COPD  -Advair daily   -Albuterol PRN    #SCOTT  -Trend CMP  -Renal dose meds  -Avoid nephrotoxic meds   --Rising cr, repeat labs     #Transaminitis (improving)  -Trend LFTs    #Acute blood loss anemia  -S/p multiple blood products  -Trend H/H  -Transfuse if hgb <7 PRN    #Type 2 Diabetes Mellitus  #Steroid induced hyperglycemia   -A1c 6.0  -FBG ACHS + Mod ISS  -Lantus 5U HS  -Admelog 12U breakfast; 8U Lunch; 5U dinner     #HTN/HLD  -Nifedipine 30mg daily     #Mood/Cognition  #Sleep disturbance  #Depression  #Hallucinations   -Seroquel 75mg HS   -Neuropsychology consult PRN  -Maintain quiet hours and low stim environment.  -Melatonin 6mg HS PRN to maximize participation in therapy during the day.     #GI/Bowel:  Senna QHS, Miralax Daily    #/Bladder  # E.Faecium UTI, asymptomatic  - Zosyn/Vanco completed  --Concentrated urine f/u UA  --Rising Cr 1.3 f/u repeat labs     #Skin/Pressure Injury:   - Skin assessment on admission: BUE ecchymosis; RUE wound scabbed 2.5x2cm; vertical abd incision 9cm with 14 staples; transverse abd incision 30cm with 38 staples; LUQ drain site 1cm with 3 sutures; RUQ drain site x 2 1) 1cm with 4 sutures 2) 1cm with 3 sutures; RLQ wound scabbed 1.8x0.7cm; R hip scratch marks; blanchable redness sacrum/buttocks; R groin and perineal area ecchymosis    #Diet/health maintenance    SLP consult for swallow function evaluation and treatment  -Current Diet: Carb consistent diet    [X] Aspiration Precautions  -Nutrition consult   -Magnesium oxide 400mg TID    #Precautions / PROPHYLAXIS:   - Falls  - ortho: Weight bearing status: WBAT   - Lungs: Aspiration, Incentive Spirometer   - DVT ppx: Heparin, TEDs  - Pressure injury/Skin: TOOB to Chair, PT/OT      ---------------  Code Status: FULL  Emergency Contact:    Outpatient Follow-up (Specialty/Name of physician):    Esvin Jim Yuriy  Surgery  [address] [place] [address]  Phone: [phone]  Fax: [phone]    Skylar Julian  Transplant Hepatology  [address] [place] [address]  Phone: [phone]  Fax: [phone]    Lulu Torres  [facility]  [address]  Scheduled Appointment: 2025      1. Follow up with Dr. Hatfield / Psych Clinic  2. Follow up with Neurology for further headache management plan       --------------  Goals: Safe discharge to Home  Estimated Length of Stay: 10-14 days  Rehab Potential: Good  Medical Prognosis: Good  Estimated Disposition: Home with Home Care  ---------------    PRESCREEN COMPARISON:  I have reviewed the prescreen information and I have found no relevant changes between the preadmission screening and my post admission evaluation.    RATIONALE FOR INPATIENT ADMISSION: Patient demonstrates the following:  [X] Medically appropriate for rehabilitation admission  [X] Has attainable rehab goals with an appropriate initial discharge plan  [X]Has rehabilitation potential (expected to make a significant improvement within a reasonable period of time)  [X] Requires close medical management by a rehab physician, rehab nursing care, Hospitalist and comprehensive interdisciplinary team (including PT, OT and/or SLP, Prosthetics and Orthotics)        70 F with PMHx NAFLD cirrhosis, migraines, T2DM, COPD, and HCC (listed for liver transplant with MELD 20B), UGIB, chronic back pain 2/2 spinal fracture, originally presented to [facility] on  for coffee ground emesis and melena, EGD showed no active bleeding, patient was transfused and stabilized prior to transfer to [facility] on  for further management. Patient denies bloody bowel movements or bloody emesis since . Patient endorses low back pain and frontal lobe headache, Neurology consulted for persistent headache, recommended to start nortriptyline. When the appropriate organ was available, the patient was brought to OR and S/p OLT from a  doner under steroids and Simulect induction with Dr. Caicedo on . Now admitted to [facility] for functional deficits and initiation of a multidisciplinary rehab program consisting focused on functional mobility, transfers and ADLs.    LIVER TRANSPLANT PATIENT: DO NOT COMMENCE ANY NEW MEDICATION OR CHANGE TREATMENT PLAN WITHOUT CLEARANCE FROM TRANSPLANT TEAM OR PRIMARY REHAB TEAM:   24 hr contact in provider hand off and 24hr teams vincent    * Labs discussed Cr elevated but stable,  * TACRO 1.4 ON  down trending--previously d/juliano, on mycophenolate will liaise with transplant team for dosing  * Migrane, revised dosing for sumatriptan, nightly and prn daytime (monitor LFTs)      # Debility 2/2 Liver transplant due to decompensated liver disease  - HBV Core + donor: Entecavir 0.5mg daily  - Good graft  function   - S/p solumedrol ---> Prednisone   - Pain Management: Tramadol PRN   - ASA 81mg daily   - Hallucinations -->  holding FK, BH following; on seroquel, UA: Neg. keppra ppx   - FK stopped, switched to Cyclo for mental status changes,  MMF , Pred 20  - PPx: Bactrim, Valcyte 450 (CMV +/-, inc 900mg as able), Fluc, PPI, OsCal  - Simulect completed    Comprehensive Multidisciplinary Rehab Program:  - Continue comprehensive rehab program, 3 hours a day, 5 days a week..      Rehab Dx/Impairments  Limited ambulatory distance   Gait disturbance  Insomnia  Debility  Head ache    - Activity Limitations: Decreased social, vocational and leisure activities, decreased self care and ADLs, decreased mobility, decreased ability to manage household and finances.       Concurrent Medical Problems    #Migraines--revised dosing sumatriptan 100mg qhs and od/prn  -If sumatriptan desired, would give as 100mg PO BID prn (no more than 2 doses in a day and 3 doses/week)    #COPD  -Advair daily   -Albuterol PRN    #SCOTT/CKD -continue liaison with Nephrology  -Avoid nephrotoxic meds       #Transaminitis (improving)  -Trend LFTs    #Acute blood loss anemia  -S/p multiple blood products  -Trend H/H  -Transfuse if hgb <7 PRN    #Type 2 Diabetes Mellitus  #Steroid induced hyperglycemia   -A1c 6.0  -FBG ACHS + Mod ISS  -Lantus 5U HS  -Admelog 12U breakfast; 8U Lunch; 5U dinner     #HTN/HLD  -Nifedipine 30mg daily     #Mood/Cognition  #Sleep disturbance  #Depression  #Hallucinations   -Seroquel 75mg HS   -Neuropsychology consult PRN  -Maintain quiet hours and low stim environment.  -Melatonin 6mg HS PRN to maximize participation in therapy during the day.     #GI/Bowel:  Senna QHS, Miralax Daily    #/Bladder  # E.Faecium UTI, asymptomatic  - Zosyn/Vanco completed  -Cr Stable     #Skin/Pressure Injury:   - Skin assessment on admission: BUE ecchymosis; RUE wound scabbed 2.5x2cm; vertical abd incision 9cm with 14 staples; transverse abd incision 30cm with 38 staples; LUQ drain site 1cm with 3 sutures; RUQ drain site x 2 1) 1cm with 4 sutures 2) 1cm with 3 sutures; RLQ wound scabbed 1.8x0.7cm; R hip scratch marks; blanchable redness sacrum/buttocks; R groin and perineal area ecchymosis    #Diet/health maintenance    SLP consult for swallow function evaluation and treatment  -Current Diet: Carb consistent diet    [X] Aspiration Precautions  -Nutrition consult   -Magnesium oxide 400mg TID    #Precautions / PROPHYLAXIS:   - Falls  - ortho: Weight bearing status: WBAT   - Lungs: Aspiration, Incentive Spirometer   - DVT ppx: Heparin, TEDs  - Pressure injury/Skin: TOOB to Chair, PT/OT      ---------------  Code Status: FULL  Emergency Contact:    Outpatient Follow-up (Specialty/Name of physician):    Esvin Jim Yuriy  Surgery  [address] [place] [address]  Phone: [phone]  Fax: [phone]    Skylar Julian  Transplant Hepatology  [address] [place] [address]  Phone: [phone]  Fax: [phone]    Lulu Torres  [facility]  [address]   Scheduled Appointment: 2025      1. Follow up with Dr. Hatfield / Psych Clinic  2. Follow up with Neurology for further headache management plan       Time based billing   Spent 53 mins, patient review, observation in therapy, discussion of lab results and care co ordination

## 2025-06-16 NOTE — PROGRESS NOTE ADULT - SUBJECTIVE AND OBJECTIVE BOX
No distress, comfortable on RA    Vital Signs Last 24 Hrs  T(C): 36.7 (06-16-25 @ 07:40), Max: 37 (06-15-25 @ 19:32)  T(F): 98.1 (06-16-25 @ 07:40), Max: 98.6 (06-15-25 @ 19:32)  HR: 85 (06-16-25 @ 07:40) (82 - 87)  BP: 129/65 (06-16-25 @ 07:40) (129/65 - 153/68)  RR: 16 (06-16-25 @ 07:40) (16 - 16)  SpO2: 97% (06-16-25 @ 07:40) (97% - 98%)    s1s2  b/l air entry  soft, ND  no edema                        8.5    5.84  )-----------( 81       ( 16 Jun 2025 07:07 )             26.7     16 Jun 2025 07:07    136    |  107    |  18     ----------------------------<  160    5.0     |  20     |  1.38     Ca    8.4        16 Jun 2025 07:07  Phos  4.3       16 Jun 2025 07:07  Mg     1.8       16 Jun 2025 07:07    TPro  5.0    /  Alb  2.3    /  TBili  0.9    /  DBili  x      /  AST  38     /  ALT  40     /  AlkPhos  191    16 Jun 2025 07:07    LIVER FUNCTIONS - ( 16 Jun 2025 07:07 )  Alb: 2.3 g/dL / Pro: 5.0 g/dL / ALK PHOS: 191 U/L / ALT: 40 U/L / AST: 38 U/L / GGT: x           PT/INR - ( 15 Albert 2025 07:40 )   PT: 11.8 sec;   INR: 1.00 ratio      albuterol    90 MICROgram(s) HFA Inhaler 2 Puff(s) Inhalation every 6 hours PRN  aspirin enteric coated 81 milliGRAM(s) Oral daily  bisacodyl Suppository 10 milliGRAM(s) Rectal daily PRN  calcium carbonate 1250 mG  + Vitamin D (OsCal 500 + D) 1 Tablet(s) Oral two times a day  chlorhexidine 2% Cloths 1 Application(s) Topical daily  dextrose 5%. 1000 milliLiter(s) IV Continuous <Continuous>  dextrose 5%. 1000 milliLiter(s) IV Continuous <Continuous>  dextrose 50% Injectable 25 Gram(s) IV Push once  dextrose 50% Injectable 12.5 Gram(s) IV Push once  dextrose 50% Injectable 25 Gram(s) IV Push once  dextrose Oral Gel 15 Gram(s) Oral once PRN  entecavir 0.5 milliGRAM(s) Oral daily  fluconAZOLE   Tablet 400 milliGRAM(s) Oral daily  fluticasone propionate/ salmeterol 100-50 MICROgram(s) Diskus 1 Dose(s) Inhalation two times a day  glucagon  Injectable 1 milliGRAM(s) IntraMuscular once  heparin   Injectable 5000 Unit(s) SubCutaneous every 12 hours  insulin glargine Injectable (LANTUS) 5 Unit(s) SubCutaneous at bedtime  insulin lispro (ADMELOG) corrective regimen sliding scale   SubCutaneous three times a day before meals  insulin lispro (ADMELOG) corrective regimen sliding scale   SubCutaneous at bedtime  insulin lispro Injectable (ADMELOG) 8 Unit(s) SubCutaneous before lunch  insulin lispro Injectable (ADMELOG) 5 Unit(s) SubCutaneous before dinner  insulin lispro Injectable (ADMELOG) 8 Unit(s) SubCutaneous with breakfast  lactulose Syrup 10 Gram(s) Oral <User Schedule>  levETIRAcetam 500 milliGRAM(s) Oral two times a day  magnesium oxide 400 milliGRAM(s) Oral three times a day with meals  melatonin 9 milliGRAM(s) Oral at bedtime  mycophenolate mofetil 1000 milliGRAM(s) Oral <User Schedule>  NIFEdipine XL 30 milliGRAM(s) Oral daily  pantoprazole    Tablet 40 milliGRAM(s) Oral before breakfast  predniSONE   Tablet 20 milliGRAM(s) Oral daily  QUEtiapine 75 milliGRAM(s) Oral at bedtime  senna 2 Tablet(s) Oral at bedtime  SUMAtriptan 100 milliGRAM(s) Oral <User Schedule>  SUMAtriptan 100 milliGRAM(s) Oral daily PRN  traMADol 25 milliGRAM(s) Oral every 6 hours PRN  traMADol 50 milliGRAM(s) Oral every 6 hours PRN  trimethoprim   80 mG/sulfamethoxazole 400 mG 1 Tablet(s) Oral daily  valGANciclovir 900 milliGRAM(s) Oral daily    Impression/Plan:    ESLD sec to IVEY  S/p OLT on 5/31/25 at [facility]   Cr slowly up-trending  Overall stable renal fx   F/u BMP, UO  If Cr is rising, would changer bactrim to Mepron  Avoid nephrotoxins   Will follow    [phone]

## 2025-06-16 NOTE — PROGRESS NOTE ADULT - SUBJECTIVE AND OBJECTIVE BOX
Subjective  Seen and examined. Reports persisting head ache, worse evening/night time, affecting her sleep  Had ? hard BM yesterday with abd straining, but non bloody  Tolerating oral diet  Have been on prn dozing for sumatriptan for her migran  No abd pain     ROS--No chest or abd pain, no nausea or vomiting  Head ache holocephalic/mostly frontal, mild/mod    Vital Signs Last 24 Hrs  T(C): 36.7 (16 Jun 2025 07:40), Max: 37 (15 Albert 2025 19:32)  T(F): 98.1 (16 Jun 2025 07:40), Max: 98.6 (15 Albert 2025 19:32)  HR: 85 (16 Jun 2025 07:40) (82 - 87)  BP: 129/65 (16 Jun 2025 07:40) (129/65 - 153/68)  RR: 16 (16 Jun 2025 07:40) (16 - 16)  SpO2: 97% (16 Jun 2025 07:40) (97% - 98%)  O2 Parameters below as of 16 Jun 2025 07:40  Patient On (Oxygen Delivery Method): room air      Exam  Gen - Mild discomfort due to head ache  HEENT - NCAT, EOMI, MMM, Normal Conjunctivae  Neck - Supple, No limited ROM  Pulm - Clear  Cardiovascular - RRR, S1S2, No murmurs  Chest - good chest expansion, good respiratory effort  Abdomen - Soft, non tender, +BS, +abd rounded   Extremities - No C/C, no calf tenderness, +BLE trace edema , +b/l resting tremors     Neuro-  AAOX 3, speech dangelo  MMT 5/5     Sensory - Intact  to LT     Tone - Normal  MSK: No limited ROM  Psychiatric - Mood stable, Affect WNL  Skin: BUE ecchymosis; RUE wound scabbed 2.5x2cm; vertical abd incision 9cm with 14 staples; transverse abd incision 30cm with 38 staples; LUQ drain site 1cm with 3 sutures; RUQ drain site x 2 1) 1cm with 4 sutures 2) 1cm with 3 sutures; RLQ wound scabbed 1.8x0.7cm; R    RECENT LABS/IMAGING                        8.5    5.84  )-----------( 81       ( 16 Jun 2025 07:07 )             26.7     06-16    136  |  107  |  18  ----------------------------<  160[H]  5.0   |  20[L]  |  1.38[H]    Ca    8.4      16 Jun 2025 07:07  Phos  4.3     06-16  Mg     1.8     06-16    TPro  5.0[L]  /  Alb  2.3[L]  /  TBili  0.9  /  DBili  x   /  AST  38  /  ALT  40  /  AlkPhos  191[H]  06-16    PT/INR - ( 15 Albert 2025 07:40 )   PT: 11.8 sec;   INR: 1.00 ratio      TACRO 1.4 ON 6/9 down trending     Urinalysis Basic - ( 16 Jun 2025 07:07 )  Color: x / Appearance: x / SG: x / pH: x  Gluc: 160 mg/dL / Ketone: x  / Bili: x / Urobili: x   Blood: x / Protein: x / Nitrite: x   Leuk Esterase: x / RBC: x / WBC x   Sq Epi: x / Non Sq Epi: x / Bacteria: x        MEDICATIONS  (STANDING):  aspirin enteric coated 81 milliGRAM(s) Oral daily  calcium carbonate 1250 mG  + Vitamin D (OsCal 500 + D) 1 Tablet(s) Oral two times a day  chlorhexidine 2% Cloths 1 Application(s) Topical daily  cycloSPORINE  , modified (GENGRAF) 100 milliGRAM(s) Oral two times a day  dextrose 5%. 1000 milliLiter(s) (50 mL/Hr) IV Continuous <Continuous>  dextrose 5%. 1000 milliLiter(s) (100 mL/Hr) IV Continuous <Continuous>  dextrose 50% Injectable 25 Gram(s) IV Push once  dextrose 50% Injectable 12.5 Gram(s) IV Push once  dextrose 50% Injectable 25 Gram(s) IV Push once  entecavir 0.5 milliGRAM(s) Oral daily  fluconAZOLE   Tablet 400 milliGRAM(s) Oral daily  fluticasone propionate/ salmeterol 100-50 MICROgram(s) Diskus 1 Dose(s) Inhalation two times a day  glucagon  Injectable 1 milliGRAM(s) IntraMuscular once  heparin   Injectable 5000 Unit(s) SubCutaneous every 12 hours  insulin glargine Injectable (LANTUS) 5 Unit(s) SubCutaneous at bedtime  insulin lispro (ADMELOG) corrective regimen sliding scale   SubCutaneous three times a day before meals  insulin lispro (ADMELOG) corrective regimen sliding scale   SubCutaneous at bedtime  insulin lispro Injectable (ADMELOG) 8 Unit(s) SubCutaneous before lunch  insulin lispro Injectable (ADMELOG) 5 Unit(s) SubCutaneous before dinner  insulin lispro Injectable (ADMELOG) 8 Unit(s) SubCutaneous with breakfast  lactulose Syrup 10 Gram(s) Oral <User Schedule>  levETIRAcetam 500 milliGRAM(s) Oral two times a day  magnesium oxide 400 milliGRAM(s) Oral three times a day with meals  melatonin 9 milliGRAM(s) Oral at bedtime  mycophenolate mofetil 1000 milliGRAM(s) Oral <User Schedule>  NIFEdipine XL 30 milliGRAM(s) Oral daily  pantoprazole    Tablet 40 milliGRAM(s) Oral before breakfast  predniSONE   Tablet 20 milliGRAM(s) Oral daily  QUEtiapine 75 milliGRAM(s) Oral at bedtime  saline laxative (FLEET) Rectal Enema 1 Enema Rectal once  senna 2 Tablet(s) Oral at bedtime  SUMAtriptan 100 milliGRAM(s) Oral <User Schedule>  trimethoprim   80 mG/sulfamethoxazole 400 mG 1 Tablet(s) Oral daily  valGANciclovir 900 milliGRAM(s) Oral daily    MEDICATIONS  (PRN):  albuterol    90 MICROgram(s) HFA Inhaler 2 Puff(s) Inhalation every 6 hours PRN Shortness of Breath and/or Wheezing  bisacodyl Suppository 10 milliGRAM(s) Rectal daily PRN Refractory Constipation  dextrose Oral Gel 15 Gram(s) Oral once PRN Blood Glucose LESS THAN 70 milliGRAM(s)/deciliter  SUMAtriptan 100 milliGRAM(s) Oral daily PRN Migraine  traMADol 25 milliGRAM(s) Oral every 6 hours PRN Moderate Pain (4 - 6)  traMADol 50 milliGRAM(s) Oral every 6 hours PRN Severe Pain (7 - 10)

## 2025-06-16 NOTE — PROGRESS NOTE ADULT - ATTENDING COMMENTS
I have personally seen and examined the patient.  I fully participated in the care of this patient.  I have made amendments to the documentation where necessary, and agree with the history, physical exam, and plan as documented by the Resident.       69 y/o. F, Hx as noted  Acute rehab admission post Liver transplant 5/31, post op UTI, Hypoglycemia, Mild transaminitis and low platelet with SCOTT treated. Acute rehab admission 6/12    Reports good family support  Long hx of migrate with light sensitivity, MRI brain unremarkable  Independent with ADLs without device at baseline    Currently has decreased ambulatory distance, and impaired dynamic balance   Good muscle strength    EXAM  AAO x 3  Tone normal  MMT UE 5/5 LE 4/5  T incision with staples on abdomen    RECENT LABS/IMAGING                        8.3    4.78  )-----------( 91       ( 13 Jun 2025 06:35 )             25.3     06-13    141  |  108  |  18  ----------------------------<  135[H]  4.2   |  25  |  1.31[H]    Ca    8.4      13 Jun 2025 06:35  Phos  3.4     06-12  Mg     1.9     06-12    TPro  4.6[L]  /  Alb  2.6[L]  /  TBili  1.0  /  DBili  x   /  AST  25  /  ALT  57[H]  /  AlkPhos  89  06-13    PT/INR - ( 12 Jun 2025 07:11 )   PT: 11.7 sec;   INR: 1.02 ratio         PTT - ( 12 Jun 2025 07:11 )  PTT:25.4 sec  Urinalysis Basic - ( 13 Jun 2025 06:35 )    Color: x / Appearance: x / SG: x / pH: x  Gluc: 135 mg/dL / Ketone: x  / Bili: x / Urobili: x   Blood: x / Protein: x / Nitrite: x   Leuk Esterase: x / RBC: x / WBC x   Sq Epi: x / Non Sq Epi: x / Bacteria: x      Dx  Debility 2/2 Liver transplant due to decompensative liver disease  Commence therapy    Labs discussed Cr elevated, with concentrated urine--f/u UA and Repeat CBC BMP tomorrow, if Cr still rising, will get renal consult  Transaminitis--monitor LFT  Monitoring of [place]ro level in liaison with transplant team  Continue all supportive treatment  Hospitalist f/u for chronic med conditions   Liaison with transplant team  Collateral hx to be obtained  Est dc approx 7-10 days

## 2025-06-17 LAB
CMV DNA CSF QL NAA+PROBE: SIGNIFICANT CHANGE UP IU/ML
CMV DNA SPEC NAA+PROBE-LOG#: SIGNIFICANT CHANGE UP LOG10IU/ML
GLUCOSE BLDC GLUCOMTR-MCNC: 124 MG/DL — HIGH (ref 70–99)
GLUCOSE BLDC GLUCOMTR-MCNC: 143 MG/DL — HIGH (ref 70–99)
GLUCOSE BLDC GLUCOMTR-MCNC: 158 MG/DL — HIGH (ref 70–99)
GLUCOSE BLDC GLUCOMTR-MCNC: 98 MG/DL — SIGNIFICANT CHANGE UP (ref 70–99)

## 2025-06-17 PROCEDURE — 99232 SBSQ HOSP IP/OBS MODERATE 35: CPT

## 2025-06-17 RX ADMIN — VALGANCICLOVIR 900 MILLIGRAM(S): 450 TABLET, FILM COATED ORAL at 11:43

## 2025-06-17 RX ADMIN — HEPARIN SODIUM 5000 UNIT(S): 1000 INJECTION INTRAVENOUS; SUBCUTANEOUS at 05:44

## 2025-06-17 RX ADMIN — Medication 30 MILLIGRAM(S): at 05:44

## 2025-06-17 RX ADMIN — SUMATRIPTAN 100 MILLIGRAM(S): 100 TABLET, FILM COATED ORAL at 18:09

## 2025-06-17 RX ADMIN — Medication 400 MILLIGRAM(S): at 07:59

## 2025-06-17 RX ADMIN — Medication 1 TABLET(S): at 18:10

## 2025-06-17 RX ADMIN — SUMATRIPTAN 100 MILLIGRAM(S): 100 TABLET, FILM COATED ORAL at 20:24

## 2025-06-17 RX ADMIN — LEVETIRACETAM 500 MILLIGRAM(S): 10 INJECTION, SOLUTION INTRAVENOUS at 05:44

## 2025-06-17 RX ADMIN — MYCOPHENOLATE MOFETIL 1000 MILLIGRAM(S): 500 TABLET, FILM COATED ORAL at 07:58

## 2025-06-17 RX ADMIN — Medication 2 TABLET(S): at 21:14

## 2025-06-17 RX ADMIN — PREDNISONE 20 MILLIGRAM(S): 20 TABLET ORAL at 05:44

## 2025-06-17 RX ADMIN — QUETIAPINE FUMARATE 75 MILLIGRAM(S): 25 TABLET ORAL at 21:14

## 2025-06-17 RX ADMIN — TRAMADOL HYDROCHLORIDE 50 MILLIGRAM(S): 50 TABLET, FILM COATED ORAL at 15:10

## 2025-06-17 RX ADMIN — Medication 9 MILLIGRAM(S): at 21:14

## 2025-06-17 RX ADMIN — INSULIN LISPRO 2: 100 INJECTION, SOLUTION INTRAVENOUS; SUBCUTANEOUS at 08:02

## 2025-06-17 RX ADMIN — LEVETIRACETAM 500 MILLIGRAM(S): 10 INJECTION, SOLUTION INTRAVENOUS at 18:10

## 2025-06-17 RX ADMIN — CYCLOSPORINE 75 MILLIGRAM(S): 100 CAPSULE, LIQUID FILLED ORAL at 07:59

## 2025-06-17 RX ADMIN — Medication 1 TABLET(S): at 05:44

## 2025-06-17 RX ADMIN — ENTECAVIR 0.5 MILLIGRAM(S): 0.5 TABLET ORAL at 11:01

## 2025-06-17 RX ADMIN — Medication 400 MILLIGRAM(S): at 18:10

## 2025-06-17 RX ADMIN — LACTULOSE 10 GRAM(S): 10 SOLUTION ORAL at 18:07

## 2025-06-17 RX ADMIN — INSULIN LISPRO 8 UNIT(S): 100 INJECTION, SOLUTION INTRAVENOUS; SUBCUTANEOUS at 11:30

## 2025-06-17 RX ADMIN — Medication 40 MILLIGRAM(S): at 05:44

## 2025-06-17 RX ADMIN — Medication 1 TABLET(S): at 11:02

## 2025-06-17 RX ADMIN — Medication 1 DOSE(S): at 08:35

## 2025-06-17 RX ADMIN — INSULIN LISPRO 5 UNIT(S): 100 INJECTION, SOLUTION INTRAVENOUS; SUBCUTANEOUS at 18:11

## 2025-06-17 RX ADMIN — CYCLOSPORINE 75 MILLIGRAM(S): 100 CAPSULE, LIQUID FILLED ORAL at 20:18

## 2025-06-17 RX ADMIN — Medication 400 MILLIGRAM(S): at 11:29

## 2025-06-17 RX ADMIN — Medication 81 MILLIGRAM(S): at 11:02

## 2025-06-17 RX ADMIN — Medication 400 MILLIGRAM(S): at 11:02

## 2025-06-17 RX ADMIN — MYCOPHENOLATE MOFETIL 1000 MILLIGRAM(S): 500 TABLET, FILM COATED ORAL at 20:18

## 2025-06-17 RX ADMIN — INSULIN GLARGINE-YFGN 5 UNIT(S): 100 INJECTION, SOLUTION SUBCUTANEOUS at 21:15

## 2025-06-17 RX ADMIN — INSULIN LISPRO 8 UNIT(S): 100 INJECTION, SOLUTION INTRAVENOUS; SUBCUTANEOUS at 08:03

## 2025-06-17 RX ADMIN — SUMATRIPTAN 100 MILLIGRAM(S): 100 TABLET, FILM COATED ORAL at 21:30

## 2025-06-17 RX ADMIN — Medication 1 APPLICATION(S): at 11:30

## 2025-06-17 RX ADMIN — HEPARIN SODIUM 5000 UNIT(S): 1000 INJECTION INTRAVENOUS; SUBCUTANEOUS at 18:10

## 2025-06-17 NOTE — PROGRESS NOTE ADULT - ASSESSMENT
70 F with PMHx NAFLD cirrhosis, migraines, T2DM, COPD, and HCC (listed for liver transplant with MELD 20B), UGIB, chronic back pain 2/2 spinal fracture, originally presented to [facility] on  for coffee ground emesis and melena, EGD showed no active bleeding, patient was transfused and stabilized prior to transfer to [facility] on  for further management. Patient denies bloody bowel movements or bloody emesis since . Patient endorses low back pain and frontal lobe headache, Neurology consulted for persistent headache, recommended to start nortriptyline. When the appropriate organ was available, the patient was brought to OR and S/p OLT from a  doner under steroids and Simulect induction with Dr. Caicedo on . Now admitted to [facility] for functional deficits and initiation of a multidisciplinary rehab program consisting focused on functional mobility, transfers and ADLs.    LIVER TRANSPLANT PATIENT: DO NOT COMMENCE ANY NEW MEDICATION OR CHANGE TREATMENT PLAN WITHOUT CLEARANCE FROM TRANSPLANT TEAM OR PRIMARY REHAB TEAM:   24 hr contact in provider hand off and 24hr teams vincent    * BP stable, removed left arm IV  * Clinically stable   * Migrane, c/w sumatriptan nightly and prn daily, aim to revert to max 3x/wk in few days with sustained control of the head ache      # Debility 2/2 Liver transplant due to decompensated liver disease  - HBV Core + donor: Entecavir 0.5mg daily  - Good graft  function   - S/p solumedrol ---> Prednisone   - Pain Management: Tramadol PRN   - ASA 81mg daily   - Hallucinations -->  holding FK, BH following; on seroquel, UA: Neg. keppra ppx   - FK stopped, switched to Cyclo for mental status changes,  MMF , Pred 20  - PPx: Bactrim, Valcyte 450 (CMV +/-, inc 900mg as able), Fluc, PPI, OsCal  - Simulect completed    Comprehensive Multidisciplinary Rehab Program:  - Continue comprehensive rehab program, 3 hours a day, 5 days a week..      Rehab Dx/Impairments  Limited ambulatory distance   Gait disturbance  Insomnia  Debility  Head ache    - Activity Limitations: Decreased social, vocational and leisure activities, decreased self care and ADLs, decreased mobility, decreased ability to manage household and finances.       Concurrent Medical Problems    #Migraines--revised dosing sumatriptan 100mg qhs and od/prn  -If sumatriptan desired, would give as 100mg PO BID prn (no more than 2 doses in a day and 3 doses/week)    #COPD  -Advair daily   -Albuterol PRN    #SCOTT/CKD -continue liaison with Nephrology  -Avoid nephrotoxic meds       #Transaminitis (improving)  -Trend LFTs    #Acute blood loss anemia  -S/p multiple blood products  -Trend H/H  -Transfuse if hgb <7 PRN    #Type 2 Diabetes Mellitus  #Steroid induced hyperglycemia   -A1c 6.0  -FBG ACHS + Mod ISS  -Lantus 5U HS  -Admelog 12U breakfast; 8U Lunch; 5U dinner     #HTN/HLD  -Nifedipine 30mg daily     #Mood/Cognition  #Sleep disturbance  #Depression  #Hallucinations   -Seroquel 75mg HS   -Neuropsychology consult PRN  -Maintain quiet hours and low stim environment.  -Melatonin 6mg HS PRN to maximize participation in therapy during the day.     #GI/Bowel:  Senna QHS, Miralax Daily    #/Bladder  # E.Faecium UTI, asymptomatic  - Zosyn/Vanco completed  -Cr Stable     #Skin/Pressure Injury:   - Skin assessment on admission: BUE ecchymosis; RUE wound scabbed 2.5x2cm; vertical abd incision 9cm with 14 staples; transverse abd incision 30cm with 38 staples; LUQ drain site 1cm with 3 sutures; RUQ drain site x 2 1) 1cm with 4 sutures 2) 1cm with 3 sutures; RLQ wound scabbed 1.8x0.7cm; R hip scratch marks; blanchable redness sacrum/buttocks; R groin and perineal area ecchymosis    #Diet/health maintenance    SLP consult for swallow function evaluation and treatment  -Current Diet: Carb consistent diet    [X] Aspiration Precautions  -Nutrition consult   -Magnesium oxide 400mg TID    #Precautions / PROPHYLAXIS:   - Falls  - ortho: Weight bearing status: WBAT   - Lungs: Aspiration, Incentive Spirometer   - DVT ppx: Heparin, TEDs  - Pressure injury/Skin: TOOB to Chair, PT/OT      ---------------  Code Status: FULL  Emergency Contact:    Outpatient Follow-up (Specialty/Name of physician):    Esvin Jim Yuriy  Surgery  [address] [place] [address]  Phone: [phone]  Fax: [phone]    Skylar Julian  Transplant Hepatology  [address] [place] [address]  Phone: [phone]  Fax: [phone]    Lulu Torres  [facility]  [address] [facility]  Scheduled Appointment: 2025      1. Follow up with Dr. Hatfield / Psych Clinic  2. Follow up with Neurology for further headache management plan           70 F with PMHx NAFLD cirrhosis, migraines, T2DM, COPD, and HCC (listed for liver transplant with MELD 20B), UGIB, chronic back pain 2/2 spinal fracture, originally presented to [facility] on  for coffee ground emesis and melena, EGD showed no active bleeding, patient was transfused and stabilized prior to transfer to [facility] on  for further management. Patient denies bloody bowel movements or bloody emesis since . Patient endorses low back pain and frontal lobe headache, Neurology consulted for persistent headache, recommended to start nortriptyline. When the appropriate organ was available, the patient was brought to OR and S/p OLT from a  doner under steroids and Simulect induction with Dr. Caicedo on . Now admitted to [facility] for functional deficits and initiation of a multidisciplinary rehab program consisting focused on functional mobility, transfers and ADLs.    LIVER TRANSPLANT PATIENT: DO NOT COMMENCE ANY NEW MEDICATION OR CHANGE TREATMENT PLAN WITHOUT CLEARANCE FROM TRANSPLANT TEAM OR PRIMARY REHAB TEAM:   24 hr contact in provider hand off and 24hr teams vincent    * BP stable, removed left arm IV  * Clinically stable   * Migrane, c/w sumatriptan nightly and prn daily, aim to revert to max 3x/wk in few days with sustained control of the head ache      # Debility 2/2 Liver transplant due to decompensated liver disease  - HBV Core + donor: Entecavir 0.5mg daily  - Good graft  function   - S/p solumedrol ---> Prednisone   - Pain Management: Tramadol PRN   - ASA 81mg daily   - Hallucinations -->  holding FK, BH following; on seroquel, UA: Neg. keppra ppx   - FK stopped, switched to Cyclo for mental status changes,  MMF , Pred 20  - PPx: Bactrim, Valcyte 450 (CMV +/-, inc 900mg as able), Fluc, PPI, OsCal  - Simulect completed    Comprehensive Multidisciplinary Rehab Program:  - Continue comprehensive rehab program, 3 hours a day, 5 days a week..      Rehab Dx/Impairments  Limited ambulatory distance   Gait disturbance  Insomnia  Debility  Head ache    - Activity Limitations: Decreased social, vocational and leisure activities, decreased self care and ADLs, decreased mobility, decreased ability to manage household and finances.       Concurrent Medical Problems    #Migraines--revised dosing sumatriptan 100mg qhs and od/prn  -If sumatriptan desired, would give as 100mg PO BID prn (no more than 2 doses in a day and 3 doses/week)    #COPD  -Advair daily   -Albuterol PRN    #SCOTT/CKD -continue liaison with Nephrology  -Avoid nephrotoxic meds       #Transaminitis (improving)  -Trend LFTs    #Acute blood loss anemia  -S/p multiple blood products  -Trend H/H  -Transfuse if hgb <7 PRN    #Type 2 Diabetes Mellitus  #Steroid induced hyperglycemia   -A1c 6.0  -FBG ACHS + Mod ISS  -Lantus 5U HS  -Admelog 12U breakfast; 8U Lunch; 5U dinner     #HTN/HLD  -Nifedipine 30mg daily     #Mood/Cognition  #Sleep disturbance  #Depression  #Hallucinations   -Seroquel 75mg HS   -Neuropsychology consult PRN  -Maintain quiet hours and low stim environment.  -Melatonin 6mg HS PRN to maximize participation in therapy during the day.     #GI/Bowel:  Senna QHS, Miralax Daily    #/Bladder  # E.Faecium UTI, asymptomatic  - Zosyn/Vanco completed  -Cr Stable     #Skin/Pressure Injury:   - Skin assessment on admission: BUE ecchymosis; RUE wound scabbed 2.5x2cm; vertical abd incision 9cm with 14 staples; transverse abd incision 30cm with 38 staples; LUQ drain site 1cm with 3 sutures; RUQ drain site x 2 1) 1cm with 4 sutures 2) 1cm with 3 sutures; RLQ wound scabbed 1.8x0.7cm; R hip scratch marks; blanchable redness sacrum/buttocks; R groin and perineal area ecchymosis    #Diet/health maintenance    SLP consult for swallow function evaluation and treatment  -Current Diet: Carb consistent diet    [X] Aspiration Precautions  -Nutrition consult   -Magnesium oxide 400mg TID    #Precautions / PROPHYLAXIS:   - Falls  - ortho: Weight bearing status: WBAT   - Lungs: Aspiration, Incentive Spirometer   - DVT ppx: Heparin, TEDs  - Pressure injury/Skin: TOOB to Chair, PT/OT      IDT   Ambulating 80ft with RW Min assist, 4 stairs with 2 hand rails   Est dc     ---------------  Code Status: FULL  Emergency Contact:    Outpatient Follow-up (Specialty/Name of physician):    Esvin Jim Yuriy  Surgery  [address] [place] [address]  Phone: [phone]  Fax: [phone]    Skylar Julian  Transplant Hepatology  [address] [place] [address]  Phone: [phone]  Fax: [phone]    Lulu Torres  [facility]  [address]  Scheduled Appointment: 2025      1. Follow up with Dr. Hatfield / Psych Clinic  2. Follow up with Neurology for further headache management plan           70 F with PMHx NAFLD cirrhosis, migraines, T2DM, COPD, and HCC (listed for liver transplant with MELD 20B), UGIB, chronic back pain 2/2 spinal fracture, originally presented to [facility] on  for coffee ground emesis and melena, EGD showed no active bleeding, patient was transfused and stabilized prior to transfer to [facility] on  for further management. Patient denies bloody bowel movements or bloody emesis since . Patient endorses low back pain and frontal lobe headache, Neurology consulted for persistent headache, recommended to start nortriptyline. When the appropriate organ was available, the patient was brought to OR and S/p OLT from a  doner under steroids and Simulect induction with Dr. Caicedo on . Now admitted to [facility] for functional deficits and initiation of a multidisciplinary rehab program consisting focused on functional mobility, transfers and ADLs.    LIVER TRANSPLANT PATIENT: DO NOT COMMENCE ANY NEW MEDICATION OR CHANGE TREATMENT PLAN WITHOUT CLEARANCE FROM TRANSPLANT TEAM OR PRIMARY REHAB TEAM:   24 hr contact in provider hand off and 24hr teams vincent    * BP stable, removed left arm IV  * Clinically stable   * Migrane, c/w sumatriptan nightly and prn daily, aim to revert to max 3x/wk in few days with sustained control of the head ache      # Debility 2/2 Liver transplant due to decompensated liver disease  - HBV Core + donor: Entecavir 0.5mg daily  - Good graft  function   - S/p solumedrol ---> Prednisone   - Pain Management: Tramadol PRN   - ASA 81mg daily   - Hallucinations -->  holding FK, BH following; on seroquel, UA: Neg. keppra ppx   - FK stopped, switched to Cyclo for mental status changes,  MMF , Pred 20  - PPx: Bactrim, Valcyte 450 (CMV +/-, inc 900mg as able), Fluc, PPI, OsCal  - Simulect completed    Comprehensive Multidisciplinary Rehab Program:  - Continue comprehensive rehab program, 3 hours a day, 5 days a week..      Rehab Dx/Impairments  Limited ambulatory distance   Gait disturbance  Insomnia  Debility  Head ache    - Activity Limitations: Decreased social, vocational and leisure activities, decreased self care and ADLs, decreased mobility, decreased ability to manage household and finances.       Concurrent Medical Problems    #Migraines--revised dosing sumatriptan 100mg qhs and od/prn  -If sumatriptan desired, would give as 100mg PO BID prn (no more than 2 doses in a day and 3 doses/week)    #COPD  -Advair daily   -Albuterol PRN    #SCOTT/CKD -continue liaison with Nephrology  -Avoid nephrotoxic meds       #Transaminitis (improving)  -Trend LFTs    #Acute blood loss anemia  -S/p multiple blood products  -Trend H/H  -Transfuse if hgb <7 PRN    #Type 2 Diabetes Mellitus  #Steroid induced hyperglycemia   -A1c 6.0  -FBG ACHS + Mod ISS  -Lantus 5U HS  -Admelog 12U breakfast; 8U Lunch; 5U dinner     #HTN/HLD  -Nifedipine 30mg daily     #Mood/Cognition  #Sleep disturbance  #Depression  #Hallucinations   -Seroquel 75mg HS   -Neuropsychology consult PRN  -Maintain quiet hours and low stim environment.  -Melatonin 6mg HS PRN to maximize participation in therapy during the day.     #GI/Bowel:  Senna QHS, Miralax Daily    #/Bladder  # E.Faecium UTI, asymptomatic  - Zosyn/Vanco completed  -Cr Stable     #Skin/Pressure Injury:   - Skin assessment on admission: BUE ecchymosis; RUE wound scabbed 2.5x2cm; vertical abd incision 9cm with 14 staples; transverse abd incision 30cm with 38 staples; LUQ drain site 1cm with 3 sutures; RUQ drain site x 2 1) 1cm with 4 sutures 2) 1cm with 3 sutures; RLQ wound scabbed 1.8x0.7cm; R hip scratch marks; blanchable redness sacrum/buttocks; R groin and perineal area ecchymosis    #Diet/health maintenance    SLP consult for swallow function evaluation and treatment  -Current Diet: Carb consistent diet    [X] Aspiration Precautions  -Nutrition consult   -Magnesium oxide 400mg TID    #Precautions / PROPHYLAXIS:   - Falls  - ortho: Weight bearing status: WBAT   - Lungs: Aspiration, Incentive Spirometer   - DVT ppx: Heparin, TEDs  - Pressure injury/Skin: TOOB to Chair, PT/OT      IDT   Ambulating 150ft with RW CG, with or without device  12 stairs with 1 hand rail   Est dc     ---------------  Code Status: FULL  Emergency Contact:    Outpatient Follow-up (Specialty/Name of physician):    Esvin Jim Yuriy  Surgery  [address] [place] [id number]  Phone: [phone]  Fax: [phone]    Skylar Julian  Transplant Hepatology  [address] [place] [address]  Phone: [phone]  Fax: [phone]    Lulu Torres  [facility]  [address]  Scheduled Appointment: 2025      1. Follow up with Dr. Hatfield / Psych Clinic  2. Follow up with Neurology for further headache management plan

## 2025-06-17 NOTE — PROGRESS NOTE ADULT - SUBJECTIVE AND OBJECTIVE BOX
Subjective  Seen and examined.   Reports good night rest. one episode of head ache yesterday everning, responded to one dose of sumatriptan. no head ache this time  Tolerating oral diet  Reports good engagement in therapy yesterday      ROS--No chest or abd pain, no nausea or vomiting LBM 6/16  no head ache today    Function  Gait training:  - Ambulated 100ft x 2 without device with CS/CGA - good arm swing, good basia,  increased fatigue without device  - 12 steps with 2 handrail with CS ascend, CGA descend, verbal cues for  eccentric control and advancing UEs on handrail.    Vital Signs Last 24 Hrs  T(C): 37.1 (17 Jun 2025 07:56), Max: 37.1 (17 Jun 2025 07:56)  T(F): 98.7 (17 Jun 2025 07:56), Max: 98.7 (17 Jun 2025 07:56)  HR: 84 (17 Jun 2025 07:56) (73 - 84)  BP: 129/63 (17 Jun 2025 07:56) (128/80 - 137/66)  RR: 16 (17 Jun 2025 07:56) (16 - 16)  SpO2: 97% (17 Jun 2025 07:56) (97% - 97%)  O2 Parameters below as of 17 Jun 2025 07:56  Patient On (Oxygen Delivery Method): room air      Exam  Gen - Comfortable   HEENT - NAD  Neck - Supple, No limited ROM  Pulm - Clear  Cardiovascular - RRR, S1S2, No murmurs  Chest - good chest expansion, good respiratory effort  Abdomen - Soft, non tender, +BS, +abd rounded   Extremities - No C/C, no calf tenderness, no edema,     Neuro-  AAOX 3, speech normal  MMT 5/5     Sensory - Intact  to LT     Tone - Normal  MSK: No limited ROM  Psychiatric - Mood stable, Affect WNL  Skin: BUE ecchymosis; RUE wound scabbed 2.5x2cm; vertical abd incision 9cm with 14 staples; transverse abd incision 30cm with 38 staples; LUQ drain site 1cm with 3 sutures; RUQ drain site x 2 1) 1cm with 4 sutures 2) 1cm with 3 sutures; RLQ wound scabbed 1.8x0.7cm; R    RECENT LABS/IMAGING                        8.5    5.84  )-----------( 81       ( 16 Jun 2025 07:07 )             26.7     06-16    136  |  107  |  18  ----------------------------<  160[H]  5.0   |  20[L]  |  1.38[H]    Ca    8.4      16 Jun 2025 07:07  Phos  4.3     06-16  Mg     1.8     06-16    TPro  5.0[L]  /  Alb  2.3[L]  /  TBili  0.9  /  DBili  x   /  AST  38  /  ALT  40  /  AlkPhos  191[H]  06-16    PT/INR - ( 15 Albert 2025 07:40 )   PT: 11.8 sec;   INR: 1.00 ratio      TACRO 1.4 ON 6/9 down trending     Urinalysis Basic - ( 16 Jun 2025 07:07 )  Color: x / Appearance: x / SG: x / pH: x  Gluc: 160 mg/dL / Ketone: x  / Bili: x / Urobili: x   Blood: x / Protein: x / Nitrite: x   Leuk Esterase: x / RBC: x / WBC x   Sq Epi: x / Non Sq Epi: x / Bacteria: x      MEDICATIONS  (STANDING):  aspirin enteric coated 81 milliGRAM(s) Oral daily  calcium carbonate 1250 mG  + Vitamin D (OsCal 500 + D) 1 Tablet(s) Oral two times a day  chlorhexidine 2% Cloths 1 Application(s) Topical daily  cycloSPORINE  , modified (GENGRAF) 75 milliGRAM(s) Oral two times a day  dextrose 5%. 1000 milliLiter(s) (50 mL/Hr) IV Continuous <Continuous>  dextrose 5%. 1000 milliLiter(s) (100 mL/Hr) IV Continuous <Continuous>  dextrose 50% Injectable 25 Gram(s) IV Push once  dextrose 50% Injectable 25 Gram(s) IV Push once  dextrose 50% Injectable 12.5 Gram(s) IV Push once  entecavir 0.5 milliGRAM(s) Oral daily  fluconAZOLE   Tablet 400 milliGRAM(s) Oral daily  fluticasone propionate/ salmeterol 100-50 MICROgram(s) Diskus 1 Dose(s) Inhalation two times a day  glucagon  Injectable 1 milliGRAM(s) IntraMuscular once  heparin   Injectable 5000 Unit(s) SubCutaneous every 12 hours  insulin glargine Injectable (LANTUS) 5 Unit(s) SubCutaneous at bedtime  insulin lispro (ADMELOG) corrective regimen sliding scale   SubCutaneous three times a day before meals  insulin lispro (ADMELOG) corrective regimen sliding scale   SubCutaneous at bedtime  insulin lispro Injectable (ADMELOG) 8 Unit(s) SubCutaneous before lunch  insulin lispro Injectable (ADMELOG) 5 Unit(s) SubCutaneous before dinner  insulin lispro Injectable (ADMELOG) 8 Unit(s) SubCutaneous with breakfast  lactulose Syrup 10 Gram(s) Oral <User Schedule>  levETIRAcetam 500 milliGRAM(s) Oral two times a day  magnesium oxide 400 milliGRAM(s) Oral three times a day with meals  melatonin 9 milliGRAM(s) Oral at bedtime  mycophenolate mofetil 1000 milliGRAM(s) Oral <User Schedule>  NIFEdipine XL 30 milliGRAM(s) Oral daily  pantoprazole    Tablet 40 milliGRAM(s) Oral before breakfast  predniSONE   Tablet 20 milliGRAM(s) Oral daily  QUEtiapine 75 milliGRAM(s) Oral at bedtime  senna 2 Tablet(s) Oral at bedtime  SUMAtriptan 100 milliGRAM(s) Oral <User Schedule>  trimethoprim   80 mG/sulfamethoxazole 400 mG 1 Tablet(s) Oral daily  valGANciclovir 900 milliGRAM(s) Oral daily    MEDICATIONS  (PRN):  albuterol    90 MICROgram(s) HFA Inhaler 2 Puff(s) Inhalation every 6 hours PRN Shortness of Breath and/or Wheezing  bisacodyl Suppository 10 milliGRAM(s) Rectal daily PRN Refractory Constipation  dextrose Oral Gel 15 Gram(s) Oral once PRN Blood Glucose LESS THAN 70 milliGRAM(s)/deciliter  SUMAtriptan 100 milliGRAM(s) Oral daily PRN Migraine  traMADol 25 milliGRAM(s) Oral every 6 hours PRN Moderate Pain (4 - 6)  traMADol 50 milliGRAM(s) Oral every 6 hours PRN Severe Pain (7 - 10)       Subjective  Seen and examined.   Reports good night rest. one episode of head ache yesterday everning, responded to one dose of sumatriptan. no head ache this time  Tolerating oral diet  Reports good engagement in therapy yesterday      ROS--No chest or abd pain, no nausea or vomiting LBM 6/16  no head ache today    Function  - Ambulated 150ft x 2 without device with CS - good arm swing, good basia,  increased fatigue without device  - 12 steps with 1 handrail with CS ascend, Bilateral UE to descend steps step to  pattern  - Ramp ascend descend 10ft with RW CS on ramp  - Negotiated 1 8 inch step with RW with CS, ascend descend with verbal cues for  balance    Vital Signs Last 24 Hrs  T(C): 37.1 (17 Jun 2025 07:56), Max: 37.1 (17 Jun 2025 07:56)  T(F): 98.7 (17 Jun 2025 07:56), Max: 98.7 (17 Jun 2025 07:56)  HR: 84 (17 Jun 2025 07:56) (73 - 84)  BP: 129/63 (17 Jun 2025 07:56) (128/80 - 137/66)  RR: 16 (17 Jun 2025 07:56) (16 - 16)  SpO2: 97% (17 Jun 2025 07:56) (97% - 97%)  O2 Parameters below as of 17 Jun 2025 07:56  Patient On (Oxygen Delivery Method): room air      Exam  Gen - Comfortable   HEENT - NAD  Neck - Supple, No limited ROM  Pulm - Clear  Cardiovascular - RRR, S1S2, No murmurs  Chest - good chest expansion, good respiratory effort  Abdomen - Soft, non tender, +BS, +abd rounded   Extremities - No C/C, no calf tenderness, no edema,     Neuro-  AAOX 3, speech normal  MMT 5/5     Sensory - Intact  to LT     Tone - Normal  MSK: No limited ROM  Psychiatric - Mood stable, Affect WNL  Skin: BUE ecchymosis; RUE wound scabbed 2.5x2cm; vertical abd incision 9cm with 14 staples; transverse abd incision 30cm with 38 staples; LUQ drain site 1cm with 3 sutures; RUQ drain site x 2 1) 1cm with 4 sutures 2) 1cm with 3 sutures; RLQ wound scabbed 1.8x0.7cm; R    RECENT LABS/IMAGING                        8.5    5.84  )-----------( 81       ( 16 Jun 2025 07:07 )             26.7     06-16    136  |  107  |  18  ----------------------------<  160[H]  5.0   |  20[L]  |  1.38[H]    Ca    8.4      16 Jun 2025 07:07  Phos  4.3     06-16  Mg     1.8     06-16    TPro  5.0[L]  /  Alb  2.3[L]  /  TBili  0.9  /  DBili  x   /  AST  38  /  ALT  40  /  AlkPhos  191[H]  06-16    PT/INR - ( 15 Albert 2025 07:40 )   PT: 11.8 sec;   INR: 1.00 ratio      TACRO 1.4 ON 6/9 down trending     Urinalysis Basic - ( 16 Jun 2025 07:07 )  Color: x / Appearance: x / SG: x / pH: x  Gluc: 160 mg/dL / Ketone: x  / Bili: x / Urobili: x   Blood: x / Protein: x / Nitrite: x   Leuk Esterase: x / RBC: x / WBC x   Sq Epi: x / Non Sq Epi: x / Bacteria: x      MEDICATIONS  (STANDING):  aspirin enteric coated 81 milliGRAM(s) Oral daily  calcium carbonate 1250 mG  + Vitamin D (OsCal 500 + D) 1 Tablet(s) Oral two times a day  chlorhexidine 2% Cloths 1 Application(s) Topical daily  cycloSPORINE  , modified (GENGRAF) 75 milliGRAM(s) Oral two times a day  dextrose 5%. 1000 milliLiter(s) (50 mL/Hr) IV Continuous <Continuous>  dextrose 5%. 1000 milliLiter(s) (100 mL/Hr) IV Continuous <Continuous>  dextrose 50% Injectable 25 Gram(s) IV Push once  dextrose 50% Injectable 25 Gram(s) IV Push once  dextrose 50% Injectable 12.5 Gram(s) IV Push once  entecavir 0.5 milliGRAM(s) Oral daily  fluconAZOLE   Tablet 400 milliGRAM(s) Oral daily  fluticasone propionate/ salmeterol 100-50 MICROgram(s) Diskus 1 Dose(s) Inhalation two times a day  glucagon  Injectable 1 milliGRAM(s) IntraMuscular once  heparin   Injectable 5000 Unit(s) SubCutaneous every 12 hours  insulin glargine Injectable (LANTUS) 5 Unit(s) SubCutaneous at bedtime  insulin lispro (ADMELOG) corrective regimen sliding scale   SubCutaneous three times a day before meals  insulin lispro (ADMELOG) corrective regimen sliding scale   SubCutaneous at bedtime  insulin lispro Injectable (ADMELOG) 8 Unit(s) SubCutaneous before lunch  insulin lispro Injectable (ADMELOG) 5 Unit(s) SubCutaneous before dinner  insulin lispro Injectable (ADMELOG) 8 Unit(s) SubCutaneous with breakfast  lactulose Syrup 10 Gram(s) Oral <User Schedule>  levETIRAcetam 500 milliGRAM(s) Oral two times a day  magnesium oxide 400 milliGRAM(s) Oral three times a day with meals  melatonin 9 milliGRAM(s) Oral at bedtime  mycophenolate mofetil 1000 milliGRAM(s) Oral <User Schedule>  NIFEdipine XL 30 milliGRAM(s) Oral daily  pantoprazole    Tablet 40 milliGRAM(s) Oral before breakfast  predniSONE   Tablet 20 milliGRAM(s) Oral daily  QUEtiapine 75 milliGRAM(s) Oral at bedtime  senna 2 Tablet(s) Oral at bedtime  SUMAtriptan 100 milliGRAM(s) Oral <User Schedule>  trimethoprim   80 mG/sulfamethoxazole 400 mG 1 Tablet(s) Oral daily  valGANciclovir 900 milliGRAM(s) Oral daily    MEDICATIONS  (PRN):  albuterol    90 MICROgram(s) HFA Inhaler 2 Puff(s) Inhalation every 6 hours PRN Shortness of Breath and/or Wheezing  bisacodyl Suppository 10 milliGRAM(s) Rectal daily PRN Refractory Constipation  dextrose Oral Gel 15 Gram(s) Oral once PRN Blood Glucose LESS THAN 70 milliGRAM(s)/deciliter  SUMAtriptan 100 milliGRAM(s) Oral daily PRN Migraine  traMADol 25 milliGRAM(s) Oral every 6 hours PRN Moderate Pain (4 - 6)  traMADol 50 milliGRAM(s) Oral every 6 hours PRN Severe Pain (7 - 10)       Subjective  Seen and examined.   Reports good night rest. one episode of head ache yesterday everning, responded to one dose of sumatriptan. no head ache this time  Tolerating oral diet  Reports good engagement in therapy yesterday      ROS--No chest or abd pain, no nausea or vomiting LBM 6/16  no head ache today    Function  - Ambulated 150ft x 2 without device with CS - good arm swing, good basia,  increased fatigue without device  - 12 steps with 1 handrail with CS ascend, Bilateral UE to descend steps step to  pattern  - Ramp ascend descend 10ft with RW CS on ramp  - Negotiated 1 8 inch step with RW with CS, ascend descend with verbal cues for  balance      Vital Signs Last 24 Hrs  T(C): 37.1 (17 Jun 2025 07:56), Max: 37.1 (17 Jun 2025 07:56)  T(F): 98.7 (17 Jun 2025 07:56), Max: 98.7 (17 Jun 2025 07:56)  HR: 84 (17 Jun 2025 07:56) (73 - 84)  BP: 129/63 (17 Jun 2025 07:56) (128/80 - 137/66)  RR: 16 (17 Jun 2025 07:56) (16 - 16)  SpO2: 97% (17 Jun 2025 07:56) (97% - 97%)  O2 Parameters below as of 17 Jun 2025 07:56  Patient On (Oxygen Delivery Method): room air      Exam  Gen - Comfortable   HEENT - NAD  Neck - Supple, No limited ROM  Pulm - Clear  Cardiovascular - RRR, S1S2, No murmurs  Chest - good chest expansion, good respiratory effort  Abdomen - Soft, non tender, +BS, +abd rounded   Extremities - No C/C, no calf tenderness, no edema,     Neuro-  AAOX 3, speech normal  MMT 5/5     Sensory - Intact  to LT     Tone - Normal  MSK: No limited ROM  Psychiatric - Mood stable, Affect WNL  Skin: BUE ecchymosis; RUE wound scabbed 2.5x2cm; vertical abd incision 9cm with 14 staples; transverse abd incision 30cm with 38 staples; LUQ drain site 1cm with 3 sutures; RUQ drain site x 2 1) 1cm with 4 sutures 2) 1cm with 3 sutures; RLQ wound scabbed 1.8x0.7cm; R    RECENT LABS/IMAGING                        8.5    5.84  )-----------( 81       ( 16 Jun 2025 07:07 )             26.7     06-16    136  |  107  |  18  ----------------------------<  160[H]  5.0   |  20[L]  |  1.38[H]    Ca    8.4      16 Jun 2025 07:07  Phos  4.3     06-16  Mg     1.8     06-16    TPro  5.0[L]  /  Alb  2.3[L]  /  TBili  0.9  /  DBili  x   /  AST  38  /  ALT  40  /  AlkPhos  191[H]  06-16    PT/INR - ( 15 Albert 2025 07:40 )   PT: 11.8 sec;   INR: 1.00 ratio      TACRO 1.4 ON 6/9 down trending     Urinalysis Basic - ( 16 Jun 2025 07:07 )  Color: x / Appearance: x / SG: x / pH: x  Gluc: 160 mg/dL / Ketone: x  / Bili: x / Urobili: x   Blood: x / Protein: x / Nitrite: x   Leuk Esterase: x / RBC: x / WBC x   Sq Epi: x / Non Sq Epi: x / Bacteria: x      MEDICATIONS  (STANDING):  aspirin enteric coated 81 milliGRAM(s) Oral daily  calcium carbonate 1250 mG  + Vitamin D (OsCal 500 + D) 1 Tablet(s) Oral two times a day  chlorhexidine 2% Cloths 1 Application(s) Topical daily  cycloSPORINE  , modified (GENGRAF) 75 milliGRAM(s) Oral two times a day  dextrose 5%. 1000 milliLiter(s) (50 mL/Hr) IV Continuous <Continuous>  dextrose 5%. 1000 milliLiter(s) (100 mL/Hr) IV Continuous <Continuous>  dextrose 50% Injectable 25 Gram(s) IV Push once  dextrose 50% Injectable 25 Gram(s) IV Push once  dextrose 50% Injectable 12.5 Gram(s) IV Push once  entecavir 0.5 milliGRAM(s) Oral daily  fluconAZOLE   Tablet 400 milliGRAM(s) Oral daily  fluticasone propionate/ salmeterol 100-50 MICROgram(s) Diskus 1 Dose(s) Inhalation two times a day  glucagon  Injectable 1 milliGRAM(s) IntraMuscular once  heparin   Injectable 5000 Unit(s) SubCutaneous every 12 hours  insulin glargine Injectable (LANTUS) 5 Unit(s) SubCutaneous at bedtime  insulin lispro (ADMELOG) corrective regimen sliding scale   SubCutaneous three times a day before meals  insulin lispro (ADMELOG) corrective regimen sliding scale   SubCutaneous at bedtime  insulin lispro Injectable (ADMELOG) 8 Unit(s) SubCutaneous before lunch  insulin lispro Injectable (ADMELOG) 5 Unit(s) SubCutaneous before dinner  insulin lispro Injectable (ADMELOG) 8 Unit(s) SubCutaneous with breakfast  lactulose Syrup 10 Gram(s) Oral <User Schedule>  levETIRAcetam 500 milliGRAM(s) Oral two times a day  magnesium oxide 400 milliGRAM(s) Oral three times a day with meals  melatonin 9 milliGRAM(s) Oral at bedtime  mycophenolate mofetil 1000 milliGRAM(s) Oral <User Schedule>  NIFEdipine XL 30 milliGRAM(s) Oral daily  pantoprazole    Tablet 40 milliGRAM(s) Oral before breakfast  predniSONE   Tablet 20 milliGRAM(s) Oral daily  QUEtiapine 75 milliGRAM(s) Oral at bedtime  senna 2 Tablet(s) Oral at bedtime  SUMAtriptan 100 milliGRAM(s) Oral <User Schedule>  trimethoprim   80 mG/sulfamethoxazole 400 mG 1 Tablet(s) Oral daily  valGANciclovir 900 milliGRAM(s) Oral daily    MEDICATIONS  (PRN):  albuterol    90 MICROgram(s) HFA Inhaler 2 Puff(s) Inhalation every 6 hours PRN Shortness of Breath and/or Wheezing  bisacodyl Suppository 10 milliGRAM(s) Rectal daily PRN Refractory Constipation  dextrose Oral Gel 15 Gram(s) Oral once PRN Blood Glucose LESS THAN 70 milliGRAM(s)/deciliter  SUMAtriptan 100 milliGRAM(s) Oral daily PRN Migraine  traMADol 25 milliGRAM(s) Oral every 6 hours PRN Moderate Pain (4 - 6)  traMADol 50 milliGRAM(s) Oral every 6 hours PRN Severe Pain (7 - 10)

## 2025-06-17 NOTE — PROGRESS NOTE ADULT - SUBJECTIVE AND OBJECTIVE BOX
No distress, comfortable on RA    Vital Signs Last 24 Hrs  T(C): 37.1 (06-17-25 @ 07:56), Max: 37.1 (06-17-25 @ 07:56)  T(F): 98.7 (06-17-25 @ 07:56), Max: 98.7 (06-17-25 @ 07:56)  HR: 84 (06-17-25 @ 07:56) (73 - 84)  BP: 129/63 (06-17-25 @ 07:56) (128/80 - 137/66)  RR: 16 (06-17-25 @ 07:56) (16 - 16)  SpO2: 97% (06-17-25 @ 07:56) (97% - 97%)    s1s2  b/l air entry  soft, ND  no edema                                8.5    5.84  )-----------( 81       ( 16 Jun 2025 07:07 )             26.7     16 Jun 2025 07:07    136    |  107    |  18     ----------------------------<  160    5.0     |  20     |  1.38     Ca    8.4        16 Jun 2025 07:07  Phos  4.3       16 Jun 2025 07:07  Mg     1.8       16 Jun 2025 07:07    TPro  5.0    /  Alb  2.3    /  TBili  0.9    /  DBili  x      /  AST  38     /  ALT  40     /  AlkPhos  191    16 Jun 2025 07:07    LIVER FUNCTIONS - ( 16 Jun 2025 07:07 )  Alb: 2.3 g/dL / Pro: 5.0 g/dL / ALK PHOS: 191 U/L / ALT: 40 U/L / AST: 38 U/L / GGT: x           albuterol    90 MICROgram(s) HFA Inhaler 2 Puff(s) Inhalation every 6 hours PRN  aspirin enteric coated 81 milliGRAM(s) Oral daily  bisacodyl Suppository 10 milliGRAM(s) Rectal daily PRN  calcium carbonate 1250 mG  + Vitamin D (OsCal 500 + D) 1 Tablet(s) Oral two times a day  chlorhexidine 2% Cloths 1 Application(s) Topical daily  cycloSPORINE  , modified (GENGRAF) 75 milliGRAM(s) Oral two times a day  dextrose 5%. 1000 milliLiter(s) IV Continuous <Continuous>  dextrose 5%. 1000 milliLiter(s) IV Continuous <Continuous>  dextrose 50% Injectable 25 Gram(s) IV Push once  dextrose 50% Injectable 12.5 Gram(s) IV Push once  dextrose 50% Injectable 25 Gram(s) IV Push once  dextrose Oral Gel 15 Gram(s) Oral once PRN  entecavir 0.5 milliGRAM(s) Oral daily  fluconAZOLE   Tablet 400 milliGRAM(s) Oral daily  fluticasone propionate/ salmeterol 100-50 MICROgram(s) Diskus 1 Dose(s) Inhalation two times a day  glucagon  Injectable 1 milliGRAM(s) IntraMuscular once  heparin   Injectable 5000 Unit(s) SubCutaneous every 12 hours  insulin glargine Injectable (LANTUS) 5 Unit(s) SubCutaneous at bedtime  insulin lispro (ADMELOG) corrective regimen sliding scale   SubCutaneous three times a day before meals  insulin lispro (ADMELOG) corrective regimen sliding scale   SubCutaneous at bedtime  insulin lispro Injectable (ADMELOG) 8 Unit(s) SubCutaneous before lunch  insulin lispro Injectable (ADMELOG) 5 Unit(s) SubCutaneous before dinner  insulin lispro Injectable (ADMELOG) 8 Unit(s) SubCutaneous with breakfast  lactulose Syrup 10 Gram(s) Oral <User Schedule>  levETIRAcetam 500 milliGRAM(s) Oral two times a day  magnesium oxide 400 milliGRAM(s) Oral three times a day with meals  melatonin 9 milliGRAM(s) Oral at bedtime  mycophenolate mofetil 1000 milliGRAM(s) Oral <User Schedule>  NIFEdipine XL 30 milliGRAM(s) Oral daily  pantoprazole    Tablet 40 milliGRAM(s) Oral before breakfast  predniSONE   Tablet 20 milliGRAM(s) Oral daily  QUEtiapine 75 milliGRAM(s) Oral at bedtime  senna 2 Tablet(s) Oral at bedtime  SUMAtriptan 100 milliGRAM(s) Oral daily PRN  SUMAtriptan 100 milliGRAM(s) Oral <User Schedule>  traMADol 25 milliGRAM(s) Oral every 6 hours PRN  traMADol 50 milliGRAM(s) Oral every 6 hours PRN  trimethoprim   80 mG/sulfamethoxazole 400 mG 1 Tablet(s) Oral daily  valGANciclovir 900 milliGRAM(s) Oral daily    Impression/Plan:    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

## 2025-06-18 LAB
GLUCOSE BLDC GLUCOMTR-MCNC: 101 MG/DL — HIGH (ref 70–99)
GLUCOSE BLDC GLUCOMTR-MCNC: 113 MG/DL — HIGH (ref 70–99)
GLUCOSE BLDC GLUCOMTR-MCNC: 117 MG/DL — HIGH (ref 70–99)
GLUCOSE BLDC GLUCOMTR-MCNC: 150 MG/DL — HIGH (ref 70–99)

## 2025-06-18 PROCEDURE — 99232 SBSQ HOSP IP/OBS MODERATE 35: CPT

## 2025-06-18 RX ORDER — OMEGA-3/DHA/EPA/FISH OIL 300-1000MG
400 CAPSULE ORAL
Qty: 90 | Refills: 5 | Status: ACTIVE | COMMUNITY
Start: 2025-06-18 | End: 1900-01-01

## 2025-06-18 RX ORDER — LACTULOSE 10 G/15ML
10 SOLUTION ORAL DAILY
Qty: 450 | Refills: 5 | Status: ACTIVE | COMMUNITY
Start: 2025-06-18 | End: 1900-01-01

## 2025-06-18 RX ORDER — PANTOPRAZOLE 40 MG/1
40 TABLET, DELAYED RELEASE ORAL DAILY
Qty: 30 | Refills: 5 | Status: ACTIVE | COMMUNITY
Start: 2025-06-18 | End: 1900-01-01

## 2025-06-18 RX ORDER — NIFEDIPINE 30 MG
1 TABLET, EXTENDED RELEASE 24 HR ORAL
Qty: 0 | Refills: 0 | DISCHARGE
Start: 2025-06-18

## 2025-06-18 RX ORDER — SULFAMETHOXAZOLE/TRIMETHOPRIM 800-160 MG
1 TABLET ORAL
Qty: 0 | Refills: 0 | DISCHARGE
Start: 2025-06-18

## 2025-06-18 RX ORDER — INSULIN LISPRO 100 U/ML
0 INJECTION, SOLUTION INTRAVENOUS; SUBCUTANEOUS
Qty: 0 | Refills: 0 | DISCHARGE
Start: 2025-06-18

## 2025-06-18 RX ORDER — MELATONIN 5 MG
3 TABLET ORAL
Qty: 0 | Refills: 0 | DISCHARGE
Start: 2025-06-18

## 2025-06-18 RX ORDER — BUDESONIDE AND FORMOTEROL FUMARATE DIHYDRATE 80; 4.5 UG/1; UG/1
2 AEROSOL RESPIRATORY (INHALATION)
Qty: 1 | Refills: 0
Start: 2025-06-18 | End: 2025-07-17

## 2025-06-18 RX ORDER — TRAMADOL HYDROCHLORIDE 50 MG/1
50 TABLET, COATED ORAL
Qty: 16 | Refills: 0 | Status: ACTIVE | COMMUNITY
Start: 2025-06-18 | End: 1900-01-01

## 2025-06-18 RX ORDER — ASPIRIN 325 MG
1 TABLET ORAL
Qty: 0 | Refills: 0 | DISCHARGE
Start: 2025-06-18

## 2025-06-18 RX ORDER — QUETIAPINE FUMARATE 25 MG/1
25 TABLET ORAL AT BEDTIME
Qty: 90 | Refills: 5 | Status: ACTIVE | COMMUNITY
Start: 2025-06-18 | End: 1900-01-01

## 2025-06-18 RX ORDER — GLUCOSAMINE HCL/CHONDROITIN SU 500-400 MG
3 CAPSULE ORAL
Qty: 90 | Refills: 5 | Status: ACTIVE | COMMUNITY
Start: 2025-06-18 | End: 1900-01-01

## 2025-06-18 RX ORDER — LACTULOSE 10 G/15ML
15 SOLUTION ORAL
Qty: 0 | Refills: 0 | DISCHARGE
Start: 2025-06-18

## 2025-06-18 RX ORDER — CYCLOSPORINE 50 MG/1
50 CAPSULE, LIQUID FILLED ORAL TWICE DAILY
Qty: 60 | Refills: 5 | Status: ACTIVE | COMMUNITY
Start: 2025-06-18 | End: 1900-01-01

## 2025-06-18 RX ORDER — LEVETIRACETAM 10 MG/ML
1 INJECTION, SOLUTION INTRAVENOUS
Qty: 0 | Refills: 0 | DISCHARGE
Start: 2025-06-18

## 2025-06-18 RX ORDER — SUMATRIPTAN 100 MG/1
1 TABLET, FILM COATED ORAL
Qty: 0 | Refills: 0 | DISCHARGE
Start: 2025-06-18

## 2025-06-18 RX ORDER — MYCOPHENOLATE MOFETIL 500 MG/1
500 TABLET ORAL
Qty: 120 | Refills: 5 | Status: ACTIVE | COMMUNITY
Start: 2025-06-18 | End: 1900-01-01

## 2025-06-18 RX ORDER — ENTECAVIR 0.5 MG/1
1 TABLET ORAL
Qty: 0 | Refills: 0 | DISCHARGE
Start: 2025-06-18

## 2025-06-18 RX ORDER — ALENDRONATE SODIUM 70 MG/1
70 TABLET ORAL
Qty: 1 | Refills: 5 | Status: ACTIVE | COMMUNITY
Start: 2025-06-18 | End: 1900-01-01

## 2025-06-18 RX ORDER — BLOOD SUGAR DIAGNOSTIC
32G X 5 MM STRIP MISCELLANEOUS
Qty: 2 | Refills: 5 | Status: ACTIVE | COMMUNITY
Start: 2025-06-18 | End: 1900-01-01

## 2025-06-18 RX ORDER — B-COMPLEX WITH VITAMIN C
500-5 TABLET ORAL
Qty: 60 | Refills: 5 | Status: ACTIVE | COMMUNITY
Start: 2025-06-18 | End: 1900-01-01

## 2025-06-18 RX ORDER — SUMATRIPTAN 100 MG/1
100 TABLET, FILM COATED ORAL DAILY
Qty: 30 | Refills: 3 | Status: ACTIVE | COMMUNITY
Start: 2025-06-18 | End: 1900-01-01

## 2025-06-18 RX ORDER — SULFAMETHOXAZOLE AND TRIMETHOPRIM 400; 80 MG/1; MG/1
400-80 TABLET ORAL
Qty: 30 | Refills: 5 | Status: ACTIVE | COMMUNITY
Start: 2025-06-18 | End: 1900-01-01

## 2025-06-18 RX ORDER — PREDNISONE 20 MG/1
1 TABLET ORAL
Qty: 0 | Refills: 0 | DISCHARGE
Start: 2025-06-18

## 2025-06-18 RX ORDER — SENNA 187 MG
2 TABLET ORAL
Qty: 0 | Refills: 0 | DISCHARGE
Start: 2025-06-18

## 2025-06-18 RX ORDER — QUETIAPINE FUMARATE 25 MG/1
3 TABLET ORAL
Qty: 0 | Refills: 0 | DISCHARGE
Start: 2025-06-18

## 2025-06-18 RX ORDER — BISACODYL 5 MG
1 TABLET, DELAYED RELEASE (ENTERIC COATED) ORAL
Qty: 0 | Refills: 0 | DISCHARGE
Start: 2025-06-18

## 2025-06-18 RX ORDER — LEVETIRACETAM 500 MG/1
500 TABLET, FILM COATED ORAL TWICE DAILY
Qty: 60 | Refills: 5 | Status: ACTIVE | COMMUNITY
Start: 2025-06-18 | End: 1900-01-01

## 2025-06-18 RX ORDER — PREDNISONE 20 MG/1
10 TABLET ORAL DAILY
Refills: 0 | Status: DISCONTINUED | OUTPATIENT
Start: 2025-06-19 | End: 2025-06-20

## 2025-06-18 RX ORDER — NIFEDIPINE 30 MG/1
30 TABLET, EXTENDED RELEASE ORAL DAILY
Qty: 30 | Refills: 5 | Status: ACTIVE | COMMUNITY
Start: 2025-06-18 | End: 1900-01-01

## 2025-06-18 RX ORDER — INSULIN GLARGINE 100 [IU]/ML
100 INJECTION, SOLUTION SUBCUTANEOUS
Qty: 1 | Refills: 5 | Status: ACTIVE | COMMUNITY
Start: 2025-06-18 | End: 1900-01-01

## 2025-06-18 RX ORDER — CALCIUM CARBONATE/VITAMIN D3 500MG-5MCG
1 TABLET ORAL
Qty: 0 | Refills: 0 | DISCHARGE
Start: 2025-06-18

## 2025-06-18 RX ORDER — VALGANCICLOVIR 450 MG/1
1 TABLET, FILM COATED ORAL
Qty: 0 | Refills: 0 | DISCHARGE
Start: 2025-06-18

## 2025-06-18 RX ORDER — BUDESONIDE AND FORMOTEROL FUMARATE DIHYDRATE 160; 4.5 UG/1; UG/1
160-4.5 AEROSOL RESPIRATORY (INHALATION) TWICE DAILY
Qty: 1 | Refills: 5 | Status: ACTIVE | COMMUNITY
Start: 2025-06-18 | End: 1900-01-01

## 2025-06-18 RX ORDER — ALENDRONATE SODIUM 70 MG/1
1 TABLET ORAL
Qty: 4 | Refills: 0
Start: 2025-06-18 | End: 2025-07-17

## 2025-06-18 RX ORDER — INSULIN ASPART 100 [IU]/ML
100 INJECTION, SOLUTION INTRAVENOUS; SUBCUTANEOUS
Qty: 1 | Refills: 5 | Status: ACTIVE | COMMUNITY
Start: 2025-06-18 | End: 1900-01-01

## 2025-06-18 RX ORDER — TRAMADOL HYDROCHLORIDE 50 MG/1
0.5 TABLET, FILM COATED ORAL
Qty: 0 | Refills: 0 | DISCHARGE
Start: 2025-06-18

## 2025-06-18 RX ORDER — ACETAMINOPHEN 325 MG/1
325 TABLET ORAL
Qty: 240 | Refills: 3 | Status: ACTIVE | COMMUNITY
Start: 2025-06-18 | End: 1900-01-01

## 2025-06-18 RX ORDER — VALGANCICLOVIR HYDROCHLORIDE 450 MG/1
450 TABLET ORAL DAILY
Qty: 60 | Refills: 5 | Status: ACTIVE | COMMUNITY
Start: 2025-06-18 | End: 1900-01-01

## 2025-06-18 RX ORDER — ISOPROPYL ALCOHOL 0.7 ML/ML
SWAB TOPICAL
Qty: 1 | Refills: 5 | Status: ACTIVE | COMMUNITY
Start: 2025-06-18 | End: 1900-01-01

## 2025-06-18 RX ORDER — PREDNISONE 10 MG/1
10 TABLET ORAL DAILY
Qty: 30 | Refills: 5 | Status: ACTIVE | COMMUNITY
Start: 2025-06-18 | End: 1900-01-01

## 2025-06-18 RX ORDER — CYCLOSPORINE 25 MG/1
25 CAPSULE, LIQUID FILLED ORAL
Qty: 60 | Refills: 5 | Status: ACTIVE | COMMUNITY
Start: 2025-06-18 | End: 1900-01-01

## 2025-06-18 RX ORDER — CYCLOSPORINE 100 MG/1
1 CAPSULE, LIQUID FILLED ORAL
Qty: 0 | Refills: 0 | DISCHARGE
Start: 2025-06-18

## 2025-06-18 RX ORDER — MAGNESIUM OXIDE 400 MG
1 TABLET ORAL
Qty: 0 | Refills: 0 | DISCHARGE
Start: 2025-06-18

## 2025-06-18 RX ORDER — LACTULOSE 10 G/15ML
20 SOLUTION ORAL ONCE
Refills: 0 | Status: COMPLETED | OUTPATIENT
Start: 2025-06-18 | End: 2025-06-18

## 2025-06-18 RX ORDER — INSULIN GLARGINE-YFGN 100 [IU]/ML
5 INJECTION, SOLUTION SUBCUTANEOUS
Qty: 0 | Refills: 0 | DISCHARGE
Start: 2025-06-18

## 2025-06-18 RX ORDER — SENNOSIDES 8.6 MG/1
8.6 TABLET ORAL DAILY
Qty: 30 | Refills: 3 | Status: ACTIVE | COMMUNITY
Start: 2025-06-18 | End: 1900-01-01

## 2025-06-18 RX ORDER — ENTECAVIR 0.5 MG/1
0.5 TABLET, FILM COATED ORAL DAILY
Qty: 30 | Refills: 5 | Status: ACTIVE | COMMUNITY
Start: 2025-06-18 | End: 1900-01-01

## 2025-06-18 RX ADMIN — INSULIN LISPRO 5 UNIT(S): 100 INJECTION, SOLUTION INTRAVENOUS; SUBCUTANEOUS at 17:07

## 2025-06-18 RX ADMIN — INSULIN LISPRO 8 UNIT(S): 100 INJECTION, SOLUTION INTRAVENOUS; SUBCUTANEOUS at 11:37

## 2025-06-18 RX ADMIN — VALGANCICLOVIR 900 MILLIGRAM(S): 450 TABLET, FILM COATED ORAL at 11:05

## 2025-06-18 RX ADMIN — Medication 400 MILLIGRAM(S): at 17:07

## 2025-06-18 RX ADMIN — LACTULOSE 20 GRAM(S): 10 SOLUTION ORAL at 17:08

## 2025-06-18 RX ADMIN — ENTECAVIR 0.5 MILLIGRAM(S): 0.5 TABLET ORAL at 11:06

## 2025-06-18 RX ADMIN — Medication 400 MILLIGRAM(S): at 04:35

## 2025-06-18 RX ADMIN — SUMATRIPTAN 100 MILLIGRAM(S): 100 TABLET, FILM COATED ORAL at 12:39

## 2025-06-18 RX ADMIN — CYCLOSPORINE 75 MILLIGRAM(S): 100 CAPSULE, LIQUID FILLED ORAL at 07:34

## 2025-06-18 RX ADMIN — HEPARIN SODIUM 5000 UNIT(S): 1000 INJECTION INTRAVENOUS; SUBCUTANEOUS at 05:25

## 2025-06-18 RX ADMIN — MYCOPHENOLATE MOFETIL 1000 MILLIGRAM(S): 500 TABLET, FILM COATED ORAL at 07:35

## 2025-06-18 RX ADMIN — Medication 400 MILLIGRAM(S): at 11:06

## 2025-06-18 RX ADMIN — Medication 30 MILLIGRAM(S): at 05:25

## 2025-06-18 RX ADMIN — SUMATRIPTAN 100 MILLIGRAM(S): 100 TABLET, FILM COATED ORAL at 20:06

## 2025-06-18 RX ADMIN — Medication 1 DOSE(S): at 08:16

## 2025-06-18 RX ADMIN — Medication 40 MILLIGRAM(S): at 05:25

## 2025-06-18 RX ADMIN — TRAMADOL HYDROCHLORIDE 50 MILLIGRAM(S): 50 TABLET, FILM COATED ORAL at 22:33

## 2025-06-18 RX ADMIN — TRAMADOL HYDROCHLORIDE 50 MILLIGRAM(S): 50 TABLET, FILM COATED ORAL at 15:12

## 2025-06-18 RX ADMIN — Medication 1 TABLET(S): at 05:25

## 2025-06-18 RX ADMIN — Medication 9 MILLIGRAM(S): at 22:02

## 2025-06-18 RX ADMIN — INSULIN LISPRO 8 UNIT(S): 100 INJECTION, SOLUTION INTRAVENOUS; SUBCUTANEOUS at 07:33

## 2025-06-18 RX ADMIN — TRAMADOL HYDROCHLORIDE 50 MILLIGRAM(S): 50 TABLET, FILM COATED ORAL at 22:03

## 2025-06-18 RX ADMIN — Medication 81 MILLIGRAM(S): at 11:06

## 2025-06-18 RX ADMIN — PREDNISONE 20 MILLIGRAM(S): 20 TABLET ORAL at 05:25

## 2025-06-18 RX ADMIN — Medication 1 APPLICATION(S): at 11:05

## 2025-06-18 RX ADMIN — INSULIN GLARGINE-YFGN 5 UNIT(S): 100 INJECTION, SOLUTION SUBCUTANEOUS at 22:23

## 2025-06-18 RX ADMIN — LACTULOSE 10 GRAM(S): 10 SOLUTION ORAL at 17:08

## 2025-06-18 RX ADMIN — QUETIAPINE FUMARATE 75 MILLIGRAM(S): 25 TABLET ORAL at 22:01

## 2025-06-18 RX ADMIN — LEVETIRACETAM 500 MILLIGRAM(S): 10 INJECTION, SOLUTION INTRAVENOUS at 17:08

## 2025-06-18 RX ADMIN — Medication 1 TABLET(S): at 17:07

## 2025-06-18 RX ADMIN — SUMATRIPTAN 100 MILLIGRAM(S): 100 TABLET, FILM COATED ORAL at 13:29

## 2025-06-18 RX ADMIN — LEVETIRACETAM 500 MILLIGRAM(S): 10 INJECTION, SOLUTION INTRAVENOUS at 05:25

## 2025-06-18 RX ADMIN — Medication 400 MILLIGRAM(S): at 11:07

## 2025-06-18 RX ADMIN — Medication 2 TABLET(S): at 22:02

## 2025-06-18 RX ADMIN — Medication 1 TABLET(S): at 11:06

## 2025-06-18 RX ADMIN — HEPARIN SODIUM 5000 UNIT(S): 1000 INJECTION INTRAVENOUS; SUBCUTANEOUS at 17:07

## 2025-06-18 RX ADMIN — TRAMADOL HYDROCHLORIDE 50 MILLIGRAM(S): 50 TABLET, FILM COATED ORAL at 14:31

## 2025-06-18 RX ADMIN — MYCOPHENOLATE MOFETIL 1000 MILLIGRAM(S): 500 TABLET, FILM COATED ORAL at 20:08

## 2025-06-18 RX ADMIN — CYCLOSPORINE 75 MILLIGRAM(S): 100 CAPSULE, LIQUID FILLED ORAL at 20:07

## 2025-06-18 NOTE — DISCHARGE NOTE PROVIDER - NSDCFUADDAPPT_GEN_ALL_CORE_FT
Please follow up with your PCP as soon as possible.    If you are in need of a PCP or a specialist in your area: contact the [facility] Physician Referral Service at [phone].  Please follow up with your PCP as soon as possible.    Liver Transplant Appointment:  Monday 6/23  [address] [place] [address].     If you are in need of a PCP or a specialist in your area: contact the [facility] Physician Referral Service at [phone].  Please follow up with your PCP as soon as possible.    Liver Transplant Appointment:  Monday 6/25 at 10AM  [address] [place] [address].     If you are in need of a PCP or a specialist in your area: contact the [facility] Physician Referral Service at [phone].  Please follow up with your PCP as soon as possible.    Liver Transplant Appointment:  Monday 6/23  [address] [place] [address].     If you are in need of a PCP or a specialist in your area: contact the [facility] Physician Referral Service at [phone].

## 2025-06-18 NOTE — PROGRESS NOTE ADULT - SUBJECTIVE AND OBJECTIVE BOX
Pt's daughter returned call Subjective  Seen and examined with NP   Reports good night rest, Still reporting intermittent head ache, responsive to treatment  Tolerating oral diet    ROS: No chest or abd pain, no nausea or vomiting LBM 6/17  Intermittent head ache    Function  - Ambulated 150ft x 2 without device with CS - good arm swing, good basia,  increased fatigue without device  - 12 steps with 1 handrail with CS ascend, Bilateral UE to descend steps step to  pattern  - Ramp ascend descend 10ft with RW CS on ramp  - Negotiated 1 8 inch step with RW with CS, ascend descend with verbal cues for  balance    Vital Signs Last 24 Hrs  T(C): 36.8 (18 Jun 2025 07:38), Max: 36.9 (17 Jun 2025 21:19)  T(F): 98.2 (18 Jun 2025 07:38), Max: 98.4 (17 Jun 2025 21:19)  HR: 88 (18 Jun 2025 08:17) (82 - 88)  BP: 139/66 (18 Jun 2025 07:38) (127/56 - 158/61)  RR: 16 (18 Jun 2025 07:38) (16 - 18)  SpO2: 99% (18 Jun 2025 08:17) (97% - 99%)  O2 Parameters below as of 18 Jun 2025 08:17  Patient On (Oxygen Delivery Method): room air      Exam  Gen - Comfortable, no distress  HEENT - NAD  Neck - Supple, No limited ROM  Pulm - Clear  Cardiovascular - RRR, S1S2, No murmurs  Chest - good chest expansion, good respiratory effort  Abdomen - Soft, non tender, +BS,  Extremities - No C/C, no calf tenderness, no edema,     Neuro-  AAOX 3, speech normal  MMT 5/5     Sensory - Intact  to LT     Tone - Normal  MSK: No limited ROM  Psychiatric - Mood stable, Affect WNL  Skin: BUE ecchymosis; RUE wound scabbed 2.5x2cm; vertical abd incision 9cm with 14 staples; transverse abd incision 30cm with 38 staples; LUQ drain site 1cm with 3 sutures; RUQ drain site x 2 1) 1cm with 4 sutures 2) 1cm with 3 sutures; RLQ wound scabbed 1.8x0.7cm;    RECENT LABS/IMAGING                        8.5    5.84  )-----------( 81       ( 16 Jun 2025 07:07 )             26.7     06-16    136  |  107  |  18  ----------------------------<  160[H]  5.0   |  20[L]  |  1.38[H]    Ca    8.4      16 Jun 2025 07:07  Phos  4.3     06-16  Mg     1.8     06-16    TPro  5.0[L]  /  Alb  2.3[L]  /  TBili  0.9  /  DBili  x   /  AST  38  /  ALT  40  /  AlkPhos  191[H]  06-16    PT/INR - ( 15 Albert 2025 07:40 )   PT: 11.8 sec;   INR: 1.00 ratio      TACRO 1.4 ON 6/9 down trending     Urinalysis Basic - ( 16 Jun 2025 07:07 )  Color: x / Appearance: x / SG: x / pH: x  Gluc: 160 mg/dL / Ketone: x  / Bili: x / Urobili: x   Blood: x / Protein: x / Nitrite: x   Leuk Esterase: x / RBC: x / WBC x   Sq Epi: x / Non Sq Epi: x / Bacteria: x      MEDICATIONS  (STANDING):  aspirin enteric coated 81 milliGRAM(s) Oral daily  calcium carbonate 1250 mG  + Vitamin D (OsCal 500 + D) 1 Tablet(s) Oral two times a day  chlorhexidine 2% Cloths 1 Application(s) Topical daily  cycloSPORINE  , modified (GENGRAF) 75 milliGRAM(s) Oral two times a day  dextrose 5%. 1000 milliLiter(s) (50 mL/Hr) IV Continuous <Continuous>  dextrose 5%. 1000 milliLiter(s) (100 mL/Hr) IV Continuous <Continuous>  dextrose 50% Injectable 25 Gram(s) IV Push once  dextrose 50% Injectable 12.5 Gram(s) IV Push once  dextrose 50% Injectable 25 Gram(s) IV Push once  entecavir 0.5 milliGRAM(s) Oral daily  fluconAZOLE   Tablet 400 milliGRAM(s) Oral daily  fluticasone propionate/ salmeterol 100-50 MICROgram(s) Diskus 1 Dose(s) Inhalation two times a day  glucagon  Injectable 1 milliGRAM(s) IntraMuscular once  heparin   Injectable 5000 Unit(s) SubCutaneous every 12 hours  insulin glargine Injectable (LANTUS) 5 Unit(s) SubCutaneous at bedtime  insulin lispro (ADMELOG) corrective regimen sliding scale   SubCutaneous three times a day before meals  insulin lispro (ADMELOG) corrective regimen sliding scale   SubCutaneous at bedtime  insulin lispro Injectable (ADMELOG) 8 Unit(s) SubCutaneous before lunch  insulin lispro Injectable (ADMELOG) 5 Unit(s) SubCutaneous before dinner  insulin lispro Injectable (ADMELOG) 8 Unit(s) SubCutaneous with breakfast  lactulose Syrup 20 Gram(s) Oral once  lactulose Syrup 10 Gram(s) Oral <User Schedule>  levETIRAcetam 500 milliGRAM(s) Oral two times a day  magnesium oxide 400 milliGRAM(s) Oral three times a day with meals  melatonin 9 milliGRAM(s) Oral at bedtime  mycophenolate mofetil 1000 milliGRAM(s) Oral <User Schedule>  NIFEdipine XL 30 milliGRAM(s) Oral daily  pantoprazole    Tablet 40 milliGRAM(s) Oral before breakfast  predniSONE   Tablet 20 milliGRAM(s) Oral daily  QUEtiapine 75 milliGRAM(s) Oral at bedtime  senna 2 Tablet(s) Oral at bedtime  SUMAtriptan 100 milliGRAM(s) Oral <User Schedule>  trimethoprim   80 mG/sulfamethoxazole 400 mG 1 Tablet(s) Oral daily  valGANciclovir 900 milliGRAM(s) Oral daily    MEDICATIONS  (PRN):  albuterol    90 MICROgram(s) HFA Inhaler 2 Puff(s) Inhalation every 6 hours PRN Shortness of Breath and/or Wheezing  bisacodyl Suppository 10 milliGRAM(s) Rectal daily PRN Refractory Constipation  dextrose Oral Gel 15 Gram(s) Oral once PRN Blood Glucose LESS THAN 70 milliGRAM(s)/deciliter  SUMAtriptan 100 milliGRAM(s) Oral daily PRN Migraine  traMADol 25 milliGRAM(s) Oral every 6 hours PRN Moderate Pain (4 - 6)  traMADol 50 milliGRAM(s) Oral every 6 hours PRN Severe Pain (7 - 10)   Subjective  Seen and examined with NP   Reports good night rest, Still reporting intermittent head ache, responsive to treatment  Tolerating oral diet    ROS: No chest or abd pain, no nausea or vomiting LBM 6/16  Intermittent head ache    Function  - Ambulated 150ft x 2 without device with CS - good arm swing, good basia,  increased fatigue without device  - 12 steps with 1 handrail with CS ascend, Bilateral UE to descend steps step to  pattern  - Ramp ascend descend 10ft with RW CS on ramp  - Negotiated 1 8 inch step with RW with CS, ascend descend with verbal cues for  balance    Vital Signs Last 24 Hrs  T(C): 36.8 (18 Jun 2025 07:38), Max: 36.9 (17 Jun 2025 21:19)  T(F): 98.2 (18 Jun 2025 07:38), Max: 98.4 (17 Jun 2025 21:19)  HR: 88 (18 Jun 2025 08:17) (82 - 88)  BP: 139/66 (18 Jun 2025 07:38) (127/56 - 158/61)  RR: 16 (18 Jun 2025 07:38) (16 - 18)  SpO2: 99% (18 Jun 2025 08:17) (97% - 99%)  O2 Parameters below as of 18 Jun 2025 08:17  Patient On (Oxygen Delivery Method): room air      Exam  Gen - Comfortable, no distress  HEENT - NAD  Neck - Supple, No limited ROM  Pulm - Clear  Cardiovascular - RRR, S1S2, No murmurs  Chest - good chest expansion, good respiratory effort  Abdomen - Soft, non tender, +BS,  Extremities - No C/C, no calf tenderness, no edema,     Neuro-  AAOX 3, speech normal  MMT 5/5     Sensory - Intact  to LT     Tone - Normal  MSK: No limited ROM  Psychiatric - Mood stable, Affect WNL  Skin: BUE ecchymosis; RUE wound scabbed 2.5x2cm; vertical abd incision 9cm with 14 staples; transverse abd incision 30cm with 38 staples; LUQ drain site 1cm with 3 sutures; RUQ drain site x 2 1) 1cm with 4 sutures 2) 1cm with 3 sutures; RLQ wound scabbed 1.8x0.7cm;    RECENT LABS/IMAGING                        8.5    5.84  )-----------( 81       ( 16 Jun 2025 07:07 )             26.7     06-16    136  |  107  |  18  ----------------------------<  160[H]  5.0   |  20[L]  |  1.38[H]    Ca    8.4      16 Jun 2025 07:07  Phos  4.3     06-16  Mg     1.8     06-16    TPro  5.0[L]  /  Alb  2.3[L]  /  TBili  0.9  /  DBili  x   /  AST  38  /  ALT  40  /  AlkPhos  191[H]  06-16    PT/INR - ( 15 Albert 2025 07:40 )   PT: 11.8 sec;   INR: 1.00 ratio      TACRO 1.4 ON 6/9 down trending     Urinalysis Basic - ( 16 Jun 2025 07:07 )  Color: x / Appearance: x / SG: x / pH: x  Gluc: 160 mg/dL / Ketone: x  / Bili: x / Urobili: x   Blood: x / Protein: x / Nitrite: x   Leuk Esterase: x / RBC: x / WBC x   Sq Epi: x / Non Sq Epi: x / Bacteria: x      MEDICATIONS  (STANDING):  aspirin enteric coated 81 milliGRAM(s) Oral daily  calcium carbonate 1250 mG  + Vitamin D (OsCal 500 + D) 1 Tablet(s) Oral two times a day  chlorhexidine 2% Cloths 1 Application(s) Topical daily  cycloSPORINE  , modified (GENGRAF) 75 milliGRAM(s) Oral two times a day  dextrose 5%. 1000 milliLiter(s) (50 mL/Hr) IV Continuous <Continuous>  dextrose 5%. 1000 milliLiter(s) (100 mL/Hr) IV Continuous <Continuous>  dextrose 50% Injectable 25 Gram(s) IV Push once  dextrose 50% Injectable 12.5 Gram(s) IV Push once  dextrose 50% Injectable 25 Gram(s) IV Push once  entecavir 0.5 milliGRAM(s) Oral daily  fluconAZOLE   Tablet 400 milliGRAM(s) Oral daily  fluticasone propionate/ salmeterol 100-50 MICROgram(s) Diskus 1 Dose(s) Inhalation two times a day  glucagon  Injectable 1 milliGRAM(s) IntraMuscular once  heparin   Injectable 5000 Unit(s) SubCutaneous every 12 hours  insulin glargine Injectable (LANTUS) 5 Unit(s) SubCutaneous at bedtime  insulin lispro (ADMELOG) corrective regimen sliding scale   SubCutaneous three times a day before meals  insulin lispro (ADMELOG) corrective regimen sliding scale   SubCutaneous at bedtime  insulin lispro Injectable (ADMELOG) 8 Unit(s) SubCutaneous before lunch  insulin lispro Injectable (ADMELOG) 5 Unit(s) SubCutaneous before dinner  insulin lispro Injectable (ADMELOG) 8 Unit(s) SubCutaneous with breakfast  lactulose Syrup 20 Gram(s) Oral once  lactulose Syrup 10 Gram(s) Oral <User Schedule>  levETIRAcetam 500 milliGRAM(s) Oral two times a day  magnesium oxide 400 milliGRAM(s) Oral three times a day with meals  melatonin 9 milliGRAM(s) Oral at bedtime  mycophenolate mofetil 1000 milliGRAM(s) Oral <User Schedule>  NIFEdipine XL 30 milliGRAM(s) Oral daily  pantoprazole    Tablet 40 milliGRAM(s) Oral before breakfast  predniSONE   Tablet 20 milliGRAM(s) Oral daily  QUEtiapine 75 milliGRAM(s) Oral at bedtime  senna 2 Tablet(s) Oral at bedtime  SUMAtriptan 100 milliGRAM(s) Oral <User Schedule>  trimethoprim   80 mG/sulfamethoxazole 400 mG 1 Tablet(s) Oral daily  valGANciclovir 900 milliGRAM(s) Oral daily    MEDICATIONS  (PRN):  albuterol    90 MICROgram(s) HFA Inhaler 2 Puff(s) Inhalation every 6 hours PRN Shortness of Breath and/or Wheezing  bisacodyl Suppository 10 milliGRAM(s) Rectal daily PRN Refractory Constipation  dextrose Oral Gel 15 Gram(s) Oral once PRN Blood Glucose LESS THAN 70 milliGRAM(s)/deciliter  SUMAtriptan 100 milliGRAM(s) Oral daily PRN Migraine  traMADol 25 milliGRAM(s) Oral every 6 hours PRN Moderate Pain (4 - 6)  traMADol 50 milliGRAM(s) Oral every 6 hours PRN Severe Pain (7 - 10)

## 2025-06-18 NOTE — DISCHARGE NOTE PROVIDER - PROVIDER TOKENS
PROVIDER:[TOKEN:[[id number]],FOLLOWUP:[1 month]],PROVIDER:[TOKEN:[[id number]],FOLLOWUP:[1 week]] PROVIDER:[TOKEN:[[id number]],FOLLOWUP:[1 month]],PROVIDER:[TOKEN:[[id number]],FOLLOWUP:[1 week]],PROVIDER:[TOKEN:[[id number]],FOLLOWUP:[1 week]] PROVIDER:[TOKEN:[[id number]],FOLLOWUP:[1 month]],PROVIDER:[TOKEN:[[id number]],SCHEDULEDAPPT:[06/25/2025],SCHEDULEDAPPTTIME:[10:00 AM]],PROVIDER:[TOKEN:[[id number]],FOLLOWUP:[1 week]]

## 2025-06-18 NOTE — DISCHARGE NOTE PROVIDER - NSDCFUSCHEDAPPT_GEN_ALL_CORE_FT
Lulu Torres  [facility]  GASTRO [address]  Scheduled Appointment: 07/09/2025

## 2025-06-18 NOTE — DISCHARGE NOTE PROVIDER - CARE PROVIDER_API CALL
[facility]  [facility] & Rehabilitation  [address] [place] [address]  Phone: [phone]  Fax: [phone]  Follow Up Time: 1 month    Emil Caicedo  Surgery (General Surgery)  [address] [place] [address]  Phone: [phone]  Fax: [phone]  Follow Up Time: 1 week   Marion Renteria [facility]  Physical Medicine & Rehabilitation  [address] [place] [address]  Phone: [phone]  Fax: [phone]  Follow Up Time: 1 month    Emil Caicedo  Surgery (General Surgery)  [address] [place] [address]  Phone: [phone]  Fax: [phone]  Follow Up Time: 1 week    Dao Culp  Neurology  [address] [place] [address] [address]  [place] [address]  Phone: [phone]  Fax: [phone]  Follow Up Time: 1 week   Marion Renteria [facility]  Physical Medicine & Rehabilitation  [address] [place] [address]  Phone: [phone]  Fax: [phone]  Follow Up Time: 1 month    Emil Caicedo  Surgery (General Surgery)  [address] [place] [address]  Phone: [phone]  Fax: [phone]  Scheduled Appointment: 06/25/2025 10:00 AM    Dao Culp  Neurology  [address] [facility] [address]  [place] [address]  Phone: [phone]  Fax: [phone]  Follow Up Time: 1 week

## 2025-06-18 NOTE — PROGRESS NOTE ADULT - SUBJECTIVE AND OBJECTIVE BOX
No distress    Vital Signs Last 24 Hrs  T(C): 37 (06-18-25 @ 20:00), Max: 37 (06-18-25 @ 20:00)  T(F): 98.6 (06-18-25 @ 20:00), Max: 98.6 (06-18-25 @ 20:00)  HR: 83 (06-18-25 @ 20:00) (83 - 88)  BP: 129/61 (06-18-25 @ 20:00) (127/56 - 139/66)  RR: 16 (06-18-25 @ 20:00) (16 - 18)  SpO2: 98% (06-18-25 @ 20:00) (97% - 99%)    18 Jun 2025 07:01  -  18 Jun 2025 23:08  --------------------------------------------------------  OUT:    Voided (mL): 450 mL  Total OUT: 450 mL    s1s2  b/l air entry  soft, ND  no edema            albuterol    90 MICROgram(s) HFA Inhaler 2 Puff(s) Inhalation every 6 hours PRN  aspirin enteric coated 81 milliGRAM(s) Oral daily  bisacodyl Suppository 10 milliGRAM(s) Rectal daily PRN  calcium carbonate 1250 mG  + Vitamin D (OsCal 500 + D) 1 Tablet(s) Oral two times a day  chlorhexidine 2% Cloths 1 Application(s) Topical daily  cycloSPORINE  , modified (GENGRAF) 75 milliGRAM(s) Oral two times a day  dextrose 5%. 1000 milliLiter(s) IV Continuous <Continuous>  dextrose 5%. 1000 milliLiter(s) IV Continuous <Continuous>  dextrose 50% Injectable 25 Gram(s) IV Push once  dextrose 50% Injectable 12.5 Gram(s) IV Push once  dextrose 50% Injectable 25 Gram(s) IV Push once  dextrose Oral Gel 15 Gram(s) Oral once PRN  entecavir 0.5 milliGRAM(s) Oral daily  fluticasone propionate/ salmeterol 100-50 MICROgram(s) Diskus 1 Dose(s) Inhalation two times a day  glucagon  Injectable 1 milliGRAM(s) IntraMuscular once  heparin   Injectable 5000 Unit(s) SubCutaneous every 12 hours  insulin glargine Injectable (LANTUS) 5 Unit(s) SubCutaneous at bedtime  insulin lispro (ADMELOG) corrective regimen sliding scale   SubCutaneous three times a day before meals  insulin lispro (ADMELOG) corrective regimen sliding scale   SubCutaneous at bedtime  insulin lispro Injectable (ADMELOG) 8 Unit(s) SubCutaneous before lunch  insulin lispro Injectable (ADMELOG) 5 Unit(s) SubCutaneous before dinner  insulin lispro Injectable (ADMELOG) 8 Unit(s) SubCutaneous with breakfast  lactulose Syrup 10 Gram(s) Oral <User Schedule>  levETIRAcetam 500 milliGRAM(s) Oral two times a day  magnesium oxide 400 milliGRAM(s) Oral three times a day with meals  melatonin 9 milliGRAM(s) Oral at bedtime  mycophenolate mofetil 1000 milliGRAM(s) Oral <User Schedule>  NIFEdipine XL 30 milliGRAM(s) Oral daily  pantoprazole    Tablet 40 milliGRAM(s) Oral before breakfast  predniSONE   Tablet 10 milliGRAM(s) Oral daily  QUEtiapine 75 milliGRAM(s) Oral at bedtime  senna 2 Tablet(s) Oral at bedtime  SUMAtriptan 100 milliGRAM(s) Oral daily PRN  SUMAtriptan 100 milliGRAM(s) Oral <User Schedule>  traMADol 25 milliGRAM(s) Oral every 6 hours PRN  traMADol 50 milliGRAM(s) Oral every 6 hours PRN  trimethoprim   80 mG/sulfamethoxazole 400 mG 1 Tablet(s) Oral daily  valGANciclovir 900 milliGRAM(s) Oral daily    Impression/Plan:    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

## 2025-06-18 NOTE — PROGRESS NOTE ADULT - ASSESSMENT
70 F with PMHx NAFLD cirrhosis, migraines, T2DM, COPD, and HCC (listed for liver transplant with MELD 20B), UGIB, chronic back pain 2/2 spinal fracture, originally presented to [facility] on  for coffee ground emesis and melena, EGD showed no active bleeding, patient was transfused and stabilized prior to transfer to [facility] on  for further management. Patient denies bloody bowel movements or bloody emesis since . Patient endorses low back pain and frontal lobe headache, Neurology consulted for persistent headache, recommended to start nortriptyline. When the appropriate organ was available, the patient was brought to OR and S/p OLT from a  doner under steroids and Simulect induction with Dr. Caicedo on . Now admitted to [facility] for functional deficits and initiation of a multidisciplinary rehab program consisting focused on functional mobility, transfers and ADLs.    LIVER TRANSPLANT PATIENT: DO NOT COMMENCE ANY NEW MEDICATION OR CHANGE TREATMENT PLAN WITHOUT CLEARANCE FROM TRANSPLANT TEAM OR PRIMARY REHAB TEAM:   24 hr contact in provider hand off and 24hr teams vincent    * Therapy to focus on balance exercises, progressive ambulation  * Clinically stable   * Migrane, c/w sumatriptan nightly and prn daily, aim to revert to max 3x/wk in few days with sustained control of the head ache      # Debility 2/2 Liver transplant due to decompensated liver disease  - HBV Core + donor: Entecavir 0.5mg daily  - Good graft  function   - S/p solumedrol ---> Prednisone   - Pain Management: Tramadol PRN   - ASA 81mg daily   - Hallucinations -->  holding FK, BH following; on seroquel, UA: Neg. keppra ppx   - FK stopped, switched to Cyclo for mental status changes,  MMF , Pred 20  - PPx: Bactrim, Valcyte 450 (CMV +/-, inc 900mg as able), Fluc, PPI, OsCal  - Simulect completed    Comprehensive Multidisciplinary Rehab Program:  - Continue comprehensive rehab program, 3 hours a day, 5 days a week..      Rehab Dx/Impairments  Limited ambulatory distance   Gait disturbance  Debility  Head ache    - Activity Limitations: Decreased social, vocational and leisure activities, decreased self care and ADLs, decreased mobility, decreased ability to manage household and finances.       Concurrent Medical Problems    #Migraines--revised dosing sumatriptan 100mg qhs and od/prn  -If sumatriptan desired, would give as 100mg PO BID prn (no more than 2 doses in a day and 3 doses/week)    #COPD  -Advair daily   -Albuterol PRN    #SCOTT/CKD -continue liaison with Nephrology  -Avoid nephrotoxic meds       #Transaminitis (improving)  -Trend LFTs    #Acute blood loss anemia  -S/p multiple blood products  -Trend H/H  -Transfuse if hgb <7 PRN    #Type 2 Diabetes Mellitus  #Steroid induced hyperglycemia   -A1c 6.0  -FBG ACHS + Mod ISS  -Lantus 5U HS  -Admelog 12U breakfast; 8U Lunch; 5U dinner     #HTN/HLD  -Nifedipine 30mg daily     #Mood/Cognition  #Sleep disturbance  #Depression  #Hallucinations   -Seroquel 75mg HS   -Neuropsychology consult PRN  -Maintain quiet hours and low stim environment.  -Melatonin 6mg HS PRN to maximize participation in therapy during the day.     #GI/Bowel:  Senna QHS, Miralax Daily    #/Bladder  # E.Faecium UTI, asymptomatic  - Zosyn/Vanco completed  -Cr Stable     #Skin/Pressure Injury:   - Skin assessment on admission: BUE ecchymosis; RUE wound scabbed 2.5x2cm; vertical abd incision 9cm with 14 staples; transverse abd incision 30cm with 38 staples; LUQ drain site 1cm with 3 sutures; RUQ drain site x 2 1) 1cm with 4 sutures 2) 1cm with 3 sutures; RLQ wound scabbed 1.8x0.7cm; R hip scratch marks; blanchable redness sacrum/buttocks; R groin and perineal area ecchymosis    #Diet/health maintenance    SLP consult for swallow function evaluation and treatment  -Current Diet: Carb consistent diet    [X] Aspiration Precautions  -Nutrition consult   -Magnesium oxide 400mg TID    #Precautions / PROPHYLAXIS:   - Falls  - ortho: Weight bearing status: WBAT   - Lungs: Aspiration, Incentive Spirometer   - DVT ppx: Heparin, TEDs  - Pressure injury/Skin: TOOB to Chair, PT/OT      IDT   Ambulating 150ft with RW CG, with or without device  12 stairs with 1 hand rail   Est dc     Code Status: FULL  Emergency Contact:    Outpatient Follow-up (Specialty/Name of physician):    Esvin Jim Yuriy  Surgery  [address] [place] [address]  Phone: [phone]  Fax: [phone]    Skylar Julian  Transplant Hepatology  [address] [place] [address]  Phone: [phone]  Fax: [phone]    [facility]  [address]  Scheduled Appointment: 2025      1. Follow up with Dr. Hatfield / Psych Clinic  2. Follow up with Neurology for further headache management plan           70 F with PMHx NAFLD cirrhosis, migraines, T2DM, COPD, and HCC (listed for liver transplant with MELD 20B), UGIB, chronic back pain 2/2 spinal fracture, originally presented to [facility] on  for coffee ground emesis and melena, EGD showed no active bleeding, patient was transfused and stabilized prior to transfer to [facility] on  for further management. Patient denies bloody bowel movements or bloody emesis since . Patient endorses low back pain and frontal lobe headache, Neurology consulted for persistent headache, recommended to start nortriptyline. When the appropriate organ was available, the patient was brought to OR and S/p OLT from a  doner under steroids and Simulect induction with Dr. Caicedo on . Now admitted to [facility] for functional deficits and initiation of a multidisciplinary rehab program consisting focused on functional mobility, transfers and ADLs.    LIVER TRANSPLANT PATIENT: DO NOT COMMENCE ANY NEW MEDICATION OR CHANGE TREATMENT PLAN WITHOUT CLEARANCE FROM TRANSPLANT TEAM OR PRIMARY REHAB TEAM:   24 hr contact in provider hand off and 24hr teams vincent    * Therapy to focus on balance exercises, progressive ambulation  * Clinically stable   * Migrane, c/w sumatriptan nightly and prn daily, aim to revert to max 3x/wk in few days with sustained control of the head ache  * Constipation--add lactulose      # Debility 2/2 Liver transplant due to decompensated liver disease  - HBV Core + donor: Entecavir 0.5mg daily  - Good graft  function   - S/p solumedrol ---> Prednisone   - Pain Management: Tramadol PRN   - ASA 81mg daily   - Hallucinations -->  holding FK, BH following; on seroquel, UA: Neg. keppra ppx   - FK stopped, switched to Cyclo for mental status changes,  MMF , Pred 20  - PPx: Bactrim, Valcyte 450 (CMV +/-, inc 900mg as able), Fluc, PPI, OsCal  - Simulect completed    Comprehensive Multidisciplinary Rehab Program:  - Continue comprehensive rehab program, 3 hours a day, 5 days a week..      Rehab Dx/Impairments  Limited ambulatory distance   Gait disturbance  Debility  Head ache    - Activity Limitations: Decreased social, vocational and leisure activities, decreased self care and ADLs, decreased mobility, decreased ability to manage household and finances.       Concurrent Medical Problems    #Migraines--revised dosing sumatriptan 100mg qhs and od/prn  -If sumatriptan desired, would give as 100mg PO BID prn (no more than 2 doses in a day and 3 doses/week)    #COPD  -Advair daily   -Albuterol PRN    #SCOTT/CKD -continue liaison with Nephrology  -Avoid nephrotoxic meds       #Transaminitis (improving)  -Trend LFTs    #Acute blood loss anemia  -S/p multiple blood products  -Trend H/H  -Transfuse if hgb <7 PRN    #Type 2 Diabetes Mellitus  #Steroid induced hyperglycemia   -A1c 6.0  -FBG ACHS + Mod ISS  -Lantus 5U HS  -Admelog 12U breakfast; 8U Lunch; 5U dinner     #HTN/HLD  -Nifedipine 30mg daily     #Mood/Cognition  #Sleep disturbance  #Depression  #Hallucinations   -Seroquel 75mg HS   -Neuropsychology consult PRN  -Maintain quiet hours and low stim environment.  -Melatonin 6mg HS PRN to maximize participation in therapy during the day.     #GI/Bowel:  Senna QHS, Miralax Daily    #/Bladder  # E.Faecium UTI, asymptomatic  - Zosyn/Vanco completed  -Cr Stable     #Skin/Pressure Injury:   - Skin assessment on admission: BUE ecchymosis; RUE wound scabbed 2.5x2cm; vertical abd incision 9cm with 14 staples; transverse abd incision 30cm with 38 staples; LUQ drain site 1cm with 3 sutures; RUQ drain site x 2 1) 1cm with 4 sutures 2) 1cm with 3 sutures; RLQ wound scabbed 1.8x0.7cm; R hip scratch marks; blanchable redness sacrum/buttocks; R groin and perineal area ecchymosis    #Diet/health maintenance    SLP consult for swallow function evaluation and treatment  -Current Diet: Carb consistent diet    [X] Aspiration Precautions  -Nutrition consult   -Magnesium oxide 400mg TID    #Precautions / PROPHYLAXIS:   - Falls  - ortho: Weight bearing status: WBAT   - Lungs: Aspiration, Incentive Spirometer   - DVT ppx: Heparin, TEDs  - Pressure injury/Skin: TOOB to Chair, PT/OT      IDT   Ambulating 150ft with RW CG, with or without device  12 stairs with 1 hand rail   Est dc     Code Status: FULL  Emergency Contact:    Outpatient Follow-up (Specialty/Name of physician):    Esvin Jim Yuriy  Surgery  [address] [place] [address]  Phone: [phone]  Fax: [phone]    Skylar Julian  Transplant Hepatology  [address] [place] [address]  Phone: [phone]  Fax: [phone]    Lulu Torres  [facility]  [address] [facility]  Scheduled Appointment: 2025      1. Follow up with Dr. Hatfield / Psych Clinic  2. Follow up with Neurology for further headache management plan

## 2025-06-18 NOTE — DISCHARGE NOTE PROVIDER - CARE PROVIDERS DIRECT ADDRESSES
[email or web address]

## 2025-06-18 NOTE — DISCHARGE NOTE PROVIDER - HOSPITAL COURSE
HPI:  70 F with PMHx NAFLD cirrhosis, migraines, T2DM, COPD, and HCC (listed for liver transplant with MELD 20B), UGIB, chronic back pain 2/2 spinal fracture, originally presented to [facility] on  for coffee ground emesis and melena, EGD showed no active bleeding, patient was transfused and stabilized prior to transfer to [facility] on  for further management. Patient denies bloody bowel movements or bloody emesis since . Patient endorses low back pain and frontal lobe headache, Neurology consulted for persistent headache, recommended to start nortriptyline. When the appropriate organ was available, the patient was brought to OR and S/p OLT from a  doner under steroids and Simulect induction with Dr. Caicedo on . Post op course significant for E. Faecium UTI , SCOTT, acute blood loss anemia requiring multiple blood products, s/p insulin gtt to maintain euglycemia, donor + hep B, thrombocytopenia, hypoglycemia episodes, and waxing and waning mental status with hallucinations. Patient optimized and was evaluated by PM&R and therapy for functional deficits, gait/ADL impairments and acute rehabilitation was recommended. Patient was cleared for discharge to [facility] on 25. (2025 15:31)    Patient was evaluated by PM&R and therapy for gait/ADL impairments and recommended acute rehabilitation. Patient was medically optimized for discharge to [facility] on 25. Admitted with gait instability, ADL, and functional impairments.     Rehab course significant for:        All other medical co-morbidities were stable. Patient tolerated course of inpatient PT/OT/SLP rehab with significant improvements and met rehab goals prior to discharge. Patient was medically cleared on 25 for discharge to home. HPI:  70 F with PMHx NAFLD cirrhosis, migraines, T2DM, COPD, and HCC (listed for liver transplant with MELD 20B), UGIB, chronic back pain 2/2 spinal fracture, originally presented to [facility] on  for coffee ground emesis and melena, EGD showed no active bleeding, patient was transfused and stabilized prior to transfer to [facility] on  for further management. Patient denies bloody bowel movements or bloody emesis since . Patient endorses low back pain and frontal lobe headache, Neurology consulted for persistent headache, recommended to start nortriptyline. When the appropriate organ was available, the patient was brought to OR and S/p OLT from a  doner under steroids and Simulect induction with Dr. Caicedo on . Post op course significant for E. Faecium UTI , SCOTT, acute blood loss anemia requiring multiple blood products, s/p insulin gtt to maintain euglycemia, donor + hep B, thrombocytopenia, hypoglycemia episodes, and waxing and waning mental status with hallucinations. Patient optimized and was evaluated by PM&R and therapy for functional deficits, gait/ADL impairments and acute rehabilitation was recommended. Patient was cleared for discharge to [facility] IRF on 25. (2025 15:31)    Patient was evaluated by PM&R and therapy for gait/ADL impairments and recommended acute rehabilitation. Patient was medically optimized for discharge to [facility] on 25. Admitted with gait instability, ADL, and functional impairments.     Rehab course significant for:  : Concentrated urine, await UA, encourage PO fluids.   : Encourage IS, lactulose daily, sumatriptan at HS and daily PRN, miralax DCd, fleet after therapy.   :  Lactulose after therapy  : Await transplant recs regarding headache and cyclosporine level.   : DC home      All other medical co-morbidities were stable. Patient tolerated course of inpatient PT/OT/SLP rehab with significant improvements and met rehab goals prior to discharge. Patient was medically cleared on 25 for discharge to home.

## 2025-06-18 NOTE — DISCHARGE NOTE PROVIDER - NSDCCPCAREPLAN_GEN_ALL_CORE_FT
PRINCIPAL DISCHARGE DIAGNOSIS  Diagnosis: S/P liver transplant  Assessment and Plan of Treatment: You presented to the hospital for vomiting and dark stools, and ultimately underwent a liver transplant. You were sent to  acute rehab to help improve your strength and overall function. Please follow up with the following providers listed in this packet for further management. Bring this packet to your follow up appointments.      SECONDARY DISCHARGE DIAGNOSES  Diagnosis: Immunosuppressed status  Assessment and Plan of Treatment: Transplant recipients are at risk for developing infection because immune system is lowered due to transplant medications.    - Avoid contact with sick people; practice good hand hygiene   - Take medications as directed by your transplant team. Never stop taking medications unless instructed by your transplant physician.   - Do NOT double up medication dose if you missed your dose; call the transplant office for instructions.  Please continue to take your immunosuppressive/anti-rejection medications as prescribed.   The transplant team will be following you for ongoing adjustment of these medications.   They can be contacted on [phone].    Diagnosis: H/O headache  Assessment and Plan of Treatment:      PRINCIPAL DISCHARGE DIAGNOSIS  Diagnosis: S/P liver transplant  Assessment and Plan of Treatment: You presented to the hospital for vomiting and dark stools, and ultimately underwent a liver transplant. You were sent to  acute rehab to help improve your strength and overall function. Please follow up with the following providers listed in this packet for further management. Bring this packet to your follow up appointments.      SECONDARY DISCHARGE DIAGNOSES  Diagnosis: Immunosuppressed status  Assessment and Plan of Treatment: Transplant recipients are at risk for developing infection because immune system is lowered due to transplant medications.    - Avoid contact with sick people; practice good hand hygiene   - Take medications as directed by your transplant team. Never stop taking medications unless instructed by your transplant physician.   - Do NOT double up medication dose if you missed your dose; call the transplant office for instructions.  Please continue to take your immunosuppressive/anti-rejection medications as prescribed.   The transplant team will be following you for ongoing adjustment of these medications.   They can be contacted on [phone].    Diagnosis: H/O headache  Assessment and Plan of Treatment: Please follow up with Dr. Culp for ongoing headache management.

## 2025-06-18 NOTE — PROGRESS NOTE ADULT - SUBJECTIVE AND OBJECTIVE BOX
PROGRESS NOTE:     Patient is a 70y old  Female who presents with a chief complaint of Debility 2/2 Liver transplant due to decompensative liver disease (17 Jun 2025 19:21)          SUBJECTIVE & OBJECTIVE:   Pt seen and examined at bedside in AM    no overnight events.     REVIEW OF SYSTEMS: remaining ROS negative     PHYSICAL EXAM:  VITALS:  Vital Signs Last 24 Hrs  T(C): 36.8 (18 Jun 2025 07:38), Max: 36.9 (17 Jun 2025 21:19)  T(F): 98.2 (18 Jun 2025 07:38), Max: 98.4 (17 Jun 2025 21:19)  HR: 88 (18 Jun 2025 08:17) (82 - 88)  BP: 139/66 (18 Jun 2025 07:38) (127/56 - 158/61)  BP(mean): --  RR: 16 (18 Jun 2025 07:38) (16 - 18)  SpO2: 99% (18 Jun 2025 08:17) (97% - 99%)    Parameters below as of 18 Jun 2025 08:17  Patient On (Oxygen Delivery Method): room air          GENERAL: NAD,  no increased WOB  HEAD:  Atraumatic, Normocephalic  EYES: EOMI, conjunctiva and sclera clear  ENMT: Moist mucous membranes  NECK: Supple, No JVD  NERVOUS SYSTEM:  Alert & Oriented  CHEST/LUNG: Clear to auscultation bilaterally; No rales, rhonchi, wheezing  HEART: Regular rate and rhythm  ABDOMEN: Soft, Nontender, Nondistended; Bowel sounds present  EXTREMITIES:  No clubbing, cyanosis, calf tenderness or edema b/l      MEDICATIONS  (STANDING):  aspirin enteric coated 81 milliGRAM(s) Oral daily  calcium carbonate 1250 mG  + Vitamin D (OsCal 500 + D) 1 Tablet(s) Oral two times a day  chlorhexidine 2% Cloths 1 Application(s) Topical daily  cycloSPORINE  , modified (GENGRAF) 75 milliGRAM(s) Oral two times a day  dextrose 5%. 1000 milliLiter(s) (50 mL/Hr) IV Continuous <Continuous>  dextrose 5%. 1000 milliLiter(s) (100 mL/Hr) IV Continuous <Continuous>  dextrose 50% Injectable 25 Gram(s) IV Push once  dextrose 50% Injectable 12.5 Gram(s) IV Push once  dextrose 50% Injectable 25 Gram(s) IV Push once  entecavir 0.5 milliGRAM(s) Oral daily  fluconAZOLE   Tablet 400 milliGRAM(s) Oral daily  fluticasone propionate/ salmeterol 100-50 MICROgram(s) Diskus 1 Dose(s) Inhalation two times a day  glucagon  Injectable 1 milliGRAM(s) IntraMuscular once  heparin   Injectable 5000 Unit(s) SubCutaneous every 12 hours  insulin glargine Injectable (LANTUS) 5 Unit(s) SubCutaneous at bedtime  insulin lispro (ADMELOG) corrective regimen sliding scale   SubCutaneous three times a day before meals  insulin lispro (ADMELOG) corrective regimen sliding scale   SubCutaneous at bedtime  insulin lispro Injectable (ADMELOG) 8 Unit(s) SubCutaneous with breakfast  insulin lispro Injectable (ADMELOG) 8 Unit(s) SubCutaneous before lunch  insulin lispro Injectable (ADMELOG) 5 Unit(s) SubCutaneous before dinner  lactulose Syrup 10 Gram(s) Oral <User Schedule>  levETIRAcetam 500 milliGRAM(s) Oral two times a day  magnesium oxide 400 milliGRAM(s) Oral three times a day with meals  melatonin 9 milliGRAM(s) Oral at bedtime  mycophenolate mofetil 1000 milliGRAM(s) Oral <User Schedule>  NIFEdipine XL 30 milliGRAM(s) Oral daily  pantoprazole    Tablet 40 milliGRAM(s) Oral before breakfast  predniSONE   Tablet 20 milliGRAM(s) Oral daily  QUEtiapine 75 milliGRAM(s) Oral at bedtime  senna 2 Tablet(s) Oral at bedtime  SUMAtriptan 100 milliGRAM(s) Oral <User Schedule>  trimethoprim   80 mG/sulfamethoxazole 400 mG 1 Tablet(s) Oral daily  valGANciclovir 900 milliGRAM(s) Oral daily    MEDICATIONS  (PRN):  albuterol    90 MICROgram(s) HFA Inhaler 2 Puff(s) Inhalation every 6 hours PRN Shortness of Breath and/or Wheezing  bisacodyl Suppository 10 milliGRAM(s) Rectal daily PRN Refractory Constipation  dextrose Oral Gel 15 Gram(s) Oral once PRN Blood Glucose LESS THAN 70 milliGRAM(s)/deciliter  SUMAtriptan 100 milliGRAM(s) Oral daily PRN Migraine  traMADol 25 milliGRAM(s) Oral every 6 hours PRN Moderate Pain (4 - 6)  traMADol 50 milliGRAM(s) Oral every 6 hours PRN Severe Pain (7 - 10)      Allergies    No Known Allergies    Intolerances              LABS:                 CAPILLARY BLOOD GLUCOSE      POCT Blood Glucose.: 117 mg/dL (18 Jun 2025 07:30)  POCT Blood Glucose.: 98 mg/dL (17 Jun 2025 21:01)  POCT Blood Glucose.: 124 mg/dL (17 Jun 2025 17:07)  POCT Blood Glucose.: 143 mg/dL (17 Jun 2025 11:27)                RECENT CULTURES:          RADIOLOGY & ADDITIONAL TESTS:

## 2025-06-18 NOTE — PROGRESS NOTE ADULT - ASSESSMENT
70 F with PMHx NAFLD cirrhosis, migraines, T2DM, COPD, and HCC (listed for liver transplant with MELD 20B), UGIB, chronic back pain 2/2 spinal fracture, s/p OLT from  donor at [facility]. Required insulin gtt for euglycemia and hospital course complicated w. migraines, scott, uti, Now admitted to [place] rehab program on .    # Debility 2/2 Liver transplant due to NAFLD  - HBV Core + donor: Entecavir 0.5mg daily  - S/p solumedrol ---> Prednisone   - ASA 81mg daily   - FK stopped d/t hallucinations. UA neg. switched to Cyclo for mental status changes,  MMF , Pred 20. keppra ppx.   - PPx: Bactrim, Valcyte 450 (CMV +/-, inc 900mg as able), Fluc, PPI, OsCal  - Simulect completed  - PT/OT    #Type 2 Diabetes Mellitus  #Steroid induced hyperglycemia   - follows w. endocrine. s/p insulin gtt  - HbA1c  6.0% - per endo, not accurate in the setting of low gfr and liver disease and dietary indiscretion  - Home regimen: Lantus 14 units + Humalog 18 units with meals. Sugars are usually high in the 200s; uses Dexcom G7 with reader   - monitor FBG ACHS + Mod ISS  - c/w Lantus 5U HS and Admelog 8U breakfast; 8U Lunch; 5U dinner     #SCOTT  - baseline Cr 1.14  - s/p IVF   - Cr 1.38 again. encourage oral fluids. no urinary sxs.  - appreciate nephro consult. cyclosporin level, UA, FeNa   - Avoid nephrotoxic meds     # Constipation  - escalate bowel regimen. enema if needed.     #Transaminitis (improving)  - Trend LFTs    #Acute blood loss anemia  -S/p multiple blood products  -Trend H/H. recent baseline 8-9's  -Transfuse if hgb <7 PRN    #HTN  -Nifedipine 30mg daily     #Migraines  -If sumatriptan desired, recommendations of 100mg PO BID prn (no more than 2 doses in a day and 3 doses/week)    #COPD  -Advair daily   -Albuterol q 6hrs PRN    #Depression  #Hallucinations   -Seroquel 75mg HS   -Neuropsychology consult PRN  -Melatonin 6mg HS PRN

## 2025-06-18 NOTE — DISCHARGE NOTE PROVIDER - NSDCMRMEDTOKEN_GEN_ALL_CORE_FT
alendronate weekly: 70 milligram(s) orally once a week  aspirin 81 mg oral delayed release tablet: 1 tab(s) orally once a day  cycloSPORINE modified 25 mg oral capsule: 3 cap(s) orally 2 times a day Please take at 8AM and  8PM  entecavir 0.5 mg oral tablet: 1 tab(s) orally once a day  fluconazole 200 mg oral tablet: 2 tab(s) orally once a day  insulin glargine 100 units/mL subcutaneous solution: 5 unit(s) subcutaneous once a day (at bedtime)  insulin lispro 100 units/mL injectable solution: 12 unit(s) injectable once a day before breakfast  insulin lispro 100 units/mL injectable solution: 8 unit(s) injectable once a day before lunch  insulin lispro 100 units/mL injectable solution: 5 unit(s) injectable once a day before dinner  levETIRAcetam 500 mg oral tablet: 1 tab(s) orally 2 times a day  magnesium oxide 400 mg oral tablet: 1 tab(s) orally 3 times a day (with meals)  mycophenolate mofetil 250 mg oral capsule: 1,000 milligram(s) orally 2 times a day Take at 8AM and 8PM  NIFEdipine 30 mg oral tablet, extended release: 1 tab(s) orally every 24 hours  pantoprazole 40 mg oral delayed release tablet: 1 tab(s) orally once a day (before a meal)  predniSONE 20 mg oral tablet: 1 tab(s) orally once a day  QUEtiapine 25 mg oral tablet: 3 tab(s) orally once a day (at bedtime)  senna leaf extract oral tablet: 2 tab(s) orally 2 times a day  sulfamethoxazole-trimethoprim 400 mg-80 mg oral tablet: 1 tab(s) orally once a day  Symbicort 160 mcg-4.5 mcg/inh inhalation aerosol: 2 inhaled 2 times a day  valGANciclovir 450 mg oral tablet: 2 tab(s) orally once a day  vitamin D-calcium (as carbonate) 5 mcg-500 mg oral tablet: 1 tab(s) orally 2 times a day   alendronate 70 mg oral tablet: 1 tab(s) orally once a week  aspirin 81 mg oral delayed release tablet: 1 tab(s) orally once a day  bisacodyl 10 mg rectal suppository: 1 suppository(ies) rectal once a day As needed Refractory Constipation  cycloSPORINE modified 25 mg oral capsule: 3 cap(s) orally 2 times a day  entecavir 0.5 mg oral tablet: 1 tab(s) orally once a day  fluticasone-salmeterol: 1 inhaler(s) inhaled 2 times a day  insulin glargine 100 units/mL subcutaneous solution: 5 unit(s) subcutaneous once a day (at bedtime)  insulin lispro 100 units/mL injectable solution: injectable 3 times a day (before meals) DM per moderate Insulin Sliding Scale (ISS):  Please check your FS before each meal, and give insulin per ISS:  2 Unit(s) if Glucose 151 - 200  4 Unit(s) if Glucose 201 - 250  6 Unit(s) if Glucose 251 - 300  8 Unit(s) if Glucose 301 - 350  10 Unit(s) if Glucose 351 - 400  12 Unit(s) if Glucose Greater Than 400  insulin lispro 100 units/mL injectable solution: injectable once a day (at bedtime) Please check your FS at bedtime, and give insulin per ISS:  0 Unit(s) if Glucose 61 - 250  2 Unit(s) if Glucose 251 - 300  4 Unit(s) if Glucose 301 - 350  6 Unit(s) if Glucose 351 - 400  8 Unit(s) if Glucose Greater Than 400    Give correctional scale insulin  REGARDLESS of PO status NOTIFY Provider for blood glucose LESS THAN 70 milliGRAM(s)/deciLiter or GREATER THAN 400 milliGRAM(s)/deciLiter  insulin lispro 100 units/mL injectable solution: 8 unit(s) injectable once a day before breakfast  insulin lispro 100 units/mL injectable solution: 8 unit(s) injectable once a day before lunch  insulin lispro 100 units/mL injectable solution: 5 unit(s) injectable once a day before dinner  lactulose 10 g/15 mL oral syrup: 15 milliliter(s) orally once a day  levETIRAcetam 500 mg oral tablet: 1 tab(s) orally 2 times a day  magnesium oxide 400 mg oral tablet: 1 tab(s) orally 3 times a day (with meals)  melatonin 3 mg oral tablet: 3 tab(s) orally once a day (at bedtime)  mycophenolate mofetil 250 mg oral capsule: 1,000 milligram(s) orally 2 times a day Take at 8AM and 8PM  NIFEdipine 30 mg oral tablet, extended release: 1 tab(s) orally once a day  pantoprazole 40 mg oral delayed release tablet: 1 tab(s) orally once a day (before a meal)  predniSONE 10 mg oral tablet: 1 tab(s) orally once a day  QUEtiapine 25 mg oral tablet: 3 tab(s) orally once a day (at bedtime)  senna leaf extract oral tablet: 2 tab(s) orally once a day (at bedtime)  sulfamethoxazole-trimethoprim 400 mg-80 mg oral tablet: 1 tab(s) orally once a day  SUMAtriptan 100 mg oral tablet: 1 tab(s) orally once a day as needed for Migraine No more than 3 doses in 1 week  Symbicort 160 mcg-4.5 mcg/inh inhalation aerosol: 2 puff(s) inhaled 2 times a day  traMADol 50 mg oral tablet: 0.5 tab(s) orally every 6 hours as needed for Moderate Pain (4 - 6) Please take 0.5 tab every 6 hours for moderate pain.  Please take 1 tab every 6 hours for severe pain.  Tylenol 325 mg oral tablet: 2 tab(s) orally every 8 hours as needed for  mild pain  valGANciclovir 450 mg oral tablet: 2 tab(s) orally once a day  vitamin D-calcium (as carbonate) 5 mcg-500 mg oral tablet: 1 tab(s) orally 2 times a day   alendronate 70 mg oral tablet: 1 tab(s) orally once a week  aspirin 81 mg oral delayed release tablet: 1 tab(s) orally once a day  bisacodyl 10 mg rectal suppository: 1 suppository(ies) rectal once a day As needed Refractory Constipation  cycloSPORINE modified 25 mg oral capsule: 1 cap(s) orally 2 times a day  diphenhydrAMINE 25 mg oral capsule: 1 cap(s) orally every 8 hours as needed for  headache Take with Reglan 10mg for headache.  entecavir 0.5 mg oral tablet: 1 tab(s) orally once a day  insulin glargine 100 units/mL subcutaneous solution: 5 unit(s) subcutaneous once a day (at bedtime)  insulin lispro 100 units/mL injectable solution: injectable 3 times a day (before meals) DM per moderate Insulin Sliding Scale (ISS):  Please check your FS before each meal, and give insulin per ISS:  2 Unit(s) if Glucose 151 - 200  4 Unit(s) if Glucose 201 - 250  6 Unit(s) if Glucose 251 - 300  8 Unit(s) if Glucose 301 - 350  10 Unit(s) if Glucose 351 - 400  12 Unit(s) if Glucose Greater Than 400  insulin lispro 100 units/mL injectable solution: injectable once a day (at bedtime) Please check your FS at bedtime, and give insulin per ISS:  0 Unit(s) if Glucose 61 - 250  2 Unit(s) if Glucose 251 - 300  4 Unit(s) if Glucose 301 - 350  6 Unit(s) if Glucose 351 - 400  8 Unit(s) if Glucose Greater Than 400    Give correctional scale insulin  REGARDLESS of PO status NOTIFY Provider for blood glucose LESS THAN 70 milliGRAM(s)/deciLiter or GREATER THAN 400 milliGRAM(s)/deciLiter  insulin lispro 100 units/mL injectable solution: 8 unit(s) injectable once a day before breakfast  insulin lispro 100 units/mL injectable solution: 8 unit(s) injectable once a day before lunch  insulin lispro 100 units/mL injectable solution: 5 unit(s) injectable once a day before dinner  lactulose 10 g/15 mL oral syrup: 15 milliliter(s) orally once a day  levETIRAcetam 500 mg oral tablet: 1 tab(s) orally 2 times a day  magnesium oxide 400 mg oral tablet: 1 tab(s) orally 3 times a day (with meals)  melatonin 3 mg oral tablet: 3 tab(s) orally once a day (at bedtime)  metoclopramide 10 mg oral tablet: 1 tab(s) orally 3 times a day as needed for  headache Take with Benadryl for headache.  mycophenolate mofetil 250 mg oral capsule: 1,000 milligram(s) orally 2 times a day Take at 8AM and 8PM  NIFEdipine 30 mg oral tablet, extended release: 1 tab(s) orally once a day  pantoprazole 40 mg oral delayed release tablet: 1 tab(s) orally once a day (before a meal)  predniSONE 10 mg oral tablet: 1 tab(s) orally once a day  QUEtiapine 25 mg oral tablet: 3 tab(s) orally once a day (at bedtime)  senna leaf extract oral tablet: 2 tab(s) orally once a day (at bedtime)  sulfamethoxazole-trimethoprim 400 mg-80 mg oral tablet: 1 tab(s) orally once a day  SUMAtriptan 100 mg oral tablet: 1 tab(s) orally once a day as needed for Migraine No more than 3 doses in 1 week  Symbicort 160 mcg-4.5 mcg/inh inhalation aerosol: 2 puff(s) inhaled 2 times a day  traMADol 50 mg oral tablet: 0.5 tab(s) orally every 6 hours as needed for Moderate Pain (4 - 6) Please take 0.5 tab every 6 hours for moderate pain.  Please take 1 tab every 6 hours for severe pain.  Tylenol 325 mg oral tablet: 2 tab(s) orally every 8 hours as needed for  mild pain  valGANciclovir 450 mg oral tablet: 2 tab(s) orally once a day  vitamin D-calcium (as carbonate) 5 mcg-500 mg oral tablet: 1 tab(s) orally 2 times a day

## 2025-06-18 NOTE — CHART NOTE - NSCHARTNOTEFT_GEN_A_CORE
NUTRITION Follow Up Note    Pt reports persistent poor - fair po intake. Pt consuming ~50 - 75% of meals provided as per flow sheets. Discussed with patient daily alternatives and encouraged ordering foods they would find more palatable. Recommend Ensure Max 11oz PO Daily (Provides 150kcal & 30grams of Protein). Prior diet education provided + reinforced at this visit. No N/V/D/C reported. No chewing/swallowing issues at this time reported.    SOURCE: Patient [X]  Medical Record [X]  Nursing Staff [X]  Family Member []    DIET: Diet, Consistent Carbohydrate w/Evening Snack:   Supplement Feeding Modality:  Oral  Ensure Max Cans or Servings Per Day:  2       Frequency:  Daily (25 @ 16:40) [Active]          EDEMA: None Noted     LAST BM:  fecal incontinence noted    SKIN: No Pressure Ulcers     WEIGHT TRENDS:  Weight in k (2025 07:38)  Weight in k (2025 07:44)      PERTINENT MEDICATIONS: MEDICATIONS  (STANDING):  aspirin enteric coated 81 milliGRAM(s) Oral daily  calcium carbonate 1250 mG  + Vitamin D (OsCal 500 + D) 1 Tablet(s) Oral two times a day  chlorhexidine 2% Cloths 1 Application(s) Topical daily  cycloSPORINE  , modified (GENGRAF) 75 milliGRAM(s) Oral two times a day  dextrose 5%. 1000 milliLiter(s) (50 mL/Hr) IV Continuous <Continuous>  dextrose 5%. 1000 milliLiter(s) (100 mL/Hr) IV Continuous <Continuous>  dextrose 50% Injectable 25 Gram(s) IV Push once  dextrose 50% Injectable 12.5 Gram(s) IV Push once  dextrose 50% Injectable 25 Gram(s) IV Push once  entecavir 0.5 milliGRAM(s) Oral daily  fluticasone propionate/ salmeterol 100-50 MICROgram(s) Diskus 1 Dose(s) Inhalation two times a day  glucagon  Injectable 1 milliGRAM(s) IntraMuscular once  heparin   Injectable 5000 Unit(s) SubCutaneous every 12 hours  insulin glargine Injectable (LANTUS) 5 Unit(s) SubCutaneous at bedtime  insulin lispro (ADMELOG) corrective regimen sliding scale   SubCutaneous three times a day before meals  insulin lispro (ADMELOG) corrective regimen sliding scale   SubCutaneous at bedtime  insulin lispro Injectable (ADMELOG) 8 Unit(s) SubCutaneous before lunch  insulin lispro Injectable (ADMELOG) 5 Unit(s) SubCutaneous before dinner  insulin lispro Injectable (ADMELOG) 8 Unit(s) SubCutaneous with breakfast  lactulose Syrup 10 Gram(s) Oral <User Schedule>  lactulose Syrup 20 Gram(s) Oral once  levETIRAcetam 500 milliGRAM(s) Oral two times a day  magnesium oxide 400 milliGRAM(s) Oral three times a day with meals  melatonin 9 milliGRAM(s) Oral at bedtime  mycophenolate mofetil 1000 milliGRAM(s) Oral <User Schedule>  NIFEdipine XL 30 milliGRAM(s) Oral daily  pantoprazole    Tablet 40 milliGRAM(s) Oral before breakfast  predniSONE   Tablet 10 milliGRAM(s) Oral daily  QUEtiapine 75 milliGRAM(s) Oral at bedtime  senna 2 Tablet(s) Oral at bedtime  SUMAtriptan 100 milliGRAM(s) Oral <User Schedule>  trimethoprim   80 mG/sulfamethoxazole 400 mG 1 Tablet(s) Oral daily  valGANciclovir 900 milliGRAM(s) Oral daily    MEDICATIONS  (PRN):  albuterol    90 MICROgram(s) HFA Inhaler 2 Puff(s) Inhalation every 6 hours PRN Shortness of Breath and/or Wheezing  bisacodyl Suppository 10 milliGRAM(s) Rectal daily PRN Refractory Constipation  dextrose Oral Gel 15 Gram(s) Oral once PRN Blood Glucose LESS THAN 70 milliGRAM(s)/deciliter  SUMAtriptan 100 milliGRAM(s) Oral daily PRN Migraine  traMADol 25 milliGRAM(s) Oral every 6 hours PRN Moderate Pain (4 - 6)  traMADol 50 milliGRAM(s) Oral every 6 hours PRN Severe Pain (7 - 10)      PERTINENT LABS:   Na136 mmol/L Glu 160 mg/dL[H] K+ 5.0 mmol/L Cr  1.38 mg/dL[H] BUN 18 mg/dL -16 Phos 4.3 mg/dL -16 Alb 2.3 g/dL[L]        ESTIMATED NEEDS:   [X] No Change Since Previous Assessment    PREVIOUS NUTRITION DIAGNOSIS:  Malnutrition...  severe, acute  related to inadequate protein-energy intake in setting of acute illness  as evidenced by moderate muscle wasting/fat loss per NFPE  Goal: Pt will consume >75% of most meals/supplements throughout rehab course      NUTRITION DIAGNOSIS IS [X] Ongoing -     [X] Resolved -    [X] Not Applicable     NEW NUTRITION DIAGNOSIS: [X] Not Applicable    INTERVENTIONS:   1. Continue Current Nutrition Care Regimen at This Time.     MONITORING & EVALUATION:   1. Weights   2. PO intakes   3. Skin integrity   4. Tolerance to diet prescription   5. Labs & POCT  6. Follow up (per protocol)    Registered Dietitian/Nutritionist Remains Available.  Chivo Aguirre RD    Contact: [phone] or via MS TEAMS

## 2025-06-19 ENCOUNTER — TRANSCRIPTION ENCOUNTER (OUTPATIENT)
Age: 70
End: 2025-06-19

## 2025-06-19 LAB
ALBUMIN SERPL ELPH-MCNC: 2.9 G/DL — LOW (ref 3.3–5)
ALP SERPL-CCNC: 147 U/L — HIGH (ref 40–120)
ALT FLD-CCNC: 33 U/L — SIGNIFICANT CHANGE UP (ref 10–45)
ANION GAP SERPL CALC-SCNC: 9 MMOL/L — SIGNIFICANT CHANGE UP (ref 5–17)
AST SERPL-CCNC: 24 U/L — SIGNIFICANT CHANGE UP (ref 10–40)
BASOPHILS # BLD AUTO: 0.03 K/UL — SIGNIFICANT CHANGE UP (ref 0–0.2)
BASOPHILS NFR BLD AUTO: 0.8 % — SIGNIFICANT CHANGE UP (ref 0–2)
BILIRUB SERPL-MCNC: 0.8 MG/DL — SIGNIFICANT CHANGE UP (ref 0.2–1.2)
BUN SERPL-MCNC: 17 MG/DL — SIGNIFICANT CHANGE UP (ref 7–23)
CALCIUM SERPL-MCNC: 8.7 MG/DL — SIGNIFICANT CHANGE UP (ref 8.4–10.5)
CHLORIDE SERPL-SCNC: 104 MMOL/L — SIGNIFICANT CHANGE UP (ref 96–108)
CO2 SERPL-SCNC: 24 MMOL/L — SIGNIFICANT CHANGE UP (ref 22–31)
CREAT ?TM UR-MCNC: 80 MG/DL — SIGNIFICANT CHANGE UP
CREAT SERPL-MCNC: 1.21 MG/DL — SIGNIFICANT CHANGE UP (ref 0.5–1.3)
CYCLOSPORINE SER-MCNC: 335 NG/ML — SIGNIFICANT CHANGE UP (ref 150–400)
EGFR: 48 ML/MIN/1.73M2 — LOW
EGFR: 48 ML/MIN/1.73M2 — LOW
EOSINOPHIL # BLD AUTO: 0.04 K/UL — SIGNIFICANT CHANGE UP (ref 0–0.5)
EOSINOPHIL NFR BLD AUTO: 1.1 % — SIGNIFICANT CHANGE UP (ref 0–6)
GLUCOSE BLDC GLUCOMTR-MCNC: 117 MG/DL — HIGH (ref 70–99)
GLUCOSE BLDC GLUCOMTR-MCNC: 127 MG/DL — HIGH (ref 70–99)
GLUCOSE BLDC GLUCOMTR-MCNC: 149 MG/DL — HIGH (ref 70–99)
GLUCOSE BLDC GLUCOMTR-MCNC: 77 MG/DL — SIGNIFICANT CHANGE UP (ref 70–99)
GLUCOSE SERPL-MCNC: 129 MG/DL — HIGH (ref 70–99)
HCT VFR BLD CALC: 27.5 % — LOW (ref 34.5–45)
HGB BLD-MCNC: 9.2 G/DL — LOW (ref 11.5–15.5)
IMM GRANULOCYTES NFR BLD AUTO: 0.5 % — SIGNIFICANT CHANGE UP (ref 0–0.9)
LYMPHOCYTES # BLD AUTO: 0.23 K/UL — LOW (ref 1–3.3)
LYMPHOCYTES # BLD AUTO: 6.2 % — LOW (ref 13–44)
MAGNESIUM SERPL-MCNC: 1.9 MG/DL — SIGNIFICANT CHANGE UP (ref 1.6–2.6)
MCHC RBC-ENTMCNC: 31.6 PG — SIGNIFICANT CHANGE UP (ref 27–34)
MCHC RBC-ENTMCNC: 33.5 G/DL — SIGNIFICANT CHANGE UP (ref 32–36)
MCV RBC AUTO: 94.5 FL — SIGNIFICANT CHANGE UP (ref 80–100)
MONOCYTES # BLD AUTO: 0.11 K/UL — SIGNIFICANT CHANGE UP (ref 0–0.9)
MONOCYTES NFR BLD AUTO: 3 % — SIGNIFICANT CHANGE UP (ref 2–14)
NEUTROPHILS # BLD AUTO: 3.27 K/UL — SIGNIFICANT CHANGE UP (ref 1.8–7.4)
NEUTROPHILS NFR BLD AUTO: 88.4 % — HIGH (ref 43–77)
NRBC BLD AUTO-RTO: 0 /100 WBCS — SIGNIFICANT CHANGE UP (ref 0–0)
PHOSPHATE SERPL-MCNC: 4.2 MG/DL — SIGNIFICANT CHANGE UP (ref 2.5–4.5)
PLATELET # BLD AUTO: 72 K/UL — LOW (ref 150–400)
POTASSIUM SERPL-MCNC: 4.4 MMOL/L — SIGNIFICANT CHANGE UP (ref 3.5–5.3)
POTASSIUM SERPL-SCNC: 4.4 MMOL/L — SIGNIFICANT CHANGE UP (ref 3.5–5.3)
PROT SERPL-MCNC: 5.3 G/DL — LOW (ref 6–8.3)
RBC # BLD: 2.91 M/UL — LOW (ref 3.8–5.2)
RBC # FLD: 22.2 % — HIGH (ref 10.3–14.5)
SODIUM SERPL-SCNC: 137 MMOL/L — SIGNIFICANT CHANGE UP (ref 135–145)
SODIUM UR-SCNC: 58 MMOL/L — SIGNIFICANT CHANGE UP
WBC # BLD: 3.7 K/UL — LOW (ref 3.8–10.5)
WBC # FLD AUTO: 3.7 K/UL — LOW (ref 3.8–10.5)

## 2025-06-19 PROCEDURE — 99233 SBSQ HOSP IP/OBS HIGH 50: CPT

## 2025-06-19 RX ORDER — CYCLOSPORINE 100 MG/1
25 CAPSULE, LIQUID FILLED ORAL
Refills: 0 | Status: DISCONTINUED | OUTPATIENT
Start: 2025-06-20 | End: 2025-06-20

## 2025-06-19 RX ORDER — DIPHENHYDRAMINE HCL 12.5MG/5ML
25 ELIXIR ORAL EVERY 8 HOURS
Refills: 0 | Status: DISCONTINUED | OUTPATIENT
Start: 2025-06-19 | End: 2025-06-20

## 2025-06-19 RX ORDER — METOCLOPRAMIDE HCL 10 MG
10 TABLET ORAL THREE TIMES A DAY
Refills: 0 | Status: DISCONTINUED | OUTPATIENT
Start: 2025-06-19 | End: 2025-06-20

## 2025-06-19 RX ADMIN — Medication 2 TABLET(S): at 22:10

## 2025-06-19 RX ADMIN — Medication 1 TABLET(S): at 05:06

## 2025-06-19 RX ADMIN — LEVETIRACETAM 500 MILLIGRAM(S): 10 INJECTION, SOLUTION INTRAVENOUS at 17:14

## 2025-06-19 RX ADMIN — VALGANCICLOVIR 900 MILLIGRAM(S): 450 TABLET, FILM COATED ORAL at 11:46

## 2025-06-19 RX ADMIN — Medication 400 MILLIGRAM(S): at 17:14

## 2025-06-19 RX ADMIN — Medication 400 MILLIGRAM(S): at 11:47

## 2025-06-19 RX ADMIN — TRAMADOL HYDROCHLORIDE 50 MILLIGRAM(S): 50 TABLET, FILM COATED ORAL at 08:03

## 2025-06-19 RX ADMIN — INSULIN GLARGINE-YFGN 5 UNIT(S): 100 INJECTION, SOLUTION SUBCUTANEOUS at 22:21

## 2025-06-19 RX ADMIN — TRAMADOL HYDROCHLORIDE 50 MILLIGRAM(S): 50 TABLET, FILM COATED ORAL at 08:53

## 2025-06-19 RX ADMIN — Medication 1 DOSE(S): at 08:11

## 2025-06-19 RX ADMIN — PREDNISONE 10 MILLIGRAM(S): 20 TABLET ORAL at 05:06

## 2025-06-19 RX ADMIN — INSULIN LISPRO 8 UNIT(S): 100 INJECTION, SOLUTION INTRAVENOUS; SUBCUTANEOUS at 08:04

## 2025-06-19 RX ADMIN — INSULIN LISPRO 8 UNIT(S): 100 INJECTION, SOLUTION INTRAVENOUS; SUBCUTANEOUS at 11:47

## 2025-06-19 RX ADMIN — Medication 9 MILLIGRAM(S): at 22:09

## 2025-06-19 RX ADMIN — TRAMADOL HYDROCHLORIDE 50 MILLIGRAM(S): 50 TABLET, FILM COATED ORAL at 14:25

## 2025-06-19 RX ADMIN — MYCOPHENOLATE MOFETIL 1000 MILLIGRAM(S): 500 TABLET, FILM COATED ORAL at 19:41

## 2025-06-19 RX ADMIN — Medication 10 MILLIGRAM(S): at 22:10

## 2025-06-19 RX ADMIN — Medication 81 MILLIGRAM(S): at 11:47

## 2025-06-19 RX ADMIN — Medication 400 MILLIGRAM(S): at 08:03

## 2025-06-19 RX ADMIN — MYCOPHENOLATE MOFETIL 1000 MILLIGRAM(S): 500 TABLET, FILM COATED ORAL at 08:04

## 2025-06-19 RX ADMIN — TRAMADOL HYDROCHLORIDE 50 MILLIGRAM(S): 50 TABLET, FILM COATED ORAL at 15:20

## 2025-06-19 RX ADMIN — ENTECAVIR 0.5 MILLIGRAM(S): 0.5 TABLET ORAL at 11:47

## 2025-06-19 RX ADMIN — HEPARIN SODIUM 5000 UNIT(S): 1000 INJECTION INTRAVENOUS; SUBCUTANEOUS at 05:07

## 2025-06-19 RX ADMIN — CYCLOSPORINE 75 MILLIGRAM(S): 100 CAPSULE, LIQUID FILLED ORAL at 08:04

## 2025-06-19 RX ADMIN — TRAMADOL HYDROCHLORIDE 50 MILLIGRAM(S): 50 TABLET, FILM COATED ORAL at 22:39

## 2025-06-19 RX ADMIN — Medication 1 DOSE(S): at 20:08

## 2025-06-19 RX ADMIN — INSULIN LISPRO 5 UNIT(S): 100 INJECTION, SOLUTION INTRAVENOUS; SUBCUTANEOUS at 17:15

## 2025-06-19 RX ADMIN — LEVETIRACETAM 500 MILLIGRAM(S): 10 INJECTION, SOLUTION INTRAVENOUS at 05:06

## 2025-06-19 RX ADMIN — Medication 1 TABLET(S): at 17:14

## 2025-06-19 RX ADMIN — Medication 40 MILLIGRAM(S): at 05:07

## 2025-06-19 RX ADMIN — QUETIAPINE FUMARATE 75 MILLIGRAM(S): 25 TABLET ORAL at 22:09

## 2025-06-19 RX ADMIN — Medication 1 APPLICATION(S): at 11:50

## 2025-06-19 RX ADMIN — HEPARIN SODIUM 5000 UNIT(S): 1000 INJECTION INTRAVENOUS; SUBCUTANEOUS at 17:15

## 2025-06-19 RX ADMIN — SUMATRIPTAN 100 MILLIGRAM(S): 100 TABLET, FILM COATED ORAL at 19:40

## 2025-06-19 RX ADMIN — Medication 1 TABLET(S): at 11:47

## 2025-06-19 RX ADMIN — Medication 30 MILLIGRAM(S): at 05:06

## 2025-06-19 RX ADMIN — TRAMADOL HYDROCHLORIDE 50 MILLIGRAM(S): 50 TABLET, FILM COATED ORAL at 22:09

## 2025-06-19 NOTE — DISCHARGE NOTE NURSING/CASE MANAGEMENT/SOCIAL WORK - NSDCFUADDAPPT_GEN_ALL_CORE_FT
Please follow up with your PCP as soon as possible.    If you are in need of a PCP or a specialist in your area: contact the [facility] Physician Referral Service at [phone].

## 2025-06-19 NOTE — PROGRESS NOTE ADULT - SUBJECTIVE AND OBJECTIVE BOX
No distress    Vital Signs Last 24 Hrs  T(C): 36.6 (06-19-25 @ 19:42), Max: 36.6 (06-19-25 @ 07:59)  T(F): 97.9 (06-19-25 @ 19:42), Max: 97.9 (06-19-25 @ 07:59)  HR: 82 (06-19-25 @ 19:42) (80 - 91)  BP: 129/62 (06-19-25 @ 19:42) (116/63 - 129/62)  RR: 16 (06-19-25 @ 19:42) (16 - 16)  SpO2: 98% (06-19-25 @ 08:12) (98% - 98%)    I&O's Detail    18 Jun 2025 07:01  -  19 Jun 2025 07:00  --------------------------------------------------------  IN:    Oral Fluid: 720 mL  Total IN: 720 mL    OUT:    Voided (mL): 1350 mL  Total OUT: 1350 mL    19 Jun 2025 07:01  -  19 Jun 2025 20:42  --------------------------------------------------------  OUT:    Voided (mL): 400 mL  Total OUT: 400 m    s1s2  b/l air entry  soft, ND  no edema                                9.2    3.70  )-----------( 72       ( 19 Jun 2025 06:45 )             27.5     19 Jun 2025 06:45    137    |  104    |  17     ----------------------------<  129    4.4     |  24     |  1.21     Ca    8.7        19 Jun 2025 06:45  Phos  4.2       19 Jun 2025 06:45  Mg     1.9       19 Jun 2025 06:45    TPro  5.3    /  Alb  2.9    /  TBili  0.8    /  DBili  x      /  AST  24     /  ALT  33     /  AlkPhos  147    19 Jun 2025 06:45    LIVER FUNCTIONS - ( 19 Jun 2025 06:45 )  Alb: 2.9 g/dL / Pro: 5.3 g/dL / ALK PHOS: 147 U/L / ALT: 33 U/L / AST: 24 U/L / GGT: x           albuterol    90 MICROgram(s) HFA Inhaler 2 Puff(s) Inhalation every 6 hours PRN  aspirin enteric coated 81 milliGRAM(s) Oral daily  bisacodyl Suppository 10 milliGRAM(s) Rectal daily PRN  calcium carbonate 1250 mG  + Vitamin D (OsCal 500 + D) 1 Tablet(s) Oral two times a day  chlorhexidine 2% Cloths 1 Application(s) Topical daily  dextrose 5%. 1000 milliLiter(s) IV Continuous <Continuous>  dextrose 5%. 1000 milliLiter(s) IV Continuous <Continuous>  dextrose 50% Injectable 25 Gram(s) IV Push once  dextrose 50% Injectable 12.5 Gram(s) IV Push once  dextrose 50% Injectable 25 Gram(s) IV Push once  dextrose Oral Gel 15 Gram(s) Oral once PRN  diphenhydrAMINE 25 milliGRAM(s) Oral every 8 hours PRN  entecavir 0.5 milliGRAM(s) Oral daily  fluticasone propionate/ salmeterol 100-50 MICROgram(s) Diskus 1 Dose(s) Inhalation two times a day  glucagon  Injectable 1 milliGRAM(s) IntraMuscular once  heparin   Injectable 5000 Unit(s) SubCutaneous every 12 hours  insulin glargine Injectable (LANTUS) 5 Unit(s) SubCutaneous at bedtime  insulin lispro (ADMELOG) corrective regimen sliding scale   SubCutaneous three times a day before meals  insulin lispro (ADMELOG) corrective regimen sliding scale   SubCutaneous at bedtime  insulin lispro Injectable (ADMELOG) 8 Unit(s) SubCutaneous with breakfast  insulin lispro Injectable (ADMELOG) 8 Unit(s) SubCutaneous before lunch  insulin lispro Injectable (ADMELOG) 5 Unit(s) SubCutaneous before dinner  lactulose Syrup 10 Gram(s) Oral <User Schedule>  levETIRAcetam 500 milliGRAM(s) Oral two times a day  magnesium oxide 400 milliGRAM(s) Oral three times a day with meals  melatonin 9 milliGRAM(s) Oral at bedtime  metoclopramide 10 milliGRAM(s) Oral three times a day  mycophenolate mofetil 1000 milliGRAM(s) Oral <User Schedule>  NIFEdipine XL 30 milliGRAM(s) Oral daily  pantoprazole    Tablet 40 milliGRAM(s) Oral before breakfast  predniSONE   Tablet 10 milliGRAM(s) Oral daily  QUEtiapine 75 milliGRAM(s) Oral at bedtime  senna 2 Tablet(s) Oral at bedtime  SUMAtriptan 100 milliGRAM(s) Oral <User Schedule>  SUMAtriptan 100 milliGRAM(s) Oral daily PRN  traMADol 25 milliGRAM(s) Oral every 6 hours PRN  traMADol 50 milliGRAM(s) Oral every 6 hours PRN  trimethoprim   80 mG/sulfamethoxazole 400 mG 1 Tablet(s) Oral daily  valGANciclovir 900 milliGRAM(s) Oral daily    Impression/Plan:    ESLD d/t IVEY  S/p OLT on 5/31/25 at [facility]    Stable renal fx so far  F/u BMP, Mg, UO  Avoid nephrotoxins   Rehab    [phone]

## 2025-06-19 NOTE — PROGRESS NOTE ADULT - SUBJECTIVE AND OBJECTIVE BOX
Subjective  Seen and examined with NP.Reports reasonable sleep, but still experiencing head ache with light sensitivity, despite taking sumatriptan daily for last three days.   We discussed adding benadryl and tylenol as recs by neurology during acute care, and she agreed to this  Tolerating oral diet    ROS: No chest or abd pain, no nausea or vomiting LBM 6/18  Intermittent head ache, persisting    Function  - Ambulated 150ft x 2 without device with CS - good arm swing, good basia,  increased fatigue without device  - 12 steps with 1 handrail with CS ascend, Bilateral UE to descend steps step to  pattern  - Ramp ascend descend 10ft with RW CS on ramp  - Negotiated 1 8 inch step with RW with CS, ascend descend with verbal cues for  balance    Vital Signs Last 24 Hrs  T(C): 36.6 (19 Jun 2025 07:59), Max: 37 (18 Jun 2025 20:00)  T(F): 97.9 (19 Jun 2025 07:59), Max: 98.6 (18 Jun 2025 20:00)  HR: 80 (19 Jun 2025 08:12) (80 - 91)  BP: 119/64 (19 Jun 2025 07:59) (116/63 - 129/61)  RR: 16 (19 Jun 2025 07:59) (16 - 16)  SpO2: 98% (19 Jun 2025 08:12) (98% - 98%)  O2 Parameters below as of 19 Jun 2025 08:12  Patient On (Oxygen Delivery Method): room air      Exam  Gen - Comfortable no distress  HEENT - NAD  Neck - Supple, No limited ROM  Pulm - Clear  Cardiovascular - RRR, S1S2, No murmurs  Chest - good chest expansion, good respiratory effort  Abdomen - Soft, non tender, +bowel sounds present  Extremities - No C/C, no calf tenderness, no edema,     Neuro-  AAOX 3, speech normal  MMT 5/5     Sensory - Intact  to LT     Tone - Normal  MSK: No limited ROM  Psychiatric - Mood stable, Affect WNL  Skin: BUE ecchymosis, resolving RUE wound scabbed 2.5x2cm; vertical abd incision 9cm with 14 staples; transverse abd incision 30cm with 38 staples; LUQ drain site 1cm with 3 sutures; RUQ drain site x 2 1) 1cm with 4 sutures 2) 1cm with 3 sutures; RLQ wound scabbed 1.8x0.7cm;    RECENT LABS/IMAGING                        8.5    5.84  )-----------( 81       ( 16 Jun 2025 07:07 )             26.7     06-16    136  |  107  |  18  ----------------------------<  160[H]  5.0   |  20[L]  |  1.38[H]    Ca    8.4      16 Jun 2025 07:07  Phos  4.3     06-16  Mg     1.8     06-16    TPro  5.0[L]  /  Alb  2.3[L]  /  TBili  0.9  /  DBili  x   /  AST  38  /  ALT  40  /  AlkPhos  191[H]  06-16    PT/INR - ( 15 Albert 2025 07:40 )   PT: 11.8 sec;   INR: 1.00 ratio      TACRO 1.4 ON 6/9 down trending     Urinalysis Basic - ( 16 Jun 2025 07:07 )  Color: x / Appearance: x / SG: x / pH: x  Gluc: 160 mg/dL / Ketone: x  / Bili: x / Urobili: x   Blood: x / Protein: x / Nitrite: x   Leuk Esterase: x / RBC: x / WBC x   Sq Epi: x / Non Sq Epi: x / Bacteria: x      MEDICATIONS  (STANDING):  aspirin enteric coated 81 milliGRAM(s) Oral daily  calcium carbonate 1250 mG  + Vitamin D (OsCal 500 + D) 1 Tablet(s) Oral two times a day  chlorhexidine 2% Cloths 1 Application(s) Topical daily  cycloSPORINE  , modified (GENGRAF) 75 milliGRAM(s) Oral two times a day  dextrose 5%. 1000 milliLiter(s) (50 mL/Hr) IV Continuous <Continuous>  dextrose 5%. 1000 milliLiter(s) (100 mL/Hr) IV Continuous <Continuous>  dextrose 50% Injectable 25 Gram(s) IV Push once  dextrose 50% Injectable 12.5 Gram(s) IV Push once  dextrose 50% Injectable 25 Gram(s) IV Push once  entecavir 0.5 milliGRAM(s) Oral daily  fluticasone propionate/ salmeterol 100-50 MICROgram(s) Diskus 1 Dose(s) Inhalation two times a day  glucagon  Injectable 1 milliGRAM(s) IntraMuscular once  heparin   Injectable 5000 Unit(s) SubCutaneous every 12 hours  insulin glargine Injectable (LANTUS) 5 Unit(s) SubCutaneous at bedtime  insulin lispro (ADMELOG) corrective regimen sliding scale   SubCutaneous three times a day before meals  insulin lispro (ADMELOG) corrective regimen sliding scale   SubCutaneous at bedtime  insulin lispro Injectable (ADMELOG) 8 Unit(s) SubCutaneous with breakfast  insulin lispro Injectable (ADMELOG) 8 Unit(s) SubCutaneous before lunch  insulin lispro Injectable (ADMELOG) 5 Unit(s) SubCutaneous before dinner  lactulose Syrup 10 Gram(s) Oral <User Schedule>  levETIRAcetam 500 milliGRAM(s) Oral two times a day  magnesium oxide 400 milliGRAM(s) Oral three times a day with meals  melatonin 9 milliGRAM(s) Oral at bedtime  mycophenolate mofetil 1000 milliGRAM(s) Oral <User Schedule>  NIFEdipine XL 30 milliGRAM(s) Oral daily  pantoprazole    Tablet 40 milliGRAM(s) Oral before breakfast  predniSONE   Tablet 10 milliGRAM(s) Oral daily  QUEtiapine 75 milliGRAM(s) Oral at bedtime  senna 2 Tablet(s) Oral at bedtime  SUMAtriptan 100 milliGRAM(s) Oral <User Schedule>  trimethoprim   80 mG/sulfamethoxazole 400 mG 1 Tablet(s) Oral daily  valGANciclovir 900 milliGRAM(s) Oral daily    MEDICATIONS  (PRN):  albuterol    90 MICROgram(s) HFA Inhaler 2 Puff(s) Inhalation every 6 hours PRN Shortness of Breath and/or Wheezing  bisacodyl Suppository 10 milliGRAM(s) Rectal daily PRN Refractory Constipation  dextrose Oral Gel 15 Gram(s) Oral once PRN Blood Glucose LESS THAN 70 milliGRAM(s)/deciliter  SUMAtriptan 100 milliGRAM(s) Oral daily PRN Migraine  traMADol 25 milliGRAM(s) Oral every 6 hours PRN Moderate Pain (4 - 6)  traMADol 50 milliGRAM(s) Oral every 6 hours PRN Severe Pain (7 - 10)

## 2025-06-19 NOTE — PROGRESS NOTE ADULT - ASSESSMENT
70 F with PMHx NAFLD cirrhosis, migraines, T2DM, COPD, and HCC (listed for liver transplant with MELD 20B), UGIB, chronic back pain 2/2 spinal fracture, originally presented to [facility] on  for coffee ground emesis and melena, EGD showed no active bleeding, patient was transfused and stabilized prior to transfer to [facility] on  for further management. Patient denies bloody bowel movements or bloody emesis since . Patient endorses low back pain and frontal lobe headache, Neurology consulted for persistent headache, recommended to start nortriptyline. When the appropriate organ was available, the patient was brought to OR and S/p OLT from a  doner under steroids and Simulect induction with Dr. Caicedo on . Now admitted to [facility] for functional deficits and initiation of a multidisciplinary rehab program consisting focused on functional mobility, transfers and ADLs.    LIVER TRANSPLANT PATIENT: DO NOT COMMENCE ANY NEW MEDICATION OR CHANGE TREATMENT PLAN WITHOUT CLEARANCE FROM TRANSPLANT TEAM OR PRIMARY REHAB TEAM:   24 hr contact in provider hand off and 24hr teams vincent    * Sustained functional improvement   * Migrane persisting--If cleared with transplant team, will commence the regimen recommended by Neurology during acute (benadryl,reglan, tylenol)  * DC planning and update d/w family    # Debility 2/2 Liver transplant due to decompensated liver disease  - HBV Core + donor: Entecavir 0.5mg daily  - Good graft  function   - S/p solumedrol ---> Prednisone   - Pain Management: Tramadol PRN   - ASA 81mg daily   - Hallucinations -->  holding FK, BH following; on seroquel, UA: Neg. keppra ppx   - FK stopped, switched to Cyclo for mental status changes,  MMF , Pred 20  - PPx: Bactrim, Valcyte 450 (CMV +/-, inc 900mg as able), Fluc, PPI, OsCal  - Simulect completed    Comprehensive Multidisciplinary Rehab Program:  - Continue comprehensive rehab program, 3 hours a day, 5 days a week..      Rehab Dx/Impairments  Limited ambulatory distance   Gait disturbance  Head ache    - Activity Limitations: Decreased social, vocational and leisure activities, decreased self care and ADLs, decreased mobility, decreased ability to manage household and finances.       Concurrent Medical Problems    #Migraines--revised dosing sumatriptan 100mg qhs and od/prn  -If sumatriptan desired, would give as 100mg PO BID prn (no more than 2 doses in a day and 3 doses/week)    #COPD  -Advair daily   -Albuterol PRN    #SCOTT/CKD -continue liaison with Nephrology  -Avoid nephrotoxic meds       #Transaminitis (improving)  -Trend LFTs    #Acute blood loss anemia  -S/p multiple blood products  -Trend H/H  -Transfuse if hgb <7 PRN    #Type 2 Diabetes Mellitus  #Steroid induced hyperglycemia   -A1c 6.0  -FBG ACHS + Mod ISS  -Lantus 5U HS  -Admelog 12U breakfast; 8U Lunch; 5U dinner     #HTN/HLD  -Nifedipine 30mg daily     #Mood/Cognition  #Sleep disturbance  #Depression  #Hallucinations   -Seroquel 75mg HS   -Neuropsychology consult PRN  -Maintain quiet hours and low stim environment.  -Melatonin 6mg HS PRN to maximize participation in therapy during the day.     #GI/Bowel:  Senna QHS, Miralax Daily    #/Bladder  # E.Faecium UTI, asymptomatic  - Zosyn/Vanco completed  -Cr Stable     #Skin/Pressure Injury:   - Skin-- BUE ecchymosis; RUE wound scabbed 2.5x2cm; vertical abd incision 9cm with 14 staples; transverse abd incision 30cm with 38 staples; LUQ drain site 1cm with 3 sutures; RUQ drain site x 2 1) 1cm with 4 sutures 2) 1cm with 3 sutures; RLQ wound scabbed 1.8x0.7cm; R hip scratch marks; blanchable redness sacrum/buttocks; R groin and perineal area ecchymosis    #Diet/health maintenance    SLP consult for swallow function evaluation and treatment  -Current Diet: Carb consistent diet    [X] Aspiration Precautions  -Nutrition consult   -Magnesium oxide 400mg TID    #Precautions / PROPHYLAXIS:   - Falls  - ortho: Weight bearing status: WBAT   - Lungs: Aspiration, Incentive Spirometer   - DVT ppx: Heparin, TEDs  - Pressure injury/Skin: TOOB to Chair, PT/OT      IDT   Ambulating 150ft with RW CG, with or without device  12 stairs with 1 hand rail   Est dc     Code Status: FULL  Emergency Contact:    Outpatient Follow-up (Specialty/Name of physician):    Esvin Jim Yuriy  Surgery  [address] [place] [address]  Phone: [phone]  Fax: [phone]    Skylar Julian  Transplant Hepatology  [address] [place] [address]  Phone: [phone]  Fax: [phone]    Lulu Torres  [facility]  [address] [facility]  Scheduled Appointment: 2025      1. Follow up with Dr. Hatfield / Psych Clinic  2. Follow up with Neurology for further headache management plan

## 2025-06-19 NOTE — DISCHARGE NOTE NURSING/CASE MANAGEMENT/SOCIAL WORK - PATIENT PORTAL LINK FT
You can access the FollowMyHealth Patient Portal offered by [facility] by registering at the following website: [email or web address]. By joining [facility]’s FollowMyHealth portal, you will also be able to view your health information using other applications (apps) compatible with our system.

## 2025-06-19 NOTE — DISCHARGE NOTE NURSING/CASE MANAGEMENT/SOCIAL WORK - FINANCIAL ASSISTANCE
[facility] provides services at a reduced cost to those who are determined to be eligible through [facility]’s financial assistance program. Information regarding [facility]’s financial assistance program can be found by going to [email or web address] or by calling [phone].

## 2025-06-20 VITALS — OXYGEN SATURATION: 97 %

## 2025-06-20 PROBLEM — C22.0 LIVER CELL CARCINOMA: Chronic | Status: ACTIVE | Noted: 2025-06-12

## 2025-06-20 PROBLEM — E78.5 HYPERLIPIDEMIA, UNSPECIFIED: Chronic | Status: ACTIVE | Noted: 2025-06-12

## 2025-06-20 PROBLEM — I10 ESSENTIAL (PRIMARY) HYPERTENSION: Chronic | Status: ACTIVE | Noted: 2025-06-12

## 2025-06-20 LAB
ALBUMIN SERPL ELPH-MCNC: 2.7 G/DL — LOW (ref 3.3–5)
ALP SERPL-CCNC: 163 U/L — HIGH (ref 40–120)
ALT FLD-CCNC: 26 U/L — SIGNIFICANT CHANGE UP (ref 10–45)
ANION GAP SERPL CALC-SCNC: 6 MMOL/L — SIGNIFICANT CHANGE UP (ref 5–17)
AST SERPL-CCNC: 20 U/L — SIGNIFICANT CHANGE UP (ref 10–40)
BILIRUB SERPL-MCNC: 0.6 MG/DL — SIGNIFICANT CHANGE UP (ref 0.2–1.2)
BUN SERPL-MCNC: 18 MG/DL — SIGNIFICANT CHANGE UP (ref 7–23)
CALCIUM SERPL-MCNC: 8.4 MG/DL — SIGNIFICANT CHANGE UP (ref 8.4–10.5)
CHLORIDE SERPL-SCNC: 108 MMOL/L — SIGNIFICANT CHANGE UP (ref 96–108)
CO2 SERPL-SCNC: 26 MMOL/L — SIGNIFICANT CHANGE UP (ref 22–31)
CREAT SERPL-MCNC: 1.14 MG/DL — SIGNIFICANT CHANGE UP (ref 0.5–1.3)
CYCLOSPORINE SER-MCNC: 182 NG/ML — SIGNIFICANT CHANGE UP (ref 150–400)
EGFR: 52 ML/MIN/1.73M2 — LOW
EGFR: 52 ML/MIN/1.73M2 — LOW
GLUCOSE BLDC GLUCOMTR-MCNC: 111 MG/DL — HIGH (ref 70–99)
GLUCOSE BLDC GLUCOMTR-MCNC: 151 MG/DL — HIGH (ref 70–99)
GLUCOSE SERPL-MCNC: 142 MG/DL — HIGH (ref 70–99)
POTASSIUM SERPL-MCNC: 4.6 MMOL/L — SIGNIFICANT CHANGE UP (ref 3.5–5.3)
POTASSIUM SERPL-SCNC: 4.6 MMOL/L — SIGNIFICANT CHANGE UP (ref 3.5–5.3)
PROT SERPL-MCNC: 4.9 G/DL — LOW (ref 6–8.3)
SODIUM SERPL-SCNC: 140 MMOL/L — SIGNIFICANT CHANGE UP (ref 135–145)

## 2025-06-20 PROCEDURE — 82962 GLUCOSE BLOOD TEST: CPT

## 2025-06-20 PROCEDURE — 92523 SPEECH SOUND LANG COMPREHEN: CPT | Mod: GN

## 2025-06-20 PROCEDURE — 97535 SELF CARE MNGMENT TRAINING: CPT | Mod: GO

## 2025-06-20 PROCEDURE — 92610 EVALUATE SWALLOWING FUNCTION: CPT | Mod: GN

## 2025-06-20 PROCEDURE — 80048 BASIC METABOLIC PNL TOTAL CA: CPT

## 2025-06-20 PROCEDURE — 83036 HEMOGLOBIN GLYCOSYLATED A1C: CPT

## 2025-06-20 PROCEDURE — 84300 ASSAY OF URINE SODIUM: CPT

## 2025-06-20 PROCEDURE — 80053 COMPREHEN METABOLIC PANEL: CPT

## 2025-06-20 PROCEDURE — 94640 AIRWAY INHALATION TREATMENT: CPT

## 2025-06-20 PROCEDURE — 82570 ASSAY OF URINE CREATININE: CPT

## 2025-06-20 PROCEDURE — 84100 ASSAY OF PHOSPHORUS: CPT

## 2025-06-20 PROCEDURE — 36415 COLL VENOUS BLD VENIPUNCTURE: CPT

## 2025-06-20 PROCEDURE — 97161 PT EVAL LOW COMPLEX 20 MIN: CPT | Mod: GP

## 2025-06-20 PROCEDURE — 99238 HOSP IP/OBS DSCHRG MGMT 30/<: CPT

## 2025-06-20 PROCEDURE — 81001 URINALYSIS AUTO W/SCOPE: CPT

## 2025-06-20 PROCEDURE — 80158 DRUG ASSAY CYCLOSPORINE: CPT

## 2025-06-20 PROCEDURE — 83735 ASSAY OF MAGNESIUM: CPT

## 2025-06-20 PROCEDURE — 94664 DEMO&/EVAL PT USE INHALER: CPT

## 2025-06-20 PROCEDURE — 97110 THERAPEUTIC EXERCISES: CPT | Mod: GO

## 2025-06-20 PROCEDURE — 82247 BILIRUBIN TOTAL: CPT

## 2025-06-20 PROCEDURE — 85610 PROTHROMBIN TIME: CPT

## 2025-06-20 PROCEDURE — 99232 SBSQ HOSP IP/OBS MODERATE 35: CPT

## 2025-06-20 PROCEDURE — 87635 SARS-COV-2 COVID-19 AMP PRB: CPT

## 2025-06-20 PROCEDURE — 85025 COMPLETE CBC W/AUTO DIFF WBC: CPT

## 2025-06-20 PROCEDURE — 97116 GAIT TRAINING THERAPY: CPT | Mod: GP

## 2025-06-20 PROCEDURE — 97530 THERAPEUTIC ACTIVITIES: CPT | Mod: GO

## 2025-06-20 RX ORDER — DIPHENHYDRAMINE HCL 12.5MG/5ML
1 ELIXIR ORAL
Qty: 90 | Refills: 0
Start: 2025-06-20 | End: 2025-07-19

## 2025-06-20 RX ORDER — METOCLOPRAMIDE HCL 10 MG
1 TABLET ORAL
Qty: 90 | Refills: 0
Start: 2025-06-20 | End: 2025-07-19

## 2025-06-20 RX ADMIN — PREDNISONE 10 MILLIGRAM(S): 20 TABLET ORAL at 05:15

## 2025-06-20 RX ADMIN — LEVETIRACETAM 500 MILLIGRAM(S): 10 INJECTION, SOLUTION INTRAVENOUS at 05:14

## 2025-06-20 RX ADMIN — Medication 81 MILLIGRAM(S): at 11:20

## 2025-06-20 RX ADMIN — Medication 400 MILLIGRAM(S): at 11:21

## 2025-06-20 RX ADMIN — Medication 1 APPLICATION(S): at 11:25

## 2025-06-20 RX ADMIN — Medication 1 TABLET(S): at 05:14

## 2025-06-20 RX ADMIN — Medication 400 MILLIGRAM(S): at 08:03

## 2025-06-20 RX ADMIN — VALGANCICLOVIR 900 MILLIGRAM(S): 450 TABLET, FILM COATED ORAL at 11:20

## 2025-06-20 RX ADMIN — Medication 1 TABLET(S): at 11:20

## 2025-06-20 RX ADMIN — ENTECAVIR 0.5 MILLIGRAM(S): 0.5 TABLET ORAL at 11:20

## 2025-06-20 RX ADMIN — Medication 40 MILLIGRAM(S): at 05:14

## 2025-06-20 RX ADMIN — INSULIN LISPRO 8 UNIT(S): 100 INJECTION, SOLUTION INTRAVENOUS; SUBCUTANEOUS at 08:03

## 2025-06-20 RX ADMIN — MYCOPHENOLATE MOFETIL 1000 MILLIGRAM(S): 500 TABLET, FILM COATED ORAL at 08:03

## 2025-06-20 RX ADMIN — Medication 10 MILLIGRAM(S): at 13:37

## 2025-06-20 RX ADMIN — INSULIN LISPRO 8 UNIT(S): 100 INJECTION, SOLUTION INTRAVENOUS; SUBCUTANEOUS at 11:21

## 2025-06-20 RX ADMIN — INSULIN LISPRO 2: 100 INJECTION, SOLUTION INTRAVENOUS; SUBCUTANEOUS at 08:04

## 2025-06-20 RX ADMIN — Medication 1 DOSE(S): at 08:15

## 2025-06-20 RX ADMIN — Medication 30 MILLIGRAM(S): at 05:14

## 2025-06-20 RX ADMIN — Medication 10 MILLIGRAM(S): at 05:14

## 2025-06-20 RX ADMIN — SUMATRIPTAN 100 MILLIGRAM(S): 100 TABLET, FILM COATED ORAL at 14:37

## 2025-06-20 RX ADMIN — CYCLOSPORINE 25 MILLIGRAM(S): 100 CAPSULE, LIQUID FILLED ORAL at 08:03

## 2025-06-20 RX ADMIN — HEPARIN SODIUM 5000 UNIT(S): 1000 INJECTION INTRAVENOUS; SUBCUTANEOUS at 05:15

## 2025-06-20 RX ADMIN — SUMATRIPTAN 100 MILLIGRAM(S): 100 TABLET, FILM COATED ORAL at 13:37

## 2025-06-20 NOTE — PROGRESS NOTE ADULT - SUBJECTIVE AND OBJECTIVE BOX
Subjective  Seen and examined with NP.Reports good night rest, head ache very minimal and short duration  Appears to have responded to revised treatment regimen as recs by neurology  Tolerating oral diet    ROS: No chest or abd pain, no nausea or vomiting LBM 6/18  Head ache transient and mild    Function  - Ambulated 150ft x 2 without device with CS - good arm swing, good basia,  increased fatigue without device  - 12 steps with 1 handrail with CS ascend, Bilateral UE to descend steps step to  pattern  - Ramp ascend descend 10ft with RW CS on ramp  - Negotiated 1 8 inch step with RW with CS, ascend descend with verbal cues for  balance    Vital Signs Last 24 Hrs  T(C): 36.6 (20 Jun 2025 07:37), Max: 36.6 (19 Jun 2025 19:42)  T(F): 97.8 (20 Jun 2025 07:37), Max: 97.9 (19 Jun 2025 19:42)  HR: 79 (20 Jun 2025 07:37) (79 - 85)  BP: 122/69 (20 Jun 2025 07:37) (115/71 - 129/62)  RR: 16 (20 Jun 2025 07:37) (16 - 16)  SpO2: 97% (20 Jun 2025 07:37) (97% - 98%)  O2 Parameters below as of 20 Jun 2025 07:37  Patient On (Oxygen Delivery Method): room air    Exam  Gen - Comfortabl  HEENT - NAD  Neck - Supple, No limited ROM  Pulm - Clear  Cardiovascular - RRR, S1S2, No murmurs  Chest - good chest expansion, good respiratory effort  Abdomen - Soft, non tender, +bowel sounds present  Extremities - No C/C, no calf tenderness, no edema,     Neuro-  AAOX 3, speech normal  MMT 5/5     Sensory - Intact  to LT     Tone - Normal  MSK: No limited ROM  Psychiatric - Mood stable, Affect WNL  Skin: BUE ecchymosis, resolving RUE wound scabbed 2.5x2cm; vertical abd incision 9cm with 14 staples; transverse abd incision 30cm with 38 staples; LUQ drain site 1cm with 3 sutures; RUQ drain site x 2 1) 1cm with 4 sutures 2) 1cm with 3 sutures; RLQ wound scabbed 1.8x0.7cm;    RECENT LABS/IMAGING                        8.5    5.84  )-----------( 81       ( 16 Jun 2025 07:07 )             26.7     06-16    136  |  107  |  18  ----------------------------<  160[H]  5.0   |  20[L]  |  1.38[H]    Ca    8.4      16 Jun 2025 07:07  Phos  4.3     06-16  Mg     1.8     06-16    TPro  5.0[L]  /  Alb  2.3[L]  /  TBili  0.9  /  DBili  x   /  AST  38  /  ALT  40  /  AlkPhos  191[H]  06-16    PT/INR - ( 15 Albert 2025 07:40 )   PT: 11.8 sec;   INR: 1.00 ratio      TACRO 1.4 ON 6/9 down trending     Urinalysis Basic - ( 16 Jun 2025 07:07 )  Color: x / Appearance: x / SG: x / pH: x  Gluc: 160 mg/dL / Ketone: x  / Bili: x / Urobili: x   Blood: x / Protein: x / Nitrite: x   Leuk Esterase: x / RBC: x / WBC x   Sq Epi: x / Non Sq Epi: x / Bacteria: x    MEDICATIONS  (STANDING):  aspirin enteric coated 81 milliGRAM(s) Oral daily  calcium carbonate 1250 mG  + Vitamin D (OsCal 500 + D) 1 Tablet(s) Oral two times a day  chlorhexidine 2% Cloths 1 Application(s) Topical daily  cycloSPORINE  , modified (GENGRAF) 25 milliGRAM(s) Oral two times a day  dextrose 5%. 1000 milliLiter(s) (50 mL/Hr) IV Continuous <Continuous>  dextrose 5%. 1000 milliLiter(s) (100 mL/Hr) IV Continuous <Continuous>  dextrose 50% Injectable 25 Gram(s) IV Push once  dextrose 50% Injectable 12.5 Gram(s) IV Push once  dextrose 50% Injectable 25 Gram(s) IV Push once  entecavir 0.5 milliGRAM(s) Oral daily  fluticasone propionate/ salmeterol 100-50 MICROgram(s) Diskus 1 Dose(s) Inhalation two times a day  glucagon  Injectable 1 milliGRAM(s) IntraMuscular once  heparin   Injectable 5000 Unit(s) SubCutaneous every 12 hours  insulin glargine Injectable (LANTUS) 5 Unit(s) SubCutaneous at bedtime  insulin lispro (ADMELOG) corrective regimen sliding scale   SubCutaneous three times a day before meals  insulin lispro (ADMELOG) corrective regimen sliding scale   SubCutaneous at bedtime  insulin lispro Injectable (ADMELOG) 8 Unit(s) SubCutaneous before lunch  insulin lispro Injectable (ADMELOG) 5 Unit(s) SubCutaneous before dinner  insulin lispro Injectable (ADMELOG) 8 Unit(s) SubCutaneous with breakfast  lactulose Syrup 10 Gram(s) Oral <User Schedule>  levETIRAcetam 500 milliGRAM(s) Oral two times a day  magnesium oxide 400 milliGRAM(s) Oral three times a day with meals  melatonin 9 milliGRAM(s) Oral at bedtime  metoclopramide 10 milliGRAM(s) Oral three times a day  mycophenolate mofetil 1000 milliGRAM(s) Oral <User Schedule>  NIFEdipine XL 30 milliGRAM(s) Oral daily  pantoprazole    Tablet 40 milliGRAM(s) Oral before breakfast  predniSONE   Tablet 10 milliGRAM(s) Oral daily  QUEtiapine 75 milliGRAM(s) Oral at bedtime  senna 2 Tablet(s) Oral at bedtime  SUMAtriptan 100 milliGRAM(s) Oral <User Schedule>  trimethoprim   80 mG/sulfamethoxazole 400 mG 1 Tablet(s) Oral daily  valGANciclovir 900 milliGRAM(s) Oral daily    MEDICATIONS  (PRN):  albuterol    90 MICROgram(s) HFA Inhaler 2 Puff(s) Inhalation every 6 hours PRN Shortness of Breath and/or Wheezing  bisacodyl Suppository 10 milliGRAM(s) Rectal daily PRN Refractory Constipation  dextrose Oral Gel 15 Gram(s) Oral once PRN Blood Glucose LESS THAN 70 milliGRAM(s)/deciliter  diphenhydrAMINE 25 milliGRAM(s) Oral every 8 hours PRN Rash and/or Itching  SUMAtriptan 100 milliGRAM(s) Oral daily PRN Migraine  traMADol 25 milliGRAM(s) Oral every 6 hours PRN Moderate Pain (4 - 6)  traMADol 50 milliGRAM(s) Oral every 6 hours PRN Severe Pain (7 - 10)

## 2025-06-20 NOTE — PROGRESS NOTE ADULT - ASSESSMENT
70 F with PMHx NAFLD cirrhosis, migraines, T2DM, COPD, and HCC (listed for liver transplant with MELD 20B), UGIB, chronic back pain 2/2 spinal fracture, [facility]  for coffee ground emesis and melena, EGD showed no active bleeding, patient was transfused and stabilized prior to transfer to [facility] on  for further management. Patient denies bloody bowel movements or bloody emesis since . Patient endorses low back pain and frontal lobe headache, Neurology consulted for persistent headache, recommended to start nortriptyline. When the appropriate organ was available, the patient was brought to OR and S/p OLT from a  doner under steroids and Simulect induction with Dr. Caicedo on . Now admitted to [facility] for functional deficits and initiation of a multidisciplinary rehab program consisting focused on functional mobility, transfers and ADLs.    LIVER TRANSPLANT PATIENT: DO NOT COMMENCE ANY NEW MEDICATION OR CHANGE TREATMENT PLAN WITHOUT CLEARANCE FROM TRANSPLANT TEAM OR PRIMARY REHAB TEAM:   24 hr contact in provider hand off and 24hr teams vincent    * Sustained functional improvement   * Migrane responsive to treatment--c/w prn sumatriptan daily max 3 day/wk and prn, benadryl,reglan, tylenol  * Await repeat cyclosporine level (elevated 335 on ), will review result before DC today  * Staples removal today    * Update on treatment d/w  yesterday    # Debility 2/2 Liver transplant due to decompensated liver disease  - HBV Core + donor: Entecavir 0.5mg daily  - Good graft  function   - S/p solumedrol ---> Prednisone   - Pain Management: Tramadol PRN   - ASA 81mg daily   - Hallucinations -->  holding FK, BH following; on seroquel, UA: Neg. keppra ppx   - FK stopped, switched to Cyclo for mental status changes,  MMF , Pred 20  - PPx: Bactrim, Valcyte 450 (CMV +/-, inc 900mg as able), Fluc, PPI, OsCal  - Simulect completed    Comprehensive Multidisciplinary Rehab Program:  - Continue comprehensive rehab program, 3 hours a day, 5 days a week..      Rehab Dx/Impairments  Limited ambulatory distance   Gait disturbance  Head ache    - Activity Limitations: Decreased social, vocational and leisure activities, decreased self care and ADLs, decreased mobility, decreased ability to manage household and finances.     Concurrent Medical Problems    #Migraines--revised dosing sumatriptan 100mg qhs and od/prn  -If sumatriptan desired, would give as 100mg PO BID prn (no more than 2 doses in a day and 3 doses/week)    #COPD  -Advair daily   -Albuterol PRN    #SCOTT/CKD -continue liaison with Nephrology  -Avoid nephrotoxic meds     #Transaminitis (improving)  -Trend LFTs    #Acute blood loss anemia  -S/p multiple blood products  -Trend H/H  -Transfuse if hgb <7 PRN    #Type 2 Diabetes Mellitus  #Steroid induced hyperglycemia   -A1c 6.0  -FBG ACHS + Mod ISS  -Lantus 5U HS  -Admelog 12U breakfast; 8U Lunch; 5U dinner     #HTN/HLD  -Nifedipine 30mg daily     #Mood/Cognition  #Sleep disturbance  #Depression  #Hallucinations   -Seroquel 75mg HS   -Neuropsychology consult PRN  -Maintain quiet hours and low stim environment.  -Melatonin 6mg HS PRN to maximize participation in therapy during the day.     #GI/Bowel:  Senna QHS, Miralax Daily    #/Bladder  # E.Faecium UTI, asymptomatic  - Zosyn/Vanco completed  -Cr Stable     #Skin/Pressure Injury:   - Skin-- BUE ecchymosis; RUE wound scabbed 2.5x2cm; vertical abd incision 9cm with 14 staples; transverse abd incision 30cm with 38 staples; LUQ drain site 1cm with 3 sutures; RUQ drain site x 2 1) 1cm with 4 sutures 2) 1cm with 3 sutures; RLQ wound scabbed 1.8x0.7cm; R hip scratch marks; blanchable redness sacrum/buttocks; R groin and perineal area ecchymosis    #Diet/health maintenance    SLP consult for swallow function evaluation and treatment  -Current Diet: Carb consistent diet    [X] Aspiration Precautions  -Nutrition consult   -Magnesium oxide 400mg TID    #Precautions / PROPHYLAXIS:   - Falls  - ortho: Weight bearing status: WBAT   - Lungs: Aspiration, Incentive Spirometer   - DVT ppx: Heparin, TEDs  - Pressure injury/Skin: TOOB to Chair, PT/OT      --I called on ph and d/w patient's , her clinical and functional progress, liaison with transplant team, revision of migrane treatment, plan for repeat cyclosporine level tomorrow before dc and post dc f/u with transplant team (once/wk clinic visit)  I told him to wait at home till we confirm dc tomorrow, before coming for     IDT   Ambulating 150ft with RW CG, with or without device  12 stairs with 1 hand rail   Est dc     Code Status: FULL  Emergency Contact:    Outpatient Follow-up (Specialty/Name of physician):    Esvin Jim Yuriy  Surgery  [address] [place] [address]  Phone: [phone]  Fax: [phone]    Skylar Julian  Transplant Hepatology  [address] [place] [address]  Phone: [phone]  Fax: [phone]    Lulu Torres  [facility]  [address] [facility]  Scheduled Appointment: 2025      1. Follow up with Dr. Hatfield / Psych Clinic  2. Follow up with Neurology for further headache management plan

## 2025-06-20 NOTE — PROGRESS NOTE ADULT - PROVIDER SPECIALTY LIST ADULT
Hospitalist
Nephrology
Nephrology
Neuropsychology
Rehab Medicine
Rehab Medicine
Hospitalist
Nephrology
Nephrology
Rehab Medicine
Hospitalist
Nephrology
Nephrology
Rehab Medicine
Rehab Medicine
Hospitalist
Rehab Medicine
Rehab Medicine
Hospitalist

## 2025-06-20 NOTE — PROGRESS NOTE ADULT - SUBJECTIVE AND OBJECTIVE BOX
PROGRESS NOTE:     Patient is a 70y old  Female who presents with a chief complaint of Debility 2/2 Liver transplant due to decompensative liver disease (17 Jun 2025 19:21)      SUBJECTIVE & OBJECTIVE:   Pt seen and examined at bedside in AM  No new complaints, no acute events overnight.       MEDICATIONS  (STANDING):  aspirin enteric coated 81 milliGRAM(s) Oral daily  calcium carbonate 1250 mG  + Vitamin D (OsCal 500 + D) 1 Tablet(s) Oral two times a day  chlorhexidine 2% Cloths 1 Application(s) Topical daily  cycloSPORINE  , modified (GENGRAF) 25 milliGRAM(s) Oral two times a day  dextrose 5%. 1000 milliLiter(s) (50 mL/Hr) IV Continuous <Continuous>  dextrose 5%. 1000 milliLiter(s) (100 mL/Hr) IV Continuous <Continuous>  dextrose 50% Injectable 25 Gram(s) IV Push once  dextrose 50% Injectable 12.5 Gram(s) IV Push once  dextrose 50% Injectable 25 Gram(s) IV Push once  entecavir 0.5 milliGRAM(s) Oral daily  fluticasone propionate/ salmeterol 100-50 MICROgram(s) Diskus 1 Dose(s) Inhalation two times a day  glucagon  Injectable 1 milliGRAM(s) IntraMuscular once  heparin   Injectable 5000 Unit(s) SubCutaneous every 12 hours  insulin glargine Injectable (LANTUS) 5 Unit(s) SubCutaneous at bedtime  insulin lispro (ADMELOG) corrective regimen sliding scale   SubCutaneous three times a day before meals  insulin lispro (ADMELOG) corrective regimen sliding scale   SubCutaneous at bedtime  insulin lispro Injectable (ADMELOG) 8 Unit(s) SubCutaneous before lunch  insulin lispro Injectable (ADMELOG) 5 Unit(s) SubCutaneous before dinner  insulin lispro Injectable (ADMELOG) 8 Unit(s) SubCutaneous with breakfast  lactulose Syrup 10 Gram(s) Oral <User Schedule>  levETIRAcetam 500 milliGRAM(s) Oral two times a day  magnesium oxide 400 milliGRAM(s) Oral three times a day with meals  melatonin 9 milliGRAM(s) Oral at bedtime  metoclopramide 10 milliGRAM(s) Oral three times a day  mycophenolate mofetil 1000 milliGRAM(s) Oral <User Schedule>  NIFEdipine XL 30 milliGRAM(s) Oral daily  pantoprazole    Tablet 40 milliGRAM(s) Oral before breakfast  predniSONE   Tablet 10 milliGRAM(s) Oral daily  QUEtiapine 75 milliGRAM(s) Oral at bedtime  senna 2 Tablet(s) Oral at bedtime  SUMAtriptan 100 milliGRAM(s) Oral <User Schedule>  trimethoprim   80 mG/sulfamethoxazole 400 mG 1 Tablet(s) Oral daily  valGANciclovir 900 milliGRAM(s) Oral daily    MEDICATIONS  (PRN):  albuterol    90 MICROgram(s) HFA Inhaler 2 Puff(s) Inhalation every 6 hours PRN Shortness of Breath and/or Wheezing  bisacodyl Suppository 10 milliGRAM(s) Rectal daily PRN Refractory Constipation  dextrose Oral Gel 15 Gram(s) Oral once PRN Blood Glucose LESS THAN 70 milliGRAM(s)/deciliter  diphenhydrAMINE 25 milliGRAM(s) Oral every 8 hours PRN Rash and/or Itching  SUMAtriptan 100 milliGRAM(s) Oral daily PRN Migraine  traMADol 25 milliGRAM(s) Oral every 6 hours PRN Moderate Pain (4 - 6)  traMADol 50 milliGRAM(s) Oral every 6 hours PRN Severe Pain (7 - 10)    T(C): 36.6 (06-20-25 @ 07:37), Max: 36.6 (06-19-25 @ 19:42)  T(F): 97.8 (06-20-25 @ 07:37), Max: 97.9 (06-19-25 @ 19:42)  HR: 73 (06-20-25 @ 09:19) (73 - 85)  BP: 122/69 (06-20-25 @ 07:37) (115/71 - 129/62)  ABP: --  ABP(mean): --  RR: 16 (06-20-25 @ 07:37) (16 - 16)  SpO2: 97% (06-20-25 @ 09:19) (97% - 98%)      GENERAL: NAD,  no increased WOB  HEAD:  Atraumatic, Normocephalic  EYES: EOMI, conjunctiva and sclera clear  ENMT: Moist mucous membranes  NECK: Supple, No JVD  NERVOUS SYSTEM:  Alert & Oriented  CHEST/LUNG: Clear to auscultation bilaterally; No rales, rhonchi, wheezing  HEART: Regular rate and rhythm  ABDOMEN: Soft, Nontender, Nondistended; Bowel sounds present  EXTREMITIES:  No clubbing, cyanosis, calf tenderness or edema b/l    LABS:                        9.2    3.70  )-----------( 72       ( 19 Jun 2025 06:45 )             27.5     06-20    140  |  108  |  18  ----------------------------<  142[H]  4.6   |  26  |  1.14    Ca    8.4      20 Jun 2025 06:04  Phos  4.2     06-19  Mg     1.9     06-19    TPro  4.9[L]  /  Alb  2.7[L]  /  TBili  0.6  /  DBili  x   /  AST  20  /  ALT  26  /  AlkPhos  163[H]  06-20      Urinalysis Basic - ( 20 Jun 2025 06:04 )    Color: x / Appearance: x / SG: x / pH: x  Gluc: 142 mg/dL / Ketone: x  / Bili: x / Urobili: x   Blood: x / Protein: x / Nitrite: x   Leuk Esterase: x / RBC: x / WBC x   Sq Epi: x / Non Sq Epi: x / Bacteria: x       CAPILLARY BLOOD GLUCOSE      POCT Blood Glucose.: 151 mg/dL (20 Jun 2025 07:45)  POCT Blood Glucose.: 77 mg/dL (19 Jun 2025 22:16)  POCT Blood Glucose.: 117 mg/dL (19 Jun 2025 16:34)  POCT Blood Glucose.: 127 mg/dL (19 Jun 2025 11:42)        Urinalysis Basic - ( 20 Jun 2025 06:04 )    Color: x / Appearance: x / SG: x / pH: x  Gluc: 142 mg/dL / Ketone: x  / Bili: x / Urobili: x   Blood: x / Protein: x / Nitrite: x   Leuk Esterase: x / RBC: x / WBC x   Sq Epi: x / Non Sq Epi: x / Bacteria: x        RADIOLOGY & ADDITIONAL TESTS:    Consultant(s) Notes Reviewed:  [x ] YES  [ ] NO  Care Discussed with Consultants/Other Providers [ x] YES  [ ] NO  Imaging Personally Reviewed:  [x ] YES  [ ] NO

## 2025-06-20 NOTE — PROGRESS NOTE ADULT - NUTRITIONAL ASSESSMENT
This patient has been assessed with a concern for Malnutrition and has been determined to have a diagnosis/diagnoses of Severe protein-calorie malnutrition.    This patient is being managed with:   Diet Consistent Carbohydrate w/Evening Snack-  Supplement Feeding Modality:  Oral  Ensure Max Cans or Servings Per Day:  2       Frequency:  Daily  Entered: Jun 12 2025  8:48PM  
This patient has been assessed with a concern for Malnutrition and has been determined to have a diagnosis/diagnoses of Severe protein-calorie malnutrition.    This patient is being managed with:   Diet Consistent Carbohydrate w/Evening Snack-  Supplement Feeding Modality:  Oral  Ensure Max Cans or Servings Per Day:  2       Frequency:  Daily  Entered: Jun 12 2025  8:48PM    This patient has been assessed with a concern for Malnutrition and has been determined to have a diagnosis/diagnoses of Severe protein-calorie malnutrition.    This patient is being managed with:   Diet Consistent Carbohydrate w/Evening Snack-  Supplement Feeding Modality:  Oral  Ensure Max Cans or Servings Per Day:  2       Frequency:  Daily  Entered: Jun 12 2025  8:48PM  
This patient has been assessed with a concern for Malnutrition and has been determined to have a diagnosis/diagnoses of Severe protein-calorie malnutrition.    This patient is being managed with:   Diet Consistent Carbohydrate w/Evening Snack-  Supplement Feeding Modality:  Oral  Ensure Max Cans or Servings Per Day:  2       Frequency:  Daily  Entered: Jun 12 2025  8:48PM  
This patient has been assessed with a concern for Malnutrition and has been determined to have a diagnosis/diagnoses of Severe protein-calorie malnutrition.    This patient is being managed with:   Diet Consistent Carbohydrate w/Evening Snack-  Supplement Feeding Modality:  Oral  Ensure Max Cans or Servings Per Day:  2       Frequency:  Daily  Entered: Jun 12 2025  8:48PM    This patient has been assessed with a concern for Malnutrition and has been determined to have a diagnosis/diagnoses of Severe protein-calorie malnutrition.    This patient is being managed with:   Diet Consistent Carbohydrate w/Evening Snack-  Supplement Feeding Modality:  Oral  Ensure Max Cans or Servings Per Day:  2       Frequency:  Daily  Entered: Jun 12 2025  8:48PM  
This patient has been assessed with a concern for Malnutrition and has been determined to have a diagnosis/diagnoses of Severe protein-calorie malnutrition.    This patient is being managed with:   Diet Consistent Carbohydrate w/Evening Snack-  Supplement Feeding Modality:  Oral  Ensure Max Cans or Servings Per Day:  2       Frequency:  Daily  Entered: Jun 12 2025  8:48PM    This patient has been assessed with a concern for Malnutrition and has been determined to have a diagnosis/diagnoses of Severe protein-calorie malnutrition.    This patient is being managed with:   Diet Consistent Carbohydrate w/Evening Snack-  Supplement Feeding Modality:  Oral  Ensure Max Cans or Servings Per Day:  2       Frequency:  Daily  Entered: Jun 12 2025  8:48PM

## 2025-06-20 NOTE — PROGRESS NOTE ADULT - TIME BILLING
Time spent for extensive review of the physical chart, electronic medical record, and documentation to obtain collateral information including but not limited to:  [x] Inpatient records (ED, H&P, primary team, and consultants if applicable, care coordination)  [x] Inpatient values/results (biomarkers, immunoassays, imaging, and microbiology results)  [x] Current or proposed treatment plans  [x] Pharmacotherapy review  [x] Discussion and coordinating care with primary team and interdisciplinary staff and floor staff  [x] Discussion including counseling/ education with the patient, surrogate decision maker, or family.
This includes reviewing results/imaging and discussions with specialists, nursing, case management/social work. Further tests, medications, and procedures have been ordered as indicated. Results and the plan of care were communicated to the patient and/or their family member. Supporting documentation was completed and added to the patient's chart.
Double Island Pedicle Flap Text: The defect edges were debeveled with a #15 scalpel blade.  Given the location of the defect, shape of the defect and the proximity to free margins a double island pedicle advancement flap was deemed most appropriate.  Using a sterile surgical marker, an appropriate advancement flap was drawn incorporating the defect, outlining the appropriate donor tissue and placing the expected incisions within the relaxed skin tension lines where possible.    The area thus outlined was incised deep to adipose tissue with a #15 scalpel blade.  The skin margins were undermined to an appropriate distance in all directions around the primary defect and laterally outward around the island pedicle utilizing iris scissors.  There was minimal undermining beneath the pedicle flap.

## 2025-06-20 NOTE — PROGRESS NOTE ADULT - SUBJECTIVE AND OBJECTIVE BOX
No distress    Vital Signs Last 24 Hrs  T(C): 36.6 (06-20-25 @ 07:37), Max: 36.6 (06-19-25 @ 19:42)  T(F): 97.8 (06-20-25 @ 07:37), Max: 97.9 (06-19-25 @ 19:42)  HR: 73 (06-20-25 @ 09:19) (73 - 85)  BP: 122/69 (06-20-25 @ 07:37) (115/71 - 129/62)  RR: 16 (06-20-25 @ 07:37) (16 - 16)  SpO2: 97% (06-20-25 @ 09:19) (97% - 98%)    I&O's Detail    19 Jun 2025 07:01  -  20 Jun 2025 07:00  --------------------------------------------------------  IN:    Oral Fluid: 720 mL  Total IN: 720 mL    OUT:    Voided (mL): 1250 mL  Total OUT: 1250 mL    s1s2  b/l air entry  soft, ND  no edema                                       9.2    3.70  )-----------( 72       ( 19 Jun 2025 06:45 )             27.5     20 Jun 2025 06:04    140    |  108    |  18     ----------------------------<  142    4.6     |  26     |  1.14     Ca    8.4        20 Jun 2025 06:04  Phos  4.2       19 Jun 2025 06:45  Mg     1.9       19 Jun 2025 06:45    TPro  4.9    /  Alb  2.7    /  TBili  0.6    /  DBili  x      /  AST  20     /  ALT  26     /  AlkPhos  163    20 Jun 2025 06:04    LIVER FUNCTIONS - ( 20 Jun 2025 06:04 )  Alb: 2.7 g/dL / Pro: 4.9 g/dL / ALK PHOS: 163 U/L / ALT: 26 U/L / AST: 20 U/L / GGT: x           albuterol    90 MICROgram(s) HFA Inhaler 2 Puff(s) Inhalation every 6 hours PRN  aspirin enteric coated 81 milliGRAM(s) Oral daily  bisacodyl Suppository 10 milliGRAM(s) Rectal daily PRN  calcium carbonate 1250 mG  + Vitamin D (OsCal 500 + D) 1 Tablet(s) Oral two times a day  chlorhexidine 2% Cloths 1 Application(s) Topical daily  cycloSPORINE  , modified (GENGRAF) 25 milliGRAM(s) Oral two times a day  dextrose 5%. 1000 milliLiter(s) IV Continuous <Continuous>  dextrose 5%. 1000 milliLiter(s) IV Continuous <Continuous>  dextrose 50% Injectable 25 Gram(s) IV Push once  dextrose 50% Injectable 12.5 Gram(s) IV Push once  dextrose 50% Injectable 25 Gram(s) IV Push once  dextrose Oral Gel 15 Gram(s) Oral once PRN  diphenhydrAMINE 25 milliGRAM(s) Oral every 8 hours PRN  entecavir 0.5 milliGRAM(s) Oral daily  fluticasone propionate/ salmeterol 100-50 MICROgram(s) Diskus 1 Dose(s) Inhalation two times a day  glucagon  Injectable 1 milliGRAM(s) IntraMuscular once  heparin   Injectable 5000 Unit(s) SubCutaneous every 12 hours  insulin glargine Injectable (LANTUS) 5 Unit(s) SubCutaneous at bedtime  insulin lispro (ADMELOG) corrective regimen sliding scale   SubCutaneous three times a day before meals  insulin lispro (ADMELOG) corrective regimen sliding scale   SubCutaneous at bedtime  insulin lispro Injectable (ADMELOG) 8 Unit(s) SubCutaneous before lunch  insulin lispro Injectable (ADMELOG) 5 Unit(s) SubCutaneous before dinner  insulin lispro Injectable (ADMELOG) 8 Unit(s) SubCutaneous with breakfast  lactulose Syrup 10 Gram(s) Oral <User Schedule>  levETIRAcetam 500 milliGRAM(s) Oral two times a day  magnesium oxide 400 milliGRAM(s) Oral three times a day with meals  melatonin 9 milliGRAM(s) Oral at bedtime  metoclopramide 10 milliGRAM(s) Oral three times a day  mycophenolate mofetil 1000 milliGRAM(s) Oral <User Schedule>  NIFEdipine XL 30 milliGRAM(s) Oral daily  pantoprazole    Tablet 40 milliGRAM(s) Oral before breakfast  predniSONE   Tablet 10 milliGRAM(s) Oral daily  QUEtiapine 75 milliGRAM(s) Oral at bedtime  senna 2 Tablet(s) Oral at bedtime  SUMAtriptan 100 milliGRAM(s) Oral <User Schedule>  SUMAtriptan 100 milliGRAM(s) Oral daily PRN  traMADol 25 milliGRAM(s) Oral every 6 hours PRN  traMADol 50 milliGRAM(s) Oral every 6 hours PRN  trimethoprim   80 mG/sulfamethoxazole 400 mG 1 Tablet(s) Oral daily  valGANciclovir 900 milliGRAM(s) Oral daily    Impression/Plan:    ESLD d/t IVEY  S/p OLT on 5/31/25 at [facility]    Good renal fx  Continue current transplant meds   Avoid nephrotoxins, no NSAID's  F/u w/liver transplant team as op    [phone]

## 2025-06-20 NOTE — PROGRESS NOTE ADULT - REASON FOR ADMISSION
Debility 2/2 Liver transplant due to decompensative liver disease

## 2025-06-20 NOTE — PROGRESS NOTE ADULT - ASSESSMENT
70 F with PMHx NAFLD cirrhosis, migraines, T2DM, COPD, and HCC (listed for liver transplant with MELD 20B), UGIB, chronic back pain 2/2 spinal fracture, s/p OLT from  donor at [facility]. Required insulin gtt for euglycemia and hospital course complicated w. migraines, scott, uti, Now admitted to [facility] rehab program on .    # Debility 2/2 Liver transplant due to NAFLD  - HBV Core + donor: Entecavir 0.5mg daily  - S/p solumedrol ---> Prednisone   - ASA 81mg daily   - FK stopped d/t hallucinations. UA neg. switched to Cyclo for mental status changes,  MMF , Pred 20. keppra ppx.   - PPx: Bactrim, Valcyte 450 (CMV +/-, inc 900mg as able), Fluc, PPI, OsCal  - Simulect completed  - PT/OT  - trend liver enzymes.     #Type 2 Diabetes Mellitus  #Steroid induced hyperglycemia   - follows w. endocrine. s/p insulin gtt  - HbA1c  6.0% - per endo, not accurate in the setting of low gfr and liver disease and dietary indiscretion  - Home regimen: Lantus 14 units + Humalog 18 units with meals. Sugars are usually high in the 200s; uses Dexcom G7 with reader   - monitor FBG ACHS + Mod ISS  - c/w Lantus 5U HS and Admelog 8U breakfast; 8U Lunch; 5U dinner     #SCOTT - improved  - s/p IVF   - appreciate nephro consult.   - Avoid nephrotoxic meds     # Constipation  -  bowel regimen. enema if needed.     #Acute blood loss anemia  -S/p multiple blood products  -Trend H/H. recent baseline 8-9's  -Transfuse if hgb <7 PRN    #HTN  -Nifedipine 30mg daily     #Migraines  -If sumatriptan desired, recommendations of 100mg PO BID prn (no more than 2 doses in a day and 3 doses/week)    #COPD  -Advair daily   -Albuterol q 6hrs PRN    #Depression  #Hallucinations   -Seroquel 75mg HS   -Neuropsychology consult PRN  -Melatonin 6mg HS PRN

## 2025-06-21 PROBLEM — Z87.898 PERSONAL HISTORY OF OTHER SPECIFIED CONDITIONS: Chronic | Status: ACTIVE | Noted: 2025-06-12

## 2025-06-21 PROBLEM — K76.0 FATTY (CHANGE OF) LIVER, NOT ELSEWHERE CLASSIFIED: Chronic | Status: ACTIVE | Noted: 2025-06-12

## 2025-06-21 PROBLEM — J44.9 CHRONIC OBSTRUCTIVE PULMONARY DISEASE, UNSPECIFIED: Chronic | Status: ACTIVE | Noted: 2025-06-12

## 2025-06-21 PROBLEM — K74.60 UNSPECIFIED CIRRHOSIS OF LIVER: Chronic | Status: ACTIVE | Noted: 2025-06-12

## 2025-06-22 PROBLEM — E11.9 TYPE 2 DIABETES MELLITUS WITHOUT COMPLICATIONS: Chronic | Status: ACTIVE | Noted: 2025-06-12

## 2025-06-23 ENCOUNTER — APPOINTMENT (OUTPATIENT)
Dept: TRANSPLANT | Facility: CLINIC | Age: 70
End: 2025-06-23

## 2025-06-23 ENCOUNTER — LABORATORY RESULT (OUTPATIENT)
Age: 70
End: 2025-06-23

## 2025-06-23 VITALS
BODY MASS INDEX: 24.17 KG/M2 | HEIGHT: 61 IN | SYSTOLIC BLOOD PRESSURE: 139 MMHG | DIASTOLIC BLOOD PRESSURE: 75 MMHG | TEMPERATURE: 98.3 F | HEART RATE: 68 BPM | OXYGEN SATURATION: 97 % | WEIGHT: 128 LBS | RESPIRATION RATE: 14 BRPM

## 2025-06-23 PROCEDURE — 99214 OFFICE O/P EST MOD 30 MIN: CPT | Mod: 24

## 2025-06-24 ENCOUNTER — NON-APPOINTMENT (OUTPATIENT)
Age: 70
End: 2025-06-24

## 2025-06-25 ENCOUNTER — APPOINTMENT (OUTPATIENT)
Dept: HEMATOLOGY ONCOLOGY | Facility: CLINIC | Age: 70
End: 2025-06-25

## 2025-06-27 ENCOUNTER — LABORATORY RESULT (OUTPATIENT)
Age: 70
End: 2025-06-27

## 2025-06-27 LAB
ALBUMIN SERPL ELPH-MCNC: 3.8 G/DL
ALP BLD-CCNC: 118 U/L
ALT SERPL-CCNC: 22 U/L
ANION GAP SERPL CALC-SCNC: 12 MMOL/L
AST SERPL-CCNC: 22 U/L
BASOPHILS # BLD AUTO: 0.03 K/UL
BASOPHILS NFR BLD AUTO: 0.9 %
BILIRUB SERPL-MCNC: 0.8 MG/DL
BUN SERPL-MCNC: 24 MG/DL
CALCIUM SERPL-MCNC: 8.7 MG/DL
CHLORIDE SERPL-SCNC: 106 MMOL/L
CMV DNA SPEC QL NAA+PROBE: NOT DETECTED IU/ML
CMVPCR LOG: NOT DETECTED LOG10IU/ML
CO2 SERPL-SCNC: 21 MMOL/L
CREAT SERPL-MCNC: 1.38 MG/DL
CYCLOSPORINE SER-MCNC: 75 NG/ML
EGFRCR SERPLBLD CKD-EPI 2021: 41 ML/MIN/1.73M2
EOSINOPHIL # BLD AUTO: 0.09 K/UL
EOSINOPHIL NFR BLD AUTO: 2.6 %
GGT SERPL-CCNC: 220 U/L
GLUCOSE SERPL-MCNC: 137 MG/DL
HCT VFR BLD CALC: 27.7 %
HGB BLD-MCNC: 9 G/DL
IMM GRANULOCYTES NFR BLD AUTO: 2.6 %
LYMPHOCYTES # BLD AUTO: 0.27 K/UL
LYMPHOCYTES NFR BLD AUTO: 7.7 %
MAGNESIUM SERPL-MCNC: 1.7 MG/DL
MAN DIFF?: NORMAL
MCHC RBC-ENTMCNC: 32 PG
MCHC RBC-ENTMCNC: 32.5 G/DL
MCV RBC AUTO: 98.6 FL
MONOCYTES # BLD AUTO: 0.17 K/UL
MONOCYTES NFR BLD AUTO: 4.9 %
NEUTROPHILS # BLD AUTO: 2.85 K/UL
NEUTROPHILS NFR BLD AUTO: 81.3 %
PHOSPHATE SERPL-MCNC: 3.3 MG/DL
PLATELET # BLD AUTO: 109 K/UL
POTASSIUM SERPL-SCNC: 4.7 MMOL/L
PROT SERPL-MCNC: 5.3 G/DL
RBC # BLD: 2.81 M/UL
RBC # FLD: 22.6 %
SODIUM SERPL-SCNC: 140 MMOL/L
WBC # FLD AUTO: 3.5 K/UL

## 2025-06-28 LAB
ALBUMIN SERPL ELPH-MCNC: 3.8 G/DL
ALP BLD-CCNC: 99 U/L
ALT SERPL-CCNC: 16 U/L
ANION GAP SERPL CALC-SCNC: 12 MMOL/L
AST SERPL-CCNC: 21 U/L
BASOPHILS # BLD AUTO: 0.05 K/UL
BASOPHILS NFR BLD AUTO: 1.9 %
BILIRUB SERPL-MCNC: 0.7 MG/DL
BUN SERPL-MCNC: 22 MG/DL
CALCIUM SERPL-MCNC: 9.3 MG/DL
CHLORIDE SERPL-SCNC: 107 MMOL/L
CO2 SERPL-SCNC: 21 MMOL/L
CREAT SERPL-MCNC: 1.25 MG/DL
CULTURE RESULTS: SIGNIFICANT CHANGE UP
CYCLOSPORINE SER-MCNC: 32 NG/ML
EGFRCR SERPLBLD CKD-EPI 2021: 46 ML/MIN/1.73M2
EOSINOPHIL # BLD AUTO: 0.04 K/UL
EOSINOPHIL NFR BLD AUTO: 1.5 %
GGT SERPL-CCNC: 142 U/L
GLUCOSE SERPL-MCNC: 123 MG/DL
HCT VFR BLD CALC: 30.4 %
HGB BLD-MCNC: 9.4 G/DL
IMM GRANULOCYTES NFR BLD AUTO: 1.5 %
LYMPHOCYTES # BLD AUTO: 0.25 K/UL
LYMPHOCYTES NFR BLD AUTO: 9.6 %
MAGNESIUM SERPL-MCNC: 1.9 MG/DL
MAN DIFF?: NORMAL
MCHC RBC-ENTMCNC: 30.9 G/DL
MCHC RBC-ENTMCNC: 32.5 PG
MCV RBC AUTO: 105.2 FL
MONOCYTES # BLD AUTO: 0.15 K/UL
MONOCYTES NFR BLD AUTO: 5.7 %
NEUTROPHILS # BLD AUTO: 2.08 K/UL
NEUTROPHILS NFR BLD AUTO: 79.8 %
PHOSPHATE SERPL-MCNC: 3.2 MG/DL
PLATELET # BLD AUTO: 126 K/UL
POTASSIUM SERPL-SCNC: 5 MMOL/L
PROT SERPL-MCNC: 5.6 G/DL
RBC # BLD: 2.89 M/UL
RBC # FLD: 23.3 %
SODIUM SERPL-SCNC: 140 MMOL/L
SPECIMEN SOURCE: SIGNIFICANT CHANGE UP
WBC # FLD AUTO: 2.61 K/UL

## 2025-06-30 ENCOUNTER — APPOINTMENT (OUTPATIENT)
Dept: TRANSPLANT | Facility: CLINIC | Age: 70
End: 2025-06-30
Payer: MEDICARE

## 2025-06-30 ENCOUNTER — APPOINTMENT (OUTPATIENT)
Dept: TRANSPLANT | Facility: CLINIC | Age: 70
End: 2025-06-30

## 2025-06-30 VITALS
HEART RATE: 68 BPM | BODY MASS INDEX: 22.66 KG/M2 | OXYGEN SATURATION: 97 % | WEIGHT: 120 LBS | RESPIRATION RATE: 14 BRPM | DIASTOLIC BLOOD PRESSURE: 65 MMHG | SYSTOLIC BLOOD PRESSURE: 156 MMHG | HEIGHT: 61 IN | TEMPERATURE: 98.7 F

## 2025-06-30 LAB
CMV DNA SPEC QL NAA+PROBE: NOT DETECTED IU/ML
CMVPCR LOG: NOT DETECTED LOG10IU/ML

## 2025-06-30 PROCEDURE — 99215 OFFICE O/P EST HI 40 MIN: CPT | Mod: 24

## 2025-07-01 ENCOUNTER — APPOINTMENT (OUTPATIENT)
Dept: PAIN MANAGEMENT | Facility: CLINIC | Age: 70
End: 2025-07-01

## 2025-07-01 VITALS
HEIGHT: 61 IN | WEIGHT: 128.13 LBS | DIASTOLIC BLOOD PRESSURE: 56 MMHG | BODY MASS INDEX: 24.19 KG/M2 | HEART RATE: 74 BPM | SYSTOLIC BLOOD PRESSURE: 132 MMHG

## 2025-07-01 PROBLEM — G43.701 CHRONIC MIGRAINE WITHOUT AURA WITH STATUS MIGRAINOSUS, NOT INTRACTABLE: Status: ACTIVE | Noted: 2025-07-01

## 2025-07-01 PROCEDURE — 99214 OFFICE O/P EST MOD 30 MIN: CPT

## 2025-07-01 RX ORDER — DULOXETINE HYDROCHLORIDE 20 MG/1
20 CAPSULE, DELAYED RELEASE PELLETS ORAL
Qty: 30 | Refills: 0 | Status: ACTIVE | COMMUNITY
Start: 2025-07-01 | End: 1900-01-01

## 2025-07-03 ENCOUNTER — LABORATORY RESULT (OUTPATIENT)
Age: 70
End: 2025-07-03

## 2025-07-03 LAB
HCV RNA SERPL NAA DL=5-ACNC: NOT DETECTED IU/ML
HCV RNA SERPL NAA+PROBE-LOG IU: NOT DETECTED LOGIU/ML
HEPC RNA INTERP: NOT DETECTED

## 2025-07-04 LAB
HBV CORE IGG+IGM SER QL: NONREACTIVE
HBV SURFACE AB SER QL: REACTIVE
HBV SURFACE AB SERPL IA-ACNC: 241 MIU/ML
HBV SURFACE AG SER QL: NONREACTIVE
HCV AB SER QL: NONREACTIVE
HCV S/CO RATIO: 0.3 S/CO
HIV1+2 AB SPEC QL IA.RAPID: NONREACTIVE

## 2025-07-05 ENCOUNTER — INPATIENT (INPATIENT)
Facility: HOSPITAL | Age: 70
LOS: 3 days | Discharge: HOME | DRG: 377 | End: 2025-07-09
Attending: TRANSPLANT SURGERY | Admitting: TRANSPLANT SURGERY
Payer: MEDICARE

## 2025-07-05 VITALS
HEIGHT: 61 IN | HEART RATE: 85 BPM | DIASTOLIC BLOOD PRESSURE: 69 MMHG | RESPIRATION RATE: 18 BRPM | WEIGHT: 121.25 LBS | SYSTOLIC BLOOD PRESSURE: 162 MMHG | OXYGEN SATURATION: 97 % | TEMPERATURE: 98 F

## 2025-07-05 DIAGNOSIS — Z98.891 HISTORY OF UTERINE SCAR FROM PREVIOUS SURGERY: Chronic | ICD-10-CM

## 2025-07-05 DIAGNOSIS — Z90.49 ACQUIRED ABSENCE OF OTHER SPECIFIED PARTS OF DIGESTIVE TRACT: Chronic | ICD-10-CM

## 2025-07-05 DIAGNOSIS — Z98.49 CATARACT EXTRACTION STATUS, UNSPECIFIED EYE: Chronic | ICD-10-CM

## 2025-07-05 DIAGNOSIS — Z98.890 OTHER SPECIFIED POSTPROCEDURAL STATES: Chronic | ICD-10-CM

## 2025-07-05 DIAGNOSIS — D64.9 ANEMIA, UNSPECIFIED: ICD-10-CM

## 2025-07-05 LAB
ALBUMIN SERPL ELPH-MCNC: 2.9 G/DL — LOW (ref 3.3–5)
ALBUMIN SERPL ELPH-MCNC: 3.1 G/DL — LOW (ref 3.3–5)
ALP SERPL-CCNC: 44 U/L — SIGNIFICANT CHANGE UP (ref 40–120)
ALP SERPL-CCNC: 56 U/L — SIGNIFICANT CHANGE UP (ref 40–120)
ALT FLD-CCNC: <5 U/L — LOW (ref 10–45)
ALT FLD-CCNC: <5 U/L — LOW (ref 10–45)
ANION GAP SERPL CALC-SCNC: 14 MMOL/L — SIGNIFICANT CHANGE UP (ref 5–17)
ANION GAP SERPL CALC-SCNC: 16 MMOL/L — SIGNIFICANT CHANGE UP (ref 5–17)
ANISOCYTOSIS BLD QL: SLIGHT — SIGNIFICANT CHANGE UP
ANISOCYTOSIS BLD QL: SLIGHT — SIGNIFICANT CHANGE UP
APPEARANCE UR: CLEAR — SIGNIFICANT CHANGE UP
APTT BLD: 22.6 SEC — LOW (ref 26.1–36.8)
APTT BLD: 26.4 SEC — SIGNIFICANT CHANGE UP (ref 26.1–36.8)
AST SERPL-CCNC: 14 U/L — SIGNIFICANT CHANGE UP (ref 10–40)
AST SERPL-CCNC: <5 U/L — LOW (ref 10–40)
BACTERIA # UR AUTO: NEGATIVE /HPF — SIGNIFICANT CHANGE UP
BASOPHILS # BLD AUTO: 0.04 K/UL — SIGNIFICANT CHANGE UP (ref 0–0.2)
BASOPHILS # BLD AUTO: 0.05 K/UL — SIGNIFICANT CHANGE UP (ref 0–0.2)
BASOPHILS # BLD MANUAL: 0.05 K/UL — SIGNIFICANT CHANGE UP (ref 0–0.2)
BASOPHILS # BLD MANUAL: 0.12 K/UL — SIGNIFICANT CHANGE UP (ref 0–0.2)
BASOPHILS NFR BLD AUTO: 0.9 % — SIGNIFICANT CHANGE UP (ref 0–2)
BASOPHILS NFR BLD AUTO: 1.5 % — SIGNIFICANT CHANGE UP (ref 0–2)
BASOPHILS NFR BLD MANUAL: 1 % — SIGNIFICANT CHANGE UP (ref 0–2)
BASOPHILS NFR BLD MANUAL: 3.7 % — HIGH (ref 0–2)
BILIRUB DIRECT SERPL-MCNC: 0.3 MG/DL — SIGNIFICANT CHANGE UP (ref 0–0.3)
BILIRUB INDIRECT FLD-MCNC: 0.5 MG/DL — SIGNIFICANT CHANGE UP (ref 0.2–1)
BILIRUB SERPL-MCNC: 0.6 MG/DL — SIGNIFICANT CHANGE UP (ref 0.2–1.2)
BILIRUB SERPL-MCNC: 0.8 MG/DL — SIGNIFICANT CHANGE UP (ref 0.2–1.2)
BILIRUB SERPL-MCNC: 0.8 MG/DL — SIGNIFICANT CHANGE UP (ref 0.2–1.2)
BILIRUB UR-MCNC: NEGATIVE — SIGNIFICANT CHANGE UP
BLD GP AB SCN SERPL QL: NEGATIVE — SIGNIFICANT CHANGE UP
BUN SERPL-MCNC: 59 MG/DL — HIGH (ref 7–23)
BUN SERPL-MCNC: 61 MG/DL — HIGH (ref 7–23)
BURR CELLS BLD QL SMEAR: SLIGHT — SIGNIFICANT CHANGE UP
CALCIUM SERPL-MCNC: 7.4 MG/DL — LOW (ref 8.4–10.5)
CALCIUM SERPL-MCNC: 8.1 MG/DL — LOW (ref 8.4–10.5)
CAST: 0 /LPF — SIGNIFICANT CHANGE UP (ref 0–4)
CHLORIDE SERPL-SCNC: 103 MMOL/L — SIGNIFICANT CHANGE UP (ref 96–108)
CHLORIDE SERPL-SCNC: 107 MMOL/L — SIGNIFICANT CHANGE UP (ref 96–108)
CO2 SERPL-SCNC: 15 MMOL/L — LOW (ref 22–31)
CO2 SERPL-SCNC: 17 MMOL/L — LOW (ref 22–31)
COLOR SPEC: YELLOW — SIGNIFICANT CHANGE UP
CREAT SERPL-MCNC: 1.16 MG/DL — SIGNIFICANT CHANGE UP (ref 0.5–1.3)
CREAT SERPL-MCNC: 1.18 MG/DL — SIGNIFICANT CHANGE UP (ref 0.5–1.3)
CYCLOSPORINE SER-MCNC: 55 NG/ML — LOW (ref 150–400)
DACRYOCYTES BLD QL SMEAR: SLIGHT — SIGNIFICANT CHANGE UP
DIFF PNL FLD: ABNORMAL
EGFR: 50 ML/MIN/1.73M2 — LOW
EGFR: 50 ML/MIN/1.73M2 — LOW
EGFR: 51 ML/MIN/1.73M2 — LOW
EGFR: 51 ML/MIN/1.73M2 — LOW
ELLIPTOCYTES BLD QL SMEAR: SLIGHT — SIGNIFICANT CHANGE UP
EOSINOPHIL # BLD AUTO: 0.01 K/UL — SIGNIFICANT CHANGE UP (ref 0–0.5)
EOSINOPHIL # BLD AUTO: 0.01 K/UL — SIGNIFICANT CHANGE UP (ref 0–0.5)
EOSINOPHIL # BLD MANUAL: 0 K/UL — SIGNIFICANT CHANGE UP (ref 0–0.5)
EOSINOPHIL # BLD MANUAL: 0.03 K/UL — SIGNIFICANT CHANGE UP (ref 0–0.5)
EOSINOPHIL NFR BLD AUTO: 0.2 % — SIGNIFICANT CHANGE UP (ref 0–6)
EOSINOPHIL NFR BLD AUTO: 0.3 % — SIGNIFICANT CHANGE UP (ref 0–6)
EOSINOPHIL NFR BLD MANUAL: 0 % — SIGNIFICANT CHANGE UP (ref 0–6)
EOSINOPHIL NFR BLD MANUAL: 0.9 % — SIGNIFICANT CHANGE UP (ref 0–6)
FIBRINOGEN PPP-MCNC: 130 MG/DL — LOW (ref 200–445)
FOLATE SERPL-MCNC: 7.8 NG/ML — SIGNIFICANT CHANGE UP
GAS PNL BLDV: SIGNIFICANT CHANGE UP
GLUCOSE BLDC GLUCOMTR-MCNC: 161 MG/DL — HIGH (ref 70–99)
GLUCOSE BLDC GLUCOMTR-MCNC: 200 MG/DL — HIGH (ref 70–99)
GLUCOSE BLDC GLUCOMTR-MCNC: 207 MG/DL — HIGH (ref 70–99)
GLUCOSE BLDC GLUCOMTR-MCNC: 269 MG/DL — HIGH (ref 70–99)
GLUCOSE BLDC GLUCOMTR-MCNC: 344 MG/DL — HIGH (ref 70–99)
GLUCOSE SERPL-MCNC: 180 MG/DL — HIGH (ref 70–99)
GLUCOSE SERPL-MCNC: 342 MG/DL — HIGH (ref 70–99)
GLUCOSE UR QL: NEGATIVE MG/DL — SIGNIFICANT CHANGE UP
HAPTOGLOB SERPL-MCNC: <20 MG/DL — LOW (ref 34–200)
HBV DNA # SERPL NAA+PROBE: NOT DETECTED IU/ML
HCT VFR BLD CALC: 24.4 % — LOW (ref 34.5–45)
HCT VFR BLD CALC: 24.4 % — LOW (ref 34.5–45)
HCT VFR BLD CALC: 26.6 % — LOW (ref 34.5–45)
HEPB DNA PCR INT: NOT DETECTED
HEPB DNA PCR LOG: NOT DETECTED LOGIU/ML
HGB BLD-MCNC: 8 G/DL — LOW (ref 11.5–15.5)
HGB BLD-MCNC: 8.6 G/DL — LOW (ref 11.5–15.5)
HGB BLD-MCNC: 9.2 G/DL — LOW (ref 11.5–15.5)
IMM GRANULOCYTES # BLD AUTO: 0.31 K/UL — HIGH (ref 0–0.07)
IMM GRANULOCYTES # BLD AUTO: 0.63 K/UL — HIGH (ref 0–0.07)
IMM GRANULOCYTES NFR BLD AUTO: 13.5 % — HIGH (ref 0–0.9)
IMM GRANULOCYTES NFR BLD AUTO: 9.6 % — HIGH (ref 0–0.9)
IMMATURE PLATELET FRACTION #: 1.8 K/UL — LOW (ref 4.7–11.1)
IMMATURE PLATELET FRACTION %: 1.7 % — SIGNIFICANT CHANGE UP (ref 1.6–4.9)
INR BLD: 1.17 RATIO — HIGH (ref 0.85–1.16)
INR BLD: 1.2 RATIO — HIGH (ref 0.85–1.16)
IRON SATN MFR SERPL: 198 UG/DL — HIGH (ref 30–160)
IRON SATN MFR SERPL: 83 % — HIGH (ref 14–50)
KETONES UR QL: NEGATIVE MG/DL — SIGNIFICANT CHANGE UP
LDH SERPL L TO P-CCNC: 288 U/L — HIGH (ref 50–242)
LEUKOCYTE ESTERASE UR-ACNC: NEGATIVE — SIGNIFICANT CHANGE UP
LYMPHOCYTES # BLD AUTO: 0.21 K/UL — LOW (ref 1–3.3)
LYMPHOCYTES # BLD AUTO: 0.59 K/UL — LOW (ref 1–3.3)
LYMPHOCYTES # BLD MANUAL: 0.33 K/UL — LOW (ref 1–3.3)
LYMPHOCYTES # BLD MANUAL: 0.42 K/UL — LOW (ref 1–3.3)
LYMPHOCYTES NFR BLD AUTO: 12.7 % — LOW (ref 13–44)
LYMPHOCYTES NFR BLD AUTO: 6.5 % — LOW (ref 13–44)
LYMPHOCYTES NFR BLD MANUAL: 10.1 % — LOW (ref 13–44)
LYMPHOCYTES NFR BLD MANUAL: 9 % — LOW (ref 13–44)
MACROCYTES BLD QL: SLIGHT — SIGNIFICANT CHANGE UP
MACROCYTES BLD QL: SLIGHT — SIGNIFICANT CHANGE UP
MAGNESIUM SERPL-MCNC: 1.7 MG/DL — SIGNIFICANT CHANGE UP (ref 1.6–2.6)
MAGNESIUM SERPL-MCNC: 2.6 MG/DL — SIGNIFICANT CHANGE UP (ref 1.6–2.6)
MANUAL MYELOCYTE #: 0.03 K/UL — HIGH (ref 0–0)
MANUAL NEUTROPHIL BANDS #: 0.06 K/UL — SIGNIFICANT CHANGE UP (ref 0–0.84)
MANUAL NRBC #: 0.1 K/UL — HIGH (ref 0–0)
MANUAL SMEAR VERIFICATION: SIGNIFICANT CHANGE UP
MCHC RBC-ENTMCNC: 30.1 PG — SIGNIFICANT CHANGE UP (ref 27–34)
MCHC RBC-ENTMCNC: 31.1 PG — SIGNIFICANT CHANGE UP (ref 27–34)
MCHC RBC-ENTMCNC: 32.7 PG — SIGNIFICANT CHANGE UP (ref 27–34)
MCHC RBC-ENTMCNC: 32.8 G/DL — SIGNIFICANT CHANGE UP (ref 32–36)
MCHC RBC-ENTMCNC: 34.6 G/DL — SIGNIFICANT CHANGE UP (ref 32–36)
MCHC RBC-ENTMCNC: 35.2 G/DL — SIGNIFICANT CHANGE UP (ref 32–36)
MCV RBC AUTO: 86.9 FL — SIGNIFICANT CHANGE UP (ref 80–100)
MCV RBC AUTO: 92.8 FL — SIGNIFICANT CHANGE UP (ref 80–100)
MCV RBC AUTO: 94.9 FL — SIGNIFICANT CHANGE UP (ref 80–100)
MICROCYTES BLD QL: SLIGHT — SIGNIFICANT CHANGE UP
MONOCYTES # BLD AUTO: 0.19 K/UL — SIGNIFICANT CHANGE UP (ref 0–0.9)
MONOCYTES # BLD AUTO: 0.3 K/UL — SIGNIFICANT CHANGE UP (ref 0–0.9)
MONOCYTES # BLD MANUAL: 0.09 K/UL — SIGNIFICANT CHANGE UP (ref 0–0.9)
MONOCYTES # BLD MANUAL: 0.28 K/UL — SIGNIFICANT CHANGE UP (ref 0–0.9)
MONOCYTES NFR BLD AUTO: 5.9 % — SIGNIFICANT CHANGE UP (ref 2–14)
MONOCYTES NFR BLD AUTO: 6.4 % — SIGNIFICANT CHANGE UP (ref 2–14)
MONOCYTES NFR BLD MANUAL: 2.8 % — SIGNIFICANT CHANGE UP (ref 2–14)
MONOCYTES NFR BLD MANUAL: 6 % — SIGNIFICANT CHANGE UP (ref 2–14)
MYELOCYTES NFR BLD: 0.9 % — HIGH (ref 0–0)
NEUTROPHILS # BLD AUTO: 2.46 K/UL — SIGNIFICANT CHANGE UP (ref 1.8–7.4)
NEUTROPHILS # BLD AUTO: 3.09 K/UL — SIGNIFICANT CHANGE UP (ref 1.8–7.4)
NEUTROPHILS # BLD MANUAL: 2.58 K/UL — SIGNIFICANT CHANGE UP (ref 1.8–7.4)
NEUTROPHILS # BLD MANUAL: 3.91 K/UL — SIGNIFICANT CHANGE UP (ref 1.8–7.4)
NEUTROPHILS NFR BLD AUTO: 66.3 % — SIGNIFICANT CHANGE UP (ref 43–77)
NEUTROPHILS NFR BLD AUTO: 76.2 % — SIGNIFICANT CHANGE UP (ref 43–77)
NEUTROPHILS NFR BLD MANUAL: 79.8 % — HIGH (ref 43–77)
NEUTROPHILS NFR BLD MANUAL: 84 % — HIGH (ref 43–77)
NEUTS BAND # BLD: 1.8 % — SIGNIFICANT CHANGE UP (ref 0–8)
NEUTS BAND NFR BLD: 1.8 % — SIGNIFICANT CHANGE UP (ref 0–8)
NITRITE UR-MCNC: NEGATIVE — SIGNIFICANT CHANGE UP
NRBC # BLD AUTO: 0 K/UL — SIGNIFICANT CHANGE UP (ref 0–0)
NRBC # BLD AUTO: 0.03 K/UL — HIGH (ref 0–0)
NRBC # BLD AUTO: 0.05 K/UL — HIGH (ref 0–0)
NRBC # BLD: 3 /100 WBCS — HIGH (ref 0–0)
NRBC # FLD: 0 K/UL — SIGNIFICANT CHANGE UP (ref 0–0)
NRBC # FLD: 0.03 K/UL — HIGH (ref 0–0)
NRBC # FLD: 0.05 K/UL — HIGH (ref 0–0)
NRBC BLD AUTO-RTO: 0 /100 WBCS — SIGNIFICANT CHANGE UP (ref 0–0)
NRBC BLD AUTO-RTO: 0 /100 WBCS — SIGNIFICANT CHANGE UP (ref 0–0)
NRBC BLD AUTO-RTO: 1 /100 WBCS — HIGH (ref 0–0)
NRBC BLD-RTO: 3 /100 WBCS — HIGH (ref 0–0)
OB PNL STL: POSITIVE
OVALOCYTES BLD QL SMEAR: SLIGHT — SIGNIFICANT CHANGE UP
OVALOCYTES BLD QL SMEAR: SLIGHT — SIGNIFICANT CHANGE UP
PH UR: 6 — SIGNIFICANT CHANGE UP (ref 5–8)
PHOSPHATE SERPL-MCNC: 2.5 MG/DL — SIGNIFICANT CHANGE UP (ref 2.5–4.5)
PHOSPHATE SERPL-MCNC: 2.8 MG/DL — SIGNIFICANT CHANGE UP (ref 2.5–4.5)
PLAT MORPH BLD: NORMAL — SIGNIFICANT CHANGE UP
PLAT MORPH BLD: NORMAL — SIGNIFICANT CHANGE UP
PLATELET # BLD AUTO: 106 K/UL — LOW (ref 150–400)
PLATELET # BLD AUTO: 119 K/UL — LOW (ref 150–400)
PLATELET # BLD AUTO: 138 K/UL — LOW (ref 150–400)
PMV BLD: 10.1 FL — SIGNIFICANT CHANGE UP (ref 7–13)
PMV BLD: 10.2 FL — SIGNIFICANT CHANGE UP (ref 7–13)
PMV BLD: 10.3 FL — SIGNIFICANT CHANGE UP (ref 7–13)
POIKILOCYTOSIS BLD QL AUTO: SLIGHT — SIGNIFICANT CHANGE UP
POIKILOCYTOSIS BLD QL AUTO: SLIGHT — SIGNIFICANT CHANGE UP
POLYCHROMASIA BLD QL SMEAR: SLIGHT — SIGNIFICANT CHANGE UP
POTASSIUM SERPL-MCNC: 4.6 MMOL/L — SIGNIFICANT CHANGE UP (ref 3.5–5.3)
POTASSIUM SERPL-MCNC: 5.2 MMOL/L — SIGNIFICANT CHANGE UP (ref 3.5–5.3)
POTASSIUM SERPL-SCNC: 4.6 MMOL/L — SIGNIFICANT CHANGE UP (ref 3.5–5.3)
POTASSIUM SERPL-SCNC: 5.2 MMOL/L — SIGNIFICANT CHANGE UP (ref 3.5–5.3)
PROT SERPL-MCNC: 4.1 G/DL — LOW (ref 6–8.3)
PROT SERPL-MCNC: 4.6 G/DL — LOW (ref 6–8.3)
PROT UR-MCNC: NEGATIVE MG/DL — SIGNIFICANT CHANGE UP
PROTHROM AB SERPL-ACNC: 13.3 SEC — SIGNIFICANT CHANGE UP (ref 9.9–13.4)
PROTHROM AB SERPL-ACNC: 13.8 SEC — HIGH (ref 9.9–13.4)
RAPIDTEG MAXIMUM AMPLITUDE: 42.5 MM — LOW (ref 52–70)
RBC # BLD: 2.57 M/UL — LOW (ref 3.8–5.2)
RBC # BLD: 2.63 M/UL — LOW (ref 3.8–5.2)
RBC # BLD: 3.06 M/UL — LOW (ref 3.8–5.2)
RBC # FLD: 17.8 % — HIGH (ref 10.3–14.5)
RBC # FLD: 18.3 % — HIGH (ref 10.3–14.5)
RBC # FLD: 19.1 % — HIGH (ref 10.3–14.5)
RBC BLD AUTO: ABNORMAL
RBC BLD AUTO: ABNORMAL
RBC CASTS # UR COMP ASSIST: 11 /HPF — HIGH (ref 0–4)
RH IG SCN BLD-IMP: POSITIVE — SIGNIFICANT CHANGE UP
SCHISTOCYTES BLD QL AUTO: SLIGHT — SIGNIFICANT CHANGE UP
SCHISTOCYTES BLD QL AUTO: SLIGHT — SIGNIFICANT CHANGE UP
SODIUM SERPL-SCNC: 134 MMOL/L — LOW (ref 135–145)
SODIUM SERPL-SCNC: 138 MMOL/L — SIGNIFICANT CHANGE UP (ref 135–145)
SP GR SPEC: 1.02 — SIGNIFICANT CHANGE UP (ref 1–1.03)
SQUAMOUS # UR AUTO: 0 /HPF — SIGNIFICANT CHANGE UP (ref 0–5)
TEG FUNCTIONAL FIBRINOGEN: 5.3 MM — LOW (ref 15–32)
TEG LY30 (LYSIS): 0 % — SIGNIFICANT CHANGE UP (ref 0–2.6)
TEG REACTION TIME: 4 MIN — LOW (ref 4.6–9.1)
TIBC SERPL-MCNC: 240 UG/DL — SIGNIFICANT CHANGE UP (ref 220–430)
UIBC SERPL-MCNC: 42 UG/DL — LOW (ref 110–370)
UROBILINOGEN FLD QL: 0.2 MG/DL — SIGNIFICANT CHANGE UP (ref 0.2–1)
VIT B12 SERPL-MCNC: 537 PG/ML — SIGNIFICANT CHANGE UP (ref 232–1245)
WBC # BLD: 3.23 K/UL — LOW (ref 3.8–10.5)
WBC # BLD: 4.66 K/UL — SIGNIFICANT CHANGE UP (ref 3.8–10.5)
WBC # BLD: 4.99 K/UL — SIGNIFICANT CHANGE UP (ref 3.8–10.5)
WBC # FLD AUTO: 3.23 K/UL — LOW (ref 3.8–10.5)
WBC # FLD AUTO: 4.66 K/UL — SIGNIFICANT CHANGE UP (ref 3.8–10.5)
WBC # FLD AUTO: 4.99 K/UL — SIGNIFICANT CHANGE UP (ref 3.8–10.5)
WBC UR QL: 0 /HPF — SIGNIFICANT CHANGE UP (ref 0–5)

## 2025-07-05 PROCEDURE — 84295 ASSAY OF SERUM SODIUM: CPT

## 2025-07-05 PROCEDURE — 87086 URINE CULTURE/COLONY COUNT: CPT

## 2025-07-05 PROCEDURE — 83010 ASSAY OF HAPTOGLOBIN QUANT: CPT

## 2025-07-05 PROCEDURE — 84132 ASSAY OF SERUM POTASSIUM: CPT

## 2025-07-05 PROCEDURE — 82248 BILIRUBIN DIRECT: CPT

## 2025-07-05 PROCEDURE — 86901 BLOOD TYPING SEROLOGIC RH(D): CPT

## 2025-07-05 PROCEDURE — 85014 HEMATOCRIT: CPT

## 2025-07-05 PROCEDURE — 82947 ASSAY GLUCOSE BLOOD QUANT: CPT

## 2025-07-05 PROCEDURE — 80158 DRUG ASSAY CYCLOSPORINE: CPT

## 2025-07-05 PROCEDURE — 87799 DETECT AGENT NOS DNA QUANT: CPT

## 2025-07-05 PROCEDURE — 71045 X-RAY EXAM CHEST 1 VIEW: CPT

## 2025-07-05 PROCEDURE — 71045 X-RAY EXAM CHEST 1 VIEW: CPT | Mod: 26

## 2025-07-05 PROCEDURE — 86900 BLOOD TYPING SEROLOGIC ABO: CPT

## 2025-07-05 PROCEDURE — 83540 ASSAY OF IRON: CPT

## 2025-07-05 PROCEDURE — G0480: CPT

## 2025-07-05 PROCEDURE — 83550 IRON BINDING TEST: CPT

## 2025-07-05 PROCEDURE — 82272 OCCULT BLD FECES 1-3 TESTS: CPT

## 2025-07-05 PROCEDURE — 93975 VASCULAR STUDY: CPT | Mod: 26

## 2025-07-05 PROCEDURE — 82435 ASSAY OF BLOOD CHLORIDE: CPT

## 2025-07-05 PROCEDURE — 85730 THROMBOPLASTIN TIME PARTIAL: CPT

## 2025-07-05 PROCEDURE — 99223 1ST HOSP IP/OBS HIGH 75: CPT | Mod: 25

## 2025-07-05 PROCEDURE — 82746 ASSAY OF FOLIC ACID SERUM: CPT

## 2025-07-05 PROCEDURE — 87040 BLOOD CULTURE FOR BACTERIA: CPT

## 2025-07-05 PROCEDURE — 76705 ECHO EXAM OF ABDOMEN: CPT

## 2025-07-05 PROCEDURE — 84100 ASSAY OF PHOSPHORUS: CPT

## 2025-07-05 PROCEDURE — 82247 BILIRUBIN TOTAL: CPT

## 2025-07-05 PROCEDURE — 85025 COMPLETE CBC W/AUTO DIFF WBC: CPT

## 2025-07-05 PROCEDURE — 36415 COLL VENOUS BLD VENIPUNCTURE: CPT

## 2025-07-05 PROCEDURE — 80053 COMPREHEN METABOLIC PANEL: CPT

## 2025-07-05 PROCEDURE — 86923 COMPATIBILITY TEST ELECTRIC: CPT

## 2025-07-05 PROCEDURE — 83735 ASSAY OF MAGNESIUM: CPT

## 2025-07-05 PROCEDURE — 83605 ASSAY OF LACTIC ACID: CPT

## 2025-07-05 PROCEDURE — 83615 LACTATE (LD) (LDH) ENZYME: CPT

## 2025-07-05 PROCEDURE — 82962 GLUCOSE BLOOD TEST: CPT

## 2025-07-05 PROCEDURE — 82803 BLOOD GASES ANY COMBINATION: CPT

## 2025-07-05 PROCEDURE — 81001 URINALYSIS AUTO W/SCOPE: CPT

## 2025-07-05 PROCEDURE — 85018 HEMOGLOBIN: CPT

## 2025-07-05 PROCEDURE — 43255 EGD CONTROL BLEEDING ANY: CPT | Mod: GC

## 2025-07-05 PROCEDURE — 76705 ECHO EXAM OF ABDOMEN: CPT | Mod: 26,XU

## 2025-07-05 PROCEDURE — 85610 PROTHROMBIN TIME: CPT

## 2025-07-05 PROCEDURE — 82330 ASSAY OF CALCIUM: CPT

## 2025-07-05 PROCEDURE — 85396 CLOTTING ASSAY WHOLE BLOOD: CPT

## 2025-07-05 PROCEDURE — 31500 INSERT EMERGENCY AIRWAY: CPT

## 2025-07-05 PROCEDURE — 86850 RBC ANTIBODY SCREEN: CPT

## 2025-07-05 PROCEDURE — 94640 AIRWAY INHALATION TREATMENT: CPT

## 2025-07-05 PROCEDURE — 82607 VITAMIN B-12: CPT

## 2025-07-05 PROCEDURE — 93975 VASCULAR STUDY: CPT

## 2025-07-05 PROCEDURE — 99232 SBSQ HOSP IP/OBS MODERATE 35: CPT | Mod: GC,24

## 2025-07-05 PROCEDURE — 85384 FIBRINOGEN ACTIVITY: CPT

## 2025-07-05 PROCEDURE — 94002 VENT MGMT INPAT INIT DAY: CPT

## 2025-07-05 DEVICE — CLIP RESOLUTION 360 235CM: Type: IMPLANTABLE DEVICE | Status: FUNCTIONAL

## 2025-07-05 RX ORDER — GLUCAGON 3 MG/1
1 POWDER NASAL ONCE
Refills: 0 | Status: DISCONTINUED | OUTPATIENT
Start: 2025-07-05 | End: 2025-07-05

## 2025-07-05 RX ORDER — VALGANCICLOVIR 450 MG/1
900 TABLET, FILM COATED ORAL DAILY
Refills: 0 | Status: DISCONTINUED | OUTPATIENT
Start: 2025-07-05 | End: 2025-07-05

## 2025-07-05 RX ORDER — INSULIN LISPRO 100 U/ML
8 INJECTION, SOLUTION INTRAVENOUS; SUBCUTANEOUS
Refills: 0 | Status: DISCONTINUED | OUTPATIENT
Start: 2025-07-05 | End: 2025-07-05

## 2025-07-05 RX ORDER — LEVETIRACETAM 10 MG/ML
500 INJECTION, SOLUTION INTRAVENOUS
Refills: 0 | Status: DISCONTINUED | OUTPATIENT
Start: 2025-07-05 | End: 2025-07-05

## 2025-07-05 RX ORDER — ENTECAVIR 0.5 MG/1
0.5 TABLET ORAL DAILY
Refills: 0 | Status: DISCONTINUED | OUTPATIENT
Start: 2025-07-05 | End: 2025-07-05

## 2025-07-05 RX ORDER — SUMATRIPTAN 100 MG/1
100 TABLET, FILM COATED ORAL DAILY
Refills: 0 | Status: DISCONTINUED | OUTPATIENT
Start: 2025-07-05 | End: 2025-07-05

## 2025-07-05 RX ORDER — INSULIN LISPRO 100 U/ML
INJECTION, SOLUTION INTRAVENOUS; SUBCUTANEOUS EVERY 6 HOURS
Refills: 0 | Status: DISCONTINUED | OUTPATIENT
Start: 2025-07-05 | End: 2025-07-07

## 2025-07-05 RX ORDER — SODIUM CHLORIDE 9 G/1000ML
1000 INJECTION, SOLUTION INTRAVENOUS ONCE
Refills: 0 | Status: COMPLETED | OUTPATIENT
Start: 2025-07-05 | End: 2025-07-05

## 2025-07-05 RX ORDER — SODIUM PHOSPHATE,DIBASIC DIHYD
15 POWDER (GRAM) MISCELLANEOUS ONCE
Refills: 0 | Status: COMPLETED | OUTPATIENT
Start: 2025-07-05 | End: 2025-07-05

## 2025-07-05 RX ORDER — OCTREOTIDE ACETATE 500 UG/ML
50 INJECTION, SOLUTION INTRAVENOUS; SUBCUTANEOUS ONCE
Refills: 0 | Status: COMPLETED | OUTPATIENT
Start: 2025-07-05 | End: 2025-07-05

## 2025-07-05 RX ORDER — ERYTHROMYCIN ETHYLSUCCINATE 200 MG/5ML
250 SUSPENSION, RECONSTITUTED, ORAL (ML) ORAL ONCE
Refills: 0 | Status: COMPLETED | OUTPATIENT
Start: 2025-07-06 | End: 2025-07-06

## 2025-07-05 RX ORDER — CYCLOSPORINE 100 MG/1
25 CAPSULE, LIQUID FILLED ORAL
Refills: 0 | Status: DISCONTINUED | OUTPATIENT
Start: 2025-07-05 | End: 2025-07-05

## 2025-07-05 RX ORDER — OCTREOTIDE ACETATE 500 UG/ML
50 INJECTION, SOLUTION INTRAVENOUS; SUBCUTANEOUS
Qty: 500 | Refills: 0 | Status: DISCONTINUED | OUTPATIENT
Start: 2025-07-05 | End: 2025-07-05

## 2025-07-05 RX ORDER — NIFEDIPINE 30 MG
30 TABLET, EXTENDED RELEASE 24 HR ORAL DAILY
Refills: 0 | Status: DISCONTINUED | OUTPATIENT
Start: 2025-07-05 | End: 2025-07-05

## 2025-07-05 RX ORDER — SODIUM CHLORIDE 9 G/1000ML
1000 INJECTION, SOLUTION INTRAVENOUS
Refills: 0 | Status: DISCONTINUED | OUTPATIENT
Start: 2025-07-05 | End: 2025-07-05

## 2025-07-05 RX ORDER — CYCLOSPORINE 100 MG/1
50 CAPSULE, LIQUID FILLED ORAL
Refills: 0 | Status: DISCONTINUED | OUTPATIENT
Start: 2025-07-05 | End: 2025-07-06

## 2025-07-05 RX ORDER — DEXTROSE 50 % IN WATER 50 %
25 SYRINGE (ML) INTRAVENOUS ONCE
Refills: 0 | Status: DISCONTINUED | OUTPATIENT
Start: 2025-07-05 | End: 2025-07-05

## 2025-07-05 RX ORDER — PROPOFOL 10 MG/ML
50 INJECTION, EMULSION INTRAVENOUS
Qty: 1000 | Refills: 0 | Status: DISCONTINUED | OUTPATIENT
Start: 2025-07-05 | End: 2025-07-06

## 2025-07-05 RX ORDER — MYCOPHENOLATE MOFETIL 500 MG/1
1000 TABLET, FILM COATED ORAL
Refills: 0 | Status: DISCONTINUED | OUTPATIENT
Start: 2025-07-05 | End: 2025-07-05

## 2025-07-05 RX ORDER — VALGANCICLOVIR 450 MG/1
900 TABLET, FILM COATED ORAL DAILY
Refills: 0 | Status: DISCONTINUED | OUTPATIENT
Start: 2025-07-05 | End: 2025-07-06

## 2025-07-05 RX ORDER — MAGNESIUM SULFATE 500 MG/ML
2 SYRINGE (ML) INJECTION ONCE
Refills: 0 | Status: COMPLETED | OUTPATIENT
Start: 2025-07-05 | End: 2025-07-05

## 2025-07-05 RX ORDER — LEVETIRACETAM 10 MG/ML
500 INJECTION, SOLUTION INTRAVENOUS EVERY 12 HOURS
Refills: 0 | Status: DISCONTINUED | OUTPATIENT
Start: 2025-07-06 | End: 2025-07-06

## 2025-07-05 RX ORDER — CYCLOSPORINE 100 MG/1
50 CAPSULE, LIQUID FILLED ORAL
Refills: 0 | Status: DISCONTINUED | OUTPATIENT
Start: 2025-07-05 | End: 2025-07-05

## 2025-07-05 RX ORDER — CALCIUM CARBONATE/VITAMIN D3 500MG-5MCG
1 TABLET ORAL
Refills: 0 | Status: DISCONTINUED | OUTPATIENT
Start: 2025-07-05 | End: 2025-07-06

## 2025-07-05 RX ORDER — LEVETIRACETAM 10 MG/ML
500 INJECTION, SOLUTION INTRAVENOUS ONCE
Refills: 0 | Status: COMPLETED | OUTPATIENT
Start: 2025-07-05 | End: 2025-07-05

## 2025-07-05 RX ORDER — SODIUM CHLORIDE 9 G/1000ML
1000 INJECTION, SOLUTION INTRAVENOUS
Refills: 0 | Status: DISCONTINUED | OUTPATIENT
Start: 2025-07-05 | End: 2025-07-07

## 2025-07-05 RX ORDER — PREDNISONE 20 MG/1
10 TABLET ORAL DAILY
Refills: 0 | Status: DISCONTINUED | OUTPATIENT
Start: 2025-07-05 | End: 2025-07-05

## 2025-07-05 RX ORDER — INSULIN LISPRO 100 U/ML
INJECTION, SOLUTION INTRAVENOUS; SUBCUTANEOUS AT BEDTIME
Refills: 0 | Status: DISCONTINUED | OUTPATIENT
Start: 2025-07-05 | End: 2025-07-05

## 2025-07-05 RX ORDER — MYCOPHENOLATE MOFETIL 500 MG/1
1000 TABLET, FILM COATED ORAL
Refills: 0 | Status: DISCONTINUED | OUTPATIENT
Start: 2025-07-05 | End: 2025-07-06

## 2025-07-05 RX ORDER — PROPOFOL 10 MG/ML
50 INJECTION, EMULSION INTRAVENOUS
Qty: 1000 | Refills: 0 | Status: DISCONTINUED | OUTPATIENT
Start: 2025-07-05 | End: 2025-07-05

## 2025-07-05 RX ORDER — BUDESONIDE 0.25 MG/2ML
0.5 SUSPENSION RESPIRATORY (INHALATION) EVERY 12 HOURS
Refills: 0 | Status: DISCONTINUED | OUTPATIENT
Start: 2025-07-05 | End: 2025-07-09

## 2025-07-05 RX ORDER — INSULIN GLARGINE-YFGN 100 [IU]/ML
5 INJECTION, SOLUTION SUBCUTANEOUS AT BEDTIME
Refills: 0 | Status: DISCONTINUED | OUTPATIENT
Start: 2025-07-05 | End: 2025-07-09

## 2025-07-05 RX ORDER — SULFAMETHOXAZOLE/TRIMETHOPRIM 800-160 MG
1 TABLET ORAL DAILY
Refills: 0 | Status: DISCONTINUED | OUTPATIENT
Start: 2025-07-05 | End: 2025-07-05

## 2025-07-05 RX ORDER — CEFTRIAXONE 500 MG/1
1000 INJECTION, POWDER, FOR SOLUTION INTRAMUSCULAR; INTRAVENOUS EVERY 24 HOURS
Refills: 0 | Status: DISCONTINUED | OUTPATIENT
Start: 2025-07-05 | End: 2025-07-05

## 2025-07-05 RX ORDER — QUETIAPINE FUMARATE 25 MG/1
75 TABLET ORAL AT BEDTIME
Refills: 0 | Status: DISCONTINUED | OUTPATIENT
Start: 2025-07-05 | End: 2025-07-05

## 2025-07-05 RX ORDER — DEXTROSE 50 % IN WATER 50 %
15 SYRINGE (ML) INTRAVENOUS ONCE
Refills: 0 | Status: DISCONTINUED | OUTPATIENT
Start: 2025-07-05 | End: 2025-07-05

## 2025-07-05 RX ORDER — MAGNESIUM OXIDE 400 MG
400 TABLET ORAL
Refills: 0 | Status: DISCONTINUED | OUTPATIENT
Start: 2025-07-05 | End: 2025-07-05

## 2025-07-05 RX ORDER — ENTECAVIR 0.5 MG/1
0.5 TABLET ORAL DAILY
Refills: 0 | Status: DISCONTINUED | OUTPATIENT
Start: 2025-07-06 | End: 2025-07-06

## 2025-07-05 RX ORDER — MIDAZOLAM IN 0.9 % SOD.CHLORID 1 MG/ML
2 PLASTIC BAG, INJECTION (ML) INTRAVENOUS ONCE
Refills: 0 | Status: DISCONTINUED | OUTPATIENT
Start: 2025-07-05 | End: 2025-07-05

## 2025-07-05 RX ORDER — INSULIN LISPRO 100 U/ML
5 INJECTION, SOLUTION INTRAVENOUS; SUBCUTANEOUS
Refills: 0 | Status: DISCONTINUED | OUTPATIENT
Start: 2025-07-05 | End: 2025-07-05

## 2025-07-05 RX ORDER — METOCLOPRAMIDE HCL 10 MG
10 TABLET ORAL ONCE
Refills: 0 | Status: COMPLETED | OUTPATIENT
Start: 2025-07-05 | End: 2025-07-05

## 2025-07-05 RX ORDER — CALCIUM CARBONATE/VITAMIN D3 500MG-5MCG
1 TABLET ORAL
Refills: 0 | Status: DISCONTINUED | OUTPATIENT
Start: 2025-07-05 | End: 2025-07-05

## 2025-07-05 RX ORDER — PREDNISONE 20 MG/1
5 TABLET ORAL DAILY
Refills: 0 | Status: DISCONTINUED | OUTPATIENT
Start: 2025-07-06 | End: 2025-07-06

## 2025-07-05 RX ORDER — SULFAMETHOXAZOLE/TRIMETHOPRIM 800-160 MG
80 TABLET ORAL DAILY
Refills: 0 | Status: DISCONTINUED | OUTPATIENT
Start: 2025-07-06 | End: 2025-07-06

## 2025-07-05 RX ORDER — DEXTROSE 50 % IN WATER 50 %
12.5 SYRINGE (ML) INTRAVENOUS ONCE
Refills: 0 | Status: DISCONTINUED | OUTPATIENT
Start: 2025-07-05 | End: 2025-07-05

## 2025-07-05 RX ORDER — ONDANSETRON HCL/PF 4 MG/2 ML
4 VIAL (ML) INJECTION ONCE
Refills: 0 | Status: COMPLETED | OUTPATIENT
Start: 2025-07-05 | End: 2025-07-05

## 2025-07-05 RX ORDER — INSULIN LISPRO 100 U/ML
INJECTION, SOLUTION INTRAVENOUS; SUBCUTANEOUS
Refills: 0 | Status: DISCONTINUED | OUTPATIENT
Start: 2025-07-05 | End: 2025-07-05

## 2025-07-05 RX ADMIN — Medication 25 GRAM(S): at 10:06

## 2025-07-05 RX ADMIN — Medication 10 MILLIGRAM(S): at 14:00

## 2025-07-05 RX ADMIN — CYCLOSPORINE 50 MILLIGRAM(S): 100 CAPSULE, LIQUID FILLED ORAL at 10:06

## 2025-07-05 RX ADMIN — CYCLOSPORINE 50 MILLIGRAM(S): 100 CAPSULE, LIQUID FILLED ORAL at 21:24

## 2025-07-05 RX ADMIN — INSULIN GLARGINE-YFGN 5 UNIT(S): 100 INJECTION, SOLUTION SUBCUTANEOUS at 21:20

## 2025-07-05 RX ADMIN — Medication 50 MILLILITER(S): at 07:02

## 2025-07-05 RX ADMIN — PREDNISONE 10 MILLIGRAM(S): 20 TABLET ORAL at 06:00

## 2025-07-05 RX ADMIN — VALGANCICLOVIR 900 MILLIGRAM(S): 450 TABLET, FILM COATED ORAL at 21:19

## 2025-07-05 RX ADMIN — Medication 255 MILLIMOLE(S): at 14:35

## 2025-07-05 RX ADMIN — Medication 2 MILLIGRAM(S): at 14:28

## 2025-07-05 RX ADMIN — INSULIN LISPRO 2: 100 INJECTION, SOLUTION INTRAVENOUS; SUBCUTANEOUS at 07:30

## 2025-07-05 RX ADMIN — SUMATRIPTAN 100 MILLIGRAM(S): 100 TABLET, FILM COATED ORAL at 10:05

## 2025-07-05 RX ADMIN — SODIUM CHLORIDE 75 MILLILITER(S): 9 INJECTION, SOLUTION INTRAVENOUS at 19:46

## 2025-07-05 RX ADMIN — LEVETIRACETAM 500 MILLIGRAM(S): 10 INJECTION, SOLUTION INTRAVENOUS at 06:00

## 2025-07-05 RX ADMIN — Medication 4 MILLIGRAM(S): at 14:00

## 2025-07-05 RX ADMIN — Medication 400 MILLIGRAM(S): at 10:06

## 2025-07-05 RX ADMIN — SUMATRIPTAN 100 MILLIGRAM(S): 100 TABLET, FILM COATED ORAL at 11:05

## 2025-07-05 RX ADMIN — INSULIN LISPRO 6: 100 INJECTION, SOLUTION INTRAVENOUS; SUBCUTANEOUS at 18:55

## 2025-07-05 RX ADMIN — Medication 30 MILLIGRAM(S): at 06:00

## 2025-07-05 RX ADMIN — Medication 15 MILLILITER(S): at 18:53

## 2025-07-05 RX ADMIN — Medication 40 MILLIGRAM(S): at 18:54

## 2025-07-05 RX ADMIN — Medication 1 TABLET(S): at 06:00

## 2025-07-05 RX ADMIN — SODIUM CHLORIDE 6000 MILLILITER(S): 9 INJECTION, SOLUTION INTRAVENOUS at 13:34

## 2025-07-05 RX ADMIN — LEVETIRACETAM 400 MILLIGRAM(S): 10 INJECTION, SOLUTION INTRAVENOUS at 18:54

## 2025-07-05 RX ADMIN — CEFTRIAXONE 100 MILLIGRAM(S): 500 INJECTION, POWDER, FOR SOLUTION INTRAMUSCULAR; INTRAVENOUS at 06:38

## 2025-07-05 RX ADMIN — Medication 1 DOSE(S): at 10:07

## 2025-07-05 RX ADMIN — MYCOPHENOLATE MOFETIL 1000 MILLIGRAM(S): 500 TABLET, FILM COATED ORAL at 10:11

## 2025-07-05 RX ADMIN — MYCOPHENOLATE MOFETIL 1000 MILLIGRAM(S): 500 TABLET, FILM COATED ORAL at 21:19

## 2025-07-05 RX ADMIN — Medication 2 MILLIGRAM(S): at 15:00

## 2025-07-05 RX ADMIN — Medication 1 APPLICATION(S): at 19:47

## 2025-07-05 RX ADMIN — PROPOFOL 16.5 MICROGRAM(S)/KG/MIN: 10 INJECTION, EMULSION INTRAVENOUS at 19:47

## 2025-07-05 NOTE — PATIENT PROFILE ADULT - FUNCTIONAL ASSESSMENT - BASIC MOBILITY 4.
Medication:   Requested Prescriptions     Pending Prescriptions Disp Refills    atorvastatin (LIPITOR) 20 MG tablet [Pharmacy Med Name: Atorvastatin Calcium Oral Tablet 20 MG] 90 tablet 0     Sig: TAKE 1 TABLET BY MOUTH EVERY DAY     Last Filled:  11/19/21    Last appt: 1/3/2023   Next appt: Visit date not found    Last Lipid:   Lab Results   Component Value Date/Time    CHOL 220 01/05/2023 02:21 PM    TRIG 118 01/05/2023 02:21 PM    HDL 50 01/05/2023 02:21 PM    [address] 146 01/05/2023 02:21 PM 3 = A little assistance

## 2025-07-05 NOTE — CONSULT NOTE ADULT - ASSESSMENT
69 yo F  with PMH of [facility] cirrhosis c/b HCC s/p OLT 5/31/25 and recurrent UGIBs 2/2 AVMs and portal gastropathy (no varices on last EGD) presenting with melena and near syncope, found to have Hgb down to 5.8 s/p 2 units. Pt then developed hematemesis and dark red blood per rectum c/f UGIB.     Impression:  #UGIB  #Hx of UGIBs 2/2 AVMs and portal gastropathy  #s/p OLT 5/31/25    P/w melena and near syncope, found to Hgb 5.8 s/p 2 units pRBC with repeat Hgb 8 now tachycardiac with hematemesis and melena c/w UGIB. Of note, pt w/ hx of UGIBs in the past. EGD 5/27/25 found tortuosity of esophagus, small nonbleeding gastric AVMs which were ablated, portal hypertensive gastropathy with contact bleeding. She had colonoscopy 5/15/2024 with 3 mm to 3 mm polyps removed from proximal transverse colon and found internal and external hemorrhoids. EGD 2/2025 showed small hiatal hernia, small amount of blood in stomach, 3 AVMs with contact bleeding, and portal hypertensive gastropathy. EGD 2022 showed small EV with no signs of recent hemorrhage, portal gastropathy, and large blood and blood clots in stomach suspected to be from gastric varices (though none seen). Suspect UGIB likely due to PUD in setting of steroids post transplant vs bleeding from gastric AVMs given prior hx. Low suspicion for varices as s/p post transplant w/ none seen on prior EGD. Pt tachycardiac, borderline hypotension    - Plan for endoscopy today after intubation  - Transfer patient to SICU  - C/w PPI IV q12h  - Transfuse 1 unit pRBC  - Send repeat stat cbc  - Keep patient NPO  - Please give dose of reglan 10 mg IV now to clear gastric contents for EGD  - Trend cbc, active T&S, transfuse Hgb>7  - Trend liver tests and INR    All recommendations are tentative until note is attested by attending.     Candida Polo, PGY5  Gastroenterology/Hepatology Fellow  Available on Microsoft Teams  [phone] (Long Range Pager)  [phone] (Short Range Pager [facility])    After 5 pm, please contact the on-call GI fellow for any urgent issues via the Hospital Call

## 2025-07-05 NOTE — CONSULT NOTE ADULT - ATTENDING COMMENTS
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 S/p 2u PRBCs overnight with appropriate response. She was brought to SICU after RRT today and intubated for airway protection. Bedside EGD performed with a large amount (>1L) of fresh and old blood suctioned from the stomach and irrigated to aid visualization but with incomplete visualization of the gastric fundus and body due to some retained old clot, active bleeding from a vessel in the mid-gastric body along the greater curvature successfully treated with hemoclip for hemostasis, and normal appearing esophagus and proximal duodenum. Okay for OGT or NGT placement for enteral access but please keep endotracheally intubated to monitor for re-bleeding and until repeat EGD tomorrow. Okay to discontinue pantoprazole infusion and switch to PPI twice daily. Okay to discontinue octreotide infusion. Continue entecavir 0.5 mg po daily for donor HBcAb positivity. Continue Bactrim SS 1 tab po daily for PJP prophylaxis. Continue Valcyte 900 mg po daily for CMV prophylaxis (D+/R-, high risk). Prednisone decreased from 10 to 5 mg po daily. Continuing MMF 1000 mg po bid. Modified cyclosporine (Gengraf) per level for goal 150-200 ng/mL.    We will continue to follow. Please don't hesitate to call with any questions or concerns.    Mamie Prado M.D., Ph.D.  Transplant Hepatology

## 2025-07-05 NOTE — PROVIDER CONTACT NOTE (OTHER) - ASSESSMENT
Patient AOx4, alert. Patient is asymptomatic. VSS per flowsheet. Patient denies chest pain or discomfort.

## 2025-07-05 NOTE — CONSULT NOTE ADULT - ASSESSMENT
69 YO female with PMH MASH/HCC s/p OLT 5/31/25, GIB (most recent EGD 5/2025 with angioectasias and no active bleeding), chronic back pain 2/2 spinal fx, presents from OSH on 7/4/25 for near syncope, weakness and dark stools, transferred to [facility] transplant service for GIB/melena, then transferred to SICU for planned EGD today and hemodynamic monitoring.      PLAN:  Neuro:  - propofol for sedation   - keppra for seizure ppx post transplant  - cont home seroquel    Resp:  - intubated for EGD today, will extubate   - Advair q12 (tx interchange for Symbicort)  - Maintain O2 saturation >92%    CV:  - HDS off pressors  - Maintain MAP >65  - holding home coreg    GI:  - s/p OLT 5/31  - now with GIB, occult positive, melena, anemia   - NPO  - octreotide infusion  - protonix IVP BID    /Renal:  - creatinine appears stable, monitor lytes & replete as necessary  - plasmalyte @75cc/hr    Heme:  - EBL 1500, received 9u pRBC 6 FFP 4 plt 4 cryo intraop  - Monitor H&H, transfuse prn  - SCDs for ppx    ID:   - entecavir for hep B+ donor  - Transplant ppx Bactrim, Valcyte, diflucan  - Immunosuppression with Cellcept, solumedrol taper    Endo: hx T2DM  - insulin gtt > lantus  - restart pre meal 8u  - Monitor glucose    Lines:  - L radial art line  - R IJ MAC   69 YO female with PMH MASH/HCC s/p OLT 5/31/25, GIB (most recent EGD 5/2025 with angioectasias and no active bleeding), chronic back pain 2/2 spinal fx, presents from OSH on 7/4/25 for near syncope, weakness and dark stools, transferred to [facility] transplant service for GIB/melena, then transferred to SICU for planned EGD today and hemodynamic monitoring.      PLAN:  Neuro:  - propofol for sedation   - keppra for seizure ppx post transplant  - cont home seroquel    Resp:  - intubated for EGD today PRVC 14/400/6/50%, likely will extubate post procedure  - Advair q12 (tx interchange for Symbicort)  - Maintain O2 saturation >92%    CV:  - HDS off pressors  - Maintain MAP >65  - holding home coreg    GI:  - s/p OLT 5/31  - now with GIB, occult positive, melena, anemia   - NPO  - octreotide infusion  - protonix IVP BID    /Renal:  - creatinine appears stable, monitor lytes & replete as necessary  - plasmalyte @75cc/hr    Heme:  - s/p 4 units PRBC   - cont to monitor H/H  - SCDs for ppx    ID:   - entecavir for hep B+ donor  - Transplant ppx Bactrim, Valcyte  - Immunosuppression with Cellcept, prednisone, cyclosporine    Endo:   - lantus 5 HS for now  - holding 8u premeals  - moderate ISS    Lines:  - PIVs

## 2025-07-05 NOTE — CONSULT NOTE ADULT - SUBJECTIVE AND OBJECTIVE BOX
HISTORY OF PRESENT ILLNESS:      70F with MASH/HCC, UGIB in  and 25 (EGD at the time no active bleed), now s/p OLT 25. Discharged to [facility] -. Transferred from [facility] for near syncope.     Also with darker BMs since txp.  H/H 5.8/17.5 on arrival, received 2u PRBC.  Afebrile, VSS on arrival and on transfer  Other than associated weakness, rest of ROS unremarkable  Tolerating PO, no other signs of bleeding  Compliant with all medications, no ETOH. (2025 04:42)            PAST MEDICAL HISTORY: HCC (hepatocellular carcinoma)    DM (diabetes mellitus)    HTN (hypertension)    HLD (hyperlipidemia)    Hepatic cirrhosis    IVEY (nonalcoholic steatohepatitis)    Former smoker    Ascites    Hepatic encephalopathy    History of cervical cancer    HCC (hepatocellular carcinoma)    Hepatic cirrhosis    HLD (hyperlipidemia)    HTN (hypertension)    Type 2 diabetes mellitus    History of ascites    Nonalcoholic fatty liver disease without nonalcoholic steatohepatitis (IVEY)    COPD exacerbation    COPD (chronic obstructive pulmonary disease)        PAST SURGICAL HISTORY: H/O  section    History of cholecystectomy    H/O carpal tunnel repair    H/O cataract extraction    H/O carpal tunnel repair    S/P cataract extraction    H/O  section    H/O cataract extraction    History of cholecystectomy        FAMILY HISTORY: FH: CAD (coronary artery disease) (Mother)    Family history of CVA (Father)        SOCIAL HISTORY:    CODE STATUS:     HOME MEDICATIONS:    ALLERGIES: No Known Allergies      VITAL SIGNS:  ICU Vital Signs Last 24 Hrs  T(C): 36.9 (2025 12:52), Max: 37.3 (2025 09:00)  T(F): 98.5 (2025 12:52), Max: 99.1 (2025 09:00)  HR: 102 (2025 13:00) (81 - 102)  BP: 107/65 (2025 13:00) (107/65 - 162/69)  BP(mean): 78 (2025 13:00) (78 - 87)  ABP: --  ABP(mean): --  RR: 22 (2025 13:00) (18 - 31)  SpO2: 96% (2025 13:00) (96% - 100%)    O2 Parameters below as of 2025 12:52  Patient On (Oxygen Delivery Method): room air            PHYSICAL EXAM:  Gen: NAD  Neuro: A&Ox3  Resp: no increased WOB, satting well on RA  Cardiac: appears well perfused, extremities warm  Abd: soft, nondistended  : Perkins draining clear yellow urine    NEURO  Meds:levETIRAcetam 500 milliGRAM(s) Oral two times a day  metoclopramide Injectable 10 milliGRAM(s) IV Push once  ondansetron Injectable 4 milliGRAM(s) IV Push once  QUEtiapine 75 milliGRAM(s) Oral at bedtime  SUMAtriptan 100 milliGRAM(s) Oral daily PRN Migraine      RESPIRATORY  Mechanical Ventilation:     Meds:fluticasone propionate/ salmeterol 250-50 MICROgram(s) Diskus 1 Dose(s) Inhalation two times a day      CARDIOVASCULAR  VBG - ( 2025 13:39 )  pH: 7.22  /  pCO2: 44    /  pO2: 31    / HCO3: 18    / Base Excess: -9.1  /  SaO2: 54.7   Lactate: 3.7              Cardiac Rhythm:  Meds:NIFEdipine XL 30 milliGRAM(s) Oral daily      GI/NUTRITION  Diet:  Meds:pantoprazole  Injectable 40 milliGRAM(s) IV Push two times a day      GENITOURINARY/RENAL  Meds:calcium carbonate 1250 mG  + Vitamin D (OsCal 500 + D) 1 Tablet(s) Oral two times a day  magnesium oxide 400 milliGRAM(s) Oral three times a day with meals  sodium chloride 0.9%. 1000 milliLiter(s) IV Continuous <Continuous>       @ : @ 07:00  --------------------------------------------------------  IN:  Total IN: 0 mL    OUT:    Voided (mL): 60 mL  Total OUT: 60 mL    Total NET: -60 mL       @ 07:  -   @ 13:46  --------------------------------------------------------  IN:    Oral Fluid: 140 mL    sodium chloride 0.9%: 250 mL  Total IN: 390 mL    OUT:    Voided (mL): 300 mL  Total OUT: 300 mL    Total NET: 90 mL        Weight (kg): 55 ( @ 04:07)      138  |  107  |  59[H]  ----------------------------<  180[H]  4.6   |  17[L]  |  1.16    Ca    8.1[L]      2025 06:49  Phos  2.5       Mg     1.7         TPro  4.6[L]  /  Alb  3.1[L]  /  TBili  0.8  /  DBili  0.3  /  AST  14  /  ALT  <5[L]  /  AlkPhos  56      [ ] Perkins catheter, indication: urine output monitoring in critically ill patient    HEMATOLOGIC  [ ] VTE Prophylaxis:                          8.0    3.23  )-----------( 106      ( 2025 09:21 )             24.4     PT/INR - ( 2025 06:49 )   PT: 13.3 sec;   INR: 1.17 ratio         PTT - ( 2025 06:49 )  PTT:22.6 sec  Transfusion: [ ] PRBC	[ ] Platelets	[ ] FFP	[ ] Cryoprecipitate      INFECTIOUS DISEASES  Meds:cycloSPORINE  , modified (GENGRAF) 50 milliGRAM(s) Oral <User Schedule>  entecavir 0.5 milliGRAM(s) Oral daily  mycophenolate mofetil 1000 milliGRAM(s) Oral <User Schedule>  trimethoprim   80 mG/sulfamethoxazole 400 mG 1 Tablet(s) Oral daily  valGANciclovir 900 milliGRAM(s) Oral daily    RECENT CULTURES:      ENDOCRINE  Meds:glucagon  Injectable 1 milliGRAM(s) IntraMuscular once  insulin glargine Injectable (LANTUS) 5 Unit(s) SubCutaneous at bedtime  insulin lispro (ADMELOG) corrective regimen sliding scale   SubCutaneous three times a day before meals  insulin lispro (ADMELOG) corrective regimen sliding scale   SubCutaneous at bedtime  octreotide  Infusion 50 MICROgram(s)/Hr IV Continuous <Continuous>  octreotide  Injectable 50 MICROGram(s) IV Push once    CAPILLARY BLOOD GLUCOSE      POCT Blood Glucose.: 344 mg/dL (2025 12:21)  POCT Blood Glucose.: 207 mg/dL (2025 08:31)  POCT Blood Glucose.: 200 mg/dL (2025 06:37)      PATIENT CARE ACCESS DEVICES:  [ ] Peripheral IV  [ ] Central Venous Line	[ ] R	[ ] L	[ ] IJ	[ ] Fem	[ ] SC	Placed:   [ ] Arterial Line		[ ] R	[ ] L	[ ] Fem	[ ] Rad	[ ] Ax	Placed:   [ ] PICC:					[ ] Mediport  [ ] Urinary Catheter, Date Placed:   [x] Necessity of urinary, arterial, and venous catheters discussed    OTHER MEDICATIONS:     IMAGING STUDIES: HISTORY OF PRESENT ILLNESS:    71 YO female with PMH MASH/HCC s/p OLT 25, GIB (most recent EGD 2025 with angioectasias and no active bleeding), chronic back pain 2/2 spinal fx, presents from OSH on 25 for near syncope, weakness and dark stools, where she was found to be anemic to 5.8/17.5.  Pt received 2 units PRBC at OSH, then was transferred to [facility].  This am was hypotensive SBP 100s, 2 more units PRBC ordered and planned for EGD this afternoon.  Pt transferred to SICU for intubation in preparation for bedside EGD.  Pt received 1 unit PRBC prior to transfer and is pending 2nd.        PAST MEDICAL HISTORY: HCC (hepatocellular carcinoma)    DM (diabetes mellitus)    HTN (hypertension)    HLD (hyperlipidemia)    Hepatic cirrhosis    IVEY (nonalcoholic steatohepatitis)    Former smoker    Ascites    Hepatic encephalopathy    History of cervical cancer    HCC (hepatocellular carcinoma)    Hepatic cirrhosis    HLD (hyperlipidemia)    HTN (hypertension)    Type 2 diabetes mellitus    History of ascites    Nonalcoholic fatty liver disease without nonalcoholic steatohepatitis (IVEY)    COPD exacerbation    COPD (chronic obstructive pulmonary disease)        PAST SURGICAL HISTORY: H/O  section    History of cholecystectomy    H/O carpal tunnel repair    H/O cataract extraction    H/O carpal tunnel repair    S/P cataract extraction    H/O  section    H/O cataract extraction    History of cholecystectomy        FAMILY HISTORY: FH: CAD (coronary artery disease) (Mother)    Family history of CVA (Father)        SOCIAL HISTORY:    CODE STATUS:     HOME MEDICATIONS:    ALLERGIES: No Known Allergies      VITAL SIGNS:  ICU Vital Signs Last 24 Hrs  T(C): 36.9 (2025 12:52), Max: 37.3 (2025 09:00)  T(F): 98.5 (2025 12:52), Max: 99.1 (2025 09:00)  HR: 102 (2025 13:00) (81 - 102)  BP: 107/65 (2025 13:00) (107/65 - 162/69)  BP(mean): 78 (2025 13:00) (78 - 87)  ABP: --  ABP(mean): --  RR: 22 (2025 13:00) (18 - 31)  SpO2: 96% (2025 13:00) (96% - 100%)    O2 Parameters below as of 2025 12:52  Patient On (Oxygen Delivery Method): room air          NEURO  Meds:levETIRAcetam 500 milliGRAM(s) Oral two times a day  metoclopramide Injectable 10 milliGRAM(s) IV Push once  ondansetron Injectable 4 milliGRAM(s) IV Push once  QUEtiapine 75 milliGRAM(s) Oral at bedtime  SUMAtriptan 100 milliGRAM(s) Oral daily PRN Migraine      RESPIRATORY  Mechanical Ventilation:     Meds:fluticasone propionate/ salmeterol 250-50 MICROgram(s) Diskus 1 Dose(s) Inhalation two times a day      CARDIOVASCULAR  VBG - ( 2025 13:39 )  pH: 7.22  /  pCO2: 44    /  pO2: 31    / HCO3: 18    / Base Excess: -9.1  /  SaO2: 54.7   Lactate: 3.7              Cardiac Rhythm:  Meds:NIFEdipine XL 30 milliGRAM(s) Oral daily      GI/NUTRITION  Diet:  Meds:pantoprazole  Injectable 40 milliGRAM(s) IV Push two times a day      GENITOURINARY/RENAL  Meds:calcium carbonate 1250 mG  + Vitamin D (OsCal 500 + D) 1 Tablet(s) Oral two times a day  magnesium oxide 400 milliGRAM(s) Oral three times a day with meals  sodium chloride 0.9%. 1000 milliLiter(s) IV Continuous <Continuous>       @ 07: @ 07:00  --------------------------------------------------------  IN:  Total IN: 0 mL    OUT:    Voided (mL): 60 mL  Total OUT: 60 mL    Total NET: -60 mL       @ 07:  -   @ 13:46  --------------------------------------------------------  IN:    Oral Fluid: 140 mL    sodium chloride 0.9%: 250 mL  Total IN: 390 mL    OUT:    Voided (mL): 300 mL  Total OUT: 300 mL    Total NET: 90 mL        Weight (kg): 55 ( @ 04:07)      138  |  107  |  59[H]  ----------------------------<  180[H]  4.6   |  17[L]  |  1.16    Ca    8.1[L]      2025 06:49  Phos  2.5       Mg     1.7         TPro  4.6[L]  /  Alb  3.1[L]  /  TBili  0.8  /  DBili  0.3  /  AST  14  /  ALT  <5[L]  /  AlkPhos  56      [ ] Perkins catheter, indication: urine output monitoring in critically ill patient    HEMATOLOGIC  [ ] VTE Prophylaxis:                          8.0    3.23  )-----------( 106      ( 2025 09:21 )             24.4     PT/INR - ( 2025 06:49 )   PT: 13.3 sec;   INR: 1.17 ratio         PTT - ( 2025 06:49 )  PTT:22.6 sec  Transfusion: [ ] PRBC	[ ] Platelets	[ ] FFP	[ ] Cryoprecipitate      INFECTIOUS DISEASES  Meds:cycloSPORINE  , modified (GENGRAF) 50 milliGRAM(s) Oral <User Schedule>  entecavir 0.5 milliGRAM(s) Oral daily  mycophenolate mofetil 1000 milliGRAM(s) Oral <User Schedule>  trimethoprim   80 mG/sulfamethoxazole 400 mG 1 Tablet(s) Oral daily  valGANciclovir 900 milliGRAM(s) Oral daily    RECENT CULTURES:      ENDOCRINE  Meds:glucagon  Injectable 1 milliGRAM(s) IntraMuscular once  insulin glargine Injectable (LANTUS) 5 Unit(s) SubCutaneous at bedtime  insulin lispro (ADMELOG) corrective regimen sliding scale   SubCutaneous three times a day before meals  insulin lispro (ADMELOG) corrective regimen sliding scale   SubCutaneous at bedtime  octreotide  Infusion 50 MICROgram(s)/Hr IV Continuous <Continuous>  octreotide  Injectable 50 MICROGram(s) IV Push once    CAPILLARY BLOOD GLUCOSE      POCT Blood Glucose.: 344 mg/dL (2025 12:21)  POCT Blood Glucose.: 207 mg/dL (2025 08:31)  POCT Blood Glucose.: 200 mg/dL (2025 06:37)      PATIENT CARE ACCESS DEVICES:  [ ] Peripheral IV  [ ] Central Venous Line	[ ] R	[ ] L	[ ] IJ	[ ] Fem	[ ] SC	Placed:   [ ] Arterial Line		[ ] R	[ ] L	[ ] Fem	[ ] Rad	[ ] Ax	Placed:   [ ] PICC:					[ ] Mediport  [ ] Urinary Catheter, Date Placed:   [x] Necessity of urinary, arterial, and venous catheters discussed

## 2025-07-05 NOTE — H&P ADULT - NSICDXPASTSURGICALHX_GEN_ALL_CORE_FT
PAST SURGICAL HISTORY:  H/O carpal tunnel repair     H/O carpal tunnel repair     H/O cataract extraction     H/O cataract extraction     H/O  section     H/O  section     History of cholecystectomy     History of cholecystectomy

## 2025-07-05 NOTE — PRE PROCEDURE NOTE - PRE PROCEDURE EVALUATION
Attending Physician:  Mamie Prado                          Procedure: endoscopy    Indication for Procedure: hematemesis  ________________________________________________________  PAST MEDICAL & SURGICAL HISTORY:  HCC (hepatocellular carcinoma)      DM (diabetes mellitus)      HTN (hypertension)      HLD (hyperlipidemia)      Hepatic cirrhosis      IVEY (nonalcoholic steatohepatitis)      Former smoker      Ascites      Hepatic encephalopathy      History of cervical cancer      HCC (hepatocellular carcinoma)      Hepatic cirrhosis      HLD (hyperlipidemia)      HTN (hypertension)      Type 2 diabetes mellitus      History of ascites      Nonalcoholic fatty liver disease without nonalcoholic steatohepatitis (IVEY)      COPD (chronic obstructive pulmonary disease)      H/O  section      History of cholecystectomy      H/O carpal tunnel repair      H/O cataract extraction      H/O carpal tunnel repair      H/O  section      H/O cataract extraction      History of cholecystectomy        ALLERGIES:  No Known Allergies    HOME MEDICATIONS:  aspirin 81 mg oral delayed release tablet: 1 tab(s) orally once a day  bisacodyl 10 mg rectal suppository: 1 suppository(ies) rectal once a day As needed Refractory Constipation  cycloSPORINE modified 25 mg oral capsule: 1 cap(s) orally 2 times a day  entecavir 0.5 mg oral tablet: 1 tab(s) orally once a day  insulin glargine 100 units/mL subcutaneous solution: 5 unit(s) subcutaneous once a day (at bedtime)  insulin lispro 100 units/mL injectable solution: injectable 3 times a day (before meals) DM per moderate Insulin Sliding Scale (ISS):  Please check your FS before each meal, and give insulin per ISS:  2 Unit(s) if Glucose 151 - 200  4 Unit(s) if Glucose 201 - 250  6 Unit(s) if Glucose 251 - 300  8 Unit(s) if Glucose 301 - 350  10 Unit(s) if Glucose 351 - 400  12 Unit(s) if Glucose Greater Than 400  insulin lispro 100 units/mL injectable solution: injectable once a day (at bedtime) Please check your FS at bedtime, and give insulin per ISS:  0 Unit(s) if Glucose 61 - 250  2 Unit(s) if Glucose 251 - 300  4 Unit(s) if Glucose 301 - 350  6 Unit(s) if Glucose 351 - 400  8 Unit(s) if Glucose Greater Than 400    Give correctional scale insulin  REGARDLESS of PO status NOTIFY Provider for blood glucose LESS THAN 70 milliGRAM(s)/deciLiter or GREATER THAN 400 milliGRAM(s)/deciLiter  insulin lispro 100 units/mL injectable solution: 8 unit(s) injectable once a day before breakfast  insulin lispro 100 units/mL injectable solution: 8 unit(s) injectable once a day before lunch  insulin lispro 100 units/mL injectable solution: 5 unit(s) injectable once a day before dinner  lactulose 10 g/15 mL oral syrup: 15 milliliter(s) orally once a day  levETIRAcetam 500 mg oral tablet: 1 tab(s) orally 2 times a day  magnesium oxide 400 mg oral tablet: 1 tab(s) orally 3 times a day (with meals)  melatonin 3 mg oral tablet: 3 tab(s) orally once a day (at bedtime)  mycophenolate mofetil 250 mg oral capsule: 1,000 milligram(s) orally 2 times a day Take at 8AM and 8PM  NIFEdipine 30 mg oral tablet, extended release: 1 tab(s) orally once a day  pantoprazole 40 mg oral delayed release tablet: 1 tab(s) orally once a day (before a meal)  predniSONE 10 mg oral tablet: 1 tab(s) orally once a day  QUEtiapine 25 mg oral tablet: 3 tab(s) orally once a day (at bedtime)  senna leaf extract oral tablet: 2 tab(s) orally once a day (at bedtime)  sulfamethoxazole-trimethoprim 400 mg-80 mg oral tablet: 1 tab(s) orally once a day  SUMAtriptan 100 mg oral tablet: 1 tab(s) orally once a day as needed for Migraine No more than 3 doses in 1 week  traMADol 50 mg oral tablet: 0.5 tab(s) orally every 6 hours as needed for Moderate Pain (4 - 6) Please take 0.5 tab every 6 hours for moderate pain.  Please take 1 tab every 6 hours for severe pain.  Tylenol 325 mg oral tablet: 2 tab(s) orally every 8 hours as needed for  mild pain  valGANciclovir 450 mg oral tablet: 2 tab(s) orally once a day  vitamin D-calcium (as carbonate) 5 mcg-500 mg oral tablet: 1 tab(s) orally 2 times a day    AICD/PPM: [ ] yes   [ ] no    PERTINENT LAB DATA:                        8.0    3.23  )-----------( 106      ( 2025 09:21 )             24.4     07-    138  |  107  |  59[H]  ----------------------------<  180[H]  4.6   |  17[L]  |  1.16    Ca    8.1[L]      2025 06:49  Phos  2.5     07-  Mg     1.7     07-    TPro  4.6[L]  /  Alb  3.1[L]  /  TBili  0.8  /  DBili  0.3  /  AST  14  /  ALT  <5[L]  /  AlkPhos  56  07-05    PT/INR - ( 2025 06:49 )   PT: 13.3 sec;   INR: 1.17 ratio         PTT - ( 2025 06:49 )  PTT:22.6 sec            PHYSICAL EXAMINATION:    Height (cm): 154.9  Weight (kg): 55  BMI (kg/m2): 22.9  BSA (m2): 1.53T(C): 36.9  HR: 118  BP: 107/65  RR: 22  SpO2: 100%    Constitutional: NAD    Neck:  No JVD  Respiratory: CTAB/L  Cardiovascular: S1 and S2  Gastrointestinal: BS+, soft, NT/ND  Extremities: No peripheral edema  Neurological: A/O x 3, no focal deficits        COMMENTS:    The patient is a suitable candidate for the planned procedure unless box checked [ ]  No, explain:

## 2025-07-05 NOTE — CONSULT NOTE ADULT - NS ATTEND AMEND GEN_ALL_CORE FT
70 YF with h/o MASH/HCC s/p OLT 5/31/25, GIB (most recent EGD 5/2025 with angioectasias and no active bleeding), chronic back pain 2/2 spinal fx, presents from OSH on 7/4/25 for near syncope, weakness and dark stools, where she was found to be anemic to 5.8/17.5 received 2U PRBC  Became hypotensive SBP 100s, 2 more units PRBC ordered,  transferred to SICU for intubation in preparation for bedside EGD.  Pt received 1 unit PRBC prior to transfer and is pending 2nd.    Was awake and alert, started on Prop after intubation  Vent management, post intubation CXR P. Change Advair discus to duoneb Pulmicort  BP soft, vasopressor as needed  NPO, PPI bid and octreotide drip   IVF, love placed  Mech DVT PPx, on resuscitation with blood/products  For EGD  Antiviral entecavir for hep B    PPx abx Valcyte, Bactrim  Anti rej: low dose Pred, cyclosporin, mycophenolate

## 2025-07-05 NOTE — CONSULT NOTE ADULT - SUBJECTIVE AND OBJECTIVE BOX
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 Patient reports darker black BMs for the past several days. She denies any abdominal pain. She reports dizziness and near syncope prompting her to come to the hospital.  She came to [facility] for her symptoms and was found to have Hgb 5.8 on  from 8.8 on 7/3. She was transferred here for further management. She was given 2 units pRBC with repeat Hgb 8. This morning, patient developed sudden onset of hematemesis and dark red blood per rectum. She was noted to be tachycardiac to 120 and MAPs in the 80s.     Of note, patient with history of UGIBs in the past. She presented 25 with coffee ground emesis and was found to have tortuosity of esophagus, small nonbleeding gastric AVMs which were ablated, portal hypertensive gastropathy with contact bleeding. She had colonoscopy 5/15/2024 with 3 mm to 3 mm polyps removed from proximal transverse colon and found internal and external hemorrhoids. EGD 2025 showed small hiatal hernia, small amount of blood in stomach, 3 AVMs with contact bleeding, and portal hypertensive gastropathy. EGD  showed small EV with no signs of recent hemorrhage, portal gastropathy, and large blood and blood clots in stomach suspected to be from gastric varices (though none seen).     Allergies:  No Known Allergies      Home Medications:  aspirin 81 mg oral delayed release tablet: 1 tab(s) orally once a day (2025 11:40)  bisacodyl 10 mg rectal suppository: 1 suppository(ies) rectal once a day As needed Refractory Constipation (2025 11:40)  cycloSPORINE modified 25 mg oral capsule: 1 cap(s) orally 2 times a day (2025 09:51)  entecavir 0.5 mg oral tablet: 1 tab(s) orally once a day (2025 11:40)  insulin glargine 100 units/mL subcutaneous solution: 5 unit(s) subcutaneous once a day (at bedtime) (2025 11:52)  insulin lispro 100 units/mL injectable solution: injectable 3 times a day (before meals) DM per moderate Insulin Sliding Scale (ISS):  Please check your FS before each meal, and give insulin per ISS:  2 Unit(s) if Glucose 151 - 200  4 Unit(s) if Glucose 201 - 250  6 Unit(s) if Glucose 251 - 300  8 Unit(s) if Glucose 301 - 350  10 Unit(s) if Glucose 351 - 400  12 Unit(s) if Glucose Greater Than 400 (2025 11:52)  insulin lispro 100 units/mL injectable solution: injectable once a day (at bedtime) Please check your FS at bedtime, and give insulin per ISS:  0 Unit(s) if Glucose 61 - 250  2 Unit(s) if Glucose 251 - 300  4 Unit(s) if Glucose 301 - 350  6 Unit(s) if Glucose 351 - 400  8 Unit(s) if Glucose Greater Than 400    Give correctional scale insulin  REGARDLESS of PO status NOTIFY Provider for blood glucose LESS THAN 70 milliGRAM(s)/deciLiter or GREATER THAN 400 milliGRAM(s)/deciLiter (2025 11:52)  insulin lispro 100 units/mL injectable solution: 8 unit(s) injectable once a day before breakfast (2025 11:58)  insulin lispro 100 units/mL injectable solution: 8 unit(s) injectable once a day before lunch (2025 15:43)  insulin lispro 100 units/mL injectable solution: 5 unit(s) injectable once a day before dinner (2025 15:43)  lactulose 10 g/15 mL oral syrup: 15 milliliter(s) orally once a day (2025 11:40)  levETIRAcetam 500 mg oral tablet: 1 tab(s) orally 2 times a day (2025 11:40)  magnesium oxide 400 mg oral tablet: 1 tab(s) orally 3 times a day (with meals) (2025 11:40)  melatonin 3 mg oral tablet: 3 tab(s) orally once a day (at bedtime) (2025 11:40)  mycophenolate mofetil 250 mg oral capsule: 1,000 milligram(s) orally 2 times a day Take at 8AM and 8PM (2025 15:43)  NIFEdipine 30 mg oral tablet, extended release: 1 tab(s) orally once a day (2025 11:40)  pantoprazole 40 mg oral delayed release tablet: 1 tab(s) orally once a day (before a meal) (2025 11:40)  predniSONE 10 mg oral tablet: 1 tab(s) orally once a day (2025 11:40)  QUEtiapine 25 mg oral tablet: 3 tab(s) orally once a day (at bedtime) (2025 11:40)  senna leaf extract oral tablet: 2 tab(s) orally once a day (at bedtime) (2025 11:40)  sulfamethoxazole-trimethoprim 400 mg-80 mg oral tablet: 1 tab(s) orally once a day (2025 11:40)  SUMAtriptan 100 mg oral tablet: 1 tab(s) orally once a day as needed for Migraine No more than 3 doses in 1 week (2025 11:52)  traMADol 50 mg oral tablet: 0.5 tab(s) orally every 6 hours as needed for Moderate Pain (4 - 6) Please take 0.5 tab every 6 hours for moderate pain.  Please take 1 tab every 6 hours for severe pain. (2025 11:40)  Tylenol 325 mg oral tablet: 2 tab(s) orally every 8 hours as needed for  mild pain (2025 11:58)  valGANciclovir 450 mg oral tablet: 2 tab(s) orally once a day (2025 11:40)  vitamin D-calcium (as carbonate) 5 mcg-500 mg oral tablet: 1 tab(s) orally 2 times a day (2025 11:40)    Hospital Medications:  calcium carbonate 1250 mG  + Vitamin D (OsCal 500 + D) 1 Tablet(s) Oral two times a day  cycloSPORINE  , modified (GENGRAF) 50 milliGRAM(s) Oral <User Schedule>  dextrose 5%. 1000 milliLiter(s) IV Continuous <Continuous>  dextrose 5%. 1000 milliLiter(s) IV Continuous <Continuous>  dextrose 50% Injectable 25 Gram(s) IV Push once  dextrose 50% Injectable 12.5 Gram(s) IV Push once  dextrose 50% Injectable 25 Gram(s) IV Push once  dextrose Oral Gel 15 Gram(s) Oral once PRN  entecavir 0.5 milliGRAM(s) Oral daily  fluticasone propionate/ salmeterol 250-50 MICROgram(s) Diskus 1 Dose(s) Inhalation two times a day  glucagon  Injectable 1 milliGRAM(s) IntraMuscular once  insulin glargine Injectable (LANTUS) 5 Unit(s) SubCutaneous at bedtime  insulin lispro (ADMELOG) corrective regimen sliding scale   SubCutaneous three times a day before meals  insulin lispro (ADMELOG) corrective regimen sliding scale   SubCutaneous at bedtime  insulin lispro Injectable (ADMELOG) 8 Unit(s) SubCutaneous before breakfast  insulin lispro Injectable (ADMELOG) 8 Unit(s) SubCutaneous before lunch  insulin lispro Injectable (ADMELOG) 5 Unit(s) SubCutaneous before dinner  levETIRAcetam 500 milliGRAM(s) Oral two times a day  magnesium oxide 400 milliGRAM(s) Oral three times a day with meals  multiple electrolytes Injection Type 1 Bolus 1000 milliLiter(s) IV Bolus once  mycophenolate mofetil 1000 milliGRAM(s) Oral <User Schedule>  NIFEdipine XL 30 milliGRAM(s) Oral daily  octreotide  Infusion 50 MICROgram(s)/Hr IV Continuous <Continuous>  octreotide  Injectable 50 MICROGram(s) IV Push once  ondansetron Injectable 4 milliGRAM(s) IV Push once  pantoprazole  Injectable 40 milliGRAM(s) IV Push two times a day  QUEtiapine 75 milliGRAM(s) Oral at bedtime  sodium chloride 0.9%. 1000 milliLiter(s) IV Continuous <Continuous>  SUMAtriptan 100 milliGRAM(s) Oral daily PRN  trimethoprim   80 mG/sulfamethoxazole 400 mG 1 Tablet(s) Oral daily  valGANciclovir 900 milliGRAM(s) Oral daily      PMHX/PSHX:  HCC (hepatocellular carcinoma)    DM (diabetes mellitus)    HTN (hypertension)    HLD (hyperlipidemia)    Hepatic cirrhosis    IVEY (nonalcoholic steatohepatitis)    Former smoker    Ascites    Hepatic encephalopathy    History of cervical cancer    HCC (hepatocellular carcinoma)    Hepatic cirrhosis    HLD (hyperlipidemia)    HTN (hypertension)    Type 2 diabetes mellitus    History of ascites    Nonalcoholic fatty liver disease without nonalcoholic steatohepatitis (IVEY)    COPD exacerbation    COPD (chronic obstructive pulmonary disease)    H/O  section    History of cholecystectomy    H/O carpal tunnel repair    H/O cataract extraction    H/O carpal tunnel repair    S/P cataract extraction    H/O  section    H/O cataract extraction    History of cholecystectomy        Family history:  FH: CAD (coronary artery disease) (Mother)    Family history of CVA (Father)        Denies family history of colon cancer/polyps, stomach cancer/polyps, pancreatic cancer/masses, liver cancer/disease, ovarian cancer and endometrial cancer.    Social History:     Tob: Denies  EtOH: As above  Illicit Drugs: Denies    ROS:   General:  No wt loss, fevers, chills, night sweats, fatigue  Eyes:  Good vision, no reported pain  ENT:  No sore throat, pain, runny nose, dysphagia  CV:  No pain, palpitations, hypo/hypertension  Pulm:  No dyspnea, cough, tachypnea, wheezing  GI:  As per HPI  :  No pain, bleeding, incontinence, nocturia  Muscle:  No pain, weakness  Neuro:  No weakness, tingling, memory problems  Psych:  No fatigue, insomnia, mood problems, depression  Endocrine:  No polyuria, polydipsia, cold/heat intolerance  Heme:  No petechiae, ecchymosis, easy bruisability  Skin:  No rash, tattoos, scars, edema    PHYSICAL EXAM:   GENERAL:  No acute distress  HEENT:  Normocephalic/atraumatic, no scleral icterus  CHEST:  No accessory muscle use  HEART:  Regular rate and rhythm  ABDOMEN:  Soft, non-tender, non-distended, normoactive bowel sounds,  no masses, no hepato-splenomegaly, no signs of chronic liver disease  EXTREMITIES: No cyanosis, clubbing, or edema  SKIN:  No rash  NEURO:  Alert and oriented x 3, no asterixis    Vital Signs:  Vital Signs Last 24 Hrs  T(C): 36.9 (2025 12:52), Max: 37.3 (2025 09:00)  T(F): 98.5 (2025 12:52), Max: 99.1 (2025 09:00)  HR: 102 (2025 13:00) (81 - 102)  BP: 107/65 (2025 13:00) (107/65 - 162/69)  BP(mean): 78 (2025 13:00) (78 - 87)  RR: 22 (2025 13:00) (18 - 31)  SpO2: 96% (2025 13:00) (96% - 100%)    Parameters below as of 2025 12:52  Patient On (Oxygen Delivery Method): room air      Daily Height in cm: 154.94 (2025 04:07)    Daily     LABS:                        8.0    3.23  )-----------( 106      ( 2025 09:21 )             24.4     Mean Cell Volume: 94.9 fl (25 @ 09:21)        138  |  107  |  59[H]  ----------------------------<  180[H]  4.6   |  17[L]  |  1.16    Ca    8.1[L]      2025 06:49  Phos  2.5       Mg     1.7         TPro  4.6[L]  /  Alb  3.1[L]  /  TBili  0.8  /  DBili  0.3  /  AST  14  /  ALT  <5[L]  /  AlkPhos  56  07-05    LIVER FUNCTIONS - ( 2025 06:49 )  Alb: 3.1 g/dL / Pro: 4.6 g/dL / ALK PHOS: 56 U/L / ALT: <5 U/L / AST: 14 U/L / GGT: x           PT/INR - ( 2025 06:49 )   PT: 13.3 sec;   INR: 1.17 ratio         PTT - ( 2025 06:49 )  PTT:22.6 sec  Urinalysis Basic - ( 2025 06:49 )    Color: Yellow / Appearance: Clear / S.018 / pH: x  Gluc: 180 mg/dL / Ketone: x  / Bili: Negative / Urobili: 0.2 mg/dL   Blood: x / Protein: Negative mg/dL / Nitrite: Negative   Leuk Esterase: Negative / RBC: 11 /HPF / WBC 0 /HPF   Sq Epi: x / Non Sq Epi: 0 /HPF / Bacteria: Negative /HPF                              8.0    3.23  )-----------( 106      ( 2025 09:21 )             24.4       Imaging:           Chief Complaint: hematemesis      HPI:  Freida Ball is a 69 yo F  with 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 Patient reports darker black BMs for the past several days. She denies any abdominal pain. She reports dizziness and near syncope prompting her to come to the hospital.  She came to [facility] for her symptoms and was found to have Hgb 5.8 on  from 8.8 on 7/3. She was transferred here for further management. She was given 2 units pRBC with repeat Hgb 8. This morning, patient developed sudden onset of hematemesis and dark red blood per rectum. She was noted to be tachycardiac to 120 and MAPs in the 80s.     Of note, patient with history of UGIBs in the past. She presented 25 with coffee ground emesis and was found to have tortuosity of esophagus, small nonbleeding gastric AVMs which were ablated, portal hypertensive gastropathy with contact bleeding. She had colonoscopy 5/15/2024 with 3 mm to 3 mm polyps removed from proximal transverse colon and found internal and external hemorrhoids. EGD 2025 showed small hiatal hernia, small amount of blood in stomach, 3 AVMs with contact bleeding, and portal hypertensive gastropathy. EGD  showed small EV with no signs of recent hemorrhage, portal gastropathy, and large blood and blood clots in stomach suspected to be from gastric varices (though none seen).     Allergies:  No Known Allergies      Home Medications:  aspirin 81 mg oral delayed release tablet: 1 tab(s) orally once a day (2025 11:40)  bisacodyl 10 mg rectal suppository: 1 suppository(ies) rectal once a day As needed Refractory Constipation (2025 11:40)  cycloSPORINE modified 25 mg oral capsule: 1 cap(s) orally 2 times a day (2025 09:51)  entecavir 0.5 mg oral tablet: 1 tab(s) orally once a day (2025 11:40)  insulin glargine 100 units/mL subcutaneous solution: 5 unit(s) subcutaneous once a day (at bedtime) (2025 11:52)  insulin lispro 100 units/mL injectable solution: injectable 3 times a day (before meals) DM per moderate Insulin Sliding Scale (ISS):  Please check your FS before each meal, and give insulin per ISS:  2 Unit(s) if Glucose 151 - 200  4 Unit(s) if Glucose 201 - 250  6 Unit(s) if Glucose 251 - 300  8 Unit(s) if Glucose 301 - 350  10 Unit(s) if Glucose 351 - 400  12 Unit(s) if Glucose Greater Than 400 (2025 11:52)  insulin lispro 100 units/mL injectable solution: injectable once a day (at bedtime) Please check your FS at bedtime, and give insulin per ISS:  0 Unit(s) if Glucose 61 - 250  2 Unit(s) if Glucose 251 - 300  4 Unit(s) if Glucose 301 - 350  6 Unit(s) if Glucose 351 - 400  8 Unit(s) if Glucose Greater Than 400    Give correctional scale insulin  REGARDLESS of PO status NOTIFY Provider for blood glucose LESS THAN 70 milliGRAM(s)/deciLiter or GREATER THAN 400 milliGRAM(s)/deciLiter (2025 11:52)  insulin lispro 100 units/mL injectable solution: 8 unit(s) injectable once a day before breakfast (2025 11:58)  insulin lispro 100 units/mL injectable solution: 8 unit(s) injectable once a day before lunch (2025 15:43)  insulin lispro 100 units/mL injectable solution: 5 unit(s) injectable once a day before dinner (2025 15:43)  lactulose 10 g/15 mL oral syrup: 15 milliliter(s) orally once a day (2025 11:40)  levETIRAcetam 500 mg oral tablet: 1 tab(s) orally 2 times a day (2025 11:40)  magnesium oxide 400 mg oral tablet: 1 tab(s) orally 3 times a day (with meals) (2025 11:40)  melatonin 3 mg oral tablet: 3 tab(s) orally once a day (at bedtime) (2025 11:40)  mycophenolate mofetil 250 mg oral capsule: 1,000 milligram(s) orally 2 times a day Take at 8AM and 8PM (2025 15:43)  NIFEdipine 30 mg oral tablet, extended release: 1 tab(s) orally once a day (2025 11:40)  pantoprazole 40 mg oral delayed release tablet: 1 tab(s) orally once a day (before a meal) (2025 11:40)  predniSONE 10 mg oral tablet: 1 tab(s) orally once a day (2025 11:40)  QUEtiapine 25 mg oral tablet: 3 tab(s) orally once a day (at bedtime) (2025 11:40)  senna leaf extract oral tablet: 2 tab(s) orally once a day (at bedtime) (2025 11:40)  sulfamethoxazole-trimethoprim 400 mg-80 mg oral tablet: 1 tab(s) orally once a day (2025 11:40)  SUMAtriptan 100 mg oral tablet: 1 tab(s) orally once a day as needed for Migraine No more than 3 doses in 1 week (2025 11:52)  traMADol 50 mg oral tablet: 0.5 tab(s) orally every 6 hours as needed for Moderate Pain (4 - 6) Please take 0.5 tab every 6 hours for moderate pain.  Please take 1 tab every 6 hours for severe pain. (2025 11:40)  Tylenol 325 mg oral tablet: 2 tab(s) orally every 8 hours as needed for  mild pain (2025 11:58)  valGANciclovir 450 mg oral tablet: 2 tab(s) orally once a day (2025 11:40)  vitamin D-calcium (as carbonate) 5 mcg-500 mg oral tablet: 1 tab(s) orally 2 times a day (2025 11:40)    Hospital Medications:  calcium carbonate 1250 mG  + Vitamin D (OsCal 500 + D) 1 Tablet(s) Oral two times a day  cycloSPORINE  , modified (GENGRAF) 50 milliGRAM(s) Oral <User Schedule>  dextrose 5%. 1000 milliLiter(s) IV Continuous <Continuous>  dextrose 5%. 1000 milliLiter(s) IV Continuous <Continuous>  dextrose 50% Injectable 25 Gram(s) IV Push once  dextrose 50% Injectable 12.5 Gram(s) IV Push once  dextrose 50% Injectable 25 Gram(s) IV Push once  dextrose Oral Gel 15 Gram(s) Oral once PRN  entecavir 0.5 milliGRAM(s) Oral daily  fluticasone propionate/ salmeterol 250-50 MICROgram(s) Diskus 1 Dose(s) Inhalation two times a day  glucagon  Injectable 1 milliGRAM(s) IntraMuscular once  insulin glargine Injectable (LANTUS) 5 Unit(s) SubCutaneous at bedtime  insulin lispro (ADMELOG) corrective regimen sliding scale   SubCutaneous three times a day before meals  insulin lispro (ADMELOG) corrective regimen sliding scale   SubCutaneous at bedtime  insulin lispro Injectable (ADMELOG) 8 Unit(s) SubCutaneous before breakfast  insulin lispro Injectable (ADMELOG) 8 Unit(s) SubCutaneous before lunch  insulin lispro Injectable (ADMELOG) 5 Unit(s) SubCutaneous before dinner  levETIRAcetam 500 milliGRAM(s) Oral two times a day  magnesium oxide 400 milliGRAM(s) Oral three times a day with meals  multiple electrolytes Injection Type 1 Bolus 1000 milliLiter(s) IV Bolus once  mycophenolate mofetil 1000 milliGRAM(s) Oral <User Schedule>  NIFEdipine XL 30 milliGRAM(s) Oral daily  octreotide  Infusion 50 MICROgram(s)/Hr IV Continuous <Continuous>  octreotide  Injectable 50 MICROGram(s) IV Push once  ondansetron Injectable 4 milliGRAM(s) IV Push once  pantoprazole  Injectable 40 milliGRAM(s) IV Push two times a day  QUEtiapine 75 milliGRAM(s) Oral at bedtime  sodium chloride 0.9%. 1000 milliLiter(s) IV Continuous <Continuous>  SUMAtriptan 100 milliGRAM(s) Oral daily PRN  trimethoprim   80 mG/sulfamethoxazole 400 mG 1 Tablet(s) Oral daily  valGANciclovir 900 milliGRAM(s) Oral daily      PMHX/PSHX:  HCC (hepatocellular carcinoma)    DM (diabetes mellitus)    HTN (hypertension)    HLD (hyperlipidemia)    Hepatic cirrhosis    IVEY (nonalcoholic steatohepatitis)    Former smoker    Ascites    Hepatic encephalopathy    History of cervical cancer    HCC (hepatocellular carcinoma)    Hepatic cirrhosis    HLD (hyperlipidemia)    HTN (hypertension)    Type 2 diabetes mellitus    History of ascites    Nonalcoholic fatty liver disease without nonalcoholic steatohepatitis (IVEY)    COPD exacerbation    COPD (chronic obstructive pulmonary disease)    H/O  section    History of cholecystectomy    H/O carpal tunnel repair    H/O cataract extraction    H/O carpal tunnel repair    S/P cataract extraction    H/O  section    H/O cataract extraction    History of cholecystectomy        Family history:  FH: CAD (coronary artery disease) (Mother)    Family history of CVA (Father)        Denies family history of colon cancer/polyps, stomach cancer/polyps, pancreatic cancer/masses, liver cancer/disease, ovarian cancer and endometrial cancer.    Social History:     Tob: Denies  EtOH: As above  Illicit Drugs: Denies    ROS:   General:  No wt loss, fevers, chills, night sweats, fatigue  Eyes:  Good vision, no reported pain  ENT:  No sore throat, pain, runny nose, dysphagia  CV:  No pain, palpitations, hypo/hypertension  Pulm:  No dyspnea, cough, tachypnea, wheezing  GI:  As per HPI  :  No pain, bleeding, incontinence, nocturia  Muscle:  No pain, weakness  Neuro:  No weakness, tingling, memory problems  Psych:  No fatigue, insomnia, mood problems, depression  Endocrine:  No polyuria, polydipsia, cold/heat intolerance  Heme:  No petechiae, ecchymosis, easy bruisability  Skin:  No rash, tattoos, scars, edema    PHYSICAL EXAM:   GENERAL:  mild distress  HEENT:  Normocephalic/atraumatic, no scleral icterus  CHEST:  No accessory muscle use  HEART:  Regular rate and rhythm  ABDOMEN:  Soft, non-tender, non-distended, normoactive bowel sounds  DENNIS: dark red blood per rectum  EXTREMITIES: No cyanosis, clubbing, or edema  SKIN:  No rash  NEURO:  Alert and oriented x 3, no asterixis    Vital Signs:  Vital Signs Last 24 Hrs  T(C): 36.9 (2025 12:52), Max: 37.3 (2025 09:00)  T(F): 98.5 (2025 12:52), Max: 99.1 (2025 09:00)  HR: 102 (2025 13:00) (81 - 102)  BP: 107/65 (2025 13:00) (107/65 - 162/69)  BP(mean): 78 (2025 13:00) (78 - 87)  RR: 22 (2025 13:00) (18 - 31)  SpO2: 96% (2025 13:00) (96% - 100%)    Parameters below as of 2025 12:52  Patient On (Oxygen Delivery Method): room air      Daily Height in cm: 154.94 (2025 04:07)    Daily     LABS:                        8.0    3.23  )-----------( 106      ( 2025 09:21 )             24.4     Mean Cell Volume: 94.9 fl (25 @ 09:21)        138  |  107  |  59[H]  ----------------------------<  180[H]  4.6   |  17[L]  |  1.16    Ca    8.1[L]      2025 06:49  Phos  2.5       Mg     1.7         TPro  4.6[L]  /  Alb  3.1[L]  /  TBili  0.8  /  DBili  0.3  /  AST  14  /  ALT  <5[L]  /  AlkPhos  56  07-05    LIVER FUNCTIONS - ( 2025 06:49 )  Alb: 3.1 g/dL / Pro: 4.6 g/dL / ALK PHOS: 56 U/L / ALT: <5 U/L / AST: 14 U/L / GGT: x           PT/INR - ( 2025 06:49 )   PT: 13.3 sec;   INR: 1.17 ratio         PTT - ( 2025 06:49 )  PTT:22.6 sec  Urinalysis Basic - ( 2025 06:49 )    Color: Yellow / Appearance: Clear / S.018 / pH: x  Gluc: 180 mg/dL / Ketone: x  / Bili: Negative / Urobili: 0.2 mg/dL   Blood: x / Protein: Negative mg/dL / Nitrite: Negative   Leuk Esterase: Negative / RBC: 11 /HPF / WBC 0 /HPF   Sq Epi: x / Non Sq Epi: 0 /HPF / Bacteria: Negative /HPF                              8.0    3.23  )-----------( 106      ( 2025 09:21 )             24.4

## 2025-07-05 NOTE — H&P ADULT - ASSESSMENT
70F with MASH/HCC, UGIB in 2022 and 5/26/25 (EGD at the time no active bleed), now s/p OLT 5/31/25. Discharged to [facility] 6/12-20. Transferred from [facility] for near syncope and anemia with dark stools to 5.8    Anemia s/p OLT  -s/p 2U PRBC at [facility], follow trend  -send iron studies  -send guaiac  -empiric CTX, f/u surveillance cultures  -will discuss EGD with Transplant Hepatology  -PPI IV BID  -NPO/IVF for now  -hold ASA given anemia  -good graft function otherwise, no concern for intra-abdominal pathology. low threshold for CT       Immunosuppression  -Cyclosporine per level, MMF 1/1, Pred 10  -PPx: Valcyte 900 (CMV +/-), Bactrim, OsCal    DVT PPx  -SCDs  -SQH BID on HOLD given Anemia

## 2025-07-05 NOTE — H&P ADULT - NS ATTEND AMEND GEN_ALL_CORE FT
transferred to [facility] with significant anemia and dark stools per report.  rec'd 2u pRBCs, will f/u HCT transferred to [facility] with significant anemia and dark stools per report.  rec'd 2u pRBCs, will f/u HCT  PPI BID.  GI to likely scope. transferred to [facility] with significant anemia and dark stools per report.  rec'd 2u pRBCs, will f/u HCT  PPI BID.  GI to likely scope.  Immuno: cyclo, mmf 1gm BID, pred 5

## 2025-07-05 NOTE — RAPID RESPONSE TEAM SUMMARY - NSSITUATIONBACKGROUNDRRT_GEN_ALL_CORE
RRT called by transplant hepatology team to expedite transfer to SICU for intubation and endoscopic intervention in the setting of active hematemesis. 70F with MASH/HCC, UGIB in 2022 and 5/26/25 (EGD at the time no active bleed), now s/p OLT 5/31/25. Discharged to [facility] 6/12-20. Transferred from [facility] for near syncope. Protonix IV running prior to rapid response.   Two units of blood were ordered prior to RRT, patient received 1u PRBCs intra transport with recovery of pressures to 130s systolic. Octreotide GTT ordered given history of prior endoscopy with large nonbleeding varix in distal esophagus, and junctional varix in the lesser curvature. Patient completed half a liter bolus of NS which was paused due to normotension. Additional 20g IV placed in R forearm. RRT concluded with successful transport

## 2025-07-05 NOTE — H&P ADULT - NSICDXPASTMEDICALHX_GEN_ALL_CORE_FT
PAST MEDICAL HISTORY:  Ascites     COPD (chronic obstructive pulmonary disease)     DM (diabetes mellitus)     Former smoker     HCC (hepatocellular carcinoma)     HCC (hepatocellular carcinoma)     Hepatic cirrhosis     Hepatic cirrhosis     Hepatic encephalopathy     History of ascites     History of cervical cancer     HLD (hyperlipidemia)     HLD (hyperlipidemia)     HTN (hypertension)     HTN (hypertension)     IVEY (nonalcoholic steatohepatitis)     Nonalcoholic fatty liver disease without nonalcoholic steatohepatitis (IVEY)     Type 2 diabetes mellitus

## 2025-07-05 NOTE — CONSULT NOTE ADULT - GEN GEN HX ROS MEA POS PC
Pt was able to urinate with out issue, lissette pad given, pt denies pain at present, dc instructions given
weakness

## 2025-07-05 NOTE — H&P ADULT - HISTORY OF PRESENT ILLNESS
70F with MASH/HCC, UGIB in 2022 and 5/26/25 (EGD at the time no active bleed), now s/p OLT 5/31/25. Discharged to [facility] 6/12-20. Transferred from [facility] for near syncope.     Also with darker BMs since txp.  H/H 5.8/17.5 on arrival, received 2u PRBC.  Afebrile, VSS on arrival and on transfer  Other than associated weakness, rest of ROS unremarkable  Tolerating PO, no other signs of bleeding  Compliant with all medications, no ETOH.

## 2025-07-05 NOTE — H&P ADULT - NSHPPHYSICALEXAM_GEN_ALL_CORE
PHYSICAL EXAM:  Constitutional: Well developed / well nourished  Eyes:  anicteric, PERRLA  ENMT: nc/at, no thrush  Neck: supple  Respiratory: CTA B/L  Cardiovascular: RRR  Gastrointestinal: Soft abdomen, ND, NT, well healed incisional scar, no peritoneal signs  Genitourinary: Voiding spontaneously  Extremities: SCD's in place and working bilaterally, no calf TTP  Vascular: Palpable dp pulses bilaterally.   Neurological: A&O x3  Skin: no rashes, ulcerations, lesions  Musculoskeletal: Moving all extremities  Psychiatric: Responsive

## 2025-07-05 NOTE — PATIENT PROFILE ADULT - ARE SIGNIFICANT INDICATORS COMPLETE.
[facility]     Outpatient Follow Up Note    CHIEF COMPLAINT / HPI: Hospital Follow Up secondary to status post coronary angiogram with LM disease    Hospital record has been reviewed  Hospital Course progressed as follows per discharge summary: 4/10 - 4/18/25    65 yo female with hx of COPD, chronic respiratory failure on 3 L oxygen presented with shortness of breath, admitted with COPD exacerbation, positive for human rhinovirus/enterovirus.      Acute on chronic hypoxic and hypercapnic respiratory failure,  Advanced COPD with acute exacerbation,  Human Rhinovirus  - Severe COPD with FEV1 22%  - Presented with worsening shortness of breath and increased O2 requirements, uses 3 liters at baseline  - Completed course of Zithromax  - IV solumedrol transitioned to prednisone taper  - Improved  - O2 needs stable  - Pulmonary arranged for NIV on DC     CAD,   Acute sCHF  - Echo 2/2025 with EF 30-35%  - [facility] 4/16 revealed MVD involving LM, LCx and LAD   - CTS evaluated, not considered candidate for CABG secondary to severe COPD  - Cardiology considering high risk PCI which would require impella, atherectomy and anesthesia support once she fully recovers from respiratory virus  - Continued asa 81 mg, lisinopril 2.5 mg, metoprolol 25 mg, rosuvastatin 20 mg     Anxiety,  Mood disorder  - Continued Lexapro 20 mg and HS Elavil 25 mg   - Continued prn Xanax      Seen this Am. Was resting in bed. States she feels so much better, eager for DC home. Denies chest pain, breathing is at baseline. Requesting few days supply of Xanax as she missed PCP appointment while hospitalized. Reviewed DC plans, follow up and medications. Expresses understanding. Questions answered.        Neena Mejia is 64 y.o. female who presents today for a routine follow up after a recent hospitalization related to the above mentioned issues. She recalls feeling SOB more so than normal.   Subjective:   Since the time of discharge, the  No Yes

## 2025-07-05 NOTE — PROVIDER CONTACT NOTE (OTHER) - ACTION/TREATMENT ORDERED:
Provider made aware. Provider came to assess patient at bedside. AM labs w/ electrolytes sent and morning nifedipine med given

## 2025-07-05 NOTE — PATIENT PROFILE ADULT - FALL HARM RISK - HARM RISK INTERVENTIONS
Assistance with ambulation/Assistance OOB with selected safe patient handling equipment/Communicate Risk of Fall with Harm to all staff/Discuss with provider need for PT consult/Monitor gait and stability/Provide patient with walking aids - walker, cane, crutches/Reinforce activity limits and safety measures with patient and family/Sit up slowly, dangle for a short time, stand at bedside before walking/Tailored Fall Risk Interventions/Use of alarms - bed, chair and/or voice tab/Visual Cue: Yellow wristband and red socks/Bed in lowest position, wheels locked, appropriate side rails in place/Call bell, personal items and telephone in reach/Instruct patient to call for assistance before getting out of bed or chair/Non-slip footwear when patient is out of bed/[place] to call system/Physically safe environment - no spills, clutter or unnecessary equipment/Purposeful Proactive Rounding/Room/bathroom lighting operational, light cord in reach

## 2025-07-06 LAB
ALBUMIN SERPL ELPH-MCNC: 2.7 G/DL — LOW (ref 3.3–5)
ALP SERPL-CCNC: 44 U/L — SIGNIFICANT CHANGE UP (ref 40–120)
ALT FLD-CCNC: <5 U/L — LOW (ref 10–45)
ANION GAP SERPL CALC-SCNC: 17 MMOL/L — SIGNIFICANT CHANGE UP (ref 5–17)
APTT BLD: 22 SEC — LOW (ref 26.1–36.8)
AST SERPL-CCNC: 11 U/L — SIGNIFICANT CHANGE UP (ref 10–40)
BILIRUB SERPL-MCNC: 0.6 MG/DL — SIGNIFICANT CHANGE UP (ref 0.2–1.2)
BUN SERPL-MCNC: 78 MG/DL — HIGH (ref 7–23)
CALCIUM SERPL-MCNC: 7.8 MG/DL — LOW (ref 8.4–10.5)
CHLORIDE SERPL-SCNC: 106 MMOL/L — SIGNIFICANT CHANGE UP (ref 96–108)
CO2 SERPL-SCNC: 17 MMOL/L — LOW (ref 22–31)
CREAT SERPL-MCNC: 2.03 MG/DL — HIGH (ref 0.5–1.3)
CYCLOSPORINE SER-MCNC: 65 NG/ML — LOW (ref 150–400)
EGFR: 26 ML/MIN/1.73M2 — LOW
EGFR: 26 ML/MIN/1.73M2 — LOW
GAS PNL BLDV: SIGNIFICANT CHANGE UP
GLUCOSE BLDC GLUCOMTR-MCNC: 147 MG/DL — HIGH (ref 70–99)
GLUCOSE BLDC GLUCOMTR-MCNC: 179 MG/DL — HIGH (ref 70–99)
GLUCOSE BLDC GLUCOMTR-MCNC: 198 MG/DL — HIGH (ref 70–99)
GLUCOSE BLDC GLUCOMTR-MCNC: 224 MG/DL — HIGH (ref 70–99)
GLUCOSE SERPL-MCNC: 191 MG/DL — HIGH (ref 70–99)
HCT VFR BLD CALC: 22 % — LOW (ref 34.5–45)
HCT VFR BLD CALC: 22.2 % — LOW (ref 34.5–45)
HCT VFR BLD CALC: 24.7 % — LOW (ref 34.5–45)
HGB BLD-MCNC: 7.6 G/DL — LOW (ref 11.5–15.5)
HGB BLD-MCNC: 7.7 G/DL — LOW (ref 11.5–15.5)
HGB BLD-MCNC: 8.5 G/DL — LOW (ref 11.5–15.5)
HIV1 RNA # SERPL NAA+PROBE: NORMAL
HIV1 RNA # SERPL NAA+PROBE: NORMAL COPIES/ML
IMMATURE PLATELET FRACTION #: 2.3 K/UL — LOW (ref 4.7–11.1)
IMMATURE PLATELET FRACTION %: 2.1 % — SIGNIFICANT CHANGE UP (ref 1.6–4.9)
INR BLD: 1.07 RATIO — SIGNIFICANT CHANGE UP (ref 0.85–1.16)
MAGNESIUM SERPL-MCNC: 2.5 MG/DL — SIGNIFICANT CHANGE UP (ref 1.6–2.6)
MCHC RBC-ENTMCNC: 30.5 PG — SIGNIFICANT CHANGE UP (ref 27–34)
MCHC RBC-ENTMCNC: 30.8 PG — SIGNIFICANT CHANGE UP (ref 27–34)
MCHC RBC-ENTMCNC: 31 PG — SIGNIFICANT CHANGE UP (ref 27–34)
MCHC RBC-ENTMCNC: 34.2 G/DL — SIGNIFICANT CHANGE UP (ref 32–36)
MCHC RBC-ENTMCNC: 34.4 G/DL — SIGNIFICANT CHANGE UP (ref 32–36)
MCHC RBC-ENTMCNC: 35 G/DL — SIGNIFICANT CHANGE UP (ref 32–36)
MCV RBC AUTO: 88.5 FL — SIGNIFICANT CHANGE UP (ref 80–100)
MCV RBC AUTO: 88.7 FL — SIGNIFICANT CHANGE UP (ref 80–100)
MCV RBC AUTO: 89.9 FL — SIGNIFICANT CHANGE UP (ref 80–100)
NRBC # BLD AUTO: 0 K/UL — SIGNIFICANT CHANGE UP (ref 0–0)
NRBC # BLD AUTO: 0.03 K/UL — HIGH (ref 0–0)
NRBC # BLD AUTO: 0.04 K/UL — HIGH (ref 0–0)
NRBC # FLD: 0 K/UL — SIGNIFICANT CHANGE UP (ref 0–0)
NRBC # FLD: 0.03 K/UL — HIGH (ref 0–0)
NRBC # FLD: 0.04 K/UL — HIGH (ref 0–0)
NRBC BLD AUTO-RTO: 0 /100 WBCS — SIGNIFICANT CHANGE UP (ref 0–0)
PHOSPHATE SERPL-MCNC: 4.4 MG/DL — SIGNIFICANT CHANGE UP (ref 2.5–4.5)
PLATELET # BLD AUTO: 102 K/UL — LOW (ref 150–400)
PLATELET # BLD AUTO: 108 K/UL — LOW (ref 150–400)
PLATELET # BLD AUTO: 110 K/UL — LOW (ref 150–400)
PMV BLD: 10.4 FL — SIGNIFICANT CHANGE UP (ref 7–13)
PMV BLD: 10.6 FL — SIGNIFICANT CHANGE UP (ref 7–13)
PMV BLD: 9.7 FL — SIGNIFICANT CHANGE UP (ref 7–13)
POTASSIUM SERPL-MCNC: 4.5 MMOL/L — SIGNIFICANT CHANGE UP (ref 3.5–5.3)
POTASSIUM SERPL-SCNC: 4.5 MMOL/L — SIGNIFICANT CHANGE UP (ref 3.5–5.3)
PROT SERPL-MCNC: 4.4 G/DL — LOW (ref 6–8.3)
PROTHROM AB SERPL-ACNC: 12.3 SEC — SIGNIFICANT CHANGE UP (ref 9.9–13.4)
RBC # BLD: 2.47 M/UL — LOW (ref 3.8–5.2)
RBC # BLD: 2.48 M/UL — LOW (ref 3.8–5.2)
RBC # BLD: 2.79 M/UL — LOW (ref 3.8–5.2)
RBC # FLD: 19.1 % — HIGH (ref 10.3–14.5)
RBC # FLD: 19.2 % — HIGH (ref 10.3–14.5)
RBC # FLD: 19.5 % — HIGH (ref 10.3–14.5)
SODIUM SERPL-SCNC: 140 MMOL/L — SIGNIFICANT CHANGE UP (ref 135–145)
VIRAL LOAD INTERP: NORMAL
VIRAL LOAD LOG: NORMAL LG COP/ML
WBC # BLD: 4.22 K/UL — SIGNIFICANT CHANGE UP (ref 3.8–10.5)
WBC # BLD: 4.51 K/UL — SIGNIFICANT CHANGE UP (ref 3.8–10.5)
WBC # BLD: 4.55 K/UL — SIGNIFICANT CHANGE UP (ref 3.8–10.5)
WBC # FLD AUTO: 4.22 K/UL — SIGNIFICANT CHANGE UP (ref 3.8–10.5)
WBC # FLD AUTO: 4.51 K/UL — SIGNIFICANT CHANGE UP (ref 3.8–10.5)
WBC # FLD AUTO: 4.55 K/UL — SIGNIFICANT CHANGE UP (ref 3.8–10.5)

## 2025-07-06 PROCEDURE — 99233 SBSQ HOSP IP/OBS HIGH 50: CPT

## 2025-07-06 PROCEDURE — 85396 CLOTTING ASSAY WHOLE BLOOD: CPT

## 2025-07-06 PROCEDURE — 82330 ASSAY OF CALCIUM: CPT

## 2025-07-06 PROCEDURE — 94640 AIRWAY INHALATION TREATMENT: CPT

## 2025-07-06 PROCEDURE — 83735 ASSAY OF MAGNESIUM: CPT

## 2025-07-06 PROCEDURE — 87086 URINE CULTURE/COLONY COUNT: CPT

## 2025-07-06 PROCEDURE — 94003 VENT MGMT INPAT SUBQ DAY: CPT

## 2025-07-06 PROCEDURE — 82247 BILIRUBIN TOTAL: CPT

## 2025-07-06 PROCEDURE — 93975 VASCULAR STUDY: CPT

## 2025-07-06 PROCEDURE — 82947 ASSAY GLUCOSE BLOOD QUANT: CPT

## 2025-07-06 PROCEDURE — 83010 ASSAY OF HAPTOGLOBIN QUANT: CPT

## 2025-07-06 PROCEDURE — 85025 COMPLETE CBC W/AUTO DIFF WBC: CPT

## 2025-07-06 PROCEDURE — 36415 COLL VENOUS BLD VENIPUNCTURE: CPT

## 2025-07-06 PROCEDURE — 85730 THROMBOPLASTIN TIME PARTIAL: CPT

## 2025-07-06 PROCEDURE — 82803 BLOOD GASES ANY COMBINATION: CPT

## 2025-07-06 PROCEDURE — 86923 COMPATIBILITY TEST ELECTRIC: CPT

## 2025-07-06 PROCEDURE — 81001 URINALYSIS AUTO W/SCOPE: CPT

## 2025-07-06 PROCEDURE — 83540 ASSAY OF IRON: CPT

## 2025-07-06 PROCEDURE — 82607 VITAMIN B-12: CPT

## 2025-07-06 PROCEDURE — 87799 DETECT AGENT NOS DNA QUANT: CPT

## 2025-07-06 PROCEDURE — 71045 X-RAY EXAM CHEST 1 VIEW: CPT | Mod: 26

## 2025-07-06 PROCEDURE — 82746 ASSAY OF FOLIC ACID SERUM: CPT

## 2025-07-06 PROCEDURE — G0480: CPT

## 2025-07-06 PROCEDURE — 43255 EGD CONTROL BLEEDING ANY: CPT | Mod: GC

## 2025-07-06 PROCEDURE — 82435 ASSAY OF BLOOD CHLORIDE: CPT

## 2025-07-06 PROCEDURE — 82248 BILIRUBIN DIRECT: CPT

## 2025-07-06 PROCEDURE — 71045 X-RAY EXAM CHEST 1 VIEW: CPT

## 2025-07-06 PROCEDURE — 84100 ASSAY OF PHOSPHORUS: CPT

## 2025-07-06 PROCEDURE — 85384 FIBRINOGEN ACTIVITY: CPT

## 2025-07-06 PROCEDURE — 82962 GLUCOSE BLOOD TEST: CPT

## 2025-07-06 PROCEDURE — 83550 IRON BINDING TEST: CPT

## 2025-07-06 PROCEDURE — 76705 ECHO EXAM OF ABDOMEN: CPT

## 2025-07-06 PROCEDURE — 83615 LACTATE (LD) (LDH) ENZYME: CPT

## 2025-07-06 PROCEDURE — 87040 BLOOD CULTURE FOR BACTERIA: CPT

## 2025-07-06 PROCEDURE — 84132 ASSAY OF SERUM POTASSIUM: CPT

## 2025-07-06 PROCEDURE — 94002 VENT MGMT INPAT INIT DAY: CPT

## 2025-07-06 PROCEDURE — 85610 PROTHROMBIN TIME: CPT

## 2025-07-06 PROCEDURE — 86900 BLOOD TYPING SEROLOGIC ABO: CPT

## 2025-07-06 PROCEDURE — 86850 RBC ANTIBODY SCREEN: CPT

## 2025-07-06 PROCEDURE — 85014 HEMATOCRIT: CPT

## 2025-07-06 PROCEDURE — 80158 DRUG ASSAY CYCLOSPORINE: CPT

## 2025-07-06 PROCEDURE — 99232 SBSQ HOSP IP/OBS MODERATE 35: CPT | Mod: GC,24

## 2025-07-06 PROCEDURE — 85018 HEMOGLOBIN: CPT

## 2025-07-06 PROCEDURE — 85027 COMPLETE CBC AUTOMATED: CPT

## 2025-07-06 PROCEDURE — 86901 BLOOD TYPING SEROLOGIC RH(D): CPT

## 2025-07-06 PROCEDURE — 83605 ASSAY OF LACTIC ACID: CPT

## 2025-07-06 PROCEDURE — 87077 CULTURE AEROBIC IDENTIFY: CPT

## 2025-07-06 PROCEDURE — 82272 OCCULT BLD FECES 1-3 TESTS: CPT

## 2025-07-06 PROCEDURE — 80053 COMPREHEN METABOLIC PANEL: CPT

## 2025-07-06 PROCEDURE — 84295 ASSAY OF SERUM SODIUM: CPT

## 2025-07-06 DEVICE — PROBE FIAPC DIA 2.3MM/7FR LNTH 220CM/7.2FT: Type: IMPLANTABLE DEVICE | Status: FUNCTIONAL

## 2025-07-06 RX ORDER — LEVETIRACETAM 10 MG/ML
500 INJECTION, SOLUTION INTRAVENOUS EVERY 12 HOURS
Refills: 0 | Status: DISCONTINUED | OUTPATIENT
Start: 2025-07-07 | End: 2025-07-09

## 2025-07-06 RX ORDER — HYDROMORPHONE/SOD CHLOR,ISO/PF 2 MG/10 ML
0.5 SYRINGE (ML) INJECTION ONCE
Refills: 0 | Status: DISCONTINUED | OUTPATIENT
Start: 2025-07-06 | End: 2025-07-06

## 2025-07-06 RX ORDER — LEVETIRACETAM 10 MG/ML
500 INJECTION, SOLUTION INTRAVENOUS ONCE
Refills: 0 | Status: COMPLETED | OUTPATIENT
Start: 2025-07-06 | End: 2025-07-06

## 2025-07-06 RX ORDER — SULFAMETHOXAZOLE/TRIMETHOPRIM 800-160 MG
1 TABLET ORAL DAILY
Refills: 0 | Status: DISCONTINUED | OUTPATIENT
Start: 2025-07-07 | End: 2025-07-09

## 2025-07-06 RX ORDER — CYCLOSPORINE 100 MG/1
25 CAPSULE, LIQUID FILLED ORAL ONCE
Refills: 0 | Status: COMPLETED | OUTPATIENT
Start: 2025-07-06 | End: 2025-07-06

## 2025-07-06 RX ORDER — MIDAZOLAM IN 0.9 % SOD.CHLORID 1 MG/ML
2 PLASTIC BAG, INJECTION (ML) INTRAVENOUS ONCE
Refills: 0 | Status: DISCONTINUED | OUTPATIENT
Start: 2025-07-06 | End: 2025-07-06

## 2025-07-06 RX ORDER — MYCOPHENOLATE MOFETIL 500 MG/1
500 TABLET, FILM COATED ORAL
Refills: 0 | Status: DISCONTINUED | OUTPATIENT
Start: 2025-07-06 | End: 2025-07-09

## 2025-07-06 RX ORDER — IPRATROPIUM BROMIDE AND ALBUTEROL SULFATE .5; 2.5 MG/3ML; MG/3ML
3 SOLUTION RESPIRATORY (INHALATION) EVERY 6 HOURS
Refills: 0 | Status: DISCONTINUED | OUTPATIENT
Start: 2025-07-06 | End: 2025-07-09

## 2025-07-06 RX ORDER — CYCLOSPORINE 100 MG/1
75 CAPSULE, LIQUID FILLED ORAL
Refills: 0 | Status: DISCONTINUED | OUTPATIENT
Start: 2025-07-06 | End: 2025-07-09

## 2025-07-06 RX ORDER — CALCIUM CARBONATE/VITAMIN D3 500MG-5MCG
1 TABLET ORAL
Refills: 0 | Status: DISCONTINUED | OUTPATIENT
Start: 2025-07-06 | End: 2025-07-09

## 2025-07-06 RX ORDER — VALGANCICLOVIR 450 MG/1
450 TABLET, FILM COATED ORAL
Refills: 0 | Status: DISCONTINUED | OUTPATIENT
Start: 2025-07-06 | End: 2025-07-07

## 2025-07-06 RX ORDER — CYCLOSPORINE 100 MG/1
75 CAPSULE, LIQUID FILLED ORAL
Refills: 0 | Status: DISCONTINUED | OUTPATIENT
Start: 2025-07-06 | End: 2025-07-06

## 2025-07-06 RX ORDER — MYCOPHENOLATE MOFETIL 500 MG/1
500 TABLET, FILM COATED ORAL
Refills: 0 | Status: DISCONTINUED | OUTPATIENT
Start: 2025-07-06 | End: 2025-07-06

## 2025-07-06 RX ORDER — ENTECAVIR 0.5 MG/1
0.5 TABLET ORAL DAILY
Refills: 0 | Status: DISCONTINUED | OUTPATIENT
Start: 2025-07-07 | End: 2025-07-07

## 2025-07-06 RX ORDER — PREDNISONE 20 MG/1
5 TABLET ORAL DAILY
Refills: 0 | Status: DISCONTINUED | OUTPATIENT
Start: 2025-07-06 | End: 2025-07-09

## 2025-07-06 RX ADMIN — BUDESONIDE 0.5 MILLIGRAM(S): 0.25 SUSPENSION RESPIRATORY (INHALATION) at 17:07

## 2025-07-06 RX ADMIN — INSULIN LISPRO 2: 100 INJECTION, SOLUTION INTRAVENOUS; SUBCUTANEOUS at 06:09

## 2025-07-06 RX ADMIN — PROPOFOL 16.5 MICROGRAM(S)/KG/MIN: 10 INJECTION, EMULSION INTRAVENOUS at 08:03

## 2025-07-06 RX ADMIN — PREDNISONE 5 MILLIGRAM(S): 20 TABLET ORAL at 06:09

## 2025-07-06 RX ADMIN — PROPOFOL 16.5 MICROGRAM(S)/KG/MIN: 10 INJECTION, EMULSION INTRAVENOUS at 17:44

## 2025-07-06 RX ADMIN — SODIUM CHLORIDE 75 MILLILITER(S): 9 INJECTION, SOLUTION INTRAVENOUS at 08:03

## 2025-07-06 RX ADMIN — CYCLOSPORINE 25 MILLIGRAM(S): 100 CAPSULE, LIQUID FILLED ORAL at 13:34

## 2025-07-06 RX ADMIN — Medication 40 MILLIGRAM(S): at 17:50

## 2025-07-06 RX ADMIN — PROPOFOL 16.5 MICROGRAM(S)/KG/MIN: 10 INJECTION, EMULSION INTRAVENOUS at 13:34

## 2025-07-06 RX ADMIN — SODIUM CHLORIDE 75 MILLILITER(S): 9 INJECTION, SOLUTION INTRAVENOUS at 20:25

## 2025-07-06 RX ADMIN — Medication 15 MILLILITER(S): at 06:08

## 2025-07-06 RX ADMIN — Medication 15 MILLILITER(S): at 17:49

## 2025-07-06 RX ADMIN — SODIUM CHLORIDE 75 MILLILITER(S): 9 INJECTION, SOLUTION INTRAVENOUS at 18:10

## 2025-07-06 RX ADMIN — MYCOPHENOLATE MOFETIL 1000 MILLIGRAM(S): 500 TABLET, FILM COATED ORAL at 08:05

## 2025-07-06 RX ADMIN — LEVETIRACETAM 500 MILLIGRAM(S): 10 INJECTION, SOLUTION INTRAVENOUS at 06:08

## 2025-07-06 RX ADMIN — Medication 0.5 MILLIGRAM(S): at 13:01

## 2025-07-06 RX ADMIN — BUDESONIDE 0.5 MILLIGRAM(S): 0.25 SUSPENSION RESPIRATORY (INHALATION) at 06:06

## 2025-07-06 RX ADMIN — Medication 2 MILLIGRAM(S): at 13:06

## 2025-07-06 RX ADMIN — INSULIN GLARGINE-YFGN 5 UNIT(S): 100 INJECTION, SOLUTION SUBCUTANEOUS at 21:14

## 2025-07-06 RX ADMIN — CYCLOSPORINE 50 MILLIGRAM(S): 100 CAPSULE, LIQUID FILLED ORAL at 08:05

## 2025-07-06 RX ADMIN — Medication 40 MILLIGRAM(S): at 06:08

## 2025-07-06 RX ADMIN — Medication 80 MILLIGRAM(S): at 11:25

## 2025-07-06 RX ADMIN — VALGANCICLOVIR 900 MILLIGRAM(S): 450 TABLET, FILM COATED ORAL at 11:26

## 2025-07-06 RX ADMIN — SODIUM CHLORIDE 75 MILLILITER(S): 9 INJECTION, SOLUTION INTRAVENOUS at 13:34

## 2025-07-06 RX ADMIN — MYCOPHENOLATE MOFETIL 500 MILLIGRAM(S): 500 TABLET, FILM COATED ORAL at 20:20

## 2025-07-06 RX ADMIN — Medication 1 APPLICATION(S): at 06:32

## 2025-07-06 RX ADMIN — Medication 0.5 MILLIGRAM(S): at 12:46

## 2025-07-06 RX ADMIN — CYCLOSPORINE 75 MILLIGRAM(S): 100 CAPSULE, LIQUID FILLED ORAL at 20:20

## 2025-07-06 RX ADMIN — Medication 250 MILLIGRAM(S): at 08:04

## 2025-07-06 RX ADMIN — INSULIN LISPRO 2: 100 INJECTION, SOLUTION INTRAVENOUS; SUBCUTANEOUS at 17:45

## 2025-07-06 RX ADMIN — SODIUM CHLORIDE 75 MILLILITER(S): 9 INJECTION, SOLUTION INTRAVENOUS at 17:44

## 2025-07-06 RX ADMIN — IPRATROPIUM BROMIDE AND ALBUTEROL SULFATE 3 MILLILITER(S): .5; 2.5 SOLUTION RESPIRATORY (INHALATION) at 17:08

## 2025-07-06 RX ADMIN — INSULIN LISPRO 4: 100 INJECTION, SOLUTION INTRAVENOUS; SUBCUTANEOUS at 11:26

## 2025-07-06 RX ADMIN — ENTECAVIR 0.5 MILLIGRAM(S): 0.5 TABLET ORAL at 11:25

## 2025-07-06 RX ADMIN — LEVETIRACETAM 400 MILLIGRAM(S): 10 INJECTION, SOLUTION INTRAVENOUS at 17:52

## 2025-07-06 RX ADMIN — Medication 1 TABLET(S): at 06:09

## 2025-07-06 NOTE — PROGRESS NOTE ADULT - ASSESSMENT
71 YO female with PMH MASH/HCC s/p OLT 5/31/25, GIB (most recent EGD 5/2025 with angioectasias and no active bleeding), chronic back pain 2/2 spinal fx, presents from OSH on 7/4/25 for near syncope, weakness and dark stools, transferred to [facility] transplant service for GIB/melena, then transferred to SICU for planned EGD and hemodynamic monitoring.    [] Anemia  [] UGIB  - Iron studies: low TIBC, high iron, high sat  - empiric CTX now off   - RRT 7/5 -> tx to sicu, intubated  - s/p EGD (7/5) large blood (>1L),  incomplete visualization of the gastric fundus and body due to some retained old clot, active bleeding from a vessel in the mid-gastric body along the greater curvature tx with hemoclip;   rescope 7/6 with erythromycin before scope    - 2U PRBC at rehab 7/4, 2U PRBC and 1U Cyro 7/5  - Protonix IV BID & IVF    [] h/o OLT  - HBV + donor: Entecavir 0.5 QD  - CMV +/- on Valcyte 900daily  - Cyclo by level, MMF 1/1, prred 5    [] DVT PPX  - SCD  - holding ASA 2/2 anemia    71 YO female with PMH MASH/HCC s/p OLT 5/31/25, GIB (most recent EGD 5/2025 with angioectasias and no active bleeding), chronic back pain 2/2 spinal fx, presents from OSH on 7/4/25 for near syncope, weakness and dark stools, transferred to [facility] transplant service for GIB/melena, then transferred to SICU for planned EGD and hemodynamic monitoring.    [] Anemia  [] UGIB  - Iron studies: low TIBC, high iron, high sat  - empiric CTX now off   - RRT 7/5 -> tx to sicu, intubated  - s/p EGD (7/5) large blood (>1L),  incomplete visualization of the gastric fundus and body due to some retained old clot, active bleeding from a vessel in the mid-gastric body along the greater curvature tx with hemoclip; repeat EGD today to reassess for dieufoley lesion or ?AVM with erythromycin ppx  rescope 7/6 with erythromycin before scope    - 2U PRBC at rehab 7/4, 2U PRBC and 1U Cyro 7/5  - Protonix IV BID & IVF    [] h/o OLT  - HBV + donor: Entecavir 0.5 QD  - CMV +/- on Valcyte 900daily  - Cyclo by level,  BID, prred 5    [] DVT PPX  - SCD  - holding ASA 2/2 anemia

## 2025-07-06 NOTE — PHYSICAL THERAPY INITIAL EVALUATION ADULT - PERTINENT HX OF CURRENT PROBLEM, REHAB EVAL
69y/o F with PMH MASH/HCC s/p OLT 5/31/25, GIB (most recent EGD 5/2025 with angioectasias and no active bleeding), chronic back pain 2/2 spinal fx, presents from OSH on 7/4/25 for near syncope, weakness and dark stools, where she was found to be anemic to 5.8/17.5.Pt received 2 units PRBC at OSH, then was transferred to [facility]. This am was hypotensive SBP 100s, 2 more units PRBC ordered and planned for EGD this afternoon. Pt transferred to SICU for intubation in preparation for bedside EGD. Pt received 1 unit PRBC prior to transfer and is pending 2nd. 7/6: - endoscopy > dieulafoy lesion clipped- remains intubated- 1 cryo CXR: (-)

## 2025-07-06 NOTE — AIRWAY REMOVAL NOTE  ADULT & PEDS - ARTIFICAL AIRWAY REMOVAL COMMENTS
Written order for extubation verified. The patient was identified by full name and birth date compared to the identification band. Present during the procedure was LAURI Hughes.

## 2025-07-06 NOTE — PROGRESS NOTE ADULT - NS ATTEND AMEND GEN_ALL_CORE FT
70 YF with h/o MASH/HCC s/p OLT 5/31/25, GIB (most recent EGD 5/2025 with angioectasias and no active bleeding), chronic back pain 2/2 spinal fx, presents from OSH on 7/4/25 for near syncope, weakness and dark stools, where she was found to be anemic to 5.8/17.5 received 2U PRBC  Became hypotensive SBP 100s, 2 more units PRBC ordered,  transferred to SICU for intubation in preparation for bedside EGD.  Pt received 1 unit PRBC prior to transfer and is pending 2nd.    Remains sedated with Propofol  Continue vent management, CXR no new finding.  Change Advair discus to duoneb Pulmicort  Not on pressure  S/P EGD with finding of dieulafoy lesions,  NPO, PPI bid and octreotide drip. For repeat EGD after prokinetic agent Erythromycin to assess bleeding ulcers and prev AVM that was clipped.  IVF, love placed  Mech DVT PPx, on resuscitation with blood/products  Antiviral entecavir for hep B  ISS/Lantus    PPx abx Valcyte, Bactrim  Anti rej: low dose Pred, cyclosporin, mycophenolate

## 2025-07-06 NOTE — PROGRESS NOTE ADULT - NS ATTEND AMEND GEN_ALL_CORE FT
bleeding Diulafoy was clipped endoscopically  no vasopressors.  HCT appropriate  SCOTT, non-oliguric.  Immuno: cyclo 50mg BID, cellcept 500mg BID, pred 5. bleeding Diulafoy was clipped endoscopically  no vasopressors.  HCT appropriate  SCOTT, non-oliguric.  Immuno: cyclo 50mg BID, cellcept 500mg BID, pred 5.  intubated -- plan to re-endoscope today.

## 2025-07-06 NOTE — PROGRESS NOTE ADULT - SUBJECTIVE AND OBJECTIVE BOX
Transplant Surgery - Multidisciplinary Rounds  --------------------------------------------------------------   Donor OLTx      Date:         POD#    Present:   Patient seen and examined with multidisciplinary Transplant team including Surgeon Dr. Caicedo, Hepatologist Dr. Prado, Surgical fellow Dr. Rowan, GUDELIA Vickers/Alan,,  and bedside RN during AM rounds.   Disciplines not in attendance will be notified of the plan.     HPI: 71 YO female with PMH MASH/HCC s/p OLT 25, GIB (most recent EGD 2025 with angioectasias and no active bleeding), chronic back pain 2/2 spinal fx, presents from OSH on 25 for near syncope, weakness and dark stools, transferred to [facility] transplant service for GIB/melena, then transferred to SICU for planned EGD and hemodynamic monitoring.    Interval Events:  - admitted w/ symptomatic anemia  - RRT on floor for hematemesis and bloody BMs and hypotension  - EGD w/ dieufoley lesion, clipped  - intubated for airway protection       Immunosupression:  Induction: Simulect  Maintenance: cyclo by level/MMF , pred 5  Ongoing monitoring for signs of rejection     Potential Discharge date: TBD  Education:  Medications  Plan of care:  See Below    tx data here      unable to obtain 2/2 intubated status       PHYSICAL EXAM:  Constitutional: Well developed / well nourished  Eyes:  PERRLA  ENMT: nc/at, no thrush  Neck: supple,   Respiratory: CTA B/L  Cardiovascular: RRR  Gastrointestinal: Soft abdomen, ND, appropriate incisional TTP  Genitourinary: Urinary catheter in place  Extremities: SCD's in place and working bilaterally  Vascular: Palpable dp pulses bilaterally.   Neurological: Intubated  Skin: no rashes, ulcerations, lesions  Musculoskeletal: Moving all extremities  Psychiatric: intubated      Transplant Surgery - Multidisciplinary Rounds  --------------------------------------------------------------    Present:   Patient seen and examined with multidisciplinary Transplant team including Surgeon Dr. Caicedo, Hepatologist Dr. Prado, Surgical fellow Dr. Rowan, GUDELIA Vickers/Alan,,  and bedside RN during AM rounds.   Disciplines not in attendance will be notified of the plan.     HPI: 71 YO female with PMH MASH/HCC s/p OLT 25, GIB (most recent EGD 2025 with angioectasias and no active bleeding), chronic back pain 2/2 spinal fx, presents from OSH on 25 for near syncope, weakness and dark stools, transferred to [facility] transplant service for GIB/melena, then transferred to SICU for planned EGD and hemodynamic monitoring.    Interval Events:  - ON: no acute events  - Afebrile, intubated for airway protection   - s/p EGD w/ dieufoley lesion, clipped , rpt EGD today to reassess for dieufoley lesion or ?AVM with erythromycin ppx  - decrease  BID    Immunosupression:  Induction: Simulect  Maintenance: cyclo by level/ BID, pred 5  Ongoing monitoring for signs of rejection     Potential Discharge date: TBD  Education:  Medications  Plan of care:  See Below    MEDICATIONS  (STANDING):  budesonide    Inhalation Suspension 0.5 milliGRAM(s) Inhalation every 12 hours  calcium carbonate 1250 mG  + Vitamin D (OsCal 500 + D) 1 Tablet(s) Oral two times a day  chlorhexidine 0.12% Liquid 15 milliLiter(s) Oral Mucosa every 12 hours  chlorhexidine 2% Cloths 1 Application(s) Topical <User Schedule>  cycloSPORINE  , modified (GENGRAF) Solution 75 milliGRAM(s) Enteral Tube <User Schedule>  cycloSPORINE  , modified (GENGRAF) Solution 25 milliGRAM(s) Oral once  entecavir Solution 0.5 milliGRAM(s) Oral daily  insulin glargine Injectable (LANTUS) 5 Unit(s) SubCutaneous at bedtime  insulin lispro (ADMELOG) corrective regimen sliding scale   SubCutaneous every 6 hours  levETIRAcetam  Solution 500 milliGRAM(s) Oral every 12 hours  multiple electrolytes Injection Type 1 1000 milliLiter(s) (75 mL/Hr) IV Continuous <Continuous>  mycophenolate mofetil Suspension 500 milliGRAM(s) Enteral Tube <User Schedule>  pantoprazole  Injectable 40 milliGRAM(s) IV Push two times a day  predniSONE   Tablet 5 milliGRAM(s) Oral daily  propofol Infusion 50 MICROgram(s)/kG/Min (16.5 mL/Hr) IV Continuous <Continuous>  trimethoprim  40 mG/sulfamethoxazole 200 mG Suspension 80 milliGRAM(s) Enteral Tube daily  valGANciclovir 50 mG/mL Oral Solution 900 milliGRAM(s) Oral daily    MEDICATIONS  (PRN):    PAST MEDICAL & SURGICAL HISTORY:  HCC (hepatocellular carcinoma)    DM (diabetes mellitus)    HTN (hypertension)    HLD (hyperlipidemia)    Hepatic cirrhosis    IVEY (nonalcoholic steatohepatitis)    Former smoker    Ascites    Hepatic encephalopathy    History of cervical cancer    HCC (hepatocellular carcinoma)    Hepatic cirrhosis    HLD (hyperlipidemia)    HTN (hypertension)    Type 2 diabetes mellitus    History of ascites    Nonalcoholic fatty liver disease without nonalcoholic steatohepatitis (IVEY)    COPD (chronic obstructive pulmonary disease)    H/O  section    History of cholecystectomy    H/O carpal tunnel repair    H/O cataract extraction    H/O carpal tunnel repair    H/O  section    H/O cataract extraction    History of cholecystectomy    Vital Signs Last 24 Hrs  T(C): 36.9 (2025 11:00), Max: 37.1 (2025 19:00)  T(F): 98.5 (2025 11:00), Max: 98.7 (2025 19:00)  HR: 87 (2025 11:00) (82 - 118)  BP: 145/63 (2025 10:00) (107/65 - 190/91)  BP(mean): 91 (2025 10:00) (78 - 127)  RR: 20 (2025 11:00) (17 - 31)  SpO2: 100% (2025 11:00) (96% - 100%)    Parameters below as of 2025 08:00  Patient On (Oxygen Delivery Method): ventilator    O2 Concentration (%): 40    I&O's Summary    2025 07: 07:00  --------------------------------------------------------  IN: 2950.5 mL / OUT: 770 mL / NET: 2180.5 mL    2025 07:01  -  2025 12:38  --------------------------------------------------------  IN: 366 mL / OUT: 140 mL / NET: 226 mL                        7.7    4.55  )-----------( 110      ( 2025 10:51 )             22.0     07-06    140  |  106  |  78[H]  ----------------------------<  191[H]  4.5   |  17[L]  |  2.03[H]    Ca    7.8[L]      2025 07:04  Phos  4.4     07-  Mg     2.5     07-    TPro  4.4[L]  /  Alb  2.7[L]  /  TBili  0.6  /  DBili  x   /  AST  11  /  ALT  <5[L]  /  AlkPhos  44  07-06    Culture - Urine (collected 25 @ 06:49)  Source: Clean Catch Clean Catch (Midstream)  Preliminary Report (25 @ 07:58):    10,000 - 49,000 CFU/mL Gram Negative Rods    Culture - Blood (collected 25 @ 06:19)  Source: Blood Blood-Peripheral  Preliminary Report (25 @ 11:01):    No growth at 24 hours    Culture - Blood (collected 25 @ 06:02)  Source: Blood Blood-Peripheral  Preliminary Report (25 @ 11:01):    No growth at 24 hours    unable to obtain 2/2 intubated status       PHYSICAL EXAM:  Constitutional: Well developed / well nourished  Eyes:  PERRLA  ENMT: nc/at, no thrush  Neck: supple,   Respiratory: intubated  Cardiovascular: RRR  Gastrointestinal: Soft abdomen, ND, appropriate incisional TTP  Genitourinary: Urinary catheter in place  Extremities: SCD's in place and working bilaterally  Vascular: Palpable dp pulses bilaterally.   Neurological: Intubated  Skin: no rashes, ulcerations, lesions  Musculoskeletal: Moving all extremities  Psychiatric: intubated

## 2025-07-06 NOTE — PROGRESS NOTE ADULT - SUBJECTIVE AND OBJECTIVE BOX
24 HOUR EVENTS:  - endoscopy > dieulafoy lesion clipped  - remains intubated  - 1 cryo    NEURO  RASS (if intubated): 		CAM ICU (if concern for delirium):  Exam: sedated, follows x4  Meds: levETIRAcetam  Solution 500 milliGRAM(s) Oral every 12 hours  propofol Infusion 50 MICROgram(s)/kG/Min IV Continuous <Continuous>      RESPIRATORY  RR: 24 (07-06-25 @ 00:00) (17 - 31)  SpO2: 100% (07-06-25 @ 00:00) (96% - 100%)  Wt(kg): --  Exam: Lungs CTA b/l  Mechanical Ventilation: Mode: AC/ CMV (Assist Control/ Continuous Mandatory Ventilation), RR (machine): 14, RR (patient): 25, TV (machine): 400, FiO2: 50, PEEP: 6, ITime: 0.9, MAP: 8.8, PIP: 18    Meds: budesonide    Inhalation Suspension 0.5 milliGRAM(s) Inhalation every 12 hours      CARDIOVASCULAR  HR: 107 (07-06-25 @ 00:00) (81 - 118)  BP: 141/67 (07-06-25 @ 00:00) (107/65 - 190/91)  BP(mean): 96 (07-06-25 @ 00:00) (78 - 127)  ABP: --  ABP(mean): --  Wt(kg): --  CVP(cm H2O): --  VBG - ( 05 Jul 2025 23:35 )  pH: 7.40  /  pCO2: 34    /  pO2: 54    / HCO3: 21    / Base Excess: -3.2  /  SaO2: 87.3   Lactate: 1.6                Exam: Normal S1/S2 w/o murmurs or rubs  Cardiac Rhythm: sinus  Perfusion     [ x]Adequate   [ ]Inadequate  Mentation   [x ]Normal       [ ]Reduced  Extremities  [x ]Warm         [ ]Cool  Volume Status [ ]Hypervolemic [x ]Euvolemic [ ]Hypovolemic  Meds:     GI/NUTRITION  Exam: abd non distended  Diet: NPO  Last Bowel Movement: 05-Jul-2025 (07-05-25 @ 19:00)  Last Bowel Movement: 05-Jul-2025 (07-05-25 @ 12:55)  Last Bowel Movement: 04-Jul-2025 (07-05-25 @ 08:03)  Last Bowel Movement: 04-Jul-2025 (07-05-25 @ 04:10)    Meds: pantoprazole  Injectable 40 milliGRAM(s) IV Push two times a day      GENITOURINARY  I&O's Detail    07-04 @ 07:01  -  07-05 @ 07:00  --------------------------------------------------------  IN:  Total IN: 0 mL    OUT:    Voided (mL): 60 mL  Total OUT: 60 mL    Total NET: -60 mL      07-05 @ 07:01  -  07-06 @ 00:35  --------------------------------------------------------  IN:    Cryoprecipitate: 75 mL    Enteral Tube Flush: 70 mL    IV PiggyBack: 250 mL    multiple electrolytes Injection Type 1.: 825 mL    Oral Fluid: 140 mL    PRBCs (Packed Red Blood Cells): 600 mL    Propofol: 99.1 mL    Propofol: 92.4 mL    sodium chloride 0.9%: 250 mL  Total IN: 2401.5 mL    OUT:    Indwelling Catheter - Urethral (mL): 260 mL    Voided (mL): 300 mL  Total OUT: 560 mL    Total NET: 1841.5 mL        Weight (kg): 55 (07-05 @ 04:07)  07-05    134[L]  |  103  |  61[H]  ----------------------------<  342[H]  5.2   |  15[L]  |  1.18    Ca    7.4[L]      05 Jul 2025 13:45  Phos  2.8     07-05  Mg     2.6     07-05    TPro  4.1[L]  /  Alb  2.9[L]  /  TBili  0.6  /  DBili  x   /  AST  <5[L]  /  ALT  <5[L]  /  AlkPhos  44  07-05    Meds: calcium carbonate 1250 mG  + Vitamin D (OsCal 500 + D) 1 Tablet(s) Oral two times a day  multiple electrolytes Injection Type 1 1000 milliLiter(s) IV Continuous <Continuous>      HEMATOLOGIC  Meds:                         9.2    4.99  )-----------( 119      ( 05 Jul 2025 23:42 )             26.6     PT/INR - ( 05 Jul 2025 13:45 )   PT: 13.8 sec;   INR: 1.20 ratio         PTT - ( 05 Jul 2025 13:45 )  PTT:26.4 sec    INFECTIOUS DISEASES  T(C): 36.6 (07-05-25 @ 23:00), Max: 37.3 (07-05-25 @ 09:00)  Wt(kg): --  WBC Count: 4.99 K/uL (07-05 @ 23:42)  WBC Count: 4.66 K/uL (07-05 @ 13:45)  WBC Count: 3.23 K/uL (07-05 @ 09:21)    Recent Cultures:    Meds: cycloSPORINE  , modified (GENGRAF) Solution 50 milliGRAM(s) Enteral Tube <User Schedule>  entecavir Solution 0.5 milliGRAM(s) Oral daily  erythromycin   IVPB 250 milliGRAM(s) IV Intermittent once  mycophenolate mofetil Suspension 1000 milliGRAM(s) Enteral Tube <User Schedule>  trimethoprim  40 mG/sulfamethoxazole 200 mG Suspension 80 milliGRAM(s) Enteral Tube daily  valGANciclovir 50 mG/mL Oral Solution 900 milliGRAM(s) Oral daily      ENDOCRINE  Capillary Blood Glucose    Meds: insulin glargine Injectable (LANTUS) 5 Unit(s) SubCutaneous at bedtime  insulin lispro (ADMELOG) corrective regimen sliding scale   SubCutaneous every 6 hours  predniSONE   Tablet 5 milliGRAM(s) Oral daily      ACCESS DEVICES:  [ ] Peripheral IV  [ ] Central Venous Line		[ ] R	[ ] L	[ ] IJ	[ ] Fem	[ ] SC	Placed:   [ ] Arterial Line			[ ] R	[ ] L	[ ] Fem	[ ] Rad	[ ] Ax	Placed:   [ ] PICC:					[ ] Mediport  [ ] Urinary Catheter, Date Placed:   [ ] Necessity of urinary, arterial, and venous catheters discussed    OTHER MEDICATIONS:  chlorhexidine 0.12% Liquid 15 milliLiter(s) Oral Mucosa every 12 hours  chlorhexidine 2% Cloths 1 Application(s) Topical <User Schedule>      IMAGING:

## 2025-07-06 NOTE — PROGRESS NOTE ADULT - ASSESSMENT
69 YO female with PMH MASH/HCC s/p OLT 5/31/25, GIB (most recent EGD 5/2025 with angioectasias and no active bleeding), chronic back pain 2/2 spinal fx, presents from OSH on 7/4/25 for near syncope, weakness and dark stools, transferred to [facility] transplant service for GIB/melena, then transferred to SICU for planned EGD today and hemodynamic monitoring.      PLAN:  Neuro:  - propofol for sedation   - keppra for seizure ppx post transplant  - cont home seroquel    Resp:  - intubated for EGD today PRVC 14/400/6/50%, likely will extubate post procedure  - Advair q12 (tx interchange for Symbicort)  - Maintain O2 saturation >92%    CV:  - HDS off pressors  - Maintain MAP >65  - holding home coreg    GI:  - s/p OLT 5/31  - now with GIB, occult positive, melena, anemia  - scope > dieulafoy lesion clipped, extensive gastric clots  - repeat AM scope   - NPO  - protonix IVP BID    /Renal:  - creatinine appears stable, monitor lytes & replete as necessary  - plasmalyte @75cc/hr    Heme:  - s/p 4 units PRBC, 1 cryo  - cont to monitor H/H  - SCDs for ppx    ID:   - entecavir for hep B+ donor  - Transplant ppx Bactrim, Valcyte  - Immunosuppression with Cellcept, prednisone, cyclosporine    Endo:   - lantus 5 HS for now  - holding 8u premeals  - moderate ISS    Lines:  - PIVs    Russell Orozco PA-C  Surgical Intensive Care Unit  [id number]

## 2025-07-07 LAB
-  AMOXICILLIN/CLAVULANIC ACID: SIGNIFICANT CHANGE UP
-  AMPICILLIN/SULBACTAM: SIGNIFICANT CHANGE UP
-  AMPICILLIN: SIGNIFICANT CHANGE UP
-  AZTREONAM: SIGNIFICANT CHANGE UP
-  CEFAZOLIN: SIGNIFICANT CHANGE UP
-  CEFEPIME: SIGNIFICANT CHANGE UP
-  CEFOXITIN: SIGNIFICANT CHANGE UP
-  CEFTRIAXONE: SIGNIFICANT CHANGE UP
-  CIPROFLOXACIN: SIGNIFICANT CHANGE UP
-  ERTAPENEM: SIGNIFICANT CHANGE UP
-  GENTAMICIN: SIGNIFICANT CHANGE UP
-  IMIPENEM: SIGNIFICANT CHANGE UP
-  LEVOFLOXACIN: SIGNIFICANT CHANGE UP
-  MEROPENEM: SIGNIFICANT CHANGE UP
-  NITROFURANTOIN: SIGNIFICANT CHANGE UP
-  PIPERACILLIN/TAZOBACTAM: SIGNIFICANT CHANGE UP
-  TIGECYCLINE: SIGNIFICANT CHANGE UP
-  TOBRAMYCIN: SIGNIFICANT CHANGE UP
-  TRIMETHOPRIM/SULFAMETHOXAZOLE: SIGNIFICANT CHANGE UP
ALBUMIN SERPL ELPH-MCNC: 2.5 G/DL — LOW (ref 3.3–5)
ALP SERPL-CCNC: 41 U/L — SIGNIFICANT CHANGE UP (ref 40–120)
ALT FLD-CCNC: 5 U/L — LOW (ref 10–45)
ANION GAP SERPL CALC-SCNC: 13 MMOL/L — SIGNIFICANT CHANGE UP (ref 5–17)
APTT BLD: 27.4 SEC — SIGNIFICANT CHANGE UP (ref 26.1–36.8)
AST SERPL-CCNC: 10 U/L — SIGNIFICANT CHANGE UP (ref 10–40)
B19V DNA FLD QL NAA+PROBE: SIGNIFICANT CHANGE UP IU/ML
BILIRUB SERPL-MCNC: 0.6 MG/DL — SIGNIFICANT CHANGE UP (ref 0.2–1.2)
BUN SERPL-MCNC: 49 MG/DL — HIGH (ref 7–23)
CALCIUM SERPL-MCNC: 7.4 MG/DL — LOW (ref 8.4–10.5)
CHLORIDE SERPL-SCNC: 111 MMOL/L — HIGH (ref 96–108)
CMV DNA CSF QL NAA+PROBE: SIGNIFICANT CHANGE UP IU/ML
CMV DNA SPEC NAA+PROBE-LOG#: SIGNIFICANT CHANGE UP LOG10IU/ML
CO2 SERPL-SCNC: 19 MMOL/L — LOW (ref 22–31)
CREAT SERPL-MCNC: 1.57 MG/DL — HIGH (ref 0.5–1.3)
CULTURE RESULTS: ABNORMAL
CYCLOSPORINE SER-MCNC: 88 NG/ML — LOW (ref 150–400)
EGFR: 35 ML/MIN/1.73M2 — LOW
EGFR: 35 ML/MIN/1.73M2 — LOW
GAS PNL BLDV: SIGNIFICANT CHANGE UP
GLUCOSE BLDC GLUCOMTR-MCNC: 112 MG/DL — HIGH (ref 70–99)
GLUCOSE BLDC GLUCOMTR-MCNC: 158 MG/DL — HIGH (ref 70–99)
GLUCOSE BLDC GLUCOMTR-MCNC: 169 MG/DL — HIGH (ref 70–99)
GLUCOSE BLDC GLUCOMTR-MCNC: 234 MG/DL — HIGH (ref 70–99)
GLUCOSE SERPL-MCNC: 140 MG/DL — HIGH (ref 70–99)
HCT VFR BLD CALC: 16 % — CRITICAL LOW (ref 34.5–45)
HCT VFR BLD CALC: 19.2 % — CRITICAL LOW (ref 34.5–45)
HCT VFR BLD CALC: 30.1 % — LOW (ref 34.5–45)
HGB BLD-MCNC: 10.1 G/DL — LOW (ref 11.5–15.5)
HGB BLD-MCNC: 5.2 G/DL — CRITICAL LOW (ref 11.5–15.5)
HGB BLD-MCNC: 6.3 G/DL — CRITICAL LOW (ref 11.5–15.5)
IMMATURE PLATELET FRACTION #: 1.3 K/UL — LOW (ref 4.7–11.1)
IMMATURE PLATELET FRACTION #: 1.7 K/UL — LOW (ref 4.7–11.1)
IMMATURE PLATELET FRACTION #: 1.8 K/UL — LOW (ref 4.7–11.1)
IMMATURE PLATELET FRACTION %: 1.6 % — SIGNIFICANT CHANGE UP (ref 1.6–4.9)
IMMATURE PLATELET FRACTION %: 2.9 % — SIGNIFICANT CHANGE UP (ref 1.6–4.9)
IMMATURE PLATELET FRACTION %: 3.2 % — SIGNIFICANT CHANGE UP (ref 1.6–4.9)
INR BLD: 1.12 RATIO — SIGNIFICANT CHANGE UP (ref 0.85–1.16)
MAGNESIUM SERPL-MCNC: 2.2 MG/DL — SIGNIFICANT CHANGE UP (ref 1.6–2.6)
MCHC RBC-ENTMCNC: 30.1 PG — SIGNIFICANT CHANGE UP (ref 27–34)
MCHC RBC-ENTMCNC: 30.2 PG — SIGNIFICANT CHANGE UP (ref 27–34)
MCHC RBC-ENTMCNC: 30.7 PG — SIGNIFICANT CHANGE UP (ref 27–34)
MCHC RBC-ENTMCNC: 32.5 G/DL — SIGNIFICANT CHANGE UP (ref 32–36)
MCHC RBC-ENTMCNC: 32.8 G/DL — SIGNIFICANT CHANGE UP (ref 32–36)
MCHC RBC-ENTMCNC: 33.6 G/DL — SIGNIFICANT CHANGE UP (ref 32–36)
MCV RBC AUTO: 89.9 FL — SIGNIFICANT CHANGE UP (ref 80–100)
MCV RBC AUTO: 93 FL — SIGNIFICANT CHANGE UP (ref 80–100)
MCV RBC AUTO: 93.7 FL — SIGNIFICANT CHANGE UP (ref 80–100)
METHOD TYPE: SIGNIFICANT CHANGE UP
NRBC # BLD AUTO: 0 K/UL — SIGNIFICANT CHANGE UP (ref 0–0)
NRBC # FLD: 0 K/UL — SIGNIFICANT CHANGE UP (ref 0–0)
NRBC BLD AUTO-RTO: 0 /100 WBCS — SIGNIFICANT CHANGE UP (ref 0–0)
ORGANISM # SPEC MICROSCOPIC CNT: ABNORMAL
ORGANISM # SPEC MICROSCOPIC CNT: ABNORMAL
PHOSPHATE SERPL-MCNC: 3.6 MG/DL — SIGNIFICANT CHANGE UP (ref 2.5–4.5)
PLATELET # BLD AUTO: 54 K/UL — LOW (ref 150–400)
PLATELET # BLD AUTO: 61 K/UL — LOW (ref 150–400)
PLATELET # BLD AUTO: 82 K/UL — LOW (ref 150–400)
PMV BLD: 10.3 FL — SIGNIFICANT CHANGE UP (ref 7–13)
PMV BLD: 10.3 FL — SIGNIFICANT CHANGE UP (ref 7–13)
PMV BLD: 11 FL — SIGNIFICANT CHANGE UP (ref 7–13)
POTASSIUM SERPL-MCNC: 3.8 MMOL/L — SIGNIFICANT CHANGE UP (ref 3.5–5.3)
POTASSIUM SERPL-SCNC: 3.8 MMOL/L — SIGNIFICANT CHANGE UP (ref 3.5–5.3)
PROT SERPL-MCNC: 3.7 G/DL — LOW (ref 6–8.3)
PROTHROM AB SERPL-ACNC: 12.9 SEC — SIGNIFICANT CHANGE UP (ref 9.9–13.4)
RBC # BLD: 1.72 M/UL — LOW (ref 3.8–5.2)
RBC # BLD: 2.05 M/UL — LOW (ref 3.8–5.2)
RBC # BLD: 3.35 M/UL — LOW (ref 3.8–5.2)
RBC # FLD: 18.6 % — HIGH (ref 10.3–14.5)
RBC # FLD: 20.3 % — HIGH (ref 10.3–14.5)
RBC # FLD: 20.5 % — HIGH (ref 10.3–14.5)
SODIUM SERPL-SCNC: 143 MMOL/L — SIGNIFICANT CHANGE UP (ref 135–145)
SPECIMEN SOURCE: SIGNIFICANT CHANGE UP
WBC # BLD: 1.96 K/UL — LOW (ref 3.8–10.5)
WBC # BLD: 2.12 K/UL — LOW (ref 3.8–10.5)
WBC # BLD: 2.51 K/UL — LOW (ref 3.8–10.5)
WBC # FLD AUTO: 1.96 K/UL — LOW (ref 3.8–10.5)
WBC # FLD AUTO: 2.12 K/UL — LOW (ref 3.8–10.5)
WBC # FLD AUTO: 2.51 K/UL — LOW (ref 3.8–10.5)

## 2025-07-07 PROCEDURE — 82247 BILIRUBIN TOTAL: CPT

## 2025-07-07 PROCEDURE — 82947 ASSAY GLUCOSE BLOOD QUANT: CPT

## 2025-07-07 PROCEDURE — 82435 ASSAY OF BLOOD CHLORIDE: CPT

## 2025-07-07 PROCEDURE — 87799 DETECT AGENT NOS DNA QUANT: CPT

## 2025-07-07 PROCEDURE — 87086 URINE CULTURE/COLONY COUNT: CPT

## 2025-07-07 PROCEDURE — 83615 LACTATE (LD) (LDH) ENZYME: CPT

## 2025-07-07 PROCEDURE — 85396 CLOTTING ASSAY WHOLE BLOOD: CPT

## 2025-07-07 PROCEDURE — 85384 FIBRINOGEN ACTIVITY: CPT

## 2025-07-07 PROCEDURE — 86965 POOLING BLOOD PLATELETS: CPT

## 2025-07-07 PROCEDURE — 94003 VENT MGMT INPAT SUBQ DAY: CPT

## 2025-07-07 PROCEDURE — 82272 OCCULT BLD FECES 1-3 TESTS: CPT

## 2025-07-07 PROCEDURE — 86900 BLOOD TYPING SEROLOGIC ABO: CPT

## 2025-07-07 PROCEDURE — G0480: CPT

## 2025-07-07 PROCEDURE — 36415 COLL VENOUS BLD VENIPUNCTURE: CPT

## 2025-07-07 PROCEDURE — 99232 SBSQ HOSP IP/OBS MODERATE 35: CPT | Mod: 24

## 2025-07-07 PROCEDURE — 82746 ASSAY OF FOLIC ACID SERUM: CPT

## 2025-07-07 PROCEDURE — C1889: CPT

## 2025-07-07 PROCEDURE — 71045 X-RAY EXAM CHEST 1 VIEW: CPT | Mod: 26

## 2025-07-07 PROCEDURE — 84100 ASSAY OF PHOSPHORUS: CPT

## 2025-07-07 PROCEDURE — 81001 URINALYSIS AUTO W/SCOPE: CPT

## 2025-07-07 PROCEDURE — 82803 BLOOD GASES ANY COMBINATION: CPT

## 2025-07-07 PROCEDURE — 94002 VENT MGMT INPAT INIT DAY: CPT

## 2025-07-07 PROCEDURE — 83540 ASSAY OF IRON: CPT

## 2025-07-07 PROCEDURE — 80053 COMPREHEN METABOLIC PANEL: CPT

## 2025-07-07 PROCEDURE — 87077 CULTURE AEROBIC IDENTIFY: CPT

## 2025-07-07 PROCEDURE — 82607 VITAMIN B-12: CPT

## 2025-07-07 PROCEDURE — 71045 X-RAY EXAM CHEST 1 VIEW: CPT

## 2025-07-07 PROCEDURE — 94640 AIRWAY INHALATION TREATMENT: CPT

## 2025-07-07 PROCEDURE — 83735 ASSAY OF MAGNESIUM: CPT

## 2025-07-07 PROCEDURE — 85025 COMPLETE CBC W/AUTO DIFF WBC: CPT

## 2025-07-07 PROCEDURE — 43255 EGD CONTROL BLEEDING ANY: CPT | Mod: GC

## 2025-07-07 PROCEDURE — 83550 IRON BINDING TEST: CPT

## 2025-07-07 PROCEDURE — 76705 ECHO EXAM OF ABDOMEN: CPT

## 2025-07-07 PROCEDURE — P9012: CPT

## 2025-07-07 PROCEDURE — 84132 ASSAY OF SERUM POTASSIUM: CPT

## 2025-07-07 PROCEDURE — 85610 PROTHROMBIN TIME: CPT

## 2025-07-07 PROCEDURE — 84295 ASSAY OF SERUM SODIUM: CPT

## 2025-07-07 PROCEDURE — P9040: CPT

## 2025-07-07 PROCEDURE — 85018 HEMOGLOBIN: CPT

## 2025-07-07 PROCEDURE — 86850 RBC ANTIBODY SCREEN: CPT

## 2025-07-07 PROCEDURE — 87186 SC STD MICRODIL/AGAR DIL: CPT

## 2025-07-07 PROCEDURE — 82962 GLUCOSE BLOOD TEST: CPT

## 2025-07-07 PROCEDURE — 85027 COMPLETE CBC AUTOMATED: CPT

## 2025-07-07 PROCEDURE — 86901 BLOOD TYPING SEROLOGIC RH(D): CPT

## 2025-07-07 PROCEDURE — 86923 COMPATIBILITY TEST ELECTRIC: CPT

## 2025-07-07 PROCEDURE — 83010 ASSAY OF HAPTOGLOBIN QUANT: CPT

## 2025-07-07 PROCEDURE — 83605 ASSAY OF LACTIC ACID: CPT

## 2025-07-07 PROCEDURE — 85014 HEMATOCRIT: CPT

## 2025-07-07 PROCEDURE — 82330 ASSAY OF CALCIUM: CPT

## 2025-07-07 PROCEDURE — 80158 DRUG ASSAY CYCLOSPORINE: CPT

## 2025-07-07 PROCEDURE — 93975 VASCULAR STUDY: CPT

## 2025-07-07 PROCEDURE — 85730 THROMBOPLASTIN TIME PARTIAL: CPT

## 2025-07-07 PROCEDURE — 87040 BLOOD CULTURE FOR BACTERIA: CPT

## 2025-07-07 PROCEDURE — 82248 BILIRUBIN DIRECT: CPT

## 2025-07-07 DEVICE — GEL PURASTAT 3ML: Type: IMPLANTABLE DEVICE | Status: FUNCTIONAL

## 2025-07-07 RX ORDER — TRAMADOL HYDROCHLORIDE 50 MG/1
25 TABLET, FILM COATED ORAL ONCE
Refills: 0 | Status: DISCONTINUED | OUTPATIENT
Start: 2025-07-07 | End: 2025-07-07

## 2025-07-07 RX ORDER — SUMATRIPTAN 100 MG/1
25 TABLET, FILM COATED ORAL ONCE
Refills: 0 | Status: COMPLETED | OUTPATIENT
Start: 2025-07-07 | End: 2025-07-07

## 2025-07-07 RX ORDER — INSULIN LISPRO 100 U/ML
INJECTION, SOLUTION INTRAVENOUS; SUBCUTANEOUS
Refills: 0 | Status: DISCONTINUED | OUTPATIENT
Start: 2025-07-07 | End: 2025-07-09

## 2025-07-07 RX ORDER — ERYTHROMYCIN ETHYLSUCCINATE 200 MG/5ML
250 SUSPENSION, RECONSTITUTED, ORAL (ML) ORAL ONCE
Refills: 0 | Status: COMPLETED | OUTPATIENT
Start: 2025-07-07 | End: 2025-07-07

## 2025-07-07 RX ORDER — VALGANCICLOVIR 450 MG/1
450 TABLET, FILM COATED ORAL
Refills: 0 | Status: DISCONTINUED | OUTPATIENT
Start: 2025-07-07 | End: 2025-07-09

## 2025-07-07 RX ORDER — ENTECAVIR 0.5 MG/1
0.5 TABLET ORAL
Refills: 0 | Status: DISCONTINUED | OUTPATIENT
Start: 2025-07-09 | End: 2025-07-09

## 2025-07-07 RX ADMIN — IPRATROPIUM BROMIDE AND ALBUTEROL SULFATE 3 MILLILITER(S): .5; 2.5 SOLUTION RESPIRATORY (INHALATION) at 23:37

## 2025-07-07 RX ADMIN — BUDESONIDE 0.5 MILLIGRAM(S): 0.25 SUSPENSION RESPIRATORY (INHALATION) at 06:27

## 2025-07-07 RX ADMIN — SUMATRIPTAN 25 MILLIGRAM(S): 100 TABLET, FILM COATED ORAL at 23:30

## 2025-07-07 RX ADMIN — TRAMADOL HYDROCHLORIDE 25 MILLIGRAM(S): 50 TABLET, FILM COATED ORAL at 21:05

## 2025-07-07 RX ADMIN — MYCOPHENOLATE MOFETIL 500 MILLIGRAM(S): 500 TABLET, FILM COATED ORAL at 08:02

## 2025-07-07 RX ADMIN — IPRATROPIUM BROMIDE AND ALBUTEROL SULFATE 3 MILLILITER(S): .5; 2.5 SOLUTION RESPIRATORY (INHALATION) at 00:24

## 2025-07-07 RX ADMIN — Medication 40 MILLIGRAM(S): at 19:13

## 2025-07-07 RX ADMIN — CYCLOSPORINE 75 MILLIGRAM(S): 100 CAPSULE, LIQUID FILLED ORAL at 20:40

## 2025-07-07 RX ADMIN — SUMATRIPTAN 25 MILLIGRAM(S): 100 TABLET, FILM COATED ORAL at 13:00

## 2025-07-07 RX ADMIN — LEVETIRACETAM 500 MILLIGRAM(S): 10 INJECTION, SOLUTION INTRAVENOUS at 19:13

## 2025-07-07 RX ADMIN — INSULIN LISPRO 4: 100 INJECTION, SOLUTION INTRAVENOUS; SUBCUTANEOUS at 12:12

## 2025-07-07 RX ADMIN — LEVETIRACETAM 500 MILLIGRAM(S): 10 INJECTION, SOLUTION INTRAVENOUS at 06:00

## 2025-07-07 RX ADMIN — Medication 1 TABLET(S): at 06:27

## 2025-07-07 RX ADMIN — MYCOPHENOLATE MOFETIL 500 MILLIGRAM(S): 500 TABLET, FILM COATED ORAL at 20:39

## 2025-07-07 RX ADMIN — Medication 1 APPLICATION(S): at 08:05

## 2025-07-07 RX ADMIN — Medication 250 MILLIGRAM(S): at 19:11

## 2025-07-07 RX ADMIN — SUMATRIPTAN 25 MILLIGRAM(S): 100 TABLET, FILM COATED ORAL at 12:07

## 2025-07-07 RX ADMIN — CYCLOSPORINE 75 MILLIGRAM(S): 100 CAPSULE, LIQUID FILLED ORAL at 08:02

## 2025-07-07 RX ADMIN — Medication 1 TABLET(S): at 12:07

## 2025-07-07 RX ADMIN — IPRATROPIUM BROMIDE AND ALBUTEROL SULFATE 3 MILLILITER(S): .5; 2.5 SOLUTION RESPIRATORY (INHALATION) at 06:27

## 2025-07-07 RX ADMIN — Medication 1 TABLET(S): at 19:13

## 2025-07-07 RX ADMIN — PREDNISONE 5 MILLIGRAM(S): 20 TABLET ORAL at 06:27

## 2025-07-07 RX ADMIN — TRAMADOL HYDROCHLORIDE 25 MILLIGRAM(S): 50 TABLET, FILM COATED ORAL at 22:05

## 2025-07-07 RX ADMIN — Medication 40 MILLIGRAM(S): at 08:42

## 2025-07-07 RX ADMIN — INSULIN GLARGINE-YFGN 5 UNIT(S): 100 INJECTION, SOLUTION SUBCUTANEOUS at 21:05

## 2025-07-07 RX ADMIN — INSULIN LISPRO 2: 100 INJECTION, SOLUTION INTRAVENOUS; SUBCUTANEOUS at 08:00

## 2025-07-07 NOTE — OCCUPATIONAL THERAPY INITIAL EVALUATION ADULT - GENERAL OBSERVATIONS, REHAB EVAL
----- Message from Kaylene Leo PA-C sent at 12/10/2018 11:02 AM CST -----  CBC - within normal limits. Please call pt and let him know. Still awaiting CHERYL, but this will take a few days to get back.   Pt found semi supine in bed, +love, +telemonitor, +pulse ox, +IVL, NAD,  at bedside.

## 2025-07-07 NOTE — PROGRESS NOTE ADULT - ASSESSMENT
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 transplant service for GIB/melena, then transferred to SICU for planned EGD and hemodynamic monitoring.    [] Anemia  [] UGIB  - Iron studies: low TIBC, high iron, high sat  - empiric CTX now off   - RRT 7/5 -> tx to sicu, intubated  - s/p EGD (7/5) large blood (>1L),  incomplete visualization of the gastric fundus and body due to some retained old clot, active bleeding from a vessel in the mid-gastric body along the greater curvature tx with hemoclip;   - 7/6 repeat EGD - no fresh bleed, normal esophagus, no active bleed, cauterized gastric angioectasia  - 2U PRBC at rehab 7/4, 2U PRBC and 1U Cyro 7/5  - Protonix IV BID & IVF    [] h/o OLT  - HBV + donor: Entecavir 0.5 QD  - CMV +/- on Valcyte 900daily  - Cyclo by level,  BID, prred 5    [] DVT PPX  - SCD  - holding ASA 2/2 anemia    71 YO female with 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    [] Anemia  [] UGIB  - Iron studies: low TIBC, high iron, high sat  - RRT 7/5 -> tx to sicu, intubated  - s/p EGD (7/5) large blood (>1L), active bleeding from ?AVM mid-gastric body; clipped   - s/p EGD 7/6   no active bleed cauterized,  - s/p EGD 7/7 - active bleeding from residual arteriole clip, tx. Clears tonight   - Protonix IV BID  - 2u PRBC in am    [] h/o OLT  - HBV + donor: Entecavir 0.5 QD  - CMV +/- on Valcyte 900daily  - Cyclo by level,  BID, pred 5    [] DVT PPX  - SCD  - holding ASA 2/2 anemia

## 2025-07-07 NOTE — DIETITIAN INITIAL EVALUATION ADULT - OTHER INFO
- Hx of OLT 5/31/25; ordered for Cellcept, Gengraf and Prednisone   - Hx of Type 2 diabetes mellitus Lantus 5U hs and  insulin sliding scale for blood glucose control

## 2025-07-07 NOTE — PRE-ANESTHESIA EVALUATION ADULT - NSANTHPMHFT_GEN_ALL_CORE
70F with PMH s/f MASH/HCC s/p OLT 5/2025, hx GIB from angioectasias presented on 7/4/25 for near syncope, weakness and dark stools from GIB/melena. SICU admission 7/5-7/6 for intubation/airway protection and hemodynamic monitoring. S/p multiple transfusions, and the following EGDs under sedation/intubation in SICU:    - EGD (7/5) large blood (>1L),  incomplete visualization of the gastric fundus and body due to some retained old clot, active bleeding from a vessel in the mid-gastric body along the greater curvature tx with hemoclip;   - EGD (7/6) - no fresh bleed, normal esophagus, no active bleed, cauterized gastric angioectasia    Despite the above EGD from yesterday and transfer to floor, patient continues to have Hgb drop requiring transfusion. Now presents for repeat EGD. Per GI, low chance of ongoing GIB that would need airway protection for the procedure today.

## 2025-07-07 NOTE — DIETITIAN INITIAL EVALUATION ADULT - PERTINENT LABORATORY DATA
07-07    143  |  111[H]  |  49[H]  ----------------------------<  140[H]  3.8   |  19[L]  |  1.57[H]    Ca    7.4[L]      07 Jul 2025 06:56  Phos  3.6     07-07  Mg     2.2     07-07    TPro  3.7[L]  /  Alb  2.5[L]  /  TBili  0.6  /  DBili  x   /  AST  10  /  ALT  5[L]  /  AlkPhos  41  07-07  POCT Blood Glucose.: 234 mg/dL (07-07-25 @ 12:09)  A1C with Estimated Average Glucose Result: 5.3 % (06-13-25 @ 06:35)  A1C with Estimated Average Glucose Result: 6.0 % (05-28-25 @ 10:20)   07-07    143  |  111[H]  |  49[H]  ----------------------------<  140[H]  3.8   |  19[L]  |  1.57[H]    Ca    7.4[L]      07 Jul 2025 06:56  Phos  3.6     07-07  Mg     2.2     07-07    TPro  3.7[L]  /  Alb  2.5[L]  /  TBili  0.6  /  DBili  x   /  AST  10  /  ALT  5[L]  /  AlkPhos  41  07-07    POCT Blood Glucose.: 234 mg/dL (07-07-25 @ 12:09)  POCT Blood Glucose.: 158 mg/dL (07-07-25 @ 08:35)  POCT Blood Glucose.: 147 mg/dL (07-06-25 @ 21:11)  POCT Blood Glucose.: 179 mg/dL (07-06-25 @ 17:42)    A1C with Estimated Average Glucose Result: 5.3 % (06-13-25 @ 06:35)  A1C with Estimated Average Glucose Result: 6.0 % (05-28-25 @ 10:20)

## 2025-07-07 NOTE — DIETITIAN INITIAL EVALUATION ADULT - NS FNS DIET ORDER
Diet, NPO:   Except Medications (07-07-25 @ 07:46)   Diet, NPO:   Except Medications (07-07-25 @ 07:46) [Active]

## 2025-07-07 NOTE — OCCUPATIONAL THERAPY INITIAL EVALUATION ADULT - PERTINENT HX OF CURRENT PROBLEM, REHAB EVAL
70F with MASH/HCC, UGIB in 2022 and 5/26/25 (EGD at the time no active bleed), now s/p OLT 5/31/25. Discharged to [facility] 6/12-20. Transferred from [facility] for near syncope. Also with darker BMs since txp.  H/H 5.8/17.5 on arrival, received 2u PRBC. Afebrile, VSS on arrival and on transfer. Other than associated weakness, rest of ROS unremarkable. Tolerating PO, no other signs of bleeding. Compliant with all medications, no ETOH. CXR: No radiographic evidence for active pulmonary disease. US TRANS LIVER: Status post liver transplant with patent hepatic vasculature. New coarsened echotexture of the hepatic parenchyma. Small perihepatic fluid collection anterior to the left hepatic lobe measures, as detailed above.

## 2025-07-07 NOTE — DIETITIAN INITIAL EVALUATION ADULT - REASON FOR ADMISSION
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

## 2025-07-07 NOTE — PROVIDER CONTACT NOTE (CRITICAL VALUE NOTIFICATION) - ACTION/TREATMENT ORDERED:
ROGERIO Hernandez made aware. Repeat CBC to be drawn and 1 unit PRBC ordered
ROGERIO Hernandez made aware. 1 unit PRBC ordered & to be administered

## 2025-07-07 NOTE — DIETITIAN INITIAL EVALUATION ADULT - ORAL INTAKE PTA/DIET HISTORY
Pt reports having a "so-so" appetite and PO intake PTA; consuming ~50% of most meals. Follows regular diet. Pt denies any known food allergies or intolerances. Pt denies any micronutrient supplementation at home. Denies any difficulty chewing/swallowing at this time.

## 2025-07-07 NOTE — OCCUPATIONAL THERAPY INITIAL EVALUATION ADULT - ADDITIONAL COMMENTS
Pt lives in a mobile home with , 4 steps to enter, no steps inside, stall shower with shower seat. Pt owns rolling walker, cane, shower seat. Prior to admission, pt was independent with ADLs, IADLs and functional mobility without DME.

## 2025-07-07 NOTE — PROVIDER CONTACT NOTE (CRITICAL VALUE NOTIFICATION) - SITUATION
hemoglobin 5.2, hematocrit 16.0, total hemoglobin calculated 5.6, hematocrit calculated 17.0
HGB 6.3. HCT 19.2

## 2025-07-07 NOTE — PROVIDER CONTACT NOTE (CRITICAL VALUE NOTIFICATION) - BACKGROUND
Pt admitted for near syncope, weakness, GIB/ melena s/p EGD x2
Pt admitted for near syncope, weakness, GIB/ melena s/p EGD x2

## 2025-07-07 NOTE — DIETITIAN INITIAL EVALUATION ADULT - ETIOLOGY
Increased demands for nutrients for surgical healing  Decreased ability to consume sufficient energy in the setting of increased nutrient needs

## 2025-07-07 NOTE — DIETITIAN INITIAL EVALUATION ADULT - PERTINENT MEDS FT
MEDICATIONS  (STANDING):  albuterol/ipratropium for Nebulization 3 milliLiter(s) Nebulizer every 6 hours  budesonide    Inhalation Suspension 0.5 milliGRAM(s) Inhalation every 12 hours  calcium carbonate 1250 mG  + Vitamin D (OsCal 500 + D) 1 Tablet(s) Oral two times a day  chlorhexidine 2% Cloths 1 Application(s) Topical <User Schedule>  cycloSPORINE  , modified (GENGRAF) 75 milliGRAM(s) Oral <User Schedule>  erythromycin   IVPB 250 milliGRAM(s) IV Intermittent once  insulin glargine Injectable (LANTUS) 5 Unit(s) SubCutaneous at bedtime  insulin lispro (ADMELOG) corrective regimen sliding scale   SubCutaneous every 6 hours  levETIRAcetam 500 milliGRAM(s) Oral every 12 hours  mycophenolate mofetil 500 milliGRAM(s) Oral <User Schedule>  pantoprazole  Injectable 40 milliGRAM(s) IV Push every 12 hours  predniSONE   Tablet 5 milliGRAM(s) Oral daily  trimethoprim   80 mG/sulfamethoxazole 400 mG 1 Tablet(s) Oral daily  valGANciclovir 450 milliGRAM(s) Oral <User Schedule>    MEDICATIONS  (PRN):

## 2025-07-07 NOTE — PROVIDER CONTACT NOTE (CRITICAL VALUE NOTIFICATION) - TEST AND RESULT REPORTED:
HGB 6.3. HCT 19.2
hemoglobin 5.2, hematocrit 16.0, total hemoglobin calculated 5.6, hematocrit calculated 17.0

## 2025-07-07 NOTE — PRE PROCEDURE NOTE - PRE PROCEDURE EVALUATION
Attending Physician:   Dr Prado                           Procedure: EGD    Indication for Procedure: anemia   ________________________________________________________  PAST MEDICAL & SURGICAL HISTORY:  HCC (hepatocellular carcinoma)      DM (diabetes mellitus)      HTN (hypertension)      HLD (hyperlipidemia)      Hepatic cirrhosis      IVEY (nonalcoholic steatohepatitis)      Former smoker      Ascites      Hepatic encephalopathy      History of cervical cancer      HCC (hepatocellular carcinoma)      Hepatic cirrhosis      HLD (hyperlipidemia)      HTN (hypertension)      Type 2 diabetes mellitus      History of ascites      Nonalcoholic fatty liver disease without nonalcoholic steatohepatitis (IVEY)      COPD (chronic obstructive pulmonary disease)      H/O  section      History of cholecystectomy      H/O carpal tunnel repair      H/O cataract extraction      H/O carpal tunnel repair      H/O  section      H/O cataract extraction      History of cholecystectomy        ALLERGIES:  No Known Allergies    HOME MEDICATIONS:  aspirin 81 mg oral delayed release tablet: 1 tab(s) orally once a day  bisacodyl 10 mg rectal suppository: 1 suppository(ies) rectal once a day As needed Refractory Constipation  cycloSPORINE modified 25 mg oral capsule: 1 cap(s) orally 2 times a day  entecavir 0.5 mg oral tablet: 1 tab(s) orally once a day  insulin glargine 100 units/mL subcutaneous solution: 5 unit(s) subcutaneous once a day (at bedtime)  insulin lispro 100 units/mL injectable solution: injectable 3 times a day (before meals) DM per moderate Insulin Sliding Scale (ISS):  Please check your FS before each meal, and give insulin per ISS:  2 Unit(s) if Glucose 151 - 200  4 Unit(s) if Glucose 201 - 250  6 Unit(s) if Glucose 251 - 300  8 Unit(s) if Glucose 301 - 350  10 Unit(s) if Glucose 351 - 400  12 Unit(s) if Glucose Greater Than 400  insulin lispro 100 units/mL injectable solution: injectable once a day (at bedtime) Please check your FS at bedtime, and give insulin per ISS:  0 Unit(s) if Glucose 61 - 250  2 Unit(s) if Glucose 251 - 300  4 Unit(s) if Glucose 301 - 350  6 Unit(s) if Glucose 351 - 400  8 Unit(s) if Glucose Greater Than 400    Give correctional scale insulin  REGARDLESS of PO status NOTIFY Provider for blood glucose LESS THAN 70 milliGRAM(s)/deciLiter or GREATER THAN 400 milliGRAM(s)/deciLiter  insulin lispro 100 units/mL injectable solution: 8 unit(s) injectable once a day before breakfast  insulin lispro 100 units/mL injectable solution: 8 unit(s) injectable once a day before lunch  insulin lispro 100 units/mL injectable solution: 5 unit(s) injectable once a day before dinner  lactulose 10 g/15 mL oral syrup: 15 milliliter(s) orally once a day  levETIRAcetam 500 mg oral tablet: 1 tab(s) orally 2 times a day  magnesium oxide 400 mg oral tablet: 1 tab(s) orally 3 times a day (with meals)  melatonin 3 mg oral tablet: 3 tab(s) orally once a day (at bedtime)  mycophenolate mofetil 250 mg oral capsule: 1,000 milligram(s) orally 2 times a day Take at 8AM and 8PM  NIFEdipine 30 mg oral tablet, extended release: 1 tab(s) orally once a day  pantoprazole 40 mg oral delayed release tablet: 1 tab(s) orally once a day (before a meal)  predniSONE 10 mg oral tablet: 1 tab(s) orally once a day  QUEtiapine 25 mg oral tablet: 3 tab(s) orally once a day (at bedtime)  senna leaf extract oral tablet: 2 tab(s) orally once a day (at bedtime)  sulfamethoxazole-trimethoprim 400 mg-80 mg oral tablet: 1 tab(s) orally once a day  SUMAtriptan 100 mg oral tablet: 1 tab(s) orally once a day as needed for Migraine No more than 3 doses in 1 week  traMADol 50 mg oral tablet: 0.5 tab(s) orally every 6 hours as needed for Moderate Pain (4 - 6) Please take 0.5 tab every 6 hours for moderate pain.  Please take 1 tab every 6 hours for severe pain.  Tylenol 325 mg oral tablet: 2 tab(s) orally every 8 hours as needed for  mild pain  valGANciclovir 450 mg oral tablet: 2 tab(s) orally once a day  vitamin D-calcium (as carbonate) 5 mcg-500 mg oral tablet: 1 tab(s) orally 2 times a day    AICD/PPM: [ ] yes   [x ] no    PERTINENT LAB DATA:                        6.3    2.51  )-----------( 82       ( 2025 07:49 )             19.2     -    143  |  111[H]  |  49[H]  ----------------------------<  140[H]  3.8   |  19[L]  |  1.57[H]    Ca    7.4[L]      2025 06:56  Phos  3.6     -  Mg     2.2     -    TPro  3.7[L]  /  Alb  2.5[L]  /  TBili  0.6  /  DBili  x   /  AST  10  /  ALT  5[L]  /  AlkPhos  41  -    PT/INR - ( 2025 06:54 )   PT: 12.9 sec;   INR: 1.12 ratio         PTT - ( 2025 06:54 )  PTT:27.4 sec            PHYSICAL EXAMINATION:    T(C): 36.7  HR: 79  BP: 116/62  RR: 18  SpO2: 100%    Constitutional: NAD    Neck:  No JVD  Respiratory: normal effort  Cardiovascular: rrr  Gastrointestinal: soft, NT/ND  Extremities: No peripheral edema  Neurological: A/O x 3, no focal deficits        COMMENTS:    The patient is a suitable candidate for the planned procedure unless box checked [ ]  No, explain:     Attending Physician:   Dr Prado                           Procedure: EGD    Indication for Procedure:  Acute on chronic anemia, melena, recent UGIB with endoscopic treatment of gastric vessel on 25 and 25, concern for re-bleeding since then   ________________________________________________________  PAST MEDICAL & SURGICAL HISTORY:  HCC (hepatocellular carcinoma)      DM (diabetes mellitus)      HTN (hypertension)      HLD (hyperlipidemia)      Hepatic cirrhosis      IVEY (nonalcoholic steatohepatitis)      Former smoker      Ascites      Hepatic encephalopathy      History of cervical cancer      HCC (hepatocellular carcinoma)      Hepatic cirrhosis      HLD (hyperlipidemia)      HTN (hypertension)      Type 2 diabetes mellitus      History of ascites      Nonalcoholic fatty liver disease without nonalcoholic steatohepatitis (IVEY)      COPD (chronic obstructive pulmonary disease)      H/O  section      History of cholecystectomy      H/O carpal tunnel repair      H/O cataract extraction      H/O carpal tunnel repair      H/O  section      H/O cataract extraction      History of cholecystectomy        History of  donor liver transplant        ALLERGIES:  No Known Allergies    HOME MEDICATIONS:  aspirin 81 mg oral delayed release tablet: 1 tab(s) orally once a day  bisacodyl 10 mg rectal suppository: 1 suppository(ies) rectal once a day As needed Refractory Constipation  cycloSPORINE modified 25 mg oral capsule: 1 cap(s) orally 2 times a day  entecavir 0.5 mg oral tablet: 1 tab(s) orally once a day  insulin glargine 100 units/mL subcutaneous solution: 5 unit(s) subcutaneous once a day (at bedtime)  insulin lispro 100 units/mL injectable solution: injectable 3 times a day (before meals) DM per moderate Insulin Sliding Scale (ISS):  Please check your FS before each meal, and give insulin per ISS:  2 Unit(s) if Glucose 151 - 200  4 Unit(s) if Glucose 201 - 250  6 Unit(s) if Glucose 251 - 300  8 Unit(s) if Glucose 301 - 350  10 Unit(s) if Glucose 351 - 400  12 Unit(s) if Glucose Greater Than 400  insulin lispro 100 units/mL injectable solution: injectable once a day (at bedtime) Please check your FS at bedtime, and give insulin per ISS:  0 Unit(s) if Glucose 61 - 250  2 Unit(s) if Glucose 251 - 300  4 Unit(s) if Glucose 301 - 350  6 Unit(s) if Glucose 351 - 400  8 Unit(s) if Glucose Greater Than 400    Give correctional scale insulin  REGARDLESS of PO status NOTIFY Provider for blood glucose LESS THAN 70 milliGRAM(s)/deciLiter or GREATER THAN 400 milliGRAM(s)/deciLiter  insulin lispro 100 units/mL injectable solution: 8 unit(s) injectable once a day before breakfast  insulin lispro 100 units/mL injectable solution: 8 unit(s) injectable once a day before lunch  insulin lispro 100 units/mL injectable solution: 5 unit(s) injectable once a day before dinner  lactulose 10 g/15 mL oral syrup: 15 milliliter(s) orally once a day  levETIRAcetam 500 mg oral tablet: 1 tab(s) orally 2 times a day  magnesium oxide 400 mg oral tablet: 1 tab(s) orally 3 times a day (with meals)  melatonin 3 mg oral tablet: 3 tab(s) orally once a day (at bedtime)  mycophenolate mofetil 250 mg oral capsule: 1,000 milligram(s) orally 2 times a day Take at 8AM and 8PM  NIFEdipine 30 mg oral tablet, extended release: 1 tab(s) orally once a day  pantoprazole 40 mg oral delayed release tablet: 1 tab(s) orally once a day (before a meal)  predniSONE 10 mg oral tablet: 1 tab(s) orally once a day  QUEtiapine 25 mg oral tablet: 3 tab(s) orally once a day (at bedtime)  senna leaf extract oral tablet: 2 tab(s) orally once a day (at bedtime)  sulfamethoxazole-trimethoprim 400 mg-80 mg oral tablet: 1 tab(s) orally once a day  SUMAtriptan 100 mg oral tablet: 1 tab(s) orally once a day as needed for Migraine No more than 3 doses in 1 week  traMADol 50 mg oral tablet: 0.5 tab(s) orally every 6 hours as needed for Moderate Pain (4 - 6) Please take 0.5 tab every 6 hours for moderate pain.  Please take 1 tab every 6 hours for severe pain.  Tylenol 325 mg oral tablet: 2 tab(s) orally every 8 hours as needed for  mild pain  valGANciclovir 450 mg oral tablet: 2 tab(s) orally once a day  vitamin D-calcium (as carbonate) 5 mcg-500 mg oral tablet: 1 tab(s) orally 2 times a day    AICD/PPM: [ ] yes   [x ] no    PERTINENT LAB DATA:                        6.3    2.51  )-----------( 82       ( 2025 07:49 )             19.2     07-07    143  |  111[H]  |  49[H]  ----------------------------<  140[H]  3.8   |  19[L]  |  1.57[H]    Ca    7.4[L]      2025 06:56  Phos  3.6     07-  Mg     2.2     07-07    TPro  3.7[L]  /  Alb  2.5[L]  /  TBili  0.6  /  DBili  x   /  AST  10  /  ALT  5[L]  /  AlkPhos  41  07-07    PT/INR - ( 2025 06:54 )   PT: 12.9 sec;   INR: 1.12 ratio         PTT - ( 2025 06:54 )  PTT:27.4 sec            PHYSICAL EXAMINATION:    T(C): 36.7  HR: 79  BP: 116/62  RR: 18  SpO2: 100%    Constitutional: NAD    Neck:  No JVD  Respiratory: normal effort  Cardiovascular: rrr  Gastrointestinal: soft, NT/ND  Extremities: No peripheral edema  Neurological: A/O x 3, no focal deficits        COMMENTS:    The patient is a suitable candidate for the planned procedure unless box checked [ ]  No, explain:

## 2025-07-07 NOTE — PROVIDER CONTACT NOTE (CRITICAL VALUE NOTIFICATION) - ASSESSMENT
Pt A&Ox4. VS as charted. Pt denies any complaints at this time and is resting comfortably in chair.
Pt A&Ox4. VS as charted. Pt denies any complaints at this time and is resting comfortably in chair.

## 2025-07-07 NOTE — PRE-ANESTHESIA EVALUATION ADULT - NSANTHADDINFOFT_GEN_ALL_CORE
Per discussion with GI team, will start with sedation and convert to GETA if needed for airway protection.

## 2025-07-07 NOTE — DIETITIAN INITIAL EVALUATION ADULT - REASON INDICATOR FOR ASSESSMENT
MST Score 2 or > and Stage 2 Pressure Injury or greater  Information obtained from: Review of pt's current medical record, RN, interview with pt.

## 2025-07-07 NOTE — PROGRESS NOTE ADULT - SUBJECTIVE AND OBJECTIVE BOX
Transplant Surgery - Multidisciplinary Rounds  --------------------------------------------------------------    Present:   Patient seen and examined with multidisciplinary Transplant team including Surgeon Dr. Valdez, Hepatologist Dr. Prado, Surgical fellow Dr. Rowan, GUDELIA Peraza/David,  and bedside RN during AM rounds.   Disciplines not in attendance will be notified of the plan.     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    Interval Events:  -s/p EGD: no fresh bleed, normal esophagus, no active bleed, cauterized gastric angioectasia  - extubated  - downgraded to floor  - resumed reg diet    Immunosupression:  Induction: Simulect  Maintenance: cyclo by level/ BID, pred 5  Ongoing monitoring for signs of rejection     Potential Discharge date: TBD  Education:  Medications  Plan of care:  See Below    tx data here     ROS negative unless otherwise noted        PHYSICAL EXAM:  Constitutional: Well developed / well nourished  Eyes:  PERRLA  ENMT: nc/at, no thrush  Neck: supple,   Respiratory: CLA   Cardiovascular: RRR  Gastrointestinal: Soft abdomen, ND, appropriate incisional TTP  Genitourinary: Urinary catheter in place  Extremities: SCD's in place and working bilaterally  Vascular: Palpable dp pulses bilaterally.   Neurological: Awake and alert   Skin: no rashes, ulcerations, lesions  Musculoskeletal: Moving all extremities  Psychiatric: intubated      Transplant Surgery - Multidisciplinary Rounds  --------------------------------------------------------------    Present:   Patient seen and examined with multidisciplinary Transplant team including Surgeon Dr. Valdez, Hepatologist Dr. Prado, Surgical fellow Dr. Rowan, GUDELIA Peraza/David,  and bedside RN during AM rounds.   Disciplines not in attendance will be notified of the plan.     HPI: 71 YO female with PMH MASH/HCC s/p OLT 25, GIB (most recent EGD 2025 with angioectasias and no active bleeding), chronic back pain 2/2 spinal fx, presents from OSH on 25 for near syncope, weakness and dark stools, transferred to [facility] transplant service for GIB/melena, then transferred to SICU for planned EGD and hemodynamic monitoring.    Interval Events:  -s/p EGD: no fresh bleed, normal esophagus, no active bleed, cauterized gastric angioectasia  - extubated  - downgraded to floor  - resumed reg diet    Immunosuppression:  Induction: Simulect  Maintenance: cyclo by level/ BID, pred 5  Ongoing monitoring for signs of rejection     Potential Discharge date: TBD  Education:  Medications  Plan of care:  See Below      MEDICATIONS  (STANDING):  albuterol/ipratropium for Nebulization 3 milliLiter(s) Nebulizer every 6 hours  budesonide    Inhalation Suspension 0.5 milliGRAM(s) Inhalation every 12 hours  calcium carbonate 1250 mG  + Vitamin D (OsCal 500 + D) 1 Tablet(s) Oral two times a day  chlorhexidine 2% Cloths 1 Application(s) Topical <User Schedule>  cycloSPORINE  , modified (GENGRAF) 75 milliGRAM(s) Oral <User Schedule>  erythromycin   IVPB 250 milliGRAM(s) IV Intermittent once  insulin glargine Injectable (LANTUS) 5 Unit(s) SubCutaneous at bedtime  insulin lispro (ADMELOG) corrective regimen sliding scale   SubCutaneous every 6 hours  levETIRAcetam 500 milliGRAM(s) Oral every 12 hours  mycophenolate mofetil 500 milliGRAM(s) Oral <User Schedule>  pantoprazole  Injectable 40 milliGRAM(s) IV Push every 12 hours  predniSONE   Tablet 5 milliGRAM(s) Oral daily  sodium chloride 0.9%. 500 milliLiter(s) (30 mL/Hr) IV Continuous <Continuous>  traMADol 25 milliGRAM(s) Oral once  trimethoprim   80 mG/sulfamethoxazole 400 mG 1 Tablet(s) Oral daily  valGANciclovir 450 milliGRAM(s) Oral <User Schedule>    PAST MEDICAL & SURGICAL HISTORY:  HCC (hepatocellular carcinoma)  DM (diabetes mellitus)  HTN (hypertension)  HLD (hyperlipidemia)  Hepatic cirrhosis  IVEY (nonalcoholic steatohepatitis)  Former smoker  Ascites  Hepatic encephalopathy  History of cervical cancer  HCC (hepatocellular carcinoma)  Hepatic cirrhosis  HLD (hyperlipidemia)  HTN (hypertension)  Type 2 diabetes mellitus  Nonalcoholic fatty liver disease without nonalcoholic steatohepatitis (IVEY)  COPD (chronic obstructive pulmonary disease)  H/O  section  History of cholecystectomy  H/O carpal tunnel repair  H/O cataract extraction  H/O carpal tunnel repair  H/O  section  H/O cataract extraction  History of cholecystectomy    Vital Signs Last 24 Hrs  T(C): 36.7 (2025 15:58), Max: 37.5 (2025 00:24)  T(F): 98 (2025 15:32), Max: 99.5 (2025 00:24)  HR: 79 (2025 15:58) (76 - 96)  BP: 116/62 (2025 15:58) (112/53 - 154/66)  BP(mean): 82 (2025 15:58) (82 - 97)  RR: 18 (2025 15:58) (12 - 27)  SpO2: 100% (2025 15:58) (96% - 100%)    Parameters below as of 2025 15:58      O2 Concentration (%): 35    I&O's Summary    2025 07:01  -  2025 07:00  --------------------------------------------------------  IN: 1532 mL / OUT: 1090 mL / NET: 442 mL    2025 07:01  -  2025 18:11  --------------------------------------------------------  IN: 600 mL / OUT: 350 mL / NET: 250 mL                         6.3    2.51  )-----------( 82       ( 2025 07:49 )             19.2         143  |  111[H]  |  49[H]  ----------------------------<  140[H]  3.8   |  19[L]  |  1.57[H]    Ca    7.4[L]      2025 06:56  Phos  3.6       Mg     2.2         TPro  3.7[L]  /  Alb  2.5[L]  /  TBili  0.6  /  DBili  x   /  AST  10  /  ALT  5[L]  /  AlkPhos  41  07-07      Culture - Urine (collected 25 @ 06:49)  Source: Clean Catch Clean Catch (Midstream)  Final Report (25 @ 12:38):    10,000 - 49,000 CFU/mL Enterobacter cloacae complex    <10,000 CFU/ml Normal Urogenital joanne present  Organism: Enterobacter cloacae complex (25 @ 12:38)  Organism: Enterobacter cloacae complex (25 @ 12:38)    Culture - Blood (collected 25 @ 06:19)  Source: Blood Blood-Peripheral  Preliminary Report (25 @ 11:01):    No growth at 48 Hours    Culture - Blood (collected 25 @ 06:02)  Source: Blood Blood-Peripheral  Preliminary Report (25 @ 11:01):    No growth at 48 Hours    ROS negative unless otherwise noted        PHYSICAL EXAM:  Constitutional: Well developed / well nourished  Eyes:  PERRLA  ENMT: nc/at, no thrush  Neck: supple,   Respiratory: CLA   Cardiovascular: RRR  Gastrointestinal: Soft abdomen, ND, appropriate incisional TTP  Genitourinary: Urinary catheter in place  Extremities: SCD's in place and working bilaterally  Vascular: Palpable dp pulses bilaterally.   Neurological: Awake and alert   Skin: no rashes, ulcerations, lesions  Musculoskeletal: Moving all extremities    No Purse String (Intermediate) Text: Given the location of the defect and the characteristics of the surrounding skin a purse string intermediate closure was deemed most appropriate.  Undermining was performed circumfirentially around the surgical defect.  A purse string suture was then placed and tightened.

## 2025-07-07 NOTE — DIETITIAN INITIAL EVALUATION ADULT - ADD RECOMMEND
1.Medical team to advance diet when medically feasible via tolerated route. If NPO status > 7 days, consider alternate means of nutrition if within pt/family GOC. 2. Malnutrition Sticker placed in pt. chart

## 2025-07-07 NOTE — DIETITIAN INITIAL EVALUATION ADULT - NS FNS REASON FOR WEIGHT CHANG
Weights:  - Source: Patient   - UBW: 141 pounds   - Reported weight changes: Pt reports weight loss with repeated hospitalizations post-transplant     Current Admission Weights:  - Dosing weight: 55 kg/121.3 pounds  (07-05)  - Daily weight: 55.5 kg/ 122.4 pounds (07-07)    Weight History per Morenita ISAACS:  -63.5 kg/ 140 pounds 4/3/25 and 1/6/25    Weight Change:  - Significant weight loss noted X 3 and 6 months     IBW:  105 pounds   %IBW: 117%

## 2025-07-07 NOTE — PROGRESS NOTE ADULT - NS ATTEND AMEND GEN_ALL_CORE FT
As above  70F s/p OLT 5/31/25 with good liver function now readmitted with acute blood loss anemia, active GI bleed from gastric vessel  s/p EGD with clipping and cauterizing  Hct dropped a little this morning. hemodynamically stable. Transfuse 2u RBC and plan for repeat EGD today  Immunosuppression - Cyclosporine by level, Pred 5, MMF 500mg bid  Prophylaxis with Entecavir and valcyte

## 2025-07-08 ENCOUNTER — NON-APPOINTMENT (OUTPATIENT)
Age: 70
End: 2025-07-08

## 2025-07-08 ENCOUNTER — APPOINTMENT (OUTPATIENT)
Dept: UROLOGY | Facility: CLINIC | Age: 70
End: 2025-07-08

## 2025-07-08 LAB
ALBUMIN SERPL ELPH-MCNC: 3 G/DL — LOW (ref 3.3–5)
ALBUMIN SERPL ELPH-MCNC: 4 G/DL
ALP BLD-CCNC: 84 U/L
ALP SERPL-CCNC: 50 U/L — SIGNIFICANT CHANGE UP (ref 40–120)
ALT FLD-CCNC: 6 U/L — LOW (ref 10–45)
ALT SERPL-CCNC: 10 U/L
ANION GAP SERPL CALC-SCNC: 12 MMOL/L — SIGNIFICANT CHANGE UP (ref 5–17)
ANION GAP SERPL CALC-SCNC: 15 MMOL/L
APTT BLD: 27.1 SEC — SIGNIFICANT CHANGE UP (ref 26.1–36.8)
AST SERPL-CCNC: 15 U/L — SIGNIFICANT CHANGE UP (ref 10–40)
AST SERPL-CCNC: 18 U/L
BASOPHILS # BLD AUTO: 0.07 K/UL
BASOPHILS NFR BLD AUTO: 2.8 %
BILIRUB SERPL-MCNC: 0.7 MG/DL
BILIRUB SERPL-MCNC: 0.8 MG/DL — SIGNIFICANT CHANGE UP (ref 0.2–1.2)
BUN SERPL-MCNC: 22 MG/DL
BUN SERPL-MCNC: 31 MG/DL — HIGH (ref 7–23)
CALCIUM SERPL-MCNC: 7.7 MG/DL — LOW (ref 8.4–10.5)
CALCIUM SERPL-MCNC: 9.2 MG/DL
CHLORIDE SERPL-SCNC: 105 MMOL/L
CHLORIDE SERPL-SCNC: 111 MMOL/L — HIGH (ref 96–108)
CMV DNA SPEC QL NAA+PROBE: NOT DETECTED IU/ML
CMVPCR LOG: NOT DETECTED LOG10IU/ML
CO2 SERPL-SCNC: 17 MMOL/L — LOW (ref 22–31)
CO2 SERPL-SCNC: 22 MMOL/L
CREAT SERPL-MCNC: 1.19 MG/DL
CREAT SERPL-MCNC: 1.26 MG/DL — SIGNIFICANT CHANGE UP (ref 0.5–1.3)
CYCLOSPORINE SER-MCNC: 124 NG/ML — LOW (ref 150–400)
CYCLOSPORINE SER-MCNC: 71 NG/ML
EGFR: 46 ML/MIN/1.73M2 — LOW
EGFR: 46 ML/MIN/1.73M2 — LOW
EGFRCR SERPLBLD CKD-EPI 2021: 49 ML/MIN/1.73M2
EOSINOPHIL # BLD AUTO: 0.02 K/UL
EOSINOPHIL NFR BLD AUTO: 0.9 %
GAS PNL BLDV: SIGNIFICANT CHANGE UP
GGT SERPL-CCNC: 97 U/L
GLUCOSE BLDC GLUCOMTR-MCNC: 138 MG/DL — HIGH (ref 70–99)
GLUCOSE BLDC GLUCOMTR-MCNC: 149 MG/DL — HIGH (ref 70–99)
GLUCOSE BLDC GLUCOMTR-MCNC: 175 MG/DL — HIGH (ref 70–99)
GLUCOSE BLDC GLUCOMTR-MCNC: 268 MG/DL — HIGH (ref 70–99)
GLUCOSE SERPL-MCNC: 123 MG/DL
GLUCOSE SERPL-MCNC: 177 MG/DL — HIGH (ref 70–99)
HCT VFR BLD CALC: 24.6 % — LOW (ref 34.5–45)
HCT VFR BLD CALC: 26.5 % — LOW (ref 34.5–45)
HCT VFR BLD CALC: 27.7 %
HGB BLD-MCNC: 8.3 G/DL — LOW (ref 11.5–15.5)
HGB BLD-MCNC: 8.8 G/DL
HGB BLD-MCNC: 8.8 G/DL — LOW (ref 11.5–15.5)
IMMATURE PLATELET FRACTION #: 2 K/UL — LOW (ref 4.7–11.1)
IMMATURE PLATELET FRACTION #: 3.2 K/UL — LOW (ref 4.7–11.1)
IMMATURE PLATELET FRACTION %: 3.3 % — SIGNIFICANT CHANGE UP (ref 1.6–4.9)
IMMATURE PLATELET FRACTION %: 4.5 % — SIGNIFICANT CHANGE UP (ref 1.6–4.9)
INR BLD: 1.07 RATIO — SIGNIFICANT CHANGE UP (ref 0.85–1.16)
LYMPHOCYTES # BLD AUTO: 0.24 K/UL
LYMPHOCYTES NFR BLD AUTO: 10.2 %
MAGNESIUM SERPL-MCNC: 1.9 MG/DL
MAGNESIUM SERPL-MCNC: 2 MG/DL — SIGNIFICANT CHANGE UP (ref 1.6–2.6)
MAN DIFF?: NORMAL
MCHC RBC-ENTMCNC: 29.7 PG — SIGNIFICANT CHANGE UP (ref 27–34)
MCHC RBC-ENTMCNC: 30.1 PG — SIGNIFICANT CHANGE UP (ref 27–34)
MCHC RBC-ENTMCNC: 31.8 G/DL
MCHC RBC-ENTMCNC: 33.1 PG
MCHC RBC-ENTMCNC: 33.2 G/DL — SIGNIFICANT CHANGE UP (ref 32–36)
MCHC RBC-ENTMCNC: 33.7 G/DL — SIGNIFICANT CHANGE UP (ref 32–36)
MCV RBC AUTO: 104.1 FL
MCV RBC AUTO: 89.1 FL — SIGNIFICANT CHANGE UP (ref 80–100)
MCV RBC AUTO: 89.5 FL — SIGNIFICANT CHANGE UP (ref 80–100)
MONOCYTES # BLD AUTO: 0.16 K/UL
MONOCYTES NFR BLD AUTO: 6.5 %
NEUTROPHILS # BLD AUTO: 1.9 K/UL
NEUTROPHILS NFR BLD AUTO: 79.6 %
NRBC # BLD AUTO: 0 K/UL — SIGNIFICANT CHANGE UP (ref 0–0)
NRBC # BLD AUTO: 0 K/UL — SIGNIFICANT CHANGE UP (ref 0–0)
NRBC # FLD: 0 K/UL — SIGNIFICANT CHANGE UP (ref 0–0)
NRBC # FLD: 0 K/UL — SIGNIFICANT CHANGE UP (ref 0–0)
NRBC BLD AUTO-RTO: 0 /100 WBCS — SIGNIFICANT CHANGE UP (ref 0–0)
NRBC BLD AUTO-RTO: 0 /100 WBCS — SIGNIFICANT CHANGE UP (ref 0–0)
PHOSPHATE SERPL-MCNC: 3.1 MG/DL — SIGNIFICANT CHANGE UP (ref 2.5–4.5)
PHOSPHATE SERPL-MCNC: 3.9 MG/DL
PLATELET # BLD AUTO: 140 K/UL
PLATELET # BLD AUTO: 60 K/UL — LOW (ref 150–400)
PLATELET # BLD AUTO: 72 K/UL — LOW (ref 150–400)
PMV BLD: 11.2 FL — SIGNIFICANT CHANGE UP (ref 7–13)
PMV BLD: 11.5 FL — SIGNIFICANT CHANGE UP (ref 7–13)
POTASSIUM SERPL-MCNC: 4.6 MMOL/L — SIGNIFICANT CHANGE UP (ref 3.5–5.3)
POTASSIUM SERPL-SCNC: 4.4 MMOL/L
POTASSIUM SERPL-SCNC: 4.6 MMOL/L — SIGNIFICANT CHANGE UP (ref 3.5–5.3)
PROT SERPL-MCNC: 4.2 G/DL — LOW (ref 6–8.3)
PROT SERPL-MCNC: 5.9 G/DL
PROTHROM AB SERPL-ACNC: 12.3 SEC — SIGNIFICANT CHANGE UP (ref 9.9–13.4)
RBC # BLD: 2.66 M/UL
RBC # BLD: 2.76 M/UL — LOW (ref 3.8–5.2)
RBC # BLD: 2.96 M/UL — LOW (ref 3.8–5.2)
RBC # FLD: 19.1 % — HIGH (ref 10.3–14.5)
RBC # FLD: 19.5 % — HIGH (ref 10.3–14.5)
RBC # FLD: NORMAL
SODIUM SERPL-SCNC: 140 MMOL/L — SIGNIFICANT CHANGE UP (ref 135–145)
SODIUM SERPL-SCNC: 142 MMOL/L
WBC # BLD: 1.74 K/UL — LOW (ref 3.8–10.5)
WBC # BLD: 2.37 K/UL — LOW (ref 3.8–10.5)
WBC # FLD AUTO: 1.74 K/UL — LOW (ref 3.8–10.5)
WBC # FLD AUTO: 2.37 K/UL — LOW (ref 3.8–10.5)
WBC # FLD AUTO: 2.39 K/UL

## 2025-07-08 PROCEDURE — 99232 SBSQ HOSP IP/OBS MODERATE 35: CPT | Mod: 24

## 2025-07-08 RX ORDER — SUMATRIPTAN 100 MG/1
25 TABLET, FILM COATED ORAL ONCE
Refills: 0 | Status: COMPLETED | OUTPATIENT
Start: 2025-07-08 | End: 2025-07-08

## 2025-07-08 RX ORDER — ACETAMINOPHEN 500 MG/5ML
1000 LIQUID (ML) ORAL ONCE
Refills: 0 | Status: COMPLETED | OUTPATIENT
Start: 2025-07-08 | End: 2025-07-08

## 2025-07-08 RX ADMIN — LEVETIRACETAM 500 MILLIGRAM(S): 10 INJECTION, SOLUTION INTRAVENOUS at 17:38

## 2025-07-08 RX ADMIN — LEVETIRACETAM 500 MILLIGRAM(S): 10 INJECTION, SOLUTION INTRAVENOUS at 06:02

## 2025-07-08 RX ADMIN — Medication 40 MILLIGRAM(S): at 17:36

## 2025-07-08 RX ADMIN — Medication 1 APPLICATION(S): at 06:01

## 2025-07-08 RX ADMIN — Medication 1 TABLET(S): at 06:02

## 2025-07-08 RX ADMIN — Medication 400 MILLIGRAM(S): at 01:21

## 2025-07-08 RX ADMIN — SUMATRIPTAN 25 MILLIGRAM(S): 100 TABLET, FILM COATED ORAL at 21:22

## 2025-07-08 RX ADMIN — Medication 1 TABLET(S): at 17:36

## 2025-07-08 RX ADMIN — MYCOPHENOLATE MOFETIL 500 MILLIGRAM(S): 500 TABLET, FILM COATED ORAL at 20:22

## 2025-07-08 RX ADMIN — Medication 1000 MILLIGRAM(S): at 02:06

## 2025-07-08 RX ADMIN — CYCLOSPORINE 75 MILLIGRAM(S): 100 CAPSULE, LIQUID FILLED ORAL at 20:21

## 2025-07-08 RX ADMIN — INSULIN LISPRO 6: 100 INJECTION, SOLUTION INTRAVENOUS; SUBCUTANEOUS at 11:48

## 2025-07-08 RX ADMIN — INSULIN GLARGINE-YFGN 5 UNIT(S): 100 INJECTION, SOLUTION SUBCUTANEOUS at 21:30

## 2025-07-08 RX ADMIN — SUMATRIPTAN 25 MILLIGRAM(S): 100 TABLET, FILM COATED ORAL at 00:30

## 2025-07-08 RX ADMIN — SUMATRIPTAN 25 MILLIGRAM(S): 100 TABLET, FILM COATED ORAL at 20:22

## 2025-07-08 RX ADMIN — Medication 40 MILLIGRAM(S): at 06:02

## 2025-07-08 RX ADMIN — INSULIN LISPRO 2: 100 INJECTION, SOLUTION INTRAVENOUS; SUBCUTANEOUS at 09:04

## 2025-07-08 RX ADMIN — Medication 1 TABLET(S): at 11:45

## 2025-07-08 RX ADMIN — PREDNISONE 5 MILLIGRAM(S): 20 TABLET ORAL at 06:02

## 2025-07-08 RX ADMIN — MYCOPHENOLATE MOFETIL 500 MILLIGRAM(S): 500 TABLET, FILM COATED ORAL at 07:34

## 2025-07-08 RX ADMIN — CYCLOSPORINE 75 MILLIGRAM(S): 100 CAPSULE, LIQUID FILLED ORAL at 07:34

## 2025-07-08 NOTE — PROGRESS NOTE ADULT - SUBJECTIVE AND OBJECTIVE BOX
Transplant Surgery - Multidisciplinary Rounds  --------------------------------------------------------------    Present:   Patient seen and examined with multidisciplinary Transplant team including Surgeon Dr. Valdez, Hepatologist Dr. Prado, Surgical fellow Dr. Rowan, GUDELIA Peraza/David,  and bedside RN during AM rounds.   Disciplines not in attendance will be notified of the plan.     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    Interval Events:  - afebrile VSS  - hgb 6.3 s/p 2u PRBC, Erythro 250IV x1, PPI bid  - s/p repeat EGD: active bleeding from residual arteriole near clip, treated with Bicap cautery and Purastat with hemostasis    Immunosuppression:  Induction: Simulect  Maintenance: cyclo by level/ BID, pred 5  Ongoing monitoring for signs of rejection     Potential Discharge date: TBD  Education:  Medications  Plan of care:  See Below    tx data here     ROS negative unless otherwise noted        PHYSICAL EXAM:  Constitutional: Well developed / well nourished  Eyes:  PERRLA  ENMT: nc/at, no thrush  Neck: supple,   Respiratory: CLA   Cardiovascular: RRR  Gastrointestinal: Soft abdomen, ND, appropriate incisional TTP  Genitourinary: Urinary catheter in place  Extremities: SCD's in place and working bilaterally  Vascular: Palpable dp pulses bilaterally.   Neurological: Awake and alert   Skin: no rashes, ulcerations, lesions  Musculoskeletal: Moving all extremities    Transplant Surgery - Multidisciplinary Rounds  --------------------------------------------------------------    Present:   Patient seen and examined with multidisciplinary Transplant team including Surgeon Dr. Valdez, Hepatologist Dr. Prado, Surgical fellow Dr. Rowan, GUDELIA Peraza/David,  and bedside RN during AM rounds.   Disciplines not in attendance will be notified of the plan.     HPI: 71 YO female with PMH MASH/HCC s/p OLT 25, GIB (most recent EGD 2025 with angioectasias and no active bleeding), chronic back pain 2/2 spinal fx, presents from OSH on 25 for near syncope, weakness and dark stools, transferred to [facility] transplant service for GIB/melena, then transferred to SICU for planned EGD and hemodynamic monitoring.    Interval Events:  - afebrile VSS  - hgb 6.3 s/p 2u PRBC, Erythro 250IV x1, PPI bid  - s/p repeat EGD: active bleeding from residual arteriole near clip, treated with Bicap cautery and Purastat with hemostasis    Immunosuppression:  Maintenance: cyclo by level/ BID, pred 5  Ongoing monitoring for signs of rejection     Potential Discharge date: TBD  Education:  Medications  Plan of care:  See Below      MEDICATIONS  (STANDING):  albuterol/ipratropium for Nebulization 3 milliLiter(s) Nebulizer every 6 hours  budesonide    Inhalation Suspension 0.5 milliGRAM(s) Inhalation every 12 hours  calcium carbonate 1250 mG  + Vitamin D (OsCal 500 + D) 1 Tablet(s) Oral two times a day  chlorhexidine 2% Cloths 1 Application(s) Topical <User Schedule>  cycloSPORINE  , modified (GENGRAF) 75 milliGRAM(s) Oral <User Schedule>  insulin glargine Injectable (LANTUS) 5 Unit(s) SubCutaneous at bedtime  insulin lispro (ADMELOG) corrective regimen sliding scale   SubCutaneous three times a day before meals  levETIRAcetam 500 milliGRAM(s) Oral every 12 hours  mycophenolate mofetil 500 milliGRAM(s) Oral <User Schedule>  pantoprazole    Tablet 40 milliGRAM(s) Oral two times a day  predniSONE   Tablet 5 milliGRAM(s) Oral daily  trimethoprim   80 mG/sulfamethoxazole 400 mG 1 Tablet(s) Oral daily  valGANciclovir 450 milliGRAM(s) Oral <User Schedule>      PAST MEDICAL & SURGICAL HISTORY:  HCC (hepatocellular carcinoma)  DM (diabetes mellitus)  HTN (hypertension)  HLD (hyperlipidemia)  Hepatic cirrhosis  IVEY (nonalcoholic steatohepatitis)  Former smoker  Ascites  Hepatic encephalopathy  History of cervical cancer  HCC (hepatocellular carcinoma)  Hepatic cirrhosis  HLD (hyperlipidemia)  HTN (hypertension)  Type 2 diabetes mellitus  History of ascites  Nonalcoholic fatty liver disease without nonalcoholic steatohepatitis (IVEY)  COPD (chronic obstructive pulmonary disease)  H/O  section  History of cholecystectomy  H/O carpal tunnel repair  H/O cataract extraction  H/O carpal tunnel repair  H/O  section  H/O cataract extraction  History of cholecystectomy    Vital Signs Last 24 Hrs  T(C): 37 (2025 13:00), Max: 37.3 (2025 21:05)  T(F): 98.6 (2025 13:00), Max: 99.2 (2025 21:05)  HR: 61 (2025 14:07) (55 - 82)  BP: 163/69 (2025 14:07) (116/68 - 177/72)  BP(mean): 97 (2025 05:57) (89 - 103)  RR: 18 (2025 13:00) (14 - 18)  SpO2: 97% (2025 14:07) (94% - 100%)    Parameters below as of 2025 14:07  Patient On (Oxygen Delivery Method): room air      I&O's Summary    2025 07:01  -  2025 07:00  --------------------------------------------------------  IN: 1250 mL / OUT: 1330 mL / NET: -80 mL    2025 07:01  -  2025 16:06  --------------------------------------------------------  IN: 360 mL / OUT: 305 mL / NET: 55 mL                       8.8    2.37  )-----------( 72       ( 2025 15:15 )             26.5     07-08    140  |  111[H]  |  31[H]  ----------------------------<  177[H]  4.6   |  17[L]  |  1.26    Ca    7.7[L]      2025 06:34  Phos  3.1     07-08  Mg     2.0     -08    TPro  4.2[L]  /  Alb  3.0[L]  /  TBili  0.8  /  DBili  x   /  AST  15  /  ALT  6[L]  /  AlkPhos  50  07-08    Culture - Urine (collected 25 @ 06:49)  Source: Clean Catch Clean Catch (Midstream)  Final Report (25 @ 12:38):    10,000 - 49,000 CFU/mL Enterobacter cloacae complex    <10,000 CFU/ml Normal Urogenital joanne present  Organism: Enterobacter cloacae complex (25 @ 12:38)  Organism: Enterobacter cloacae complex (25 @ 12:38)    Culture - Blood (collected 25 @ 06:19)  Source: Blood Blood-Peripheral  Preliminary Report (25 @ 11:01):    No growth at 72 Hours    Culture - Blood (collected 25 @ 06:02)  Source: Blood Blood-Peripheral  Preliminary Report (25 @ 11:01):    No growth at 72 Hours    ROS negative unless otherwise noted        PHYSICAL EXAM:  Constitutional: Well developed / well nourished  Eyes:  PERRLA  ENMT: nc/at, no thrush  Neck: supple,   Respiratory: CLA   Cardiovascular: RRR  Gastrointestinal: Soft abdomen, ND, appropriate incisional TTP  Genitourinary: Urinary catheter in place  Extremities: SCD's in place and working bilaterally  Vascular: Palpable dp pulses bilaterally.   Neurological: Awake and alert   Skin: no rashes, ulcerations, lesions  Musculoskeletal: Moving all extremities

## 2025-07-08 NOTE — PROGRESS NOTE ADULT - ASSESSMENT
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    [] Anemia  [] UGIB  - Iron studies: low TIBC, high iron, high sat  - RRT 7/5 -> tx to sicu, intubated  - s/p EGD (7/5) large blood (>1L), active bleeding from ?AVM mid-gastric body; clipped   - s/p EGD 7/6   no active bleed cauterized,  - s/p EGD 7/7 - active bleeding from residual arteriole clip, tx. 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    [] Anemia  [] UGIB  - Iron studies: low TIBC, high iron, high sat  - RRT 7/5 -> tx to sicu, intubated  - s/p EGD (7/5) large blood (>1L), active bleeding from ?AVM mid-gastric body; clipped   - s/p EGD 7/6   no active bleed cauterized,  - s/p EGD 7/7 - active bleeding from residual arteriole clip, tx.   - CBC stable  - Adv diet to reg  - Protonix PO BID    [] h/o OLT  - HBV + donor: Entecavir 0.5 QD  - CMV +/- on Valcyte 900daily  - Cyclo by level,  BID, pred 5    [] DVT PPX  - SCD  - holding ASA 2/2 anemia

## 2025-07-08 NOTE — PROGRESS NOTE ADULT - NS ATTEND AMEND GEN_ALL_CORE FT
As above  70F s/p OLT 5/31/25 with good liver function now readmitted with acute blood loss anemia, active GI bleed from gastric vessel  s/p EGD with clipping and cauterizing x2  Hct stable today after repeat egd yesterday. advance diet as tolerated  PPI bid  Immunosuppression - Cyclosporine by level, Pred 5, MMF 500mg bid  Prophylaxis with Entecavir and valcyte

## 2025-07-09 ENCOUNTER — APPOINTMENT (OUTPATIENT)
Dept: GASTROENTEROLOGY | Facility: CLINIC | Age: 70
End: 2025-07-09

## 2025-07-09 ENCOUNTER — TRANSCRIPTION ENCOUNTER (OUTPATIENT)
Age: 70
End: 2025-07-09

## 2025-07-09 VITALS
TEMPERATURE: 98 F | OXYGEN SATURATION: 99 % | SYSTOLIC BLOOD PRESSURE: 160 MMHG | HEART RATE: 65 BPM | DIASTOLIC BLOOD PRESSURE: 70 MMHG | RESPIRATION RATE: 18 BRPM

## 2025-07-09 LAB
ALBUMIN SERPL ELPH-MCNC: 3.3 G/DL — SIGNIFICANT CHANGE UP (ref 3.3–5)
ALP SERPL-CCNC: 59 U/L — SIGNIFICANT CHANGE UP (ref 40–120)
ALT FLD-CCNC: <5 U/L — LOW (ref 10–45)
ANION GAP SERPL CALC-SCNC: 11 MMOL/L — SIGNIFICANT CHANGE UP (ref 5–17)
APTT BLD: 28.5 SEC — SIGNIFICANT CHANGE UP (ref 26.1–36.8)
AST SERPL-CCNC: 13 U/L — SIGNIFICANT CHANGE UP (ref 10–40)
BILIRUB SERPL-MCNC: 0.9 MG/DL — SIGNIFICANT CHANGE UP (ref 0.2–1.2)
BLD GP AB SCN SERPL QL: NEGATIVE — SIGNIFICANT CHANGE UP
BUN SERPL-MCNC: 21 MG/DL — SIGNIFICANT CHANGE UP (ref 7–23)
CALCIUM SERPL-MCNC: 8 MG/DL — LOW (ref 8.4–10.5)
CHLORIDE SERPL-SCNC: 108 MMOL/L — SIGNIFICANT CHANGE UP (ref 96–108)
CO2 SERPL-SCNC: 19 MMOL/L — LOW (ref 22–31)
CREAT SERPL-MCNC: 1.26 MG/DL — SIGNIFICANT CHANGE UP (ref 0.5–1.3)
CYCLOSPORINE SER-MCNC: 93 NG/ML — LOW (ref 150–400)
EGFR: 46 ML/MIN/1.73M2 — LOW
EGFR: 46 ML/MIN/1.73M2 — LOW
GAS PNL BLDV: SIGNIFICANT CHANGE UP
GLUCOSE BLDC GLUCOMTR-MCNC: 103 MG/DL — HIGH (ref 70–99)
GLUCOSE BLDC GLUCOMTR-MCNC: 149 MG/DL — HIGH (ref 70–99)
GLUCOSE SERPL-MCNC: 105 MG/DL — HIGH (ref 70–99)
HCT VFR BLD CALC: 30.8 % — LOW (ref 34.5–45)
HGB BLD-MCNC: 10.3 G/DL — LOW (ref 11.5–15.5)
IMMATURE PLATELET FRACTION #: 3 K/UL — LOW (ref 4.7–11.1)
IMMATURE PLATELET FRACTION %: 3.8 % — SIGNIFICANT CHANGE UP (ref 1.6–4.9)
INR BLD: 1.06 RATIO — SIGNIFICANT CHANGE UP (ref 0.85–1.16)
MAGNESIUM SERPL-MCNC: 1.7 MG/DL — SIGNIFICANT CHANGE UP (ref 1.6–2.6)
MCHC RBC-ENTMCNC: 30.3 PG — SIGNIFICANT CHANGE UP (ref 27–34)
MCHC RBC-ENTMCNC: 33.4 G/DL — SIGNIFICANT CHANGE UP (ref 32–36)
MCV RBC AUTO: 90.6 FL — SIGNIFICANT CHANGE UP (ref 80–100)
NRBC # BLD AUTO: 0 K/UL — SIGNIFICANT CHANGE UP (ref 0–0)
NRBC # FLD: 0 K/UL — SIGNIFICANT CHANGE UP (ref 0–0)
NRBC BLD AUTO-RTO: 0 /100 WBCS — SIGNIFICANT CHANGE UP (ref 0–0)
PETH 16:0/18:1: NEGATIVE NG/ML — SIGNIFICANT CHANGE UP
PETH 16:0/18:2: NEGATIVE NG/ML — SIGNIFICANT CHANGE UP
PETH COMMENTS: SIGNIFICANT CHANGE UP
PHOSPHATE SERPL-MCNC: 1.5 MG/DL — LOW (ref 2.5–4.5)
PLATELET # BLD AUTO: 79 K/UL — LOW (ref 150–400)
PMV BLD: 10.7 FL — SIGNIFICANT CHANGE UP (ref 7–13)
POTASSIUM SERPL-MCNC: 4.2 MMOL/L — SIGNIFICANT CHANGE UP (ref 3.5–5.3)
POTASSIUM SERPL-SCNC: 4.2 MMOL/L — SIGNIFICANT CHANGE UP (ref 3.5–5.3)
PROT SERPL-MCNC: 4.8 G/DL — LOW (ref 6–8.3)
PROTHROM AB SERPL-ACNC: 12.1 SEC — SIGNIFICANT CHANGE UP (ref 9.9–13.4)
RBC # BLD: 3.4 M/UL — LOW (ref 3.8–5.2)
RBC # FLD: 20.6 % — HIGH (ref 10.3–14.5)
RH IG SCN BLD-IMP: POSITIVE — SIGNIFICANT CHANGE UP
SODIUM SERPL-SCNC: 138 MMOL/L — SIGNIFICANT CHANGE UP (ref 135–145)
WBC # BLD: 2.31 K/UL — LOW (ref 3.8–10.5)
WBC # FLD AUTO: 2.31 K/UL — LOW (ref 3.8–10.5)

## 2025-07-09 PROCEDURE — 86900 BLOOD TYPING SEROLOGIC ABO: CPT

## 2025-07-09 PROCEDURE — 85027 COMPLETE CBC AUTOMATED: CPT

## 2025-07-09 PROCEDURE — 81001 URINALYSIS AUTO W/SCOPE: CPT

## 2025-07-09 PROCEDURE — 85014 HEMATOCRIT: CPT

## 2025-07-09 PROCEDURE — 83550 IRON BINDING TEST: CPT

## 2025-07-09 PROCEDURE — 86965 POOLING BLOOD PLATELETS: CPT

## 2025-07-09 PROCEDURE — 86901 BLOOD TYPING SEROLOGIC RH(D): CPT

## 2025-07-09 PROCEDURE — P9012: CPT

## 2025-07-09 PROCEDURE — 87040 BLOOD CULTURE FOR BACTERIA: CPT

## 2025-07-09 PROCEDURE — 82272 OCCULT BLD FECES 1-3 TESTS: CPT

## 2025-07-09 PROCEDURE — 83735 ASSAY OF MAGNESIUM: CPT

## 2025-07-09 PROCEDURE — 87086 URINE CULTURE/COLONY COUNT: CPT

## 2025-07-09 PROCEDURE — C1889: CPT

## 2025-07-09 PROCEDURE — 71045 X-RAY EXAM CHEST 1 VIEW: CPT

## 2025-07-09 PROCEDURE — 82248 BILIRUBIN DIRECT: CPT

## 2025-07-09 PROCEDURE — 84100 ASSAY OF PHOSPHORUS: CPT

## 2025-07-09 PROCEDURE — 82330 ASSAY OF CALCIUM: CPT

## 2025-07-09 PROCEDURE — 76705 ECHO EXAM OF ABDOMEN: CPT

## 2025-07-09 PROCEDURE — 94640 AIRWAY INHALATION TREATMENT: CPT

## 2025-07-09 PROCEDURE — 80053 COMPREHEN METABOLIC PANEL: CPT

## 2025-07-09 PROCEDURE — 94002 VENT MGMT INPAT INIT DAY: CPT

## 2025-07-09 PROCEDURE — 83540 ASSAY OF IRON: CPT

## 2025-07-09 PROCEDURE — 94003 VENT MGMT INPAT SUBQ DAY: CPT

## 2025-07-09 PROCEDURE — 85384 FIBRINOGEN ACTIVITY: CPT

## 2025-07-09 PROCEDURE — 80158 DRUG ASSAY CYCLOSPORINE: CPT

## 2025-07-09 PROCEDURE — 82247 BILIRUBIN TOTAL: CPT

## 2025-07-09 PROCEDURE — 36415 COLL VENOUS BLD VENIPUNCTURE: CPT

## 2025-07-09 PROCEDURE — 85018 HEMOGLOBIN: CPT

## 2025-07-09 PROCEDURE — 87799 DETECT AGENT NOS DNA QUANT: CPT

## 2025-07-09 PROCEDURE — 85396 CLOTTING ASSAY WHOLE BLOOD: CPT

## 2025-07-09 PROCEDURE — 97165 OT EVAL LOW COMPLEX 30 MIN: CPT

## 2025-07-09 PROCEDURE — 82947 ASSAY GLUCOSE BLOOD QUANT: CPT

## 2025-07-09 PROCEDURE — 85610 PROTHROMBIN TIME: CPT

## 2025-07-09 PROCEDURE — 84295 ASSAY OF SERUM SODIUM: CPT

## 2025-07-09 PROCEDURE — 93975 VASCULAR STUDY: CPT

## 2025-07-09 PROCEDURE — 84132 ASSAY OF SERUM POTASSIUM: CPT

## 2025-07-09 PROCEDURE — 87077 CULTURE AEROBIC IDENTIFY: CPT

## 2025-07-09 PROCEDURE — G0480: CPT

## 2025-07-09 PROCEDURE — 85730 THROMBOPLASTIN TIME PARTIAL: CPT

## 2025-07-09 PROCEDURE — 36430 TRANSFUSION BLD/BLD COMPNT: CPT

## 2025-07-09 PROCEDURE — 97161 PT EVAL LOW COMPLEX 20 MIN: CPT

## 2025-07-09 PROCEDURE — 82803 BLOOD GASES ANY COMBINATION: CPT

## 2025-07-09 PROCEDURE — 83605 ASSAY OF LACTIC ACID: CPT

## 2025-07-09 PROCEDURE — 83010 ASSAY OF HAPTOGLOBIN QUANT: CPT

## 2025-07-09 PROCEDURE — 86923 COMPATIBILITY TEST ELECTRIC: CPT

## 2025-07-09 PROCEDURE — 86850 RBC ANTIBODY SCREEN: CPT

## 2025-07-09 PROCEDURE — 82607 VITAMIN B-12: CPT

## 2025-07-09 PROCEDURE — 83615 LACTATE (LD) (LDH) ENZYME: CPT

## 2025-07-09 PROCEDURE — 85025 COMPLETE CBC W/AUTO DIFF WBC: CPT

## 2025-07-09 PROCEDURE — P9040: CPT

## 2025-07-09 PROCEDURE — 82746 ASSAY OF FOLIC ACID SERUM: CPT

## 2025-07-09 PROCEDURE — 87186 SC STD MICRODIL/AGAR DIL: CPT

## 2025-07-09 PROCEDURE — 99232 SBSQ HOSP IP/OBS MODERATE 35: CPT | Mod: 24

## 2025-07-09 PROCEDURE — 82962 GLUCOSE BLOOD TEST: CPT

## 2025-07-09 PROCEDURE — 82435 ASSAY OF BLOOD CHLORIDE: CPT

## 2025-07-09 RX ORDER — SOD PHOS DI, MONO/K PHOS MONO 250 MG
1 TABLET ORAL ONCE
Refills: 0 | Status: COMPLETED | OUTPATIENT
Start: 2025-07-09 | End: 2025-07-09

## 2025-07-09 RX ORDER — PREDNISONE 20 MG/1
1 TABLET ORAL
Qty: 0 | Refills: 0 | DISCHARGE
Start: 2025-07-09

## 2025-07-09 RX ORDER — SODIUM PHOSPHATE,DIBASIC DIHYD
30 POWDER (GRAM) MISCELLANEOUS ONCE
Refills: 0 | Status: COMPLETED | OUTPATIENT
Start: 2025-07-09 | End: 2025-07-09

## 2025-07-09 RX ORDER — B1/B2/B3/B5/B6/B12/VIT C/FOLIC 500-0.5 MG
1 TABLET ORAL DAILY
Refills: 0 | Status: DISCONTINUED | OUTPATIENT
Start: 2025-07-09 | End: 2025-07-09

## 2025-07-09 RX ORDER — B1/B2/B3/B5/B6/B12/VIT C/FOLIC 500-0.5 MG
1 TABLET ORAL
Qty: 30 | Refills: 0
Start: 2025-07-09 | End: 2025-08-07

## 2025-07-09 RX ORDER — CYCLOSPORINE 100 MG/1
3 CAPSULE, LIQUID FILLED ORAL
Qty: 0 | Refills: 0 | DISCHARGE
Start: 2025-07-09

## 2025-07-09 RX ORDER — MAGNESIUM OXIDE 400 MG
2 TABLET ORAL
Qty: 120 | Refills: 0
Start: 2025-07-09 | End: 2025-08-07

## 2025-07-09 RX ORDER — NIFEDIPINE 30 MG
30 TABLET, EXTENDED RELEASE 24 HR ORAL DAILY
Refills: 0 | Status: DISCONTINUED | OUTPATIENT
Start: 2025-07-09 | End: 2025-07-09

## 2025-07-09 RX ORDER — MELATONIN 5 MG
3 TABLET ORAL ONCE
Refills: 0 | Status: COMPLETED | OUTPATIENT
Start: 2025-07-09 | End: 2025-07-09

## 2025-07-09 RX ORDER — MAGNESIUM OXIDE 400 MG
800 TABLET ORAL
Refills: 0 | Status: DISCONTINUED | OUTPATIENT
Start: 2025-07-09 | End: 2025-07-09

## 2025-07-09 RX ORDER — MAGNESIUM SULFATE 500 MG/ML
2 SYRINGE (ML) INJECTION ONCE
Refills: 0 | Status: COMPLETED | OUTPATIENT
Start: 2025-07-09 | End: 2025-07-09

## 2025-07-09 RX ORDER — SUMATRIPTAN 100 MG/1
25 TABLET, FILM COATED ORAL ONCE
Refills: 0 | Status: COMPLETED | OUTPATIENT
Start: 2025-07-09 | End: 2025-07-09

## 2025-07-09 RX ORDER — CYCLOSPORINE 100 MG/1
1 CAPSULE, LIQUID FILLED ORAL
Qty: 60 | Refills: 0
Start: 2025-07-09 | End: 2025-08-07

## 2025-07-09 RX ORDER — ACETAMINOPHEN 500 MG/5ML
2 LIQUID (ML) ORAL
Qty: 0 | Refills: 0 | DISCHARGE

## 2025-07-09 RX ORDER — CYCLOSPORINE 100 MG/1
25 CAPSULE, LIQUID FILLED ORAL ONCE
Refills: 0 | Status: COMPLETED | OUTPATIENT
Start: 2025-07-09 | End: 2025-07-09

## 2025-07-09 RX ADMIN — MYCOPHENOLATE MOFETIL 500 MILLIGRAM(S): 500 TABLET, FILM COATED ORAL at 08:40

## 2025-07-09 RX ADMIN — Medication 1 TABLET(S): at 06:03

## 2025-07-09 RX ADMIN — CYCLOSPORINE 75 MILLIGRAM(S): 100 CAPSULE, LIQUID FILLED ORAL at 08:39

## 2025-07-09 RX ADMIN — Medication 30 MILLIGRAM(S): at 12:00

## 2025-07-09 RX ADMIN — Medication 1 PACKET(S): at 08:43

## 2025-07-09 RX ADMIN — LEVETIRACETAM 500 MILLIGRAM(S): 10 INJECTION, SOLUTION INTRAVENOUS at 06:03

## 2025-07-09 RX ADMIN — Medication 1 TABLET(S): at 12:00

## 2025-07-09 RX ADMIN — ENTECAVIR 0.5 MILLIGRAM(S): 0.5 TABLET ORAL at 08:43

## 2025-07-09 RX ADMIN — PREDNISONE 5 MILLIGRAM(S): 20 TABLET ORAL at 06:03

## 2025-07-09 RX ADMIN — VALGANCICLOVIR 450 MILLIGRAM(S): 450 TABLET, FILM COATED ORAL at 08:40

## 2025-07-09 RX ADMIN — Medication 85 MILLIMOLE(S): at 12:00

## 2025-07-09 RX ADMIN — Medication 3 MILLIGRAM(S): at 00:41

## 2025-07-09 RX ADMIN — CYCLOSPORINE 25 MILLIGRAM(S): 100 CAPSULE, LIQUID FILLED ORAL at 12:00

## 2025-07-09 RX ADMIN — SUMATRIPTAN 25 MILLIGRAM(S): 100 TABLET, FILM COATED ORAL at 14:01

## 2025-07-09 RX ADMIN — Medication 40 MILLIGRAM(S): at 06:03

## 2025-07-09 RX ADMIN — Medication 1 APPLICATION(S): at 06:04

## 2025-07-09 RX ADMIN — Medication 25 GRAM(S): at 08:43

## 2025-07-09 NOTE — PROGRESS NOTE ADULT - SUBJECTIVE AND OBJECTIVE BOX
Transplant Surgery - Multidisciplinary Rounds  --------------------------------------------------------------    Present:   Patient seen and examined with multidisciplinary Transplant team including Surgeon Dr. José Miguel Rowan, Hepatologist Dr. Prado, ROGERIO John/Brandi and bedside RN during AM rounds.   Disciplines not in attendance will be notified of the plan.     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    Interval Events:  -   - H/H ---  - diet advanced, d/c'd love     Immunosuppression:  Maintenance: cyclo by level/ BID, pred 5  Ongoing monitoring for signs of rejection     Potential Discharge date: TBD  Education:  Medications  Plan of care:  See Below          TX DATA      ROS negative unless otherwise noted        PHYSICAL EXAM:  Constitutional: Well developed / well nourished  Eyes:  PERRLA  ENMT: nc/at, no thrush  Neck: supple,   Respiratory: CLA   Cardiovascular: RRR  Gastrointestinal: Soft abdomen, ND, appropriate incisional TTP  Genitourinary: Urinary catheter in place  Extremities: SCD's in place and working bilaterally  Vascular: Palpable dp pulses bilaterally.   Neurological: Awake and alert   Skin: no rashes, ulcerations, lesions  Musculoskeletal: Moving all extremities    Transplant Surgery - Multidisciplinary Rounds  --------------------------------------------------------------    Present:   Patient seen and examined with multidisciplinary Transplant team including Surgeon Dr. José Miguel Rowan, Hepatologist Dr. Prado, ROGERIO John/Brandi and bedside RN during AM rounds.   Disciplines not in attendance will be notified of the plan.     HPI: 71 YO female with PMH MASH/HCC s/p OLT 25, GIB (most recent EGD 2025 with angioectasias and no active bleeding), chronic back pain 2/2 spinal fx, presents from OSH on 25 for near syncope, weakness and dark stools, transferred to [facility] transplant service for GIB/melena, then transferred to SICU for planned EGD and hemodynamic monitoring.    Interval Events:  - afebrile, vss  - H/H stable  - tolerating reg diet    Immunosuppression:  Maintenance: cyclo by level/ BID, pred 5  Ongoing monitoring for signs of rejection     Potential Discharge date: TBD  Education:  Medications  Plan of care:  See Below      MEDICATIONS  (STANDING):  albuterol/ipratropium for Nebulization 3 milliLiter(s) Nebulizer every 6 hours  budesonide    Inhalation Suspension 0.5 milliGRAM(s) Inhalation every 12 hours  calcium carbonate 1250 mG  + Vitamin D (OsCal 500 + D) 1 Tablet(s) Oral two times a day  chlorhexidine 2% Cloths 1 Application(s) Topical <User Schedule>  cycloSPORINE  , modified (GENGRAF) 75 milliGRAM(s) Oral <User Schedule>  entecavir 0.5 milliGRAM(s) Oral every 72 hours  insulin glargine Injectable (LANTUS) 5 Unit(s) SubCutaneous at bedtime  insulin lispro (ADMELOG) corrective regimen sliding scale   SubCutaneous three times a day before meals  levETIRAcetam 500 milliGRAM(s) Oral every 12 hours  magnesium oxide 800 milliGRAM(s) Oral two times a day with meals  mycophenolate mofetil 500 milliGRAM(s) Oral <User Schedule>  Nephro-lora 1 Tablet(s) Oral daily  NIFEdipine XL 30 milliGRAM(s) Oral daily  pantoprazole    Tablet 40 milliGRAM(s) Oral two times a day  predniSONE   Tablet 5 milliGRAM(s) Oral daily  trimethoprim   80 mG/sulfamethoxazole 400 mG 1 Tablet(s) Oral daily  valGANciclovir 450 milliGRAM(s) Oral <User Schedule>    MEDICATIONS  (PRN):      PAST MEDICAL & SURGICAL HISTORY:  HCC (hepatocellular carcinoma)      DM (diabetes mellitus)      HTN (hypertension)      HLD (hyperlipidemia)      Hepatic cirrhosis      IVEY (nonalcoholic steatohepatitis)      Former smoker      Ascites      Hepatic encephalopathy      History of cervical cancer      HCC (hepatocellular carcinoma)      Hepatic cirrhosis      HLD (hyperlipidemia)      HTN (hypertension)      Type 2 diabetes mellitus      History of ascites      Nonalcoholic fatty liver disease without nonalcoholic steatohepatitis (IVEY)      COPD (chronic obstructive pulmonary disease)      H/O  section      History of cholecystectomy      H/O carpal tunnel repair      H/O cataract extraction      H/O carpal tunnel repair      H/O  section      H/O cataract extraction      History of cholecystectomy          Vital Signs Last 24 Hrs  T(C): 36.8 (2025 12:32), Max: 37.2 (2025 00:12)  T(F): 98.3 (2025 12:32), Max: 98.9 (2025 00:12)  HR: 65 (2025 12:32) (60 - 65)  BP: 160/70 (2025 12:32) (145/65 - 164/73)  BP(mean): 100 (2025 12:32) (93 - 110)  RR: 18 (2025 12:32) (18 - 18)  SpO2: 99% (2025 12:32) (98% - 100%)    Parameters below as of 2025 12:32  Patient On (Oxygen Delivery Method): room air        I&O's Summary    2025 07:01  -  2025 07:00  --------------------------------------------------------  IN: 900 mL / OUT: 685 mL / NET: 215 mL    2025 07:01  -  2025 19:18  --------------------------------------------------------  IN: 400 mL / OUT: 150 mL / NET: 250 mL                              10.3   2.31  )-----------( 79       ( 2025 06:41 )             30.8         138  |  108  |  21  ----------------------------<  105[H]  4.2   |  19[L]  |  1.26    Ca    8.0[L]      2025 06:39  Phos  1.5       Mg     1.7         TPro  4.8[L]  /  Alb  3.3  /  TBili  0.9  /  DBili  x   /  AST  13  /  ALT  <5[L]  /  AlkPhos  59            Culture - Urine (collected 25 @ 06:49)  Source: Clean Catch Clean Catch (Midstream)  Final Report (25 @ 12:38):    10,000 - 49,000 CFU/mL Enterobacter cloacae complex    <10,000 CFU/ml Normal Urogenital joanne present  Organism: Enterobacter cloacae complex (25 @ 12:38)  Organism: Enterobacter cloacae complex (25 @ 12:38)    Culture - Blood (collected 25 @ 06:19)  Source: Blood Blood-Peripheral  Preliminary Report (25 @ 11:01):    No growth at 4 days    Culture - Blood (collected 25 @ 06:02)  Source: Blood Blood-Peripheral  Preliminary Report (25 @ 11:01):    No growth at 4 days        ROS negative unless otherwise noted        PHYSICAL EXAM:  Constitutional: Well developed / well nourished  Eyes:  PERRLA  ENMT: nc/at, no thrush  Neck: supple,   Respiratory: CLA   Cardiovascular: RRR  Gastrointestinal: Soft abdomen, ND, appropriate incisional TTP  Genitourinary: Urinary catheter in place  Extremities: SCD's in place and working bilaterally  Vascular: Palpable dp pulses bilaterally.   Neurological: Awake and alert   Skin: no rashes, ulcerations, lesions  Musculoskeletal: Moving all extremities

## 2025-07-09 NOTE — DISCHARGE NOTE NURSING/CASE MANAGEMENT/SOCIAL WORK - FINANCIAL ASSISTANCE
[facility] provides services at a reduced cost to those who are determined to be eligible through [facility]’s financial assistance program. Information regarding [facility]’s financial assistance program can be found by going to [email or web address] or by calling [phone].

## 2025-07-09 NOTE — DISCHARGE NOTE PROVIDER - NSDCFUADDAPPT_GEN_ALL_CORE_FT
Follow up 7/14 with Transplant Clinic  Follow up with neurology/headache specialist for chronic migraines

## 2025-07-09 NOTE — DISCHARGE NOTE NURSING/CASE MANAGEMENT/SOCIAL WORK - PATIENT PORTAL LINK FT
You can access the FollowMyHealth Patient Portal offered by [facility] by registering at the following website: [email or web address]. By joining [employer]’s FollowMyHealth portal, you will also be able to view your health information using other applications (apps) compatible with our system.

## 2025-07-09 NOTE — ADVANCED PRACTICE NURSE CONSULT - RECOMMEDATIONS
Impression        Urinary incontinence   Sacrum B/L Buttocks Stage II Pressure Inj , present on admission    B/L Heels Blanchable erythema, intact skin.                 Recommendations              1. Sacrum / B/L Buttocks - Stage II Pressure Injury.          Topical therapy- sacral/bilateral buttocks- Apply cavilon, cover with Allevyn foam border dressing. Frequency: Daily. Monitor for changes.           2. Incontinent management - incontinent cleanser, pads, donaldo care BID             3. Maintain on an alternating air with low air loss surface              4. Turn & reposition every 2 hr; Use positioning pillow to turn and reposition, soft pillow between bony prominences; continue measures to decrease friction/shear/pressure.             5. Nutrition optimization.            6. Place waffle cushion when out of bed to chair.          Impression        Urinary incontinence   Fecal incontinence  Sacrum B/L Buttocks Stage II Pressure Inj , present on admission    B/L Heels Blanchable erythema, intact skin.                 Recommendations              1. Sacrum / B/L Buttocks - Stage II Pressure Injury.          Topical therapy- sacral/bilateral buttocks- Apply cavilon, cover with Allevyn foam border dressing. Frequency: Daily. Monitor for changes.           2. Incontinent management - incontinent cleanser, pads, donaldo care BID             3. Maintain on an alternating air with low air loss surface              4. Turn & reposition every 2 hr; Use positioning pillow to turn and reposition, soft pillow between bony prominences; continue measures to decrease friction/shear/pressure.             5. Nutrition optimization.            6. Place waffle cushion when out of bed to chair.

## 2025-07-09 NOTE — DISCHARGE NOTE PROVIDER - NSDCFUSCHEDAPPT_GEN_ALL_CORE_FT
Myrtle Mazariegos  [facility] Physician [facility]  TRANSPLANT [address]   Scheduled Appointment: 07/14/2025    Megan Kinney  [facility] Physician [facility]  HEPATOLOGY [address]   Scheduled Appointment: 08/27/2025

## 2025-07-09 NOTE — ADVANCED PRACTICE NURSE CONSULT - ASSESSMENT
Arrived on [address] [place] on 7/9/25. Patient is awake, alert, and engaging in conversation. Introduced self and role as Wound RN. Patient expressed consent for consultation. Once consent was granted, consultation performed. Patient was found lying in a low air loss pressure redistribution support surface style bed.  Ms. Dudley was able to turn independently. Once turned, incontinence of stool was apparent, characterized by foul-smelling loose stool on the skin, wet gown, and donaldo care was provided. Once donaldo care was provided, I was able to view her skin. Urinary incontinence documented in patient chart. Patient with a Prima fit for urinary diversion.    Skin examination reveals a stage II pressure injury located on the sacrum [Left Buttock] present on admission.  The wound measures approximately 1.5 cm x 1.0 cm x 0.1 cm. The wound bed is shallow, pink and appears to be partial thickness. Surrounding skin: non-blanchable erythema. Bony prominence is palpable. There is small drainage, no foul odor, no purulent drainage. Applied Cavilon No-sting barrier wipe to form a gentle, breathable, waterproof, transparent coating on the skin, covered with ALLEVYN LIFE dressings help to absorb friction and shear within the dressing, minimizing impact to a patient's skin.     B/L Heels blanchable erythema, intact with no open ulcerations.      Patient, and RN were educated on the importance of turning and positioning every 2 hours. The use of waffle cushion when out of bed to chair, and to shift her weight every 2 hours while in chair. The importance of keeping her skin clean and dry and to offload feet/heels, and optimal nutrition.        When the consultation was completed, the patient was left in a right sided position, with side rails up, call bell within reach, and bed in lowest position. Discussed plan of care with Nina (RN).       Arrived on [address] [place] on 7/9/25. Patient is awake, alert, and engaging in conversation. Introduced self and role as Wound RN. Patient expressed consent for consultation. Once consent was granted, consultation performed. Patient was found lying in a low air loss pressure redistribution support surface style bed.  Ms. Dudley was able to turn independently. Once turned, incontinence of stool was apparent, characterized by foul-smelling loose stool on the skin, wet gown, and donaldo care was provided. Once donaldo care was provided, I was able to view her skin. Urinary incontinence documented in patient chart. Patient with a Prima fit for urinary diversion.    Skin examination reveals;  ·	 A stage II pressure injury located on the sacrum [Left Buttock] present on admission.  The wound measures approximately 1.5 cm x 1.0 cm x 0.1 cm. The wound bed is shallow, pink and appears to be partial thickness. Surrounding skin: non-blanchable erythema. Bony prominence is palpable. There is small drainage, no foul odor, no purulent drainage. Applied Cavilon No-sting barrier wipe to form a gentle, breathable, waterproof, transparent coating on the skin, covered with ALLEVYN LIFE dressings help to absorb friction and shear within the dressing, minimizing impact to a patient's skin.   ·	Patient's right arm was assessed for reported skin tear. No open wounds or breaks in the skin were observed. The skin is noted to be fragile, dry, and exhibits bruising. No active bleeding or drainage present. No wound care required at this time as no open wound is present. Recommend gentle skin care with moisturizing lotion specifically designed for fragile skin. Avoid harsh soaps and scrubbing.    B/L Heels blanchable erythema, intact with no open ulcerations.      Patient, and RN were educated on the importance of turning and positioning every 2 hours. The use of waffle cushion when out of bed to chair, and to shift her weight every 2 hours while in chair. The importance of keeping her skin clean and dry and to offload feet/heels, and optimal nutrition.        When the consultation was completed, the patient was left in a right sided position, with side rails up, call bell within reach, and bed in lowest position. Discussed plan of care with Nina BUTLER).       Arrived on [address] [place] on 7/9/25. Patient is awake, alert, and engaging in conversation. Introduced self and role as Wound RN. Patient expressed consent for consultation. Once consent was granted, consultation performed. Patient was found lying in a low air loss pressure redistribution support surface style bed.  Ms. Dudley was able to turn independently. Once turned, incontinence of stool was apparent, characterized by foul-smelling loose stool on the skin, wet gown, and donaldo care was provided. Once donaldo care was provided, I was able to view her skin. Urinary incontinence documented in patient chart. Patient with a Prima fit for urinary diversion.    Skin examination reveals;  ·	 A stage II pressure injury located on the sacrum [Left Buttock] present on admission.  The wound measures approximately 1.5 cm x 1.0 cm x 0.1 cm. The wound bed is shallow, pink and appears to be partial thickness. Surrounding skin: non-blanchable erythema. Bony prominence is palpable. There is small drainage, no foul odor, no purulent drainage. Applied Cavilon No-sting barrier wipe to form a gentle, breathable, waterproof, transparent coating on the skin, covered with ALLEVYN LIFE dressings help to absorb friction and shear within the dressing, minimizing impact to a patient's skin.   ·	Patient's right hand and arm were assessed for reported skin tear. No open wounds or breaks in the skin were observed. The skin is noted to be fragile, dry, and exhibits bruising. No active bleeding or drainage present. No wound care required at this time as no open wound is present. Recommend gentle skin care with moisturizing lotion specifically designed for fragile skin. Avoid harsh soaps and scrubbing.    B/L Heels blanchable erythema, intact with no open ulcerations.      Patient, and RN were educated on the importance of turning and positioning every 2 hours. The use of waffle cushion when out of bed to chair, and to shift her weight every 2 hours while in chair. The importance of keeping her skin clean and dry and to offload feet/heels, and optimal nutrition.        When the consultation was completed, the patient was left in a right sided position, with side rails up, call bell within reach, and bed in lowest position. Discussed plan of care with Nina (LAURI).

## 2025-07-09 NOTE — PROGRESS NOTE ADULT - NS ATTEND AMEND GEN_ALL_CORE FT
As above  70F s/p OLT 5/31/25 with good liver function now readmitted with acute blood loss anemia, active GI bleed from gastric vessel  s/p EGD with clipping and cauterizing x2  Hct stable   tolerating PO  Cont PPI bid x 4 weeks  Immunosuppression - Cyclosporine by level, Pred 5, MMF 500mg bid  Prophylaxis with Entecavir and valcyte  d/c home today, f/u in office next week

## 2025-07-09 NOTE — DISCHARGE NOTE PROVIDER - NSDCMRMEDTOKEN_GEN_ALL_CORE_FT
alendronate 70 mg oral tablet: 1 tab(s) orally once a week  aspirin 81 mg oral delayed release tablet: 1 tab(s) orally once a day  bisacodyl 10 mg rectal suppository: 1 suppository(ies) rectal once a day As needed Refractory Constipation  cycloSPORINE modified 25 mg oral capsule: 1 cap(s) orally 2 times a day  diphenhydrAMINE 25 mg oral capsule: 1 cap(s) orally every 8 hours as needed for  headache Take with Reglan 10mg for headache.  entecavir 0.5 mg oral tablet: 1 tab(s) orally once a day  insulin glargine 100 units/mL subcutaneous solution: 5 unit(s) subcutaneous once a day (at bedtime)  insulin lispro 100 units/mL injectable solution: injectable 3 times a day (before meals) DM per moderate Insulin Sliding Scale (ISS):  Please check your FS before each meal, and give insulin per ISS:  2 Unit(s) if Glucose 151 - 200  4 Unit(s) if Glucose 201 - 250  6 Unit(s) if Glucose 251 - 300  8 Unit(s) if Glucose 301 - 350  10 Unit(s) if Glucose 351 - 400  12 Unit(s) if Glucose Greater Than 400  insulin lispro 100 units/mL injectable solution: injectable once a day (at bedtime) Please check your FS at bedtime, and give insulin per ISS:  0 Unit(s) if Glucose 61 - 250  2 Unit(s) if Glucose 251 - 300  4 Unit(s) if Glucose 301 - 350  6 Unit(s) if Glucose 351 - 400  8 Unit(s) if Glucose Greater Than 400    Give correctional scale insulin  REGARDLESS of PO status NOTIFY Provider for blood glucose LESS THAN 70 milliGRAM(s)/deciLiter or GREATER THAN 400 milliGRAM(s)/deciLiter  insulin lispro 100 units/mL injectable solution: 8 unit(s) injectable once a day before breakfast  insulin lispro 100 units/mL injectable solution: 8 unit(s) injectable once a day before lunch  insulin lispro 100 units/mL injectable solution: 5 unit(s) injectable once a day before dinner  lactulose 10 g/15 mL oral syrup: 15 milliliter(s) orally once a day  levETIRAcetam 500 mg oral tablet: 1 tab(s) orally 2 times a day  magnesium oxide 400 mg oral capsule: 2 cap(s) orally 2 times a day  magnesium oxide 400 mg oral tablet: 1 tab(s) orally 3 times a day (with meals)  melatonin 3 mg oral tablet: 3 tab(s) orally once a day (at bedtime)  metoclopramide 10 mg oral tablet: 1 tab(s) orally 3 times a day as needed for  headache Take with Benadryl for headache.  mycophenolate mofetil 250 mg oral capsule: 1,000 milligram(s) orally 2 times a day Take at 8AM and 8PM  NIFEdipine 30 mg oral tablet, extended release: 1 tab(s) orally once a day  pantoprazole 40 mg oral delayed release tablet: 1 tab(s) orally once a day (before a meal)  predniSONE 10 mg oral tablet: 1 tab(s) orally once a day  Protonix 40 mg oral delayed release tablet: 1 tab(s) orally 2 times a day  QUEtiapine 25 mg oral tablet: 3 tab(s) orally once a day (at bedtime)  senna leaf extract oral tablet: 2 tab(s) orally once a day (at bedtime)  sulfamethoxazole-trimethoprim 400 mg-80 mg oral tablet: 1 tab(s) orally once a day  SUMAtriptan 100 mg oral tablet: 1 tab(s) orally once a day as needed for Migraine No more than 3 doses in 1 week  Symbicort 160 mcg-4.5 mcg/inh inhalation aerosol: 2 puff(s) inhaled 2 times a day  traMADol 50 mg oral tablet: 0.5 tab(s) orally every 6 hours as needed for Moderate Pain (4 - 6) Please take 0.5 tab every 6 hours for moderate pain.  Please take 1 tab every 6 hours for severe pain.  Tylenol 325 mg oral tablet: 2 tab(s) orally every 8 hours as needed for  mild pain  valGANciclovir 450 mg oral tablet: 2 tab(s) orally once a day  Vitamin B Complex oral tablet: 1 tab(s) orally once a day  vitamin D-calcium (as carbonate) 5 mcg-500 mg oral tablet: 1 tab(s) orally 2 times a day   alendronate 70 mg oral tablet: 1 tab(s) orally once a week  bisacodyl 10 mg rectal suppository: 1 suppository(ies) rectal once a day As needed Refractory Constipation  cycloSPORINE modified 25 mg oral capsule: 1 cap(s) orally 2 times a day  cycloSPORINE modified 50 mg oral capsule: 1 cap(s) orally 2 times a day F/u dosage provided by transplant team. 100 mg in the morning and 75 mg in the evening  diphenhydrAMINE 25 mg oral capsule: 1 cap(s) orally every 8 hours as needed for  headache Take with Reglan 10mg for headache.  entecavir 0.5 mg oral tablet: 1 tab(s) orally once a day  insulin glargine 100 units/mL subcutaneous solution: 5 unit(s) subcutaneous once a day (at bedtime)  insulin lispro 100 units/mL injectable solution: injectable 3 times a day (before meals) DM per moderate Insulin Sliding Scale (ISS):  Please check your FS before each meal, and give insulin per ISS:  2 Unit(s) if Glucose 151 - 200  4 Unit(s) if Glucose 201 - 250  6 Unit(s) if Glucose 251 - 300  8 Unit(s) if Glucose 301 - 350  10 Unit(s) if Glucose 351 - 400  12 Unit(s) if Glucose Greater Than 400  insulin lispro 100 units/mL injectable solution: injectable once a day (at bedtime) Please check your FS at bedtime, and give insulin per ISS:  0 Unit(s) if Glucose 61 - 250  2 Unit(s) if Glucose 251 - 300  4 Unit(s) if Glucose 301 - 350  6 Unit(s) if Glucose 351 - 400  8 Unit(s) if Glucose Greater Than 400    Give correctional scale insulin  REGARDLESS of PO status NOTIFY Provider for blood glucose LESS THAN 70 milliGRAM(s)/deciLiter or GREATER THAN 400 milliGRAM(s)/deciLiter  insulin lispro 100 units/mL injectable solution: 8 unit(s) injectable once a day before breakfast  insulin lispro 100 units/mL injectable solution: 8 unit(s) injectable once a day before lunch  insulin lispro 100 units/mL injectable solution: 5 unit(s) injectable once a day before dinner  lactulose 10 g/15 mL oral syrup: 15 milliliter(s) orally once a day  levETIRAcetam 500 mg oral tablet: 1 tab(s) orally 2 times a day  magnesium oxide 400 mg oral capsule: 2 cap(s) orally 2 times a day  magnesium oxide 400 mg oral tablet: 1 tab(s) orally 3 times a day (with meals)  melatonin 3 mg oral tablet: 3 tab(s) orally once a day (at bedtime)  metoclopramide 10 mg oral tablet: 1 tab(s) orally 3 times a day as needed for  headache Take with Benadryl for headache.  mycophenolate mofetil 250 mg oral capsule: 1,000 milligram(s) orally 2 times a day Take at 8AM and 8PM  NIFEdipine 30 mg oral tablet, extended release: 1 tab(s) orally once a day  pantoprazole 40 mg oral delayed release tablet: 1 tab(s) orally once a day (before a meal)  predniSONE 5 mg oral tablet: 1 tab(s) orally once a day  Protonix 40 mg oral delayed release tablet: 1 tab(s) orally 2 times a day  QUEtiapine 25 mg oral tablet: 3 tab(s) orally once a day (at bedtime)  senna leaf extract oral tablet: 2 tab(s) orally once a day (at bedtime)  sulfamethoxazole-trimethoprim 400 mg-80 mg oral tablet: 1 tab(s) orally once a day  SUMAtriptan 100 mg oral tablet: 1 tab(s) orally once a day as needed for Migraine No more than 3 doses in 1 week  Symbicort 160 mcg-4.5 mcg/inh inhalation aerosol: 2 puff(s) inhaled 2 times a day  valGANciclovir 450 mg oral tablet: 2 tab(s) orally once a day  Vitamin B Complex oral tablet: 1 tab(s) orally once a day  vitamin D-calcium (as carbonate) 5 mcg-500 mg oral tablet: 1 tab(s) orally 2 times a day   alendronate 70 mg oral tablet: 1 tab(s) orally once a week  cycloSPORINE modified 25 mg oral capsule: 3 cap(s) orally once a day (at bedtime)  cycloSPORINE modified 50 mg oral capsule: 1 cap(s) orally 2 times a day F/u dosage provided by transplant team. 100 mg in the morning and 75 mg in the evening  entecavir 0.5 mg oral tablet: 1 tab(s) orally once a day  insulin glargine 100 units/mL subcutaneous solution: 5 unit(s) subcutaneous once a day (at bedtime)  levETIRAcetam 500 mg oral tablet: 1 tab(s) orally 2 times a day  magnesium oxide 400 mg oral capsule: 2 cap(s) orally 2 times a day  mycophenolate mofetil 250 mg oral capsule: 500 milligram(s) orally 2 times a day Take at 8AM and 8PM  NIFEdipine 30 mg oral tablet, extended release: 1 tab(s) orally once a day  predniSONE 5 mg oral tablet: 1 tab(s) orally once a day  Protonix 40 mg oral delayed release tablet: 1 tab(s) orally 2 times a day  QUEtiapine 25 mg oral tablet: 3 tab(s) orally once a day (at bedtime)  senna leaf extract oral tablet: 2 tab(s) orally once a day (at bedtime)  sulfamethoxazole-trimethoprim 400 mg-80 mg oral tablet: 1 tab(s) orally once a day  SUMAtriptan 100 mg oral tablet: 1 tab(s) orally once a day as needed for Migraine No more than 3 doses in 1 week  Symbicort 160 mcg-4.5 mcg/inh inhalation aerosol: 2 puff(s) inhaled 2 times a day  valGANciclovir 450 mg oral tablet: 1 tab(s) orally once a day  Vitamin B Complex oral tablet: 1 tab(s) orally once a day  vitamin D-calcium (as carbonate) 5 mcg-500 mg oral tablet: 1 tab(s) orally 2 times a day

## 2025-07-09 NOTE — PROGRESS NOTE ADULT - NUTRITIONAL ASSESSMENT
This patient has been assessed with a concern for Malnutrition and has been determined to have a diagnosis/diagnoses of Severe protein-calorie malnutrition.    This patient is being managed with:   Diet Consistent Carbohydrate w/Evening Snack-  Entered: Jul 8 2025  4:04PM  
This patient has been assessed with a concern for Malnutrition and has been determined to have a diagnosis/diagnoses of Severe protein-calorie malnutrition.    This patient is being managed with:   Diet Clear Liquid-  Entered: Jul 7 2025  6:01PM

## 2025-07-09 NOTE — DISCHARGE NOTE PROVIDER - CARE PROVIDER_API CALL
Myrtle Mazariegos  Nurse Practitioner Adult Health  [address] [place] [id number]  Phone: [phone]  Fax: [phone]  Follow Up Time:

## 2025-07-09 NOTE — ADVANCED PRACTICE NURSE CONSULT - REASON FOR CONSULT
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

## 2025-07-09 NOTE — DISCHARGE NOTE PROVIDER - HOSPITAL COURSE
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 transplant service for GIB/melena, then transferred to SICU for planned EGD and hemodynamic monitoring. In SICU patient got intubated 7/5, had 3 EGD's ((7/5) large blood (>1L), active bleeding from ?AVM mid-gastric body; clipped, (7/6) no active bleed cauterized, (7/7) active bleeding from residual arteriole clip). Extubated on7/6, bleeding stopped, hemoglobin stable 10.3 today, patient tolerated regular diet, will continue protonix BID on discharge.    UCX+ enterobacter cloacae, patient is asymptomatic, requiring no treatment.    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 Will continue holding ASA, discuss resuming outpatient.        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 In SICU patient got intubated 7/5, had 3 EGD's ((7/5) large blood (>1L), active bleeding from ?AVM mid-gastric body; clipped, (7/6) no active bleed cauterized, (7/7) active bleeding from residual arteriole clip). Extubated on7/6, bleeding stopped, hemoglobin stable 10.3 today, patient tolerated regular diet, will continue protonix BID on discharge.    UCX+ enterobacter cloacae, patient is asymptomatic, requiring no treatment.    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 Will continue holding ASA, discuss resuming outpatient.    Immuno: Cyclo 100/75,  BID, pred 5    Follow up 7/14 with Transplant Clinic  Follow up with neurology/headache specialist for chronic migraines.           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 In SICU patient got intubated 7/5, had 3 EGD's ((7/5) large blood (>1L), active bleeding from ?AVM mid-gastric body; clipped, (7/6) no active bleed cauterized, (7/7) active bleeding from residual arteriole clip). Extubated on 7/6, bleeding stopped, hemoglobin stable 10.3 today, patient tolerated regular diet, will continue protonix BID on discharge.    UCX+ enterobacter cloacae, patient is asymptomatic, requiring no treatment.    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 Will continue holding ASA, discuss resuming outpatient.    Immuno: Cyclo 100/75,  BID, pred 5    Follow up 7/14 with Transplant Clinic  Follow up with neurology/headache specialist for chronic migraines.           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 transplant service for GIB/melena, then transferred to SICU for planned EGD and hemodynamic monitoring. In SICU patient got intubated 7/5, had 3 EGD's ((7/5) large blood (>1L), active bleeding from ?AVM mid-gastric body; clipped, (7/6) no active bleed cauterized, (7/7) active bleeding from residual arteriole clip). Extubated on 7/6, bleeding stopped, hemoglobin stable 10.3 today, patient tolerated regular diet, will continue protonix BID on discharge.    UCX+ enterobacter cloacae, patient is asymptomatic, requiring no treatment.    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 Will continue holding ASA, discuss resuming outpatient.    Immuno: Cyclo 100/75,  BID, pred 5  CMV high risk, on Valcyte 450 qd, adjust dose outpatient as renal function improves    Follow up 7/14 with Transplant Clinic  Follow up with neurology/headache specialist for chronic migraines.

## 2025-07-09 NOTE — PROGRESS NOTE ADULT - ASSESSMENT
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    [] Anemia  [] UGIB  - Iron studies: low TIBC, high iron, high sat  - RRT 7/5 -> tx to sicu, intubated  - s/p EGD (7/5) large blood (>1L), active bleeding from ?AVM mid-gastric body; clipped   - s/p EGD 7/6   no active bleed cauterized,  - s/p EGD 7/7 - active bleeding from residual arteriole clip, tx.   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    [] Anemia  [] UGIB  - Iron studies: low TIBC, high iron, high sat  - RRT 7/5 -> tx to sicu, intubated  - s/p EGD (7/5) large blood (>1L), active bleeding from ?AVM mid-gastric body; clipped   - s/p EGD 7/6   no active bleed cauterized,  - s/p EGD 7/7 - active bleeding from residual arteriole clip, tx.   - CBC stable  - reg diet  - Protonix PO BID  - Dc today    [] h/o OLT  - HBV + donor: Entecavir 0.5 QD  - CMV +/- on Valcyte 450daily  - Cyclo by level,  BID, pred 5    [] DVT PPX  - SCD  - holding ASA 2/2 anemia

## 2025-07-10 LAB
CULTURE RESULTS: SIGNIFICANT CHANGE UP
CULTURE RESULTS: SIGNIFICANT CHANGE UP
SPECIMEN SOURCE: SIGNIFICANT CHANGE UP
SPECIMEN SOURCE: SIGNIFICANT CHANGE UP

## 2025-07-14 ENCOUNTER — LABORATORY RESULT (OUTPATIENT)
Age: 70
End: 2025-07-14

## 2025-07-14 ENCOUNTER — APPOINTMENT (OUTPATIENT)
Dept: TRANSPLANT | Facility: CLINIC | Age: 70
End: 2025-07-14

## 2025-07-14 ENCOUNTER — APPOINTMENT (OUTPATIENT)
Dept: TRANSPLANT | Facility: CLINIC | Age: 70
End: 2025-07-14
Payer: MEDICARE

## 2025-07-14 VITALS
WEIGHT: 128 LBS | TEMPERATURE: 97.8 F | HEART RATE: 63 BPM | HEIGHT: 61 IN | OXYGEN SATURATION: 98 % | SYSTOLIC BLOOD PRESSURE: 154 MMHG | BODY MASS INDEX: 24.17 KG/M2 | DIASTOLIC BLOOD PRESSURE: 85 MMHG

## 2025-07-14 PROCEDURE — 99215 OFFICE O/P EST HI 40 MIN: CPT | Mod: 24

## 2025-07-15 LAB
ALBUMIN SERPL ELPH-MCNC: 3.6 G/DL
ALP BLD-CCNC: 69 U/L
ALT SERPL-CCNC: <5 U/L
ANION GAP SERPL CALC-SCNC: 13 MMOL/L
AST SERPL-CCNC: 16 U/L
BASOPHILS # BLD AUTO: 0.05 K/UL
BASOPHILS NFR BLD AUTO: 2 %
BILIRUB SERPL-MCNC: 0.8 MG/DL
BUN SERPL-MCNC: 14 MG/DL
CALCIUM SERPL-MCNC: 8.3 MG/DL
CHLORIDE SERPL-SCNC: 105 MMOL/L
CMV DNA SPEC QL NAA+PROBE: NOT DETECTED IU/ML
CMVPCR LOG: NOT DETECTED LOG10IU/ML
CO2 SERPL-SCNC: 22 MMOL/L
CREAT SERPL-MCNC: 0.94 MG/DL
CYCLOSPORINE SER-MCNC: 91 NG/ML
EGFRCR SERPLBLD CKD-EPI 2021: 65 ML/MIN/1.73M2
EOSINOPHIL # BLD AUTO: 0 K/UL
EOSINOPHIL NFR BLD AUTO: 0 %
GGT SERPL-CCNC: 41 U/L
GLUCOSE SERPL-MCNC: 101 MG/DL
HCT VFR BLD CALC: 30.2 %
HGB BLD-MCNC: 9.3 G/DL
LYMPHOCYTES # BLD AUTO: 0.05 K/UL
LYMPHOCYTES NFR BLD AUTO: 2 %
MAGNESIUM SERPL-MCNC: 1.6 MG/DL
MAN DIFF?: NORMAL
MCHC RBC-ENTMCNC: 29.2 PG
MCHC RBC-ENTMCNC: 30.8 G/DL
MCV RBC AUTO: 94.7 FL
MONOCYTES # BLD AUTO: 0.28 K/UL
MONOCYTES NFR BLD AUTO: 12 %
NEUTROPHILS # BLD AUTO: 1.85 K/UL
NEUTROPHILS NFR BLD AUTO: 78 %
PHOSPHATE SERPL-MCNC: 2.8 MG/DL
PLATELET # BLD AUTO: 102 K/UL
POTASSIUM SERPL-SCNC: 4.1 MMOL/L
PROT SERPL-MCNC: 5.1 G/DL
RBC # BLD: 3.19 M/UL
RBC # FLD: 20.9 %
SODIUM SERPL-SCNC: 140 MMOL/L
WBC # FLD AUTO: 2.37 K/UL

## 2025-07-18 ENCOUNTER — LABORATORY RESULT (OUTPATIENT)
Age: 70
End: 2025-07-18

## 2025-07-19 LAB
ALBUMIN SERPL ELPH-MCNC: 3.4 G/DL
ALP BLD-CCNC: 72 U/L
ALT SERPL-CCNC: 6 U/L
ANION GAP SERPL CALC-SCNC: 12 MMOL/L
AST SERPL-CCNC: 19 U/L
BASOPHILS # BLD AUTO: 0.03 K/UL
BASOPHILS NFR BLD AUTO: 1.4 %
BILIRUB SERPL-MCNC: 0.8 MG/DL
BUN SERPL-MCNC: 12 MG/DL
CALCIUM SERPL-MCNC: 8.9 MG/DL
CHLORIDE SERPL-SCNC: 104 MMOL/L
CO2 SERPL-SCNC: 25 MMOL/L
CREAT SERPL-MCNC: 1.1 MG/DL
CYCLOSPORINE SER-MCNC: 79 NG/ML
EGFRCR SERPLBLD CKD-EPI 2021: 54 ML/MIN/1.73M2
EOSINOPHIL # BLD AUTO: 0.03 K/UL
EOSINOPHIL NFR BLD AUTO: 1.4 %
GGT SERPL-CCNC: 35 U/L
GLUCOSE SERPL-MCNC: 118 MG/DL
HCT VFR BLD CALC: 33.1 %
HGB BLD-MCNC: 10.2 G/DL
IMM GRANULOCYTES NFR BLD AUTO: 7.7 %
LYMPHOCYTES # BLD AUTO: 0.15 K/UL
LYMPHOCYTES NFR BLD AUTO: 6.8 %
MAGNESIUM SERPL-MCNC: 1.6 MG/DL
MAN DIFF?: NORMAL
MCHC RBC-ENTMCNC: 30 PG
MCHC RBC-ENTMCNC: 30.8 G/DL
MCV RBC AUTO: 97.4 FL
MONOCYTES # BLD AUTO: 0.35 K/UL
MONOCYTES NFR BLD AUTO: 15.8 %
NEUTROPHILS # BLD AUTO: 1.48 K/UL
NEUTROPHILS NFR BLD AUTO: 66.9 %
PHOSPHATE SERPL-MCNC: 3.4 MG/DL
PLATELET # BLD AUTO: 105 K/UL
POTASSIUM SERPL-SCNC: 4.3 MMOL/L
PROT SERPL-MCNC: 4.8 G/DL
RBC # BLD: 3.4 M/UL
RBC # FLD: 20.4 %
RESP PATH DNA+RNA PNL NPH NAA+NON-PROBE: NOT DETECTED
SARS-COV-2 RNA RESP QL NAA+PROBE: NOT DETECTED
SODIUM SERPL-SCNC: 141 MMOL/L
WBC # FLD AUTO: 2.21 K/UL

## 2025-07-21 ENCOUNTER — RX RENEWAL (OUTPATIENT)
Age: 70
End: 2025-07-21

## 2025-07-21 LAB
CMV DNA SPEC QL NAA+PROBE: NOT DETECTED IU/ML
CMVPCR LOG: NOT DETECTED LOG10IU/ML

## 2025-07-25 ENCOUNTER — LABORATORY RESULT (OUTPATIENT)
Age: 70
End: 2025-07-25

## 2025-07-25 LAB
ALBUMIN SERPL ELPH-MCNC: 3.7 G/DL
ALP BLD-CCNC: 84 U/L
ALT SERPL-CCNC: 7 U/L
ANION GAP SERPL CALC-SCNC: 11 MMOL/L
AST SERPL-CCNC: 18 U/L
BASOPHILS # BLD AUTO: 0.03 K/UL
BASOPHILS NFR BLD AUTO: 1 %
BILIRUB SERPL-MCNC: 0.9 MG/DL
BUN SERPL-MCNC: 14 MG/DL
CALCIUM SERPL-MCNC: 8.9 MG/DL
CHLORIDE SERPL-SCNC: 103 MMOL/L
CO2 SERPL-SCNC: 27 MMOL/L
CREAT SERPL-MCNC: 1.23 MG/DL
CYCLOSPORINE SER-MCNC: 98 NG/ML
EGFRCR SERPLBLD CKD-EPI 2021: 47 ML/MIN/1.73M2
EOSINOPHIL # BLD AUTO: 0.08 K/UL
EOSINOPHIL NFR BLD AUTO: 3 %
GGT SERPL-CCNC: 32 U/L
GLUCOSE SERPL-MCNC: 90 MG/DL
HCT VFR BLD CALC: 33.6 %
HGB BLD-MCNC: 10.3 G/DL
LYMPHOCYTES # BLD AUTO: 0.29 K/UL
LYMPHOCYTES NFR BLD AUTO: 11 %
MAGNESIUM SERPL-MCNC: 1.8 MG/DL
MAN DIFF?: NORMAL
MCHC RBC-ENTMCNC: 29.8 PG
MCHC RBC-ENTMCNC: 30.7 G/DL
MCV RBC AUTO: 97.1 FL
MONOCYTES # BLD AUTO: 0.24 K/UL
MONOCYTES NFR BLD AUTO: 9 %
NEUTROPHILS # BLD AUTO: 1.99 K/UL
NEUTROPHILS NFR BLD AUTO: 76 %
PHOSPHATE SERPL-MCNC: 3.5 MG/DL
PLATELET # BLD AUTO: 94 K/UL
POTASSIUM SERPL-SCNC: 4.5 MMOL/L
PROT SERPL-MCNC: 5.1 G/DL
RBC # BLD: 3.46 M/UL
RBC # FLD: 19.9 %
SODIUM SERPL-SCNC: 142 MMOL/L
WBC # FLD AUTO: 2.62 K/UL

## 2025-07-28 ENCOUNTER — APPOINTMENT (OUTPATIENT)
Dept: TRANSPLANT | Facility: CLINIC | Age: 70
End: 2025-07-28
Payer: MEDICARE

## 2025-07-28 ENCOUNTER — APPOINTMENT (OUTPATIENT)
Dept: TRANSPLANT | Facility: CLINIC | Age: 70
End: 2025-07-28

## 2025-07-28 VITALS
WEIGHT: 120 LBS | BODY MASS INDEX: 22.67 KG/M2 | SYSTOLIC BLOOD PRESSURE: 150 MMHG | OXYGEN SATURATION: 96 % | DIASTOLIC BLOOD PRESSURE: 76 MMHG | HEART RATE: 71 BPM

## 2025-07-28 PROCEDURE — 92523 SPEECH SOUND LANG COMPREHEN: CPT | Mod: GN

## 2025-07-28 PROCEDURE — 85025 COMPLETE CBC W/AUTO DIFF WBC: CPT

## 2025-07-28 PROCEDURE — 36415 COLL VENOUS BLD VENIPUNCTURE: CPT

## 2025-07-28 PROCEDURE — 80053 COMPREHEN METABOLIC PANEL: CPT

## 2025-07-28 PROCEDURE — 83036 HEMOGLOBIN GLYCOSYLATED A1C: CPT

## 2025-07-28 PROCEDURE — 80158 DRUG ASSAY CYCLOSPORINE: CPT

## 2025-07-28 PROCEDURE — 81001 URINALYSIS AUTO W/SCOPE: CPT

## 2025-07-28 PROCEDURE — 97161 PT EVAL LOW COMPLEX 20 MIN: CPT | Mod: GP

## 2025-07-28 PROCEDURE — 97110 THERAPEUTIC EXERCISES: CPT | Mod: GP

## 2025-07-28 PROCEDURE — 94640 AIRWAY INHALATION TREATMENT: CPT

## 2025-07-28 PROCEDURE — 99215 OFFICE O/P EST HI 40 MIN: CPT | Mod: 24

## 2025-07-28 PROCEDURE — 82570 ASSAY OF URINE CREATININE: CPT

## 2025-07-28 PROCEDURE — 84100 ASSAY OF PHOSPHORUS: CPT

## 2025-07-28 PROCEDURE — 85610 PROTHROMBIN TIME: CPT

## 2025-07-28 PROCEDURE — 94664 DEMO&/EVAL PT USE INHALER: CPT

## 2025-07-28 PROCEDURE — 97535 SELF CARE MNGMENT TRAINING: CPT | Mod: GO

## 2025-07-28 PROCEDURE — 97116 GAIT TRAINING THERAPY: CPT | Mod: GP

## 2025-07-28 PROCEDURE — 97530 THERAPEUTIC ACTIVITIES: CPT | Mod: GP

## 2025-07-28 PROCEDURE — 84300 ASSAY OF URINE SODIUM: CPT

## 2025-07-28 PROCEDURE — 87635 SARS-COV-2 COVID-19 AMP PRB: CPT

## 2025-07-28 PROCEDURE — 82962 GLUCOSE BLOOD TEST: CPT

## 2025-07-28 PROCEDURE — 82247 BILIRUBIN TOTAL: CPT

## 2025-07-28 PROCEDURE — 83735 ASSAY OF MAGNESIUM: CPT

## 2025-07-28 PROCEDURE — 92610 EVALUATE SWALLOWING FUNCTION: CPT | Mod: GN

## 2025-07-28 PROCEDURE — 80048 BASIC METABOLIC PNL TOTAL CA: CPT

## 2025-07-29 ENCOUNTER — APPOINTMENT (OUTPATIENT)
Dept: UROLOGY | Facility: CLINIC | Age: 70
End: 2025-07-29
Payer: MEDICARE

## 2025-07-29 VITALS
SYSTOLIC BLOOD PRESSURE: 190 MMHG | HEART RATE: 77 BPM | WEIGHT: 120 LBS | HEIGHT: 61 IN | BODY MASS INDEX: 22.66 KG/M2 | DIASTOLIC BLOOD PRESSURE: 61 MMHG | TEMPERATURE: 97.3 F

## 2025-07-29 PROCEDURE — 99203 OFFICE O/P NEW LOW 30 MIN: CPT

## 2025-07-31 ENCOUNTER — APPOINTMENT (OUTPATIENT)
Dept: ULTRASOUND IMAGING | Facility: CLINIC | Age: 70
End: 2025-07-31
Payer: MEDICARE

## 2025-07-31 PROCEDURE — 76770 US EXAM ABDO BACK WALL COMP: CPT

## 2025-08-01 ENCOUNTER — LABORATORY RESULT (OUTPATIENT)
Age: 70
End: 2025-08-01

## 2025-08-08 ENCOUNTER — LABORATORY RESULT (OUTPATIENT)
Age: 70
End: 2025-08-08

## 2025-08-11 ENCOUNTER — APPOINTMENT (OUTPATIENT)
Dept: TRANSPLANT | Facility: CLINIC | Age: 70
End: 2025-08-11

## 2025-08-11 VITALS — WEIGHT: 121 LBS | BODY MASS INDEX: 22.86 KG/M2 | OXYGEN SATURATION: 98 % | TEMPERATURE: 96.4 F | HEART RATE: 67 BPM

## 2025-08-11 VITALS — DIASTOLIC BLOOD PRESSURE: 78 MMHG | SYSTOLIC BLOOD PRESSURE: 160 MMHG

## 2025-08-11 DIAGNOSIS — Z79.60 LONG TERM (CURRENT) USE OF UNSPECIFIED IMMUNOMODULATORS AND IMMUNOSUPPRESSANTS: ICD-10-CM

## 2025-08-11 DIAGNOSIS — D72.819 DECREASED WHITE BLOOD CELL COUNT, UNSPECIFIED: ICD-10-CM

## 2025-08-11 RX ORDER — FILGRASTIM-SNDZ 300 UG/.5ML
300 INJECTION, SOLUTION INTRAVENOUS; SUBCUTANEOUS
Qty: 3 | Refills: 1 | Status: ACTIVE | COMMUNITY
Start: 2025-08-11 | End: 1900-01-01

## 2025-08-12 ENCOUNTER — APPOINTMENT (OUTPATIENT)
Dept: UROLOGY | Facility: CLINIC | Age: 70
End: 2025-08-12
Payer: MEDICARE

## 2025-08-12 VITALS
DIASTOLIC BLOOD PRESSURE: 62 MMHG | HEIGHT: 61 IN | HEART RATE: 89 BPM | TEMPERATURE: 97.3 F | SYSTOLIC BLOOD PRESSURE: 168 MMHG | BODY MASS INDEX: 22.84 KG/M2 | WEIGHT: 121 LBS

## 2025-08-12 DIAGNOSIS — N39.46 MIXED INCONTINENCE: ICD-10-CM

## 2025-08-12 DIAGNOSIS — R31.29 OTHER MICROSCOPIC HEMATURIA: ICD-10-CM

## 2025-08-12 DIAGNOSIS — N20.0 CALCULUS OF KIDNEY: ICD-10-CM

## 2025-08-12 LAB
APPEARANCE: CLEAR
BACTERIA UR CULT: NORMAL
BACTERIA: NEGATIVE /HPF
BILIRUBIN URINE: NEGATIVE
BLOOD URINE: ABNORMAL
CAST: 1 /LPF
COLOR: YELLOW
EPITHELIAL CELLS: 9 /HPF
GLUCOSE QUALITATIVE U: NEGATIVE MG/DL
KETONES URINE: NEGATIVE MG/DL
LEUKOCYTE ESTERASE URINE: ABNORMAL
MICROSCOPIC-UA: NORMAL
NITRITE URINE: NEGATIVE
PH URINE: 7.5
PROTEIN URINE: 100 MG/DL
RED BLOOD CELLS URINE: 110 /HPF
SPECIFIC GRAVITY URINE: 1.02
UROBILINOGEN URINE: 0.2 MG/DL
WHITE BLOOD CELLS URINE: 7 /HPF

## 2025-08-12 PROCEDURE — G2211 COMPLEX E/M VISIT ADD ON: CPT

## 2025-08-12 PROCEDURE — 99214 OFFICE O/P EST MOD 30 MIN: CPT

## 2025-08-12 RX ORDER — TROSPIUM CHLORIDE 20 MG/1
20 TABLET, FILM COATED ORAL
Qty: 30 | Refills: 1 | Status: ACTIVE | COMMUNITY
Start: 2025-08-12 | End: 1900-01-01

## 2025-08-14 ENCOUNTER — OUTPATIENT (OUTPATIENT)
Dept: OUTPATIENT SERVICES | Facility: HOSPITAL | Age: 70
LOS: 1 days | End: 2025-08-14

## 2025-08-14 ENCOUNTER — APPOINTMENT (OUTPATIENT)
Dept: INTERNAL MEDICINE | Facility: CLINIC | Age: 70
End: 2025-08-14

## 2025-08-14 DIAGNOSIS — Z98.891 HISTORY OF UTERINE SCAR FROM PREVIOUS SURGERY: Chronic | ICD-10-CM

## 2025-08-14 DIAGNOSIS — Z90.49 ACQUIRED ABSENCE OF OTHER SPECIFIED PARTS OF DIGESTIVE TRACT: Chronic | ICD-10-CM

## 2025-08-14 DIAGNOSIS — Z98.890 OTHER SPECIFIED POSTPROCEDURAL STATES: Chronic | ICD-10-CM

## 2025-08-15 ENCOUNTER — LABORATORY RESULT (OUTPATIENT)
Age: 70
End: 2025-08-15

## 2025-08-20 ENCOUNTER — APPOINTMENT (OUTPATIENT)
Dept: CT IMAGING | Facility: CLINIC | Age: 70
End: 2025-08-20

## 2025-08-20 PROCEDURE — 74178 CT ABD&PLV WO CNTR FLWD CNTR: CPT

## 2025-08-22 ENCOUNTER — LABORATORY RESULT (OUTPATIENT)
Age: 70
End: 2025-08-22

## 2025-08-22 LAB
ALBUMIN SERPL ELPH-MCNC: 3.6 G/DL
ALP BLD-CCNC: 71 U/L
ALT SERPL-CCNC: 5 U/L
ANION GAP SERPL CALC-SCNC: 12 MMOL/L
AST SERPL-CCNC: 14 U/L
BASOPHILS # BLD AUTO: 0.05 K/UL
BASOPHILS NFR BLD AUTO: 2.3 %
BILIRUB SERPL-MCNC: 0.5 MG/DL
BUN SERPL-MCNC: 21 MG/DL
CALCIUM SERPL-MCNC: 8.8 MG/DL
CHLORIDE SERPL-SCNC: 103 MMOL/L
CO2 SERPL-SCNC: 26 MMOL/L
CREAT SERPL-MCNC: 1.3 MG/DL
CYCLOSPORINE SER-MCNC: 120 NG/ML
EGFRCR SERPLBLD CKD-EPI 2021: 44 ML/MIN/1.73M2
EOSINOPHIL # BLD AUTO: 0.05 K/UL
EOSINOPHIL NFR BLD AUTO: 2.3 %
GGT SERPL-CCNC: 18 U/L
GLUCOSE SERPL-MCNC: 80 MG/DL
HCT VFR BLD CALC: 30.7 %
HGB BLD-MCNC: 9.9 G/DL
LYMPHOCYTES # BLD AUTO: 0.13 K/UL
LYMPHOCYTES NFR BLD AUTO: 5.7 %
MAGNESIUM SERPL-MCNC: 2 MG/DL
MAN DIFF?: NORMAL
MCHC RBC-ENTMCNC: 29.9 PG
MCHC RBC-ENTMCNC: 32.2 G/DL
MCV RBC AUTO: 92.7 FL
MONOCYTES # BLD AUTO: 0.11 K/UL
MONOCYTES NFR BLD AUTO: 4.5 %
NEUTROPHILS # BLD AUTO: 2 K/UL
NEUTROPHILS NFR BLD AUTO: 85.2 %
PHOSPHATE SERPL-MCNC: 3.9 MG/DL
PLATELET # BLD AUTO: 105 K/UL
POTASSIUM SERPL-SCNC: 5.1 MMOL/L
PROT SERPL-MCNC: 5.2 G/DL
RBC # BLD: 3.31 M/UL
RBC # FLD: 16.8 %
SODIUM SERPL-SCNC: 141 MMOL/L
WBC # FLD AUTO: 2.35 K/UL

## 2025-08-27 ENCOUNTER — APPOINTMENT (OUTPATIENT)
Dept: HEPATOLOGY | Facility: CLINIC | Age: 70
End: 2025-08-27
Payer: MEDICARE

## 2025-08-27 VITALS
OXYGEN SATURATION: 93 % | TEMPERATURE: 97.1 F | WEIGHT: 123 LBS | SYSTOLIC BLOOD PRESSURE: 165 MMHG | DIASTOLIC BLOOD PRESSURE: 97 MMHG | HEART RATE: 88 BPM | BODY MASS INDEX: 23.24 KG/M2

## 2025-08-27 DIAGNOSIS — Z94.4 LIVER TRANSPLANT STATUS: ICD-10-CM

## 2025-08-27 DIAGNOSIS — Z79.60 LONG TERM (CURRENT) USE OF UNSPECIFIED IMMUNOMODULATORS AND IMMUNOSUPPRESSANTS: ICD-10-CM

## 2025-08-27 DIAGNOSIS — Z87.19 PERSONAL HISTORY OF OTHER DISEASES OF THE DIGESTIVE SYSTEM: ICD-10-CM

## 2025-08-27 DIAGNOSIS — I10 ESSENTIAL (PRIMARY) HYPERTENSION: ICD-10-CM

## 2025-08-27 PROCEDURE — 99215 OFFICE O/P EST HI 40 MIN: CPT

## 2025-08-28 ENCOUNTER — APPOINTMENT (OUTPATIENT)
Dept: UROLOGY | Facility: CLINIC | Age: 70
End: 2025-08-28
Payer: MEDICARE

## 2025-08-28 VITALS
DIASTOLIC BLOOD PRESSURE: 69 MMHG | BODY MASS INDEX: 23.22 KG/M2 | TEMPERATURE: 97.1 F | HEIGHT: 61 IN | SYSTOLIC BLOOD PRESSURE: 134 MMHG | WEIGHT: 123 LBS | HEART RATE: 86 BPM

## 2025-08-28 DIAGNOSIS — R31.29 OTHER MICROSCOPIC HEMATURIA: ICD-10-CM

## 2025-08-28 DIAGNOSIS — N20.0 CALCULUS OF KIDNEY: ICD-10-CM

## 2025-08-28 DIAGNOSIS — N39.46 MIXED INCONTINENCE: ICD-10-CM

## 2025-08-28 PROCEDURE — 99214 OFFICE O/P EST MOD 30 MIN: CPT

## 2025-08-28 RX ORDER — NIFEDIPINE 90 MG/1
90 TABLET, EXTENDED RELEASE ORAL DAILY
Qty: 30 | Refills: 2 | Status: ACTIVE | COMMUNITY
Start: 2025-08-27 | End: 1900-01-01

## 2025-08-29 ENCOUNTER — LABORATORY RESULT (OUTPATIENT)
Age: 70
End: 2025-08-29

## 2025-08-29 PROBLEM — Z87.19 HISTORY OF ESOPHAGEAL VARICES: Status: RESOLVED | Noted: 2023-01-24 | Resolved: 2025-08-29

## 2025-08-29 PROBLEM — Z87.19 HISTORY OF FATTY INFILTRATION OF LIVER: Status: RESOLVED | Noted: 2024-05-06 | Resolved: 2025-08-29

## 2025-08-29 PROBLEM — Z87.19 HISTORY OF CIRRHOSIS: Status: RESOLVED | Noted: 2017-12-18 | Resolved: 2025-08-29

## 2025-09-02 ENCOUNTER — RX RENEWAL (OUTPATIENT)
Age: 70
End: 2025-09-02

## 2025-09-04 ENCOUNTER — NON-APPOINTMENT (OUTPATIENT)
Age: 70
End: 2025-09-04

## 2025-09-05 ENCOUNTER — LABORATORY RESULT (OUTPATIENT)
Age: 70
End: 2025-09-05

## 2025-09-05 LAB
ALBUMIN SERPL ELPH-MCNC: 3.8 G/DL
ALP BLD-CCNC: 68 U/L
ALT SERPL-CCNC: 8 U/L
ANION GAP SERPL CALC-SCNC: 12 MMOL/L
AST SERPL-CCNC: 21 U/L
BILIRUB SERPL-MCNC: 0.6 MG/DL
BUN SERPL-MCNC: 23 MG/DL
CALCIUM SERPL-MCNC: 8.8 MG/DL
CHLORIDE SERPL-SCNC: 103 MMOL/L
CMV DNA SPEC QL NAA+PROBE: NOT DETECTED IU/ML
CMVPCR LOG: NOT DETECTED LOG10IU/ML
CO2 SERPL-SCNC: 24 MMOL/L
CREAT SERPL-MCNC: 1.38 MG/DL
CYCLOSPORINE SER-MCNC: 164 NG/ML
EGFRCR SERPLBLD CKD-EPI 2021: 41 ML/MIN/1.73M2
GGT SERPL-CCNC: 17 U/L
GLUCOSE SERPL-MCNC: 95 MG/DL
MAGNESIUM SERPL-MCNC: 2 MG/DL
PHOSPHATE SERPL-MCNC: 4 MG/DL
POTASSIUM SERPL-SCNC: 5.4 MMOL/L
PROT SERPL-MCNC: 5.5 G/DL
SODIUM SERPL-SCNC: 139 MMOL/L

## 2025-09-06 LAB
BASOPHILS # BLD AUTO: 0.03 K/UL
BASOPHILS NFR BLD AUTO: 2.2 %
EOSINOPHIL # BLD AUTO: 0.08 K/UL
EOSINOPHIL NFR BLD AUTO: 5.8 %
HCT VFR BLD CALC: 31.1 %
HGB BLD-MCNC: 10 G/DL
IMM GRANULOCYTES NFR BLD AUTO: 2.9 %
LYMPHOCYTES # BLD AUTO: 0.13 K/UL
LYMPHOCYTES NFR BLD AUTO: 9.4 %
MAN DIFF?: NORMAL
MCHC RBC-ENTMCNC: 30.8 PG
MCHC RBC-ENTMCNC: 32.2 G/DL
MCV RBC AUTO: 95.7 FL
MONOCYTES # BLD AUTO: 0.35 K/UL
MONOCYTES NFR BLD AUTO: 25.2 %
NEUTROPHILS # BLD AUTO: 0.76 K/UL
NEUTROPHILS NFR BLD AUTO: 54.5 %
PLATELET # BLD AUTO: 91 K/UL
RBC # BLD: 3.25 M/UL
RBC # FLD: 15.9 %
WBC # FLD AUTO: 1.39 K/UL

## 2025-09-09 ENCOUNTER — APPOINTMENT (OUTPATIENT)
Dept: UROLOGY | Facility: CLINIC | Age: 70
End: 2025-09-09
Payer: MEDICARE

## 2025-09-09 VITALS
DIASTOLIC BLOOD PRESSURE: 83 MMHG | TEMPERATURE: 97.2 F | SYSTOLIC BLOOD PRESSURE: 148 MMHG | BODY MASS INDEX: 23.22 KG/M2 | WEIGHT: 123 LBS | HEIGHT: 61 IN | HEART RATE: 67 BPM

## 2025-09-09 PROCEDURE — 99212 OFFICE O/P EST SF 10 MIN: CPT

## 2025-09-12 ENCOUNTER — APPOINTMENT (OUTPATIENT)
Dept: UROLOGY | Facility: CLINIC | Age: 70
End: 2025-09-12
Payer: MEDICARE

## 2025-09-12 ENCOUNTER — LABORATORY RESULT (OUTPATIENT)
Age: 70
End: 2025-09-12

## 2025-09-12 VITALS
DIASTOLIC BLOOD PRESSURE: 83 MMHG | SYSTOLIC BLOOD PRESSURE: 156 MMHG | BODY MASS INDEX: 23.22 KG/M2 | HEART RATE: 67 BPM | HEIGHT: 61 IN | TEMPERATURE: 97.3 F | WEIGHT: 123 LBS

## 2025-09-12 DIAGNOSIS — R31.29 OTHER MICROSCOPIC HEMATURIA: ICD-10-CM

## 2025-09-12 LAB
APPEARANCE: CLEAR
BILIRUBIN URINE: NEGATIVE
BLOOD URINE: ABNORMAL
COLOR: YELLOW
GLUCOSE QUALITATIVE U: NEGATIVE
KETONES URINE: NEGATIVE
LEUKOCYTE ESTERASE URINE: ABNORMAL
NITRITE URINE: NEGATIVE
PH URINE: 6.5
PROTEIN URINE: >=300
SPECIFIC GRAVITY URINE: 1.02
UROBILINOGEN URINE: 0.2 (ref 0.2–?)

## 2025-09-12 PROCEDURE — 52000 CYSTOURETHROSCOPY: CPT

## 2025-09-15 LAB
ALBUMIN SERPL ELPH-MCNC: 3.7 G/DL
ALP BLD-CCNC: 84 U/L
ALT SERPL-CCNC: 14 U/L
ANION GAP SERPL CALC-SCNC: 11 MMOL/L
AST SERPL-CCNC: 29 U/L
BASOPHILS # BLD AUTO: 0.18 K/UL
BASOPHILS NFR BLD AUTO: 9.5 %
BILIRUB SERPL-MCNC: 0.4 MG/DL
BUN SERPL-MCNC: 25 MG/DL
CALCIUM SERPL-MCNC: 9.2 MG/DL
CHLORIDE SERPL-SCNC: 102 MMOL/L
CMV DNA SPEC QL NAA+PROBE: NOT DETECTED IU/ML
CMVPCR LOG: NOT DETECTED LOG10IU/ML
CO2 SERPL-SCNC: 25 MMOL/L
CREAT SERPL-MCNC: 1.54 MG/DL
CYCLOSPORINE SER-MCNC: 139 NG/ML
EGFRCR SERPLBLD CKD-EPI 2021: 36 ML/MIN/1.73M2
EOSINOPHIL # BLD AUTO: 0.13 K/UL
EOSINOPHIL NFR BLD AUTO: 6.9 %
GGT SERPL-CCNC: 25 U/L
GLUCOSE SERPL-MCNC: 101 MG/DL
HCT VFR BLD CALC: 33.2 %
HGB BLD-MCNC: 10.6 G/DL
LYMPHOCYTES # BLD AUTO: 0.13 K/UL
LYMPHOCYTES NFR BLD AUTO: 6.9 %
MAN DIFF?: NORMAL
MCHC RBC-ENTMCNC: 29.4 PG
MCHC RBC-ENTMCNC: 31.9 G/DL
MCV RBC AUTO: 92 FL
MONOCYTES # BLD AUTO: 0.32 K/UL
MONOCYTES NFR BLD AUTO: 17.2 %
NEUTROPHILS # BLD AUTO: 1.1 K/UL
NEUTROPHILS NFR BLD AUTO: 59.5 %
PLATELET # BLD AUTO: 111 K/UL
POTASSIUM SERPL-SCNC: 5.3 MMOL/L
PROT SERPL-MCNC: 5.8 G/DL
RBC # BLD: 3.61 M/UL
RBC # FLD: 15.9 %
SODIUM SERPL-SCNC: 139 MMOL/L
WBC # FLD AUTO: 1.85 K/UL

## 2025-09-16 ENCOUNTER — APPOINTMENT (OUTPATIENT)
Dept: HEPATOLOGY | Facility: CLINIC | Age: 70
End: 2025-09-16
Payer: MEDICARE

## 2025-09-16 ENCOUNTER — LABORATORY RESULT (OUTPATIENT)
Age: 70
End: 2025-09-16

## 2025-09-16 VITALS
SYSTOLIC BLOOD PRESSURE: 130 MMHG | HEART RATE: 67 BPM | BODY MASS INDEX: 23.62 KG/M2 | DIASTOLIC BLOOD PRESSURE: 58 MMHG | TEMPERATURE: 98.3 F | WEIGHT: 125 LBS

## 2025-09-16 DIAGNOSIS — D72.819 DECREASED WHITE BLOOD CELL COUNT, UNSPECIFIED: ICD-10-CM

## 2025-09-16 DIAGNOSIS — Z79.60 LONG TERM (CURRENT) USE OF UNSPECIFIED IMMUNOMODULATORS AND IMMUNOSUPPRESSANTS: ICD-10-CM

## 2025-09-16 DIAGNOSIS — C22.0 LIVER CELL CARCINOMA: ICD-10-CM

## 2025-09-16 PROCEDURE — 99215 OFFICE O/P EST HI 40 MIN: CPT

## 2025-09-17 LAB — URINE CYTOLOGY: NORMAL

## 2025-09-18 DIAGNOSIS — Z94.4 LIVER TRANSPLANT STATUS: ICD-10-CM

## 2025-09-18 LAB
ALBUMIN SERPL ELPH-MCNC: 4.1 G/DL
ALP BLD-CCNC: 96 U/L
ALT SERPL-CCNC: 12 U/L
ANION GAP SERPL CALC-SCNC: 13 MMOL/L
AST SERPL-CCNC: 38 U/L
BASOPHILS # BLD AUTO: 0 K/UL
BASOPHILS NFR BLD AUTO: 0 %
BILIRUB SERPL-MCNC: 0.6 MG/DL
BUN SERPL-MCNC: 28 MG/DL
CALCIUM SERPL-MCNC: 9.2 MG/DL
CHLORIDE SERPL-SCNC: 102 MMOL/L
CO2 SERPL-SCNC: 24 MMOL/L
CREAT SERPL-MCNC: 1.51 MG/DL
CYCLOSPORINE SER-MCNC: 130 NG/ML
EGFRCR SERPLBLD CKD-EPI 2021: 37 ML/MIN/1.73M2
EOSINOPHIL # BLD AUTO: 0.11 K/UL
EOSINOPHIL NFR BLD AUTO: 1.8 %
GLUCOSE SERPL-MCNC: 83 MG/DL
HCT VFR BLD CALC: 31.2 %
HGB BLD-MCNC: 10.2 G/DL
LYMPHOCYTES # BLD AUTO: 0 K/UL
LYMPHOCYTES NFR BLD AUTO: 0 %
MAN DIFF?: NORMAL
MCHC RBC-ENTMCNC: 29.5 PG
MCHC RBC-ENTMCNC: 32.7 G/DL
MCV RBC AUTO: 90.2 FL
MONOCYTES # BLD AUTO: 0.59 K/UL
MONOCYTES NFR BLD AUTO: 9.6 %
NEUTROPHILS # BLD AUTO: 5.31 K/UL
NEUTROPHILS NFR BLD AUTO: 85.2 %
PLATELET # BLD AUTO: 107 K/UL
POTASSIUM SERPL-SCNC: 5.1 MMOL/L
PROT SERPL-MCNC: 6.1 G/DL
RBC # BLD: 3.46 M/UL
RBC # FLD: 15.6 %
SODIUM SERPL-SCNC: 139 MMOL/L
WBC # FLD AUTO: 6.11 K/UL

## 2025-09-18 RX ORDER — BLOOD SUGAR DIAGNOSTIC
STRIP MISCELLANEOUS DAILY
Qty: 1 | Refills: 1 | Status: ACTIVE | COMMUNITY
Start: 2025-09-18 | End: 1900-01-01

## 2025-09-18 RX ORDER — LANCETS
EACH MISCELLANEOUS
Qty: 1 | Refills: 1 | Status: ACTIVE | COMMUNITY
Start: 2025-09-18 | End: 1900-01-01

## 2025-09-23 LAB
ALBUMIN SERPL ELPH-MCNC: 4 G/DL
ALP BLD-CCNC: 86 U/L
ALT SERPL-CCNC: 11 U/L
ANION GAP SERPL CALC-SCNC: 12 MMOL/L
AST SERPL-CCNC: 15 U/L
BASOPHILS # BLD AUTO: 0.07 K/UL
BASOPHILS NFR BLD AUTO: 3.1 %
BILIRUB SERPL-MCNC: 0.4 MG/DL
BUN SERPL-MCNC: 26 MG/DL
CALCIUM SERPL-MCNC: 9.2 MG/DL
CHLORIDE SERPL-SCNC: 103 MMOL/L
CO2 SERPL-SCNC: 24 MMOL/L
CREAT SERPL-MCNC: 1.31 MG/DL
CYCLOSPORINE SER-MCNC: 104 NG/ML
EGFRCR SERPLBLD CKD-EPI 2021: 44 ML/MIN/1.73M2
EOSINOPHIL # BLD AUTO: 0.09 K/UL
EOSINOPHIL NFR BLD AUTO: 3.9 %
GLUCOSE SERPL-MCNC: 63 MG/DL
HCT VFR BLD CALC: 31.6 %
HGB BLD-MCNC: 10.7 G/DL
IMM GRANULOCYTES NFR BLD AUTO: 4.4 %
INR PPP: 0.97 RATIO
LYMPHOCYTES # BLD AUTO: 0.3 K/UL
LYMPHOCYTES NFR BLD AUTO: 13.1 %
MAGNESIUM SERPL-MCNC: 2.2 MG/DL
MAN DIFF?: NORMAL
MCHC RBC-ENTMCNC: 30.5 PG
MCHC RBC-ENTMCNC: 33.9 G/DL
MCV RBC AUTO: 90 FL
MONOCYTES # BLD AUTO: 0.83 K/UL
MONOCYTES NFR BLD AUTO: 36.2 %
NEUTROPHILS # BLD AUTO: 0.9 K/UL
NEUTROPHILS NFR BLD AUTO: 39.3 %
PLATELET # BLD AUTO: 141 K/UL
POTASSIUM SERPL-SCNC: 4.6 MMOL/L
PROT SERPL-MCNC: 5.9 G/DL
PT BLD: 11.3 SEC
RBC # BLD: 3.51 M/UL
RBC # FLD: 15.3 %
SODIUM SERPL-SCNC: 139 MMOL/L
WBC # FLD AUTO: 2.29 K/UL

## (undated) DEVICE — PACK BASIN SPECIAL PROCEDURE

## (undated) DEVICE — WARMING BLANKET FULL UNDERBODY

## (undated) DEVICE — BAG DECANTER IV STERILE

## (undated) DEVICE — CATH IV SAFE BC 22G X 1" (BLUE)

## (undated) DEVICE — SUT SOFSILK 4-0 30" TIES

## (undated) DEVICE — SUCTION YANKAUER NO CONTROL VENT

## (undated) DEVICE — MERCIAN VISABILITY BACKROUND YELLOW

## (undated) DEVICE — DRAPE SLUSH / WARMER 44 X 66"

## (undated) DEVICE — SUT PDS II PLUS 4-0 27" SH

## (undated) DEVICE — SUT POLYSORB 3-0 30" V-20 UNDYED

## (undated) DEVICE — SOL INJ NS 0.9% 500ML 2 PORT

## (undated) DEVICE — LAP PAD 4 X 18"

## (undated) DEVICE — SUT PROLENE 4-0 36" SH

## (undated) DEVICE — DRAPE TOWEL BLUE 17" X 24"

## (undated) DEVICE — Device

## (undated) DEVICE — WARMING BLANKET UPPER ADULT

## (undated) DEVICE — FOLEY HOLDER STATLOCK 2 WAY ADULT

## (undated) DEVICE — SUT PDS II 6-0 30" C-1

## (undated) DEVICE — GLV 7.5 PROTEXIS (BLUE)

## (undated) DEVICE — SENSOR O2 FINGER ADULT

## (undated) DEVICE — SYR ALLIANCE II INFLATION 60ML

## (undated) DEVICE — GOWN TRIMAX XXL

## (undated) DEVICE — CATH IV SAFE BC 20G X 1.16" (PINK)

## (undated) DEVICE — DRSG CURITY GAUZE SPONGE 4 X 4" 12-PLY

## (undated) DEVICE — GLV 6 PROTEXIS (WHITE)

## (undated) DEVICE — SYR LUER LOK 3CC

## (undated) DEVICE — VESSEL LOOP MAXI-RED  0.120" X 16"

## (undated) DEVICE — SUT SOFSILK 0 18" TIES

## (undated) DEVICE — DRAIN RESERVOIR FOR JACKSON PRATT 100CC CARDINAL

## (undated) DEVICE — SUT SOFSILK 0 30" TIES

## (undated) DEVICE — ELCTR BOVIE PENCIL SMOKE EVACUATION

## (undated) DEVICE — FOLEY TRAY 16FR SURESTEP LF URINE METER TEMP SENSING STATLOCK

## (undated) DEVICE — DEVICE GRASPING RAPTOR

## (undated) DEVICE — DRAPE ISOLATION BAG 20X20"

## (undated) DEVICE — TUBING IV SET GRAVITY 3Y 100" MACRO

## (undated) DEVICE — SUT SOFSILK 2-0 30" TIES

## (undated) DEVICE — BALLOON US ENDO

## (undated) DEVICE — GLV 7.5 PROTEXIS (WHITE)

## (undated) DEVICE — GLV 8 PROTEXIS (WHITE)

## (undated) DEVICE — ELCTR HANDPIECE ARGON BEND A BEAM 6"

## (undated) DEVICE — DRSG TAPE MEDIPORE 3"

## (undated) DEVICE — SUT MONOSOF 3-0 18" C-14

## (undated) DEVICE — SUT SURGIPRO 1 60" GS-26

## (undated) DEVICE — SUT PDS II 6-0 24" BV-1

## (undated) DEVICE — ELCTR BOVIE PENCIL HANDPIECE ROCKER SWITCH 15FT

## (undated) DEVICE — BIPOLAR FORCEP CODMAN VERSATRU 8" X 0.5MM (BLUE)

## (undated) DEVICE — DRAPE C ARM UNIVERSAL

## (undated) DEVICE — BITE BLOCK ADULT 20 X 27MM (GREEN)

## (undated) DEVICE — TUBING SUCTION 20FT

## (undated) DEVICE — PACK IV START WITH CHG

## (undated) DEVICE — CANNULA IRR ALCON ANTERIOR CHAMBER 27G

## (undated) DEVICE — SUT SOFSILK 3-0 18" V-20 (POP-OFF)

## (undated) DEVICE — STOPCOCK 4-WAY W SWIVEL MALE LUER LOCK NON VENTED RED CAP

## (undated) DEVICE — STAPLER SKIN VISI-STAT 35 WIDE

## (undated) DEVICE — DRAPE IOBAN 33" X 23"

## (undated) DEVICE — GLV 6.5 PROTEXIS (WHITE)

## (undated) DEVICE — PREP CHLORAPREP HI-LITE ORANGE 26ML

## (undated) DEVICE — DRAPE 1/2 SHEET 40X57"

## (undated) DEVICE — TUBING SUCTION CONN 6FT STERILE

## (undated) DEVICE — SPECIMEN CONTAINER 100ML

## (undated) DEVICE — SUT BOOT STANDARD (ASSORTED) 5 PAIR

## (undated) DEVICE — GLV 8.5 PROTEXIS (WHITE)

## (undated) DEVICE — NDL COUNTER FOAM AND MAGNET 40-70

## (undated) DEVICE — SUT SOFSILK 4-0 18" TIES

## (undated) DEVICE — LAP PAD 18 X 18"

## (undated) DEVICE — SUT PROLENE 3-0 36" SH

## (undated) DEVICE — DRSG XEROFORM 5 X 9"

## (undated) DEVICE — SUT SOFSILK 3-0 30" TIES

## (undated) DEVICE — SUT PDS II 5-0 30" C-1

## (undated) DEVICE — SYR LUER LOK 20CC

## (undated) DEVICE — TUBING FLUID ADMINISTRATION SET PRIM 70"

## (undated) DEVICE — SUCTION YANKAUER OPEN TIP NO VENT CURVE

## (undated) DEVICE — SUT SOFSILK 2-0 18" V-20 (POP-OFF)

## (undated) DEVICE — WARMING BLANKET LOWER ADULT

## (undated) DEVICE — SUT SOFSILK 2-0 18" TIES

## (undated) DEVICE — DRAPE ARMATEC MICROSSCOPE HANDLE 5" X 10"

## (undated) DEVICE — PACK MAJOR ABDOMINAL SUPINE

## (undated) DEVICE — ELCTR DEFIB PAD PRO-PADZ W 10FT LEAD WIRES ADULT

## (undated) DEVICE — SUT PROLENE 6-0 30" C-1

## (undated) DEVICE — BLADE SCALPEL SAFETYLOCK #11

## (undated) DEVICE — SYR LUER LOK 10CC

## (undated) DEVICE — GLV 7 PROTEXIS (WHITE)

## (undated) DEVICE — SUT PDS II 3-0 36" SH

## (undated) DEVICE — ELCTR BIPOLAR CORD J&J 12FT DISP

## (undated) DEVICE — PACK CV SPLIT PACK II